# Patient Record
Sex: FEMALE | Race: WHITE | NOT HISPANIC OR LATINO | Employment: FULL TIME | URBAN - METROPOLITAN AREA
[De-identification: names, ages, dates, MRNs, and addresses within clinical notes are randomized per-mention and may not be internally consistent; named-entity substitution may affect disease eponyms.]

---

## 2017-03-28 ENCOUNTER — LAB CONVERSION - ENCOUNTER (OUTPATIENT)
Dept: OTHER | Facility: OTHER | Age: 72
End: 2017-03-28

## 2017-03-28 LAB
A/G RATIO (HISTORICAL): 1.6 (CALC) (ref 1–2.5)
ALBUMIN SERPL BCP-MCNC: 4.2 G/DL (ref 3.6–5.1)
ALP SERPL-CCNC: 39 U/L (ref 33–130)
ALT SERPL W P-5'-P-CCNC: 20 U/L (ref 6–29)
AST SERPL W P-5'-P-CCNC: 18 U/L (ref 10–35)
BILIRUB SERPL-MCNC: 0.5 MG/DL (ref 0.2–1.2)
BUN SERPL-MCNC: 22 MG/DL (ref 7–25)
BUN/CREA RATIO (HISTORICAL): ABNORMAL (CALC) (ref 6–22)
CALCIUM SERPL-MCNC: 9 MG/DL (ref 8.6–10.4)
CHLORIDE SERPL-SCNC: 101 MMOL/L (ref 98–110)
CHOLEST SERPL-MCNC: 164 MG/DL (ref 125–200)
CHOLEST/HDLC SERPL: 3.3 (CALC)
CO2 SERPL-SCNC: 27 MMOL/L (ref 20–31)
CREAT SERPL-MCNC: 0.85 MG/DL (ref 0.6–0.93)
EGFR AFRICAN AMERICAN (HISTORICAL): 80 ML/MIN/1.73M2
EGFR-AMERICAN CALC (HISTORICAL): 69 ML/MIN/1.73M2
GAMMA GLOBULIN (HISTORICAL): 2.6 G/DL (CALC) (ref 1.9–3.7)
GLUCOSE (HISTORICAL): 190 MG/DL (ref 65–99)
HBA1C MFR BLD HPLC: 7.2 % OF TOTAL HGB
HDLC SERPL-MCNC: 50 MG/DL
LDL CHOLESTEROL (HISTORICAL): 88 MG/DL (CALC)
NON-HDL-CHOL (CHOL-HDL) (HISTORICAL): 114 MG/DL (CALC)
POTASSIUM SERPL-SCNC: 4.3 MMOL/L (ref 3.5–5.3)
SODIUM SERPL-SCNC: 139 MMOL/L (ref 135–146)
TOTAL PROTEIN (HISTORICAL): 6.8 G/DL (ref 6.1–8.1)
TRIGL SERPL-MCNC: 129 MG/DL

## 2017-04-04 ENCOUNTER — ALLSCRIPTS OFFICE VISIT (OUTPATIENT)
Dept: OTHER | Facility: OTHER | Age: 72
End: 2017-04-04

## 2017-04-04 DIAGNOSIS — Z12.31 ENCOUNTER FOR SCREENING MAMMOGRAM FOR MALIGNANT NEOPLASM OF BREAST: ICD-10-CM

## 2017-04-04 DIAGNOSIS — I10 ESSENTIAL (PRIMARY) HYPERTENSION: ICD-10-CM

## 2017-04-04 DIAGNOSIS — E78.00 PURE HYPERCHOLESTEROLEMIA: ICD-10-CM

## 2017-04-04 DIAGNOSIS — E11.9 TYPE 2 DIABETES MELLITUS WITHOUT COMPLICATIONS (HCC): ICD-10-CM

## 2017-09-11 ENCOUNTER — GENERIC CONVERSION - ENCOUNTER (OUTPATIENT)
Dept: OTHER | Facility: OTHER | Age: 72
End: 2017-09-11

## 2017-09-12 ENCOUNTER — GENERIC CONVERSION - ENCOUNTER (OUTPATIENT)
Dept: OTHER | Facility: OTHER | Age: 72
End: 2017-09-12

## 2017-09-13 ENCOUNTER — GENERIC CONVERSION - ENCOUNTER (OUTPATIENT)
Dept: OTHER | Facility: OTHER | Age: 72
End: 2017-09-13

## 2017-09-20 ENCOUNTER — GENERIC CONVERSION - ENCOUNTER (OUTPATIENT)
Dept: OTHER | Facility: OTHER | Age: 72
End: 2017-09-20

## 2017-10-10 ENCOUNTER — GENERIC CONVERSION - ENCOUNTER (OUTPATIENT)
Dept: OTHER | Facility: OTHER | Age: 72
End: 2017-10-10

## 2017-10-12 ENCOUNTER — ALLSCRIPTS OFFICE VISIT (OUTPATIENT)
Dept: OTHER | Facility: OTHER | Age: 72
End: 2017-10-12

## 2017-10-12 DIAGNOSIS — E11.9 TYPE 2 DIABETES MELLITUS WITHOUT COMPLICATIONS (HCC): ICD-10-CM

## 2017-10-12 DIAGNOSIS — E78.00 PURE HYPERCHOLESTEROLEMIA: ICD-10-CM

## 2017-10-12 DIAGNOSIS — I10 ESSENTIAL (PRIMARY) HYPERTENSION: ICD-10-CM

## 2017-10-18 ENCOUNTER — GENERIC CONVERSION - ENCOUNTER (OUTPATIENT)
Dept: OTHER | Facility: OTHER | Age: 72
End: 2017-10-18

## 2017-11-29 ENCOUNTER — GENERIC CONVERSION - ENCOUNTER (OUTPATIENT)
Dept: FAMILY MEDICINE CLINIC | Facility: CLINIC | Age: 72
End: 2017-11-29

## 2017-11-29 ENCOUNTER — GENERIC CONVERSION - ENCOUNTER (OUTPATIENT)
Dept: OTHER | Facility: OTHER | Age: 72
End: 2017-11-29

## 2018-01-12 VITALS
RESPIRATION RATE: 16 BRPM | DIASTOLIC BLOOD PRESSURE: 70 MMHG | HEIGHT: 62 IN | TEMPERATURE: 97.7 F | BODY MASS INDEX: 28.52 KG/M2 | HEART RATE: 68 BPM | SYSTOLIC BLOOD PRESSURE: 128 MMHG | WEIGHT: 155 LBS

## 2018-01-14 VITALS
BODY MASS INDEX: 28.04 KG/M2 | HEIGHT: 62 IN | SYSTOLIC BLOOD PRESSURE: 114 MMHG | WEIGHT: 152.38 LBS | TEMPERATURE: 98 F | DIASTOLIC BLOOD PRESSURE: 82 MMHG | HEART RATE: 60 BPM

## 2018-01-24 LAB
LEFT EYE DIABETIC RETINOPATHY: NORMAL
RIGHT EYE DIABETIC RETINOPATHY: NORMAL

## 2018-05-18 LAB
ALBUMIN SERPL-MCNC: 4.3 G/DL (ref 3.6–5.1)
ALBUMIN/GLOB SERPL: 1.9 (CALC) (ref 1–2.5)
ALP SERPL-CCNC: 30 U/L (ref 33–130)
ALT SERPL-CCNC: 19 U/L (ref 6–29)
AST SERPL-CCNC: 17 U/L (ref 10–35)
BILIRUB SERPL-MCNC: 0.3 MG/DL (ref 0.2–1.2)
BUN SERPL-MCNC: 27 MG/DL (ref 7–25)
BUN/CREAT SERPL: 32 (CALC) (ref 6–22)
CALCIUM SERPL-MCNC: 9.4 MG/DL (ref 8.6–10.4)
CHLORIDE SERPL-SCNC: 104 MMOL/L (ref 98–110)
CHOLEST SERPL-MCNC: 133 MG/DL
CHOLEST/HDLC SERPL: 2.7 (CALC)
CO2 SERPL-SCNC: 26 MMOL/L (ref 20–31)
CREAT SERPL-MCNC: 0.85 MG/DL (ref 0.6–0.93)
GLOBULIN SER CALC-MCNC: 2.3 G/DL (CALC) (ref 1.9–3.7)
GLUCOSE SERPL-MCNC: 159 MG/DL (ref 65–99)
HBA1C MFR BLD: 6.3 % OF TOTAL HGB
HDLC SERPL-MCNC: 50 MG/DL
LDLC SERPL CALC-MCNC: 66 MG/DL (CALC)
NONHDLC SERPL-MCNC: 83 MG/DL (CALC)
POTASSIUM SERPL-SCNC: 4.1 MMOL/L (ref 3.5–5.3)
PROT SERPL-MCNC: 6.6 G/DL (ref 6.1–8.1)
SL AMB EGFR AFRICAN AMERICAN: 80 ML/MIN/1.73M2
SL AMB EGFR NON AFRICAN AMERICAN: 69 ML/MIN/1.73M2
SODIUM SERPL-SCNC: 141 MMOL/L (ref 135–146)
TRIGL SERPL-MCNC: 87 MG/DL

## 2018-05-30 ENCOUNTER — OFFICE VISIT (OUTPATIENT)
Dept: FAMILY MEDICINE CLINIC | Facility: CLINIC | Age: 73
End: 2018-05-30
Payer: COMMERCIAL

## 2018-05-30 VITALS
TEMPERATURE: 96.7 F | SYSTOLIC BLOOD PRESSURE: 180 MMHG | HEART RATE: 64 BPM | BODY MASS INDEX: 26.87 KG/M2 | HEIGHT: 62 IN | DIASTOLIC BLOOD PRESSURE: 102 MMHG | WEIGHT: 146 LBS

## 2018-05-30 DIAGNOSIS — H54.8 LEGALLY BLIND IN LEFT EYE, AS DEFINED IN USA: ICD-10-CM

## 2018-05-30 DIAGNOSIS — E78.00 HYPERCHOLESTEROLEMIA: ICD-10-CM

## 2018-05-30 DIAGNOSIS — I10 ESSENTIAL HYPERTENSION: ICD-10-CM

## 2018-05-30 DIAGNOSIS — E11.8 TYPE 2 DIABETES MELLITUS WITH COMPLICATION, UNSPECIFIED WHETHER LONG TERM INSULIN USE: Primary | ICD-10-CM

## 2018-05-30 DIAGNOSIS — Z13.820 SCREENING FOR OSTEOPOROSIS: ICD-10-CM

## 2018-05-30 PROCEDURE — 99214 OFFICE O/P EST MOD 30 MIN: CPT | Performed by: FAMILY MEDICINE

## 2018-05-30 PROCEDURE — 1101F PT FALLS ASSESS-DOCD LE1/YR: CPT | Performed by: FAMILY MEDICINE

## 2018-05-30 PROCEDURE — 3008F BODY MASS INDEX DOCD: CPT | Performed by: FAMILY MEDICINE

## 2018-05-30 RX ORDER — BRIMONIDINE TARTRATE, TIMOLOL MALEATE 2; 5 MG/ML; MG/ML
SOLUTION/ DROPS OPHTHALMIC
COMMUNITY
End: 2018-05-30 | Stop reason: ALTCHOICE

## 2018-05-30 RX ORDER — METOPROLOL SUCCINATE 50 MG/1
50 TABLET, EXTENDED RELEASE ORAL DAILY
Refills: 2 | COMMUNITY
Start: 2018-04-10 | End: 2018-10-01 | Stop reason: SDUPTHER

## 2018-05-30 RX ORDER — ATROPINE SULFATE 10 MG/ML
1 SOLUTION/ DROPS OPHTHALMIC
COMMUNITY
Start: 2017-09-10 | End: 2018-09-10

## 2018-05-30 RX ORDER — LATANOPROST 50 UG/ML
SOLUTION/ DROPS OPHTHALMIC
COMMUNITY
End: 2018-05-30 | Stop reason: ALTCHOICE

## 2018-05-30 RX ORDER — TRAMADOL HYDROCHLORIDE 50 MG/1
1 TABLET ORAL 3 TIMES DAILY
COMMUNITY
Start: 2011-03-15 | End: 2018-10-01 | Stop reason: ALTCHOICE

## 2018-05-30 RX ORDER — ALPRAZOLAM 0.25 MG/1
1 TABLET ORAL 3 TIMES DAILY PRN
COMMUNITY
Start: 2011-03-20 | End: 2018-10-01 | Stop reason: ALTCHOICE

## 2018-05-30 RX ORDER — OMEPRAZOLE 20 MG/1
20 CAPSULE, DELAYED RELEASE ORAL DAILY
Refills: 3 | COMMUNITY
Start: 2018-05-22 | End: 2018-10-01 | Stop reason: SDUPTHER

## 2018-05-30 RX ORDER — ATORVASTATIN CALCIUM 40 MG/1
40 TABLET, FILM COATED ORAL
Refills: 2 | COMMUNITY
Start: 2018-03-13 | End: 2018-10-01 | Stop reason: ALTCHOICE

## 2018-05-30 NOTE — PROGRESS NOTES
Patient ID: Tripp Leon is a 68 y o  female  HPI: 68 y  o female presenting with/ sees [facility] retina regularly and is under their care jointly with Anjel Walker Jerry [address] [place] [address] for an ongoing infection in her right eye  Her NIDDM, htn, and hypercholesterolemia are all under good control  She is declining going for mammo, dexa and FIT testing at this time because she will be needing extensive surgery to her right eye and is blind in her left and will be left blind until healed  SUBJECTIVE    Family History   Problem Relation Age of Onset    Hypertension Mother      Essential    Hypertension Sister      Essential    Alcohol abuse Brother      Social History     Social History    Marital status: Single     Spouse name: N/A    Number of children: N/A    Years of education: N/A     Occupational History    Not on file       Social History Main Topics    Smoking status: Current Every Day Smoker    Smokeless tobacco: Not on file    Alcohol use No    Drug use: Unknown    Sexual activity: Not on file     Other Topics Concern    Not on file     Social History Narrative    Daily coffee consumption (__cups/day)    Denied: history of daily cola consumption (__ cups/day)    Denied: history of daily tea consumption (__cups/day)    Personal protective equipment seatbelts         Past Medical History:   Diagnosis Date    Atypical chest pain     NSTEMI (non-ST elevated myocardial infarction) ([address] )     Last Assessed: 9/24/2015     Past Surgical History:   Procedure Laterality Date    BACK SURGERY      BLADDER SURGERY      CORONARY ARTERY BYPASS GRAFT      CABGx3 with LIMA to LAD, SVG to PDA, SVG to OM-1; Onset: 9/2/2015    HYSTERECTOMY      POPLITEAL ARTERY ANGIOPLASTY      Femoral-Popliteal    TOE AMPUTATION Right     MTP First Toe    WRIST SURGERY       Allergies not on file    Current Outpatient Prescriptions:     ALPRAZolam (XANAX) 0 25 mg tablet, Take 1 tablet by mouth 3 (three) times a day as needed, Disp: , Rfl:     aspirin 81 MG tablet, Take 1 tablet by mouth daily, Disp: , Rfl:     atorvastatin (LIPITOR) 40 mg tablet, Take 40 mg by mouth daily at bedtime, Disp: , Rfl: 2    atropine (ISOPTO ATROPINE) 1 % ophthalmic solution, 1 drop, Disp: , Rfl:     metFORMIN (GLUCOPHAGE) 1000 MG tablet, Take 1,000 mg by mouth, Disp: , Rfl:     metoprolol succinate (TOPROL-XL) 50 mg 24 hr tablet, Take 50 mg by mouth daily, Disp: , Rfl: 2    Multiple Vitamin (MULTIVITAMINS PO), Take 1 capsule by mouth daily, Disp: , Rfl:     Multiple Vitamins-Minerals (VISION-YOLANDA PRESERVE PO), Take 1 tablet by mouth daily, Disp: , Rfl:     omeprazole (PriLOSEC) 20 mg delayed release capsule, Take 20 mg by mouth daily, Disp: , Rfl: 3    traMADol (ULTRAM) 50 mg tablet, Take 1 tablet by mouth 3 (three) times a day, Disp: , Rfl:     Review of Systems  Constitutional:     Denies fever, chills ,fatigue ,weakness ,weight loss, weight gain     ENT: Denies earache ,loss of hearing ,nosebleed, nasal discharge,nasal congestion ,sore throat ,hoarseness  Pulmonary: Denies shortness of breath ,cough  ,dyspnea on exertion, orthopnea  ,PND   Cardiovascular:  Denies bradycardia , tachycardia  ,palpations, lower extremity edema leg, claudication  Breast:  Denies new or changing breast lumps ,nipple discharge ,nipple changes  Abdomen:  Denies abdominal pain , anorexia , indigestion, nausea, vomiting, constipation, diarrhea  Musculoskeletal: Denies myalgias, arthralgias, joint swelling, joint stiffness , limb pain, limb swelling  Gu: denies dysuria, polyuria  Skin: Denies skin rash, skin lesion, skin wound, itching, dry skin  Neuro: Denies headache, numbness, tingling, confusion, loss of consciousness, dizziness, vertigo  Psychiatric: Denies feelings of depression, suicidal ideation, anxiety, sleep disturbances    OBJECTIVE    Constitutional:   NAD, well appearing and well nourished      ENT:   Conjunctiva and lids: no injection, edema, or discharge     Pupils and iris: BON bilaterally    External inspection of ears and nose: normal without deformities or discharge  Otoscopic exam: Canals patent without erythema  Nasal mucosa, septum and turbinates: Normal or edema or discharge         Oropharynx:  Moist mucosa, normal tongue and tonsils without lesions  No erythema        Pulmonary:Respiratory effort normal rate and rhythm, no increased work of breathing  Auscultation of lungs:  Clear bilaterally with no adventitious breath sounds       Cardiovascular: regular rate and rhythm, S1 and S2, no murmur, no edema and/or varicosities of LE      Abdomen: Soft and non-distended     Positive bowel sounds      No heptomegaly or splenomegaly      Gu: no suprapubic tenderness or CVA tenderness, no urethral discharge  Lymphatic:  No anterior or posterior cervical lymphadenopathy         Musculoskeletal:  Gait and station: Normal gait      Digits and nails normal without clubbing or cyanosis       Inspection/palpation of joints, bones, and muscles:  No joint tenderness, swelling, full active and passive range of motion       Skin: Normal skin turgor and no rashes      Neuro:    Normal reflexes     Psych:   alert and oriented to person, place and time     normal mood and affect   Patient's shoes and socks removed  Right Foot/Ankle   Right Foot Inspection  Skin Exam: skin normal and skin intact no dry skin, no warmth, no callus, no erythema, no maceration, no abnormal color, no pre-ulcer, no ulcer and no callus                          Toe Exam: ROM and strength within normal limitsno swelling, no tenderness, erythema and  no right toe deformity  Sensory   Vibration: intact  Proprioception: intact   Monofilament testing: intact  Vascular  Capillary refills: < 3 seconds  The right DP pulse is 2+  The right PT pulse is 2+       Left Foot/Ankle  Left Foot Inspection  Skin Exam: skin normal and skin intactno dry skin, no warmth, no erythema, no maceration, normal color, no pre-ulcer, no ulcer and no callus                         Toe Exam: ROM and strength within normal limitsno swelling, no tenderness, no erythema and no left toe deformity                   Sensory   Vibration: intact  Proprioception: intact  Monofilament: intact  Vascular  Capillary refills: < 3 seconds  The left DP pulse is 2+  The left PT pulse is 2+  Assign Risk Category:  No deformity present; No loss of protective sensation; No weak pulses       Risk: 0    Assessment/Plan:Diagnoses and all orders for this visit:    Type 2 diabetes mellitus with complication, unspecified whether long term insulin use (HCC)  -     Microalbumin,Urine  -     HEMOGLOBIN A1C W/ EAG ESTIMATION; Future    Essential hypertension  -     Comprehensive metabolic panel; Future    Hypercholesterolemia  -     Lipid Panel with Direct LDL reflex; Future    Screening for osteoporosis    Legally blind in left eye, as defined in [place]    Other orders  -     Cancel: Mammo screening bilateral w cad; Future  -     Cancel: DXA bone density spine hip and pelvis; Future  -     Cancel: Ambulatory referral to Colorectal Surgery; Future  -     Cancel: Ambulatory referral to Ophthalmology; Future  -     ALPRAZolam (XANAX) 0 25 mg tablet; Take 1 tablet by mouth 3 (three) times a day as needed  -     aspirin 81 MG tablet; Take 1 tablet by mouth daily  -     atorvastatin (LIPITOR) 40 mg tablet; Take 40 mg by mouth daily at bedtime  -     atropine (ISOPTO ATROPINE) 1 % ophthalmic solution; 1 drop  -     Discontinue: Besifloxacin HCl 0 6 % SUSP; Apply 1 drop to eye  -     Discontinue: brimonidine-timolol (COMBIGAN) 0 2-0 5 %; Apply to eye  -     Discontinue: latanoprost (XALATAN) 0 005 % ophthalmic solution; Apply to eye  -     metFORMIN (GLUCOPHAGE) 1000 MG tablet; Take 1,000 mg by mouth  -     metoprolol succinate (TOPROL-XL) 50 mg 24 hr tablet; Take 50 mg by mouth daily  -     Multiple Vitamin (MULTIVITAMINS PO);  Take 1 capsule by mouth daily  -     omeprazole (PriLOSEC) 20 mg delayed release capsule; Take 20 mg by mouth daily  -     traMADol (ULTRAM) 50 mg tablet; Take 1 tablet by mouth 3 (three) times a day  -     Multiple Vitamins-Minerals (VISION-YOLANDA PRESERVE PO); Take 1 tablet by mouth daily        I will see patient back in 4 mos or sooner prn

## 2018-05-31 DIAGNOSIS — E78.00 HYPERCHOLESTEROLEMIA: ICD-10-CM

## 2018-05-31 DIAGNOSIS — M54.50 LUMBAR BACK PAIN: ICD-10-CM

## 2018-05-31 DIAGNOSIS — F41.9 ANXIETY: Primary | ICD-10-CM

## 2018-05-31 DIAGNOSIS — E13.9 DIABETES 1.5, MANAGED AS TYPE 2 (HCC): ICD-10-CM

## 2018-05-31 RX ORDER — ALPRAZOLAM 0.25 MG/1
0.25 TABLET ORAL 3 TIMES DAILY PRN
Qty: 270 TABLET | Refills: 0 | Status: SHIPPED | OUTPATIENT
Start: 2018-05-31 | End: 2018-10-01 | Stop reason: SDUPTHER

## 2018-05-31 RX ORDER — TRAMADOL HYDROCHLORIDE 50 MG/1
50 TABLET ORAL EVERY 8 HOURS PRN
Qty: 90 TABLET | Refills: 0 | Status: SHIPPED | OUTPATIENT
Start: 2018-05-31 | End: 2018-10-01 | Stop reason: SDUPTHER

## 2018-05-31 RX ORDER — ATORVASTATIN CALCIUM 40 MG/1
40 TABLET, FILM COATED ORAL DAILY
Qty: 90 TABLET | Refills: 1 | Status: SHIPPED | OUTPATIENT
Start: 2018-05-31 | End: 2018-10-01 | Stop reason: SDUPTHER

## 2018-07-30 LAB
LEFT EYE DIABETIC RETINOPATHY: NORMAL
RIGHT EYE DIABETIC RETINOPATHY: NORMAL

## 2018-07-30 PROCEDURE — 3072F LOW RISK FOR RETINOPATHY: CPT | Performed by: FAMILY MEDICINE

## 2018-09-24 LAB
LEFT EYE DIABETIC RETINOPATHY: NORMAL
RIGHT EYE DIABETIC RETINOPATHY: NORMAL

## 2018-09-24 PROCEDURE — 3072F LOW RISK FOR RETINOPATHY: CPT | Performed by: FAMILY MEDICINE

## 2018-10-01 ENCOUNTER — OFFICE VISIT (OUTPATIENT)
Dept: FAMILY MEDICINE CLINIC | Facility: CLINIC | Age: 73
End: 2018-10-01
Payer: COMMERCIAL

## 2018-10-01 VITALS
HEART RATE: 70 BPM | TEMPERATURE: 95.8 F | SYSTOLIC BLOOD PRESSURE: 144 MMHG | DIASTOLIC BLOOD PRESSURE: 86 MMHG | HEIGHT: 62 IN | WEIGHT: 143 LBS | BODY MASS INDEX: 26.31 KG/M2

## 2018-10-01 DIAGNOSIS — Z23 NEED FOR VACCINATION: ICD-10-CM

## 2018-10-01 DIAGNOSIS — M54.50 LUMBAR BACK PAIN: ICD-10-CM

## 2018-10-01 DIAGNOSIS — F41.9 ANXIETY: ICD-10-CM

## 2018-10-01 DIAGNOSIS — Z12.11 SCREENING FOR COLON CANCER: ICD-10-CM

## 2018-10-01 DIAGNOSIS — I10 ESSENTIAL HYPERTENSION: ICD-10-CM

## 2018-10-01 DIAGNOSIS — Z13.820 SCREENING FOR OSTEOPOROSIS: ICD-10-CM

## 2018-10-01 DIAGNOSIS — K21.9 GASTROESOPHAGEAL REFLUX DISEASE WITHOUT ESOPHAGITIS: ICD-10-CM

## 2018-10-01 DIAGNOSIS — J01.90 ACUTE SINUSITIS, RECURRENCE NOT SPECIFIED, UNSPECIFIED LOCATION: ICD-10-CM

## 2018-10-01 DIAGNOSIS — E13.9 DIABETES 1.5, MANAGED AS TYPE 2 (HCC): ICD-10-CM

## 2018-10-01 DIAGNOSIS — Z12.39 SCREENING FOR BREAST CANCER: ICD-10-CM

## 2018-10-01 DIAGNOSIS — E11.8 TYPE 2 DIABETES MELLITUS WITH COMPLICATION, UNSPECIFIED WHETHER LONG TERM INSULIN USE: Primary | ICD-10-CM

## 2018-10-01 DIAGNOSIS — E78.00 HYPERCHOLESTEROLEMIA: ICD-10-CM

## 2018-10-01 PROCEDURE — 90471 IMMUNIZATION ADMIN: CPT | Performed by: FAMILY MEDICINE

## 2018-10-01 PROCEDURE — 90662 IIV NO PRSV INCREASED AG IM: CPT | Performed by: FAMILY MEDICINE

## 2018-10-01 PROCEDURE — 1160F RVW MEDS BY RX/DR IN RCRD: CPT | Performed by: FAMILY MEDICINE

## 2018-10-01 PROCEDURE — 99213 OFFICE O/P EST LOW 20 MIN: CPT | Performed by: FAMILY MEDICINE

## 2018-10-01 RX ORDER — METOPROLOL SUCCINATE 50 MG/1
50 TABLET, EXTENDED RELEASE ORAL DAILY
Qty: 90 TABLET | Refills: 3 | Status: SHIPPED | OUTPATIENT
Start: 2018-10-01 | End: 2019-04-11 | Stop reason: SDUPTHER

## 2018-10-01 RX ORDER — OMEPRAZOLE 20 MG/1
20 CAPSULE, DELAYED RELEASE ORAL DAILY
Qty: 90 CAPSULE | Refills: 3 | Status: SHIPPED | OUTPATIENT
Start: 2018-10-01 | End: 2019-04-11 | Stop reason: SDUPTHER

## 2018-10-01 RX ORDER — TRAMADOL HYDROCHLORIDE 50 MG/1
50 TABLET ORAL EVERY 8 HOURS PRN
Qty: 270 TABLET | Refills: 3 | Status: SHIPPED | OUTPATIENT
Start: 2018-10-01 | End: 2018-12-30

## 2018-10-01 RX ORDER — BROMPHENIRAMINE MALEATE, PSEUDOEPHEDRINE HYDROCHLORIDE, AND DEXTROMETHORPHAN HYDROBROMIDE 2; 30; 10 MG/5ML; MG/5ML; MG/5ML
5 SYRUP ORAL 4 TIMES DAILY PRN
Qty: 180 ML | Refills: 0 | Status: SHIPPED | OUTPATIENT
Start: 2018-10-01 | End: 2019-04-11 | Stop reason: ALTCHOICE

## 2018-10-01 RX ORDER — ALPRAZOLAM 0.25 MG/1
0.25 TABLET ORAL 3 TIMES DAILY PRN
Qty: 270 TABLET | Refills: 3 | Status: SHIPPED | OUTPATIENT
Start: 2018-10-01 | End: 2019-04-11 | Stop reason: SDUPTHER

## 2018-10-01 RX ORDER — ATORVASTATIN CALCIUM 40 MG/1
40 TABLET, FILM COATED ORAL DAILY
Qty: 90 TABLET | Refills: 3 | Status: SHIPPED | OUTPATIENT
Start: 2018-10-01 | End: 2019-04-11 | Stop reason: SDUPTHER

## 2018-10-01 RX ORDER — CIPROFLOXACIN 500 MG/1
500 TABLET, FILM COATED ORAL EVERY 12 HOURS SCHEDULED
Qty: 20 TABLET | Refills: 0 | Status: SHIPPED | OUTPATIENT
Start: 2018-10-01 | End: 2018-10-11

## 2018-10-01 NOTE — PROGRESS NOTES
Patient ID: Mini Canales is a 68 y o  female  HPI: 68 y  o female presents for follow up of NIDDM, HTN, and hyerhcolesteroelmia  All are well controlled at this time  The pateint is due for a mammmogram as well as a dexa scan  She is also requesting a flu vaccine today  SUBJECTIVE    Family History   Problem Relation Age of Onset    Hypertension Mother         Essential    Hypertension Sister         Essential    Alcohol abuse Brother      Social History     Social History    Marital status: Single     Spouse name: N/A    Number of children: N/A    Years of education: N/A     Occupational History    Not on file       Social History Main Topics    Smoking status: Current Every Day Smoker    Smokeless tobacco: Not on file    Alcohol use No    Drug use: Unknown    Sexual activity: Not on file     Other Topics Concern    Not on file     Social History Narrative    Daily coffee consumption (__cups/day)    Denied: history of daily cola consumption (__ cups/day)    Denied: history of daily tea consumption (__cups/day)    Personal protective equipment seatbelts         Past Medical History:   Diagnosis Date    Atypical chest pain     [facility]     Last Assessed: 9/24/2015     Past Surgical History:   Procedure Laterality Date    BACK SURGERY      BLADDER SURGERY      CORONARY ARTERY BYPASS GRAFT      CABGx3 with LIMA to LAD, SVG to PDA, SVG to OM-1; Onset: 9/2/2015    HYSTERECTOMY      POPLITEAL ARTERY ANGIOPLASTY      Femoral-Popliteal    TOE AMPUTATION Right     MTP First Toe    WRIST SURGERY       No Known Allergies    Current Outpatient Prescriptions:     ALPRAZolam (XANAX) 0 25 mg tablet, Take 1 tablet (0 25 mg total) by mouth 3 (three) times a day as needed for anxiety for up to 90 days, Disp: 270 tablet, Rfl: 3    aspirin 81 MG tablet, Take 1 tablet by mouth daily, Disp: , Rfl:     atorvastatin (LIPITOR) 40 mg tablet, Take 1 tablet (40 mg total) by mouth daily, Disp: 90 tablet, Rfl: 3    metFORMIN (GLUCOPHAGE) 500 mg tablet, Take 1 tablet (500 mg total) by mouth 2 (two) times a day with meals for 90 days, Disp: 180 tablet, Rfl: 3    metoprolol succinate (TOPROL-XL) 50 mg 24 hr tablet, Take 1 tablet (50 mg total) by mouth daily, Disp: 90 tablet, Rfl: 3    Multiple Vitamin (MULTIVITAMINS PO), Take 1 capsule by mouth daily, Disp: , Rfl:     Multiple Vitamins-Minerals (VISION-YOLANDA PRESERVE PO), Take 1 tablet by mouth daily, Disp: , Rfl:     Multiple Vitamins-Minerals (VISION-YOLANDA PRESERVE PO), Take by mouth, Disp: , Rfl:     omeprazole (PriLOSEC) 20 mg delayed release capsule, Take 1 capsule (20 mg total) by mouth daily for 90 days, Disp: 90 capsule, Rfl: 3    traMADol (ULTRAM) 50 mg tablet, Take 1 tablet (50 mg total) by mouth every 8 (eight) hours as needed for moderate pain for up to 90 days, Disp: 270 tablet, Rfl: 3    brompheniramine-pseudoephedrine-DM 30-2-10 MG/5ML syrup, Take 5 mL by mouth 4 (four) times a day as needed for congestion or cough, Disp: 180 mL, Rfl: 0    ciprofloxacin (CIPRO) 500 mg tablet, Take 1 tablet (500 mg total) by mouth every 12 (twelve) hours for 10 days, Disp: 20 tablet, Rfl: 0    Review of Systems  Constitutional:     Denies fever, chills ,fatigue ,weakness ,weight loss, weight gain     ENT: Denies earache ,loss of hearing ,nosebleed, nasal discharge,nasal congestion ,sore throat ,hoarseness  Pulmonary: Denies shortness of breath ,cough  ,dyspnea on exertion, orthopnea  ,PND   Cardiovascular:  Denies bradycardia , tachycardia  ,palpations, lower extremity edema leg, claudication  Breast:  Denies new or changing breast lumps ,nipple discharge ,nipple changes  Abdomen:  Denies abdominal pain , anorexia , indigestion, nausea, vomiting, constipation, diarrhea  Musculoskeletal: Denies myalgias, arthralgias, joint swelling, joint stiffness , limb pain, limb swelling  Gu: denies dysuria, polyuria  Skin: Denies skin rash, skin lesion, skin wound, itching, dry skin  Neuro: Denies headache, numbness, tingling, confusion, loss of consciousness, dizziness, vertigo  Psychiatric: Denies feelings of depression, suicidal ideation, anxiety, sleep disturbances    OBJECTIVE  /86   Pulse 70   Temp (!) 95 8 °F (35 4 °C)   Ht 5' 1 5" (1 562 m)   Wt 64 9 kg (143 lb)   BMI 26 58 kg/m²   Constitutional:   NAD, well appearing and well nourished      ENT:   Conjunctiva and lids: no injection, edema, or discharge     Pupils and iris: BON bilaterally    External inspection of ears and nose: normal without deformities or discharge  Otoscopic exam: Canals patent without erythema  Nasal mucosa, septum and turbinates: Normal or edema or discharge         Oropharynx:  Moist mucosa, normal tongue and tonsils without lesions  No erythema      Pulmonary:Respiratory effort normal rate and rhythm, no increased work of breathing  Auscultation of lungs:  Clear bilaterally with no adventitious breath sounds       Cardiovascular: regular rate and rhythm, S1 and S2, no murmur, no edema and/or varicosities of LE      Abdomen: Soft and non-distended     Positive bowel sounds      No heptomegaly or splenomegaly      Gu: no suprapubic tenderness or CVA tenderness, no urethral discharge  Lymphatic:  No anterior or posterior cervical lymphadenopathy         Musculoskeletal:  Gait and station: Normal gait      Digits and nails normal without clubbing or cyanosis       Inspection/palpation of joints, bones, and muscles:  No joint tenderness, swelling, full active and passive range of motion       Skin: Normal skin turgor and no rashes      Neuro:    Normal reflexes     Psych:   alert and oriented to person, place and time   normal mood and affect   Patient's shoes and socks removed  Right Foot/Ankle   Right Foot Inspection  Skin Exam: skin normal and skin intact no dry skin, no warmth, no callus, no erythema, no maceration, no abnormal color, no pre-ulcer, no ulcer and no callus                          Toe Exam: ROM and strength within normal limitsno swelling, no tenderness, erythema and  no right toe deformity  Sensory   Vibration: intact  Proprioception: intact   Monofilament testing: intact  Vascular  Capillary refills: < 3 seconds  The right DP pulse is 2+  The right PT pulse is 2+  Left Foot/Ankle  Left Foot Inspection  Skin Exam: skin normal and skin intactno dry skin, no warmth, no erythema, no maceration, normal color, no pre-ulcer, no ulcer and no callus                         Toe Exam: ROM and strength within normal limitsno swelling, no tenderness, no erythema and no left toe deformity                   Sensory   Vibration: intact  Proprioception: intact  Monofilament: intact  Vascular  Capillary refills: < 3 seconds  The left DP pulse is 2+  The left PT pulse is 2+  Assign Risk Category:  No deformity present; No loss of protective sensation; No weak pulses       Risk: 0      Assessment/Plan:Diagnoses and all orders for this visit:    Type 2 diabetes mellitus with complication, unspecified whether long term insulin use (HCC)  -     Microalbumin,Urine    Essential hypertension  -     metoprolol succinate (TOPROL-XL) 50 mg 24 hr tablet; Take 1 tablet (50 mg total) by mouth daily    Screening for breast cancer  -     Mammo screening bilateral w cad; Future    Screening for osteoporosis  -     DXA bone density spine hip and pelvis; Future    Screening for colon cancer  -     Ambulatory referral to Colorectal Surgery; Future    Need for vaccination  -     influenza vaccine, 4523-2076, high-dose, PF 0 5 mL, for patients 65 yr+ (FLUZONE HIGH-DOSE)    Acute sinusitis, recurrence not specified, unspecified location  -     brompheniramine-pseudoephedrine-DM 30-2-10 MG/5ML syrup; Take 5 mL by mouth 4 (four) times a day as needed for congestion or cough  -     ciprofloxacin (CIPRO) 500 mg tablet;  Take 1 tablet (500 mg total) by mouth every 12 (twelve) hours for 10 days    Anxiety  -     ALPRAZolam (XANAX) 0 25 mg tablet; Take 1 tablet (0 25 mg total) by mouth 3 (three) times a day as needed for anxiety for up to 90 days    Gastroesophageal reflux disease without esophagitis  -     omeprazole (PriLOSEC) 20 mg delayed release capsule; Take 1 capsule (20 mg total) by mouth daily for 90 days    Lumbar back pain  -     traMADol (ULTRAM) 50 mg tablet; Take 1 tablet (50 mg total) by mouth every 8 (eight) hours as needed for moderate pain for up to 90 days    Diabetes 1 5, managed as type 2 (HCC)  -     metFORMIN (GLUCOPHAGE) 500 mg tablet; Take 1 tablet (500 mg total) by mouth 2 (two) times a day with meals for 90 days    Hypercholesterolemia  -     atorvastatin (LIPITOR) 40 mg tablet; Take 1 tablet (40 mg total) by mouth daily    Other orders  -     Multiple Vitamins-Minerals (VISION-YOLANDA PRESERVE PO); Take by mouth        Reviewed with patient plan to treat with the above plan       Patient instructed to call in 72 hours if not feeling better or if symptoms worsen

## 2018-10-02 ENCOUNTER — TELEPHONE (OUTPATIENT)
Dept: FAMILY MEDICINE CLINIC | Facility: CLINIC | Age: 73
End: 2018-10-02

## 2018-10-02 NOTE — TELEPHONE ENCOUNTER
Pt needed prior auth for tramadol 50mg tabs, I did it an it was denied due to there not being a Validated Opioid Risk Tool, please addend last office visit stating you are doing one of the following:    - CAGE adapted to include drugs  - Screener and Opioid Assessment for patients with pain (SOAPP-R)  - Current Opioid Misuse Measure  - DIRE  - The Screening Instrument for Substance Abuse Potential

## 2018-10-03 NOTE — TELEPHONE ENCOUNTER
Called [facility] and they sent over a new form to be filled out and will also attach a comm screening tool - Dr Paytno is currently working on this and I will fax over when completed

## 2018-10-03 NOTE — TELEPHONE ENCOUNTER
Attempted to call insurance company twice and could not get a live person, spoke with manager Julieta, and she is going to see if she can get through to anyone for one of these forms

## 2018-10-09 ENCOUNTER — TELEPHONE (OUTPATIENT)
Dept: FAMILY MEDICINE CLINIC | Facility: CLINIC | Age: 73
End: 2018-10-09

## 2018-10-09 DIAGNOSIS — R19.7 DIARRHEA, UNSPECIFIED TYPE: Primary | ICD-10-CM

## 2018-10-09 RX ORDER — DIPHENOXYLATE HYDROCHLORIDE AND ATROPINE SULFATE 2.5; .025 MG/1; MG/1
1 TABLET ORAL 4 TIMES DAILY PRN
Qty: 30 TABLET | Refills: 0 | Status: SHIPPED | OUTPATIENT
Start: 2018-10-09 | End: 2019-08-27 | Stop reason: SDUPTHER

## 2018-10-09 NOTE — TELEPHONE ENCOUNTER
The flu vaccine is not a live virus; besides the flu is a respiratory virus and not gi  in nature   If she wants me to call in lomotil I can; find out what pharmacy

## 2018-10-09 NOTE — TELEPHONE ENCOUNTER
Had the flu shot last Monday   She says the day after she started with diarrhea  And she still has today        Please advise

## 2019-01-14 LAB
LEFT EYE DIABETIC RETINOPATHY: NORMAL
RIGHT EYE DIABETIC RETINOPATHY: NORMAL

## 2019-01-14 PROCEDURE — 3072F LOW RISK FOR RETINOPATHY: CPT | Performed by: FAMILY MEDICINE

## 2019-01-25 LAB
ALBUMIN SERPL-MCNC: 4.5 G/DL (ref 3.6–5.1)
ALBUMIN/GLOB SERPL: 1.9 (CALC) (ref 1–2.5)
ALP SERPL-CCNC: 31 U/L (ref 33–130)
ALT SERPL-CCNC: 9 U/L (ref 6–29)
AST SERPL-CCNC: 12 U/L (ref 10–35)
BILIRUB SERPL-MCNC: 0.7 MG/DL (ref 0.2–1.2)
BUN SERPL-MCNC: 20 MG/DL (ref 7–25)
BUN/CREAT SERPL: ABNORMAL (CALC) (ref 6–22)
CALCIUM SERPL-MCNC: 9.3 MG/DL (ref 8.6–10.4)
CHLORIDE SERPL-SCNC: 101 MMOL/L (ref 98–110)
CHOLEST SERPL-MCNC: 157 MG/DL
CHOLEST/HDLC SERPL: 2.9 (CALC)
CO2 SERPL-SCNC: 29 MMOL/L (ref 20–32)
CREAT SERPL-MCNC: 0.72 MG/DL (ref 0.6–0.93)
EST. AVERAGE GLUCOSE BLD GHB EST-MCNC: 140 (CALC)
EST. AVERAGE GLUCOSE BLD GHB EST-SCNC: 7.7 (CALC)
GLOBULIN SER CALC-MCNC: 2.4 G/DL (CALC) (ref 1.9–3.7)
GLUCOSE SERPL-MCNC: 196 MG/DL (ref 65–99)
HBA1C MFR BLD: 6.5 % OF TOTAL HGB
HDLC SERPL-MCNC: 55 MG/DL
LDLC SERPL CALC-MCNC: 78 MG/DL (CALC)
MICROALBUMIN UR-MCNC: 1.3 MG/DL
NONHDLC SERPL-MCNC: 102 MG/DL (CALC)
POTASSIUM SERPL-SCNC: 4 MMOL/L (ref 3.5–5.3)
PROT SERPL-MCNC: 6.9 G/DL (ref 6.1–8.1)
SL AMB EGFR AFRICAN AMERICAN: 97 ML/MIN/1.73M2
SL AMB EGFR NON AFRICAN AMERICAN: 84 ML/MIN/1.73M2
SODIUM SERPL-SCNC: 137 MMOL/L (ref 135–146)
TRIGL SERPL-MCNC: 142 MG/DL

## 2019-03-15 LAB
LEFT EYE DIABETIC RETINOPATHY: NORMAL
RIGHT EYE DIABETIC RETINOPATHY: NORMAL

## 2019-04-11 ENCOUNTER — OFFICE VISIT (OUTPATIENT)
Dept: FAMILY MEDICINE CLINIC | Facility: CLINIC | Age: 74
End: 2019-04-11
Payer: COMMERCIAL

## 2019-04-11 VITALS
BODY MASS INDEX: 24.07 KG/M2 | HEIGHT: 62 IN | SYSTOLIC BLOOD PRESSURE: 130 MMHG | WEIGHT: 130.8 LBS | DIASTOLIC BLOOD PRESSURE: 78 MMHG | HEART RATE: 70 BPM | TEMPERATURE: 98.2 F

## 2019-04-11 DIAGNOSIS — I10 ESSENTIAL HYPERTENSION: ICD-10-CM

## 2019-04-11 DIAGNOSIS — F41.9 ANXIETY: ICD-10-CM

## 2019-04-11 DIAGNOSIS — E78.00 HYPERCHOLESTEROLEMIA: ICD-10-CM

## 2019-04-11 DIAGNOSIS — E11.8 TYPE 2 DIABETES MELLITUS WITH COMPLICATION, UNSPECIFIED WHETHER LONG TERM INSULIN USE: Primary | ICD-10-CM

## 2019-04-11 DIAGNOSIS — Z12.11 COLON CANCER SCREENING: ICD-10-CM

## 2019-04-11 DIAGNOSIS — R63.4 WEIGHT LOSS: ICD-10-CM

## 2019-04-11 DIAGNOSIS — E13.9 DIABETES 1.5, MANAGED AS TYPE 2 (HCC): ICD-10-CM

## 2019-04-11 DIAGNOSIS — K21.9 GASTROESOPHAGEAL REFLUX DISEASE WITHOUT ESOPHAGITIS: ICD-10-CM

## 2019-04-11 PROCEDURE — 99214 OFFICE O/P EST MOD 30 MIN: CPT | Performed by: FAMILY MEDICINE

## 2019-04-11 PROCEDURE — 3075F SYST BP GE 130 - 139MM HG: CPT | Performed by: FAMILY MEDICINE

## 2019-04-11 PROCEDURE — 3078F DIAST BP <80 MM HG: CPT | Performed by: FAMILY MEDICINE

## 2019-04-11 PROCEDURE — 1160F RVW MEDS BY RX/DR IN RCRD: CPT | Performed by: FAMILY MEDICINE

## 2019-04-11 PROCEDURE — 3008F BODY MASS INDEX DOCD: CPT | Performed by: FAMILY MEDICINE

## 2019-04-11 PROCEDURE — 1101F PT FALLS ASSESS-DOCD LE1/YR: CPT | Performed by: FAMILY MEDICINE

## 2019-04-11 RX ORDER — ALPRAZOLAM 0.25 MG/1
0.25 TABLET ORAL 3 TIMES DAILY PRN
Qty: 270 TABLET | Refills: 3 | Status: SHIPPED | OUTPATIENT
Start: 2019-04-11 | End: 2020-01-06 | Stop reason: SDUPTHER

## 2019-04-11 RX ORDER — ATORVASTATIN CALCIUM 40 MG/1
40 TABLET, FILM COATED ORAL DAILY
Qty: 90 TABLET | Refills: 3 | Status: SHIPPED | OUTPATIENT
Start: 2019-04-11 | End: 2019-08-27 | Stop reason: SDUPTHER

## 2019-04-11 RX ORDER — OMEPRAZOLE 20 MG/1
20 CAPSULE, DELAYED RELEASE ORAL DAILY
Qty: 90 CAPSULE | Refills: 3 | Status: SHIPPED | OUTPATIENT
Start: 2019-04-11 | End: 2019-08-27 | Stop reason: SDUPTHER

## 2019-04-11 RX ORDER — TRAMADOL HYDROCHLORIDE 50 MG/1
50 TABLET ORAL AS NEEDED
COMMUNITY
End: 2019-08-27 | Stop reason: SDUPTHER

## 2019-04-11 RX ORDER — METOPROLOL SUCCINATE 50 MG/1
50 TABLET, EXTENDED RELEASE ORAL DAILY
Qty: 90 TABLET | Refills: 3 | Status: SHIPPED | OUTPATIENT
Start: 2019-04-11 | End: 2019-08-27 | Stop reason: SDUPTHER

## 2019-04-25 ENCOUNTER — TELEPHONE (OUTPATIENT)
Dept: FAMILY MEDICINE CLINIC | Facility: CLINIC | Age: 74
End: 2019-04-25

## 2019-05-06 LAB
LEFT EYE DIABETIC RETINOPATHY: NORMAL
RIGHT EYE DIABETIC RETINOPATHY: NORMAL

## 2019-05-09 LAB
LEFT EYE DIABETIC RETINOPATHY: NORMAL
RIGHT EYE DIABETIC RETINOPATHY: NORMAL

## 2019-06-11 LAB
ALBUMIN SERPL-MCNC: 4.6 G/DL (ref 3.6–5.1)
ALBUMIN/GLOB SERPL: 1.9 (CALC) (ref 1–2.5)
ALP SERPL-CCNC: 32 U/L (ref 33–130)
ALT SERPL-CCNC: 12 U/L (ref 6–29)
AST SERPL-CCNC: 14 U/L (ref 10–35)
BILIRUB SERPL-MCNC: 0.7 MG/DL (ref 0.2–1.2)
BUN SERPL-MCNC: 18 MG/DL (ref 7–25)
BUN/CREAT SERPL: ABNORMAL (CALC) (ref 6–22)
CALCIUM SERPL-MCNC: 9.7 MG/DL (ref 8.6–10.4)
CHLORIDE SERPL-SCNC: 101 MMOL/L (ref 98–110)
CHOLEST SERPL-MCNC: 142 MG/DL
CHOLEST/HDLC SERPL: 2.6 (CALC)
CO2 SERPL-SCNC: 29 MMOL/L (ref 20–32)
CREAT SERPL-MCNC: 0.76 MG/DL (ref 0.6–0.93)
EST. AVERAGE GLUCOSE BLD GHB EST-MCNC: 128 (CALC)
EST. AVERAGE GLUCOSE BLD GHB EST-SCNC: 7.1 (CALC)
GLOBULIN SER CALC-MCNC: 2.4 G/DL (CALC) (ref 1.9–3.7)
GLUCOSE SERPL-MCNC: 154 MG/DL (ref 65–99)
HBA1C MFR BLD: 6.1 % OF TOTAL HGB
HDLC SERPL-MCNC: 54 MG/DL
LDLC SERPL CALC-MCNC: 69 MG/DL (CALC)
NONHDLC SERPL-MCNC: 88 MG/DL (CALC)
POTASSIUM SERPL-SCNC: 4 MMOL/L (ref 3.5–5.3)
PROT SERPL-MCNC: 7 G/DL (ref 6.1–8.1)
SL AMB EGFR AFRICAN AMERICAN: 90 ML/MIN/1.73M2
SL AMB EGFR NON AFRICAN AMERICAN: 78 ML/MIN/1.73M2
SODIUM SERPL-SCNC: 139 MMOL/L (ref 135–146)
TRIGL SERPL-MCNC: 106 MG/DL
TSH SERPL-ACNC: 1.89 MIU/L (ref 0.4–4.5)

## 2019-08-27 ENCOUNTER — OFFICE VISIT (OUTPATIENT)
Dept: FAMILY MEDICINE CLINIC | Facility: CLINIC | Age: 74
End: 2019-08-27
Payer: COMMERCIAL

## 2019-08-27 VITALS
HEART RATE: 72 BPM | TEMPERATURE: 98.1 F | HEIGHT: 62 IN | DIASTOLIC BLOOD PRESSURE: 84 MMHG | SYSTOLIC BLOOD PRESSURE: 122 MMHG | BODY MASS INDEX: 23.25 KG/M2 | WEIGHT: 126.38 LBS

## 2019-08-27 DIAGNOSIS — E78.00 HYPERCHOLESTEROLEMIA: ICD-10-CM

## 2019-08-27 DIAGNOSIS — Z23 NEED FOR VACCINATION: ICD-10-CM

## 2019-08-27 DIAGNOSIS — I10 ESSENTIAL HYPERTENSION: ICD-10-CM

## 2019-08-27 DIAGNOSIS — R19.7 DIARRHEA, UNSPECIFIED TYPE: ICD-10-CM

## 2019-08-27 DIAGNOSIS — Z12.39 BREAST CANCER SCREENING: ICD-10-CM

## 2019-08-27 DIAGNOSIS — E13.9 DIABETES 1.5, MANAGED AS TYPE 2 (HCC): ICD-10-CM

## 2019-08-27 DIAGNOSIS — M81.0 OSTEOPOROSIS, UNSPECIFIED OSTEOPOROSIS TYPE, UNSPECIFIED PATHOLOGICAL FRACTURE PRESENCE: ICD-10-CM

## 2019-08-27 DIAGNOSIS — E03.9 HYPOTHYROIDISM, UNSPECIFIED TYPE: ICD-10-CM

## 2019-08-27 DIAGNOSIS — K21.9 GASTROESOPHAGEAL REFLUX DISEASE WITHOUT ESOPHAGITIS: ICD-10-CM

## 2019-08-27 DIAGNOSIS — M54.50 LUMBAR BACK PAIN: ICD-10-CM

## 2019-08-27 DIAGNOSIS — Z11.59 ENCOUNTER FOR HEPATITIS C SCREENING TEST FOR LOW RISK PATIENT: ICD-10-CM

## 2019-08-27 DIAGNOSIS — Z00.00 MEDICARE ANNUAL WELLNESS VISIT, SUBSEQUENT: Primary | ICD-10-CM

## 2019-08-27 PROCEDURE — 1160F RVW MEDS BY RX/DR IN RCRD: CPT | Performed by: FAMILY MEDICINE

## 2019-08-27 PROCEDURE — 90471 IMMUNIZATION ADMIN: CPT | Performed by: FAMILY MEDICINE

## 2019-08-27 PROCEDURE — G0439 PPPS, SUBSEQ VISIT: HCPCS | Performed by: FAMILY MEDICINE

## 2019-08-27 PROCEDURE — 90714 TD VACC NO PRESV 7 YRS+ IM: CPT | Performed by: FAMILY MEDICINE

## 2019-08-27 PROCEDURE — 3074F SYST BP LT 130 MM HG: CPT | Performed by: FAMILY MEDICINE

## 2019-08-27 PROCEDURE — 3008F BODY MASS INDEX DOCD: CPT | Performed by: FAMILY MEDICINE

## 2019-08-27 PROCEDURE — 99214 OFFICE O/P EST MOD 30 MIN: CPT | Performed by: FAMILY MEDICINE

## 2019-08-27 RX ORDER — DIPHENOXYLATE HYDROCHLORIDE AND ATROPINE SULFATE 2.5; .025 MG/1; MG/1
1 TABLET ORAL 4 TIMES DAILY PRN
Qty: 30 TABLET | Refills: 0 | Status: SHIPPED | OUTPATIENT
Start: 2019-08-27 | End: 2020-05-29 | Stop reason: HOSPADM

## 2019-08-27 RX ORDER — METOPROLOL SUCCINATE 50 MG/1
50 TABLET, EXTENDED RELEASE ORAL DAILY
Qty: 90 TABLET | Refills: 3 | Status: SHIPPED | OUTPATIENT
Start: 2019-08-27 | End: 2020-08-31 | Stop reason: SDUPTHER

## 2019-08-27 RX ORDER — OMEPRAZOLE 20 MG/1
20 CAPSULE, DELAYED RELEASE ORAL DAILY
Qty: 90 CAPSULE | Refills: 3 | Status: SHIPPED | OUTPATIENT
Start: 2019-08-27 | End: 2020-08-31 | Stop reason: SDUPTHER

## 2019-08-27 RX ORDER — ATORVASTATIN CALCIUM 40 MG/1
40 TABLET, FILM COATED ORAL DAILY
Qty: 90 TABLET | Refills: 3 | Status: SHIPPED | OUTPATIENT
Start: 2019-08-27 | End: 2020-08-31 | Stop reason: SDUPTHER

## 2019-08-27 RX ORDER — TRAMADOL HYDROCHLORIDE 50 MG/1
50 TABLET ORAL AS NEEDED
Qty: 30 TABLET | Refills: 3 | Status: SHIPPED | OUTPATIENT
Start: 2019-08-27 | End: 2019-09-03 | Stop reason: SDUPTHER

## 2019-08-27 NOTE — PROGRESS NOTES
Assessment and Plan:     Problem List Items Addressed This Visit     None         History of Present Illness:     Patient presents for Medicare Annual Wellness visit  Pt is due for a tetanus booster, mammogram, dexa , and labs  She is declining a colon screen at this time        Patient Care Team:  Mignon Estrada DO as PCP - MD Mary Rodarte MD Merrie Caras, MD Winda Leavell, MD Sharin Cue, MD     Problem List:     Patient Active Problem List   Diagnosis    Aneurysm of ascending aorta ([email or web address] 75 )    Anxiety    CAD (coronary atherosclerotic disease)    Neck pain    Essential hypertension    Gastroesophageal reflux disease    Glaucoma    Hypercholesterolemia    Macular degeneration    Osteopenia    Postoperative atrial fibrillation ([place] [address] )    Tobacco abuse    Type 2 diabetes mellitus with complication [facility])      Past Medical and Surgical History:     Past Medical History:   Diagnosis Date    Atypical chest pain     NSTEMI (non-ST elevated myocardial infarction) ([place] [address] )     Last Assessed: 9/24/2015     Past Surgical History:   Procedure Laterality Date    BACK SURGERY      BLADDER SURGERY      CORONARY ARTERY BYPASS GRAFT      CABGx3 with LIMA to LAD, SVG to PDA, SVG to OM-1; Onset: 9/2/2015    HYSTERECTOMY      POPLITEAL ARTERY ANGIOPLASTY      Femoral-Popliteal    TOE AMPUTATION Right     MTP First Toe    WRIST SURGERY        Family History:     Family History   Problem Relation Age of Onset    Hypertension Mother         Essential    Hypertension Sister         Essential    Alcohol abuse Brother       Social History:     Social History     Tobacco Use   Smoking Status Current Every Day Smoker   Smokeless Tobacco Never Used     Social History     Substance and Sexual Activity   Alcohol Use No     Social History     Substance and Sexual Activity   Drug Use Never      Medications and Allergies:     Current Outpatient Medications Medication Sig Dispense Refill    ALPRAZolam (XANAX) 0 25 mg tablet Take 1 tablet (0 25 mg total) by mouth 3 (three) times a day as needed for anxiety for up to 90 days 270 tablet 3    aspirin 81 MG tablet Take 1 tablet by mouth daily      atorvastatin (LIPITOR) 40 mg tablet Take 1 tablet (40 mg total) by mouth daily 90 tablet 3    diphenoxylate-atropine (LOMOTIL) 2 5-0 025 mg per tablet Take 1 tablet by mouth 4 (four) times a day as needed for diarrhea 30 tablet 0    metFORMIN (GLUCOPHAGE) 500 mg tablet Take 1 tablet (500 mg total) by mouth 2 (two) times a day with meals for 90 days 180 tablet 3    metoprolol succinate (TOPROL-XL) 50 mg 24 hr tablet Take 1 tablet (50 mg total) by mouth daily 90 tablet 3    Multiple Vitamins-Minerals (VISION-YOLANDA PRESERVE PO) Take 1 tablet by mouth daily      omeprazole (PriLOSEC) 20 mg delayed release capsule Take 1 capsule (20 mg total) by mouth daily for 90 days 90 capsule 3    traMADol (ULTRAM) 50 mg tablet Take 50 mg by mouth as needed for moderate pain       No current facility-administered medications for this visit  No Known Allergies   Immunizations:     Immunization History   Administered Date(s) Administered    Influenza Split High Dose Preservative Free IM 10/01/2015, 10/12/2017    Influenza, high dose seasonal 0 5 mL 10/01/2018    Pneumococcal Conjugate 13-Valent 09/29/2016    Pneumococcal Polysaccharide PPV23 10/01/2015    Zoster 02/27/2013      Medicare Screening Tests and Risk Assessments:     Radha Pastrana is here for her Subsequent Wellness visit  Last Medicare Wellness visit information reviewed, patient interviewed and updates made to the record today  Health Risk Assessment:  Patient rates overall health as good  Patient feels that their physical health rating is Same  Eyesight was rated as Slightly worse  Hearing was rated as Same  Patient feels that their emotional and mental health rating is Same   Pain experienced by patient in the last 7 days has been None  Patient states that she has experienced no weight loss or gain in last 6 months  Emotional/Mental Health:  Patient has not been feeling nervous/anxious  PHQ-9 Depression Screening:    Frequency of the following problems over the past two weeks:      1  Little interest or pleasure in doing things: 0 - not at all      2  Feeling down, depressed, or hopeless: 0 - not at all  PHQ-2 Score: 0          Broken Bones/Falls: Fall Risk Assessment:    In the past year, patient has experienced: No history of falling in past year          Bladder/Bowel:  Patient has leaked urine accidently in the last six months  Patient reports loss of bowel control  Immunizations:  Patient has had a flu vaccination within the last year  Patient has received a pneumonia shot  Patient has received a shingles shot  Patient has not received tetanus/diphtheria shot  Home Safety:  Patient does not have trouble with stairs inside or outside of their home  Patient currently reports that there are no safety hazards present in home, working smoke alarms, working carbon monoxide detectors  Preventative Screenings:   Breast cancer screening performed, colon cancer screen completed, cholesterol screen completed, glaucoma eye exam completed,     Nutrition:  Current diet: Regular with servings of the following:    Medications:  Patient is currently taking over-the-counter supplements  List of OTC medications includes: tylenol & preser vision   Patient is able to manage medications  Lifestyle Choices:  Patient reports current tobacco use  Patient reports no alcohol use  Patient drives a vehicle  Patient wears seat belt  Current level of exercise of physical activity described by patient as: moderate           Activities of Daily Living:  Can get out of bed by his or her self, able to dress self, able to make own meals, able to do own shopping, able to bathe self, can do own laundry/housekeeping, can manage own money, pay bills and track expenses    Previous Hospitalizations:  No hospitalization or ED visit in past 12 months        Advanced Directives:  Patient has decided on a power of   Patient has spoken to designated power of   Patient has completed advanced directive  Preventative Screening/Counseling:      Cardiovascular:      General: Screening Current          Diabetes:      General: Screening Current          Colorectal Cancer:      General: Risks and Benefits Discussed and Patient Declines          Breast Cancer:      General: Screening Current          Cervical Cancer:      General: Screening Not Indicated          Osteoporosis:      General: Screening Current          AAA:      General: Screening Not Indicated          Glaucoma:      General: Screening Current          HIV:      General: Screening Not Indicated          Hepatitis C:      General: Risks and Benefits Discussed      Counseling: has received general HCV counseling        Advanced Directives:   Patient has living will for healthcare, has durable POA for healthcare, patient has an advanced directive  Information on ACP and/or AD provided  5 wishes given  End of life assessment reviewed with patient  Provider agrees with end of life decisions        Immunizations:      Influenza: Influenza UTD This Year      Pneumococcal: Lifetime Vaccine Completed      Shingrix: Risks & Benefits Discussed      Hepatitis B (Low risk patients): Series Not Indicated      Zostavax: Zostavax Vaccine UTD      TD: Td Vaccine Needed Today

## 2019-08-27 NOTE — PATIENT INSTRUCTIONS
Obesity   AMBULATORY CARE:   Obesity  is when your body mass index (BMI) is greater than 30  Your healthcare provider will use your height and weight to measure your BMI  The risks of obesity include  many health problems, such as injuries or physical disability  You may need tests to check for the following:  · Diabetes     · High blood pressure or high cholesterol     · Heart disease     · Gallbladder or liver disease     · Cancer of the colon, breast, prostate, liver, or kidney     · Sleep apnea     · Arthritis or gout  Seek care immediately if:   · You have a severe headache, confusion, or difficulty speaking  · You have weakness on one side of your body  · You have chest pain, sweating, or shortness of breath  Contact your healthcare provider if:   · You have symptoms of gallbladder or liver disease, such as pain in your upper abdomen  · You have knee or hip pain and discomfort while walking  · You have symptoms of diabetes, such as intense hunger and thirst, and frequent urination  · You have symptoms of sleep apnea, such as snoring or daytime sleepiness  · You have questions or concerns about your condition or care  Treatment for obesity  focuses on helping you lose weight to improve your health  Even a small decrease in BMI can reduce the risk for many health problems  Your healthcare provider will help you set a weight-loss goal   · Lifestyle changes  are the first step in treating obesity  These include making healthy food choices and getting regular physical activity  Your healthcare provider may suggest a weight-loss program that involves coaching, education, and therapy  · Medicine  may help you lose weight when it is used with a healthy diet and physical activity  · Surgery  can help you lose weight if you are very obese and have other health problems  There are several types of weight-loss surgery  Ask your healthcare provider for more information    Be successful losing weight:   · Set small, realistic goals  An example of a small goal is to walk for 20 minutes 5 days a week  Anther goal is to lose 5% of your body weight  · Tell friends, family members, and coworkers about your goals  and ask for their support  Ask a friend to lose weight with you, or join a weight-loss support group  · Identify foods or triggers that may cause you to overeat , and find ways to avoid them  Remove tempting high-calorie foods from your home and workplace  Place a bowl of fresh fruit on your kitchen counter  If stress causes you to eat, then find other ways to cope with stress  · Keep a diary to track what you eat and drink  Also write down how many minutes of physical activity you do each day  Weigh yourself once a week and record it in your diary  Eating changes: You will need to eat 500 to 1,000 fewer calories each day than you currently eat to lose 1 to 2 pounds a week  The following changes will help you cut calories:  · Eat smaller portions  Use small plates, no larger than 9 inches in diameter  Fill your plate half full of fruits and vegetables  Measure your food using measuring cups until you know what a serving size looks like  · Eat 3 meals and 1 or 2 snacks each day  Plan your meals in advance  Domin[employer] and eat at home most of the time  Eat slowly  · Eat fruits and vegetables at every meal   They are low in calories and high in fiber, which makes you feel full  Do not add butter, margarine, or cream sauce to vegetables  Use herbs to season steamed vegetables  · Eat less fat and fewer fried foods  Eat more baked or grilled chicken and fish  These protein sources are lower in calories and fat than red meat  Limit fast food  Dress your salads with olive oil and vinegar instead of bottled dressing  · Limit the amount of sugar you eat  Do not drink sugary beverages  Limit alcohol  Activity changes:  Physical activity is good for your body in many ways   It helps you burn calories and build strong muscles  It decreases stress and depression, and improves your mood  It can also help you sleep better  Talk to your healthcare provider before you begin an exercise program   · Exercise for at least 30 minutes 5 days a week  Start slowly  Set aside time each day for physical activity that you enjoy and that is convenient for you  It is best to do both weight training and an activity that increases your heart rate, such as walking, bicycling, or swimming  · Find ways to be more active  Do yard work and housecleaning  Walk up the stairs instead of using elevators  Spend your leisure time going to events that require walking, such as outdoor festivals or fairs  This extra physical activity can help you lose weight and keep it off  Follow up with your healthcare provider as directed: You may need to meet with a dietitian  Write down your questions so you remember to ask them during your visits  © 2017 [address] [employer] Information is for End User's use only and may not be sold, redistributed or otherwise used for commercial purposes  All illustrations and images included in CareNotes® are the copyrighted property of [employer]  or Yordy Foley  The above information is an  only  It is not intended as medical advice for individual conditions or treatments  Talk to your doctor, nurse or pharmacist before following any medical regimen to see if it is safe and effective for you  Urinary Incontinence   WHAT YOU NEED TO KNOW:   What is urinary incontinence? Urinary incontinence (UI) is when you lose control of your bladder  What causes UI? UI occurs because your bladder cannot store or empty urine properly  The following are the most common types of UI:  · Stress incontinence  is when you leak urine due to increased bladder pressure  This may happen when you cough, sneeze, or exercise       · Urge incontinence  is when you feel the need to urinate right away and leak urine accidentally  · Mixed incontinence  is when you have both stress and urge UI  What are the signs and symptoms of UI?   · You feel like your bladder does not empty completely when you urinate  · You urinate often and need to urinate immediately  · You leak urine when you sleep, or you wake up with the urge to urinate  · You leak urine when you cough, sneeze, exercise, or laugh  How is UI diagnosed? Your healthcare provider will ask how often you leak urine and whether you have stress or urge symptoms  Tell him which medicines you take, how often you urinate, and how much liquid you drink each day  You may need any of the following tests:  · Urine tests  may show infection or kidney function  · A pelvic exam  may be done to check for blockages  A pelvic exam will also show if your bladder, uterus, or other organs have moved out of place  · An x-ray, ultrasound, or CT  may show problems with parts of your urinary system  You may be given contrast liquid to help your organs show up better in the pictures  Tell the healthcare provider if you have ever had an allergic reaction to contrast liquid  Do not enter the MRI room with anything metal  Metal can cause serious injury  Tell the healthcare provider if you have any metal in or on your body  · A bladder scan  will show how much urine is left in your bladder after you urinate  You will be asked to urinate and then healthcare providers will use a small ultrasound machine to check the urine left in your bladder  · Cystometry  is used to check the function of your urinary system  Your healthcare provider checks the pressure in your bladder while filling it with fluid  Your bladder pressure may also be tested when your bladder is full and while you urinate  How is UI treated? · Medicines  can help strengthen your bladder control      · Electrical stimulation  is used to send a small amount of electrical energy to your pelvic floor muscles  This helps control your bladder function  Electrodes may be placed outside your body or in your rectum  For women, the electrodes may be placed in the vagina  · A bulking agent  may be injected into the wall of your urethra to make it thicker  This helps keep your urethra closed and decreases urine leakage  · Devices  such as a clamp, pessary, or tampon may help stop urine leaks  Ask your healthcare provider for more information about these and other devices  · Surgery  may be needed if other treatments do not work  Several types of surgery can help improve your bladder control  Ask your healthcare provider for more information about the surgery you may need  How can I manage my symptoms? · Do pelvic muscle exercises often  Your pelvic muscles help you stop urinating  Squeeze these muscles tight for 5 seconds, then relax for 5 seconds  Gradually work up to squeezing for 10 seconds  Do 3 sets of 15 repetitions a day, or as directed  This will help strengthen your pelvic muscles and improve bladder control  · A catheter  may be used to help empty your bladder  A catheter is a tiny, plastic tube that is put into your bladder to drain your urine  Your healthcare provider may tell you to use a catheter to prevent your bladder from getting too full and leaking urine  · Keep a UI record  Write down how often you leak urine and how much you leak  Make a note of what you were doing when you leaked urine  · Train your bladder  Go to the bathroom at set times, such as every 2 hours, even if you do not feel the urge to go  You can also try to hold your urine when you feel the urge to go  For example, hold your urine for 5 minutes when you feel the urge to go  As that becomes easier, hold your urine for 10 minutes  · Drink liquids as directed  Ask your healthcare provider how much liquid to drink each day and which liquids are best for you   You may need to limit the amount of liquid you drink to help control your urine leakage  Limit or do not have drinks that contain caffeine or alcohol  Do not drink any liquid right before you go to bed  · Prevent constipation  Eat a variety of high-fiber foods  Good examples are high-fiber cereals, beans, vegetables, and whole-grain breads  Prune juice may help make your bowel movement softer  Walking is the best way to trigger your intestines to have a bowel movement  · Exercise regularly and maintain a healthy weight  Ask your healthcare provider how much you should weigh and about the best exercise plan for you  Weight loss and exercise will decrease pressure on your bladder and help you control your leakage  Ask him to help you create a weight loss plan if you are overweight  When should I seek immediate care? · You have severe pain  · You are confused or cannot think clearly  When should I contact my healthcare provider? · You have a fever  · You see blood in your urine  · You have pain when you urinate  · You have new or worse pain, even after treatment  · Your mouth feels dry or you have vision changes  · Your urine is cloudy or smells bad  · You have questions or concerns about your condition or care  CARE AGREEMENT:   You have the right to help plan your care  Learn about your health condition and how it may be treated  Discuss treatment options with your caregivers to decide what care you want to receive  You always have the right to refuse treatment  The above information is an  only  It is not intended as medical advice for individual conditions or treatments  Talk to your doctor, nurse or pharmacist before following any medical regimen to see if it is safe and effective for you  © 2017 [address] [employer] Information is for End User's use only and may not be sold, redistributed or otherwise used for commercial purposes   All illustrations and images included in CareNotes® are the copyrighted property of [employer]  or Yordy Foley  Cigarette Smoking and Your Health   AMBULATORY CARE:   Risks to your health if you smoke:  Nicotine and other chemicals found in tobacco damage every cell in your body  Even if you are a light smoker, you have an increased risk for cancer, heart disease, and lung disease  If you are pregnant or have diabetes, smoking increases your risk for complications  Benefits to your health if you stop smoking:   · You decrease respiratory symptoms such as coughing, wheezing, and shortness of breath  · You reduce your risk for cancers of the lung, mouth, throat, kidney, bladder, pancreas, stomach, and cervix  If you already have cancer, you increase the benefits of chemotherapy  You also reduce your risk for cancer returning or a second cancer from developing  · You reduce your risk for heart disease, blood clots, heart attack, and stroke  · You reduce your risk for lung infections, and diseases such as pneumonia, asthma, chronic bronchitis, and emphysema  · Your circulation improves  More oxygen can be delivered to your body  If you have diabetes, you lower your risk for complications, such as kidney, artery, and eye diseases  You also lower your risk for nerve damage  Nerve damage can lead to amputations, poor vision, and blindness  · You improve your body's ability to heal and to fight infections  Benefits to the health of others if you stop smoking:  Tobacco is harmful to nonsmokers who breathe in your secondhand smoke  The following are ways the health of others around you may improve when you stop smoking:  · You lower the risks for lung cancer and heart disease in nonsmoking adults  · If you are pregnant, you lower the risk for miscarriage, early delivery, low birth weight, and stillbirth  You also lower your baby's risk for SIDS, obesity, developmental delay, and neurobehavioral problems, such as ADHD  · If you have children, you lower their risk for ear infections, colds, pneumonia, bronchitis, and asthma  For more information and support to stop smoking:   · [employer]  Phone: 1- 995 - 658-2198  Web Address: www smokefree  gov  Follow up with your healthcare provider as directed:  Write down your questions so you remember to ask them during your visits  © 2017 [address] [employer] Information is for End User's use only and may not be sold, redistributed or otherwise used for commercial purposes  All illustrations and images included in CareNotes® are the copyrighted property of [employer] M [employer]  or Yordy Foley  The above information is an  only  It is not intended as medical advice for individual conditions or treatments  Talk to your doctor, nurse or pharmacist before following any medical regimen to see if it is safe and effective for you  Fall Prevention   AMBULATORY CARE:   Fall prevention  includes ways to make your home and other areas safer  It also includes ways you can move more carefully to prevent a fall  Health conditions that cause changes in your blood pressure, vision, or muscle strength and coordination may increase your risk for falls  Medicines may also increase your risk for falls if they make you dizzy, weak, or sleepy  Call 911 or have someone else call if:   · You have fallen and are unconscious  · You have fallen and cannot move part of your body  Contact your healthcare provider if:   · You have fallen and have pain or a headache  · You have questions or concerns about your condition or care  Fall prevention tips:   · Stand or sit up slowly  This may help you keep your balance and prevent falls  · Use assistive devices as directed  Your healthcare provider may suggest that you use a cane or walker to help you keep your balance  You may need to have grab bars put in your bathroom near the toilet or in the shower      · Wear shoes that fit well and have soles that   Wear shoes both inside and outside  Use slippers with good   Do not wear shoes with high heels  · Wear a personal alarm  This is a device that allows you to call 911 if you fall and need help  Ask your healthcare provider for more information  · Stay active  Exercise can help strengthen your muscles and improve your balance  Your healthcare provider may recommend water aerobics or walking  He or she may also recommend physical therapy to improve your coordination  Never start an exercise program without talking to your healthcare provider first      · Manage your medical conditions  Keep all appointments with your healthcare providers  Visit your eye doctor as directed  Home safety tips:   · Add items to prevent falls in the bathroom  Put nonslip strips on your bath or shower floor to prevent you from slipping  Use a bath mat if you do not have carpet in the bathroom  This will prevent you from falling when you step out of the bath or shower  Use a shower seat so you do not need to stand while you shower  Sit on the toilet or a chair in your bathroom to dry yourself and put on clothing  This will prevent you from losing your balance from drying or dressing yourself while you are standing  · Keep paths clear  Remove books, shoes, and other objects from walkways and stairs  Place cords for telephones and lamps out of the way so that you do not need to walk over them  Tape them down if you cannot move them  Remove small rugs  If you cannot remove a rug, secure it with double-sided tape  This will prevent you from tripping  · Install bright lights in your home  Use night lights to help light paths to the bathroom or kitchen  Always turn on the light before you start walking  · Keep items you use often on shelves within reach  Do not use a step stool to help you reach an item  · Paint or place reflective tape on the edges of your stairs    This will help you see the stairs better  Follow up with your healthcare provider as directed:  Write down your questions so you remember to ask them during your visits  © 2017 [address] [employer] Information is for End User's use only and may not be sold, redistributed or otherwise used for commercial purposes  All illustrations and images included in CareNotes® are the copyrighted property of [employer]  or Yordy Foley  The above information is an  only  It is not intended as medical advice for individual conditions or treatments  Talk to your doctor, nurse or pharmacist before following any medical regimen to see if it is safe and effective for you  Advance Directives   WHAT YOU NEED TO KNOW:   What are advance directives? Advance directives are legal documents that state your wishes and plans for medical care  These plans are made ahead of time in case you lose your ability to make decisions for yourself  Advance directives can apply to any medical decision, such as the treatments you want, and if you want to donate organs  What are the types of advance directives? There are many types of advance directives, and each state has rules about how to use them  You may choose a combination of any of the following:  · Living will: This is a written record of the treatment you want  You can also choose which treatments you do not want, which to limit, and which to stop at a certain time  This includes surgery, medicine, IV fluid, and tube feedings  · Durable power of  for healthcare [facility]): This is a written record that states who you want to make healthcare choices for you when you are unable to make them for yourself  This person, called a proxy, is usually a family member or a friend  You may choose more than 1 proxy  · Do not resuscitate (DNR) order:  A DNR order is used in case your heart stops beating or you stop breathing   It is a request not to have certain forms of treatment, such as CPR  A DNR order may be included in other types of advance directives  · Medical directive: This covers the care that you want if you are in a coma, near death, or unable to make decisions for yourself  You can list the treatments you want for each condition  Treatment may include pain medicine, surgery, blood transfusions, dialysis, IV or tube feedings, and a ventilator (breathing machine)  · Values history: This document has questions about your views, beliefs, and how you feel and think about life  This information can help others choose the care that you would choose  Why are advance directives important? An advance directive helps you control your care  Although spoken wishes may be used, it is better to have your wishes written down  Spoken wishes can be misunderstood, or not followed  Treatments may be given even if you do not want them  An advance directive may make it easier for your family to make difficult choices about your care  How do I decide what to put in my advance directives? · Make informed decisions:  Make sure you fully understand treatments or care you may receive  Think about the benefits and problems your decisions could cause for you or your family  Talk to healthcare providers if you have concerns or questions before you write down your wishes  You may also want to talk with your [employer] or , or a   Check your state laws to make sure that what you put in your advance directive is legal      · Sign all forms:  Sign and date your advance directive when you have finished  You may also need 2 witnesses to sign the forms  Witnesses cannot be your doctor or his staff, your spouse, heirs or beneficiaries, people you owe money to, or your chosen proxy  Talk to your family, proxy, and healthcare providers about your advance directive  Give each person a copy, and keep one for yourself in a place you can get to easily   Do not keep it hidden or locked away  · Review and revise your plans: You can revise your advance directive at any time, as long as you are able to make decisions  Review your plan every year, and when there are changes in your life, or your health  When you make changes, let your family, proxy, and healthcare providers know  Give each a new copy  Where can I find more information? · [employer]  [address] , [address]  Phone: 1- 808 - [phone]  Phone: 4- 530 - [phone]  Web Address: http://www  aafp org  · [address] [facility])  [address] [address] [facility], [address] [facility] , [address] [place] [address]  Phone: 7- [phone]  Phone: [phone]  Web Address: Donna stallings  CARE AGREEMENT:   You have the right to help plan your care  To help with this plan, you must learn about your health condition and treatment options  You must also learn about advance directives and how they are used  Work with your healthcare providers to decide what care will be used to treat you  You always have the right to refuse treatment  The above information is an  only  It is not intended as medical advice for individual conditions or treatments  Talk to your doctor, nurse or pharmacist before following any medical regimen to see if it is safe and effective for you  © 2017 [address] [employer] [employer] Information is for End User's use only and may not be sold, redistributed or otherwise used for commercial purposes  All illustrations and images included in CareNotes® are the copyrighted property of [employer]  or Yordy Foley

## 2019-08-27 NOTE — PROGRESS NOTES
Patient ID: Dhruv Villarreal is a 69 y o  female  HPI: 69 y  o female presents for folllow up for niddm, cad, hypertension, hypercholesterolemia and hypothyroidism  Her hgba1c is good at 6 1  She is following regularly with both ophthalmology and cardiology        SUBJECTIVE    Family History   Problem Relation Age of Onset    Hypertension Mother         Essential    Hypertension Sister         Essential    Alcohol abuse Brother      Social History     Socioeconomic History    Marital status: Single     Spouse name: Not on file    Number of children: Not on file    Years of education: Not on file    Highest education level: Not on file   Occupational History    Not on file   Social Needs    Financial resource strain: Not on file    Food insecurity:     Worry: Not on file     Inability: Not on file    Transportation needs:     Medical: Not on file     Non-medical: Not on file   Tobacco Use    Smoking status: Current Every Day Smoker    Smokeless tobacco: Never Used   Substance and Sexual Activity    Alcohol use: No    Drug use: Never    Sexual activity: Not on file   Lifestyle    Physical activity:     Days per week: Not on file     Minutes per session: Not on file    Stress: Not on file   Relationships    Social connections:     Talks on phone: Not on file     Gets together: Not on file     Attends Confucianist service: Not on file     Active member of club or organization: Not on file     Attends meetings of clubs or organizations: Not on file     Relationship status: Not on file    Intimate partner violence:     Fear of current or ex partner: Not on file     Emotionally abused: Not on file     Physically abused: Not on file     Forced sexual activity: Not on file   Other Topics Concern    Not on file   Social History Narrative    Daily coffee consumption (__cups/day)    Denied: history of daily cola consumption (__ cups/day)    Denied: history of daily tea consumption (__cups/day)    Personal protective equipment seatbelts     Past Medical History:   Diagnosis Date    Atypical chest pain     NSTEMI (non-ST elevated myocardial infarction) ([facility] [address] )     Last Assessed: 9/24/2015     Past Surgical History:   Procedure Laterality Date    BACK SURGERY      BLADDER SURGERY      CORONARY ARTERY BYPASS GRAFT      CABGx3 with LIMA to LAD, SVG to PDA, SVG to OM-1; Onset: 9/2/2015    HYSTERECTOMY      POPLITEAL ARTERY ANGIOPLASTY      Femoral-Popliteal    TOE AMPUTATION Right     MTP First Toe    WRIST SURGERY       No Known Allergies    Current Outpatient Medications:     ALPRAZolam (XANAX) 0 25 mg tablet, Take 1 tablet (0 25 mg total) by mouth 3 (three) times a day as needed for anxiety for up to 90 days, Disp: 270 tablet, Rfl: 3    aspirin 81 MG tablet, Take 1 tablet by mouth daily, Disp: , Rfl:     atorvastatin (LIPITOR) 40 mg tablet, Take 1 tablet (40 mg total) by mouth daily, Disp: 90 tablet, Rfl: 3    diphenoxylate-atropine (LOMOTIL) 2 5-0 025 mg per tablet, Take 1 tablet by mouth 4 (four) times a day as needed for diarrhea, Disp: 30 tablet, Rfl: 0    metFORMIN (GLUCOPHAGE) 500 mg tablet, Take 1 tablet (500 mg total) by mouth 2 (two) times a day with meals for 5000 doses, Disp: 180 tablet, Rfl: 3    metoprolol succinate (TOPROL-XL) 50 mg 24 hr tablet, Take 1 tablet (50 mg total) by mouth daily, Disp: 90 tablet, Rfl: 3    Multiple Vitamins-Minerals (VISION-YOLANDA PRESERVE PO), Take 1 tablet by mouth daily, Disp: , Rfl:     omeprazole (PriLOSEC) 20 mg delayed release capsule, Take 1 capsule (20 mg total) by mouth daily, Disp: 90 capsule, Rfl: 3    traMADol (ULTRAM) 50 mg tablet, Take 1 tablet (50 mg total) by mouth as needed for moderate pain, Disp: 30 tablet, Rfl: 3    Review of Systems  Constitutional:     Denies fever, chills ,fatigue ,weakness ,weight loss, weight gain     ENT: Denies earache ,loss of hearing ,nosebleed, nasal discharge,nasal congestion ,sore throat ,hoarseness  Pulmonary: Denies shortness of breath ,cough  ,dyspnea on exertion, orthopnea  ,PND   Cardiovascular:  Denies bradycardia , tachycardia  ,palpations, lower extremity edema leg, claudication  Breast:  Denies new or changing breast lumps ,nipple discharge ,nipple changes  Abdomen:  Denies abdominal pain , anorexia , indigestion, nausea, vomiting, constipation, diarrhea  Musculoskeletal: Denies myalgias, arthralgias, joint swelling, joint stiffness , limb pain, limb swelling  Gu: denies dysuria, polyuria  Skin: Denies skin rash, skin lesion, skin wound, itching, dry skin  Neuro: Denies headache, numbness, tingling, confusion, loss of consciousness, dizziness, vertigo  Psychiatric: Denies feelings of depression, suicidal ideation, anxiety, sleep disturbances    OBJECTIVE  /84   Pulse 72   Temp 98 1 °F (36 7 °C)   Ht 5' 1 5" (1 562 m)   Wt 57 3 kg (126 lb 6 oz)   BMI 23 49 kg/m²   Constitutional:   NAD, well appearing and well nourished      ENT:   Conjunctiva and lids: no injection, edema, or discharge     Pupils and iris: BON bilaterally    External inspection of ears and nose: normal without deformities or discharge  Otoscopic exam: Canals patent without erythema  Nasal mucosa, septum and turbinates: Normal or edema or discharge         Oropharynx:  Moist mucosa, normal tongue and tonsils without lesions  No erythema        Pulmonary:Respiratory effort normal rate and rhythm, no increased work of breathing   Auscultation of lungs:  Clear bilaterally with no adventitious breath sounds       Cardiovascular: regular rate and rhythm, S1 and S2, no murmur, no edema and/or varicosities of LE      Abdomen: Soft and non-distended   Positive bowel sounds      No heptomegaly or splenomegaly      Gu: no suprapubic tenderness or CVA tenderness, no urethral discharge  Lymphatic:  No anterior or posterior cervical lymphadenopathy         Musculoskeletal:  Gait and station: Normal gait      Digits and nails normal without clubbing or cyanosis     Inspection/palpation of joints, bones, and muscles:  No joint tenderness, swelling, full active and passive range of motion       Skin: Normal skin turgor and no rashes      Neuro:     Normal reflexes       Psych:   alert and oriented to person, place and time     normal mood and affect   Patient's shoes and socks removed  Right Foot/Ankle   Right Foot Inspection  Skin Exam: skin normal and skin intact no dry skin, no warmth, no callus, no erythema, no maceration, no abnormal color, no pre-ulcer, no ulcer and no callus                          Toe Exam: ROM and strength within normal limitsno swelling, no tenderness, erythema and  no right toe deformity  Sensory   Vibration: intact  Proprioception: intact   Monofilament testing: intact  Vascular  Capillary refills: < 3 seconds  The right DP pulse is 2+  The right PT pulse is 2+  Left Foot/Ankle  Left Foot Inspection  Skin Exam: skin normal and skin intactno dry skin, no warmth, no erythema, no maceration, normal color, no pre-ulcer, no ulcer and no callus                         Toe Exam: ROM and strength within normal limitsno swelling, no tenderness, no erythema and no left toe deformity                   Sensory   Vibration: intact  Proprioception: intact  Monofilament: intact  Vascular  Capillary refills: < 3 seconds  The left DP pulse is 2+  The left PT pulse is 2+  Assign Risk Category:  No deformity present; No loss of protective sensation; No weak pulses       Risk: 0      Assessment/Plan:Diagnoses and all orders for this visit:    Hypothyroidism, unspecified type  -     TSH, 3rd generation; Future    Hypercholesterolemia  -     Lipid Panel with Direct LDL reflex; Future  -     atorvastatin (LIPITOR) 40 mg tablet; Take 1 tablet (40 mg total) by mouth daily    Essential hypertension  -     Comprehensive metabolic panel; Future  -     metoprolol succinate (TOPROL-XL) 50 mg 24 hr tablet;  Take 1 tablet (50 mg total) by mouth daily    Diabetes 1 5, managed as type 2 ([facility] [place] [facility] )  -     HEMOGLOBIN A1C W/ EAG ESTIMATION; Future  -     metFORMIN (GLUCOPHAGE) 500 mg tablet; Take 1 tablet (500 mg total) by mouth 2 (two) times a day with meals for 5000 doses    Encounter for hepatitis C screening test for low risk patient  -     Hepatitis C antibody; Future    Need for vaccination  -     TD VACCINE GREATER THAN OR EQUAL TO 6YO PRESERVATIVE FREE IM    Osteoporosis, unspecified osteoporosis type, unspecified pathological fracture presence  -     DXA bone density spine hip and pelvis; Future    Breast cancer screening  -     Mammo screening bilateral w cad; Future    Aneurysm of ascending aorta (HCC)    Postoperative atrial fibrillation (HCC)    Lumbar back pain  -     traMADol (ULTRAM) 50 mg tablet; Take 1 tablet (50 mg total) by mouth as needed for moderate pain    Diarrhea, unspecified type  -     diphenoxylate-atropine (LOMOTIL) 2 5-0 025 mg per tablet; Take 1 tablet by mouth 4 (four) times a day as needed for diarrhea    Essential hypertension  Comments:  Stable on current therapy  Orders:  -     Comprehensive metabolic panel; Future  -     metoprolol succinate (TOPROL-XL) 50 mg 24 hr tablet; Take 1 tablet (50 mg total) by mouth daily    Gastroesophageal reflux disease without esophagitis  -     omeprazole (PriLOSEC) 20 mg delayed release capsule; Take 1 capsule (20 mg total) by mouth daily    Hypercholesterolemia  Comments:  Controlled on current statin  Orders:  -     Lipid Panel with Direct LDL reflex; Future  -     atorvastatin (LIPITOR) 40 mg tablet; Take 1 tablet (40 mg total) by mouth daily    Other orders  -     Cancel: TDAP VACCINE GREATER THAN OR EQUAL TO 6YO IM  -     Cancel: Cologuard; Future        I will see patient back in 4 mos or sooner prn

## 2019-09-03 DIAGNOSIS — M54.50 LUMBAR BACK PAIN: ICD-10-CM

## 2019-09-03 RX ORDER — TRAMADOL HYDROCHLORIDE 50 MG/1
50 TABLET ORAL AS NEEDED
Qty: 30 TABLET | Refills: 3 | Status: SHIPPED | OUTPATIENT
Start: 2019-09-03 | End: 2020-03-06 | Stop reason: SDUPTHER

## 2019-10-30 LAB
LEFT EYE DIABETIC RETINOPATHY: NORMAL
RIGHT EYE DIABETIC RETINOPATHY: NORMAL

## 2019-12-10 LAB
ALBUMIN SERPL-MCNC: 4.7 G/DL (ref 3.6–5.1)
ALBUMIN/GLOB SERPL: 1.7 (CALC) (ref 1–2.5)
ALP SERPL-CCNC: 34 U/L (ref 33–130)
ALT SERPL-CCNC: 11 U/L (ref 6–29)
AST SERPL-CCNC: 14 U/L (ref 10–35)
BILIRUB SERPL-MCNC: 0.6 MG/DL (ref 0.2–1.2)
BUN SERPL-MCNC: 19 MG/DL (ref 7–25)
BUN/CREAT SERPL: ABNORMAL (CALC) (ref 6–22)
CALCIUM SERPL-MCNC: 9.1 MG/DL (ref 8.6–10.4)
CHLORIDE SERPL-SCNC: 104 MMOL/L (ref 98–110)
CHOLEST SERPL-MCNC: 140 MG/DL
CHOLEST/HDLC SERPL: 2.3 (CALC)
CO2 SERPL-SCNC: 28 MMOL/L (ref 20–32)
CREAT SERPL-MCNC: 0.72 MG/DL (ref 0.6–0.93)
EST. AVERAGE GLUCOSE BLD GHB EST-MCNC: 128 (CALC)
EST. AVERAGE GLUCOSE BLD GHB EST-SCNC: 7.1 (CALC)
GLOBULIN SER CALC-MCNC: 2.7 G/DL (CALC) (ref 1.9–3.7)
GLUCOSE SERPL-MCNC: 170 MG/DL (ref 65–99)
HBA1C MFR BLD: 6.1 % OF TOTAL HGB
HCV AB S/CO SERPL IA: 0.06
HCV AB SERPL QL IA: NORMAL
HDLC SERPL-MCNC: 60 MG/DL
LDLC SERPL CALC-MCNC: 60 MG/DL (CALC)
NONHDLC SERPL-MCNC: 80 MG/DL (CALC)
POTASSIUM SERPL-SCNC: 3.9 MMOL/L (ref 3.5–5.3)
PROT SERPL-MCNC: 7.4 G/DL (ref 6.1–8.1)
SL AMB EGFR AFRICAN AMERICAN: 96 ML/MIN/1.73M2
SL AMB EGFR NON AFRICAN AMERICAN: 83 ML/MIN/1.73M2
SODIUM SERPL-SCNC: 141 MMOL/L (ref 135–146)
TRIGL SERPL-MCNC: 115 MG/DL
TSH SERPL-ACNC: 2.4 MIU/L (ref 0.4–4.5)

## 2019-12-16 ENCOUNTER — OFFICE VISIT (OUTPATIENT)
Dept: FAMILY MEDICINE CLINIC | Facility: CLINIC | Age: 74
End: 2019-12-16
Payer: COMMERCIAL

## 2019-12-16 VITALS
WEIGHT: 128 LBS | HEIGHT: 62 IN | BODY MASS INDEX: 23.55 KG/M2 | SYSTOLIC BLOOD PRESSURE: 122 MMHG | HEART RATE: 74 BPM | DIASTOLIC BLOOD PRESSURE: 82 MMHG | TEMPERATURE: 98.6 F

## 2019-12-16 DIAGNOSIS — I25.10 ATHEROSCLEROSIS OF CORONARY ARTERY OF NATIVE HEART WITHOUT ANGINA PECTORIS, UNSPECIFIED VESSEL OR LESION TYPE: ICD-10-CM

## 2019-12-16 DIAGNOSIS — E13.9 DIABETES 1.5, MANAGED AS TYPE 2 (HCC): ICD-10-CM

## 2019-12-16 DIAGNOSIS — M54.50 LUMBAR BACK PAIN: Primary | ICD-10-CM

## 2019-12-16 DIAGNOSIS — E78.00 HYPERCHOLESTEROLEMIA: ICD-10-CM

## 2019-12-16 DIAGNOSIS — I71.2 ANEURYSM OF ASCENDING AORTA (HCC): ICD-10-CM

## 2019-12-16 DIAGNOSIS — I97.89 POSTOPERATIVE ATRIAL FIBRILLATION (HCC): ICD-10-CM

## 2019-12-16 DIAGNOSIS — I71.3 RUPTURED ABDOMINAL AORTIC ANEURYSM (AAA) (HCC): ICD-10-CM

## 2019-12-16 DIAGNOSIS — I48.91 POSTOPERATIVE ATRIAL FIBRILLATION (HCC): ICD-10-CM

## 2019-12-16 DIAGNOSIS — D49.9 NEOPLASM: ICD-10-CM

## 2019-12-16 DIAGNOSIS — I10 ESSENTIAL HYPERTENSION: ICD-10-CM

## 2019-12-16 PROCEDURE — 99215 OFFICE O/P EST HI 40 MIN: CPT | Performed by: FAMILY MEDICINE

## 2019-12-16 PROCEDURE — 3079F DIAST BP 80-89 MM HG: CPT | Performed by: FAMILY MEDICINE

## 2019-12-16 PROCEDURE — 3008F BODY MASS INDEX DOCD: CPT | Performed by: FAMILY MEDICINE

## 2019-12-16 PROCEDURE — 1160F RVW MEDS BY RX/DR IN RCRD: CPT | Performed by: FAMILY MEDICINE

## 2019-12-16 PROCEDURE — 3074F SYST BP LT 130 MM HG: CPT | Performed by: FAMILY MEDICINE

## 2019-12-16 RX ORDER — TRAMADOL HYDROCHLORIDE 50 MG/1
100 TABLET ORAL 2 TIMES DAILY
Qty: 60 TABLET | Refills: 3 | Status: SHIPPED | OUTPATIENT
Start: 2019-12-16 | End: 2020-04-22 | Stop reason: SDUPTHER

## 2019-12-16 NOTE — PROGRESS NOTES
Tobacco Cessation Counseling: Tobacco cessation counseling and education was provided  The patient is sincerely urged to quit consumption of tobacco  She is not ready to quit tobacco  The numerous health risks of tobacco consumption were discussed  If she decides to quit, there are a number of helpful adjunctive aids, and she can see me to discuss nicotine replacement therapy, chantix, or bupropion anytime in the future  Patient ID: Asad Ledesma is a 69 y o  female  HPI: 69 y  o female presents to review type 2 diabetes, GERD,hypertension, and CAD  She denies any chest pain or dyspnea  She follows with cardiology regularly for this and afib,, but also needs a follow up ultrasound of her AAA  She has a lesion on her left cheek, which is getting larger, bleeding and has become lobulated  Her lumbar spinal stenosis pain has worsened with weather changes  She is requesting an adjustment in her meds  She denies any neurologic claudicaiton, weakness of legs bilaterally             SUBJECTIVE    Family History   Problem Relation Age of Onset    Hypertension Mother         Essential    Hypertension Sister         Essential    Alcohol abuse Brother      Social History     Socioeconomic History    Marital status: Single     Spouse name: Not on file    Number of children: Not on file    Years of education: Not on file    Highest education level: Not on file   Occupational History    Not on file   Social Needs    Financial resource strain: Not on file    Food insecurity:     Worry: Not on file     Inability: Not on file    Transportation needs:     Medical: Not on file     Non-medical: Not on file   Tobacco Use    Smoking status: Current Every Day Smoker    Smokeless tobacco: Never Used   Substance and Sexual Activity    Alcohol use: No    Drug use: Never    Sexual activity: Not Currently     Partners: Male     Birth control/protection: Post-menopausal   Lifestyle    Physical activity:     Days per week: Not on file     Minutes per session: Not on file    Stress: Not on file   Relationships    Social connections:     Talks on phone: Not on file     Gets together: Not on file     Attends Adventism service: Not on file     Active member of club or organization: Not on file     Attends meetings of clubs or organizations: Not on file     Relationship status: Not on file    Intimate partner violence:     Fear of current or ex partner: Not on file     Emotionally abused: Not on file     Physically abused: Not on file     Forced sexual activity: Not on file   Other Topics Concern    Not on file   Social History Narrative    Daily coffee consumption (__cups/day)    Denied: history of daily cola consumption (__ cups/day)    Denied: history of daily tea consumption (__cups/day)    Personal protective equipment seatbelts     Past Medical History:   Diagnosis Date    Atypical chest pain     NSTEMI (non-ST elevated myocardial infarction) ([facility] [address] )     Last Assessed: 9/24/2015     Past Surgical History:   Procedure Laterality Date    BACK SURGERY      BLADDER SURGERY      CORONARY ARTERY BYPASS GRAFT      CABGx3 with LIMA to LAD, SVG to PDA, SVG to OM-1; Onset: 9/2/2015    HYSTERECTOMY      POPLITEAL ARTERY ANGIOPLASTY      Femoral-Popliteal    TOE AMPUTATION Right     MTP First Toe    WRIST SURGERY       No Known Allergies    Current Outpatient Medications:     ALPRAZolam (XANAX) 0 25 mg tablet, Take 1 tablet (0 25 mg total) by mouth 3 (three) times a day as needed for anxiety for up to 90 days, Disp: 270 tablet, Rfl: 3    aspirin 81 MG tablet, Take 1 tablet by mouth daily, Disp: , Rfl:     atorvastatin (LIPITOR) 40 mg tablet, Take 1 tablet (40 mg total) by mouth daily, Disp: 90 tablet, Rfl: 3    diphenoxylate-atropine (LOMOTIL) 2 5-0 025 mg per tablet, Take 1 tablet by mouth 4 (four) times a day as needed for diarrhea, Disp: 30 tablet, Rfl: 0    metFORMIN (GLUCOPHAGE) 500 mg tablet, Take 1 tablet (500 mg total) by mouth 2 (two) times a day with meals for 5000 doses, Disp: 180 tablet, Rfl: 3    metoprolol succinate (TOPROL-XL) 50 mg 24 hr tablet, Take 1 tablet (50 mg total) by mouth daily, Disp: 90 tablet, Rfl: 3    Multiple Vitamins-Minerals (VISION-YOLANDA PRESERVE PO), Take 1 tablet by mouth daily, Disp: , Rfl:     omeprazole (PriLOSEC) 20 mg delayed release capsule, Take 1 capsule (20 mg total) by mouth daily, Disp: 90 capsule, Rfl: 3    traMADol (ULTRAM) 50 mg tablet, Take 1 tablet (50 mg total) by mouth as needed for moderate pain, Disp: 30 tablet, Rfl: 3    traMADol (ULTRAM) 50 mg tablet, Take 2 tablets (100 mg total) by mouth 2 (two) times a day, Disp: 60 tablet, Rfl: 3    Review of Systems  Constitutional:     Denies fever, chills ,fatigue ,weakness ,weight loss, weight gain     ENT: Denies earache ,loss of hearing ,nosebleed, nasal discharge,nasal congestion ,sore throat ,hoarseness  Pulmonary: Denies shortness of breath ,cough  ,dyspnea on exertion, orthopnea  ,PND   Cardiovascular:  Denies bradycardia , tachycardia  ,palpations, lower extremity edema leg, claudication  Breast:  Denies new or changing breast lumps ,nipple discharge ,nipple changes  Abdomen:  Denies abdominal pain , anorexia , indigestion, nausea, vomiting, constipation, diarrhea  Musculoskeletal: Denies myalgias,, joint swelling, joint stiffness , limb pain, limb swelling+ stiffness an constant aching pain of lumbar spine  Gu: denies dysuria, polyuria  Skin: Denies skin rash, +skin lesion on left cheek; bleeding, enlargimg and enlarge, skin wound, itching, dry skin  Neuro: Denies headache, numbness, tingling, confusion, loss of consciousness, dizziness, vertigo  Psychiatric: Denies feelings of depression, suicidal ideation, anxiety, sleep disturbances    OBJECTIVE  /82   Pulse 74   Temp 98 6 °F (37 °C)   Ht 5' 1 5" (1 562 m)   Wt 58 1 kg (128 lb)   BMI 23 79 kg/m²   Constitutional:   NAD, well appearing and well nourished      ENT: Conjunctiva and lids: no injection, edema, or discharge     Pupils and iris: BON bilaterally    External inspection of ears and nose: normal without deformities or discharge  Otoscopic exam: Canals patent without erythema  Nasal mucosa, septum and turbinates: Normal or edema or discharge         Oropharynx:  Moist mucosa, normal tongue and tonsils without lesions  No erythema        Pulmonary:Respiratory effort normal rate and rhythm, no increased work of breathing  Auscultation of lungs:  Clear bilaterally with no adventitious breath sounds       Cardiovascular: regular rate and rhythm, S1 and S2, no murmur, no edema and/or varicosities of LE      Abdomen: Soft and non-distended     Positive bowel sounds      No heptomegaly or splenomegaly      Gu: no suprapubic tenderness or CVA tenderness, no urethral discharge  Lymphatic:  No anterior or posterior cervical lymphadenopathy         Musculoskeletal:  Gait and station: Normal gait      Digits and nails normal without clubbing or cyanosis       Inspection/palpation of joints, bones, and muscles:  No joint tenderness, swelling, full active and passive range of motion       Skin: Normal skin turgor and no rashes+ skin lesion on left cheek, irregular border, aprox 1 cm in diameter and scabbed        Neuro:      Normal reflexes       Psych:   alert and oriented to person, place and time     normal mood and affect       Assessment/Plan:Diagnoses and all orders for this visit:    Neoplasm  -     Ambulatory referral to Plastic Surgery; Future    Lumbar back pain  -     traMADol (ULTRAM) 50 mg tablet; Take 2 tablets (100 mg total) by mouth 2 (two) times a day    Aneurysm of ascending aorta (HCC)    Postoperative atrial fibrillation (HCC)    Ruptured abdominal aortic aneurysm (AAA) ([facility] [address] )  -     US abdominal aorta screening aaa; Future    Hypercholesterolemia  -     Lipid Panel with Direct LDL reflex;  Future    Essential hypertension  -     Basic metabolic panel; Future    [facility] 1 5[facility]  -     HEMOGLOBIN A1C W/ EAG ESTIMATION;  Future        Reviewed with patient plan to treat with   Patient instructed to call in 72 hours if not feeling better or if symptoms worsen

## 2020-01-06 DIAGNOSIS — F41.9 ANXIETY: ICD-10-CM

## 2020-01-06 RX ORDER — ALPRAZOLAM 0.25 MG/1
0.25 TABLET ORAL 3 TIMES DAILY PRN
Qty: 270 TABLET | Refills: 0 | Status: SHIPPED | OUTPATIENT
Start: 2020-01-06 | End: 2020-06-23 | Stop reason: SDUPTHER

## 2020-02-05 LAB
LEFT EYE DIABETIC RETINOPATHY: NORMAL
RIGHT EYE DIABETIC RETINOPATHY: NORMAL

## 2020-03-06 ENCOUNTER — OFFICE VISIT (OUTPATIENT)
Dept: FAMILY MEDICINE CLINIC | Facility: CLINIC | Age: 75
End: 2020-03-06
Payer: COMMERCIAL

## 2020-03-06 VITALS
HEIGHT: 62 IN | WEIGHT: 126 LBS | DIASTOLIC BLOOD PRESSURE: 80 MMHG | TEMPERATURE: 98.5 F | SYSTOLIC BLOOD PRESSURE: 122 MMHG | HEART RATE: 74 BPM | BODY MASS INDEX: 23.19 KG/M2

## 2020-03-06 DIAGNOSIS — E11.3593 TYPE 2 DIABETES MELLITUS WITH PROLIFERATIVE RETINOPATHY OF BOTH EYES, WITHOUT LONG-TERM CURRENT USE OF INSULIN, MACULAR EDEMA PRESENCE UNSPECIFIED, UNSPECIFIED PROLIFERATIVE RETINOPATHY TYPE (HCC): ICD-10-CM

## 2020-03-06 DIAGNOSIS — R31.9 HEMATURIA, UNSPECIFIED TYPE: ICD-10-CM

## 2020-03-06 DIAGNOSIS — R31.9 URINARY TRACT INFECTION WITH HEMATURIA, SITE UNSPECIFIED: ICD-10-CM

## 2020-03-06 DIAGNOSIS — J06.9 UPPER RESPIRATORY TRACT INFECTION, UNSPECIFIED TYPE: Primary | ICD-10-CM

## 2020-03-06 DIAGNOSIS — N39.0 URINARY TRACT INFECTION WITH HEMATURIA, SITE UNSPECIFIED: ICD-10-CM

## 2020-03-06 PROBLEM — I71.30 RUPTURED ABDOMINAL AORTIC ANEURYSM (AAA): Status: ACTIVE | Noted: 2020-03-06

## 2020-03-06 PROBLEM — I71.3 RUPTURED ABDOMINAL AORTIC ANEURYSM (AAA) (HCC): Status: ACTIVE | Noted: 2020-03-06

## 2020-03-06 LAB
CREAT UR-MCNC: 87.5 MG/DL
MICROALBUMIN UR-MCNC: 327 MG/L (ref 0–20)
MICROALBUMIN/CREAT 24H UR: 374 MG/G CREATININE (ref 0–30)
SL AMB  POCT GLUCOSE, UA: 2000
SL AMB LEUKOCYTE ESTERASE,UA: ABNORMAL
SL AMB POCT BILIRUBIN,UA: NEGATIVE
SL AMB POCT BLOOD,UA: ABNORMAL
SL AMB POCT CLARITY,UA: ABNORMAL
SL AMB POCT COLOR,UA: YELLOW
SL AMB POCT HEMOGLOBIN AIC: 6.5 (ref ?–6.5)
SL AMB POCT KETONES,UA: NEGATIVE
SL AMB POCT NITRITE,UA: NEGATIVE
SL AMB POCT PH,UA: 6
SL AMB POCT SPECIFIC GRAVITY,UA: 1.02
SL AMB POCT URINE PROTEIN: ABNORMAL
SL AMB POCT UROBILINOGEN: 0.2

## 2020-03-06 PROCEDURE — 3079F DIAST BP 80-89 MM HG: CPT | Performed by: FAMILY MEDICINE

## 2020-03-06 PROCEDURE — 3074F SYST BP LT 130 MM HG: CPT | Performed by: FAMILY MEDICINE

## 2020-03-06 PROCEDURE — 2022F DILAT RTA XM EVC RTNOPTHY: CPT | Performed by: FAMILY MEDICINE

## 2020-03-06 PROCEDURE — 82043 UR ALBUMIN QUANTITATIVE: CPT | Performed by: FAMILY MEDICINE

## 2020-03-06 PROCEDURE — 99214 OFFICE O/P EST MOD 30 MIN: CPT | Performed by: FAMILY MEDICINE

## 2020-03-06 PROCEDURE — 81002 URINALYSIS NONAUTO W/O SCOPE: CPT | Performed by: FAMILY MEDICINE

## 2020-03-06 PROCEDURE — 3008F BODY MASS INDEX DOCD: CPT | Performed by: FAMILY MEDICINE

## 2020-03-06 PROCEDURE — 3062F POS MACROALBUMINURIA REV: CPT | Performed by: FAMILY MEDICINE

## 2020-03-06 PROCEDURE — 3044F HG A1C LEVEL LT 7.0%: CPT | Performed by: FAMILY MEDICINE

## 2020-03-06 PROCEDURE — 82570 ASSAY OF URINE CREATININE: CPT | Performed by: FAMILY MEDICINE

## 2020-03-06 PROCEDURE — 4040F PNEUMOC VAC/ADMIN/RCVD: CPT | Performed by: FAMILY MEDICINE

## 2020-03-06 PROCEDURE — 1160F RVW MEDS BY RX/DR IN RCRD: CPT | Performed by: FAMILY MEDICINE

## 2020-03-06 PROCEDURE — 4004F PT TOBACCO SCREEN RCVD TLK: CPT | Performed by: FAMILY MEDICINE

## 2020-03-06 PROCEDURE — 87086 URINE CULTURE/COLONY COUNT: CPT | Performed by: FAMILY MEDICINE

## 2020-03-06 PROCEDURE — 83036 HEMOGLOBIN GLYCOSYLATED A1C: CPT | Performed by: FAMILY MEDICINE

## 2020-03-06 PROCEDURE — 3060F POS MICROALBUMINURIA REV: CPT | Performed by: FAMILY MEDICINE

## 2020-03-06 RX ORDER — BROMPHENIRAMINE MALEATE, PSEUDOEPHEDRINE HYDROCHLORIDE, AND DEXTROMETHORPHAN HYDROBROMIDE 2; 30; 10 MG/5ML; MG/5ML; MG/5ML
5 SYRUP ORAL 4 TIMES DAILY PRN
Qty: 180 ML | Refills: 0 | Status: SHIPPED | OUTPATIENT
Start: 2020-03-06 | End: 2020-04-16 | Stop reason: ALTCHOICE

## 2020-03-06 RX ORDER — LEVOFLOXACIN 500 MG/1
500 TABLET, FILM COATED ORAL EVERY 24 HOURS
Qty: 10 TABLET | Refills: 0 | Status: SHIPPED | OUTPATIENT
Start: 2020-03-06 | End: 2020-03-16

## 2020-03-06 NOTE — PROGRESS NOTES
Patient ID: Storm Delarosa is a 69 y o  female  HPI: 69 y  o female presenting with 3 day history of sore throat, nasal congestion, ear pain,pnd and cough  Pt denies any fever, chills, or body aches  She also had cloudy bloody urine and some low back pain but denies any dysuria or polyuria        SUBJECTIVE    Family History   Problem Relation Age of Onset    Hypertension Mother         Essential    Hypertension Sister         Essential    Alcohol abuse Brother      Social History     Socioeconomic History    Marital status: Single     Spouse name: Not on file    Number of children: Not on file    Years of education: Not on file    Highest education level: Not on file   Occupational History    Not on file   Social Needs    Financial resource strain: Not on file    Food insecurity:     Worry: Not on file     Inability: Not on file    Transportation needs:     Medical: Not on file     Non-medical: Not on file   Tobacco Use    Smoking status: Current Every Day Smoker    Smokeless tobacco: Never Used   Substance and Sexual Activity    Alcohol use: No    Drug use: Never    Sexual activity: Not Currently     Partners: Male     Birth control/protection: Post-menopausal   Lifestyle    Physical activity:     Days per week: Not on file     Minutes per session: Not on file    Stress: Not on file   Relationships    Social connections:     Talks on phone: Not on file     Gets together: Not on file     Attends [employer] service: Not on file     Active member of club or organization: Not on file     Attends meetings of clubs or organizations: Not on file     Relationship status: Not on file    Intimate partner violence:     Fear of current or ex partner: Not on file     Emotionally abused: Not on file     Physically abused: Not on file     Forced sexual activity: Not on file   Other Topics Concern    Not on file   Social History Narrative    Daily coffee consumption (__cups/day)    Denied: history of daily cola consumption (__ cups/day)    Denied: history of daily tea consumption (__cups/day)    Personal protective equipment seatbelts     Past Medical History:   Diagnosis Date    Atypical chest pain     NSTEMI (non-ST elevated myocardial infarction) ([facility] [address] )     Last Assessed: 9/24/2015     Past Surgical History:   Procedure Laterality Date    BACK SURGERY      BLADDER SURGERY      CORONARY ARTERY BYPASS GRAFT      CABGx3 with LIMA to LAD, SVG to PDA, SVG to OM-1; Onset: 9/2/2015    HYSTERECTOMY      POPLITEAL ARTERY ANGIOPLASTY      Femoral-Popliteal    TOE AMPUTATION Right     MTP First Toe    WRIST SURGERY       No Known Allergies    Current Outpatient Medications:     ALPRAZolam (XANAX) 0 25 mg tablet, Take 1 tablet (0 25 mg total) by mouth 3 (three) times a day as needed for anxiety, Disp: 270 tablet, Rfl: 0    aspirin 81 MG tablet, Take 1 tablet by mouth daily, Disp: , Rfl:     atorvastatin (LIPITOR) 40 mg tablet, Take 1 tablet (40 mg total) by mouth daily, Disp: 90 tablet, Rfl: 3    diphenoxylate-atropine (LOMOTIL) 2 5-0 025 mg per tablet, Take 1 tablet by mouth 4 (four) times a day as needed for diarrhea, Disp: 30 tablet, Rfl: 0    metFORMIN (GLUCOPHAGE) 500 mg tablet, Take 1 tablet (500 mg total) by mouth 2 (two) times a day with meals for 5000 doses, Disp: 180 tablet, Rfl: 3    metoprolol succinate (TOPROL-XL) 50 mg 24 hr tablet, Take 1 tablet (50 mg total) by mouth daily, Disp: 90 tablet, Rfl: 3    Multiple Vitamins-Minerals (VISION-YOLANDA PRESERVE PO), Take 1 tablet by mouth daily, Disp: , Rfl:     omeprazole (PriLOSEC) 20 mg delayed release capsule, Take 1 capsule (20 mg total) by mouth daily, Disp: 90 capsule, Rfl: 3    traMADol (ULTRAM) 50 mg tablet, Take 2 tablets (100 mg total) by mouth 2 (two) times a day, Disp: 60 tablet, Rfl: 3    brompheniramine-pseudoephedrine-DM 30-2-10 MG/5ML syrup, Take 5 mL by mouth 4 (four) times a day as needed for congestion, Disp: 180 mL, Rfl: 0   levofloxacin (LEVAQUIN) 500 mg tablet, Take 1 tablet (500 mg total) by mouth every 24 hours for 10 days, Disp: 10 tablet, Rfl: 0    Review of Systems  Constitutional:     Denies fever, chills ,fatigue ,weakness ,weight loss, weight gain     ENT: Denies loss of hearing ,nosebleed, nasal discharge ,hoarseness; but admits to nasal congestion , sore throat and ear pain  Pulmonary: Denies shortness of breath ,dyspnea on exertion, orthopnea ; but admits to cough and pnd  Cardiovascular:  Denies bradycardia , tachycardia  ,palpations, lower extremity edema leg, claudication  Breast:  Denies new or changing breast lumps ,nipple discharge ,nipple changes  Abdomen:  Denies abdominal pain , anorexia , indigestion, nausea, vomiting, constipation, diarrhea  Musculoskeletal: Denies myalgias,  joint swelling, joint stiffness , limb pain, limb swelling+ low back pan straight across low back  Lymph: + swollen glands  Gu: Denies polyuria or dysuria + hematuria and cloudy urine  Skin: Denies skin rash, skin lesion, skin wound, itching, dry skin  Neuro: Denies headache, numbness, tingling, confusion, loss of consciousness, dizziness, vertigo  Psychiatric: Denies feelings of depression, suicidal ideation, anxiety, sleep disturbances    OBJECTIVE  /80   Pulse 74   Temp 98 5 °F (36 9 °C)   Ht 5' 1 5" (1 562 m)   Wt 57 2 kg (126 lb)   BMI 23 42 kg/m²   Constitutional:   NAD, well appearing and well nourished     ENT:   Conjunctiva and lids: no injection, edema, or discharge    Pupils and iris: BON bilaterally  External inspection of ears and nose: normal without deformities or discharge  Otoscopic exam: Canals patent without erythema, tm dull and erythematous, effusions bilaterally   Nasal mucosa, septum and turbinates: + turbinate injection, nasal discharge       Oropharynx:  Moist mucosa, normal tongue and tonsils without lesions  + erythema and injection of posterior pharynx with pnd    Pulmonary:Respiratory effort normal rate and rhythm, no increased work of breathing  Auscultation of lungs:  Clear bilaterally with no adventitious breath sounds     Cardiovascular: regular rate and rhythm, S1 and S2, no murmur, no edema and/or varicosities of LE     Abdomen: Soft and nontender with + bowel sounds  No heptomegaly or splenomegaly     Gu: no suprapubic tenderness or CVA tenderness  Lymphatic: + anterior  cervical lymphadenopathy      Musculoskeletal:  Gait and station: Normal gait      Digits and nails normal without clubbing or cyanosis      Inspection/palpation of joints, bones, and muscles:  No joint tenderness, swelling, full active and passive range of motion       Skin: Normal skin turgor and no rashes      Neuro:    Normal reflexes  Pych:   alert and oriented to person, place and time     normal mood and affect      Assessment/Plan:Diagnoses and all orders for this visit:    Upper respiratory tract infection, unspecified type  -     levofloxacin (LEVAQUIN) 500 mg tablet; Take 1 tablet (500 mg total) by mouth every 24 hours for 10 days  -     brompheniramine-pseudoephedrine-DM 30-2-10 MG/5ML syrup; Take 5 mL by mouth 4 (four) times a day as needed for congestion    Urinary tract infection with hematuria, site unspecified  Comments:  Levofloxacin should cover uti, encouraged pt to increase her fluid intake  Hematuria, unspecified type  -     POCT urine dip  -     Urine culture; Future  -     Urine culture    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, without long-term current use of insulin, macular edema presence unspecified, unspecified proliferative retinopathy type (HCC)  -     POCT hemoglobin A1c  -     Microalbumin / creatinine urine ratio        Reviewed with patient plan to treat with above plan      Patient instructed to call in 72 hours if not feeling better or if symptoms worsen

## 2020-03-07 LAB — BACTERIA UR CULT: NORMAL

## 2020-04-01 LAB
LEFT EYE DIABETIC RETINOPATHY: NORMAL
RIGHT EYE DIABETIC RETINOPATHY: NORMAL

## 2020-04-16 ENCOUNTER — OFFICE VISIT (OUTPATIENT)
Dept: FAMILY MEDICINE CLINIC | Facility: CLINIC | Age: 75
End: 2020-04-16
Payer: COMMERCIAL

## 2020-04-16 VITALS
HEIGHT: 62 IN | BODY MASS INDEX: 22.52 KG/M2 | WEIGHT: 122.38 LBS | DIASTOLIC BLOOD PRESSURE: 82 MMHG | HEART RATE: 74 BPM | SYSTOLIC BLOOD PRESSURE: 118 MMHG | TEMPERATURE: 98 F

## 2020-04-16 DIAGNOSIS — H61.23 BILATERAL HEARING LOSS DUE TO CERUMEN IMPACTION: ICD-10-CM

## 2020-04-16 DIAGNOSIS — Z00.00 ANNUAL PHYSICAL EXAM: Primary | ICD-10-CM

## 2020-04-16 DIAGNOSIS — E78.00 HYPERCHOLESTEROLEMIA: ICD-10-CM

## 2020-04-16 DIAGNOSIS — E11.8 TYPE 2 DIABETES MELLITUS WITH COMPLICATION (HCC): ICD-10-CM

## 2020-04-16 DIAGNOSIS — I10 ESSENTIAL HYPERTENSION: ICD-10-CM

## 2020-04-16 PROCEDURE — 3008F BODY MASS INDEX DOCD: CPT | Performed by: FAMILY MEDICINE

## 2020-04-16 PROCEDURE — 3079F DIAST BP 80-89 MM HG: CPT | Performed by: FAMILY MEDICINE

## 2020-04-16 PROCEDURE — 3044F HG A1C LEVEL LT 7.0%: CPT | Performed by: FAMILY MEDICINE

## 2020-04-16 PROCEDURE — 1160F RVW MEDS BY RX/DR IN RCRD: CPT | Performed by: FAMILY MEDICINE

## 2020-04-16 PROCEDURE — 4040F PNEUMOC VAC/ADMIN/RCVD: CPT | Performed by: FAMILY MEDICINE

## 2020-04-16 PROCEDURE — 3074F SYST BP LT 130 MM HG: CPT | Performed by: FAMILY MEDICINE

## 2020-04-16 PROCEDURE — 69210 REMOVE IMPACTED EAR WAX UNI: CPT | Performed by: FAMILY MEDICINE

## 2020-04-16 PROCEDURE — 99214 OFFICE O/P EST MOD 30 MIN: CPT | Performed by: FAMILY MEDICINE

## 2020-04-16 PROCEDURE — 99397 PER PM REEVAL EST PAT 65+ YR: CPT | Performed by: FAMILY MEDICINE

## 2020-04-16 PROCEDURE — 3060F POS MICROALBUMINURIA REV: CPT | Performed by: FAMILY MEDICINE

## 2020-04-16 PROCEDURE — 4004F PT TOBACCO SCREEN RCVD TLK: CPT | Performed by: FAMILY MEDICINE

## 2020-04-16 PROCEDURE — 2022F DILAT RTA XM EVC RTNOPTHY: CPT | Performed by: FAMILY MEDICINE

## 2020-04-22 DIAGNOSIS — M54.50 LUMBAR BACK PAIN: ICD-10-CM

## 2020-04-22 RX ORDER — TRAMADOL HYDROCHLORIDE 50 MG/1
100 TABLET ORAL 2 TIMES DAILY
Qty: 60 TABLET | Refills: 3 | Status: SHIPPED | OUTPATIENT
Start: 2020-04-22 | End: 2020-11-19 | Stop reason: SDUPTHER

## 2020-05-23 ENCOUNTER — APPOINTMENT (EMERGENCY)
Dept: RADIOLOGY | Facility: HOSPITAL | Age: 75
DRG: 287 | End: 2020-05-23
Payer: COMMERCIAL

## 2020-05-23 ENCOUNTER — HOSPITAL ENCOUNTER (INPATIENT)
Facility: HOSPITAL | Age: 75
LOS: 6 days | Discharge: HOME | DRG: 287 | End: 2020-05-29
Attending: EMERGENCY MEDICINE | Admitting: INTERNAL MEDICINE
Payer: COMMERCIAL

## 2020-05-23 DIAGNOSIS — I48.91 A-FIB (HCC): ICD-10-CM

## 2020-05-23 DIAGNOSIS — R07.9 CHEST PAIN: ICD-10-CM

## 2020-05-23 DIAGNOSIS — I10 ESSENTIAL HYPERTENSION: ICD-10-CM

## 2020-05-23 DIAGNOSIS — H35.30 MACULAR DEGENERATION: ICD-10-CM

## 2020-05-23 DIAGNOSIS — H40.9 GLAUCOMA: ICD-10-CM

## 2020-05-23 DIAGNOSIS — I21.4 NSTEMI (NON-ST ELEVATED MYOCARDIAL INFARCTION) (HCC): Primary | ICD-10-CM

## 2020-05-23 DIAGNOSIS — R31.0 GROSS HEMATURIA: ICD-10-CM

## 2020-05-23 DIAGNOSIS — F17.200 CURRENT SMOKER: ICD-10-CM

## 2020-05-23 DIAGNOSIS — E78.00 HYPERCHOLESTEREMIA: ICD-10-CM

## 2020-05-23 DIAGNOSIS — I25.10 CORONARY ARTERY DISEASE: ICD-10-CM

## 2020-05-23 DIAGNOSIS — I10 HYPERTENSION: ICD-10-CM

## 2020-05-23 LAB
ANION GAP SERPL CALCULATED.3IONS-SCNC: 8 MMOL/L (ref 4–13)
APTT PPP: 29 SECONDS (ref 23–37)
APTT PPP: 57 SECONDS (ref 23–37)
BACTERIA UR QL AUTO: ABNORMAL /HPF
BASOPHILS # BLD AUTO: 0.07 THOUSANDS/ΜL (ref 0–0.1)
BASOPHILS NFR BLD AUTO: 1 % (ref 0–1)
BILIRUB UR QL STRIP: NEGATIVE
BUN SERPL-MCNC: 17 MG/DL (ref 5–25)
CALCIUM SERPL-MCNC: 8.3 MG/DL (ref 8.3–10.1)
CHLORIDE SERPL-SCNC: 101 MMOL/L (ref 100–108)
CHOLEST SERPL-MCNC: 120 MG/DL (ref 50–200)
CLARITY UR: CLEAR
CO2 SERPL-SCNC: 28 MMOL/L (ref 21–32)
COLOR UR: YELLOW
CREAT SERPL-MCNC: 0.71 MG/DL (ref 0.6–1.3)
EOSINOPHIL # BLD AUTO: 0.2 THOUSAND/ΜL (ref 0–0.61)
EOSINOPHIL NFR BLD AUTO: 2 % (ref 0–6)
ERYTHROCYTE [DISTWIDTH] IN BLOOD BY AUTOMATED COUNT: 13 % (ref 11.6–15.1)
GFR SERPL CREATININE-BSD FRML MDRD: 85 ML/MIN/1.73SQ M
GLUCOSE SERPL-MCNC: 107 MG/DL (ref 65–140)
GLUCOSE SERPL-MCNC: 148 MG/DL (ref 65–140)
GLUCOSE SERPL-MCNC: 74 MG/DL (ref 65–140)
GLUCOSE UR STRIP-MCNC: NEGATIVE MG/DL
HCT VFR BLD AUTO: 39.1 % (ref 34.8–46.1)
HDLC SERPL-MCNC: 52 MG/DL
HGB BLD-MCNC: 12.6 G/DL (ref 11.5–15.4)
HGB UR QL STRIP.AUTO: ABNORMAL
IMM GRANULOCYTES # BLD AUTO: 0.03 THOUSAND/UL (ref 0–0.2)
IMM GRANULOCYTES NFR BLD AUTO: 0 % (ref 0–2)
INR PPP: 1.03 (ref 0.84–1.19)
KETONES UR STRIP-MCNC: NEGATIVE MG/DL
LDLC SERPL CALC-MCNC: 55 MG/DL (ref 0–100)
LEUKOCYTE ESTERASE UR QL STRIP: ABNORMAL
LYMPHOCYTES # BLD AUTO: 2.42 THOUSANDS/ΜL (ref 0.6–4.47)
LYMPHOCYTES NFR BLD AUTO: 25 % (ref 14–44)
MAGNESIUM SERPL-MCNC: 1.5 MG/DL (ref 1.6–2.6)
MCH RBC QN AUTO: 30.9 PG (ref 26.8–34.3)
MCHC RBC AUTO-ENTMCNC: 32.2 G/DL (ref 31.4–37.4)
MCV RBC AUTO: 96 FL (ref 82–98)
MONOCYTES # BLD AUTO: 0.52 THOUSAND/ΜL (ref 0.17–1.22)
MONOCYTES NFR BLD AUTO: 5 % (ref 4–12)
NEUTROPHILS # BLD AUTO: 6.4 THOUSANDS/ΜL (ref 1.85–7.62)
NEUTS SEG NFR BLD AUTO: 67 % (ref 43–75)
NITRITE UR QL STRIP: NEGATIVE
NON-SQ EPI CELLS URNS QL MICRO: ABNORMAL /HPF
NONHDLC SERPL-MCNC: 68 MG/DL
NRBC BLD AUTO-RTO: 0 /100 WBCS
NT-PROBNP SERPL-MCNC: 702 PG/ML
PH UR STRIP.AUTO: 6.5 [PH] (ref 4.5–8)
PLATELET # BLD AUTO: 171 THOUSANDS/UL (ref 149–390)
PMV BLD AUTO: 11.1 FL (ref 8.9–12.7)
POTASSIUM SERPL-SCNC: 3.7 MMOL/L (ref 3.5–5.3)
PROT UR STRIP-MCNC: ABNORMAL MG/DL
PROTHROMBIN TIME: 12.9 SECONDS (ref 11.6–14.5)
RBC # BLD AUTO: 4.08 MILLION/UL (ref 3.81–5.12)
RBC #/AREA URNS AUTO: ABNORMAL /HPF
SARS-COV-2 RNA RESP QL NAA+PROBE: NEGATIVE
SODIUM SERPL-SCNC: 137 MMOL/L (ref 136–145)
SP GR UR STRIP.AUTO: 1.02 (ref 1–1.03)
TRIGL SERPL-MCNC: 67 MG/DL
TROPONIN I SERPL-MCNC: 0.11 NG/ML
TROPONIN I SERPL-MCNC: 0.36 NG/ML
TROPONIN I SERPL-MCNC: 0.48 NG/ML
TROPONIN I SERPL-MCNC: 0.55 NG/ML
TROPONIN I SERPL-MCNC: 0.6 NG/ML
TSH SERPL DL<=0.05 MIU/L-ACNC: 1.31 UIU/ML (ref 0.36–3.74)
UROBILINOGEN UR QL STRIP.AUTO: 0.2 E.U./DL
WBC # BLD AUTO: 9.64 THOUSAND/UL (ref 4.31–10.16)
WBC #/AREA URNS AUTO: ABNORMAL /HPF

## 2020-05-23 PROCEDURE — 96374 THER/PROPH/DIAG INJ IV PUSH: CPT

## 2020-05-23 PROCEDURE — 96375 TX/PRO/DX INJ NEW DRUG ADDON: CPT

## 2020-05-23 PROCEDURE — 93005 ELECTROCARDIOGRAM TRACING: CPT

## 2020-05-23 PROCEDURE — 80048 BASIC METABOLIC PNL TOTAL CA: CPT | Performed by: PHYSICIAN ASSISTANT

## 2020-05-23 PROCEDURE — 84484 ASSAY OF TROPONIN QUANT: CPT | Performed by: PHYSICIAN ASSISTANT

## 2020-05-23 PROCEDURE — 85610 PROTHROMBIN TIME: CPT | Performed by: PHYSICIAN ASSISTANT

## 2020-05-23 PROCEDURE — 85730 THROMBOPLASTIN TIME PARTIAL: CPT | Performed by: PHYSICIAN ASSISTANT

## 2020-05-23 PROCEDURE — 85025 COMPLETE CBC W/AUTO DIFF WBC: CPT | Performed by: PHYSICIAN ASSISTANT

## 2020-05-23 PROCEDURE — 99223 1ST HOSP IP/OBS HIGH 75: CPT | Performed by: INTERNAL MEDICINE

## 2020-05-23 PROCEDURE — 83880 ASSAY OF NATRIURETIC PEPTIDE: CPT | Performed by: INTERNAL MEDICINE

## 2020-05-23 PROCEDURE — 84484 ASSAY OF TROPONIN QUANT: CPT | Performed by: INTERNAL MEDICINE

## 2020-05-23 PROCEDURE — 83735 ASSAY OF MAGNESIUM: CPT | Performed by: PHYSICIAN ASSISTANT

## 2020-05-23 PROCEDURE — 87086 URINE CULTURE/COLONY COUNT: CPT

## 2020-05-23 PROCEDURE — 71045 X-RAY EXAM CHEST 1 VIEW: CPT

## 2020-05-23 PROCEDURE — 99285 EMERGENCY DEPT VISIT HI MDM: CPT

## 2020-05-23 PROCEDURE — 99285 EMERGENCY DEPT VISIT HI MDM: CPT | Performed by: PHYSICIAN ASSISTANT

## 2020-05-23 PROCEDURE — 84443 ASSAY THYROID STIM HORMONE: CPT | Performed by: INTERNAL MEDICINE

## 2020-05-23 PROCEDURE — 85730 THROMBOPLASTIN TIME PARTIAL: CPT | Performed by: INTERNAL MEDICINE

## 2020-05-23 PROCEDURE — 36415 COLL VENOUS BLD VENIPUNCTURE: CPT | Performed by: PHYSICIAN ASSISTANT

## 2020-05-23 PROCEDURE — 82948 REAGENT STRIP/BLOOD GLUCOSE: CPT

## 2020-05-23 PROCEDURE — 80061 LIPID PANEL: CPT | Performed by: INTERNAL MEDICINE

## 2020-05-23 PROCEDURE — 81001 URINALYSIS AUTO W/SCOPE: CPT

## 2020-05-23 PROCEDURE — 87635 SARS-COV-2 COVID-19 AMP PRB: CPT | Performed by: PHYSICIAN ASSISTANT

## 2020-05-23 RX ORDER — HEPARIN SODIUM 1000 [USP'U]/ML
1650 INJECTION, SOLUTION INTRAVENOUS; SUBCUTANEOUS
Status: DISCONTINUED | OUTPATIENT
Start: 2020-05-23 | End: 2020-05-25

## 2020-05-23 RX ORDER — BRIMONIDINE TARTRATE 0.15 %
1 DROPS OPHTHALMIC (EYE) 2 TIMES DAILY
Status: DISCONTINUED | OUTPATIENT
Start: 2020-05-23 | End: 2020-05-29 | Stop reason: HOSPADM

## 2020-05-23 RX ORDER — HEPARIN SODIUM 1000 [USP'U]/ML
3300 INJECTION, SOLUTION INTRAVENOUS; SUBCUTANEOUS ONCE
Status: COMPLETED | OUTPATIENT
Start: 2020-05-23 | End: 2020-05-23

## 2020-05-23 RX ORDER — ASPIRIN 81 MG/1
243 TABLET, CHEWABLE ORAL ONCE
Status: COMPLETED | OUTPATIENT
Start: 2020-05-23 | End: 2020-05-23

## 2020-05-23 RX ORDER — ACETAMINOPHEN 325 MG/1
650 TABLET ORAL EVERY 6 HOURS PRN
Status: DISCONTINUED | OUTPATIENT
Start: 2020-05-23 | End: 2020-05-29 | Stop reason: HOSPADM

## 2020-05-23 RX ORDER — TRAMADOL HYDROCHLORIDE 50 MG/1
100 TABLET ORAL 2 TIMES DAILY
Status: DISCONTINUED | OUTPATIENT
Start: 2020-05-23 | End: 2020-05-29 | Stop reason: HOSPADM

## 2020-05-23 RX ORDER — ASPIRIN 81 MG/1
81 TABLET ORAL DAILY
Status: DISCONTINUED | OUTPATIENT
Start: 2020-05-24 | End: 2020-05-25

## 2020-05-23 RX ORDER — MAGNESIUM SULFATE HEPTAHYDRATE 40 MG/ML
2 INJECTION, SOLUTION INTRAVENOUS ONCE
Status: COMPLETED | OUTPATIENT
Start: 2020-05-23 | End: 2020-05-23

## 2020-05-23 RX ORDER — HYDRALAZINE HYDROCHLORIDE 20 MG/ML
5 INJECTION INTRAMUSCULAR; INTRAVENOUS EVERY 6 HOURS PRN
Status: DISCONTINUED | OUTPATIENT
Start: 2020-05-23 | End: 2020-05-24

## 2020-05-23 RX ORDER — ALPRAZOLAM 0.25 MG/1
0.25 TABLET ORAL 3 TIMES DAILY PRN
Status: DISCONTINUED | OUTPATIENT
Start: 2020-05-23 | End: 2020-05-29 | Stop reason: HOSPADM

## 2020-05-23 RX ORDER — HEPARIN SODIUM 10000 [USP'U]/100ML
3-20 INJECTION, SOLUTION INTRAVENOUS
Status: DISCONTINUED | OUTPATIENT
Start: 2020-05-23 | End: 2020-05-25

## 2020-05-23 RX ORDER — HEPARIN SODIUM 1000 [USP'U]/ML
3300 INJECTION, SOLUTION INTRAVENOUS; SUBCUTANEOUS
Status: DISCONTINUED | OUTPATIENT
Start: 2020-05-23 | End: 2020-05-25

## 2020-05-23 RX ORDER — TIMOLOL MALEATE 5 MG/ML
1 SOLUTION/ DROPS OPHTHALMIC 2 TIMES DAILY
Status: DISCONTINUED | OUTPATIENT
Start: 2020-05-23 | End: 2020-05-29 | Stop reason: HOSPADM

## 2020-05-23 RX ORDER — NITROGLYCERIN 0.4 MG/1
0.4 TABLET SUBLINGUAL ONCE
Status: COMPLETED | OUTPATIENT
Start: 2020-05-23 | End: 2020-05-23

## 2020-05-23 RX ORDER — ATORVASTATIN CALCIUM 40 MG/1
40 TABLET, FILM COATED ORAL DAILY
Status: DISCONTINUED | OUTPATIENT
Start: 2020-05-24 | End: 2020-05-29 | Stop reason: HOSPADM

## 2020-05-23 RX ADMIN — ACETAMINOPHEN 650 MG: 325 TABLET, FILM COATED ORAL at 21:50

## 2020-05-23 RX ADMIN — MAGNESIUM SULFATE HEPTAHYDRATE 2 G: 40 INJECTION, SOLUTION INTRAVENOUS at 16:51

## 2020-05-23 RX ADMIN — HEPARIN SODIUM 1650 UNITS: 1000 INJECTION INTRAVENOUS; SUBCUTANEOUS at 20:30

## 2020-05-23 RX ADMIN — NITROGLYCERIN 0.4 MG: 0.4 TABLET SUBLINGUAL at 11:51

## 2020-05-23 RX ADMIN — HEPARIN SODIUM 12 UNITS/KG/HR: 10000 INJECTION, SOLUTION INTRAVENOUS at 12:40

## 2020-05-23 RX ADMIN — TRAMADOL HYDROCHLORIDE 100 MG: 50 TABLET, FILM COATED ORAL at 17:57

## 2020-05-23 RX ADMIN — ASPIRIN 81 MG 243 MG: 81 TABLET ORAL at 11:00

## 2020-05-23 RX ADMIN — HEPARIN SODIUM 3300 UNITS: 1000 INJECTION INTRAVENOUS; SUBCUTANEOUS at 12:41

## 2020-05-24 LAB
ALBUMIN SERPL BCP-MCNC: 3.3 G/DL (ref 3.5–5)
ALP SERPL-CCNC: 33 U/L (ref 46–116)
ALT SERPL W P-5'-P-CCNC: 12 U/L (ref 12–78)
ANION GAP SERPL CALCULATED.3IONS-SCNC: 10 MMOL/L (ref 4–13)
APTT PPP: 43 SECONDS (ref 23–37)
APTT PPP: 68 SECONDS (ref 23–37)
APTT PPP: 74 SECONDS (ref 23–37)
AST SERPL W P-5'-P-CCNC: 17 U/L (ref 5–45)
BACTERIA UR CULT: NORMAL
BILIRUB SERPL-MCNC: 0.45 MG/DL (ref 0.2–1)
BUN SERPL-MCNC: 13 MG/DL (ref 5–25)
CALCIUM SERPL-MCNC: 7.8 MG/DL (ref 8.3–10.1)
CHLORIDE SERPL-SCNC: 102 MMOL/L (ref 100–108)
CO2 SERPL-SCNC: 27 MMOL/L (ref 21–32)
CREAT SERPL-MCNC: 0.7 MG/DL (ref 0.6–1.3)
ERYTHROCYTE [DISTWIDTH] IN BLOOD BY AUTOMATED COUNT: 12.9 % (ref 11.6–15.1)
GFR SERPL CREATININE-BSD FRML MDRD: 86 ML/MIN/1.73SQ M
GLUCOSE SERPL-MCNC: 127 MG/DL (ref 65–140)
GLUCOSE SERPL-MCNC: 128 MG/DL (ref 65–140)
GLUCOSE SERPL-MCNC: 165 MG/DL (ref 65–140)
GLUCOSE SERPL-MCNC: 201 MG/DL (ref 65–140)
GLUCOSE SERPL-MCNC: 99 MG/DL (ref 65–140)
HCT VFR BLD AUTO: 37.7 % (ref 34.8–46.1)
HCT VFR BLD AUTO: 42.1 % (ref 34.8–46.1)
HGB BLD-MCNC: 12.7 G/DL (ref 11.5–15.4)
HGB BLD-MCNC: 13.9 G/DL (ref 11.5–15.4)
MAGNESIUM SERPL-MCNC: 1.8 MG/DL (ref 1.6–2.6)
MCH RBC QN AUTO: 31.4 PG (ref 26.8–34.3)
MCHC RBC AUTO-ENTMCNC: 33.7 G/DL (ref 31.4–37.4)
MCV RBC AUTO: 93 FL (ref 82–98)
PHOSPHATE SERPL-MCNC: 2.8 MG/DL (ref 2.3–4.1)
PLATELET # BLD AUTO: 158 THOUSANDS/UL (ref 149–390)
PMV BLD AUTO: 10.2 FL (ref 8.9–12.7)
POTASSIUM SERPL-SCNC: 3.3 MMOL/L (ref 3.5–5.3)
PROT SERPL-MCNC: 6.3 G/DL (ref 6.4–8.2)
RBC # BLD AUTO: 4.04 MILLION/UL (ref 3.81–5.12)
SODIUM SERPL-SCNC: 139 MMOL/L (ref 136–145)
TROPONIN I SERPL-MCNC: 0.54 NG/ML
WBC # BLD AUTO: 12.8 THOUSAND/UL (ref 4.31–10.16)

## 2020-05-24 PROCEDURE — 83735 ASSAY OF MAGNESIUM: CPT | Performed by: INTERNAL MEDICINE

## 2020-05-24 PROCEDURE — 85018 HEMOGLOBIN: CPT | Performed by: INTERNAL MEDICINE

## 2020-05-24 PROCEDURE — 84100 ASSAY OF PHOSPHORUS: CPT | Performed by: INTERNAL MEDICINE

## 2020-05-24 PROCEDURE — 99254 IP/OBS CNSLTJ NEW/EST MOD 60: CPT | Performed by: INTERNAL MEDICINE

## 2020-05-24 PROCEDURE — 85027 COMPLETE CBC AUTOMATED: CPT | Performed by: INTERNAL MEDICINE

## 2020-05-24 PROCEDURE — 85014 HEMATOCRIT: CPT | Performed by: INTERNAL MEDICINE

## 2020-05-24 PROCEDURE — 99232 SBSQ HOSP IP/OBS MODERATE 35: CPT | Performed by: INTERNAL MEDICINE

## 2020-05-24 PROCEDURE — 85730 THROMBOPLASTIN TIME PARTIAL: CPT | Performed by: INTERNAL MEDICINE

## 2020-05-24 PROCEDURE — 80053 COMPREHEN METABOLIC PANEL: CPT | Performed by: INTERNAL MEDICINE

## 2020-05-24 PROCEDURE — 84484 ASSAY OF TROPONIN QUANT: CPT | Performed by: INTERNAL MEDICINE

## 2020-05-24 PROCEDURE — 82948 REAGENT STRIP/BLOOD GLUCOSE: CPT

## 2020-05-24 RX ORDER — POTASSIUM CHLORIDE 20 MEQ/1
40 TABLET, EXTENDED RELEASE ORAL ONCE
Status: COMPLETED | OUTPATIENT
Start: 2020-05-24 | End: 2020-05-24

## 2020-05-24 RX ORDER — CALCIUM CARBONATE 200(500)MG
500 TABLET,CHEWABLE ORAL 2 TIMES DAILY PRN
Status: DISCONTINUED | OUTPATIENT
Start: 2020-05-24 | End: 2020-05-29 | Stop reason: HOSPADM

## 2020-05-24 RX ORDER — HYDRALAZINE HYDROCHLORIDE 20 MG/ML
10 INJECTION INTRAMUSCULAR; INTRAVENOUS EVERY 6 HOURS PRN
Status: DISCONTINUED | OUTPATIENT
Start: 2020-05-24 | End: 2020-05-25

## 2020-05-24 RX ORDER — MAGNESIUM SULFATE HEPTAHYDRATE 40 MG/ML
2 INJECTION, SOLUTION INTRAVENOUS ONCE
Status: COMPLETED | OUTPATIENT
Start: 2020-05-24 | End: 2020-05-24

## 2020-05-24 RX ADMIN — ASPIRIN 81 MG: 81 TABLET, COATED ORAL at 09:36

## 2020-05-24 RX ADMIN — CALCIUM CARBONATE (ANTACID) CHEW TAB 500 MG 500 MG: 500 CHEW TAB at 21:29

## 2020-05-24 RX ADMIN — ATORVASTATIN CALCIUM 40 MG: 40 TABLET, FILM COATED ORAL at 09:35

## 2020-05-24 RX ADMIN — TRAMADOL HYDROCHLORIDE 100 MG: 50 TABLET, FILM COATED ORAL at 18:30

## 2020-05-24 RX ADMIN — POTASSIUM CHLORIDE 40 MEQ: 1500 TABLET, EXTENDED RELEASE ORAL at 18:30

## 2020-05-24 RX ADMIN — HEPARIN SODIUM 1650 UNITS: 1000 INJECTION INTRAVENOUS; SUBCUTANEOUS at 12:29

## 2020-05-24 RX ADMIN — INSULIN LISPRO 1 UNITS: 100 INJECTION, SOLUTION INTRAVENOUS; SUBCUTANEOUS at 09:38

## 2020-05-24 RX ADMIN — HEPARIN SODIUM 16 UNITS/KG/HR: 10000 INJECTION, SOLUTION INTRAVENOUS at 12:30

## 2020-05-24 RX ADMIN — TRAMADOL HYDROCHLORIDE 100 MG: 50 TABLET, FILM COATED ORAL at 09:34

## 2020-05-24 RX ADMIN — TIMOLOL MALEATE 1 DROP: 5 SOLUTION/ DROPS OPHTHALMIC at 09:37

## 2020-05-24 RX ADMIN — TICAGRELOR 90 MG: 90 TABLET ORAL at 23:18

## 2020-05-24 RX ADMIN — BRIMONIDINE TARTRATE 1 DROP: 1.5 SOLUTION OPHTHALMIC at 09:38

## 2020-05-24 RX ADMIN — TICAGRELOR 180 MG: 90 TABLET ORAL at 12:36

## 2020-05-24 RX ADMIN — POTASSIUM CHLORIDE 40 MEQ: 1500 TABLET, EXTENDED RELEASE ORAL at 09:35

## 2020-05-24 RX ADMIN — HEPARIN SODIUM 16 UNITS/KG/HR: 10000 INJECTION, SOLUTION INTRAVENOUS at 21:50

## 2020-05-24 RX ADMIN — HYDRALAZINE HYDROCHLORIDE 5 MG: 20 INJECTION INTRAMUSCULAR; INTRAVENOUS at 19:50

## 2020-05-24 RX ADMIN — HYDRALAZINE HYDROCHLORIDE 10 MG: 20 INJECTION INTRAMUSCULAR; INTRAVENOUS at 21:05

## 2020-05-24 RX ADMIN — METOPROLOL TARTRATE 12.5 MG: 25 TABLET ORAL at 12:51

## 2020-05-24 RX ADMIN — MAGNESIUM SULFATE HEPTAHYDRATE 2 G: 40 INJECTION, SOLUTION INTRAVENOUS at 12:15

## 2020-05-25 PROBLEM — R31.9 HEMATURIA: Status: ACTIVE | Noted: 2020-05-25

## 2020-05-25 PROBLEM — R31.0 GROSS HEMATURIA: Status: ACTIVE | Noted: 2020-05-25

## 2020-05-25 PROBLEM — D72.829 LEUKOCYTOSIS: Status: ACTIVE | Noted: 2020-05-25

## 2020-05-25 LAB
ANION GAP SERPL CALCULATED.3IONS-SCNC: 10 MMOL/L (ref 4–13)
APTT PPP: 64 SECONDS (ref 23–37)
ATRIAL RATE: 58 BPM
BUN SERPL-MCNC: 11 MG/DL (ref 5–25)
CALCIUM SERPL-MCNC: 8.7 MG/DL (ref 8.3–10.1)
CHLORIDE SERPL-SCNC: 103 MMOL/L (ref 100–108)
CO2 SERPL-SCNC: 24 MMOL/L (ref 21–32)
CREAT SERPL-MCNC: 0.67 MG/DL (ref 0.6–1.3)
ERYTHROCYTE [DISTWIDTH] IN BLOOD BY AUTOMATED COUNT: 12.8 % (ref 11.6–15.1)
GFR SERPL CREATININE-BSD FRML MDRD: 87 ML/MIN/1.73SQ M
GLUCOSE SERPL-MCNC: 103 MG/DL (ref 65–140)
GLUCOSE SERPL-MCNC: 149 MG/DL (ref 65–140)
GLUCOSE SERPL-MCNC: 162 MG/DL (ref 65–140)
GLUCOSE SERPL-MCNC: 196 MG/DL (ref 65–140)
GLUCOSE SERPL-MCNC: 272 MG/DL (ref 65–140)
HCT VFR BLD AUTO: 41.2 % (ref 34.8–46.1)
HGB BLD-MCNC: 13.5 G/DL (ref 11.5–15.4)
MAGNESIUM SERPL-MCNC: 2 MG/DL (ref 1.6–2.6)
MCH RBC QN AUTO: 30.7 PG (ref 26.8–34.3)
MCHC RBC AUTO-ENTMCNC: 32.8 G/DL (ref 31.4–37.4)
MCV RBC AUTO: 94 FL (ref 82–98)
P AXIS: 29 DEGREES
PLATELET # BLD AUTO: 188 THOUSANDS/UL (ref 149–390)
PMV BLD AUTO: 10.7 FL (ref 8.9–12.7)
POTASSIUM SERPL-SCNC: 4.1 MMOL/L (ref 3.5–5.3)
PR INTERVAL: 180 MS
QRS AXIS: 3 DEGREES
QRSD INTERVAL: 86 MS
QT INTERVAL: 432 MS
QTC INTERVAL: 417 MS
RBC # BLD AUTO: 4.4 MILLION/UL (ref 3.81–5.12)
SODIUM SERPL-SCNC: 137 MMOL/L (ref 136–145)
T WAVE AXIS: 18 DEGREES
VENTRICULAR RATE: 56 BPM
WBC # BLD AUTO: 16.44 THOUSAND/UL (ref 4.31–10.16)

## 2020-05-25 PROCEDURE — 93010 ELECTROCARDIOGRAM REPORT: CPT | Performed by: INTERNAL MEDICINE

## 2020-05-25 PROCEDURE — 99232 SBSQ HOSP IP/OBS MODERATE 35: CPT | Performed by: INTERNAL MEDICINE

## 2020-05-25 PROCEDURE — 83735 ASSAY OF MAGNESIUM: CPT | Performed by: PHYSICIAN ASSISTANT

## 2020-05-25 PROCEDURE — 99233 SBSQ HOSP IP/OBS HIGH 50: CPT | Performed by: INTERNAL MEDICINE

## 2020-05-25 PROCEDURE — 85730 THROMBOPLASTIN TIME PARTIAL: CPT | Performed by: INTERNAL MEDICINE

## 2020-05-25 PROCEDURE — 80048 BASIC METABOLIC PNL TOTAL CA: CPT | Performed by: PHYSICIAN ASSISTANT

## 2020-05-25 PROCEDURE — 82948 REAGENT STRIP/BLOOD GLUCOSE: CPT

## 2020-05-25 PROCEDURE — 85027 COMPLETE CBC AUTOMATED: CPT | Performed by: PHYSICIAN ASSISTANT

## 2020-05-25 PROCEDURE — 93005 ELECTROCARDIOGRAM TRACING: CPT

## 2020-05-25 RX ORDER — LOSARTAN POTASSIUM 25 MG/1
25 TABLET ORAL DAILY
Status: DISCONTINUED | OUTPATIENT
Start: 2020-05-25 | End: 2020-05-29 | Stop reason: HOSPADM

## 2020-05-25 RX ORDER — METOPROLOL TARTRATE 5 MG/5ML
5 INJECTION INTRAVENOUS ONCE
Status: COMPLETED | OUTPATIENT
Start: 2020-05-25 | End: 2020-05-25

## 2020-05-25 RX ORDER — ASPIRIN 81 MG/1
324 TABLET, CHEWABLE ORAL ONCE
Status: COMPLETED | OUTPATIENT
Start: 2020-05-26 | End: 2020-05-26

## 2020-05-25 RX ORDER — METOPROLOL TARTRATE 5 MG/5ML
5 INJECTION INTRAVENOUS EVERY 6 HOURS PRN
Status: DISCONTINUED | OUTPATIENT
Start: 2020-05-25 | End: 2020-05-26

## 2020-05-25 RX ORDER — ASPIRIN 81 MG/1
81 TABLET ORAL DAILY
Status: DISCONTINUED | OUTPATIENT
Start: 2020-05-27 | End: 2020-05-26

## 2020-05-25 RX ADMIN — METOPROLOL TARTRATE 5 MG: 1 INJECTION, SOLUTION INTRAVENOUS at 22:44

## 2020-05-25 RX ADMIN — TRAMADOL HYDROCHLORIDE 100 MG: 50 TABLET, FILM COATED ORAL at 08:15

## 2020-05-25 RX ADMIN — INSULIN LISPRO 3 UNITS: 100 INJECTION, SOLUTION INTRAVENOUS; SUBCUTANEOUS at 12:20

## 2020-05-25 RX ADMIN — BRIMONIDINE TARTRATE 1 DROP: 1.5 SOLUTION OPHTHALMIC at 18:00

## 2020-05-25 RX ADMIN — LOSARTAN POTASSIUM 25 MG: 25 TABLET, FILM COATED ORAL at 11:29

## 2020-05-25 RX ADMIN — INSULIN LISPRO 1 UNITS: 100 INJECTION, SOLUTION INTRAVENOUS; SUBCUTANEOUS at 22:13

## 2020-05-25 RX ADMIN — TIMOLOL MALEATE 1 DROP: 5 SOLUTION/ DROPS OPHTHALMIC at 18:00

## 2020-05-25 RX ADMIN — ATORVASTATIN CALCIUM 40 MG: 40 TABLET, FILM COATED ORAL at 08:17

## 2020-05-25 RX ADMIN — TICAGRELOR 90 MG: 90 TABLET ORAL at 12:38

## 2020-05-25 RX ADMIN — METOPROLOL TARTRATE 12.5 MG: 25 TABLET ORAL at 08:17

## 2020-05-25 RX ADMIN — HYDRALAZINE HYDROCHLORIDE 10 MG: 20 INJECTION INTRAMUSCULAR; INTRAVENOUS at 15:58

## 2020-05-25 RX ADMIN — ASPIRIN 81 MG: 81 TABLET, COATED ORAL at 08:17

## 2020-05-25 RX ADMIN — TRAMADOL HYDROCHLORIDE 100 MG: 50 TABLET, FILM COATED ORAL at 16:53

## 2020-05-25 RX ADMIN — HYDRALAZINE HYDROCHLORIDE 10 MG: 20 INJECTION INTRAMUSCULAR; INTRAVENOUS at 22:32

## 2020-05-26 ENCOUNTER — APPOINTMENT (INPATIENT)
Dept: NON INVASIVE DIAGNOSTICS | Facility: HOSPITAL | Age: 75
DRG: 287 | End: 2020-05-26
Payer: COMMERCIAL

## 2020-05-26 ENCOUNTER — TELEPHONE (OUTPATIENT)
Dept: OTHER | Facility: HOSPITAL | Age: 75
End: 2020-05-26

## 2020-05-26 ENCOUNTER — APPOINTMENT (INPATIENT)
Dept: ULTRASOUND IMAGING | Facility: HOSPITAL | Age: 75
DRG: 287 | End: 2020-05-26
Payer: COMMERCIAL

## 2020-05-26 LAB
ANION GAP SERPL CALCULATED.3IONS-SCNC: 12 MMOL/L (ref 4–13)
ATRIAL RATE: 182 BPM
ATRIAL RATE: 66 BPM
ATRIAL RATE: 80 BPM
BASOPHILS # BLD AUTO: 0.05 THOUSANDS/ΜL (ref 0–0.1)
BASOPHILS NFR BLD AUTO: 0 % (ref 0–1)
BUN SERPL-MCNC: 20 MG/DL (ref 5–25)
CALCIUM SERPL-MCNC: 8.7 MG/DL (ref 8.3–10.1)
CHLORIDE SERPL-SCNC: 105 MMOL/L (ref 100–108)
CO2 SERPL-SCNC: 21 MMOL/L (ref 21–32)
CREAT SERPL-MCNC: 0.66 MG/DL (ref 0.6–1.3)
EOSINOPHIL # BLD AUTO: 0.05 THOUSAND/ΜL (ref 0–0.61)
EOSINOPHIL NFR BLD AUTO: 0 % (ref 0–6)
ERYTHROCYTE [DISTWIDTH] IN BLOOD BY AUTOMATED COUNT: 13.2 % (ref 11.6–15.1)
GFR SERPL CREATININE-BSD FRML MDRD: 87 ML/MIN/1.73SQ M
GLUCOSE SERPL-MCNC: 133 MG/DL (ref 65–140)
GLUCOSE SERPL-MCNC: 136 MG/DL (ref 65–140)
GLUCOSE SERPL-MCNC: 155 MG/DL (ref 65–140)
GLUCOSE SERPL-MCNC: 156 MG/DL (ref 65–140)
GLUCOSE SERPL-MCNC: 188 MG/DL (ref 65–140)
HCT VFR BLD AUTO: 40.4 % (ref 34.8–46.1)
HGB BLD-MCNC: 13 G/DL (ref 11.5–15.4)
IMM GRANULOCYTES # BLD AUTO: 0.05 THOUSAND/UL (ref 0–0.2)
IMM GRANULOCYTES NFR BLD AUTO: 0 % (ref 0–2)
LYMPHOCYTES # BLD AUTO: 2.38 THOUSANDS/ΜL (ref 0.6–4.47)
LYMPHOCYTES NFR BLD AUTO: 18 % (ref 14–44)
MAGNESIUM SERPL-MCNC: 1.8 MG/DL (ref 1.6–2.6)
MCH RBC QN AUTO: 30.6 PG (ref 26.8–34.3)
MCHC RBC AUTO-ENTMCNC: 32.2 G/DL (ref 31.4–37.4)
MCV RBC AUTO: 95 FL (ref 82–98)
MONOCYTES # BLD AUTO: 1.5 THOUSAND/ΜL (ref 0.17–1.22)
MONOCYTES NFR BLD AUTO: 11 % (ref 4–12)
NEUTROPHILS # BLD AUTO: 9.43 THOUSANDS/ΜL (ref 1.85–7.62)
NEUTS SEG NFR BLD AUTO: 71 % (ref 43–75)
NRBC BLD AUTO-RTO: 0 /100 WBCS
P AXIS: 42 DEGREES
P AXIS: 56 DEGREES
PLATELET # BLD AUTO: 175 THOUSANDS/UL (ref 149–390)
PMV BLD AUTO: 11 FL (ref 8.9–12.7)
POTASSIUM SERPL-SCNC: 4 MMOL/L (ref 3.5–5.3)
PR INTERVAL: 152 MS
PR INTERVAL: 172 MS
QRS AXIS: 21 DEGREES
QRS AXIS: 27 DEGREES
QRS AXIS: 33 DEGREES
QRSD INTERVAL: 70 MS
QRSD INTERVAL: 82 MS
QRSD INTERVAL: 82 MS
QT INTERVAL: 310 MS
QT INTERVAL: 384 MS
QT INTERVAL: 412 MS
QTC INTERVAL: 431 MS
QTC INTERVAL: 442 MS
QTC INTERVAL: 461 MS
RBC # BLD AUTO: 4.25 MILLION/UL (ref 3.81–5.12)
SODIUM SERPL-SCNC: 138 MMOL/L (ref 136–145)
T WAVE AXIS: 14 DEGREES
T WAVE AXIS: 234 DEGREES
T WAVE AXIS: 35 DEGREES
VENTRICULAR RATE: 133 BPM
VENTRICULAR RATE: 66 BPM
VENTRICULAR RATE: 80 BPM
WBC # BLD AUTO: 13.46 THOUSAND/UL (ref 4.31–10.16)

## 2020-05-26 PROCEDURE — C1894 INTRO/SHEATH, NON-LASER: HCPCS | Performed by: PHYSICIAN ASSISTANT

## 2020-05-26 PROCEDURE — 93455 CORONARY ART/GRFT ANGIO S&I: CPT | Performed by: PHYSICIAN ASSISTANT

## 2020-05-26 PROCEDURE — 93010 ELECTROCARDIOGRAM REPORT: CPT | Performed by: INTERNAL MEDICINE

## 2020-05-26 PROCEDURE — 99152 MOD SED SAME PHYS/QHP 5/>YRS: CPT | Performed by: INTERNAL MEDICINE

## 2020-05-26 PROCEDURE — B2131ZZ FLUOROSCOPY OF MULTIPLE CORONARY ARTERY BYPASS GRAFTS USING LOW OSMOLAR CONTRAST: ICD-10-PCS | Performed by: INTERNAL MEDICINE

## 2020-05-26 PROCEDURE — B2111ZZ FLUOROSCOPY OF MULTIPLE CORONARY ARTERIES USING LOW OSMOLAR CONTRAST: ICD-10-PCS | Performed by: INTERNAL MEDICINE

## 2020-05-26 PROCEDURE — 82948 REAGENT STRIP/BLOOD GLUCOSE: CPT

## 2020-05-26 PROCEDURE — 93306 TTE W/DOPPLER COMPLETE: CPT | Performed by: INTERNAL MEDICINE

## 2020-05-26 PROCEDURE — B2181ZZ FLUOROSCOPY OF LEFT INTERNAL MAMMARY BYPASS GRAFT USING LOW OSMOLAR CONTRAST: ICD-10-PCS | Performed by: INTERNAL MEDICINE

## 2020-05-26 PROCEDURE — C1887 CATHETER, GUIDING: HCPCS | Performed by: PHYSICIAN ASSISTANT

## 2020-05-26 PROCEDURE — C1769 GUIDE WIRE: HCPCS | Performed by: PHYSICIAN ASSISTANT

## 2020-05-26 PROCEDURE — 99232 SBSQ HOSP IP/OBS MODERATE 35: CPT | Performed by: INTERNAL MEDICINE

## 2020-05-26 PROCEDURE — 93306 TTE W/DOPPLER COMPLETE: CPT

## 2020-05-26 PROCEDURE — 93571 IV DOP VEL&/PRESS C FLO 1ST: CPT | Performed by: INTERNAL MEDICINE

## 2020-05-26 PROCEDURE — 99254 IP/OBS CNSLTJ NEW/EST MOD 60: CPT | Performed by: UROLOGY

## 2020-05-26 PROCEDURE — 80048 BASIC METABOLIC PNL TOTAL CA: CPT | Performed by: PHYSICIAN ASSISTANT

## 2020-05-26 PROCEDURE — 99152 MOD SED SAME PHYS/QHP 5/>YRS: CPT | Performed by: PHYSICIAN ASSISTANT

## 2020-05-26 PROCEDURE — 76770 US EXAM ABDO BACK WALL COMP: CPT

## 2020-05-26 PROCEDURE — 93455 CORONARY ART/GRFT ANGIO S&I: CPT | Performed by: INTERNAL MEDICINE

## 2020-05-26 PROCEDURE — C1760 CLOSURE DEV, VASC: HCPCS | Performed by: PHYSICIAN ASSISTANT

## 2020-05-26 PROCEDURE — 93571 IV DOP VEL&/PRESS C FLO 1ST: CPT | Performed by: PHYSICIAN ASSISTANT

## 2020-05-26 PROCEDURE — 83735 ASSAY OF MAGNESIUM: CPT | Performed by: PHYSICIAN ASSISTANT

## 2020-05-26 PROCEDURE — 99153 MOD SED SAME PHYS/QHP EA: CPT | Performed by: PHYSICIAN ASSISTANT

## 2020-05-26 PROCEDURE — 85025 COMPLETE CBC W/AUTO DIFF WBC: CPT | Performed by: PHYSICIAN ASSISTANT

## 2020-05-26 PROCEDURE — 93005 ELECTROCARDIOGRAM TRACING: CPT

## 2020-05-26 RX ORDER — SODIUM CHLORIDE 9 MG/ML
75 INJECTION, SOLUTION INTRAVENOUS CONTINUOUS
Status: DISPENSED | OUTPATIENT
Start: 2020-05-26 | End: 2020-05-26

## 2020-05-26 RX ORDER — ASPIRIN 81 MG/1
81 TABLET, CHEWABLE ORAL DAILY
Status: DISCONTINUED | OUTPATIENT
Start: 2020-05-26 | End: 2020-05-29 | Stop reason: HOSPADM

## 2020-05-26 RX ORDER — METOPROLOL TARTRATE 5 MG/5ML
5 INJECTION INTRAVENOUS ONCE
Status: COMPLETED | OUTPATIENT
Start: 2020-05-26 | End: 2020-05-26

## 2020-05-26 RX ORDER — SODIUM CHLORIDE 9 MG/ML
INJECTION, SOLUTION INTRAVENOUS
Status: COMPLETED | OUTPATIENT
Start: 2020-05-26 | End: 2020-05-26

## 2020-05-26 RX ORDER — NITROGLYCERIN 0.4 MG/1
0.4 TABLET SUBLINGUAL
Status: DISCONTINUED | OUTPATIENT
Start: 2020-05-26 | End: 2020-05-29 | Stop reason: HOSPADM

## 2020-05-26 RX ORDER — FENTANYL CITRATE 50 UG/ML
INJECTION, SOLUTION INTRAMUSCULAR; INTRAVENOUS CODE/TRAUMA/SEDATION MEDICATION
Status: COMPLETED | OUTPATIENT
Start: 2020-05-26 | End: 2020-05-26

## 2020-05-26 RX ORDER — LIDOCAINE HYDROCHLORIDE 10 MG/ML
INJECTION, SOLUTION EPIDURAL; INFILTRATION; INTRACAUDAL; PERINEURAL CODE/TRAUMA/SEDATION MEDICATION
Status: COMPLETED | OUTPATIENT
Start: 2020-05-26 | End: 2020-05-26

## 2020-05-26 RX ORDER — METOPROLOL TARTRATE 5 MG/5ML
5 INJECTION INTRAVENOUS EVERY 6 HOURS PRN
Status: DISCONTINUED | OUTPATIENT
Start: 2020-05-26 | End: 2020-05-29 | Stop reason: HOSPADM

## 2020-05-26 RX ORDER — MIDAZOLAM HYDROCHLORIDE 2 MG/2ML
INJECTION, SOLUTION INTRAMUSCULAR; INTRAVENOUS CODE/TRAUMA/SEDATION MEDICATION
Status: COMPLETED | OUTPATIENT
Start: 2020-05-26 | End: 2020-05-26

## 2020-05-26 RX ORDER — HEPARIN SODIUM 1000 [USP'U]/ML
INJECTION, SOLUTION INTRAVENOUS; SUBCUTANEOUS CODE/TRAUMA/SEDATION MEDICATION
Status: COMPLETED | OUTPATIENT
Start: 2020-05-26 | End: 2020-05-26

## 2020-05-26 RX ORDER — AMIODARONE HYDROCHLORIDE 200 MG/1
200 TABLET ORAL
Status: DISCONTINUED | OUTPATIENT
Start: 2020-05-26 | End: 2020-05-29

## 2020-05-26 RX ORDER — METOPROLOL SUCCINATE 50 MG/1
50 TABLET, EXTENDED RELEASE ORAL DAILY
Status: DISCONTINUED | OUTPATIENT
Start: 2020-05-27 | End: 2020-05-29 | Stop reason: HOSPADM

## 2020-05-26 RX ADMIN — TIMOLOL MALEATE 1 DROP: 5 SOLUTION/ DROPS OPHTHALMIC at 08:58

## 2020-05-26 RX ADMIN — IOHEXOL 20 ML: 350 INJECTION, SOLUTION INTRAVENOUS at 10:35

## 2020-05-26 RX ADMIN — INSULIN LISPRO 1 UNITS: 100 INJECTION, SOLUTION INTRAVENOUS; SUBCUTANEOUS at 21:19

## 2020-05-26 RX ADMIN — TRAMADOL HYDROCHLORIDE 100 MG: 50 TABLET, FILM COATED ORAL at 08:58

## 2020-05-26 RX ADMIN — TICAGRELOR 90 MG: 90 TABLET ORAL at 02:31

## 2020-05-26 RX ADMIN — HEPARIN SODIUM 5000 UNITS: 1000 INJECTION INTRAVENOUS; SUBCUTANEOUS at 10:24

## 2020-05-26 RX ADMIN — METOPROLOL TARTRATE 12.5 MG: 25 TABLET ORAL at 08:58

## 2020-05-26 RX ADMIN — AMIODARONE HYDROCHLORIDE 1 MG/MIN: 50 INJECTION, SOLUTION INTRAVENOUS at 20:07

## 2020-05-26 RX ADMIN — METOPROLOL TARTRATE 5 MG: 1 INJECTION, SOLUTION INTRAVENOUS at 18:18

## 2020-05-26 RX ADMIN — LIDOCAINE HYDROCHLORIDE 5 ML: 10 INJECTION, SOLUTION EPIDURAL; INFILTRATION; INTRACAUDAL; PERINEURAL at 10:06

## 2020-05-26 RX ADMIN — ASPIRIN 81 MG 324 MG: 81 TABLET ORAL at 05:14

## 2020-05-26 RX ADMIN — FENTANYL CITRATE 50 MCG: 50 INJECTION INTRAMUSCULAR; INTRAVENOUS at 09:56

## 2020-05-26 RX ADMIN — METOPROLOL TARTRATE 25 MG: 25 TABLET, FILM COATED ORAL at 12:51

## 2020-05-26 RX ADMIN — INSULIN LISPRO 1 UNITS: 100 INJECTION, SOLUTION INTRAVENOUS; SUBCUTANEOUS at 17:40

## 2020-05-26 RX ADMIN — AMIODARONE HYDROCHLORIDE 200 MG: 200 TABLET ORAL at 17:40

## 2020-05-26 RX ADMIN — ATORVASTATIN CALCIUM 40 MG: 40 TABLET, FILM COATED ORAL at 08:58

## 2020-05-26 RX ADMIN — BRIMONIDINE TARTRATE 1 DROP: 1.5 SOLUTION OPHTHALMIC at 17:40

## 2020-05-26 RX ADMIN — LOSARTAN POTASSIUM 25 MG: 25 TABLET, FILM COATED ORAL at 08:58

## 2020-05-26 RX ADMIN — SODIUM CHLORIDE 100 ML/HR: 0.9 INJECTION, SOLUTION INTRAVENOUS at 09:56

## 2020-05-26 RX ADMIN — MIDAZOLAM HYDROCHLORIDE 2 MG: 1 INJECTION, SOLUTION INTRAMUSCULAR; INTRAVENOUS at 09:56

## 2020-05-26 RX ADMIN — IOHEXOL 100 ML: 350 INJECTION, SOLUTION INTRAVENOUS at 10:21

## 2020-05-26 RX ADMIN — ASPIRIN 81 MG 81 MG: 81 TABLET ORAL at 12:33

## 2020-05-26 RX ADMIN — METOPROLOL TARTRATE 5 MG: 5 INJECTION INTRAVENOUS at 12:51

## 2020-05-26 RX ADMIN — TIMOLOL MALEATE 1 DROP: 5 SOLUTION/ DROPS OPHTHALMIC at 17:40

## 2020-05-26 RX ADMIN — BRIMONIDINE TARTRATE 1 DROP: 1.5 SOLUTION OPHTHALMIC at 08:58

## 2020-05-26 RX ADMIN — AMIODARONE HYDROCHLORIDE 150 MG: 50 INJECTION, SOLUTION INTRAVENOUS at 19:40

## 2020-05-27 ENCOUNTER — APPOINTMENT (INPATIENT)
Dept: CT IMAGING | Facility: HOSPITAL | Age: 75
DRG: 287 | End: 2020-05-27
Payer: COMMERCIAL

## 2020-05-27 LAB
ANION GAP SERPL CALCULATED.3IONS-SCNC: 11 MMOL/L (ref 4–13)
BUN SERPL-MCNC: 14 MG/DL (ref 5–25)
CALCIUM SERPL-MCNC: 8.5 MG/DL (ref 8.3–10.1)
CHLORIDE SERPL-SCNC: 105 MMOL/L (ref 100–108)
CO2 SERPL-SCNC: 22 MMOL/L (ref 21–32)
CREAT SERPL-MCNC: 0.62 MG/DL (ref 0.6–1.3)
ERYTHROCYTE [DISTWIDTH] IN BLOOD BY AUTOMATED COUNT: 13.2 % (ref 11.6–15.1)
GFR SERPL CREATININE-BSD FRML MDRD: 89 ML/MIN/1.73SQ M
GLUCOSE SERPL-MCNC: 140 MG/DL (ref 65–140)
GLUCOSE SERPL-MCNC: 146 MG/DL (ref 65–140)
GLUCOSE SERPL-MCNC: 161 MG/DL (ref 65–140)
GLUCOSE SERPL-MCNC: 178 MG/DL (ref 65–140)
GLUCOSE SERPL-MCNC: 183 MG/DL (ref 65–140)
GLUCOSE SERPL-MCNC: 187 MG/DL (ref 65–140)
HCT VFR BLD AUTO: 37.9 % (ref 34.8–46.1)
HGB BLD-MCNC: 11.9 G/DL (ref 11.5–15.4)
MAGNESIUM SERPL-MCNC: 1.8 MG/DL (ref 1.6–2.6)
MCH RBC QN AUTO: 30.7 PG (ref 26.8–34.3)
MCHC RBC AUTO-ENTMCNC: 31.4 G/DL (ref 31.4–37.4)
MCV RBC AUTO: 98 FL (ref 82–98)
PLATELET # BLD AUTO: 192 THOUSANDS/UL (ref 149–390)
PMV BLD AUTO: 10.9 FL (ref 8.9–12.7)
POTASSIUM SERPL-SCNC: 4 MMOL/L (ref 3.5–5.3)
RBC # BLD AUTO: 3.88 MILLION/UL (ref 3.81–5.12)
SODIUM SERPL-SCNC: 138 MMOL/L (ref 136–145)
WBC # BLD AUTO: 15.15 THOUSAND/UL (ref 4.31–10.16)

## 2020-05-27 PROCEDURE — 99232 SBSQ HOSP IP/OBS MODERATE 35: CPT | Performed by: INTERNAL MEDICINE

## 2020-05-27 PROCEDURE — 88112 CYTOPATH CELL ENHANCE TECH: CPT | Performed by: PATHOLOGY

## 2020-05-27 PROCEDURE — 99232 SBSQ HOSP IP/OBS MODERATE 35: CPT | Performed by: UROLOGY

## 2020-05-27 PROCEDURE — 80048 BASIC METABOLIC PNL TOTAL CA: CPT | Performed by: INTERNAL MEDICINE

## 2020-05-27 PROCEDURE — 74178 CT ABD&PLV WO CNTR FLWD CNTR: CPT

## 2020-05-27 PROCEDURE — 83735 ASSAY OF MAGNESIUM: CPT | Performed by: INTERNAL MEDICINE

## 2020-05-27 PROCEDURE — 82948 REAGENT STRIP/BLOOD GLUCOSE: CPT

## 2020-05-27 PROCEDURE — 85027 COMPLETE CBC AUTOMATED: CPT | Performed by: INTERNAL MEDICINE

## 2020-05-27 PROCEDURE — 93005 ELECTROCARDIOGRAM TRACING: CPT

## 2020-05-27 RX ORDER — CEPHALEXIN 500 MG/1
500 CAPSULE ORAL EVERY 6 HOURS SCHEDULED
Status: DISCONTINUED | OUTPATIENT
Start: 2020-05-27 | End: 2020-05-29 | Stop reason: HOSPADM

## 2020-05-27 RX ORDER — CEPHALEXIN 500 MG/1
500 CAPSULE ORAL EVERY 12 HOURS SCHEDULED
Status: DISCONTINUED | OUTPATIENT
Start: 2020-05-27 | End: 2020-05-27

## 2020-05-27 RX ADMIN — IOHEXOL 100 ML: 350 INJECTION, SOLUTION INTRAVENOUS at 14:40

## 2020-05-27 RX ADMIN — METOPROLOL SUCCINATE 50 MG: 50 TABLET, EXTENDED RELEASE ORAL at 09:00

## 2020-05-27 RX ADMIN — LOSARTAN POTASSIUM 25 MG: 25 TABLET, FILM COATED ORAL at 09:00

## 2020-05-27 RX ADMIN — CEPHALEXIN 500 MG: 500 CAPSULE ORAL at 17:46

## 2020-05-27 RX ADMIN — TRAMADOL HYDROCHLORIDE 100 MG: 50 TABLET, FILM COATED ORAL at 08:56

## 2020-05-27 RX ADMIN — TRAMADOL HYDROCHLORIDE 100 MG: 50 TABLET, FILM COATED ORAL at 17:46

## 2020-05-27 RX ADMIN — INSULIN LISPRO 1 UNITS: 100 INJECTION, SOLUTION INTRAVENOUS; SUBCUTANEOUS at 09:01

## 2020-05-27 RX ADMIN — INSULIN LISPRO 1 UNITS: 100 INJECTION, SOLUTION INTRAVENOUS; SUBCUTANEOUS at 22:14

## 2020-05-27 RX ADMIN — AMIODARONE HYDROCHLORIDE 0.5 MG/MIN: 50 INJECTION, SOLUTION INTRAVENOUS at 02:14

## 2020-05-27 RX ADMIN — AMIODARONE HYDROCHLORIDE 200 MG: 200 TABLET ORAL at 09:00

## 2020-05-27 RX ADMIN — CEPHALEXIN 500 MG: 500 CAPSULE ORAL at 13:03

## 2020-05-27 RX ADMIN — ASPIRIN 81 MG 81 MG: 81 TABLET ORAL at 08:56

## 2020-05-27 RX ADMIN — AMIODARONE HYDROCHLORIDE 200 MG: 200 TABLET ORAL at 17:46

## 2020-05-27 RX ADMIN — INSULIN LISPRO 1 UNITS: 100 INJECTION, SOLUTION INTRAVENOUS; SUBCUTANEOUS at 17:47

## 2020-05-27 RX ADMIN — CEPHALEXIN 500 MG: 500 CAPSULE ORAL at 23:52

## 2020-05-27 RX ADMIN — AMIODARONE HYDROCHLORIDE 200 MG: 200 TABLET ORAL at 13:00

## 2020-05-27 RX ADMIN — ATORVASTATIN CALCIUM 40 MG: 40 TABLET, FILM COATED ORAL at 09:00

## 2020-05-28 LAB
ANION GAP SERPL CALCULATED.3IONS-SCNC: 11 MMOL/L (ref 4–13)
APTT PPP: 25 SECONDS (ref 23–37)
ATRIAL RATE: 61 BPM
BASOPHILS # BLD AUTO: 0.04 THOUSANDS/ΜL (ref 0–0.1)
BASOPHILS NFR BLD AUTO: 0 % (ref 0–1)
BUN SERPL-MCNC: 16 MG/DL (ref 5–25)
CALCIUM SERPL-MCNC: 8.4 MG/DL (ref 8.3–10.1)
CHLORIDE SERPL-SCNC: 102 MMOL/L (ref 100–108)
CO2 SERPL-SCNC: 25 MMOL/L (ref 21–32)
CREAT SERPL-MCNC: 0.69 MG/DL (ref 0.6–1.3)
EOSINOPHIL # BLD AUTO: 0.12 THOUSAND/ΜL (ref 0–0.61)
EOSINOPHIL NFR BLD AUTO: 1 % (ref 0–6)
ERYTHROCYTE [DISTWIDTH] IN BLOOD BY AUTOMATED COUNT: 13.1 % (ref 11.6–15.1)
GFR SERPL CREATININE-BSD FRML MDRD: 86 ML/MIN/1.73SQ M
GLUCOSE SERPL-MCNC: 155 MG/DL (ref 65–140)
GLUCOSE SERPL-MCNC: 168 MG/DL (ref 65–140)
GLUCOSE SERPL-MCNC: 176 MG/DL (ref 65–140)
GLUCOSE SERPL-MCNC: 352 MG/DL (ref 65–140)
GLUCOSE SERPL-MCNC: 90 MG/DL (ref 65–140)
HCT VFR BLD AUTO: 33.1 % (ref 34.8–46.1)
HCT VFR BLD AUTO: 34.4 % (ref 34.8–46.1)
HGB BLD-MCNC: 10.8 G/DL (ref 11.5–15.4)
HGB BLD-MCNC: 11.1 G/DL (ref 11.5–15.4)
IMM GRANULOCYTES # BLD AUTO: 0.05 THOUSAND/UL (ref 0–0.2)
IMM GRANULOCYTES NFR BLD AUTO: 0 % (ref 0–2)
INR PPP: 1.08 (ref 0.84–1.19)
LYMPHOCYTES # BLD AUTO: 1.66 THOUSANDS/ΜL (ref 0.6–4.47)
LYMPHOCYTES NFR BLD AUTO: 14 % (ref 14–44)
MCH RBC QN AUTO: 30.9 PG (ref 26.8–34.3)
MCHC RBC AUTO-ENTMCNC: 32.6 G/DL (ref 31.4–37.4)
MCV RBC AUTO: 95 FL (ref 82–98)
MONOCYTES # BLD AUTO: 1.4 THOUSAND/ΜL (ref 0.17–1.22)
MONOCYTES NFR BLD AUTO: 12 % (ref 4–12)
NEUTROPHILS # BLD AUTO: 8.83 THOUSANDS/ΜL (ref 1.85–7.62)
NEUTS SEG NFR BLD AUTO: 73 % (ref 43–75)
NRBC BLD AUTO-RTO: 0 /100 WBCS
P AXIS: 48 DEGREES
PLATELET # BLD AUTO: 195 THOUSANDS/UL (ref 149–390)
PMV BLD AUTO: 10.9 FL (ref 8.9–12.7)
POTASSIUM SERPL-SCNC: 3.8 MMOL/L (ref 3.5–5.3)
PR INTERVAL: 190 MS
PROTHROMBIN TIME: 13.4 SECONDS (ref 11.6–14.5)
QRS AXIS: 12 DEGREES
QRSD INTERVAL: 86 MS
QT INTERVAL: 432 MS
QTC INTERVAL: 434 MS
RBC # BLD AUTO: 3.49 MILLION/UL (ref 3.81–5.12)
SODIUM SERPL-SCNC: 138 MMOL/L (ref 136–145)
T WAVE AXIS: 54 DEGREES
VENTRICULAR RATE: 61 BPM
WBC # BLD AUTO: 12.1 THOUSAND/UL (ref 4.31–10.16)

## 2020-05-28 PROCEDURE — 85610 PROTHROMBIN TIME: CPT | Performed by: INTERNAL MEDICINE

## 2020-05-28 PROCEDURE — 85730 THROMBOPLASTIN TIME PARTIAL: CPT | Performed by: INTERNAL MEDICINE

## 2020-05-28 PROCEDURE — 99232 SBSQ HOSP IP/OBS MODERATE 35: CPT | Performed by: INTERNAL MEDICINE

## 2020-05-28 PROCEDURE — 85018 HEMOGLOBIN: CPT | Performed by: FAMILY MEDICINE

## 2020-05-28 PROCEDURE — 93010 ELECTROCARDIOGRAM REPORT: CPT | Performed by: INTERNAL MEDICINE

## 2020-05-28 PROCEDURE — 99232 SBSQ HOSP IP/OBS MODERATE 35: CPT | Performed by: UROLOGY

## 2020-05-28 PROCEDURE — 82948 REAGENT STRIP/BLOOD GLUCOSE: CPT

## 2020-05-28 PROCEDURE — 85014 HEMATOCRIT: CPT | Performed by: FAMILY MEDICINE

## 2020-05-28 PROCEDURE — 85025 COMPLETE CBC W/AUTO DIFF WBC: CPT | Performed by: INTERNAL MEDICINE

## 2020-05-28 PROCEDURE — 80048 BASIC METABOLIC PNL TOTAL CA: CPT | Performed by: INTERNAL MEDICINE

## 2020-05-28 RX ORDER — LANOLIN ALCOHOL/MO/W.PET/CERES
3 CREAM (GRAM) TOPICAL
Status: DISCONTINUED | OUTPATIENT
Start: 2020-05-28 | End: 2020-05-29 | Stop reason: HOSPADM

## 2020-05-28 RX ORDER — HEPARIN SODIUM 10000 [USP'U]/100ML
3-20 INJECTION, SOLUTION INTRAVENOUS
Status: DISCONTINUED | OUTPATIENT
Start: 2020-05-28 | End: 2020-05-28

## 2020-05-28 RX ADMIN — INSULIN LISPRO 1 UNITS: 100 INJECTION, SOLUTION INTRAVENOUS; SUBCUTANEOUS at 21:13

## 2020-05-28 RX ADMIN — ATORVASTATIN CALCIUM 40 MG: 40 TABLET, FILM COATED ORAL at 08:57

## 2020-05-28 RX ADMIN — CEPHALEXIN 500 MG: 500 CAPSULE ORAL at 06:30

## 2020-05-28 RX ADMIN — TRAMADOL HYDROCHLORIDE 100 MG: 50 TABLET, FILM COATED ORAL at 08:59

## 2020-05-28 RX ADMIN — TIMOLOL MALEATE 1 DROP: 5 SOLUTION/ DROPS OPHTHALMIC at 17:34

## 2020-05-28 RX ADMIN — APIXABAN 5 MG: 5 TABLET, FILM COATED ORAL at 17:32

## 2020-05-28 RX ADMIN — BRIMONIDINE TARTRATE 1 DROP: 1.5 SOLUTION OPHTHALMIC at 17:34

## 2020-05-28 RX ADMIN — ASPIRIN 81 MG 81 MG: 81 TABLET ORAL at 08:57

## 2020-05-28 RX ADMIN — AMIODARONE HYDROCHLORIDE 200 MG: 200 TABLET ORAL at 06:31

## 2020-05-28 RX ADMIN — CEPHALEXIN 500 MG: 500 CAPSULE ORAL at 13:00

## 2020-05-28 RX ADMIN — CEPHALEXIN 500 MG: 500 CAPSULE ORAL at 17:32

## 2020-05-28 RX ADMIN — INSULIN LISPRO 4 UNITS: 100 INJECTION, SOLUTION INTRAVENOUS; SUBCUTANEOUS at 13:39

## 2020-05-28 RX ADMIN — TIMOLOL MALEATE 1 DROP: 5 SOLUTION/ DROPS OPHTHALMIC at 09:00

## 2020-05-28 RX ADMIN — METOPROLOL SUCCINATE 50 MG: 50 TABLET, EXTENDED RELEASE ORAL at 08:57

## 2020-05-28 RX ADMIN — TRAMADOL HYDROCHLORIDE 100 MG: 50 TABLET, FILM COATED ORAL at 17:32

## 2020-05-28 RX ADMIN — INSULIN LISPRO 1 UNITS: 100 INJECTION, SOLUTION INTRAVENOUS; SUBCUTANEOUS at 09:00

## 2020-05-28 RX ADMIN — MELATONIN 3 MG: at 21:12

## 2020-05-28 RX ADMIN — BRIMONIDINE TARTRATE 1 DROP: 1.5 SOLUTION OPHTHALMIC at 09:00

## 2020-05-28 RX ADMIN — AMIODARONE HYDROCHLORIDE 200 MG: 200 TABLET ORAL at 13:34

## 2020-05-28 RX ADMIN — MELATONIN 3 MG: at 00:08

## 2020-05-28 RX ADMIN — CEPHALEXIN 500 MG: 500 CAPSULE ORAL at 23:13

## 2020-05-28 RX ADMIN — LOSARTAN POTASSIUM 25 MG: 25 TABLET, FILM COATED ORAL at 08:57

## 2020-05-28 RX ADMIN — APIXABAN 5 MG: 5 TABLET, FILM COATED ORAL at 13:38

## 2020-05-28 RX ADMIN — NICOTINE 1 PATCH: 7 PATCH TRANSDERMAL at 08:59

## 2020-05-28 RX ADMIN — AMIODARONE HYDROCHLORIDE 200 MG: 200 TABLET ORAL at 17:32

## 2020-05-28 RX ADMIN — AMIODARONE HYDROCHLORIDE 1 MG/MIN: 50 INJECTION, SOLUTION INTRAVENOUS at 15:29

## 2020-05-29 VITALS
WEIGHT: 125 LBS | RESPIRATION RATE: 18 BRPM | DIASTOLIC BLOOD PRESSURE: 67 MMHG | SYSTOLIC BLOOD PRESSURE: 158 MMHG | HEIGHT: 61 IN | TEMPERATURE: 98.8 F | OXYGEN SATURATION: 97 % | BODY MASS INDEX: 23.6 KG/M2 | HEART RATE: 52 BPM

## 2020-05-29 PROBLEM — R79.89 ELEVATED TROPONIN LEVEL NOT DUE MYOCARDIAL INFARCTION: Status: ACTIVE | Noted: 2020-05-23

## 2020-05-29 PROBLEM — R77.8 ELEVATED TROPONIN LEVEL NOT DUE MYOCARDIAL INFARCTION: Status: ACTIVE | Noted: 2020-05-23

## 2020-05-29 LAB
ANION GAP SERPL CALCULATED.3IONS-SCNC: 9 MMOL/L (ref 4–13)
BUN SERPL-MCNC: 15 MG/DL (ref 5–25)
CALCIUM SERPL-MCNC: 8.4 MG/DL (ref 8.3–10.1)
CHLORIDE SERPL-SCNC: 100 MMOL/L (ref 100–108)
CO2 SERPL-SCNC: 26 MMOL/L (ref 21–32)
CREAT SERPL-MCNC: 0.73 MG/DL (ref 0.6–1.3)
GFR SERPL CREATININE-BSD FRML MDRD: 81 ML/MIN/1.73SQ M
GLUCOSE SERPL-MCNC: 108 MG/DL (ref 65–140)
GLUCOSE SERPL-MCNC: 157 MG/DL (ref 65–140)
GLUCOSE SERPL-MCNC: 166 MG/DL (ref 65–140)
GLUCOSE SERPL-MCNC: 267 MG/DL (ref 65–140)
HCT VFR BLD AUTO: 33 % (ref 34.8–46.1)
HCT VFR BLD AUTO: 38.2 % (ref 34.8–46.1)
HGB BLD-MCNC: 10.5 G/DL (ref 11.5–15.4)
HGB BLD-MCNC: 12.7 G/DL (ref 11.5–15.4)
POTASSIUM SERPL-SCNC: 3.7 MMOL/L (ref 3.5–5.3)
SODIUM SERPL-SCNC: 135 MMOL/L (ref 136–145)

## 2020-05-29 PROCEDURE — 85014 HEMATOCRIT: CPT | Performed by: FAMILY MEDICINE

## 2020-05-29 PROCEDURE — 99239 HOSP IP/OBS DSCHRG MGMT >30: CPT | Performed by: INTERNAL MEDICINE

## 2020-05-29 PROCEDURE — 99232 SBSQ HOSP IP/OBS MODERATE 35: CPT | Performed by: INTERNAL MEDICINE

## 2020-05-29 PROCEDURE — 82948 REAGENT STRIP/BLOOD GLUCOSE: CPT

## 2020-05-29 PROCEDURE — 80048 BASIC METABOLIC PNL TOTAL CA: CPT | Performed by: INTERNAL MEDICINE

## 2020-05-29 PROCEDURE — 85018 HEMOGLOBIN: CPT | Performed by: FAMILY MEDICINE

## 2020-05-29 PROCEDURE — 99232 SBSQ HOSP IP/OBS MODERATE 35: CPT | Performed by: NURSE PRACTITIONER

## 2020-05-29 RX ORDER — BRIMONIDINE TARTRATE 0.15 %
1 DROPS OPHTHALMIC (EYE) 2 TIMES DAILY
Qty: 5 ML | Refills: 0 | Status: SHIPPED | OUTPATIENT
Start: 2020-05-29 | End: 2020-08-12 | Stop reason: ALTCHOICE

## 2020-05-29 RX ORDER — TIMOLOL MALEATE 5 MG/ML
1 SOLUTION/ DROPS OPHTHALMIC 2 TIMES DAILY
Qty: 5 ML | Refills: 0 | Status: SHIPPED | OUTPATIENT
Start: 2020-05-29 | End: 2020-06-15 | Stop reason: ALTCHOICE

## 2020-05-29 RX ORDER — AMIODARONE HYDROCHLORIDE 200 MG/1
200 TABLET ORAL 2 TIMES DAILY WITH MEALS
Qty: 14 TABLET | Refills: 0 | Status: ON HOLD | OUTPATIENT
Start: 2020-06-05 | End: 2020-06-05 | Stop reason: SDUPTHER

## 2020-05-29 RX ORDER — AMIODARONE HYDROCHLORIDE 200 MG/1
200 TABLET ORAL
Qty: 30 TABLET | Refills: 0 | Status: SHIPPED | OUTPATIENT
Start: 2020-06-12 | End: 2020-06-05 | Stop reason: HOSPADM

## 2020-05-29 RX ORDER — AMIODARONE HYDROCHLORIDE 200 MG/1
200 TABLET ORAL
Status: DISCONTINUED | OUTPATIENT
Start: 2020-05-29 | End: 2020-05-29 | Stop reason: HOSPADM

## 2020-05-29 RX ORDER — AMIODARONE HYDROCHLORIDE 200 MG/1
200 TABLET ORAL 2 TIMES DAILY WITH MEALS
Status: DISCONTINUED | OUTPATIENT
Start: 2020-06-05 | End: 2020-05-29 | Stop reason: HOSPADM

## 2020-05-29 RX ORDER — LOSARTAN POTASSIUM 25 MG/1
25 TABLET ORAL DAILY
Qty: 30 TABLET | Refills: 0 | Status: SHIPPED | OUTPATIENT
Start: 2020-05-30 | End: 2020-06-17 | Stop reason: SDUPTHER

## 2020-05-29 RX ORDER — CEPHALEXIN 500 MG/1
500 CAPSULE ORAL EVERY 6 HOURS SCHEDULED
Qty: 8 CAPSULE | Refills: 0 | Status: SHIPPED | OUTPATIENT
Start: 2020-05-29 | End: 2020-05-31

## 2020-05-29 RX ORDER — AMIODARONE HYDROCHLORIDE 200 MG/1
200 TABLET ORAL
Qty: 20 TABLET | Refills: 0 | Status: ON HOLD | OUTPATIENT
Start: 2020-05-29 | End: 2020-06-05 | Stop reason: CLARIF

## 2020-05-29 RX ORDER — AMIODARONE HYDROCHLORIDE 200 MG/1
200 TABLET ORAL
Status: DISCONTINUED | OUTPATIENT
Start: 2020-06-12 | End: 2020-05-29 | Stop reason: HOSPADM

## 2020-05-29 RX ADMIN — INSULIN LISPRO 2 UNITS: 100 INJECTION, SOLUTION INTRAVENOUS; SUBCUTANEOUS at 11:44

## 2020-05-29 RX ADMIN — APIXABAN 5 MG: 5 TABLET, FILM COATED ORAL at 09:23

## 2020-05-29 RX ADMIN — TRAMADOL HYDROCHLORIDE 100 MG: 50 TABLET, FILM COATED ORAL at 09:22

## 2020-05-29 RX ADMIN — CEPHALEXIN 500 MG: 500 CAPSULE ORAL at 06:10

## 2020-05-29 RX ADMIN — ATORVASTATIN CALCIUM 40 MG: 40 TABLET, FILM COATED ORAL at 09:22

## 2020-05-29 RX ADMIN — TIMOLOL MALEATE 1 DROP: 5 SOLUTION/ DROPS OPHTHALMIC at 09:26

## 2020-05-29 RX ADMIN — BRIMONIDINE TARTRATE 1 DROP: 1.5 SOLUTION OPHTHALMIC at 09:25

## 2020-05-29 RX ADMIN — AMIODARONE HYDROCHLORIDE 200 MG: 200 TABLET ORAL at 09:23

## 2020-05-29 RX ADMIN — INSULIN LISPRO 1 UNITS: 100 INJECTION, SOLUTION INTRAVENOUS; SUBCUTANEOUS at 09:25

## 2020-05-29 RX ADMIN — AMIODARONE HYDROCHLORIDE 200 MG: 200 TABLET ORAL at 11:43

## 2020-05-29 RX ADMIN — CEPHALEXIN 500 MG: 500 CAPSULE ORAL at 11:43

## 2020-05-29 RX ADMIN — METOPROLOL SUCCINATE 50 MG: 50 TABLET, EXTENDED RELEASE ORAL at 09:22

## 2020-05-29 RX ADMIN — ASPIRIN 81 MG 81 MG: 81 TABLET ORAL at 09:23

## 2020-05-29 RX ADMIN — LOSARTAN POTASSIUM 25 MG: 25 TABLET, FILM COATED ORAL at 09:22

## 2020-05-30 ENCOUNTER — APPOINTMENT (EMERGENCY)
Dept: CT IMAGING | Facility: HOSPITAL | Age: 75
DRG: 309 | End: 2020-05-30
Payer: COMMERCIAL

## 2020-05-30 ENCOUNTER — APPOINTMENT (EMERGENCY)
Dept: RADIOLOGY | Facility: HOSPITAL | Age: 75
DRG: 309 | End: 2020-05-30
Payer: COMMERCIAL

## 2020-05-30 ENCOUNTER — HOSPITAL ENCOUNTER (INPATIENT)
Facility: HOSPITAL | Age: 75
LOS: 2 days | DRG: 309 | End: 2020-06-02
Attending: EMERGENCY MEDICINE | Admitting: INTERNAL MEDICINE
Payer: COMMERCIAL

## 2020-05-30 DIAGNOSIS — R53.81 MALAISE: Primary | ICD-10-CM

## 2020-05-30 DIAGNOSIS — R63.4 WEIGHT LOSS: ICD-10-CM

## 2020-05-30 DIAGNOSIS — R77.8 ELEVATED TROPONIN I LEVEL: ICD-10-CM

## 2020-05-30 DIAGNOSIS — D72.829 LEUKOCYTOSIS, UNSPECIFIED TYPE: ICD-10-CM

## 2020-05-30 DIAGNOSIS — R00.2 PALPITATIONS: ICD-10-CM

## 2020-05-30 DIAGNOSIS — R53.1 GENERALIZED WEAKNESS: ICD-10-CM

## 2020-05-30 DIAGNOSIS — I48.0 PAF (PAROXYSMAL ATRIAL FIBRILLATION) (HCC): ICD-10-CM

## 2020-05-30 DIAGNOSIS — I48.0 PAROXYSMAL ATRIAL FIBRILLATION (HCC): ICD-10-CM

## 2020-05-30 LAB
ALBUMIN SERPL BCP-MCNC: 2.7 G/DL (ref 3.5–5)
ALP SERPL-CCNC: 35 U/L (ref 46–116)
ALT SERPL W P-5'-P-CCNC: 14 U/L (ref 12–78)
ANION GAP SERPL CALCULATED.3IONS-SCNC: 11 MMOL/L (ref 4–13)
APTT PPP: 33 SECONDS (ref 23–37)
AST SERPL W P-5'-P-CCNC: 15 U/L (ref 5–45)
BASOPHILS # BLD AUTO: 0.07 THOUSANDS/ΜL (ref 0–0.1)
BASOPHILS NFR BLD AUTO: 1 % (ref 0–1)
BILIRUB SERPL-MCNC: 0.52 MG/DL (ref 0.2–1)
BILIRUB UR QL STRIP: NEGATIVE
BUN SERPL-MCNC: 17 MG/DL (ref 5–25)
CALCIUM SERPL-MCNC: 8.9 MG/DL (ref 8.3–10.1)
CHLORIDE SERPL-SCNC: 103 MMOL/L (ref 100–108)
CK SERPL-CCNC: 29 U/L (ref 26–192)
CLARITY UR: ABNORMAL
CO2 SERPL-SCNC: 25 MMOL/L (ref 21–32)
COLOR UR: ABNORMAL
CREAT SERPL-MCNC: 0.84 MG/DL (ref 0.6–1.3)
EOSINOPHIL # BLD AUTO: 0.17 THOUSAND/ΜL (ref 0–0.61)
EOSINOPHIL NFR BLD AUTO: 1 % (ref 0–6)
ERYTHROCYTE [DISTWIDTH] IN BLOOD BY AUTOMATED COUNT: 12.9 % (ref 11.6–15.1)
GFR SERPL CREATININE-BSD FRML MDRD: 69 ML/MIN/1.73SQ M
GLUCOSE SERPL-MCNC: 167 MG/DL (ref 65–140)
GLUCOSE SERPL-MCNC: 185 MG/DL (ref 65–140)
GLUCOSE UR STRIP-MCNC: NEGATIVE MG/DL
HCT VFR BLD AUTO: 37.2 % (ref 34.8–46.1)
HGB BLD-MCNC: 12 G/DL (ref 11.5–15.4)
HGB UR QL STRIP.AUTO: ABNORMAL
IMM GRANULOCYTES # BLD AUTO: 0.08 THOUSAND/UL (ref 0–0.2)
IMM GRANULOCYTES NFR BLD AUTO: 1 % (ref 0–2)
INR PPP: 1.21 (ref 0.84–1.19)
KETONES UR STRIP-MCNC: ABNORMAL MG/DL
LEUKOCYTE ESTERASE UR QL STRIP: ABNORMAL
LYMPHOCYTES # BLD AUTO: 1.92 THOUSANDS/ΜL (ref 0.6–4.47)
LYMPHOCYTES NFR BLD AUTO: 14 % (ref 14–44)
MAGNESIUM SERPL-MCNC: 1.7 MG/DL (ref 1.6–2.6)
MCH RBC QN AUTO: 30.5 PG (ref 26.8–34.3)
MCHC RBC AUTO-ENTMCNC: 32.3 G/DL (ref 31.4–37.4)
MCV RBC AUTO: 94 FL (ref 82–98)
MONOCYTES # BLD AUTO: 1.31 THOUSAND/ΜL (ref 0.17–1.22)
MONOCYTES NFR BLD AUTO: 10 % (ref 4–12)
NEUTROPHILS # BLD AUTO: 9.78 THOUSANDS/ΜL (ref 1.85–7.62)
NEUTS SEG NFR BLD AUTO: 73 % (ref 43–75)
NITRITE UR QL STRIP: NEGATIVE
NRBC BLD AUTO-RTO: 0 /100 WBCS
PH UR STRIP.AUTO: 6.5 [PH] (ref 4.5–8)
PLATELET # BLD AUTO: 301 THOUSANDS/UL (ref 149–390)
PMV BLD AUTO: 10.8 FL (ref 8.9–12.7)
POTASSIUM SERPL-SCNC: 3.5 MMOL/L (ref 3.5–5.3)
PROT SERPL-MCNC: 6.9 G/DL (ref 6.4–8.2)
PROT UR STRIP-MCNC: ABNORMAL MG/DL
PROTHROMBIN TIME: 14.6 SECONDS (ref 11.6–14.5)
RBC # BLD AUTO: 3.94 MILLION/UL (ref 3.81–5.12)
SODIUM SERPL-SCNC: 139 MMOL/L (ref 136–145)
SP GR UR STRIP.AUTO: 1.02 (ref 1–1.03)
TROPONIN I SERPL-MCNC: 0.08 NG/ML
TSH SERPL DL<=0.05 MIU/L-ACNC: 1.76 UIU/ML (ref 0.36–3.74)
UROBILINOGEN UR QL STRIP.AUTO: >=8 E.U./DL
WBC # BLD AUTO: 13.33 THOUSAND/UL (ref 4.31–10.16)

## 2020-05-30 PROCEDURE — 70450 CT HEAD/BRAIN W/O DYE: CPT

## 2020-05-30 PROCEDURE — 82948 REAGENT STRIP/BLOOD GLUCOSE: CPT

## 2020-05-30 PROCEDURE — 85610 PROTHROMBIN TIME: CPT | Performed by: PHYSICIAN ASSISTANT

## 2020-05-30 PROCEDURE — 99285 EMERGENCY DEPT VISIT HI MDM: CPT | Performed by: PHYSICIAN ASSISTANT

## 2020-05-30 PROCEDURE — 84443 ASSAY THYROID STIM HORMONE: CPT | Performed by: PHYSICIAN ASSISTANT

## 2020-05-30 PROCEDURE — 93005 ELECTROCARDIOGRAM TRACING: CPT

## 2020-05-30 PROCEDURE — 99285 EMERGENCY DEPT VISIT HI MDM: CPT

## 2020-05-30 PROCEDURE — 83735 ASSAY OF MAGNESIUM: CPT | Performed by: PHYSICIAN ASSISTANT

## 2020-05-30 PROCEDURE — 85025 COMPLETE CBC W/AUTO DIFF WBC: CPT | Performed by: PHYSICIAN ASSISTANT

## 2020-05-30 PROCEDURE — 82550 ASSAY OF CK (CPK): CPT | Performed by: PHYSICIAN ASSISTANT

## 2020-05-30 PROCEDURE — 36415 COLL VENOUS BLD VENIPUNCTURE: CPT | Performed by: PHYSICIAN ASSISTANT

## 2020-05-30 PROCEDURE — 84484 ASSAY OF TROPONIN QUANT: CPT | Performed by: PHYSICIAN ASSISTANT

## 2020-05-30 PROCEDURE — 85730 THROMBOPLASTIN TIME PARTIAL: CPT | Performed by: PHYSICIAN ASSISTANT

## 2020-05-30 PROCEDURE — 71045 X-RAY EXAM CHEST 1 VIEW: CPT

## 2020-05-30 PROCEDURE — 80053 COMPREHEN METABOLIC PANEL: CPT | Performed by: PHYSICIAN ASSISTANT

## 2020-05-30 RX ORDER — BRIMONIDINE TARTRATE/TIMOLOL 0.2%-0.5%
DROPS OPHTHALMIC (EYE)
COMMUNITY
Start: 2020-04-03

## 2020-05-31 ENCOUNTER — APPOINTMENT (OUTPATIENT)
Dept: CT IMAGING | Facility: HOSPITAL | Age: 75
DRG: 309 | End: 2020-05-31
Payer: COMMERCIAL

## 2020-05-31 ENCOUNTER — APPOINTMENT (INPATIENT)
Dept: CT IMAGING | Facility: HOSPITAL | Age: 75
DRG: 309 | End: 2020-05-31
Payer: COMMERCIAL

## 2020-05-31 PROBLEM — R53.1 GENERALIZED WEAKNESS: Status: ACTIVE | Noted: 2020-05-31

## 2020-05-31 LAB
ANION GAP SERPL CALCULATED.3IONS-SCNC: 8 MMOL/L (ref 4–13)
ATRIAL RATE: 258 BPM
BUN SERPL-MCNC: 17 MG/DL (ref 5–25)
CALCIUM SERPL-MCNC: 8.5 MG/DL (ref 8.3–10.1)
CHLORIDE SERPL-SCNC: 104 MMOL/L (ref 100–108)
CO2 SERPL-SCNC: 27 MMOL/L (ref 21–32)
CREAT SERPL-MCNC: 1.02 MG/DL (ref 0.6–1.3)
ERYTHROCYTE [DISTWIDTH] IN BLOOD BY AUTOMATED COUNT: 12.8 % (ref 11.6–15.1)
GFR SERPL CREATININE-BSD FRML MDRD: 54 ML/MIN/1.73SQ M
GLUCOSE SERPL-MCNC: 107 MG/DL (ref 65–140)
GLUCOSE SERPL-MCNC: 116 MG/DL (ref 65–140)
GLUCOSE SERPL-MCNC: 153 MG/DL (ref 65–140)
GLUCOSE SERPL-MCNC: 165 MG/DL (ref 65–140)
GLUCOSE SERPL-MCNC: 261 MG/DL (ref 65–140)
GLUCOSE SERPL-MCNC: 281 MG/DL (ref 65–140)
HCT VFR BLD AUTO: 35.2 % (ref 34.8–46.1)
HGB BLD-MCNC: 11.4 G/DL (ref 11.5–15.4)
MCH RBC QN AUTO: 30.6 PG (ref 26.8–34.3)
MCHC RBC AUTO-ENTMCNC: 32.4 G/DL (ref 31.4–37.4)
MCV RBC AUTO: 94 FL (ref 82–98)
P AXIS: 138 DEGREES
PLATELET # BLD AUTO: 299 THOUSANDS/UL (ref 149–390)
PMV BLD AUTO: 10 FL (ref 8.9–12.7)
POTASSIUM SERPL-SCNC: 3.5 MMOL/L (ref 3.5–5.3)
QRS AXIS: 6 DEGREES
QRSD INTERVAL: 80 MS
QT INTERVAL: 402 MS
QTC INTERVAL: 472 MS
RBC # BLD AUTO: 3.73 MILLION/UL (ref 3.81–5.12)
SARS-COV-2 RNA RESP QL NAA+PROBE: NEGATIVE
SODIUM SERPL-SCNC: 139 MMOL/L (ref 136–145)
T WAVE AXIS: 24 DEGREES
TROPONIN I SERPL-MCNC: 0.07 NG/ML
TROPONIN I SERPL-MCNC: 0.08 NG/ML
VENTRICULAR RATE: 83 BPM
WBC # BLD AUTO: 13.01 THOUSAND/UL (ref 4.31–10.16)

## 2020-05-31 PROCEDURE — 87635 SARS-COV-2 COVID-19 AMP PRB: CPT | Performed by: INTERNAL MEDICINE

## 2020-05-31 PROCEDURE — 99254 IP/OBS CNSLTJ NEW/EST MOD 60: CPT | Performed by: INTERNAL MEDICINE

## 2020-05-31 PROCEDURE — 93010 ELECTROCARDIOGRAM REPORT: CPT | Performed by: INTERNAL MEDICINE

## 2020-05-31 PROCEDURE — 74176 CT ABD & PELVIS W/O CONTRAST: CPT

## 2020-05-31 PROCEDURE — 80048 BASIC METABOLIC PNL TOTAL CA: CPT | Performed by: NURSE PRACTITIONER

## 2020-05-31 PROCEDURE — 84484 ASSAY OF TROPONIN QUANT: CPT | Performed by: NURSE PRACTITIONER

## 2020-05-31 PROCEDURE — 82948 REAGENT STRIP/BLOOD GLUCOSE: CPT

## 2020-05-31 PROCEDURE — 87040 BLOOD CULTURE FOR BACTERIA: CPT | Performed by: INTERNAL MEDICINE

## 2020-05-31 PROCEDURE — 99223 1ST HOSP IP/OBS HIGH 75: CPT | Performed by: INTERNAL MEDICINE

## 2020-05-31 PROCEDURE — 71250 CT THORAX DX C-: CPT

## 2020-05-31 PROCEDURE — 85027 COMPLETE CBC AUTOMATED: CPT | Performed by: NURSE PRACTITIONER

## 2020-05-31 RX ORDER — PANTOPRAZOLE SODIUM 40 MG/1
40 TABLET, DELAYED RELEASE ORAL
Status: DISCONTINUED | OUTPATIENT
Start: 2020-05-31 | End: 2020-06-02 | Stop reason: HOSPADM

## 2020-05-31 RX ORDER — SODIUM CHLORIDE 9 MG/ML
75 INJECTION, SOLUTION INTRAVENOUS CONTINUOUS
Status: DISPENSED | OUTPATIENT
Start: 2020-05-31 | End: 2020-05-31

## 2020-05-31 RX ORDER — NICOTINE 21 MG/24HR
1 PATCH, TRANSDERMAL 24 HOURS TRANSDERMAL DAILY
Status: DISCONTINUED | OUTPATIENT
Start: 2020-05-31 | End: 2020-06-02 | Stop reason: HOSPADM

## 2020-05-31 RX ORDER — AMIODARONE HYDROCHLORIDE 200 MG/1
200 TABLET ORAL
Status: DISCONTINUED | OUTPATIENT
Start: 2020-05-31 | End: 2020-06-01

## 2020-05-31 RX ORDER — ATORVASTATIN CALCIUM 40 MG/1
40 TABLET, FILM COATED ORAL DAILY
Status: DISCONTINUED | OUTPATIENT
Start: 2020-05-31 | End: 2020-06-02 | Stop reason: HOSPADM

## 2020-05-31 RX ORDER — TRAMADOL HYDROCHLORIDE 50 MG/1
100 TABLET ORAL 2 TIMES DAILY
Status: DISCONTINUED | OUTPATIENT
Start: 2020-05-31 | End: 2020-06-02 | Stop reason: HOSPADM

## 2020-05-31 RX ORDER — BRIMONIDINE TARTRATE 0.15 %
1 DROPS OPHTHALMIC (EYE) 2 TIMES DAILY
Status: DISCONTINUED | OUTPATIENT
Start: 2020-05-31 | End: 2020-06-02 | Stop reason: HOSPADM

## 2020-05-31 RX ORDER — ACETAMINOPHEN 325 MG/1
650 TABLET ORAL EVERY 6 HOURS PRN
Status: DISCONTINUED | OUTPATIENT
Start: 2020-05-31 | End: 2020-06-02 | Stop reason: HOSPADM

## 2020-05-31 RX ORDER — LOSARTAN POTASSIUM 25 MG/1
25 TABLET ORAL DAILY
Status: DISCONTINUED | OUTPATIENT
Start: 2020-05-31 | End: 2020-06-02 | Stop reason: HOSPADM

## 2020-05-31 RX ORDER — ASPIRIN 81 MG/1
81 TABLET ORAL DAILY
Status: DISCONTINUED | OUTPATIENT
Start: 2020-05-31 | End: 2020-06-02 | Stop reason: HOSPADM

## 2020-05-31 RX ORDER — METOPROLOL SUCCINATE 50 MG/1
50 TABLET, EXTENDED RELEASE ORAL DAILY
Status: DISCONTINUED | OUTPATIENT
Start: 2020-05-31 | End: 2020-06-01

## 2020-05-31 RX ORDER — ALPRAZOLAM 0.25 MG/1
0.25 TABLET ORAL 3 TIMES DAILY PRN
Status: DISCONTINUED | OUTPATIENT
Start: 2020-05-31 | End: 2020-06-02 | Stop reason: HOSPADM

## 2020-05-31 RX ORDER — CEPHALEXIN 500 MG/1
500 CAPSULE ORAL EVERY 6 HOURS SCHEDULED
Status: DISCONTINUED | OUTPATIENT
Start: 2020-05-31 | End: 2020-05-31

## 2020-05-31 RX ORDER — FERROUS SULFATE 325(65) MG
325 TABLET ORAL
Status: DISCONTINUED | OUTPATIENT
Start: 2020-05-31 | End: 2020-06-02 | Stop reason: HOSPADM

## 2020-05-31 RX ORDER — TIMOLOL MALEATE 5 MG/ML
1 SOLUTION/ DROPS OPHTHALMIC 2 TIMES DAILY
Status: DISCONTINUED | OUTPATIENT
Start: 2020-05-31 | End: 2020-06-02 | Stop reason: HOSPADM

## 2020-05-31 RX ORDER — AZITHROMYCIN 250 MG/1
500 TABLET, FILM COATED ORAL EVERY 24 HOURS
Status: DISCONTINUED | OUTPATIENT
Start: 2020-05-31 | End: 2020-06-01

## 2020-05-31 RX ADMIN — METOPROLOL SUCCINATE 50 MG: 50 TABLET, EXTENDED RELEASE ORAL at 08:38

## 2020-05-31 RX ADMIN — CEPHALEXIN 500 MG: 500 CAPSULE ORAL at 00:50

## 2020-05-31 RX ADMIN — BRIMONIDINE TARTRATE 1 DROP: 1.5 SOLUTION OPHTHALMIC at 17:47

## 2020-05-31 RX ADMIN — FERROUS SULFATE TAB 325 MG (65 MG ELEMENTAL FE) 325 MG: 325 (65 FE) TAB at 10:10

## 2020-05-31 RX ADMIN — AMIODARONE HYDROCHLORIDE 200 MG: 200 TABLET ORAL at 17:46

## 2020-05-31 RX ADMIN — CEPHALEXIN 500 MG: 500 CAPSULE ORAL at 07:34

## 2020-05-31 RX ADMIN — SODIUM CHLORIDE 75 ML/HR: 0.9 INJECTION, SOLUTION INTRAVENOUS at 10:10

## 2020-05-31 RX ADMIN — ATORVASTATIN CALCIUM 40 MG: 40 TABLET, FILM COATED ORAL at 08:38

## 2020-05-31 RX ADMIN — LOSARTAN POTASSIUM 25 MG: 25 TABLET, FILM COATED ORAL at 08:38

## 2020-05-31 RX ADMIN — AZITHROMYCIN 500 MG: 250 TABLET, FILM COATED ORAL at 12:59

## 2020-05-31 RX ADMIN — TRAMADOL HYDROCHLORIDE 100 MG: 50 TABLET, FILM COATED ORAL at 08:38

## 2020-05-31 RX ADMIN — TIMOLOL MALEATE 1 DROP: 5 SOLUTION/ DROPS OPHTHALMIC at 17:47

## 2020-05-31 RX ADMIN — AMIODARONE HYDROCHLORIDE 200 MG: 200 TABLET ORAL at 12:59

## 2020-05-31 RX ADMIN — INSULIN LISPRO 1 UNITS: 100 INJECTION, SOLUTION INTRAVENOUS; SUBCUTANEOUS at 13:00

## 2020-05-31 RX ADMIN — AMIODARONE HYDROCHLORIDE 200 MG: 200 TABLET ORAL at 08:38

## 2020-05-31 RX ADMIN — TRAMADOL HYDROCHLORIDE 100 MG: 50 TABLET, FILM COATED ORAL at 17:46

## 2020-05-31 RX ADMIN — BRIMONIDINE TARTRATE 1 DROP: 1.5 SOLUTION OPHTHALMIC at 08:38

## 2020-05-31 RX ADMIN — TIMOLOL MALEATE 1 DROP: 5 SOLUTION/ DROPS OPHTHALMIC at 10:15

## 2020-05-31 RX ADMIN — ASPIRIN 81 MG: 81 TABLET, COATED ORAL at 08:38

## 2020-05-31 RX ADMIN — PANTOPRAZOLE SODIUM 40 MG: 40 TABLET, DELAYED RELEASE ORAL at 06:05

## 2020-05-31 RX ADMIN — CEFTRIAXONE SODIUM 1000 MG: 10 INJECTION, POWDER, FOR SOLUTION INTRAVENOUS at 13:48

## 2020-05-31 RX ADMIN — INSULIN LISPRO 2 UNITS: 100 INJECTION, SOLUTION INTRAVENOUS; SUBCUTANEOUS at 04:19

## 2020-06-01 ENCOUNTER — TRANSITIONAL CARE MANAGEMENT (OUTPATIENT)
Dept: FAMILY MEDICINE CLINIC | Facility: CLINIC | Age: 75
End: 2020-06-01

## 2020-06-01 PROBLEM — R63.4 UNINTENTIONAL WEIGHT LOSS: Status: ACTIVE | Noted: 2020-06-01

## 2020-06-01 LAB
ANION GAP SERPL CALCULATED.3IONS-SCNC: 7 MMOL/L (ref 4–13)
BASOPHILS # BLD AUTO: 0.09 THOUSANDS/ΜL (ref 0–0.1)
BASOPHILS NFR BLD AUTO: 1 % (ref 0–1)
BUN SERPL-MCNC: 18 MG/DL (ref 5–25)
CALCIUM SERPL-MCNC: 8.3 MG/DL (ref 8.3–10.1)
CHLORIDE SERPL-SCNC: 104 MMOL/L (ref 100–108)
CO2 SERPL-SCNC: 26 MMOL/L (ref 21–32)
CREAT SERPL-MCNC: 0.94 MG/DL (ref 0.6–1.3)
EOSINOPHIL # BLD AUTO: 0.25 THOUSAND/ΜL (ref 0–0.61)
EOSINOPHIL NFR BLD AUTO: 2 % (ref 0–6)
ERYTHROCYTE [DISTWIDTH] IN BLOOD BY AUTOMATED COUNT: 12.8 % (ref 11.6–15.1)
GFR SERPL CREATININE-BSD FRML MDRD: 60 ML/MIN/1.73SQ M
GLUCOSE SERPL-MCNC: 144 MG/DL (ref 65–140)
GLUCOSE SERPL-MCNC: 148 MG/DL (ref 65–140)
GLUCOSE SERPL-MCNC: 158 MG/DL (ref 65–140)
GLUCOSE SERPL-MCNC: 159 MG/DL (ref 65–140)
GLUCOSE SERPL-MCNC: 92 MG/DL (ref 65–140)
HCT VFR BLD AUTO: 32.8 % (ref 34.8–46.1)
HGB BLD-MCNC: 10.3 G/DL (ref 11.5–15.4)
IMM GRANULOCYTES # BLD AUTO: 0.06 THOUSAND/UL (ref 0–0.2)
IMM GRANULOCYTES NFR BLD AUTO: 1 % (ref 0–2)
LYMPHOCYTES # BLD AUTO: 2.83 THOUSANDS/ΜL (ref 0.6–4.47)
LYMPHOCYTES NFR BLD AUTO: 27 % (ref 14–44)
MCH RBC QN AUTO: 30.7 PG (ref 26.8–34.3)
MCHC RBC AUTO-ENTMCNC: 31.4 G/DL (ref 31.4–37.4)
MCV RBC AUTO: 98 FL (ref 82–98)
MONOCYTES # BLD AUTO: 0.93 THOUSAND/ΜL (ref 0.17–1.22)
MONOCYTES NFR BLD AUTO: 9 % (ref 4–12)
NEUTROPHILS # BLD AUTO: 6.35 THOUSANDS/ΜL (ref 1.85–7.62)
NEUTS SEG NFR BLD AUTO: 60 % (ref 43–75)
NRBC BLD AUTO-RTO: 0 /100 WBCS
PLATELET # BLD AUTO: 326 THOUSANDS/UL (ref 149–390)
PMV BLD AUTO: 10.3 FL (ref 8.9–12.7)
POTASSIUM SERPL-SCNC: 3.8 MMOL/L (ref 3.5–5.3)
PROCALCITONIN SERPL-MCNC: <0.05 NG/ML
RBC # BLD AUTO: 3.35 MILLION/UL (ref 3.81–5.12)
SODIUM SERPL-SCNC: 137 MMOL/L (ref 136–145)
WBC # BLD AUTO: 10.51 THOUSAND/UL (ref 4.31–10.16)

## 2020-06-01 PROCEDURE — 99232 SBSQ HOSP IP/OBS MODERATE 35: CPT | Performed by: INTERNAL MEDICINE

## 2020-06-01 PROCEDURE — 99254 IP/OBS CNSLTJ NEW/EST MOD 60: CPT | Performed by: PHYSICIAN ASSISTANT

## 2020-06-01 PROCEDURE — 93005 ELECTROCARDIOGRAM TRACING: CPT

## 2020-06-01 PROCEDURE — 85025 COMPLETE CBC W/AUTO DIFF WBC: CPT | Performed by: INTERNAL MEDICINE

## 2020-06-01 PROCEDURE — 97162 PT EVAL MOD COMPLEX 30 MIN: CPT

## 2020-06-01 PROCEDURE — 88185 FLOWCYTOMETRY/TC ADD-ON: CPT

## 2020-06-01 PROCEDURE — 88184 FLOWCYTOMETRY/ TC 1 MARKER: CPT | Performed by: INTERNAL MEDICINE

## 2020-06-01 PROCEDURE — 82948 REAGENT STRIP/BLOOD GLUCOSE: CPT

## 2020-06-01 PROCEDURE — 84145 PROCALCITONIN (PCT): CPT | Performed by: INTERNAL MEDICINE

## 2020-06-01 PROCEDURE — 80048 BASIC METABOLIC PNL TOTAL CA: CPT | Performed by: INTERNAL MEDICINE

## 2020-06-01 RX ORDER — NICOTINE 21 MG/24HR
1 PATCH, TRANSDERMAL 24 HOURS TRANSDERMAL DAILY
Status: CANCELLED | OUTPATIENT
Start: 2020-06-02

## 2020-06-01 RX ORDER — TIMOLOL MALEATE 5 MG/ML
1 SOLUTION/ DROPS OPHTHALMIC 2 TIMES DAILY
Status: CANCELLED | OUTPATIENT
Start: 2020-06-01

## 2020-06-01 RX ORDER — METOPROLOL SUCCINATE 25 MG/1
25 TABLET, EXTENDED RELEASE ORAL DAILY
Status: DISCONTINUED | OUTPATIENT
Start: 2020-06-02 | End: 2020-06-01

## 2020-06-01 RX ORDER — ATORVASTATIN CALCIUM 40 MG/1
40 TABLET, FILM COATED ORAL DAILY
Status: CANCELLED | OUTPATIENT
Start: 2020-06-02

## 2020-06-01 RX ORDER — ACETAMINOPHEN 325 MG/1
650 TABLET ORAL EVERY 6 HOURS PRN
Status: CANCELLED | OUTPATIENT
Start: 2020-06-01

## 2020-06-01 RX ORDER — FERROUS SULFATE 325(65) MG
325 TABLET ORAL
Status: CANCELLED | OUTPATIENT
Start: 2020-06-02

## 2020-06-01 RX ORDER — TRAMADOL HYDROCHLORIDE 50 MG/1
100 TABLET ORAL 2 TIMES DAILY
Status: CANCELLED | OUTPATIENT
Start: 2020-06-01

## 2020-06-01 RX ORDER — BRIMONIDINE TARTRATE 0.15 %
1 DROPS OPHTHALMIC (EYE) 2 TIMES DAILY
Status: CANCELLED | OUTPATIENT
Start: 2020-06-01

## 2020-06-01 RX ORDER — PANTOPRAZOLE SODIUM 40 MG/1
40 TABLET, DELAYED RELEASE ORAL
Status: CANCELLED | OUTPATIENT
Start: 2020-06-02

## 2020-06-01 RX ORDER — ASPIRIN 81 MG/1
81 TABLET ORAL DAILY
Status: CANCELLED | OUTPATIENT
Start: 2020-06-02

## 2020-06-01 RX ORDER — ALPRAZOLAM 0.25 MG/1
0.25 TABLET ORAL 3 TIMES DAILY PRN
Status: CANCELLED | OUTPATIENT
Start: 2020-06-01

## 2020-06-01 RX ORDER — LOSARTAN POTASSIUM 25 MG/1
25 TABLET ORAL DAILY
Status: CANCELLED | OUTPATIENT
Start: 2020-06-02

## 2020-06-01 RX ADMIN — TIMOLOL MALEATE 1 DROP: 5 SOLUTION/ DROPS OPHTHALMIC at 17:25

## 2020-06-01 RX ADMIN — INSULIN LISPRO 1 UNITS: 100 INJECTION, SOLUTION INTRAVENOUS; SUBCUTANEOUS at 12:10

## 2020-06-01 RX ADMIN — TRAMADOL HYDROCHLORIDE 100 MG: 50 TABLET, FILM COATED ORAL at 08:00

## 2020-06-01 RX ADMIN — TIMOLOL MALEATE 1 DROP: 5 SOLUTION/ DROPS OPHTHALMIC at 08:01

## 2020-06-01 RX ADMIN — TRAMADOL HYDROCHLORIDE 100 MG: 50 TABLET, FILM COATED ORAL at 17:25

## 2020-06-01 RX ADMIN — ATORVASTATIN CALCIUM 40 MG: 40 TABLET, FILM COATED ORAL at 08:00

## 2020-06-01 RX ADMIN — BRIMONIDINE TARTRATE 1 DROP: 1.5 SOLUTION OPHTHALMIC at 17:25

## 2020-06-01 RX ADMIN — AMIODARONE HYDROCHLORIDE 200 MG: 200 TABLET ORAL at 07:59

## 2020-06-01 RX ADMIN — INSULIN LISPRO 1 UNITS: 100 INJECTION, SOLUTION INTRAVENOUS; SUBCUTANEOUS at 17:26

## 2020-06-01 RX ADMIN — FERROUS SULFATE TAB 325 MG (65 MG ELEMENTAL FE) 325 MG: 325 (65 FE) TAB at 07:59

## 2020-06-01 RX ADMIN — BRIMONIDINE TARTRATE 1 DROP: 1.5 SOLUTION OPHTHALMIC at 08:01

## 2020-06-01 RX ADMIN — PANTOPRAZOLE SODIUM 40 MG: 40 TABLET, DELAYED RELEASE ORAL at 07:58

## 2020-06-01 RX ADMIN — ASPIRIN 81 MG: 81 TABLET, COATED ORAL at 08:00

## 2020-06-02 ENCOUNTER — HOSPITAL ENCOUNTER (INPATIENT)
Facility: HOSPITAL | Age: 75
LOS: 3 days | Discharge: HOME | DRG: 242 | End: 2020-06-05
Attending: HOSPITALIST | Admitting: INTERNAL MEDICINE
Payer: COMMERCIAL

## 2020-06-02 VITALS
SYSTOLIC BLOOD PRESSURE: 142 MMHG | RESPIRATION RATE: 16 BRPM | BODY MASS INDEX: 21.83 KG/M2 | OXYGEN SATURATION: 92 % | HEIGHT: 61 IN | TEMPERATURE: 98.4 F | WEIGHT: 115.6 LBS | HEART RATE: 56 BPM | DIASTOLIC BLOOD PRESSURE: 74 MMHG

## 2020-06-02 DIAGNOSIS — I48.91 A-FIB (HCC): ICD-10-CM

## 2020-06-02 DIAGNOSIS — R31.0 GROSS HEMATURIA: Primary | ICD-10-CM

## 2020-06-02 DIAGNOSIS — I48.0 PAROXYSMAL ATRIAL FIBRILLATION (HCC): ICD-10-CM

## 2020-06-02 DIAGNOSIS — R00.2 PALPITATIONS: ICD-10-CM

## 2020-06-02 LAB
ANION GAP SERPL CALCULATED.3IONS-SCNC: 7 MMOL/L (ref 4–13)
ATRIAL RATE: 50 BPM
ATRIAL RATE: 67 BPM
BASOPHILS # BLD AUTO: 0.09 THOUSANDS/ΜL (ref 0–0.1)
BASOPHILS NFR BLD AUTO: 1 % (ref 0–1)
BUN SERPL-MCNC: 16 MG/DL (ref 5–25)
CALCIUM SERPL-MCNC: 8.2 MG/DL (ref 8.3–10.1)
CHLORIDE SERPL-SCNC: 103 MMOL/L (ref 100–108)
CO2 SERPL-SCNC: 28 MMOL/L (ref 21–32)
CREAT SERPL-MCNC: 0.73 MG/DL (ref 0.6–1.3)
EOSINOPHIL # BLD AUTO: 0.26 THOUSAND/ΜL (ref 0–0.61)
EOSINOPHIL NFR BLD AUTO: 3 % (ref 0–6)
ERYTHROCYTE [DISTWIDTH] IN BLOOD BY AUTOMATED COUNT: 12.5 % (ref 11.6–15.1)
GFR SERPL CREATININE-BSD FRML MDRD: 81 ML/MIN/1.73SQ M
GLUCOSE SERPL-MCNC: 125 MG/DL (ref 65–140)
GLUCOSE SERPL-MCNC: 128 MG/DL (ref 65–140)
GLUCOSE SERPL-MCNC: 156 MG/DL (ref 65–140)
GLUCOSE SERPL-MCNC: 173 MG/DL (ref 65–140)
GLUCOSE SERPL-MCNC: 179 MG/DL (ref 65–140)
HCT VFR BLD AUTO: 33.6 % (ref 34.8–46.1)
HGB BLD-MCNC: 10.8 G/DL (ref 11.5–15.4)
IMM GRANULOCYTES # BLD AUTO: 0.07 THOUSAND/UL (ref 0–0.2)
IMM GRANULOCYTES NFR BLD AUTO: 1 % (ref 0–2)
LYMPHOCYTES # BLD AUTO: 2.65 THOUSANDS/ΜL (ref 0.6–4.47)
LYMPHOCYTES NFR BLD AUTO: 25 % (ref 14–44)
MCH RBC QN AUTO: 30 PG (ref 26.8–34.3)
MCHC RBC AUTO-ENTMCNC: 32.1 G/DL (ref 31.4–37.4)
MCV RBC AUTO: 93 FL (ref 82–98)
MONOCYTES # BLD AUTO: 0.95 THOUSAND/ΜL (ref 0.17–1.22)
MONOCYTES NFR BLD AUTO: 9 % (ref 4–12)
NEUTROPHILS # BLD AUTO: 6.46 THOUSANDS/ΜL (ref 1.85–7.62)
NEUTS SEG NFR BLD AUTO: 61 % (ref 43–75)
NRBC BLD AUTO-RTO: 0 /100 WBCS
P AXIS: 38 DEGREES
P AXIS: 74 DEGREES
PLATELET # BLD AUTO: 321 THOUSANDS/UL (ref 149–390)
PMV BLD AUTO: 10 FL (ref 8.9–12.7)
POTASSIUM SERPL-SCNC: 3.7 MMOL/L (ref 3.5–5.3)
PR INTERVAL: 156 MS
PR INTERVAL: 214 MS
QRS AXIS: -28 DEGREES
QRS AXIS: 13 DEGREES
QRSD INTERVAL: 108 MS
QRSD INTERVAL: 86 MS
QT INTERVAL: 440 MS
QT INTERVAL: 496 MS
QTC INTERVAL: 452 MS
QTC INTERVAL: 464 MS
RBC # BLD AUTO: 3.6 MILLION/UL (ref 3.81–5.12)
SODIUM SERPL-SCNC: 138 MMOL/L (ref 136–145)
T WAVE AXIS: -80 DEGREES
T WAVE AXIS: 46 DEGREES
VENTRICULAR RATE: 50 BPM
VENTRICULAR RATE: 67 BPM
WBC # BLD AUTO: 10.48 THOUSAND/UL (ref 4.31–10.16)

## 2020-06-02 PROCEDURE — 82948 REAGENT STRIP/BLOOD GLUCOSE: CPT

## 2020-06-02 PROCEDURE — 99232 SBSQ HOSP IP/OBS MODERATE 35: CPT | Performed by: PHYSICIAN ASSISTANT

## 2020-06-02 PROCEDURE — 99222 1ST HOSP IP/OBS MODERATE 55: CPT | Performed by: INTERNAL MEDICINE

## 2020-06-02 PROCEDURE — 85025 COMPLETE CBC W/AUTO DIFF WBC: CPT | Performed by: INTERNAL MEDICINE

## 2020-06-02 PROCEDURE — 80048 BASIC METABOLIC PNL TOTAL CA: CPT | Performed by: INTERNAL MEDICINE

## 2020-06-02 PROCEDURE — NC001 PR NO CHARGE: Performed by: PHYSICIAN ASSISTANT

## 2020-06-02 PROCEDURE — 93010 ELECTROCARDIOGRAM REPORT: CPT | Performed by: INTERNAL MEDICINE

## 2020-06-02 RX ORDER — TRAMADOL HYDROCHLORIDE 50 MG/1
100 TABLET ORAL 2 TIMES DAILY
Status: DISCONTINUED | OUTPATIENT
Start: 2020-06-02 | End: 2020-06-05 | Stop reason: HOSPADM

## 2020-06-02 RX ORDER — ACETAMINOPHEN 325 MG/1
650 TABLET ORAL EVERY 6 HOURS PRN
Status: DISCONTINUED | OUTPATIENT
Start: 2020-06-02 | End: 2020-06-05 | Stop reason: HOSPADM

## 2020-06-02 RX ORDER — NICOTINE 21 MG/24HR
1 PATCH, TRANSDERMAL 24 HOURS TRANSDERMAL DAILY
Status: DISCONTINUED | OUTPATIENT
Start: 2020-06-03 | End: 2020-06-05 | Stop reason: HOSPADM

## 2020-06-02 RX ORDER — ATORVASTATIN CALCIUM 40 MG/1
40 TABLET, FILM COATED ORAL
Status: DISCONTINUED | OUTPATIENT
Start: 2020-06-02 | End: 2020-06-05 | Stop reason: HOSPADM

## 2020-06-02 RX ORDER — LOSARTAN POTASSIUM 25 MG/1
25 TABLET ORAL DAILY
Status: DISCONTINUED | OUTPATIENT
Start: 2020-06-03 | End: 2020-06-05 | Stop reason: HOSPADM

## 2020-06-02 RX ORDER — FERROUS SULFATE 325(65) MG
325 TABLET ORAL
Status: DISCONTINUED | OUTPATIENT
Start: 2020-06-03 | End: 2020-06-05 | Stop reason: HOSPADM

## 2020-06-02 RX ORDER — BRIMONIDINE TARTRATE 0.15 %
1 DROPS OPHTHALMIC (EYE) 2 TIMES DAILY
Status: DISCONTINUED | OUTPATIENT
Start: 2020-06-02 | End: 2020-06-05 | Stop reason: HOSPADM

## 2020-06-02 RX ORDER — TIMOLOL MALEATE 5 MG/ML
1 SOLUTION/ DROPS OPHTHALMIC 2 TIMES DAILY
Status: DISCONTINUED | OUTPATIENT
Start: 2020-06-02 | End: 2020-06-05 | Stop reason: HOSPADM

## 2020-06-02 RX ORDER — ASPIRIN 81 MG/1
81 TABLET ORAL DAILY
Status: DISCONTINUED | OUTPATIENT
Start: 2020-06-03 | End: 2020-06-05 | Stop reason: HOSPADM

## 2020-06-02 RX ORDER — ALPRAZOLAM 0.25 MG/1
0.25 TABLET ORAL 3 TIMES DAILY PRN
Status: DISCONTINUED | OUTPATIENT
Start: 2020-06-02 | End: 2020-06-05 | Stop reason: HOSPADM

## 2020-06-02 RX ORDER — PANTOPRAZOLE SODIUM 40 MG/1
40 TABLET, DELAYED RELEASE ORAL
Status: DISCONTINUED | OUTPATIENT
Start: 2020-06-03 | End: 2020-06-05 | Stop reason: HOSPADM

## 2020-06-02 RX ADMIN — FERROUS SULFATE TAB 325 MG (65 MG ELEMENTAL FE) 325 MG: 325 (65 FE) TAB at 08:35

## 2020-06-02 RX ADMIN — TRAMADOL HYDROCHLORIDE 100 MG: 50 TABLET, FILM COATED ORAL at 17:14

## 2020-06-02 RX ADMIN — TRAMADOL HYDROCHLORIDE 100 MG: 50 TABLET, FILM COATED ORAL at 08:36

## 2020-06-02 RX ADMIN — ATORVASTATIN CALCIUM 40 MG: 40 TABLET, FILM COATED ORAL at 17:14

## 2020-06-02 RX ADMIN — LOSARTAN POTASSIUM 25 MG: 25 TABLET, FILM COATED ORAL at 08:35

## 2020-06-02 RX ADMIN — PANTOPRAZOLE SODIUM 40 MG: 40 TABLET, DELAYED RELEASE ORAL at 05:10

## 2020-06-02 RX ADMIN — INSULIN LISPRO 1 UNITS: 100 INJECTION, SOLUTION INTRAVENOUS; SUBCUTANEOUS at 11:49

## 2020-06-02 RX ADMIN — ASPIRIN 81 MG: 81 TABLET, COATED ORAL at 08:36

## 2020-06-02 RX ADMIN — TIMOLOL MALEATE 1 DROP: 5 SOLUTION/ DROPS OPHTHALMIC at 08:36

## 2020-06-02 RX ADMIN — INSULIN LISPRO 1 UNITS: 100 INJECTION, SOLUTION INTRAVENOUS; SUBCUTANEOUS at 22:28

## 2020-06-02 RX ADMIN — ATORVASTATIN CALCIUM 40 MG: 40 TABLET, FILM COATED ORAL at 08:36

## 2020-06-02 RX ADMIN — BRIMONIDINE TARTRATE 1 DROP: 1.5 SOLUTION OPHTHALMIC at 17:14

## 2020-06-02 RX ADMIN — BRIMONIDINE TARTRATE 1 DROP: 1.5 SOLUTION OPHTHALMIC at 08:36

## 2020-06-02 RX ADMIN — TIMOLOL MALEATE 1 DROP: 5 SOLUTION/ DROPS OPHTHALMIC at 17:14

## 2020-06-03 ENCOUNTER — TELEPHONE (OUTPATIENT)
Dept: HEMATOLOGY ONCOLOGY | Facility: CLINIC | Age: 75
End: 2020-06-03

## 2020-06-03 DIAGNOSIS — D72.829 LEUKOCYTOSIS: Primary | ICD-10-CM

## 2020-06-03 PROBLEM — R31.0 GROSS HEMATURIA: Status: RESOLVED | Noted: 2020-05-25 | Resolved: 2020-06-03

## 2020-06-03 LAB
ANION GAP SERPL CALCULATED.3IONS-SCNC: 5 MMOL/L (ref 4–13)
BUN SERPL-MCNC: 15 MG/DL (ref 5–25)
CALCIUM SERPL-MCNC: 9 MG/DL (ref 8.3–10.1)
CHLORIDE SERPL-SCNC: 104 MMOL/L (ref 100–108)
CO2 SERPL-SCNC: 28 MMOL/L (ref 21–32)
CREAT SERPL-MCNC: 0.74 MG/DL (ref 0.6–1.3)
GFR SERPL CREATININE-BSD FRML MDRD: 80 ML/MIN/1.73SQ M
GLUCOSE SERPL-MCNC: 129 MG/DL (ref 65–140)
GLUCOSE SERPL-MCNC: 134 MG/DL (ref 65–140)
GLUCOSE SERPL-MCNC: 143 MG/DL (ref 65–140)
GLUCOSE SERPL-MCNC: 193 MG/DL (ref 65–140)
GLUCOSE SERPL-MCNC: 97 MG/DL (ref 65–140)
MAGNESIUM SERPL-MCNC: 1.9 MG/DL (ref 1.6–2.6)
POTASSIUM SERPL-SCNC: 3.4 MMOL/L (ref 3.5–5.3)
SODIUM SERPL-SCNC: 137 MMOL/L (ref 136–145)

## 2020-06-03 PROCEDURE — 99223 1ST HOSP IP/OBS HIGH 75: CPT | Performed by: INTERNAL MEDICINE

## 2020-06-03 PROCEDURE — 99232 SBSQ HOSP IP/OBS MODERATE 35: CPT | Performed by: PHYSICIAN ASSISTANT

## 2020-06-03 PROCEDURE — 83735 ASSAY OF MAGNESIUM: CPT | Performed by: PHYSICIAN ASSISTANT

## 2020-06-03 PROCEDURE — 80048 BASIC METABOLIC PNL TOTAL CA: CPT | Performed by: PHYSICIAN ASSISTANT

## 2020-06-03 PROCEDURE — 82948 REAGENT STRIP/BLOOD GLUCOSE: CPT

## 2020-06-03 RX ORDER — POTASSIUM CHLORIDE 20 MEQ/1
40 TABLET, EXTENDED RELEASE ORAL 2 TIMES DAILY
Status: COMPLETED | OUTPATIENT
Start: 2020-06-03 | End: 2020-06-03

## 2020-06-03 RX ORDER — VANCOMYCIN HYDROCHLORIDE 1 G/200ML
1000 INJECTION, SOLUTION INTRAVENOUS ONCE
Status: COMPLETED | OUTPATIENT
Start: 2020-06-04 | End: 2020-06-04

## 2020-06-03 RX ORDER — HYDRALAZINE HYDROCHLORIDE 20 MG/ML
5 INJECTION INTRAMUSCULAR; INTRAVENOUS EVERY 6 HOURS PRN
Status: DISCONTINUED | OUTPATIENT
Start: 2020-06-03 | End: 2020-06-05 | Stop reason: HOSPADM

## 2020-06-03 RX ADMIN — BRIMONIDINE TARTRATE 1 DROP: 1.5 SOLUTION OPHTHALMIC at 17:20

## 2020-06-03 RX ADMIN — TRAMADOL HYDROCHLORIDE 100 MG: 50 TABLET, FILM COATED ORAL at 17:20

## 2020-06-03 RX ADMIN — LOSARTAN POTASSIUM 25 MG: 25 TABLET, FILM COATED ORAL at 08:28

## 2020-06-03 RX ADMIN — POTASSIUM CHLORIDE 40 MEQ: 1500 TABLET, EXTENDED RELEASE ORAL at 17:20

## 2020-06-03 RX ADMIN — INSULIN LISPRO 1 UNITS: 100 INJECTION, SOLUTION INTRAVENOUS; SUBCUTANEOUS at 21:16

## 2020-06-03 RX ADMIN — TIMOLOL MALEATE 1 DROP: 5 SOLUTION/ DROPS OPHTHALMIC at 17:20

## 2020-06-03 RX ADMIN — BRIMONIDINE TARTRATE 1 DROP: 1.5 SOLUTION OPHTHALMIC at 08:29

## 2020-06-03 RX ADMIN — TRAMADOL HYDROCHLORIDE 100 MG: 50 TABLET, FILM COATED ORAL at 08:28

## 2020-06-03 RX ADMIN — ATORVASTATIN CALCIUM 40 MG: 40 TABLET, FILM COATED ORAL at 17:20

## 2020-06-03 RX ADMIN — ASPIRIN 81 MG: 81 TABLET, COATED ORAL at 08:29

## 2020-06-03 RX ADMIN — FERROUS SULFATE TAB 325 MG (65 MG ELEMENTAL FE) 325 MG: 325 (65 FE) TAB at 08:28

## 2020-06-03 RX ADMIN — TIMOLOL MALEATE 1 DROP: 5 SOLUTION/ DROPS OPHTHALMIC at 08:29

## 2020-06-03 RX ADMIN — POTASSIUM CHLORIDE 40 MEQ: 1500 TABLET, EXTENDED RELEASE ORAL at 12:53

## 2020-06-03 RX ADMIN — PANTOPRAZOLE SODIUM 40 MG: 40 TABLET, DELAYED RELEASE ORAL at 05:10

## 2020-06-04 ENCOUNTER — ANESTHESIA (INPATIENT)
Dept: NON INVASIVE DIAGNOSTICS | Facility: HOSPITAL | Age: 75
DRG: 242 | End: 2020-06-04
Payer: COMMERCIAL

## 2020-06-04 ENCOUNTER — APPOINTMENT (INPATIENT)
Dept: RADIOLOGY | Facility: HOSPITAL | Age: 75
DRG: 242 | End: 2020-06-04
Payer: COMMERCIAL

## 2020-06-04 LAB
ANION GAP SERPL CALCULATED.3IONS-SCNC: 5 MMOL/L (ref 4–13)
ATRIAL RATE: 64 BPM
BASOPHILS # BLD AUTO: 0.1 THOUSANDS/ΜL (ref 0–0.1)
BASOPHILS NFR BLD AUTO: 1 % (ref 0–1)
BUN SERPL-MCNC: 17 MG/DL (ref 5–25)
CALCIUM SERPL-MCNC: 8.9 MG/DL (ref 8.3–10.1)
CHLORIDE SERPL-SCNC: 106 MMOL/L (ref 100–108)
CO2 SERPL-SCNC: 27 MMOL/L (ref 21–32)
CREAT SERPL-MCNC: 0.72 MG/DL (ref 0.6–1.3)
EOSINOPHIL # BLD AUTO: 0.25 THOUSAND/ΜL (ref 0–0.61)
EOSINOPHIL NFR BLD AUTO: 2 % (ref 0–6)
ERYTHROCYTE [DISTWIDTH] IN BLOOD BY AUTOMATED COUNT: 12.6 % (ref 11.6–15.1)
GFR SERPL CREATININE-BSD FRML MDRD: 83 ML/MIN/1.73SQ M
GLUCOSE SERPL-MCNC: 124 MG/DL (ref 65–140)
GLUCOSE SERPL-MCNC: 137 MG/DL (ref 65–140)
GLUCOSE SERPL-MCNC: 194 MG/DL (ref 65–140)
GLUCOSE SERPL-MCNC: 245 MG/DL (ref 65–140)
GLUCOSE SERPL-MCNC: 95 MG/DL (ref 65–140)
HCT VFR BLD AUTO: 33.9 % (ref 34.8–46.1)
HGB BLD-MCNC: 10.8 G/DL (ref 11.5–15.4)
IMM GRANULOCYTES # BLD AUTO: 0.09 THOUSAND/UL (ref 0–0.2)
IMM GRANULOCYTES NFR BLD AUTO: 1 % (ref 0–2)
LYMPHOCYTES # BLD AUTO: 2.84 THOUSANDS/ΜL (ref 0.6–4.47)
LYMPHOCYTES NFR BLD AUTO: 24 % (ref 14–44)
MAGNESIUM SERPL-MCNC: 1.9 MG/DL (ref 1.6–2.6)
MCH RBC QN AUTO: 30.3 PG (ref 26.8–34.3)
MCHC RBC AUTO-ENTMCNC: 31.9 G/DL (ref 31.4–37.4)
MCV RBC AUTO: 95 FL (ref 82–98)
MONOCYTES # BLD AUTO: 1.12 THOUSAND/ΜL (ref 0.17–1.22)
MONOCYTES NFR BLD AUTO: 9 % (ref 4–12)
NEUTROPHILS # BLD AUTO: 7.56 THOUSANDS/ΜL (ref 1.85–7.62)
NEUTS SEG NFR BLD AUTO: 63 % (ref 43–75)
NRBC BLD AUTO-RTO: 0 /100 WBCS
P AXIS: 34 DEGREES
PLATELET # BLD AUTO: 365 THOUSANDS/UL (ref 149–390)
PMV BLD AUTO: 9.7 FL (ref 8.9–12.7)
POTASSIUM SERPL-SCNC: 4.3 MMOL/L (ref 3.5–5.3)
PR INTERVAL: 186 MS
QRS AXIS: 5 DEGREES
QRSD INTERVAL: 86 MS
QT INTERVAL: 450 MS
QTC INTERVAL: 464 MS
RBC # BLD AUTO: 3.57 MILLION/UL (ref 3.81–5.12)
SCAN RESULT: NORMAL
SODIUM SERPL-SCNC: 138 MMOL/L (ref 136–145)
T WAVE AXIS: 29 DEGREES
VENTRICULAR RATE: 64 BPM
WBC # BLD AUTO: 11.96 THOUSAND/UL (ref 4.31–10.16)

## 2020-06-04 PROCEDURE — 71045 X-RAY EXAM CHEST 1 VIEW: CPT

## 2020-06-04 PROCEDURE — 0JH606Z INSERTION OF PACEMAKER, DUAL CHAMBER INTO CHEST SUBCUTANEOUS TISSUE AND FASCIA, OPEN APPROACH: ICD-10-PCS | Performed by: INTERNAL MEDICINE

## 2020-06-04 PROCEDURE — 33208 INSRT HEART PM ATRIAL & VENT: CPT

## 2020-06-04 PROCEDURE — 99232 SBSQ HOSP IP/OBS MODERATE 35: CPT | Performed by: PHYSICIAN ASSISTANT

## 2020-06-04 PROCEDURE — C1892 INTRO/SHEATH,FIXED,PEEL-AWAY: HCPCS

## 2020-06-04 PROCEDURE — 83735 ASSAY OF MAGNESIUM: CPT | Performed by: PHYSICIAN ASSISTANT

## 2020-06-04 PROCEDURE — 97166 OT EVAL MOD COMPLEX 45 MIN: CPT

## 2020-06-04 PROCEDURE — C1898 LEAD, PMKR, OTHER THAN TRANS: HCPCS

## 2020-06-04 PROCEDURE — 82948 REAGENT STRIP/BLOOD GLUCOSE: CPT

## 2020-06-04 PROCEDURE — 80048 BASIC METABOLIC PNL TOTAL CA: CPT | Performed by: PHYSICIAN ASSISTANT

## 2020-06-04 PROCEDURE — 93010 ELECTROCARDIOGRAM REPORT: CPT | Performed by: INTERNAL MEDICINE

## 2020-06-04 PROCEDURE — 93005 ELECTROCARDIOGRAM TRACING: CPT

## 2020-06-04 PROCEDURE — 02H63JZ INSERTION OF PACEMAKER LEAD INTO RIGHT ATRIUM, PERCUTANEOUS APPROACH: ICD-10-PCS | Performed by: INTERNAL MEDICINE

## 2020-06-04 PROCEDURE — 33208 INSRT HEART PM ATRIAL & VENT: CPT | Performed by: INTERNAL MEDICINE

## 2020-06-04 PROCEDURE — 85025 COMPLETE CBC W/AUTO DIFF WBC: CPT | Performed by: PHYSICIAN ASSISTANT

## 2020-06-04 PROCEDURE — C1785 PMKR, DUAL, RATE-RESP: HCPCS

## 2020-06-04 PROCEDURE — C1769 GUIDE WIRE: HCPCS

## 2020-06-04 PROCEDURE — 02HK3JZ INSERTION OF PACEMAKER LEAD INTO RIGHT VENTRICLE, PERCUTANEOUS APPROACH: ICD-10-PCS | Performed by: INTERNAL MEDICINE

## 2020-06-04 RX ORDER — METOPROLOL SUCCINATE 50 MG/1
50 TABLET, EXTENDED RELEASE ORAL DAILY
Status: DISCONTINUED | OUTPATIENT
Start: 2020-06-05 | End: 2020-06-04

## 2020-06-04 RX ORDER — GENTAMICIN SULFATE 40 MG/ML
INJECTION, SOLUTION INTRAMUSCULAR; INTRAVENOUS CODE/TRAUMA/SEDATION MEDICATION
Status: COMPLETED | OUTPATIENT
Start: 2020-06-04 | End: 2020-06-04

## 2020-06-04 RX ORDER — PROPOFOL 10 MG/ML
INJECTION, EMULSION INTRAVENOUS CONTINUOUS PRN
Status: DISCONTINUED | OUTPATIENT
Start: 2020-06-04 | End: 2020-06-04 | Stop reason: SURG

## 2020-06-04 RX ORDER — SODIUM CHLORIDE 9 MG/ML
INJECTION, SOLUTION INTRAVENOUS CONTINUOUS PRN
Status: DISCONTINUED | OUTPATIENT
Start: 2020-06-04 | End: 2020-06-04 | Stop reason: SURG

## 2020-06-04 RX ORDER — METOPROLOL SUCCINATE 50 MG/1
50 TABLET, EXTENDED RELEASE ORAL DAILY
Status: DISCONTINUED | OUTPATIENT
Start: 2020-06-04 | End: 2020-06-05 | Stop reason: HOSPADM

## 2020-06-04 RX ORDER — LIDOCAINE HYDROCHLORIDE 10 MG/ML
INJECTION, SOLUTION EPIDURAL; INFILTRATION; INTRACAUDAL; PERINEURAL CODE/TRAUMA/SEDATION MEDICATION
Status: COMPLETED | OUTPATIENT
Start: 2020-06-04 | End: 2020-06-04

## 2020-06-04 RX ORDER — FENTANYL CITRATE 50 UG/ML
INJECTION, SOLUTION INTRAMUSCULAR; INTRAVENOUS AS NEEDED
Status: DISCONTINUED | OUTPATIENT
Start: 2020-06-04 | End: 2020-06-04 | Stop reason: SURG

## 2020-06-04 RX ADMIN — METOPROLOL SUCCINATE 50 MG: 50 TABLET, EXTENDED RELEASE ORAL at 17:08

## 2020-06-04 RX ADMIN — GENTAMICIN SULFATE 80 MG: 40 INJECTION, SOLUTION INTRAMUSCULAR; INTRAVENOUS at 11:05

## 2020-06-04 RX ADMIN — TIMOLOL MALEATE 1 DROP: 5 SOLUTION/ DROPS OPHTHALMIC at 08:31

## 2020-06-04 RX ADMIN — LIDOCAINE HYDROCHLORIDE 20 ML: 10 INJECTION, SOLUTION EPIDURAL; INFILTRATION; INTRACAUDAL; PERINEURAL at 10:51

## 2020-06-04 RX ADMIN — ATORVASTATIN CALCIUM 40 MG: 40 TABLET, FILM COATED ORAL at 17:06

## 2020-06-04 RX ADMIN — INSULIN LISPRO 1 UNITS: 100 INJECTION, SOLUTION INTRAVENOUS; SUBCUTANEOUS at 12:35

## 2020-06-04 RX ADMIN — TRAMADOL HYDROCHLORIDE 100 MG: 50 TABLET, FILM COATED ORAL at 17:05

## 2020-06-04 RX ADMIN — INSULIN LISPRO 2 UNITS: 100 INJECTION, SOLUTION INTRAVENOUS; SUBCUTANEOUS at 21:19

## 2020-06-04 RX ADMIN — SODIUM CHLORIDE: 0.9 INJECTION, SOLUTION INTRAVENOUS at 10:18

## 2020-06-04 RX ADMIN — HYDRALAZINE HYDROCHLORIDE 5 MG: 20 INJECTION INTRAMUSCULAR; INTRAVENOUS at 15:40

## 2020-06-04 RX ADMIN — PROPOFOL 50 MCG/KG/MIN: 10 INJECTION, EMULSION INTRAVENOUS at 10:35

## 2020-06-04 RX ADMIN — BRIMONIDINE TARTRATE 1 DROP: 1.5 SOLUTION OPHTHALMIC at 17:06

## 2020-06-04 RX ADMIN — VANCOMYCIN HYDROCHLORIDE 1000 MG: 1 INJECTION, SOLUTION INTRAVENOUS at 10:22

## 2020-06-04 RX ADMIN — PANTOPRAZOLE SODIUM 40 MG: 40 TABLET, DELAYED RELEASE ORAL at 05:06

## 2020-06-04 RX ADMIN — FERROUS SULFATE TAB 325 MG (65 MG ELEMENTAL FE) 325 MG: 325 (65 FE) TAB at 08:31

## 2020-06-04 RX ADMIN — ASPIRIN 81 MG: 81 TABLET, COATED ORAL at 08:31

## 2020-06-04 RX ADMIN — ACETAMINOPHEN 650 MG: 325 TABLET ORAL at 12:33

## 2020-06-04 RX ADMIN — FENTANYL CITRATE 50 MCG: 50 INJECTION, SOLUTION INTRAMUSCULAR; INTRAVENOUS at 10:35

## 2020-06-04 RX ADMIN — BRIMONIDINE TARTRATE 1 DROP: 1.5 SOLUTION OPHTHALMIC at 08:31

## 2020-06-04 RX ADMIN — IOHEXOL 10 ML: 350 INJECTION, SOLUTION INTRAVENOUS at 10:54

## 2020-06-04 RX ADMIN — TIMOLOL MALEATE 1 DROP: 5 SOLUTION/ DROPS OPHTHALMIC at 17:06

## 2020-06-04 RX ADMIN — LOSARTAN POTASSIUM 25 MG: 25 TABLET, FILM COATED ORAL at 08:30

## 2020-06-04 RX ADMIN — TRAMADOL HYDROCHLORIDE 100 MG: 50 TABLET, FILM COATED ORAL at 08:31

## 2020-06-05 ENCOUNTER — APPOINTMENT (INPATIENT)
Dept: RADIOLOGY | Facility: HOSPITAL | Age: 75
DRG: 242 | End: 2020-06-05
Payer: COMMERCIAL

## 2020-06-05 VITALS
WEIGHT: 115.52 LBS | HEART RATE: 58 BPM | BODY MASS INDEX: 21.81 KG/M2 | DIASTOLIC BLOOD PRESSURE: 68 MMHG | TEMPERATURE: 98.4 F | SYSTOLIC BLOOD PRESSURE: 124 MMHG | HEIGHT: 61 IN | RESPIRATION RATE: 18 BRPM | OXYGEN SATURATION: 95 %

## 2020-06-05 LAB
ANION GAP SERPL CALCULATED.3IONS-SCNC: 6 MMOL/L (ref 4–13)
BACTERIA BLD CULT: NORMAL
BACTERIA BLD CULT: NORMAL
BASOPHILS # BLD AUTO: 0.09 THOUSANDS/ΜL (ref 0–0.1)
BASOPHILS NFR BLD AUTO: 1 % (ref 0–1)
BUN SERPL-MCNC: 17 MG/DL (ref 5–25)
CALCIUM SERPL-MCNC: 8.3 MG/DL (ref 8.3–10.1)
CHLORIDE SERPL-SCNC: 104 MMOL/L (ref 100–108)
CO2 SERPL-SCNC: 26 MMOL/L (ref 21–32)
CREAT SERPL-MCNC: 0.7 MG/DL (ref 0.6–1.3)
EOSINOPHIL # BLD AUTO: 0.2 THOUSAND/ΜL (ref 0–0.61)
EOSINOPHIL NFR BLD AUTO: 1 % (ref 0–6)
ERYTHROCYTE [DISTWIDTH] IN BLOOD BY AUTOMATED COUNT: 12.9 % (ref 11.6–15.1)
GFR SERPL CREATININE-BSD FRML MDRD: 86 ML/MIN/1.73SQ M
GLUCOSE SERPL-MCNC: 162 MG/DL (ref 65–140)
GLUCOSE SERPL-MCNC: 162 MG/DL (ref 65–140)
GLUCOSE SERPL-MCNC: 172 MG/DL (ref 65–140)
HCT VFR BLD AUTO: 34.9 % (ref 34.8–46.1)
HGB BLD-MCNC: 11.4 G/DL (ref 11.5–15.4)
IMM GRANULOCYTES # BLD AUTO: 0.1 THOUSAND/UL (ref 0–0.2)
IMM GRANULOCYTES NFR BLD AUTO: 1 % (ref 0–2)
LYMPHOCYTES # BLD AUTO: 2.19 THOUSANDS/ΜL (ref 0.6–4.47)
LYMPHOCYTES NFR BLD AUTO: 15 % (ref 14–44)
MCH RBC QN AUTO: 30.6 PG (ref 26.8–34.3)
MCHC RBC AUTO-ENTMCNC: 32.7 G/DL (ref 31.4–37.4)
MCV RBC AUTO: 94 FL (ref 82–98)
MONOCYTES # BLD AUTO: 0.91 THOUSAND/ΜL (ref 0.17–1.22)
MONOCYTES NFR BLD AUTO: 6 % (ref 4–12)
NEUTROPHILS # BLD AUTO: 10.86 THOUSANDS/ΜL (ref 1.85–7.62)
NEUTS SEG NFR BLD AUTO: 76 % (ref 43–75)
NRBC BLD AUTO-RTO: 0 /100 WBCS
PLATELET # BLD AUTO: 405 THOUSANDS/UL (ref 149–390)
PMV BLD AUTO: 10 FL (ref 8.9–12.7)
POTASSIUM SERPL-SCNC: 4.3 MMOL/L (ref 3.5–5.3)
RBC # BLD AUTO: 3.72 MILLION/UL (ref 3.81–5.12)
SODIUM SERPL-SCNC: 136 MMOL/L (ref 136–145)
WBC # BLD AUTO: 14.35 THOUSAND/UL (ref 4.31–10.16)

## 2020-06-05 PROCEDURE — 99239 HOSP IP/OBS DSCHRG MGMT >30: CPT | Performed by: NURSE PRACTITIONER

## 2020-06-05 PROCEDURE — 97162 PT EVAL MOD COMPLEX 30 MIN: CPT

## 2020-06-05 PROCEDURE — 82948 REAGENT STRIP/BLOOD GLUCOSE: CPT

## 2020-06-05 PROCEDURE — NC001 PR NO CHARGE: Performed by: INTERNAL MEDICINE

## 2020-06-05 PROCEDURE — 85025 COMPLETE CBC W/AUTO DIFF WBC: CPT | Performed by: PHYSICIAN ASSISTANT

## 2020-06-05 PROCEDURE — 80048 BASIC METABOLIC PNL TOTAL CA: CPT | Performed by: PHYSICIAN ASSISTANT

## 2020-06-05 PROCEDURE — 99024 POSTOP FOLLOW-UP VISIT: CPT | Performed by: INTERNAL MEDICINE

## 2020-06-05 PROCEDURE — 71046 X-RAY EXAM CHEST 2 VIEWS: CPT

## 2020-06-05 RX ORDER — AMIODARONE HYDROCHLORIDE 200 MG/1
TABLET ORAL
Qty: 42 TABLET | Refills: 0 | Status: SHIPPED | OUTPATIENT
Start: 2020-06-05 | End: 2020-06-17 | Stop reason: SDUPTHER

## 2020-06-05 RX ORDER — AMIODARONE HYDROCHLORIDE 200 MG/1
200 TABLET ORAL 2 TIMES DAILY WITH MEALS
Status: DISCONTINUED | OUTPATIENT
Start: 2020-06-05 | End: 2020-06-05

## 2020-06-05 RX ORDER — AMIODARONE HYDROCHLORIDE 200 MG/1
200 TABLET ORAL 2 TIMES DAILY WITH MEALS
Status: DISCONTINUED | OUTPATIENT
Start: 2020-06-05 | End: 2020-06-05 | Stop reason: HOSPADM

## 2020-06-05 RX ADMIN — LOSARTAN POTASSIUM 25 MG: 25 TABLET, FILM COATED ORAL at 08:06

## 2020-06-05 RX ADMIN — PANTOPRAZOLE SODIUM 40 MG: 40 TABLET, DELAYED RELEASE ORAL at 06:04

## 2020-06-05 RX ADMIN — ASPIRIN 81 MG: 81 TABLET, COATED ORAL at 08:06

## 2020-06-05 RX ADMIN — METOPROLOL SUCCINATE 50 MG: 50 TABLET, EXTENDED RELEASE ORAL at 08:06

## 2020-06-05 RX ADMIN — TRAMADOL HYDROCHLORIDE 100 MG: 50 TABLET, FILM COATED ORAL at 08:06

## 2020-06-05 RX ADMIN — FERROUS SULFATE TAB 325 MG (65 MG ELEMENTAL FE) 325 MG: 325 (65 FE) TAB at 08:06

## 2020-06-05 RX ADMIN — TIMOLOL MALEATE 1 DROP: 5 SOLUTION/ DROPS OPHTHALMIC at 08:08

## 2020-06-05 RX ADMIN — INSULIN LISPRO 1 UNITS: 100 INJECTION, SOLUTION INTRAVENOUS; SUBCUTANEOUS at 11:43

## 2020-06-05 RX ADMIN — AMIODARONE HYDROCHLORIDE 200 MG: 200 TABLET ORAL at 12:10

## 2020-06-05 RX ADMIN — INSULIN LISPRO 1 UNITS: 100 INJECTION, SOLUTION INTRAVENOUS; SUBCUTANEOUS at 07:46

## 2020-06-05 RX ADMIN — BRIMONIDINE TARTRATE 1 DROP: 1.5 SOLUTION OPHTHALMIC at 08:07

## 2020-06-08 ENCOUNTER — TRANSITIONAL CARE MANAGEMENT (OUTPATIENT)
Dept: FAMILY MEDICINE CLINIC | Facility: CLINIC | Age: 75
End: 2020-06-08

## 2020-06-11 ENCOUNTER — OFFICE VISIT (OUTPATIENT)
Dept: FAMILY MEDICINE CLINIC | Facility: CLINIC | Age: 75
End: 2020-06-11
Payer: COMMERCIAL

## 2020-06-11 VITALS
HEIGHT: 61 IN | SYSTOLIC BLOOD PRESSURE: 118 MMHG | WEIGHT: 114 LBS | HEART RATE: 52 BPM | DIASTOLIC BLOOD PRESSURE: 80 MMHG | TEMPERATURE: 98.2 F | BODY MASS INDEX: 21.52 KG/M2

## 2020-06-11 DIAGNOSIS — Z09 HOSPITAL DISCHARGE FOLLOW-UP: ICD-10-CM

## 2020-06-11 DIAGNOSIS — I49.5 TACHY-BRADY SYNDROME (HCC): Primary | ICD-10-CM

## 2020-06-11 DIAGNOSIS — E11.8 TYPE 2 DIABETES MELLITUS WITH COMPLICATION (HCC): ICD-10-CM

## 2020-06-11 DIAGNOSIS — R31.21 ASYMPTOMATIC MICROSCOPIC HEMATURIA: ICD-10-CM

## 2020-06-11 LAB — SL AMB POCT HEMOGLOBIN AIC: 6.7 (ref ?–6.5)

## 2020-06-11 PROCEDURE — 1111F DSCHRG MED/CURRENT MED MERGE: CPT | Performed by: FAMILY MEDICINE

## 2020-06-11 PROCEDURE — 83036 HEMOGLOBIN GLYCOSYLATED A1C: CPT | Performed by: FAMILY MEDICINE

## 2020-06-11 PROCEDURE — 99496 TRANSJ CARE MGMT HIGH F2F 7D: CPT | Performed by: FAMILY MEDICINE

## 2020-06-15 ENCOUNTER — IN-CLINIC DEVICE VISIT (OUTPATIENT)
Dept: CARDIOLOGY CLINIC | Facility: CLINIC | Age: 75
End: 2020-06-15
Payer: COMMERCIAL

## 2020-06-15 ENCOUNTER — TELEPHONE (OUTPATIENT)
Dept: CARDIOLOGY CLINIC | Facility: CLINIC | Age: 75
End: 2020-06-15

## 2020-06-15 ENCOUNTER — OFFICE VISIT (OUTPATIENT)
Dept: CARDIOLOGY CLINIC | Facility: CLINIC | Age: 75
End: 2020-06-15
Payer: COMMERCIAL

## 2020-06-15 VITALS
OXYGEN SATURATION: 100 % | WEIGHT: 115.5 LBS | SYSTOLIC BLOOD PRESSURE: 160 MMHG | BODY MASS INDEX: 21.81 KG/M2 | DIASTOLIC BLOOD PRESSURE: 84 MMHG | HEART RATE: 60 BPM | HEIGHT: 61 IN

## 2020-06-15 DIAGNOSIS — E78.00 HYPERCHOLESTEROLEMIA: ICD-10-CM

## 2020-06-15 DIAGNOSIS — I10 ESSENTIAL HYPERTENSION: ICD-10-CM

## 2020-06-15 DIAGNOSIS — Z95.0 CARDIAC PACEMAKER: Primary | ICD-10-CM

## 2020-06-15 DIAGNOSIS — I48.0 PAF (PAROXYSMAL ATRIAL FIBRILLATION) (HCC): Primary | ICD-10-CM

## 2020-06-15 DIAGNOSIS — I25.10 ATHEROSCLEROSIS OF CORONARY ARTERY OF NATIVE HEART WITHOUT ANGINA PECTORIS, UNSPECIFIED VESSEL OR LESION TYPE: ICD-10-CM

## 2020-06-15 DIAGNOSIS — I49.5 TACHY-BRADY SYNDROME (HCC): ICD-10-CM

## 2020-06-15 DIAGNOSIS — Z72.0 TOBACCO ABUSE: ICD-10-CM

## 2020-06-15 DIAGNOSIS — Z95.0 CARDIAC PACEMAKER: ICD-10-CM

## 2020-06-15 PROCEDURE — 1111F DSCHRG MED/CURRENT MED MERGE: CPT | Performed by: NURSE PRACTITIONER

## 2020-06-15 PROCEDURE — 99215 OFFICE O/P EST HI 40 MIN: CPT | Performed by: NURSE PRACTITIONER

## 2020-06-15 PROCEDURE — 99024 POSTOP FOLLOW-UP VISIT: CPT | Performed by: INTERNAL MEDICINE

## 2020-06-15 PROCEDURE — 3008F BODY MASS INDEX DOCD: CPT | Performed by: NURSE PRACTITIONER

## 2020-06-15 PROCEDURE — 3060F POS MICROALBUMINURIA REV: CPT | Performed by: NURSE PRACTITIONER

## 2020-06-15 PROCEDURE — 1160F RVW MEDS BY RX/DR IN RCRD: CPT | Performed by: NURSE PRACTITIONER

## 2020-06-15 PROCEDURE — 3077F SYST BP >= 140 MM HG: CPT | Performed by: NURSE PRACTITIONER

## 2020-06-15 PROCEDURE — 3044F HG A1C LEVEL LT 7.0%: CPT | Performed by: NURSE PRACTITIONER

## 2020-06-15 PROCEDURE — 2022F DILAT RTA XM EVC RTNOPTHY: CPT | Performed by: NURSE PRACTITIONER

## 2020-06-15 PROCEDURE — 3079F DIAST BP 80-89 MM HG: CPT | Performed by: NURSE PRACTITIONER

## 2020-06-15 PROCEDURE — 4040F PNEUMOC VAC/ADMIN/RCVD: CPT | Performed by: NURSE PRACTITIONER

## 2020-06-15 PROCEDURE — 4004F PT TOBACCO SCREEN RCVD TLK: CPT | Performed by: NURSE PRACTITIONER

## 2020-06-16 ENCOUNTER — TELEMEDICINE (OUTPATIENT)
Dept: UROLOGY | Facility: CLINIC | Age: 75
End: 2020-06-16
Payer: COMMERCIAL

## 2020-06-16 DIAGNOSIS — N20.0 NEPHROLITHIASIS: Primary | ICD-10-CM

## 2020-06-16 DIAGNOSIS — R31.0 GROSS HEMATURIA: ICD-10-CM

## 2020-06-16 PROBLEM — R31.9 HEMATURIA: Status: ACTIVE | Noted: 2020-06-16

## 2020-06-16 PROCEDURE — 1160F RVW MEDS BY RX/DR IN RCRD: CPT | Performed by: PHYSICIAN ASSISTANT

## 2020-06-16 PROCEDURE — 99213 OFFICE O/P EST LOW 20 MIN: CPT | Performed by: PHYSICIAN ASSISTANT

## 2020-06-17 ENCOUNTER — TELEPHONE (OUTPATIENT)
Dept: FAMILY MEDICINE CLINIC | Facility: CLINIC | Age: 75
End: 2020-06-17

## 2020-06-17 ENCOUNTER — TELEPHONE (OUTPATIENT)
Dept: CARDIOLOGY CLINIC | Facility: CLINIC | Age: 75
End: 2020-06-17

## 2020-06-17 DIAGNOSIS — Z74.8 ASSISTANCE NEEDED WITH TRANSPORTATION: ICD-10-CM

## 2020-06-17 DIAGNOSIS — I48.91 POSTOPERATIVE ATRIAL FIBRILLATION (HCC): ICD-10-CM

## 2020-06-17 DIAGNOSIS — I49.5 TACHY-BRADY SYNDROME (HCC): Primary | ICD-10-CM

## 2020-06-17 DIAGNOSIS — I10 ESSENTIAL HYPERTENSION: ICD-10-CM

## 2020-06-17 DIAGNOSIS — I48.91 A-FIB (HCC): ICD-10-CM

## 2020-06-17 DIAGNOSIS — I97.89 POSTOPERATIVE ATRIAL FIBRILLATION (HCC): ICD-10-CM

## 2020-06-17 DIAGNOSIS — R26.2 AMBULATORY DYSFUNCTION: ICD-10-CM

## 2020-06-17 PROCEDURE — 4010F ACE/ARB THERAPY RXD/TAKEN: CPT | Performed by: FAMILY MEDICINE

## 2020-06-17 RX ORDER — LOSARTAN POTASSIUM 25 MG/1
25 TABLET ORAL DAILY
Qty: 90 TABLET | Refills: 3 | Status: SHIPPED | OUTPATIENT
Start: 2020-06-17 | End: 2021-06-29 | Stop reason: SDUPTHER

## 2020-06-17 RX ORDER — AMIODARONE HYDROCHLORIDE 200 MG/1
TABLET ORAL
Qty: 90 TABLET | Refills: 3 | Status: SHIPPED | OUTPATIENT
Start: 2020-06-17 | End: 2021-09-22 | Stop reason: SDUPTHER

## 2020-06-17 RX ORDER — AMIODARONE HYDROCHLORIDE 200 MG/1
TABLET ORAL
Qty: 42 TABLET | Refills: 0 | Status: CANCELLED | OUTPATIENT
Start: 2020-06-17

## 2020-06-18 ENCOUNTER — TELEPHONE (OUTPATIENT)
Dept: UROLOGY | Facility: MEDICAL CENTER | Age: 75
End: 2020-06-18

## 2020-06-18 ENCOUNTER — TELEPHONE (OUTPATIENT)
Dept: CARDIOLOGY CLINIC | Facility: CLINIC | Age: 75
End: 2020-06-18

## 2020-06-18 ENCOUNTER — PATIENT OUTREACH (OUTPATIENT)
Dept: FAMILY MEDICINE CLINIC | Facility: CLINIC | Age: 75
End: 2020-06-18

## 2020-06-19 LAB
LEFT EYE DIABETIC RETINOPATHY: NORMAL
RIGHT EYE DIABETIC RETINOPATHY: NORMAL

## 2020-06-22 ENCOUNTER — TELEPHONE (OUTPATIENT)
Dept: UROLOGY | Facility: AMBULATORY SURGERY CENTER | Age: 75
End: 2020-06-22

## 2020-06-22 LAB
LEFT EYE DIABETIC RETINOPATHY: NORMAL
RIGHT EYE DIABETIC RETINOPATHY: NORMAL

## 2020-06-23 ENCOUNTER — TELEPHONE (OUTPATIENT)
Dept: CARDIOLOGY CLINIC | Facility: CLINIC | Age: 75
End: 2020-06-23

## 2020-06-23 DIAGNOSIS — F41.9 ANXIETY: ICD-10-CM

## 2020-06-23 RX ORDER — ALPRAZOLAM 0.25 MG/1
0.25 TABLET ORAL 3 TIMES DAILY PRN
Qty: 270 TABLET | Refills: 0 | Status: SHIPPED | OUTPATIENT
Start: 2020-06-23 | End: 2020-10-20 | Stop reason: SDUPTHER

## 2020-06-30 ENCOUNTER — TELEPHONE (OUTPATIENT)
Dept: FAMILY MEDICINE CLINIC | Facility: CLINIC | Age: 75
End: 2020-06-30

## 2020-06-30 DIAGNOSIS — D64.9 ANEMIA, UNSPECIFIED TYPE: Primary | ICD-10-CM

## 2020-07-02 ENCOUNTER — TELEPHONE (OUTPATIENT)
Dept: CARDIOLOGY CLINIC | Facility: CLINIC | Age: 75
End: 2020-07-02

## 2020-07-02 NOTE — TELEPHONE ENCOUNTER
Informed patient per Dr Chapin Mota to hold Eliquis for 48 hrs until bleeding stops and to contact urologist about kidney stones  Dr Chapin Mota will discuss other options at upcoming appointments

## 2020-07-02 NOTE — TELEPHONE ENCOUNTER
She needs to discuss with her urologist what the plan is for the kidney stone  Can hold the Eliquis for 48 hrs for now until bleeding stops, then restart  Will discuss other options at appt  Thanks

## 2020-07-02 NOTE — TELEPHONE ENCOUNTER
Has appointment with Franklyn 7/17  Jose Hartman Has questions on her eusebiaqurosemarie Hartman She states has a kidney stone and she is bleeding taking the eliqurosemarie Hartman  Thanks

## 2020-07-06 DIAGNOSIS — I48.91 POSTOPERATIVE ATRIAL FIBRILLATION (HCC): ICD-10-CM

## 2020-07-06 DIAGNOSIS — I97.89 POSTOPERATIVE ATRIAL FIBRILLATION (HCC): ICD-10-CM

## 2020-07-07 ENCOUNTER — APPOINTMENT (OUTPATIENT)
Dept: LAB | Facility: CLINIC | Age: 75
End: 2020-07-07
Payer: COMMERCIAL

## 2020-07-07 DIAGNOSIS — E03.9 HYPOTHYROIDISM, UNSPECIFIED TYPE: ICD-10-CM

## 2020-07-07 DIAGNOSIS — E13.9 DIABETES 1.5, MANAGED AS TYPE 2 (HCC): ICD-10-CM

## 2020-07-07 DIAGNOSIS — I10 ESSENTIAL HYPERTENSION: ICD-10-CM

## 2020-07-07 DIAGNOSIS — Z11.59 ENCOUNTER FOR HEPATITIS C SCREENING TEST FOR LOW RISK PATIENT: ICD-10-CM

## 2020-07-07 DIAGNOSIS — D64.9 ANEMIA, UNSPECIFIED TYPE: ICD-10-CM

## 2020-07-07 DIAGNOSIS — E78.00 HYPERCHOLESTEROLEMIA: ICD-10-CM

## 2020-07-07 LAB
ALBUMIN SERPL BCP-MCNC: 3.2 G/DL (ref 3.5–5)
ALP SERPL-CCNC: 42 U/L (ref 46–116)
ALT SERPL W P-5'-P-CCNC: 16 U/L (ref 12–78)
ANION GAP SERPL CALCULATED.3IONS-SCNC: 5 MMOL/L (ref 4–13)
AST SERPL W P-5'-P-CCNC: 11 U/L (ref 5–45)
BILIRUB SERPL-MCNC: 0.29 MG/DL (ref 0.2–1)
BUN SERPL-MCNC: 22 MG/DL (ref 5–25)
CALCIUM SERPL-MCNC: 9.3 MG/DL (ref 8.3–10.1)
CHLORIDE SERPL-SCNC: 109 MMOL/L (ref 100–108)
CO2 SERPL-SCNC: 28 MMOL/L (ref 21–32)
CREAT SERPL-MCNC: 0.82 MG/DL (ref 0.6–1.3)
EST. AVERAGE GLUCOSE BLD GHB EST-MCNC: 134 MG/DL
GFR SERPL CREATININE-BSD FRML MDRD: 71 ML/MIN/1.73SQ M
GLUCOSE SERPL-MCNC: 151 MG/DL (ref 65–140)
HBA1C MFR BLD: 6.3 %
HCV AB SER QL: NORMAL
HGB BLD-MCNC: 11.7 G/DL (ref 11.5–15.4)
POTASSIUM SERPL-SCNC: 4.5 MMOL/L (ref 3.5–5.3)
PROT SERPL-MCNC: 7 G/DL (ref 6.4–8.2)
SODIUM SERPL-SCNC: 142 MMOL/L (ref 136–145)
TSH SERPL DL<=0.05 MIU/L-ACNC: 9.14 UIU/ML (ref 0.36–3.74)

## 2020-07-07 PROCEDURE — 85018 HEMOGLOBIN: CPT

## 2020-07-07 PROCEDURE — 36415 COLL VENOUS BLD VENIPUNCTURE: CPT

## 2020-07-07 PROCEDURE — 86803 HEPATITIS C AB TEST: CPT

## 2020-07-07 PROCEDURE — 84443 ASSAY THYROID STIM HORMONE: CPT

## 2020-07-07 PROCEDURE — 83036 HEMOGLOBIN GLYCOSYLATED A1C: CPT

## 2020-07-07 PROCEDURE — 3044F HG A1C LEVEL LT 7.0%: CPT | Performed by: FAMILY MEDICINE

## 2020-07-07 PROCEDURE — 80053 COMPREHEN METABOLIC PANEL: CPT

## 2020-07-07 RX ORDER — APIXABAN 5 MG/1
TABLET, FILM COATED ORAL
Qty: 60 TABLET | Refills: 0 | Status: SHIPPED | OUTPATIENT
Start: 2020-07-07 | End: 2020-07-10 | Stop reason: SDUPTHER

## 2020-07-10 ENCOUNTER — TELEPHONE (OUTPATIENT)
Dept: UROLOGY | Facility: MEDICAL CENTER | Age: 75
End: 2020-07-10

## 2020-07-10 ENCOUNTER — HOSPITAL ENCOUNTER (INPATIENT)
Facility: HOSPITAL | Age: 75
LOS: 2 days | Discharge: HOME | DRG: 661 | End: 2020-07-12
Attending: EMERGENCY MEDICINE | Admitting: HOSPITALIST
Payer: COMMERCIAL

## 2020-07-10 ENCOUNTER — APPOINTMENT (EMERGENCY)
Dept: CT IMAGING | Facility: HOSPITAL | Age: 75
DRG: 661 | End: 2020-07-10
Payer: COMMERCIAL

## 2020-07-10 ENCOUNTER — OFFICE VISIT (OUTPATIENT)
Dept: FAMILY MEDICINE CLINIC | Facility: CLINIC | Age: 75
End: 2020-07-10
Payer: COMMERCIAL

## 2020-07-10 VITALS
DIASTOLIC BLOOD PRESSURE: 80 MMHG | HEIGHT: 61 IN | TEMPERATURE: 98.2 F | BODY MASS INDEX: 22.66 KG/M2 | SYSTOLIC BLOOD PRESSURE: 134 MMHG | WEIGHT: 120 LBS | HEART RATE: 72 BPM

## 2020-07-10 DIAGNOSIS — N20.0 KIDNEY STONES: ICD-10-CM

## 2020-07-10 DIAGNOSIS — R53.1 GENERALIZED WEAKNESS: ICD-10-CM

## 2020-07-10 DIAGNOSIS — N20.1 URETEROLITHIASIS: ICD-10-CM

## 2020-07-10 DIAGNOSIS — I97.89 POSTOPERATIVE ATRIAL FIBRILLATION (HCC): ICD-10-CM

## 2020-07-10 DIAGNOSIS — E13.9 DIABETES 1.5, MANAGED AS TYPE 2 (HCC): ICD-10-CM

## 2020-07-10 DIAGNOSIS — I48.91 POSTOPERATIVE ATRIAL FIBRILLATION (HCC): ICD-10-CM

## 2020-07-10 DIAGNOSIS — R31.0 GROSS HEMATURIA: Primary | ICD-10-CM

## 2020-07-10 DIAGNOSIS — R31.9 HEMATURIA: Primary | ICD-10-CM

## 2020-07-10 LAB
ANION GAP SERPL CALCULATED.3IONS-SCNC: 7 MMOL/L (ref 4–13)
APTT PPP: 37 SECONDS (ref 23–37)
BACTERIA UR QL AUTO: ABNORMAL /HPF
BASOPHILS # BLD AUTO: 0.12 THOUSANDS/ΜL (ref 0–0.1)
BASOPHILS NFR BLD AUTO: 1 % (ref 0–1)
BILIRUB UR QL STRIP: NEGATIVE
BUN SERPL-MCNC: 30 MG/DL (ref 5–25)
CALCIUM SERPL-MCNC: 9.5 MG/DL (ref 8.3–10.1)
CHLORIDE SERPL-SCNC: 104 MMOL/L (ref 100–108)
CLARITY UR: ABNORMAL
CO2 SERPL-SCNC: 30 MMOL/L (ref 21–32)
COLOR UR: ABNORMAL
CREAT SERPL-MCNC: 0.91 MG/DL (ref 0.6–1.3)
EOSINOPHIL # BLD AUTO: 0.47 THOUSAND/ΜL (ref 0–0.61)
EOSINOPHIL NFR BLD AUTO: 5 % (ref 0–6)
ERYTHROCYTE [DISTWIDTH] IN BLOOD BY AUTOMATED COUNT: 14.2 % (ref 11.6–15.1)
GFR SERPL CREATININE-BSD FRML MDRD: 62 ML/MIN/1.73SQ M
GLUCOSE SERPL-MCNC: 163 MG/DL (ref 65–140)
GLUCOSE UR STRIP-MCNC: NEGATIVE MG/DL
HCT VFR BLD AUTO: 36.7 % (ref 34.8–46.1)
HGB BLD-MCNC: 11.7 G/DL (ref 11.5–15.4)
HGB UR QL STRIP.AUTO: ABNORMAL
IMM GRANULOCYTES # BLD AUTO: 0.05 THOUSAND/UL (ref 0–0.2)
IMM GRANULOCYTES NFR BLD AUTO: 1 % (ref 0–2)
INR PPP: 1.25 (ref 0.84–1.19)
KETONES UR STRIP-MCNC: NEGATIVE MG/DL
LEUKOCYTE ESTERASE UR QL STRIP: NEGATIVE
LYMPHOCYTES # BLD AUTO: 1.9 THOUSANDS/ΜL (ref 0.6–4.47)
LYMPHOCYTES NFR BLD AUTO: 20 % (ref 14–44)
MCH RBC QN AUTO: 30 PG (ref 26.8–34.3)
MCHC RBC AUTO-ENTMCNC: 31.9 G/DL (ref 31.4–37.4)
MCV RBC AUTO: 94 FL (ref 82–98)
MONOCYTES # BLD AUTO: 0.66 THOUSAND/ΜL (ref 0.17–1.22)
MONOCYTES NFR BLD AUTO: 7 % (ref 4–12)
NEUTROPHILS # BLD AUTO: 6.56 THOUSANDS/ΜL (ref 1.85–7.62)
NEUTS SEG NFR BLD AUTO: 66 % (ref 43–75)
NITRITE UR QL STRIP: NEGATIVE
NON-SQ EPI CELLS URNS QL MICRO: ABNORMAL /HPF
NRBC BLD AUTO-RTO: 0 /100 WBCS
NT-PROBNP SERPL-MCNC: 593 PG/ML
PH UR STRIP.AUTO: 6 [PH]
PLATELET # BLD AUTO: 316 THOUSANDS/UL (ref 149–390)
PMV BLD AUTO: 10.2 FL (ref 8.9–12.7)
POTASSIUM SERPL-SCNC: 4.4 MMOL/L (ref 3.5–5.3)
PROT UR STRIP-MCNC: ABNORMAL MG/DL
PROTHROMBIN TIME: 15 SECONDS (ref 11.6–14.5)
RBC # BLD AUTO: 3.9 MILLION/UL (ref 3.81–5.12)
RBC #/AREA URNS AUTO: ABNORMAL /HPF
SARS-COV-2 RNA RESP QL NAA+PROBE: NEGATIVE
SODIUM SERPL-SCNC: 141 MMOL/L (ref 136–145)
SP GR UR STRIP.AUTO: >=1.03 (ref 1–1.03)
TROPONIN I SERPL-MCNC: 0.02 NG/ML
UROBILINOGEN UR QL STRIP.AUTO: 0.2 E.U./DL
WBC # BLD AUTO: 9.76 THOUSAND/UL (ref 4.31–10.16)
WBC #/AREA URNS AUTO: ABNORMAL /HPF

## 2020-07-10 PROCEDURE — 3079F DIAST BP 80-89 MM HG: CPT | Performed by: FAMILY MEDICINE

## 2020-07-10 PROCEDURE — 99284 EMERGENCY DEPT VISIT MOD MDM: CPT

## 2020-07-10 PROCEDURE — 84484 ASSAY OF TROPONIN QUANT: CPT | Performed by: PHYSICIAN ASSISTANT

## 2020-07-10 PROCEDURE — 3044F HG A1C LEVEL LT 7.0%: CPT | Performed by: FAMILY MEDICINE

## 2020-07-10 PROCEDURE — 74176 CT ABD & PELVIS W/O CONTRAST: CPT

## 2020-07-10 PROCEDURE — 36415 COLL VENOUS BLD VENIPUNCTURE: CPT | Performed by: PHYSICIAN ASSISTANT

## 2020-07-10 PROCEDURE — 80048 BASIC METABOLIC PNL TOTAL CA: CPT | Performed by: PHYSICIAN ASSISTANT

## 2020-07-10 PROCEDURE — 1111F DSCHRG MED/CURRENT MED MERGE: CPT | Performed by: FAMILY MEDICINE

## 2020-07-10 PROCEDURE — 3060F POS MICROALBUMINURIA REV: CPT | Performed by: FAMILY MEDICINE

## 2020-07-10 PROCEDURE — 2022F DILAT RTA XM EVC RTNOPTHY: CPT | Performed by: FAMILY MEDICINE

## 2020-07-10 PROCEDURE — 99285 EMERGENCY DEPT VISIT HI MDM: CPT | Performed by: PHYSICIAN ASSISTANT

## 2020-07-10 PROCEDURE — 99223 1ST HOSP IP/OBS HIGH 75: CPT | Performed by: PHYSICIAN ASSISTANT

## 2020-07-10 PROCEDURE — 1160F RVW MEDS BY RX/DR IN RCRD: CPT | Performed by: FAMILY MEDICINE

## 2020-07-10 PROCEDURE — 93005 ELECTROCARDIOGRAM TRACING: CPT

## 2020-07-10 PROCEDURE — 3077F SYST BP >= 140 MM HG: CPT | Performed by: FAMILY MEDICINE

## 2020-07-10 PROCEDURE — 4040F PNEUMOC VAC/ADMIN/RCVD: CPT | Performed by: FAMILY MEDICINE

## 2020-07-10 PROCEDURE — 85730 THROMBOPLASTIN TIME PARTIAL: CPT | Performed by: PHYSICIAN ASSISTANT

## 2020-07-10 PROCEDURE — 85610 PROTHROMBIN TIME: CPT | Performed by: PHYSICIAN ASSISTANT

## 2020-07-10 PROCEDURE — 99213 OFFICE O/P EST LOW 20 MIN: CPT | Performed by: FAMILY MEDICINE

## 2020-07-10 PROCEDURE — 85025 COMPLETE CBC W/AUTO DIFF WBC: CPT | Performed by: PHYSICIAN ASSISTANT

## 2020-07-10 PROCEDURE — 83880 ASSAY OF NATRIURETIC PEPTIDE: CPT | Performed by: PHYSICIAN ASSISTANT

## 2020-07-10 PROCEDURE — 81001 URINALYSIS AUTO W/SCOPE: CPT | Performed by: PHYSICIAN ASSISTANT

## 2020-07-10 PROCEDURE — 4004F PT TOBACCO SCREEN RCVD TLK: CPT | Performed by: FAMILY MEDICINE

## 2020-07-10 PROCEDURE — 87635 SARS-COV-2 COVID-19 AMP PRB: CPT | Performed by: UROLOGY

## 2020-07-10 RX ORDER — METOPROLOL SUCCINATE 50 MG/1
50 TABLET, EXTENDED RELEASE ORAL DAILY
Status: DISCONTINUED | OUTPATIENT
Start: 2020-07-11 | End: 2020-07-12 | Stop reason: HOSPADM

## 2020-07-10 RX ORDER — BRIMONIDINE TARTRATE 0.15 %
1 DROPS OPHTHALMIC (EYE) 2 TIMES DAILY
Status: DISCONTINUED | OUTPATIENT
Start: 2020-07-10 | End: 2020-07-10 | Stop reason: CLARIF

## 2020-07-10 RX ORDER — CEFAZOLIN SODIUM 1 G/50ML
1000 SOLUTION INTRAVENOUS
Status: COMPLETED | OUTPATIENT
Start: 2020-07-11 | End: 2020-07-11

## 2020-07-10 RX ORDER — NICOTINE 21 MG/24HR
1 PATCH, TRANSDERMAL 24 HOURS TRANSDERMAL DAILY
Status: DISCONTINUED | OUTPATIENT
Start: 2020-07-11 | End: 2020-07-12 | Stop reason: HOSPADM

## 2020-07-10 RX ORDER — LOSARTAN POTASSIUM 25 MG/1
25 TABLET ORAL DAILY
Status: DISCONTINUED | OUTPATIENT
Start: 2020-07-11 | End: 2020-07-12 | Stop reason: HOSPADM

## 2020-07-10 RX ORDER — BRIMONIDINE TARTRATE, TIMOLOL MALEATE 2; 5 MG/ML; MG/ML
1 SOLUTION/ DROPS OPHTHALMIC 2 TIMES DAILY
Status: DISCONTINUED | OUTPATIENT
Start: 2020-07-10 | End: 2020-07-10 | Stop reason: CLARIF

## 2020-07-10 RX ORDER — DOCUSATE SODIUM 100 MG/1
100 CAPSULE, LIQUID FILLED ORAL 2 TIMES DAILY
Status: DISCONTINUED | OUTPATIENT
Start: 2020-07-10 | End: 2020-07-12 | Stop reason: HOSPADM

## 2020-07-10 RX ORDER — BRIMONIDINE TARTRATE, TIMOLOL MALEATE 2; 5 MG/ML; MG/ML
1 SOLUTION/ DROPS OPHTHALMIC 2 TIMES DAILY
Status: DISCONTINUED | OUTPATIENT
Start: 2020-07-10 | End: 2020-07-12 | Stop reason: HOSPADM

## 2020-07-10 RX ORDER — TRAMADOL HYDROCHLORIDE 50 MG/1
50 TABLET ORAL ONCE
Status: COMPLETED | OUTPATIENT
Start: 2020-07-10 | End: 2020-07-10

## 2020-07-10 RX ORDER — ALPRAZOLAM 0.25 MG/1
0.25 TABLET ORAL 3 TIMES DAILY PRN
Status: DISCONTINUED | OUTPATIENT
Start: 2020-07-10 | End: 2020-07-12 | Stop reason: HOSPADM

## 2020-07-10 RX ORDER — HYDRALAZINE HYDROCHLORIDE 20 MG/ML
10 INJECTION INTRAMUSCULAR; INTRAVENOUS EVERY 6 HOURS PRN
Status: DISCONTINUED | OUTPATIENT
Start: 2020-07-10 | End: 2020-07-12 | Stop reason: HOSPADM

## 2020-07-10 RX ORDER — ATORVASTATIN CALCIUM 40 MG/1
40 TABLET, FILM COATED ORAL DAILY
Status: DISCONTINUED | OUTPATIENT
Start: 2020-07-11 | End: 2020-07-12 | Stop reason: HOSPADM

## 2020-07-10 RX ORDER — PANTOPRAZOLE SODIUM 40 MG/1
40 TABLET, DELAYED RELEASE ORAL
Status: DISCONTINUED | OUTPATIENT
Start: 2020-07-11 | End: 2020-07-12 | Stop reason: HOSPADM

## 2020-07-10 RX ORDER — ONDANSETRON 2 MG/ML
4 INJECTION INTRAMUSCULAR; INTRAVENOUS EVERY 6 HOURS PRN
Status: DISCONTINUED | OUTPATIENT
Start: 2020-07-10 | End: 2020-07-12 | Stop reason: HOSPADM

## 2020-07-10 RX ORDER — AMIODARONE HYDROCHLORIDE 200 MG/1
200 TABLET ORAL
Status: DISCONTINUED | OUTPATIENT
Start: 2020-07-11 | End: 2020-07-12 | Stop reason: HOSPADM

## 2020-07-10 RX ORDER — ACETAMINOPHEN 325 MG/1
650 TABLET ORAL EVERY 6 HOURS PRN
Status: DISCONTINUED | OUTPATIENT
Start: 2020-07-10 | End: 2020-07-12 | Stop reason: HOSPADM

## 2020-07-10 RX ORDER — TIMOLOL MALEATE 5 MG/ML
1 SOLUTION/ DROPS OPHTHALMIC 2 TIMES DAILY
Status: DISCONTINUED | OUTPATIENT
Start: 2020-07-10 | End: 2020-07-10 | Stop reason: CLARIF

## 2020-07-10 RX ORDER — CALCIUM CARBONATE 200(500)MG
1000 TABLET,CHEWABLE ORAL DAILY PRN
Status: DISCONTINUED | OUTPATIENT
Start: 2020-07-10 | End: 2020-07-12 | Stop reason: HOSPADM

## 2020-07-10 RX ADMIN — TRAMADOL HYDROCHLORIDE 50 MG: 50 TABLET, FILM COATED ORAL at 19:22

## 2020-07-10 RX ADMIN — BRIMONIDINE TARTRATE, TIMOLOL MALEATE 1 DROP: 2; 5 SOLUTION/ DROPS OPHTHALMIC at 21:12

## 2020-07-10 RX ADMIN — DOCUSATE SODIUM 100 MG: 100 CAPSULE, LIQUID FILLED ORAL at 21:08

## 2020-07-10 NOTE — TELEPHONE ENCOUNTER
Patient of Dr Britni Dominguez seen in the [facility]  Patient at PCP with gross hematuria   Please call patient at [phone]  Will be going home   Thank you

## 2020-07-10 NOTE — ED NOTES
Provider at bedside       Clifford Morataya RN  07/10/20 1873
Provider at bedside       Lawyer Mcburney, RN  07/10/20 7520
Pt ambulated to restroom with steady gait       Marcia Sharp RN  07/10/20 5432
SLIM at bedside       Dm Reza RN  07/10/20 1942
n/a

## 2020-07-10 NOTE — TELEPHONE ENCOUNTER
Called and spoke to patient and patient daughter  Advised we would want to have repeat urine studies done  Advised order in chart can go to any [facility] lab  Advised we would want to also have cysto moved up to next week per provider  Advised unfortunately we do not have availability in the [place] office but we do have a spot in the [place] office for 7/16/2020 at 3:30pm for the cysto  Provided [place] address  Patient daughter states her mother contacted her pcp and she advised that she will most likely be sending patient to the ER to be admitted  But they were not sure yet  She advised she will take the appt for 7/16/2020 incase patient is not admitted and if she is will call back to cancel  She states they are waiting for the PCP to call her back  Advised office will monitor chart to see if patient is admitted or not  If patient not admitted and attends the 7/16/2020 appt her appt for 8/19 will be canceled or if patient is admitted we will keep patients appt for 8/19 if they have to cancel the 7/16 appt       Patient daughter agreeable to plan

## 2020-07-10 NOTE — ASSESSMENT & PLAN NOTE
· Patient with persistent hematuria, now occurring both with and without urination  · Noted to have multiple kidney stones on the right side-- measuring between 3 mm and 14 mm-- the 3 mm stone is most distal stone, but still located in the proximal ureter causing upstream hydronephrosis  · E d   Discussed with urology-- NPO at midnight for likely inpatient cystoscopy/treatment of stones  · Hold Eliquis  · Fortunately, patient is voiding without difficulty  · PVR  · Pain control  · Formal urology consult in the morning

## 2020-07-10 NOTE — ASSESSMENT & PLAN NOTE
· Status post grafting x3  · Had cardiac catheterization in May 2020 which demonstrated multivessel disease that was completely revascularized  · Chest pain-free at this time  · Continue Toprol XL

## 2020-07-10 NOTE — ED PROVIDER NOTES
History  Chief Complaint   Patient presents with    Vaginal Bleeding     pt states she was put on blood thinners 1 month ago after getting pacemaker placed, has known kidney stone  states she noticed bleeding ever since a month ago but states this morning she woke up and noticed a pad "full of red blood " changed at 9 am, only now has some red spots on it  denies dizziness/cramping  c/o back pain     The patient is a 23-year-old female with history of CAD and pacemaker placement who presents the emergency department for evaluation of vaginal bleeding  The patient reports that she had a pacemaker inserted approximately 1 month ago  She was subsequently started on Eliquis  She states prior to that she has had some issues with hematuria due to a kidney stone that she is supposed to have removed  She states, however, this morning she had a pad on, and it was full of blood  Today was the 1st time she noted any vaginal bleeding  She states she has had a total hysterectomy previously  The patient is additionally reporting increased swelling in her feet bilaterally that started today  The patient denies fever, chills, chest pain, shortness of breath, nausea, vomiting, hematochezia, melena, dysuria, headache or dizziness  History provided by:  Patient   used: No    Vaginal Bleeding   Associated symptoms: no abdominal pain, no back pain, no dizziness, no dysuria, no fever and no nausea        Prior to Admission Medications   Prescriptions Last Dose Informant Patient Reported? Taking? ALPRAZolam (XANAX) 0 25 mg tablet 2020 at Unknown time Self No Yes   Sig: Take 1 tablet (0 25 mg total) by mouth 3 (three) times a day as needed for anxiety   COMBIGAN 0 2-0 5 % 7/10/2020 at Unknown time Self Yes Yes   amiodarone 200 mg tablet 7/10/2020 at Unknown time Self No Yes   Si mg PO daily     apixaban (Eliquis) 5 mg 7/10/2020 at Unknown time  No Yes   Sig: Take 1 tablet (5 mg total) by mouth 2 (two) times a day   atorvastatin (LIPITOR) 40 mg tablet 7/10/2020 at Unknown time Self No Yes   Sig: Take 1 tablet (40 mg total) by mouth daily   brimonidine (ALPHAGAN P) 0 15 % ophthalmic solution Not Taking at Unknown time Self No No   Sig: Administer 1 drop to both eyes 2 (two) times a day   Patient not taking: Reported on 7/10/2020   losartan (COZAAR) 25 mg tablet 7/10/2020 at Unknown time Self No Yes   Sig: Take 1 tablet (25 mg total) by mouth daily   metFORMIN (GLUCOPHAGE) 500 mg tablet 7/10/2020 at Unknown time  No Yes   Sig: Take 1 tablet (500 mg total) by mouth 2 (two) times a day with meals for 5000 doses   metoprolol succinate (TOPROL-XL) 50 mg 24 hr tablet 7/10/2020 at Unknown time Self No Yes   Sig: Take 1 tablet (50 mg total) by mouth daily   omeprazole (PriLOSEC) 20 mg delayed release capsule 7/10/2020 at Unknown time Self No Yes   Sig: Take 1 capsule (20 mg total) by mouth daily   traMADol (ULTRAM) 50 mg tablet  Self No Yes   Sig: Take 2 tablets (100 mg total) by mouth 2 (two) times a day      Facility-Administered Medications: None       Past Medical History:   Diagnosis Date    Atypical chest pain     [facility]     Last Assessed: 9/24/2015       Past Surgical History:   Procedure Laterality Date    A-V CARDIAC PACEMAKER INSERTION      ANKLE SURGERY      BACK SURGERY  01/2011    L4 and L5 laminectomy and fusion     BLADDER SURGERY      CORONARY ARTERY BYPASS GRAFT  09/02/2015    CABGx3 with LIMA to LAD, SVG to PDA, SVG to OM-1    HYSTERECTOMY      WRIST SURGERY         Family History   Problem Relation Age of Onset    Hypertension Mother         Essential    Hypertension Sister         Essential    Alcohol abuse Brother     Cirrhosis Brother     Diabetes Father     Other Brother         Heart transplant in his 46s    Lung cancer Sister     Diabetes Brother     Stroke Sister     No Known Problems Daughter      I have reviewed and agree with the history as documented  E-Cigarette/Vaping    E-Cigarette Use Never User      E-Cigarette/Vaping Substances    Nicotine No     THC No     CBD No     Flavoring No     Other No     Unknown No      Social History     Tobacco Use    Smoking status: Current Every Day Smoker     Packs/day: 0 25     Start date: 5    Smokeless tobacco: Never Used    Tobacco comment: Started smoking at age 25; currently smoking <1ppd  Substance Use Topics    Alcohol use: No    Drug use: Never       Review of Systems   Constitutional: Negative for chills and fever  HENT: Negative for ear pain and sore throat  Eyes: Negative for redness and visual disturbance  Respiratory: Negative for cough and shortness of breath  Cardiovascular: Positive for leg swelling  Negative for chest pain  Gastrointestinal: Negative for abdominal pain, diarrhea, nausea and vomiting  Genitourinary: Positive for hematuria and vaginal bleeding  Negative for dysuria  Musculoskeletal: Negative for back pain, neck pain and neck stiffness  Skin: Negative for color change and rash  Neurological: Negative for dizziness, light-headedness and headaches  All other systems reviewed and are negative  Physical Exam  Physical Exam   Constitutional: She is oriented to person, place, and time  She appears well-developed and well-nourished  Non-toxic appearance  She does not have a sickly appearance  She does not appear ill  No distress  HENT:   Head: Normocephalic and atraumatic  Mouth/Throat: Uvula is midline, oropharynx is clear and moist and mucous membranes are normal    Eyes: Conjunctivae and lids are normal    Neck: Normal range of motion  Neck supple  Cardiovascular: Normal rate, regular rhythm, normal heart sounds, intact distal pulses and normal pulses  Bilateral pedal edema with no pitting  Pulmonary/Chest: Effort normal and breath sounds normal    Abdominal: Soft  She exhibits no distension   There is no tenderness  There is CVA tenderness  Genitourinary: Vagina normal  Pelvic exam was performed with patient supine  No bleeding in the vagina  Neurological: She is alert and oriented to person, place, and time  Skin: Skin is warm and dry  Nursing note and vitals reviewed        Vital Signs  ED Triage Vitals [07/10/20 1531]   Temperature Pulse Respirations Blood Pressure SpO2   97 6 °F (36 4 °C) 67 18 (!) 201/86 100 %      Temp Source Heart Rate Source Patient Position - Orthostatic VS BP Location FiO2 (%)   Oral Monitor Sitting Left arm --      Pain Score       7           Vitals:    07/10/20 1531 07/10/20 1710 07/10/20 1800 07/10/20 2037   BP: (!) 201/86 165/72 (!) 176/88 166/94   Pulse: 67 59 62 62   Patient Position - Orthostatic VS: Sitting            Visual Acuity      ED Medications  Medications   atorvastatin (LIPITOR) tablet 40 mg (has no administration in time range)   losartan (COZAAR) tablet 25 mg (has no administration in time range)   metoprolol succinate (TOPROL-XL) 24 hr tablet 50 mg (has no administration in time range)   pantoprazole (PROTONIX) EC tablet 40 mg (has no administration in time range)   ALPRAZolam (XANAX) tablet 0 25 mg (has no administration in time range)   amiodarone tablet 200 mg (has no administration in time range)   nicotine (NICODERM CQ) 21 mg/24 hr TD 24 hr patch 1 patch (has no administration in time range)   ondansetron (ZOFRAN) injection 4 mg (has no administration in time range)   calcium carbonate (TUMS) chewable tablet 1,000 mg (has no administration in time range)   docusate sodium (COLACE) capsule 100 mg (has no administration in time range)   acetaminophen (TYLENOL) tablet 650 mg (has no administration in time range)   brimonidine (ALPHAGAN P) 0 15 % ophthalmic solution 1 drop (has no administration in time range)     And   timolol (TIMOPTIC) 0 5 % ophthalmic solution 1 drop (has no administration in time range)   traMADol (ULTRAM) tablet 50 mg (50 mg Oral Given 7/10/20 1922)       Diagnostic Studies  Results Reviewed     Procedure Component Value Units Date/Time    Urine Microscopic [[id number]]  (Abnormal) Collected:  07/10/20 1653    Lab Status:  Final result Specimen:  Urine, Clean Catch Updated:  07/10/20 1737     RBC, UA Innumerable /hpf      WBC, UA 4-10 /hpf      Epithelial Cells Occasional /hpf      Bacteria, UA Occasional /hpf     NT-BNP PRO [[id number]]  (Abnormal) Collected:  07/10/20 1644    Lab Status:  Final result Specimen:  Blood from Arm, Right Updated:  07/10/20 1735     NT-proBNP 593 pg/mL     Basic metabolic panel [[id number]]  (Abnormal) Collected:  07/10/20 1644    Lab Status:  Final result Specimen:  Blood from Arm, Right Updated:  07/10/20 1735     Sodium 141 mmol/L      Potassium 4 4 mmol/L      Chloride 104 mmol/L      CO2 30 mmol/L      ANION GAP 7 mmol/L      BUN 30 mg/dL      Creatinine 0 91 mg/dL      Glucose 163 mg/dL      Calcium 9 5 mg/dL      eGFR 62 ml/min/1 73sq m     Narrative:       [place] guidelines for Chronic Kidney Disease (CKD):     Stage 1 with normal or high GFR (GFR > 90 mL/min/1 73 square meters)    Stage 2 Mild CKD (GFR = 60-89 mL/min/1 73 square meters)    Stage 3A Moderate CKD (GFR = 45-59 mL/min/1 73 square meters)    Stage 3B Moderate CKD (GFR = 30-44 mL/min/1 73 square meters)    Stage 4 Severe CKD (GFR = 15-29 mL/min/1 73 square meters)    Stage 5 End Stage CKD (GFR <15 mL/min/1 73 square meters)  Note: GFR calculation is accurate only with a steady state creatinine    Protime-INR [[id number]]  (Abnormal) Collected:  07/10/20 1644    Lab Status:  Final result Specimen:  Blood from Arm, Right Updated:  07/10/20 1732     Protime 15 0 seconds      INR 1 25    APTT [[id number]]  (Normal) Collected:  07/10/20 1644    Lab Status:  Final result Specimen:  Blood from Arm, Right Updated:  07/10/20 1732     PTT 37 seconds     Troponin I [[id number]]  (Normal) Collected:  07/10/20 1644    Lab Status: Final result Specimen:  Blood from Arm, Right Updated:  07/10/20 1731     Troponin I 0 02 ng/mL     UA w Reflex to Microscopic w Reflex to Culture [[id number]]  (Abnormal) Collected:  07/10/20 1653    Lab Status:  Final result Specimen:  Urine, Clean Catch Updated:  07/10/20 1726     Color, UA Red     Clarity, UA Turbid     Specific Gravity, UA >=1 030     pH, UA 6 0     Leukocytes, UA Negative     Nitrite, UA Negative     Protein,  (2+) mg/dl      Glucose, UA Negative mg/dl      Ketones, UA Negative mg/dl      Urobilinogen, UA 0 2 E U /dl      Bilirubin, UA Negative     Blood, UA Moderate    CBC and differential [[id number]]  (Abnormal) Collected:  07/10/20 1644    Lab Status:  Final result Specimen:  Blood from Arm, Right Updated:  07/10/20 1706     WBC 9 76 Thousand/uL      RBC 3 90 Million/uL      Hemoglobin 11 7 g/dL      Hematocrit 36 7 %      MCV 94 fL      MCH 30 0 pg      MCHC 31 9 g/dL      RDW 14 2 %      MPV 10 2 fL      Platelets 038 Thousands/uL      nRBC 0 /100 WBCs      Neutrophils Relative 66 %      Immat GRANS % 1 %      Lymphocytes Relative 20 %      Monocytes Relative 7 %      Eosinophils Relative 5 %      Basophils Relative 1 %      Neutrophils Absolute 6 56 Thousands/µL      Immature Grans Absolute 0 05 Thousand/uL      Lymphocytes Absolute 1 90 Thousands/µL      Monocytes Absolute 0 66 Thousand/µL      Eosinophils Absolute 0 47 Thousand/µL      Basophils Absolute 0 12 Thousands/µL                  CT renal stone study abdomen pelvis without contrast   Final Result by Sawyer Cardenas MD (07/10 1841)      Obstructive 3 mm proximal right ureteral calculus causes mild upstream hydroureteronephrosis  Multiple nonobstructive right-sided calculi, the largest of which measures 14 mm  Status post hysterectomy  Limited visualization of the vaginal vault in this patient with a history of vaginal bleeding  The study was marked in [facility] for immediate notification  Workstation performed: [id number]                    Procedures  Procedures         ED Course  ED Course as of Jul 10 2042   Fri Jul 10, 2020   1857 Labs reviewed  CT reviewed  Will consult Urology  MDM  Number of Diagnoses or Management Options  Hematuria: new and requires workup  Kidney stones: new and requires workup  Ureterolithiasis: new and requires workup  Diagnosis management comments: Patient presents for evaluation of vaginal bleeding  On my exam, patient does not have vaginal bleeding  When the patient gave a urine sample, there was gross hematuria  I suspect this may be the source of the patient's bleeding  The patient's daughter who is at bedside states that the patient is supposed to have surgery in August to remove a kidney stone as well as have a cystoscopy  They are expressing concern due to the increased bleeding and the patient being on Eliquis  Labs and imaging ordered  Labs reviewed  Patient has large amount of RBCs in urine  CT shows a 3 mm right-sided obstructing ureteral stone  The patient also has multiple nonobstructing stones in her right kidney  Due to the amount of hematuria, the decision was made to consult Urology  Urology advises that the patient be admitted, and he will consult tomorrow morning  He asked that the patient be kept NPO after midnight  Patient reports increasing back pain  She is requesting tramadol as that is what she usually takes  I offered her IV pain medication, however she declined  Tramadol ordered  Case was discussed with MER[facility] who agrees to accept the patient under service of Dr Carmita Atwood  Patient is stable for admission         Amount and/or Complexity of Data Reviewed  Clinical lab tests: ordered and reviewed  Tests in the radiology section of CPT®: ordered and reviewed  Decide to obtain previous medical records or to obtain history from someone other than the patient: yes  Obtain history from someone other than the patient: yes  Review and summarize past medical records: yes  Discuss the patient with other providers: yes    Risk of Complications, Morbidity, and/or Mortality  Presenting problems: moderate  Diagnostic procedures: moderate  Management options: moderate    Patient Progress  Patient progress: stable        Disposition  Final diagnoses:   Hematuria   Ureterolithiasis   Kidney stones     Time reflects when diagnosis was documented in both MDM as applicable and the Disposition within this note     Time User Action Codes Description Comment    7/10/2020  7:34 PM Joslyn Alejandra Add [R31 9] Hematuria     7/10/2020  7:34 PM Martina Marti Add [N20 1] Ureterolithiasis     7/10/2020  7:34 PM Joslyn Oliverty Add [N20 0] Kidney stones       ED Disposition     ED Disposition Condition Date/Time Comment    Discharge Stable Fri Jul 10, 2020  7:34 PM Adrianna Roa discharge to home/self care  Follow-up Information    None         Current Discharge Medication List      CONTINUE these medications which have NOT CHANGED    Details   ALPRAZolam (XANAX) 0 25 mg tablet Take 1 tablet (0 25 mg total) by mouth 3 (three) times a day as needed for anxiety  Qty: 270 tablet, Refills: 0    Comments: Please place on hold  Associated Diagnoses: Anxiety      amiodarone 200 mg tablet 200 mg PO daily    Qty: 90 tablet, Refills: 3    Associated Diagnoses: A-fib (HCC)      apixaban (Eliquis) 5 mg Take 1 tablet (5 mg total) by mouth 2 (two) times a day  Qty: 180 tablet, Refills: 3    Associated Diagnoses: Postoperative atrial fibrillation (HCC)      atorvastatin (LIPITOR) 40 mg tablet Take 1 tablet (40 mg total) by mouth daily  Qty: 90 tablet, Refills: 3    Comments: Please place on hold/file  Associated Diagnoses: Hypercholesterolemia      COMBIGAN 0 2-0 5 %       losartan (COZAAR) 25 mg tablet Take 1 tablet (25 mg total) by mouth daily  Qty: 90 tablet, Refills: 3    Associated Diagnoses: Essential hypertension      metFORMIN (GLUCOPHAGE) 500 mg tablet Take 1 tablet (500 mg total) by mouth 2 (two) times a day with meals for 5000 doses  Qty: 180 tablet, Refills: 3    Comments: Please place on file/hold  Associated Diagnoses: Diabetes 1 5, managed as type 2 (HCC)      metoprolol succinate (TOPROL-XL) 50 mg 24 hr tablet Take 1 tablet (50 mg total) by mouth daily  Qty: 90 tablet, Refills: 3    Comments: Please place on hold/file  Associated Diagnoses: Essential hypertension      omeprazole (PriLOSEC) 20 mg delayed release capsule Take 1 capsule (20 mg total) by mouth daily  Qty: 90 capsule, Refills: 3    Comments: Please place on hold/file  Associated Diagnoses: Gastroesophageal reflux disease without esophagitis      traMADol (ULTRAM) 50 mg tablet Take 2 tablets (100 mg total) by mouth 2 (two) times a day  Qty: 60 tablet, Refills: 3    Associated Diagnoses: Lumbar back pain      brimonidine (ALPHAGAN P) 0 15 % ophthalmic solution Administer 1 drop to both eyes 2 (two) times a day  Qty: 5 mL, Refills: 0    Associated Diagnoses: Glaucoma; Macular degeneration           No discharge procedures on file      PDMP Review       Value Time User    PDMP Reviewed  Yes 6/23/2020 12:52 PM Antony Mortimer ED Provider  Electronically Signed by           Lamar Johnston PA-C  07/10/20 2042

## 2020-07-10 NOTE — TELEPHONE ENCOUNTER
Patient at the [facility] office, patient known for nephrolithiasis, gross hematuria and complex left renal cyst likely hemorrhagic  Last appt 6/16/2020  Called and spoke to patient at this time  She states her bleeding is getting worse  States it is not longer just when she urinates  States she is bleeding and needing to wear a pad  She states she knows it is not her period because she had a hysterectomy yrs ago  She states the hematuria has been contstant past week    Patient denies dysuria, denies fever or chills, denies issues with not being able to void  patient denies blood clots  Patient states she does have some back pain in the middle of back and on the right lower side  States she has frequency as well  She states her urine is a reddish/ brown color but more on the red side  Patient states she is still on the eliquis  Advised will route to the provider   I advised on the continued aggressive hydration    Advised to call our office or report to er is any change in symptoms such as fever, unable to void     Patient verbalized understanding

## 2020-07-10 NOTE — H&P
H&P- Floridalma Deal 1945, 77 y o  female MRN: [id number]    Unit/Bed#: JUNI Encounter: [id number]    Primary Care Provider: Birdie Ascencio DO   Date and time admitted to hospital: 7/10/2020  4:11 PM    * Gross hematuria  Assessment & Plan  · Patient with persistent hematuria, now occurring both with and without urination  · Noted to have multiple kidney stones on the right side-- measuring between 3 mm and 14 mm-- the 3 mm stone is most distal stone, but still located in the proximal ureter causing upstream hydronephrosis  · E d  Discussed with urology-- NPO at midnight for likely inpatient cystoscopy/treatment of stones  · Hold Eliquis  · Fortunately, patient is voiding without difficulty  · PVR  · Pain control  · Formal urology consult in the morning    Controlled type 2 diabetes mellitus, without long-term current use of insulin ([facility] [address] )  Assessment & Plan  Lab Results   Component Value Date    HGBA1C 6 3 (H) 07/07/2020       No results for input(s): POCGLU in the last 72 hours      Blood Sugar Average: Last 72 hrs:  · decently controlled type 2 diabetes  · Hold metformin  · SSI for correction  · Q i d  Accu-Cheks    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HCC)  Assessment & Plan  · Status post ppm June 2020  · Continue amiodarone  · Holding Eliquis  · Continue metoprolol    CAD (coronary atherosclerotic disease)  Assessment & Plan  · Status post grafting x3  · Had cardiac catheterization in May 2020 which demonstrated multivessel disease that was completely revascularized  · Chest pain-free at this time  · Continue Toprol XL    Aneurysm of ascending aorta ([facility] [address] )  Assessment & Plan  · Noted during admission in May 2020  · At that time, CT was not performed to further evaluate for active dissection as patient was level 3 DNR/DNI and hemodynamically stable  · Patient with some low back pain today, but again overall hemodynamically stable, and suspect this is more likely related to renal stones      VTE Prophylaxis: Pharmacologic VTE Prophylaxis contraindicated due to acute hematuria  / reason for no mechanical VTE prophylaxis low risk   Code Status: level 3  POLST: POLST form is not discussed and not completed at this time  Discussion with family: daughter at bedside     Anticipated Length of Stay:  Patient will be admitted on an Inpatient basis with an anticipated length of stay of  > 2 midnights  Justification for Hospital Stay: tx and eval of hematuria    Total Time for Visit, including Counseling / Coordination of Care: 30 minutes  Greater than 50% of this total time spent on direct patient counseling and coordination of care  Chief Complaint:   Hematuria, back pain    History of Present Illness:    Daryl Wong is a 77 y o  female with past medical history significant for coronary artery disease status post CABG, ascending aortic aneurysm, 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 presents to the ED for evaluation of worsening hematuria and low back pain  Patient reports she has had hematuria since May  It has worsened since starting Eliquis for tachy-keanu syndrome  Patient reports typically her urine is bloody, without clots  She states she had no bleeding other than when she would urinate, however this morning she noted to have saturated a sanitary napkin with blood overnight  She thought she may have had vaginal bleeding secondary to that  Fortunately, patient denies any dizziness, lightheadedness  She is still able to urinate without difficulty  She has not noticed any clots  She now reports some bilateral low back pain that slightly worse on the right side  She denies any chest pain or shortness of breath  She was due to have an outpatient cystoscopy in August, but patient and daughter contacted the Urology office today to try and expedite this  Ultimately, urology felt the best decision was for patient to come to the ER for evaluation an expedition of inpatient cystoscopy      Review of Systems:    Review of Systems   Constitutional: Negative  HENT: Negative  Eyes: Negative  Respiratory: Negative  Cardiovascular: Negative  Gastrointestinal: Negative  Genitourinary: Positive for hematuria  Musculoskeletal: Negative  Skin: Negative  Neurological: Negative  Hematological: Negative  Psychiatric/Behavioral: Negative  Past Medical and Surgical History:     Past Medical History:   Diagnosis Date    Atypical chest pain     NSTEMI (non-ST elevated myocardial infarction) ([facility] [address] )     Last Assessed: 9/24/2015       Past Surgical History:   Procedure Laterality Date    A-V CARDIAC PACEMAKER INSERTION      ANKLE SURGERY      BACK SURGERY  01/2011    L4 and L5 laminectomy and fusion     BLADDER SURGERY      CORONARY ARTERY BYPASS GRAFT  09/02/2015    CABGx3 with LIMA to LAD, SVG to PDA, SVG to OM-1    HYSTERECTOMY      WRIST SURGERY         Meds/Allergies:    Prior to Admission medications    Medication Sig Start Date End Date Taking? Authorizing Provider   ALPRAZolam Vilma Francia) 0 25 mg tablet Take 1 tablet (0 25 mg total) by mouth 3 (three) times a day as needed for anxiety 6/23/20 9/21/20 Yes Sindy Torres DO   amiodarone 200 mg tablet 200 mg PO daily   6/17/20  Yes PETE Guaman   apixaban (Eliquis) 5 mg Take 1 tablet (5 mg total) by mouth 2 (two) times a day 7/10/20  Yes Casper Torres DO   atorvastatin (LIPITOR) 40 mg tablet Take 1 tablet (40 mg total) by mouth daily 8/27/19  Yes Casper Torres DO   COMBIGAN 0 2-0 5 %  4/3/20  Yes Historical Provider, MD   losartan (COZAAR) 25 mg tablet Take 1 tablet (25 mg total) by mouth daily 6/17/20  Yes PETE Guaman   metFORMIN (GLUCOPHAGE) 500 mg tablet Take 1 tablet (500 mg total) by mouth 2 (two) times a day with meals for 5000 doses 7/10/20 5/15/27 Yes Sindy Torres DO   metoprolol succinate (TOPROL-XL) 50 mg 24 hr tablet Take 1 tablet (50 mg total) by mouth daily 8/27/19  Yes Sindy Torres DO   omeprazole (PriLOSEC) 20 mg delayed release capsule Take 1 capsule (20 mg total) by mouth daily 8/27/19 5/14/29 Yes Sindy Torres DO   traMADol (ULTRAM) 50 mg tablet Take 2 tablets (100 mg total) by mouth 2 (two) times a day 4/22/20  Yes Sindy Torres DO   brimonidine (ALPHAGAN P) 0 15 % ophthalmic solution Administer 1 drop to both eyes 2 (two) times a day  Patient not taking: Reported on 7/10/2020 5/29/20   Aleida Celeste MD   ELIQUIS 5 MG TAKE 1 TABLET BY MOUTH TWO TIMES DAILY 7/7/20 7/10/20  Ck Galvez MD   metFORMIN (GLUCOPHAGE) 500 mg tablet Take 1 tablet (500 mg total) by mouth 2 (two) times a day with meals for 5000 doses 8/27/19 7/10/20  Yvonne Crimes, DO     I have reviewed home medications with patient personally  Allergies: No Known Allergies    Social History:     Marital Status: Single   Occupation: retired  Patient Pre-hospital Living Situation: home w/ family  Patient Pre-hospital Level of Mobility: independent  Patient Pre-hospital Diet Restrictions: none  Substance Use History:   Social History     Substance and Sexual Activity   Alcohol Use No     Social History     Tobacco Use   Smoking Status Current Every Day Smoker    Packs/day: 0 25    Start date: 5   Smokeless Tobacco Never Used   Tobacco Comment    Started smoking at age 25; currently smoking <1ppd       Social History     Substance and Sexual Activity   Drug Use Never       Family History:    Family History   Problem Relation Age of Onset    Hypertension Mother         Essential    Hypertension Sister         Essential    Alcohol abuse Brother     Cirrhosis Brother     Diabetes Father     Other Brother         Heart transplant in his 46s    Lung cancer Sister     Diabetes Brother     Stroke Sister     No Known Problems Daughter        Physical Exam:     Vitals:   Blood Pressure: (!) 176/88 (07/10/20 1800)  Pulse: 62 (07/10/20 1800)  Temperature: 97 6 °F (36 4 °C) (07/10/20 1531)  Temp Source: Oral (07/10/20 1531)  Respirations: 16 (07/10/20 1710)  Weight - Scale: 54 6 kg (120 lb 6 4 oz) (07/10/20 1526)  SpO2: 97 % (07/10/20 1800)    Physical Exam   Constitutional: She is oriented to person, place, and time  No distress  HENT:   Head: Normocephalic and atraumatic  Eyes: Pupils are equal, round, and reactive to light  EOM are normal    Neck: No JVD present  Cardiovascular: Normal rate and regular rhythm  Exam reveals no gallop and no friction rub  No murmur heard  Pulmonary/Chest: Effort normal and breath sounds normal  No stridor  No respiratory distress  She has no wheezes  Abdominal: Soft  Bowel sounds are normal  She exhibits no distension  There is no tenderness  There is no guarding  Musculoskeletal: Normal range of motion  She exhibits tenderness (low back, b/l; R>L)  She exhibits no edema  Neurological: She is alert and oriented to person, place, and time  She displays normal reflexes  No cranial nerve deficit  Coordination normal    Skin: Skin is warm and dry  She is not diaphoretic  No erythema  Psychiatric: She has a normal mood and affect  Her behavior is normal      Additional Data:     Lab Results: I have personally reviewed pertinent reports        Results from last 7 days   Lab Units 07/10/20  1644   WBC Thousand/uL 9 76   HEMOGLOBIN g/dL 11 7   HEMATOCRIT % 36 7   PLATELETS Thousands/uL 316   NEUTROS PCT % 66   LYMPHS PCT % 20   MONOS PCT % 7   EOS PCT % 5     Results from last 7 days   Lab Units 07/10/20  1644 07/07/20  0912   SODIUM mmol/L 141 142   POTASSIUM mmol/L 4 4 4 5   CHLORIDE mmol/L 104 109*   CO2 mmol/L 30 28   BUN mg/dL 30* 22   CREATININE mg/dL 0 91 0 82   ANION GAP mmol/L 7 5   CALCIUM mg/dL 9 5 9 3   ALBUMIN g/dL  --  3 2*   TOTAL BILIRUBIN mg/dL  --  0 29   ALK PHOS U/L  --  42*   ALT U/L  --  16   AST U/L  --  11   GLUCOSE RANDOM mg/dL 163* 151*     Results from last 7 days   Lab Units 07/10/20  1644   INR 1 25*         Results from last 7 days   Lab Units 07/07/20  0912   HEMOGLOBIN A1C % 6 3*           Imaging: I have personally reviewed pertinent reports  CT renal stone study abdomen pelvis without contrast   Final Result by Castro Shelton MD (07/10 1841)      Obstructive 3 mm proximal right ureteral calculus causes mild upstream hydroureteronephrosis  Multiple nonobstructive right-sided calculi, the largest of which measures 14 mm  Status post hysterectomy  Limited visualization of the vaginal vault in this patient with a history of vaginal bleeding  The study was marked in [facility] for immediate notification  Workstation performed: [id number]             EKG, Pathology, and Other Studies Reviewed on Admission:   · EKG: NSR    Allscripts / Epic Records Reviewed: Yes     ** Please Note: This note has been constructed using a voice recognition system   **

## 2020-07-10 NOTE — ASSESSMENT & PLAN NOTE
Lab Results   Component Value Date    HGBA1C 6 3 (H) 07/07/2020       No results for input(s): POCGLU in the last 72 hours      Blood Sugar Average: Last 72 hrs:  · decently controlled type 2 diabetes  · Hold metformin  · SSI for correction  · Q i d  Accu-Cheks

## 2020-07-10 NOTE — ASSESSMENT & PLAN NOTE
· Noted during admission in May 2020  · At that time, CT was not performed to further evaluate for active dissection as patient was level 3 DNR/DNI and hemodynamically stable  · Patient with some low back pain today, but again overall hemodynamically stable, and suspect this is more likely related to renal stones

## 2020-07-10 NOTE — PROGRESS NOTES
Tobacco Cessation Counseling: Tobacco cessation counseling and education was provided  The patient is sincerely urged to quit consumption of tobacco  She is not ready to quit tobacco  The numerous health risks of tobacco consumption were discussed  If she decides to quit, there are a number of helpful adjunctive aids, and she can see me to discuss nicotine replacement therapy, chantix, or bupropion anytime in the future  Patient ID: Jann Lofton is a 77 y o  female  HPI: 77 y  o female presenting with gross hematuria since being discharged from hospital in early June  She was found to be have multiple kidney stones and had a pacemaker put in for tachybrady syndrome and was put on eliquis  She noted when she first came home there was very little hematuria, but overnight she filled a menstrual pad  She complains of weakenss        SUBJECTIVE  TCM Call (since 6/12/2020)     None      TCM Call (since 6/12/2020)     None        Family History   Problem Relation Age of Onset    Hypertension Mother         Essential    Hypertension Sister         Essential    Alcohol abuse Brother     Cirrhosis Brother     Diabetes Father     Other Brother         Heart transplant in his 46s    Lung cancer Sister     Diabetes Brother     Stroke Sister     No Known Problems Daughter      Social History     Socioeconomic History    Marital status: Single     Spouse name: Not on file    Number of children: 1    Years of education: Not on file    Highest education level: Not on file   Occupational History    Not on file   Social Needs    Financial resource strain: Not on file    Food insecurity:     Worry: Not on file     Inability: Not on file    Transportation needs:     Medical: Not on file     Non-medical: Not on file   Tobacco Use    Smoking status: Current Every Day Smoker     Packs/day: 0 25     Start date: 1967    Smokeless tobacco: Never Used    Tobacco comment: Started smoking at age 25; currently smoking <1ppd    Substance and Sexual Activity    Alcohol use: Not Currently    Drug use: Never    Sexual activity: Not Currently     Partners: Male     Birth control/protection: Post-menopausal   Lifestyle    Physical activity:     Days per week: Not on file     Minutes per session: Not on file    Stress: Not on file   Relationships    Social connections:     Talks on phone: Not on file     Gets together: Not on file     Attends Anabaptism service: Not on file     Active member of club or organization: Not on file     Attends meetings of clubs or organizations: Not on file     Relationship status: Not on file    Intimate partner violence:     Fear of current or ex partner: Not on file     Emotionally abused: Not on file     Physically abused: Not on file     Forced sexual activity: Not on file   Other Topics Concern    Not on file   Social History Narrative    Not on file     Past Medical History:   Diagnosis Date    Atypical chest pain     NSTEMI (non-ST elevated myocardial infarction) ([facility] [address] )     Last Assessed: 9/24/2015     Past Surgical History:   Procedure Laterality Date    A-V CARDIAC PACEMAKER INSERTION      ANKLE SURGERY      BACK SURGERY  01/2011    L4 and L5 laminectomy and fusion     BLADDER SURGERY      CORONARY ARTERY BYPASS GRAFT  09/02/2015    CABGx3 with LIMA to LAD, SVG to PDA, SVG to OM-1    FL RETROGRADE PYELOGRAM  7/11/2020    HYSTERECTOMY      WRIST SURGERY       No Known Allergies    Current Outpatient Medications:     ALPRAZolam (XANAX) 0 25 mg tablet, Take 1 tablet (0 25 mg total) by mouth 3 (three) times a day as needed for anxiety, Disp: 270 tablet, Rfl: 0    amiodarone 200 mg tablet, 200 mg PO daily  , Disp: 90 tablet, Rfl: 3    apixaban (Eliquis) 5 mg, Take 1 tablet (5 mg total) by mouth 2 (two) times a day, Disp: 180 tablet, Rfl: 3    atorvastatin (LIPITOR) 40 mg tablet, Take 1 tablet (40 mg total) by mouth daily, Disp: 90 tablet, Rfl: 3    brimonidine (ALPHAGAN P) 0 15 % ophthalmic solution, Administer 1 drop to both eyes 2 (two) times a day (Patient not taking: Reported on 7/10/2020), Disp: 5 mL, Rfl: 0    COMBIGAN 0 2-0 5 %, , Disp: , Rfl:     losartan (COZAAR) 25 mg tablet, Take 1 tablet (25 mg total) by mouth daily, Disp: 90 tablet, Rfl: 3    metoprolol succinate (TOPROL-XL) 50 mg 24 hr tablet, Take 1 tablet (50 mg total) by mouth daily, Disp: 90 tablet, Rfl: 3    omeprazole (PriLOSEC) 20 mg delayed release capsule, Take 1 capsule (20 mg total) by mouth daily, Disp: 90 capsule, Rfl: 3    traMADol (ULTRAM) 50 mg tablet, Take 2 tablets (100 mg total) by mouth 2 (two) times a day, Disp: 60 tablet, Rfl: 3    metFORMIN (GLUCOPHAGE) 500 mg tablet, Take 1 tablet (500 mg total) by mouth 2 (two) times a day with meals for 5000 doses, Disp: 180 tablet, Rfl: 3  No current facility-administered medications for this visit       Review of Systems  Constitutional:     Denies fever, chills ,fatigue  ,weight loss, weight gain + weakness    ENT: Denies earache ,loss of hearing ,nosebleed, nasal discharge,nasal congestion ,sore throat ,hoarseness  Pulmonary: Denies shortness of breath ,cough  ,dyspnea on exertion, orthopnea  ,PND   Cardiovascular:  Denies bradycardia , tachycardia  ,palpations, lower extremity edema leg, claudication  Breast:  Denies new or changing breast lumps ,nipple discharge ,nipple changes  Abdomen:  Denies abdominal pain , anorexia , indigestion, nausea, vomiting, constipation, diarrhea  Musculoskeletal: Denies myalgias, arthralgias, joint swelling, joint stiffness , limb pain, limb swelling  Gu: + hematuria  Skin: Denies skin rash, skin lesion, skin wound, itching, dry skin  Neuro: Denies headache, numbness, tingling, confusion, loss of consciousness, dizziness, vertigo  Psychiatric: Denies feelings of depression, suicidal ideation, anxiety, sleep disturbances    OBJECTIVE  /80   Pulse 72   Temp 98 2 °F (36 8 °C)   Ht 5' 1" (1 549 m)   Wt 54 4 kg (120 lb) BMI 22 67 kg/m²   Constitutional:   NAD, well appearing and well nourished      ENT:   Conjunctiva and lids: no injection, edema, or discharge     Pupils and iris: BON bilaterally    External inspection of ears and nose: normal without deformities or discharge  Otoscopic exam: Canals patent without erythema  Nasal mucosa, septum and turbinates: Normal or edema or discharge         Oropharynx:  Moist mucosa, normal tongue and tonsils without lesions  No erythema        Pulmonary:Respiratory effort normal rate and rhythm, no increased work of breathing  Auscultation of lungs:  Clear bilaterally with no adventitious breath sounds       Cardiovascular: regular rate and rhythm, S1 and S2, no murmur, no edema and/or varicosities of LE      Abdomen: Soft and non-distended     Positive bowel sounds      No heptomegaly or splenomegaly      Gu: no suprapubic tenderness or CVA tenderness, no urethral discharge  Lymphatic:  No anterior or posterior cervical lymphadenopathy         Musculoskeletal:  Gait and station: Normal gait      Digits and nails normal without clubbing or cyanosis       Inspection/palpation of joints, bones, and muscles:  No joint tenderness, swelling, full active and passive range of motion       Skin: Normal skin turgor and no rashes      Neuro:      Normal reflexes      Psych:   alert and oriented to person, place and time     normal mood and affect       Assessment/Plan:Diagnoses and all orders for this visit:    Gross hematuria  Comments:  Pt to be evaluated by Urology today  Postoperative atrial fibrillation (HCC)  Comments: Follow up as per cardiology  Orders:  -     apixaban (Eliquis) 5 mg;  Take 1 tablet (5 mg total) by mouth 2 (two) times a day        Reviewed with patient plan to treat with above plan    Patient instructed to call in 72 hours if not feeling better or if symptoms worsen

## 2020-07-11 ENCOUNTER — ANESTHESIA (INPATIENT)
Dept: PERIOP | Facility: HOSPITAL | Age: 75
DRG: 661 | End: 2020-07-11
Payer: COMMERCIAL

## 2020-07-11 ENCOUNTER — ANESTHESIA EVENT (INPATIENT)
Dept: PERIOP | Facility: HOSPITAL | Age: 75
DRG: 661 | End: 2020-07-11
Payer: COMMERCIAL

## 2020-07-11 ENCOUNTER — APPOINTMENT (INPATIENT)
Dept: RADIOLOGY | Facility: HOSPITAL | Age: 75
DRG: 661 | End: 2020-07-11
Payer: COMMERCIAL

## 2020-07-11 LAB
ANION GAP SERPL CALCULATED.3IONS-SCNC: 7 MMOL/L (ref 4–13)
ATRIAL RATE: 62 BPM
ATRIAL RATE: 62 BPM
BUN SERPL-MCNC: 26 MG/DL (ref 5–25)
CALCIUM SERPL-MCNC: 8.9 MG/DL (ref 8.3–10.1)
CHLORIDE SERPL-SCNC: 105 MMOL/L (ref 100–108)
CO2 SERPL-SCNC: 30 MMOL/L (ref 21–32)
CREAT SERPL-MCNC: 0.93 MG/DL (ref 0.6–1.3)
ERYTHROCYTE [DISTWIDTH] IN BLOOD BY AUTOMATED COUNT: 14.2 % (ref 11.6–15.1)
GFR SERPL CREATININE-BSD FRML MDRD: 61 ML/MIN/1.73SQ M
GLUCOSE SERPL-MCNC: 121 MG/DL (ref 65–140)
GLUCOSE SERPL-MCNC: 170 MG/DL (ref 65–140)
HCT VFR BLD AUTO: 33.2 % (ref 34.8–46.1)
HGB BLD-MCNC: 10.7 G/DL (ref 11.5–15.4)
INR PPP: 1.24 (ref 0.84–1.19)
MCH RBC QN AUTO: 30.4 PG (ref 26.8–34.3)
MCHC RBC AUTO-ENTMCNC: 32.2 G/DL (ref 31.4–37.4)
MCV RBC AUTO: 94 FL (ref 82–98)
P AXIS: 69 DEGREES
PLATELET # BLD AUTO: 277 THOUSANDS/UL (ref 149–390)
PMV BLD AUTO: 9.7 FL (ref 8.9–12.7)
POTASSIUM SERPL-SCNC: 4.2 MMOL/L (ref 3.5–5.3)
PR INTERVAL: 264 MS
PROTHROMBIN TIME: 15 SECONDS (ref 11.6–14.5)
QRS AXIS: 6 DEGREES
QRS AXIS: 9 DEGREES
QRSD INTERVAL: 76 MS
QRSD INTERVAL: 76 MS
QT INTERVAL: 420 MS
QT INTERVAL: 422 MS
QTC INTERVAL: 416 MS
QTC INTERVAL: 418 MS
RBC # BLD AUTO: 3.52 MILLION/UL (ref 3.81–5.12)
SODIUM SERPL-SCNC: 142 MMOL/L (ref 136–145)
T WAVE AXIS: 49 DEGREES
T WAVE AXIS: 50 DEGREES
VENTRICULAR RATE: 59 BPM
VENTRICULAR RATE: 59 BPM
WBC # BLD AUTO: 10.56 THOUSAND/UL (ref 4.31–10.16)

## 2020-07-11 PROCEDURE — 0T768DZ DILATION OF RIGHT URETER WITH INTRALUMINAL DEVICE, VIA NATURAL OR ARTIFICIAL OPENING ENDOSCOPIC: ICD-10-PCS | Performed by: UROLOGY

## 2020-07-11 PROCEDURE — 93010 ELECTROCARDIOGRAM REPORT: CPT | Performed by: INTERNAL MEDICINE

## 2020-07-11 PROCEDURE — 85610 PROTHROMBIN TIME: CPT | Performed by: PHYSICIAN ASSISTANT

## 2020-07-11 PROCEDURE — 0UCG7ZZ EXTIRPATION OF MATTER FROM VAGINA, VIA NATURAL OR ARTIFICIAL OPENING: ICD-10-PCS | Performed by: OBSTETRICS & GYNECOLOGY

## 2020-07-11 PROCEDURE — 0T5B8ZZ DESTRUCTION OF BLADDER, VIA NATURAL OR ARTIFICIAL OPENING ENDOSCOPIC: ICD-10-PCS | Performed by: UROLOGY

## 2020-07-11 PROCEDURE — 99252 IP/OBS CONSLTJ NEW/EST SF 35: CPT | Performed by: UROLOGY

## 2020-07-11 PROCEDURE — 0TBB8ZX EXCISION OF BLADDER, VIA NATURAL OR ARTIFICIAL OPENING ENDOSCOPIC, DIAGNOSTIC: ICD-10-PCS | Performed by: UROLOGY

## 2020-07-11 PROCEDURE — 85027 COMPLETE CBC AUTOMATED: CPT | Performed by: PHYSICIAN ASSISTANT

## 2020-07-11 PROCEDURE — C2617 STENT, NON-COR, TEM W/O DEL: HCPCS | Performed by: UROLOGY

## 2020-07-11 PROCEDURE — 52204 CYSTOSCOPY W/BIOPSY(S): CPT | Performed by: UROLOGY

## 2020-07-11 PROCEDURE — BT1D1ZZ FLUOROSCOPY OF RIGHT KIDNEY, URETER AND BLADDER USING LOW OSMOLAR CONTRAST: ICD-10-PCS | Performed by: UROLOGY

## 2020-07-11 PROCEDURE — NC001 PR NO CHARGE: Performed by: OBSTETRICS & GYNECOLOGY

## 2020-07-11 PROCEDURE — 74420 UROGRAPHY RTRGR +-KUB: CPT

## 2020-07-11 PROCEDURE — 88305 TISSUE EXAM BY PATHOLOGIST: CPT | Performed by: PATHOLOGY

## 2020-07-11 PROCEDURE — 88300 SURGICAL PATH GROSS: CPT | Performed by: PATHOLOGY

## 2020-07-11 PROCEDURE — 99232 SBSQ HOSP IP/OBS MODERATE 35: CPT | Performed by: HOSPITALIST

## 2020-07-11 PROCEDURE — C1769 GUIDE WIRE: HCPCS | Performed by: UROLOGY

## 2020-07-11 PROCEDURE — 82948 REAGENT STRIP/BLOOD GLUCOSE: CPT

## 2020-07-11 PROCEDURE — 80048 BASIC METABOLIC PNL TOTAL CA: CPT | Performed by: PHYSICIAN ASSISTANT

## 2020-07-11 DEVICE — STENT URETERAL 6FR 24CM INLAY OPTIMA: Type: IMPLANTABLE DEVICE | Site: URETER | Status: FUNCTIONAL

## 2020-07-11 RX ORDER — ONDANSETRON 2 MG/ML
4 INJECTION INTRAMUSCULAR; INTRAVENOUS ONCE AS NEEDED
Status: DISCONTINUED | OUTPATIENT
Start: 2020-07-11 | End: 2020-07-11 | Stop reason: HOSPADM

## 2020-07-11 RX ORDER — LIDOCAINE HYDROCHLORIDE 10 MG/ML
INJECTION, SOLUTION EPIDURAL; INFILTRATION; INTRACAUDAL; PERINEURAL AS NEEDED
Status: DISCONTINUED | OUTPATIENT
Start: 2020-07-11 | End: 2020-07-11 | Stop reason: SURG

## 2020-07-11 RX ORDER — LABETALOL 20 MG/4 ML (5 MG/ML) INTRAVENOUS SYRINGE
5
Status: DISCONTINUED | OUTPATIENT
Start: 2020-07-11 | End: 2020-07-11 | Stop reason: HOSPADM

## 2020-07-11 RX ORDER — ASPIRIN 81 MG/1
81 TABLET, CHEWABLE ORAL ONCE
Status: DISCONTINUED | OUTPATIENT
Start: 2020-07-11 | End: 2020-07-11

## 2020-07-11 RX ORDER — ALBUTEROL SULFATE 2.5 MG/3ML
2.5 SOLUTION RESPIRATORY (INHALATION) ONCE AS NEEDED
Status: DISCONTINUED | OUTPATIENT
Start: 2020-07-11 | End: 2020-07-11 | Stop reason: HOSPADM

## 2020-07-11 RX ORDER — SODIUM CHLORIDE, SODIUM LACTATE, POTASSIUM CHLORIDE, CALCIUM CHLORIDE 600; 310; 30; 20 MG/100ML; MG/100ML; MG/100ML; MG/100ML
125 INJECTION, SOLUTION INTRAVENOUS CONTINUOUS
Status: DISCONTINUED | OUTPATIENT
Start: 2020-07-11 | End: 2020-07-12

## 2020-07-11 RX ORDER — PROPOFOL 10 MG/ML
INJECTION, EMULSION INTRAVENOUS AS NEEDED
Status: DISCONTINUED | OUTPATIENT
Start: 2020-07-11 | End: 2020-07-11 | Stop reason: SURG

## 2020-07-11 RX ORDER — FENTANYL CITRATE/PF 50 MCG/ML
25 SYRINGE (ML) INJECTION
Status: DISCONTINUED | OUTPATIENT
Start: 2020-07-11 | End: 2020-07-11 | Stop reason: HOSPADM

## 2020-07-11 RX ORDER — METOPROLOL TARTRATE 5 MG/5ML
INJECTION INTRAVENOUS AS NEEDED
Status: DISCONTINUED | OUTPATIENT
Start: 2020-07-11 | End: 2020-07-11 | Stop reason: SURG

## 2020-07-11 RX ORDER — LIDOCAINE HYDROCHLORIDE AND EPINEPHRINE 20; 5 MG/ML; UG/ML
INJECTION, SOLUTION EPIDURAL; INFILTRATION; INTRACAUDAL; PERINEURAL AS NEEDED
Status: DISCONTINUED | OUTPATIENT
Start: 2020-07-11 | End: 2020-07-11 | Stop reason: HOSPADM

## 2020-07-11 RX ORDER — OXYCODONE HYDROCHLORIDE 5 MG/1
2.5 TABLET ORAL EVERY 4 HOURS PRN
Status: DISCONTINUED | OUTPATIENT
Start: 2020-07-11 | End: 2020-07-12 | Stop reason: HOSPADM

## 2020-07-11 RX ORDER — HYDRALAZINE HYDROCHLORIDE 20 MG/ML
5 INJECTION INTRAMUSCULAR; INTRAVENOUS ONCE
Status: COMPLETED | OUTPATIENT
Start: 2020-07-11 | End: 2020-07-11

## 2020-07-11 RX ORDER — HYDROMORPHONE HCL/PF 1 MG/ML
0.5 SYRINGE (ML) INJECTION
Status: DISCONTINUED | OUTPATIENT
Start: 2020-07-11 | End: 2020-07-11 | Stop reason: HOSPADM

## 2020-07-11 RX ORDER — MAGNESIUM HYDROXIDE 1200 MG/15ML
LIQUID ORAL AS NEEDED
Status: DISCONTINUED | OUTPATIENT
Start: 2020-07-11 | End: 2020-07-11 | Stop reason: HOSPADM

## 2020-07-11 RX ORDER — HYDRALAZINE HYDROCHLORIDE 20 MG/ML
INJECTION INTRAMUSCULAR; INTRAVENOUS AS NEEDED
Status: DISCONTINUED | OUTPATIENT
Start: 2020-07-11 | End: 2020-07-11 | Stop reason: SURG

## 2020-07-11 RX ORDER — CEFAZOLIN SODIUM 1 G/50ML
SOLUTION INTRAVENOUS AS NEEDED
Status: DISCONTINUED | OUTPATIENT
Start: 2020-07-11 | End: 2020-07-11 | Stop reason: SURG

## 2020-07-11 RX ORDER — FENTANYL CITRATE 50 UG/ML
INJECTION, SOLUTION INTRAMUSCULAR; INTRAVENOUS AS NEEDED
Status: DISCONTINUED | OUTPATIENT
Start: 2020-07-11 | End: 2020-07-11 | Stop reason: SURG

## 2020-07-11 RX ORDER — HYDROMORPHONE HCL/PF 1 MG/ML
0.2 SYRINGE (ML) INJECTION EVERY 4 HOURS PRN
Status: DISCONTINUED | OUTPATIENT
Start: 2020-07-11 | End: 2020-07-12 | Stop reason: HOSPADM

## 2020-07-11 RX ORDER — ONDANSETRON 2 MG/ML
INJECTION INTRAMUSCULAR; INTRAVENOUS AS NEEDED
Status: DISCONTINUED | OUTPATIENT
Start: 2020-07-11 | End: 2020-07-11 | Stop reason: SURG

## 2020-07-11 RX ORDER — DEXAMETHASONE SODIUM PHOSPHATE 10 MG/ML
INJECTION, SOLUTION INTRAMUSCULAR; INTRAVENOUS AS NEEDED
Status: DISCONTINUED | OUTPATIENT
Start: 2020-07-11 | End: 2020-07-11 | Stop reason: SURG

## 2020-07-11 RX ORDER — OXYCODONE HYDROCHLORIDE 5 MG/1
5 TABLET ORAL EVERY 4 HOURS PRN
Status: DISCONTINUED | OUTPATIENT
Start: 2020-07-11 | End: 2020-07-12 | Stop reason: HOSPADM

## 2020-07-11 RX ORDER — MEPERIDINE HYDROCHLORIDE 25 MG/ML
12.5 INJECTION INTRAMUSCULAR; INTRAVENOUS; SUBCUTANEOUS ONCE
Status: DISCONTINUED | OUTPATIENT
Start: 2020-07-11 | End: 2020-07-11 | Stop reason: HOSPADM

## 2020-07-11 RX ORDER — PROMETHAZINE HYDROCHLORIDE 25 MG/ML
12.5 INJECTION, SOLUTION INTRAMUSCULAR; INTRAVENOUS ONCE AS NEEDED
Status: DISCONTINUED | OUTPATIENT
Start: 2020-07-11 | End: 2020-07-11 | Stop reason: HOSPADM

## 2020-07-11 RX ORDER — SODIUM CHLORIDE, SODIUM LACTATE, POTASSIUM CHLORIDE, CALCIUM CHLORIDE 600; 310; 30; 20 MG/100ML; MG/100ML; MG/100ML; MG/100ML
INJECTION, SOLUTION INTRAVENOUS CONTINUOUS PRN
Status: DISCONTINUED | OUTPATIENT
Start: 2020-07-11 | End: 2020-07-11 | Stop reason: SURG

## 2020-07-11 RX ADMIN — PROPOFOL 160 MG: 10 INJECTION, EMULSION INTRAVENOUS at 13:35

## 2020-07-11 RX ADMIN — SODIUM CHLORIDE, SODIUM LACTATE, POTASSIUM CHLORIDE, AND CALCIUM CHLORIDE 125 ML/HR: .6; .31; .03; .02 INJECTION, SOLUTION INTRAVENOUS at 16:00

## 2020-07-11 RX ADMIN — NICOTINE 1 PATCH: 21 PATCH TRANSDERMAL at 08:12

## 2020-07-11 RX ADMIN — METOPROLOL TARTRATE 2.5 MG: 5 INJECTION, SOLUTION INTRAVENOUS at 14:29

## 2020-07-11 RX ADMIN — HYDRALAZINE HYDROCHLORIDE 5 MG: 20 INJECTION INTRAMUSCULAR; INTRAVENOUS at 14:31

## 2020-07-11 RX ADMIN — CEFAZOLIN SODIUM 1000 MG: 1 SOLUTION INTRAVENOUS at 13:30

## 2020-07-11 RX ADMIN — ONDANSETRON 4 MG: 2 INJECTION INTRAMUSCULAR; INTRAVENOUS at 13:40

## 2020-07-11 RX ADMIN — BRIMONIDINE TARTRATE, TIMOLOL MALEATE 1 DROP: 2; 5 SOLUTION/ DROPS OPHTHALMIC at 17:39

## 2020-07-11 RX ADMIN — DOCUSATE SODIUM 100 MG: 100 CAPSULE, LIQUID FILLED ORAL at 08:12

## 2020-07-11 RX ADMIN — FENTANYL CITRATE 25 MCG: 50 INJECTION INTRAMUSCULAR; INTRAVENOUS at 14:04

## 2020-07-11 RX ADMIN — LOSARTAN POTASSIUM 25 MG: 25 TABLET, FILM COATED ORAL at 08:12

## 2020-07-11 RX ADMIN — DEXAMETHASONE SODIUM PHOSPHATE 4 MG: 10 INJECTION, SOLUTION INTRAMUSCULAR; INTRAVENOUS at 13:40

## 2020-07-11 RX ADMIN — METOPROLOL TARTRATE 2.5 MG: 5 INJECTION, SOLUTION INTRAVENOUS at 14:25

## 2020-07-11 RX ADMIN — CEFAZOLIN SODIUM 1000 MG: 1 SOLUTION INTRAVENOUS at 05:13

## 2020-07-11 RX ADMIN — SODIUM CHLORIDE, SODIUM LACTATE, POTASSIUM CHLORIDE, AND CALCIUM CHLORIDE 125 ML/HR: .6; .31; .03; .02 INJECTION, SOLUTION INTRAVENOUS at 19:14

## 2020-07-11 RX ADMIN — ATORVASTATIN CALCIUM 40 MG: 40 TABLET, FILM COATED ORAL at 08:12

## 2020-07-11 RX ADMIN — OXYCODONE HYDROCHLORIDE 5 MG: 5 TABLET ORAL at 23:50

## 2020-07-11 RX ADMIN — HYDRALAZINE HYDROCHLORIDE 5 MG: 20 INJECTION INTRAMUSCULAR; INTRAVENOUS at 11:39

## 2020-07-11 RX ADMIN — PANTOPRAZOLE SODIUM 40 MG: 40 TABLET, DELAYED RELEASE ORAL at 05:13

## 2020-07-11 RX ADMIN — OXYCODONE HYDROCHLORIDE 2.5 MG: 5 TABLET ORAL at 21:26

## 2020-07-11 RX ADMIN — BRIMONIDINE TARTRATE, TIMOLOL MALEATE 1 DROP: 2; 5 SOLUTION/ DROPS OPHTHALMIC at 08:13

## 2020-07-11 RX ADMIN — DOCUSATE SODIUM 100 MG: 100 CAPSULE, LIQUID FILLED ORAL at 17:39

## 2020-07-11 RX ADMIN — PHENYLEPHRINE HYDROCHLORIDE 100 MCG: 10 INJECTION INTRAVENOUS at 13:48

## 2020-07-11 RX ADMIN — FENTANYL CITRATE 50 MCG: 50 INJECTION INTRAMUSCULAR; INTRAVENOUS at 13:40

## 2020-07-11 RX ADMIN — OXYCODONE HYDROCHLORIDE 2.5 MG: 5 TABLET ORAL at 16:47

## 2020-07-11 RX ADMIN — AMIODARONE HYDROCHLORIDE 200 MG: 200 TABLET ORAL at 08:12

## 2020-07-11 RX ADMIN — LIDOCAINE HYDROCHLORIDE 50 MG: 10 INJECTION, SOLUTION EPIDURAL; INFILTRATION; INTRACAUDAL; PERINEURAL at 13:34

## 2020-07-11 RX ADMIN — PHENYLEPHRINE HYDROCHLORIDE 100 MCG: 10 INJECTION INTRAVENOUS at 13:46

## 2020-07-11 RX ADMIN — METOPROLOL SUCCINATE 50 MG: 50 TABLET, EXTENDED RELEASE ORAL at 08:12

## 2020-07-11 RX ADMIN — FENTANYL CITRATE 25 MCG: 50 INJECTION INTRAMUSCULAR; INTRAVENOUS at 14:01

## 2020-07-11 RX ADMIN — SODIUM CHLORIDE, SODIUM LACTATE, POTASSIUM CHLORIDE, AND CALCIUM CHLORIDE: .6; .31; .03; .02 INJECTION, SOLUTION INTRAVENOUS at 13:30

## 2020-07-11 RX ADMIN — APIXABAN 5 MG: 5 TABLET, FILM COATED ORAL at 17:39

## 2020-07-11 NOTE — ASSESSMENT & PLAN NOTE
· POA-  Hematuria- Pads soaked with blood yesterday  · Noted to have multiple kidney stones on the right side-- measuring between 3 mm and 14 mm-- the 3 mm stone is most distal stone, but still located in the proximal ureter causing upstream hydronephrosis  · No difficulty in urinating  ·  NPO since midnight ED consulted Urology  · Hold Eliquis  · Monitor H& H, BMP  ·  Hgb on admission- 11 7, 07/11/2020- 10 7  · No acute rise in creatinine  · Urology consulted this morning- Going in cystoscopy and stunt placement around noon    · Not in acute pain now, Pain control- is Tylenol PRN

## 2020-07-11 NOTE — CONSULTS
UROLOGY CONSULTATION NOTE     Patient Identifiers: Danielle Padron (MRN [id number])  Service Requesting Consultation:  Internal Medicine  Service Providing Consultation:  Urology, Aaron Sommers MD    Date of Service: 7/11/2020    Reason for Consultation:  Hematuria, nephrolithiasis      ASSESSMENT:     1  Persistent gross hematuria  - etiology unclear  - cystoscopy is required    2  Greater than 1 cm right intrarenal calculus  - asymptomatic  - potential source of hematuria    Given the persistence of the patient's hematuria in the presence of her stone I have recommended proceeding with diagnostic cystoscopy with potential intravesical intervention as well as right ureteral stent placement in anticipation of follow-up ureteroscopic extraction of her large stone    I have offered to organized this as an inpatient given the persistence of her hematuria and she is very amenable to this plan    We discussed the procedure in detail and the normal postoperative course was presented  The patient understood the main risks to be bleeding, infection, bladder perforation, and the need for a catheter or stent  The patient asked questions and all of them were answered  PLAN:     - to OR for cystoscopy, possible bladder biopsy/fulguration, and right retrograde pyelogram ureteral stent placement  - patient understands she will require follow-up procedure to address her large renal stone likely in the form of ureteroscopy, and this will be performed elective outpatient setting after her hematuria is assessed and addressed  - patient has been made NPO past midnight and I have coordinated care with Internal Medicine and the operating room team    Thank you for allowing me to participate in this patients care  Please do not hesitate to call with any additional questions      Aaron Sommers MD    History of Present Illness:     Danielle Padron is a 77 y o  old with a history of coronary artery disease, pacemaker, anticoagulation use who was recently evaluated for hematuria  He was scheduled for cystoscopy and was also diagnosed with a large right intrarenal calculus and ureteroscopic management was discussed  Her hematuria and then resolved and subsequently restarted recently  This was pre dated by the reinstitution of Eliquis on June 20th  She has had persistent hematuria intermittently with the passage of clots  She has had no pain in her pelvis or flank    Ultimately presented to the emergency department overnight with ongoing gross hematuria  I was contacted by the emergency department staff and recommended admission to the hospital for hydration, evaluation by internal Medicine, and expedited surgical management    This morning she is comfortable in appearance  She reports no flank pain    He says her hematuria has consisted though has lightened somewhat since starting IV fluids    Past Medical, Past Surgical History:     Past Medical History:   Diagnosis Date    Atypical chest pain     NSTEMI (non-ST elevated myocardial infarction) ([facility] [address] )     Last Assessed: 9/24/2015   :    Past Surgical History:   Procedure Laterality Date    A-V CARDIAC PACEMAKER INSERTION      ANKLE SURGERY      BACK SURGERY  01/2011    L4 and L5 laminectomy and fusion     BLADDER SURGERY      CORONARY ARTERY BYPASS GRAFT  09/02/2015    CABGx3 with LIMA to LAD, SVG to PDA, SVG to OM-1    HYSTERECTOMY      WRIST SURGERY     :    Medications, Allergies:     Current Facility-Administered Medications:     acetaminophen (TYLENOL) tablet 650 mg, 650 mg, Oral, Q6H PRN, Cristal Hope PA-C    ALPRAZolam (XANAX) tablet 0 25 mg, 0 25 mg, Oral, TID PRN, Cristal Hope PA-C    amiodarone tablet 200 mg, 200 mg, Oral, Daily With Breakfast, Cristal Hope PA-C, 200 mg at 07/11/20 0812    atorvastatin (LIPITOR) tablet 40 mg, 40 mg, Oral, Daily, Cristal Hope PA-C, 40 mg at 07/11/20 0812    brimonidine-timolol (COMBIGAN) 0 2-0 5 % ophthalmic solution 1 drop, 1 drop, Both Eyes, BID, Cristal Hope, PA-C, 1 drop at 07/11/20 0813    calcium carbonate (TUMS) chewable tablet 1,000 mg, 1,000 mg, Oral, Daily PRN, Cristal Hope PA-C    docusate sodium (COLACE) capsule 100 mg, 100 mg, Oral, BID, Cristal Hope, PA-C, 100 mg at 07/11/20 7410    hydrALAZINE (APRESOLINE) injection 10 mg, 10 mg, Intravenous, Q6H PRN, Cristal Hope, PA-C    losartan (COZAAR) tablet 25 mg, 25 mg, Oral, Daily, BETH Dumont-C, 25 mg at 07/11/20 7762    metoprolol succinate (TOPROL-XL) 24 hr tablet 50 mg, 50 mg, Oral, Daily, BETH Dumont-C, 50 mg at 07/11/20 5362    nicotine (NICODERM CQ) 21 mg/24 hr TD 24 hr patch 1 patch, 1 patch, Transdermal, Daily, Cristal Hope PA-C, 1 patch at 07/11/20 1124    ondansetron (ZOFRAN) injection 4 mg, 4 mg, Intravenous, Q6H PRN, Cristal Hope PA-C    pantoprazole (PROTONIX) EC tablet 40 mg, 40 mg, Oral, Early Morning, BETH Dumont-C, 40 mg at 07/11/20 1781    Allergies:  No Known Allergies:    Social and Family History:   Social History:   Social History     Tobacco Use    Smoking status: Current Every Day Smoker     Packs/day: 0 25     Start date: 1967    Smokeless tobacco: Never Used    Tobacco comment: Started smoking at age 25; currently smoking <1ppd  Substance Use Topics    Alcohol use: Not Currently    Drug use: Never     Social History     Tobacco Use   Smoking Status Current Every Day Smoker    Packs/day: 0 25    Start date: 5   Smokeless Tobacco Never Used   Tobacco Comment    Started smoking at age 25; currently smoking <1ppd         Family History:  Family History   Problem Relation Age of Onset    Hypertension Mother         Essential    Hypertension Sister         Essential    Alcohol abuse Brother     Cirrhosis Brother     Diabetes Father     Other Brother         Heart transplant in his 46s    Lung cancer Sister     Diabetes Brother     Stroke Sister     No Known Problems Daughter    :     Review of Systems:     General: Fever, chills, or night sweats: negative  Cardiac: Negative for chest pain  Pulmonary: Negative for shortness of breath  Gastrointestinal: Abdominal pain negative  Nausea, vomiting, or diarrhea negative,  Genitourinary: See HPI above  Patient does have hematuria  All other systems queried were negative  Physical Exam:   General: Patient is pleasant and in NAD  Awake and alert  BP (!) 176/78   Pulse 60   Temp 98 1 °F (36 7 °C)   Resp 16   Ht 5' 1" (1 549 m)   Wt 54 6 kg (120 lb 6 4 oz)   SpO2 94%   BMI 22 75 kg/m²   Cardiac: Peripheral edema: negative  Pulmonary: Non-labored breathing  Abdomen: Soft, non-tender, non-distended  No surgical scars  No masses, tenderness, hernias noted  Genitourinary: Negative CVA tenderness, negative suprapubic tenderness  Labs:     Lab Results   Component Value Date    HGB 10 7 (L) 07/11/2020    HCT 33 2 (L) 07/11/2020    WBC 10 56 (H) 07/11/2020     07/11/2020   ]    Lab Results   Component Value Date     03/27/2017    K 4 2 07/11/2020     07/11/2020    CO2 30 07/11/2020    BUN 26 (H) 07/11/2020    CREATININE 0 93 07/11/2020    CALCIUM 8 9 07/11/2020    GLUCOSE 152 (H) 09/05/2015   ]    Imaging:   I personally reviewed the images and report of the following studies, and reviewed them with the patient:    FINDINGS:     RIGHT KIDNEY AND URETER:     3 mm proximal right ureteral calculus causes mild hydroureteronephrosis  #601/65     Nonobstructive 14 mm central right renal calculus  6 mm smaller lower pole calculi  No hydronephrosis or perinephric collection      LEFT KIDNEY AND URETER:  Stable appearance of an upper pole hyperdense lesion  No calculi  No perinephric collection or hydronephrosis      URINARY BLADDER:   Only partially distended   Grossly normal      No significant abnormality in the visualized lung bases      Limited low radiation dose noncontrast CT evaluation demonstrates no clinically significant abnormality of liver, spleen, pancreas, or adrenal glands  Left hepatic cyst   There are gallstone(s) within the gallbladder, without pericholecystic inflammatory changes  No ascites or bulky lymphadenopathy on this limited noncontrast study  Prominent colonic stool  No bowel obstruction  Limited evaluation demonstrates no evidence to suggest acute appendicitis  Status post hysterectomy  Limited evaluation the vaginal vault  Lower lumbar spine fixation hardware  No acute fracture         IMPRESSION:     Obstructive 3 mm proximal right ureteral calculus causes mild upstream hydroureteronephrosis      Multiple nonobstructive right-sided calculi, the largest of which measures 14 mm      Status post hysterectomy  Limited visualization of the vaginal vault in this patient with a history of vaginal bleeding

## 2020-07-11 NOTE — ANESTHESIA PREPROCEDURE EVALUATION
Review of Systems/Medical History          Cardiovascular  Hyperlipidemia, Hypertension , Past MI , CAD , History of CABG,   Comment: Normal biventricular systolic function, DD2  Mild AI, mild MR, mild TR    S/p CABG, recent cath showed patent grafts    Tachy-keanu syndrome,  Pulmonary       GI/Hepatic    GERD ,             Endo/Other  Diabetes ,      GYN       Hematology   Musculoskeletal       Neurology   Psychology           Physical Exam    Airway    Mallampati score: II  TM Distance: >3 FB  Neck ROM: full     Dental       Cardiovascular      Pulmonary      Other Findings        Anesthesia Plan  ASA Score- 3     Anesthesia Type- IV sedation with anesthesia with ASA Monitors  Additional Monitors:   Airway Plan:     Comment: IV sedation, GA back up; standard ASA monitors  Risks and benefits discussed with patient; patient consented and agrees to proceed  I saw and evaluated the patient  If seen with CRNA, we have discussed the anesthetic plan and I am in agreement that the plan is appropriate for the patient        Plan Factors-    Induction- intravenous  Postoperative Plan-     Informed Consent- Anesthetic plan and risks discussed with patient  I personally reviewed this patient with the CRNA  Discussed and agreed on the Anesthesia Plan with the CRNA  Karrie Nissen

## 2020-07-11 NOTE — PROGRESS NOTES
Patient resting comfortably after surgery  VSS  Pain medication administered  No complaints at this time  Will continue to monitor

## 2020-07-11 NOTE — PROGRESS NOTES
Progress Note - Roldan Sharpe 1945, 77 y o  female MRN: [id number]    Unit/Bed#: W -01 Encounter: [id number]    Primary Care Provider: Maddie Fuentes DO   Date and time admitted to hospital: 7/10/2020  4:11 PM        * Gross hematuria  Assessment & Plan  · POA-  Hematuria- Pads soaked with blood yesterday  · Noted to have multiple kidney stones on the right side-- measuring between 3 mm and 14 mm-- the 3 mm stone is most distal stone, but still located in the proximal ureter causing upstream hydronephrosis  · No difficulty in urinating  ·  NPO since midnight ED consulted Urology  · Hold Eliquis  · Monitor H& H, BMP  ·  Hgb on admission- 11 7, 07/11/2020- 10 7  · No acute rise in creatinine  · Urology consulted this morning- Going in cystoscopy and stunt placement around noon  · Not in acute pain now, Pain control- is Tylenol PRN      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[facility])  Assessment & Plan  · Status post ppm June 2020  · Continue amiodarone  · Holding Eliquis, will restart after consult with Urology  · Continue metoprolol    Aneurysm of ascending aorta [facility])  Assessment & Plan  · Noted during admission in May 2020  · At that time, CT was not performed to further evaluate for active dissection as patient was level 3 DNR/DNI and hemodynamically stable  · Patient with some low back pain today, but again overall hemodynamically stable, and suspect this is more likely related to renal stones    CAD (coronary atherosclerotic disease)  Assessment & Plan  · Status post grafting x3  · Had cardiac catheterization in May 2020 which demonstrated multivessel disease that was completely revascularized  · Chest pain-free at this time  · Continue Toprol XL    Controlled type 2 diabetes mellitus, without long-term current use of insulin ([facility] [address] )  Assessment & Plan  Lab Results   Component Value Date    HGBA1C 6 3 (H) 07/07/2020       No results for input(s): POCGLU in the last 72 hours      Blood Sugar Average: Last 72 hrs:  · decently controlled type 2 diabetes  · Hold metformin  · SSI for correction  · Q i d  Accu-Cheks          VTE Pharmacologic Prophylaxis:   Pharmacologic: Holding Eliquis for Gross hematuria and possible Cystoscopy for stunt placement  Mechanical VTE Prophylaxis in Place: No    Discussions with Specialists or Other Care Team Provider: Urology    Education and Discussions with Family / Patient: Will call patient's family    Current Length of Stay: 1 day(s)    Current Patient Status: Inpatient     Discharge Plan / Estimated Discharge Date: TBD    Code Status: Level 3 - DNAR and DNI      Subjective:   Patient said the hematuria has improved since yesterday, after holding Eliquis  Mild lower back pain, no pain with urination, no nausea or vomiting, no dizziness  No palpitations, no chest pain, no shortness of breath, no cough  Objective:     Vitals:   Temp (24hrs), Av °F (36 7 °C), Min:97 6 °F (36 4 °C), Max:98 2 °F (36 8 °C)    Temp:  [97 6 °F (36 4 °C)-98 2 °F (36 8 °C)] 98 1 °F (36 7 °C)  HR:  [59-72] 60  Resp:  [16-18] 16  BP: (134-201)/(72-94) 176/78  SpO2:  [94 %-100 %] 94 %  Body mass index is 22 75 kg/m²  Input and Output Summary (last 24 hours):     No intake or output data in the 24 hours ending 20 1054    Physical Exam:     Physical Exam   Constitutional: She is oriented to person, place, and time  She appears well-developed and well-nourished  HENT:   Head: Normocephalic and atraumatic  Neck: Normal range of motion  Cardiovascular: Normal rate and regular rhythm  Exam reveals no gallop and no friction rub  Pulmonary/Chest: Effort normal and breath sounds normal  She has no wheezes  She has no rales  Abdominal: Soft  Bowel sounds are normal    Musculoskeletal: She exhibits tenderness  Arms:  Neurological: She is alert and oriented to person, place, and time  Skin: Skin is warm and dry  Capillary refill takes less than 2 seconds         Additional Data: Labs:    Results from last 7 days   Lab Units 07/11/20  0442 07/10/20  1644   WBC Thousand/uL 10 56* 9 76   HEMOGLOBIN g/dL 10 7* 11 7   HEMATOCRIT % 33 2* 36 7   PLATELETS Thousands/uL 277 316   NEUTROS PCT %  --  66   LYMPHS PCT %  --  20   MONOS PCT %  --  7   EOS PCT %  --  5     Results from last 7 days   Lab Units 07/11/20  0442  07/07/20  0912   POTASSIUM mmol/L 4 2   < > 4 5   CHLORIDE mmol/L 105   < > 109*   CO2 mmol/L 30   < > 28   BUN mg/dL 26*   < > 22   CREATININE mg/dL 0 93   < > 0 82   CALCIUM mg/dL 8 9   < > 9 3   ALK PHOS U/L  --   --  42*   ALT U/L  --   --  16   AST U/L  --   --  11    < > = values in this interval not displayed  Results from last 7 days   Lab Units 07/11/20  0442   INR  1 24*       * I Have Reviewed All Lab Data Listed Above  * Additional Pertinent Lab Tests Reviewed:  All Labs Within Last 24 Hours Reviewed    Imaging:    Imaging Reports Reviewed Today Include: CT renal stone study abdomen and Pelvis  Imaging Personally Reviewed by Myself Includes:  None    Recent Cultures (last 7 days):           Last 24 Hours Medication List:     Current Facility-Administered Medications:  acetaminophen 650 mg Oral Q6H PRN Cristal Hope PA-C   ALPRAZolam 0 25 mg Oral TID PRN Cristal Hope PA-C   amiodarone 200 mg Oral Daily With Breakfast Cristal Hope PA-C   atorvastatin 40 mg Oral Daily Cristal Hope PA-C   brimonidine-timolol 1 drop Both Eyes BID Cristal Hope PA-C   calcium carbonate 1,000 mg Oral Daily PRN Cristal Hope PA-C   docusate sodium 100 mg Oral BID Cristal Hope PA-C   hydrALAZINE 10 mg Intravenous Q6H PRN Cristal Hope PA-C   losartan 25 mg Oral Daily Cristal Hope PA-C   metoprolol succinate 50 mg Oral Daily Cristal Hope PA-C   nicotine 1 patch Transdermal Daily Cristal Hope PA-C   ondansetron 4 mg Intravenous Q6H PRN Cristal Hope PA-C   pantoprazole 40 mg Oral Early Morning Cristal BURROUGHS CORA Hope        Today, Patient Was Seen By: Jessica Hook MD    ** Please Note: This note has been constructed using a voice recognition system   **

## 2020-07-11 NOTE — PLAN OF CARE
Problem: Potential for Falls  Goal: Patient will remain free of falls  Description  INTERVENTIONS:  - Assess patient frequently for physical needs  -  Identify cognitive and physical deficits and behaviors that affect risk of falls    -  [place] fall precautions as indicated by assessment   - Educate patient/family on patient safety including physical limitations  - Instruct patient to call for assistance with activity based on assessment  - Modify environment to reduce risk of injury  - Consider OT/PT consult to assist with strengthening/mobility  Outcome: Progressing     Problem: PAIN - ADULT  Goal: Verbalizes/displays adequate comfort level or baseline comfort level  Description  Interventions:  - Encourage patient to monitor pain and request assistance  - Assess pain using appropriate pain scale  - Administer analgesics based on type and severity of pain and evaluate response  - Implement non-pharmacological measures as appropriate and evaluate response  - Consider cultural and social influences on pain and pain management  - Notify physician/advanced practitioner if interventions unsuccessful or patient reports new pain  Outcome: Progressing     Problem: INFECTION - ADULT  Goal: Absence or prevention of progression during hospitalization  Description  INTERVENTIONS:  - Assess and monitor for signs and symptoms of infection  - Monitor lab/diagnostic results  - Monitor all insertion sites, i e  indwelling lines, tubes, and drains  - Monitor endotracheal if appropriate and nasal secretions for changes in amount and color  - [place] appropriate cooling/warming therapies per order  - Administer medications as ordered  - Instruct and encourage patient and family to use good hand hygiene technique  - Identify and instruct in appropriate isolation precautions for identified infection/condition  Outcome: Progressing  Goal: Absence of fever/infection during neutropenic period  Description  INTERVENTIONS:  - Monitor WBC    Outcome: Progressing     Problem: SAFETY ADULT  Goal: Maintain or return to baseline ADL function  Description  INTERVENTIONS:  -  Assess patient's ability to carry out ADLs; assess patient's baseline for ADL function and identify physical deficits which impact ability to perform ADLs (bathing, care of mouth/teeth, toileting, grooming, dressing, etc )  - Assess/evaluate cause of self-care deficits   - Assess range of motion  - Assess patient's mobility; develop plan if impaired  - Assess patient's need for assistive devices and provide as appropriate  - Encourage maximum independence but intervene and supervise when necessary  - Involve family in performance of ADLs  - Assess for home care needs following discharge   - Consider OT consult to assist with ADL evaluation and planning for discharge  - Provide patient education as appropriate  Outcome: Progressing  Goal: Maintain or return mobility status to optimal level  Description  INTERVENTIONS:  - Assess patient's baseline mobility status (ambulation, transfers, stairs, etc )    - Identify cognitive and physical deficits and behaviors that affect mobility  - Identify mobility aids required to assist with transfers and/or ambulation (gait belt, sit-to-stand, lift, walker, cane, etc )  - [place] fall precautions as indicated by assessment  - Record patient progress and toleration of activity level on Mobility SBAR; progress patient to next Phase/Stage  - Instruct patient to call for assistance with activity based on assessment  - Consider rehabilitation consult to assist with strengthening/weightbearing, etc   Outcome: Progressing     Problem: Knowledge Deficit  Goal: Patient/family/caregiver demonstrates understanding of disease process, treatment plan, medications, and discharge instructions  Description  Complete learning assessment and assess knowledge base    Interventions:  - Provide teaching at level of understanding  - Provide teaching via preferred learning methods  Outcome: Progressing

## 2020-07-11 NOTE — OP NOTE
Operative Note     PATIENT:  Rome Olvera (MRN [id number])    DATE OF PROCEDURE:   7/11/2020    PRE-OP DIAGNOSES:   1) Gross hematuria  2) Right renal calculus > 1cm     POST-OP DIAGNOSES AND OPERATIVE FINDINGS:   1) Gross hematuria  2) Right renal calculus > 1cm  3) calcified posterior vaginal mass    PROCEDURES:  1) cystoscopy with bladder biopsy and fulguration  2  Right retrograde pyelogram ureteral stent placement  3  Exam under anesthesia with removal of foreign body and biopsy of calcified vaginal mass (Performed by Gynecology)    SURGEON:   Eduardo Mac MD    ANESTHESIA TYPE:  General anesthesia    ESTIMATED BLOOD LOSS:   Minimal    COMPLICATIONS:   None    ANTIBIOTICS:  Cephazolin    INTRAOPERATIVE THROMBOEMBOLISM PROPHYLAXIS:  Pneumatic compression stockings      FINDINGS:  Exam under anesthesia at the beginning of the place identified a calcification emanating from the posterior vaginal vault  Findings suspicious for erosion of mesh from a prior posterior repair  I consult to gynecology intraoperatively removed this calcified tissue and performed an excisional biopsy of the underlying mucosa    Cystoscopy was generally normal   There was some extrinsic compression posteriorly  A confirmatory biopsy was then taken from the urothelium in this region  Successful placement of a right ureteral stent to facilitate future ureteroscopic management of her large right intrarenal calculus    PROCEDURE SUMMARY:    The patient was brought to the operating room and anesthesia obtained  The patient was then placed in the lithotomy position and prepped and draped using standard sterile technique  All pressure points were carefully padded  A surgical time-out occurred, antibiotics were administered, and thromboembolism prophylaxis was given  Exam under anesthesia performed at the beginning of the case revealed a large calcified mass as documented above    My suspicion was from surgical reaction from prior transvaginal surgery  I made the decision to consult gynecology for intraoperative evaluation, please refer to the documentation from this  Cystoscopy was then performed  There were no intravesical lesions of any kind  There is no evidence of active gross hematuria from inside the bladder or the right ureteral orifice  Extrinsic compression is noted posteriorly in a biopsy of the urothelium was taken here in submitted as a separate specimen  Next I performed a retrograde pyelogram which revealed the large nonobstructing collection of intrarenal calculi on the right side  Ureteral stent was successfully placed  Gynecology was kind enough to perform an intraoperative consultation  We examined the patient together with a general suspicion that the large calcified foreign body in the posterior vagina, adherent, likely secondary to mesh erosion from a prior transvaginal surgery  This was removed by gynecology submitted as a specimen and an excisional biopsy of the posterior vaginal tissue was obtained by their service as well  Please refer to the separate operative note    DISPOSITION:   PACU - hemodynamically stable  PLAN:  Findings the time of surgery discussed the patient's daughter by phone  Patient will return to the hospital for overnight admission  I placed in order to remove her Powell catheter tomorrow at 6:00 a m  Kate Partida From my standpoint I will arrange for ureteroscopy using the high-powered laser to address her large intrarenal calculus  We will plan for endoscopic approach versus a percutaneous approach given her anticoagulation use      She will also require a follow-up by gynecology

## 2020-07-11 NOTE — UTILIZATION REVIEW
Initial Clinical Review    Admission: Date/Time/Statement: Admission Orders (From admission, onward)     Ordered        07/10/20 1935  Inpatient Admission (expected length of stay for this patient Order details is greater than two midnights)  Once                   Orders Placed This Encounter   Procedures    Inpatient Admission (expected length of stay for this patient Order details is greater than two midnights)     Standing Status:   Standing     Number of Occurrences:   1     Order Specific Question:   Admitting Physician     Answer:   Trice Lewis [[id number]]     Order Specific Question:   Level of Care     Answer:   Med Surg [16]     Order Specific Question:   Estimated length of stay     Answer:   More than 2 Midnights     Order Specific Question:   Certification     Answer:   I certify that inpatient services are medically necessary for this patient for a duration of greater than two midnights  See H&P and MD Progress Notes for additional information about the patient's course of treatment  ED Arrival Information     Expected Arrival Acuity Means of Arrival Escorted By Service Admission Type    - 7/10/2020 15:19 Urgent [facility]In [facility] Hospitalist Urgent    Arrival Complaint    vaginal bleeding        Chief Complaint   Patient presents with    Vaginal Bleeding     pt states she was put on blood thinners 1 month ago after getting pacemaker placed, has known kidney stone  states she noticed bleeding ever since a month ago but states this morning she woke up and noticed a pad "full of red blood " changed at 9 am, only now has some red spots on it  denies dizziness/cramping  c/o back pain     Assessment/Plan: 78 yo female to ED from home admitted as Inpatient due to Treatment & evaluation of Hematuria with obstructive renal calculus with Hydroureteronephrosis   PMHx coronary artery disease status post CABG, ascending aortic aneurysm, 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 & RAMBO reports for worsening of Hematuria & low back pain  States had hematuria since may  That urine is typically bloody without clots  Only has bleeding if urinates but this am she noted a saturated sanitary napkin from overnight  She thinks she may additionally have vaginal bleeding  Pain bilateral to low back but worse on right side  IN ED: CVA tenderness with bilateral pedal edema  CT with obstructive renal calculus with Hydroureteronephrosis  Consult Urology  NPO  Likely diagnostic procedure in am  Pain contorl  Hold AC  Cont BB    7/11 Attending:  admitted with gross hematuria and multiple nephro lithiasis on the right side with stone measuring between 3 and 14 mm  Patient is on Eliquis for atrial fibrillation, tachy-keanu syndrome and placement of pacemaker 2 months ago  Eliquis has been held but will need to be restarted as soon as possible when cleared by urology  To the OR today for cystoscopy and intervention  Will continue to monitor hemoglobin  Will restart Eliquis hopefully then this evening  7/11 Urology:   Persistent hematuria: etiology unclear; cystoscopy required  Greater than 1 cm right intrarenal calculus- potential source of hematuria, asymptomatic   Plan for OR for cystoscopy with management dependent on results, possible bladder biopsy/fulguration, and right retrograde pyelogram ureteral stent placement    ED Triage Vitals [07/10/20 1531]   Temperature Pulse Respirations Blood Pressure SpO2   97 6 °F (36 4 °C) 67 18 (!) 201/86 100 %      Temp Source Heart Rate Source Patient Position - Orthostatic VS BP Location FiO2 (%)   Oral Monitor Sitting Left arm --      Pain Score       7        Wt Readings from Last 1 Encounters:   07/10/20 54 6 kg (120 lb 6 4 oz)     Additional Vital Signs:   Date/Time  Temp  Pulse  Resp  BP  MAP (mmHg)  SpO2  O2 Device  Patient Position - Orthostatic VS   07/11/20 1243  99 4 °F (37 4 °C)  73  18  204/88Abnormal     97 %  None (Room air)     07/11/20 12:15:08    60   158/76  103  95 %       07/11/20 11:20:43    60    176/77Abnormal   110  95 %       07/11/20 09:52:45    60    176/78Abnormal   111  94 %       07/11/20 07:42:44  98 1 °F (36 7 °C)  72  16  172/90Abnormal   117  95 %       07/10/20 22:09:06  98 1 °F (36 7 °C)  63  17  159/78  105  96 %  None (Room air)  Lying   07/10/20 2122        200/90Abnormal            07/10/20 20:37:23  98 °F (36 7 °C)  62  18  166/94  118  95 %       07/10/20 1800    62    176/88Abnormal   122  97 %       07/10/20 1710    59  16  165/72    96 %  None (Room air)     07/10/20 1531  97 6 °F (36 4 °C)  67  18  201/86Abnormal     100 %  None (Room air)  Sitting      Weights (last 14 days)     Date/Time  Weight  Weight Method  Height   07/11/20 0700      5' 1" (1 549 m)   07/10/20 1526  54 6 kg (120 lb 6 4 oz)  Standing scale         Pertinent Labs/Diagnostic Test Results:   Results from last 7 days   Lab Units 07/10/20  2241   SARS-COV-2  Negative     Results from last 7 days   Lab Units 07/11/20 0442 07/10/20  1644 07/07/20  0912   WBC Thousand/uL 10 56* 9 76  --    HEMOGLOBIN g/dL 10 7* 11 7 11 7   HEMATOCRIT % 33 2* 36 7  --    PLATELETS Thousands/uL 277 316  --    NEUTROS ABS Thousands/µL  --  6 56  --          Results from last 7 days   Lab Units 07/11/20  0442 07/10/20  1644 07/07/20  0912   SODIUM mmol/L 142 141 142   POTASSIUM mmol/L 4 2 4 4 4 5   CHLORIDE mmol/L 105 104 109*   CO2 mmol/L 30 30 28   ANION GAP mmol/L 7 7 5   BUN mg/dL 26* 30* 22   CREATININE mg/dL 0 93 0 91 0 82   EGFR ml/min/1 73sq m 61 62 71   CALCIUM mg/dL 8 9 9 5 9 3     Results from last 7 days   Lab Units 07/07/20  0912   AST U/L 11   ALT U/L 16   ALK PHOS U/L 42*   TOTAL PROTEIN g/dL 7 0   ALBUMIN g/dL 3 2*   TOTAL BILIRUBIN mg/dL 0 29     Results from last 7 days   Lab Units 07/11/20  1100   POC GLUCOSE mg/dl 170*     Results from last 7 days   Lab Units 07/11/20  0442 07/10/20  1644 07/07/20  0912   GLUCOSE RANDOM mg/dL 121 163* 151*         Results from last 7 days   Lab Units 07/07/20  0912   HEMOGLOBIN A1C % 6 3*   EAG mg/dl 134     No results found for: BETA-HYDROXYBUTYRATE                   Results from last 7 days   Lab Units 07/10/20  1644   TROPONIN I ng/mL 0 02         Results from last 7 days   Lab Units 07/11/20  0442 07/10/20  1644   PROTIME seconds 15 0* 15 0*   INR  1 24* 1 25*   PTT seconds  --  37     Results from last 7 days   Lab Units 07/07/20  0912   TSH 3RD GENERATON uIU/mL 9 140*                     Results from last 7 days   Lab Units 07/10/20  1644   NT-PRO BNP pg/mL 593*         Results from last 7 days   Lab Units 07/07/20  0912   HEP C AB  Non-reactive                 Results from last 7 days   Lab Units 07/10/20  1653   CLARITY UA  Turbid   COLOR UA  Red   SPEC GRAV UA  >=1 030   PH UA  6 0   GLUCOSE UA mg/dl Negative   KETONES UA mg/dl Negative   BLOOD UA  Moderate*   PROTEIN UA mg/dl 100 (2+)*   NITRITE UA  Negative   BILIRUBIN UA  Negative   UROBILINOGEN UA E U /dl 0 2   LEUKOCYTES UA  Negative   WBC UA /hpf 4-10*   RBC UA /hpf Innumerable*   BACTERIA UA /hpf Occasional   EPITHELIAL CELLS WET PREP /hpf Occasional     7/10  CT renal stone study abdomen pelvis without contrast   Obstructive 3 mm proximal right ureteral calculus causes mild upstream hydroureteronephrosis        Multiple nonobstructive right-sided calculi, the largest of which measures 14 mm        Status post hysterectomy  Limited visualization of the vaginal vault in this patient with a history of vaginal bleeding        Ekg: nsr  ED Treatment:   Medication Administration from 07/10/2020 1519 to 07/10/2020 2003       Date/Time Order Dose Route Action     07/10/2020 1922 traMADol (ULTRAM) tablet 50 mg 50 mg Oral Given        Past Medical History:   Diagnosis Date    Atypical chest pain     NSTEMI (non-ST elevated myocardial infarction) ([facility] [address] )     Last Assessed: 9/24/2015     Present on Admission:   Aneurysm of ascending aorta ([facility] [address] )   CAD (coronary atherosclerotic disease)   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HCC)   Controlled type 2 diabetes mellitus, without long-term current use of insulin (HCC)    Admitting Diagnosis: Ureterolithiasis [N20 1]  Kidney stones [N20 0]  Vaginal bleeding [N93 9]  Hematuria [R31 9]  Age/Sex: 77 y o  female  Admission Orders:  Scheduled Medications:    Medications:  [MAR Hold] amiodarone 200 mg Oral Daily With Breakfast   [MAR Hold] atorvastatin 40 mg Oral Daily   [MAR Hold] brimonidine-timolol 1 drop Both Eyes BID   [MAR Hold] docusate sodium 100 mg Oral BID   [MAR Hold] losartan 25 mg Oral Daily   [MAR Hold] metoprolol succinate 50 mg Oral Daily   [MAR Hold] nicotine 1 patch Transdermal Daily   [MAR Hold] pantoprazole 40 mg Oral Early Morning     Continuous IV Infusions:     PRN Meds:    [MAR Hold] acetaminophen 650 mg Oral Q6H PRN   [MAR Hold] ALPRAZolam 0 25 mg Oral TID PRN   [MAR Hold] calcium carbonate 1,000 mg Oral Daily PRN   [MAR Hold] hydrALAZINE 10 mg Intravenous Q6H PRN   [MAR Hold] ondansetron 4 mg Intravenous Q6H PRN     IP CONSULT TO UROLOGY  Npo  Activity as tolerated    Network Utilization Review Department  [email or web address]  ATTENTION: Please call with any questions or concerns to [phone] and carefully listen to the prompts so that you are directed to the right person  All voicemails are confidential   Lazaro Miller all requests for admission clinical reviews, approved or denied determinations and any other requests to dedicated fax number below belonging to the campus where the patient is receiving treatment   List of dedicated fax numbers for the Facilities:  FACILITY NAME UR FAX NUMBER   ADMISSION DENIALS (Administrative/Medical Necessity) [phone]   [address] (Maternity/NICU/Pediatrics) [phone]   Marcia Pitts [address] [phone] [address] [facility] [address] [phone]   [facility]  [phone]   [place] [address] [phone]   [address] [place] [address] [address] [facility] [phone]

## 2020-07-11 NOTE — ASSESSMENT & PLAN NOTE
· Status post ppm June 2020  · Continue amiodarone  · Holding Eliquis, will restart after consult with Urology  · Continue metoprolol

## 2020-07-12 ENCOUNTER — TELEPHONE (OUTPATIENT)
Dept: UROLOGY | Facility: CLINIC | Age: 75
End: 2020-07-12

## 2020-07-12 VITALS
WEIGHT: 120.4 LBS | DIASTOLIC BLOOD PRESSURE: 81 MMHG | OXYGEN SATURATION: 97 % | BODY MASS INDEX: 22.73 KG/M2 | TEMPERATURE: 98.5 F | HEIGHT: 61 IN | SYSTOLIC BLOOD PRESSURE: 166 MMHG | HEART RATE: 72 BPM | RESPIRATION RATE: 16 BRPM

## 2020-07-12 PROBLEM — T19.2XXA FOREIGN BODY IN VAGINA: Status: ACTIVE | Noted: 2020-07-12

## 2020-07-12 LAB
ANION GAP SERPL CALCULATED.3IONS-SCNC: 6 MMOL/L (ref 4–13)
BUN SERPL-MCNC: 17 MG/DL (ref 5–25)
CALCIUM SERPL-MCNC: 8.2 MG/DL (ref 8.3–10.1)
CHLORIDE SERPL-SCNC: 104 MMOL/L (ref 100–108)
CO2 SERPL-SCNC: 29 MMOL/L (ref 21–32)
CREAT SERPL-MCNC: 0.89 MG/DL (ref 0.6–1.3)
ERYTHROCYTE [DISTWIDTH] IN BLOOD BY AUTOMATED COUNT: 13.9 % (ref 11.6–15.1)
GFR SERPL CREATININE-BSD FRML MDRD: 64 ML/MIN/1.73SQ M
GLUCOSE SERPL-MCNC: 136 MG/DL (ref 65–140)
GLUCOSE SERPL-MCNC: 165 MG/DL (ref 65–140)
GLUCOSE SERPL-MCNC: 209 MG/DL (ref 65–140)
HCT VFR BLD AUTO: 32.3 % (ref 34.8–46.1)
HGB BLD-MCNC: 10.4 G/DL (ref 11.5–15.4)
MCH RBC QN AUTO: 30.1 PG (ref 26.8–34.3)
MCHC RBC AUTO-ENTMCNC: 32.2 G/DL (ref 31.4–37.4)
MCV RBC AUTO: 94 FL (ref 82–98)
PLATELET # BLD AUTO: 304 THOUSANDS/UL (ref 149–390)
PMV BLD AUTO: 10.2 FL (ref 8.9–12.7)
POTASSIUM SERPL-SCNC: 4.2 MMOL/L (ref 3.5–5.3)
RBC # BLD AUTO: 3.45 MILLION/UL (ref 3.81–5.12)
SODIUM SERPL-SCNC: 139 MMOL/L (ref 136–145)
WBC # BLD AUTO: 13.12 THOUSAND/UL (ref 4.31–10.16)

## 2020-07-12 PROCEDURE — 82948 REAGENT STRIP/BLOOD GLUCOSE: CPT

## 2020-07-12 PROCEDURE — 80048 BASIC METABOLIC PNL TOTAL CA: CPT | Performed by: INTERNAL MEDICINE

## 2020-07-12 PROCEDURE — 99232 SBSQ HOSP IP/OBS MODERATE 35: CPT | Performed by: UROLOGY

## 2020-07-12 PROCEDURE — 85027 COMPLETE CBC AUTOMATED: CPT | Performed by: INTERNAL MEDICINE

## 2020-07-12 PROCEDURE — 99239 HOSP IP/OBS DSCHRG MGMT >30: CPT | Performed by: HOSPITALIST

## 2020-07-12 RX ADMIN — DOCUSATE SODIUM 100 MG: 100 CAPSULE, LIQUID FILLED ORAL at 09:08

## 2020-07-12 RX ADMIN — METOPROLOL SUCCINATE 50 MG: 50 TABLET, EXTENDED RELEASE ORAL at 09:08

## 2020-07-12 RX ADMIN — AMIODARONE HYDROCHLORIDE 200 MG: 200 TABLET ORAL at 09:08

## 2020-07-12 RX ADMIN — ATORVASTATIN CALCIUM 40 MG: 40 TABLET, FILM COATED ORAL at 09:08

## 2020-07-12 RX ADMIN — NICOTINE 1 PATCH: 21 PATCH TRANSDERMAL at 09:08

## 2020-07-12 RX ADMIN — LOSARTAN POTASSIUM 25 MG: 25 TABLET, FILM COATED ORAL at 09:08

## 2020-07-12 RX ADMIN — SODIUM CHLORIDE, SODIUM LACTATE, POTASSIUM CHLORIDE, AND CALCIUM CHLORIDE 125 ML/HR: .6; .31; .03; .02 INJECTION, SOLUTION INTRAVENOUS at 03:37

## 2020-07-12 RX ADMIN — PANTOPRAZOLE SODIUM 40 MG: 40 TABLET, DELAYED RELEASE ORAL at 05:19

## 2020-07-12 RX ADMIN — APIXABAN 5 MG: 5 TABLET, FILM COATED ORAL at 09:08

## 2020-07-12 RX ADMIN — BRIMONIDINE TARTRATE, TIMOLOL MALEATE 1 DROP: 2; 5 SOLUTION/ DROPS OPHTHALMIC at 09:09

## 2020-07-12 NOTE — PLAN OF CARE
Problem: Potential for Falls  Goal: Patient will remain free of falls  Description  INTERVENTIONS:  - Assess patient frequently for physical needs  -  Identify cognitive and physical deficits and behaviors that affect risk of falls    -  Humble fall precautions as indicated by assessment   - Educate patient/family on patient safety including physical limitations  - Instruct patient to call for assistance with activity based on assessment  - Modify environment to reduce risk of injury  - Consider OT/PT consult to assist with strengthening/mobility  Outcome: Progressing

## 2020-07-12 NOTE — DISCHARGE SUMMARY
Discharge- Marcia Amen 1945, 77 y o  female MRN: [id number]    Unit/Bed#: W -01 Encounter: [id number]    Primary Care Provider: Luis Duke DO   Date and time admitted to hospital: 7/10/2020  4:11 PM        * Gross hematuria  Assessment & Plan  · POA-  Hematuria- Pads soaked with blood  · Noted to have multiple kidney stones on the right side-- measuring between 3 mm and 14 mm-- the 3 mm stone is most distal stone, but still located in the proximal ureter causing upstream hydronephrosis  · No difficulty in urinating  · Monitor H& H, BMP  ·  Hgb on admission- 11 7, 07/11/2020- 10 7, 07/12/2020 - 10 4  · No acute rise in creatinine  · Urology consulted   · Performed cystoscopy with bladder biopsy and fulguration, right retrograde pyelogram ureteral stent placement  · There was removal of a calcified vaginal mass (performed by Gynecology)  · Patient restarted on Eliquis per GYN recommendations  · Mild red tinged urine today  · Not in acute pain now, Pain control- is Tylenol PRN  · Patient cleared for discharge by GYN and Urology  · She has a follow up appointment with Urology on 7/16/2020  · She has the contact information for Juliana Buck for follow up      Foreign body in vagina  Assessment & Plan  Calcified vaginal mass was biopsied yesterday  Controlled type 2 diabetes mellitus, without long-term current use of insulin [facility])  Assessment & Plan  Lab Results   Component Value Date    HGBA1C 6 3 (H) 07/07/2020       Recent Labs     07/11/20  1100 07/12/20  0719   POCGLU 170* 165*       Blood Sugar Average: Last 72 hrs:  · decently controlled type 2 diabetes  · Hold metformin  · SSI for correction  · Q i d  Accu-Cheks    D/C back to home medications    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[facility])  Assessment & Plan  · Status post ppm June 2020  · Continue amiodarone  · Resumed Eliquis after cystoscopy yesterday      · Continue metoprolol    CAD (coronary atherosclerotic disease)  Assessment & Plan  · Status post grafting x3  · Had cardiac catheterization in May 2020 which demonstrated multivessel disease that was completely revascularized  · Chest pain-free at this time  · Continue Toprol XL    Aneurysm of ascending aorta (HCC)  Assessment & Plan  · Noted during admission in May 2020  · At that time, CT was not performed to further evaluate for active dissection as patient was level 3 DNR/DNI and hemodynamically stable  · Patient with some low back pain today, but again overall hemodynamically stable, and suspect this is more likely related to renal stones    Discharging Resident Physician: Rosario Martel DO  Attending: Ken Larios MD  PCP: Zuleyma Flannery DO  Admission Date: 7/10/2020  Discharge Date: 07/12/20    Disposition:     Home    Reason for Admission: Hematuria    Consultations During Hospital Stay:  · Urology  · OB/GYN    Procedures Performed:     · Cystoscopy with bladder biopsy and fulguration  · Right retrograde pyelogram ureteral stent placement  · Removal of foreign body and biopsy of calcified vaginal mass (Performed by Gynecology)    Significant Findings / Test Results:     · CT abdomen/pelvis     Obstructive 3 mm proximal right ureteral calculus causes mild upstream hydroureteronephrosis      Multiple nonobstructive right-sided calculi, the largest of which measures 14 mm      Status post hysterectomy  Limited visualization of the vaginal vault in this patient with a history of vaginal bleeding  Incidental Findings:   · Calcified vaginal mass, biopsies taken    Test Results Pending at Discharge (will require follow up):   · Calcified vaginal mass biopsy     Outpatient Tests Requested:  · None    Complications:  None    Hospital Course:     Briseyda Casarez is a 77 y o  female patient who originally presented to the hospital on 7/10/2020 due to worsening hematuria and low back pain    Patient has a history of CAD s/p CABG, AAA, tachy-keanu syndrome s/p pacemaker on Eliquis  Hematuria was worsening since starting Eliquis several months ago  Urology requested patient come for evaluation and inpatient cystoscopy  CT abdomen/pelvis found multiple renal calculi; a 3 mm proximal right ureteral obstructive calculi causing mild upstream hydro ureter nephrosis and cellphone nonobstructive right-sided calculi, largest 14 mmm in size  Urology performed cystoscopy and placed a right ureteral stent  During the procedure, a calcified vaginal mass was found and biopsied  Patient tolerated procedure well  Eliquis was restarted  Patient noted improved hematuria today  Hemoglobin and vitals has been stable  She will follow up with Gynecology and Urology  Condition at Discharge: stable     Discharge Day Visit / Exam:     Subjective:  Patient seen and examined at bedside  She notes mild red tinged urine  She denies any abdominal or back pain  She denies fevers, chills, shortness of breath  She feels stable for discharge  Vitals: Blood Pressure: 166/81 (07/12/20 0720)  Pulse: 72 (07/12/20 0720)  Temperature: 98 5 °F (36 9 °C) (07/12/20 0720)  Temp Source: Oral (07/11/20 2121)  Respirations: 16 (07/12/20 0720)  Height: 5' 1" (154 9 cm) (07/11/20 0700)  Weight - Scale: 54 6 kg (120 lb 6 4 oz) (07/10/20 1526)  SpO2: 97 % (07/12/20 0720)  Exam:   Physical Exam   Constitutional: She is oriented to person, place, and time  She appears well-developed and well-nourished  Cardiovascular: Normal rate and regular rhythm  Pulmonary/Chest: Effort normal and breath sounds normal    Abdominal: Soft  Bowel sounds are normal  There is no tenderness  Musculoskeletal: She exhibits no tenderness  Neurological: She is alert and oriented to person, place, and time  Skin: Skin is warm and dry  Psychiatric: She has a normal mood and affect  Discussion with Family: Talked to patient's daughter on the phone this morning  Updated patient on plan for discharge   She had just discussed this with   Art Conine  Discharge instructions/Information to patient and family:   See after visit summary for information provided to patient and family  Provisions for Follow-Up Care:  See after visit summary for information related to follow-up care and any pertinent home health orders  Planned Readmission: None     Discharge Medications:  See after visit summary for reconciled discharge medications provided to patient and family        ** Please Note: This note has been constructed using a voice recognition system **

## 2020-07-12 NOTE — TELEPHONE ENCOUNTER
Established patient status post stent placement  She has a large right renal calculus    Please schedule the patient for ureteroscopy with me  It is absolutely necessary to reserve the high-powered laser from [facility] for her case  75 minutes operative time please    Please schedule the main hospital operating room not the [facility] due to her cardiac history  She can remain on her Eliquis without stopping for the procedure  Case request have been placed    Any pending outpatient appointments with our office including cystoscopy can be canceled as this was performed for her during her recent hospitalization    She does not need an office preop visit    She does need an outpatient urine culture 10 days preop

## 2020-07-12 NOTE — ASSESSMENT & PLAN NOTE
· Status post ppm June 2020  · Continue amiodarone  · Resumed Eliquis after cystoscopy yesterday      · Continue metoprolol

## 2020-07-12 NOTE — DISCHARGE INSTR - AVS FIRST PAGE
Dear Briseyda Casarez,     It was our pleasure to care for you here at [facility]  It is our hope that we were always able to exceed the expected standards for your care during your stay  You were hospitalized due to blood in your urine  You were cared for on the 4th floor by Rosario Martel DO under the service of Ken Larios MD with the [facility] who covers for your primary care physician (PCP), Peyman Payton DO, while you were hospitalized  If you have any questions or concerns related to this hospitalization, you may contact us at [phone]  For follow up as well as any medication refills, we recommend that you follow up with your primary care physician  A registered nurse will reach out to you by phone within a few days after your discharge to answer any additional questions that you may have after going home  However, at this time we provide for you here, the most important instructions / recommendations at discharge:     · Notable Medication Adjustments -   · None   · Testing Required after Discharge -   · None  · Important follow up information -   · Please arrange a follow-up appointment with Dr Beryle [place], Urology  Contact information has been provided on a separate page  · Please arrange for a follow-up appointment with Gynecology  You had a biopsy of a vaginal mass performed  Please follow up with a gynecologist on the results  (Attached is the contact info for [facility] -- please schedule an appointment within the coming week)  · Other Instructions -   · Continue to take Eliquis  · Please review this entire after visit summary as additional general instructions including medication list, appointments, activity, diet, any pertinent wound care, and other additional recommendations from your care team that may be provided for you        Sincerely,     Rosario Martel DO

## 2020-07-12 NOTE — ASSESSMENT & PLAN NOTE
Lab Results   Component Value Date    HGBA1C 6 3 (H) 07/07/2020       Recent Labs     07/11/20  1100 07/12/20  0719   POCGLU 170* 165*       Blood Sugar Average: Last 72 hrs:  · decently controlled type 2 diabetes  · Hold metformin  · SSI for correction  · Q i d  Accu-Cheks    D/C back to home medications

## 2020-07-12 NOTE — PLAN OF CARE
Problem: Potential for Falls  Goal: Patient will remain free of falls  Description  INTERVENTIONS:  - Assess patient frequently for physical needs  -  Identify cognitive and physical deficits and behaviors that affect risk of falls    -  [place] fall precautions as indicated by assessment   - Educate patient/family on patient safety including physical limitations  - Instruct patient to call for assistance with activity based on assessment  - Modify environment to reduce risk of injury  - Consider OT/PT consult to assist with strengthening/mobility  Outcome: Progressing     Problem: PAIN - ADULT  Goal: Verbalizes/displays adequate comfort level or baseline comfort level  Description  Interventions:  - Encourage patient to monitor pain and request assistance  - Assess pain using appropriate pain scale  - Administer analgesics based on type and severity of pain and evaluate response  - Implement non-pharmacological measures as appropriate and evaluate response  - Consider cultural and social influences on pain and pain management  - Notify physician/advanced practitioner if interventions unsuccessful or patient reports new pain  Outcome: Progressing     Problem: INFECTION - ADULT  Goal: Absence or prevention of progression during hospitalization  Description  INTERVENTIONS:  - Assess and monitor for signs and symptoms of infection  - Monitor lab/diagnostic results  - Monitor all insertion sites, i e  indwelling lines, tubes, and drains  - Monitor endotracheal if appropriate and nasal secretions for changes in amount and color  - [place] appropriate cooling/warming therapies per order  - Administer medications as ordered  - Instruct and encourage patient and family to use good hand hygiene technique  - Identify and instruct in appropriate isolation precautions for identified infection/condition  Outcome: Progressing  Goal: Absence of fever/infection during neutropenic period  Description  INTERVENTIONS:  - Monitor WBC    Outcome: Progressing     Problem: SAFETY ADULT  Goal: Maintain or return to baseline ADL function  Description  INTERVENTIONS:  -  Assess patient's ability to carry out ADLs; assess patient's baseline for ADL function and identify physical deficits which impact ability to perform ADLs (bathing, care of mouth/teeth, toileting, grooming, dressing, etc )  - Assess/evaluate cause of self-care deficits   - Assess range of motion  - Assess patient's mobility; develop plan if impaired  - Assess patient's need for assistive devices and provide as appropriate  - Encourage maximum independence but intervene and supervise when necessary  - Involve family in performance of ADLs  - Assess for home care needs following discharge   - Consider OT consult to assist with ADL evaluation and planning for discharge  - Provide patient education as appropriate  Outcome: Progressing  Goal: Maintain or return mobility status to optimal level  Description  INTERVENTIONS:  - Assess patient's baseline mobility status (ambulation, transfers, stairs, etc )    - Identify cognitive and physical deficits and behaviors that affect mobility  - Identify mobility aids required to assist with transfers and/or ambulation (gait belt, sit-to-stand, lift, walker, cane, etc )  - [place] fall precautions as indicated by assessment  - Record patient progress and toleration of activity level on Mobility SBAR; progress patient to next Phase/Stage  - Instruct patient to call for assistance with activity based on assessment  - Consider rehabilitation consult to assist with strengthening/weightbearing, etc   Outcome: Progressing     Problem: Knowledge Deficit  Goal: Patient/family/caregiver demonstrates understanding of disease process, treatment plan, medications, and discharge instructions  Description  Complete learning assessment and assess knowledge base    Interventions:  - Provide teaching at level of understanding  - Provide teaching via preferred learning methods  Outcome: Progressing

## 2020-07-12 NOTE — UTILIZATION REVIEW
Notification of Inpatient Admission/Inpatient Authorization Request   This is a Notification of Inpatient Admission for [address]  Be advised that this patient was admitted to our facility under Inpatient Status  Contact Semaj Price at [phone] for additional admission information  [address] UR DEPT  DEDICATED [phone]  Patient Name:   Floridalma Deal   YOB: 1945       [address]   [address]:   [address]  Tax ID: [id number]  NPI: [id number] Attending Provider/NPI:  Address:  Phone: Logan Ayala, Fareed Giraldo [[id number]]  Same as the facility  [phone]   Place of Service Code: 24 Place of Service Name:  [address] [place] [address]   Start Date: 7/10/20 1935     Discharge Date & Time: No discharge date for patient encounter  Type of Admission: Inpatient Status Discharge Disposition   (if discharged): Home/Self Care   Patient Diagnoses: Ureterolithiasis [N20 1]  Kidney stones [N20 0]  Vaginal bleeding [N93 9]  Hematuria [R31 9]     Orders: Admission Orders (From admission, onward)     Ordered        07/10/20 1935  Inpatient Admission (expected length of stay for this patient Order details is greater than two midnights)  Once                    Assigned Utilization Review Contact: Semaj Price  Utilization   Network Utilization Review Department  Phone: [phone]; Fax [phone]  Email: [email or web address]   ATTENTION PAYERS: Please call the assigned Utilization  directly with any questions or concerns ALL voicemails in the department are confidential  Send all requests for admission clinical reviews, approved or denied determinations and any other requests to dedicated fax number belonging to the campus where the patient is receiving treatment

## 2020-07-12 NOTE — OP NOTE
OPERATIVE REPORT  PATIENT NAME: Cristopher Lesch    :  1945  MRN: [id number]  Pt Location: AN OR ROOM 01    SURGERY DATE: 2020    Surgeon(s) and Role:  Panel 1:     * Liyah Patel MD - Primary  Panel 2:     * Dania Snow MD - Primary     * Rita Muñoz MD - Assisting    Preop Diagnosis:  Hematuria [R31 9]  Ureterolithiasis [N20 1]    Post-Op Diagnosis Codes:     * Hematuria [R31 9]     * Ureterolithiasis [N20 1]  Calcified foreign body posterior vaginal wall    Procedure(s) (LRB):  CYSTOSCOPY RETROGRADE PYELOGRAM WITH INSERTION STENT URETERAL, bladder biopsy with fulguration (Right)  EXAM UNDER ANESTHESIA (EUA)  EXCISION OF POSTERIOR VAGINAL FOREIGN BODY (N/A)    Specimen(s):  ID Type Source Tests Collected by Time Destination   1 : BLADDER BIOPSY Tissue Urinary Bladder TISSUE EXAM Liyah Patel MD 2020 1350    2 : VAGINAL CALCIFICATION Tissue Soft Tissue, Other TISSUE EXAM Liyah Patel MD 2020 1428    3 : POSTERIOR VAGINAL WALL Tissue Vaginal TISSUE EXAM Liyah Patel MD 2020 1431        Estimated Blood Loss:   Minimal    Drains:  Ureteral Drain/Stent Right ureter 6 Fr  (Active)   Number of days: 1       Anesthesia Type:   General/LMA    Operative Indications:  Hematuria [R31 9]  Ureterolithiasis [N20 1]      Operative Findings:  Atrophic vagina with 1cm irregular calcified foreign body permanently sutured to posterior vaginal wall approximately 3 cm cephalad to the   introitus    Complications:   None    Procedure and Technique: An intraoperative gynecology consultation was requested by Dr Sara Allison from urology during cystoscopy for hematuria due to an intraoperative finding of a  mass noted to be protruding from vagina  The patient has a history of hematuria and is taking anticoagulation due to atrial fibrillation  She is noted to have undergone a hysterectomy in her past for benign disease        On examination under anesthesia,  The introitus is narrow due to atrophy and postmenopausal status  A 1 cm irregularly shaped, firm, calcified foreign body is noted to be completely protruding from the posterior vagina approximately 3cm cephalad to the introitus  There is an attenuated inflammatory stalk from which this protrudes from the posterior wall and the foreign body is anchored with a single permanent suture  This was excised along with the attenuated tissue and sent for pathology  The area was infiltrated with 1% lidocaine with epinephrine prior to excision and the defect was closed with 2-0 vicryl in interrupted fashion  At this point, the case was turned back over to Dr Vipul Romero            SIGNATURE: Angel Schmitz MD  DATE: July 12, 2020  TIME: 1:53 AM

## 2020-07-12 NOTE — PROGRESS NOTES
UROLOGY PROGRESS NOTE   Patient Identifiers: Emerson Santoyo (MRN [id number])  Date of Service: 7/12/2020        Assessment:     1  Greater than right cm renal calculus  - status post stent insertion  - not a candidate for shockwave lithotripsy or percutaneous nephrolithotomy given Eliquis use  We will plan for elective ureteroscopy    2  Pelvic organ prolapse status post repair by gynecology  - status post removal of vaginal calculus, bladder biopsy by GYN  - this was likely the source of her vaginal bleeding    Findings the time of surgery discussed the patient  Regarding next steps in her urologic care we will plan for ureteroscopy using a high-powered holmium laser at the optimal treatment in the setting of her requisite Eliquis use      Discussed options for management of the patient's ureteral stone  We discussed surgical options including ureteroscopy and shock wave lithotripsy  In addition we discussed conservative management with medical expulsive therapy  The patient has elected to undergo ureteroscopy  I discussed with the patients risks and benefits and alternatives to ureteroscopy with laser lithotripsy  The risks include bleeding, infection, injury to the urethra, bladder, ureter or kidney, risk of a staged procedure, risk of stricture, risk of residual fragments, risk of loss of kidney, risks of anesthesia including DVT, PE, MI and death  The patient understands that a ureteral stent will likely be left in place at the time of the procedure  We reviewed the expected postoperative care  The patient understands these risks and has provided informed consent for RIGHT ureteroscopy  Plan:   - patient is stable for discharge home from my standpoint  - I have asked my office to coordinate a right ureteroscopy using the high-powered holmium laser in the near future  - patient will need to follow up with gynecology for post discharge vaginal exam and to review her vaginal biopsy results  This should ideally be arranged with their service prior to hospital discharge    Thank you for allowing me to participate in patient's care  Urology to sign a        Subjective:     24 HR EVENTS:   no significant events  Patient has  no complaints        Objective:     VITALS:    Vitals:    07/12/20 0720   BP: 166/81   Pulse: 72   Resp: 16   Temp: 98 5 °F (36 9 °C)   SpO2: 97%       INS & OUTS:  [unfilled]    LABS:  Lab Results   Component Value Date    HGB 10 4 (L) 07/12/2020    HCT 32 3 (L) 07/12/2020    WBC 13 12 (H) 07/12/2020     07/12/2020   ]    Lab Results   Component Value Date     03/27/2017    K 4 2 07/12/2020     07/12/2020    CO2 29 07/12/2020    BUN 17 07/12/2020    CREATININE 0 89 07/12/2020    CALCIUM 8 2 (L) 07/12/2020    GLUCOSE 152 (H) 09/05/2015   ]    INPATIENT MEDS:    Current Facility-Administered Medications:     acetaminophen (TYLENOL) tablet 650 mg, 650 mg, Oral, Q6H PRN, Nima Prasad MD    ALPRAZolam [facility]) tablet 0 25 mg, 0 25 mg, Oral, TID PRN, Nima Prasad MD    amiodarone tablet 200 mg, 200 mg, Oral, Daily With Breakfast, Nima Prasad MD, 200 mg at 07/12/20 0908    apixaban (ELIQUIS) tablet 5 mg, 5 mg, Oral, BID, Juancho Obregon MD, 5 mg at 07/12/20 0908    atorvastatin (LIPITOR) tablet 40 mg, 40 mg, Oral, Daily, Nima Prasad MD, 40 mg at 07/12/20 0908    brimonidine-timolol (COMBIGAN) 0 2-0 5 % ophthalmic solution 1 drop, 1 drop, Both Eyes, BID, Nima Prasad MD, 1 drop at 07/12/20 0909    calcium carbonate (TUMS) chewable tablet 1,000 mg, 1,000 mg, Oral, Daily PRN, Nima Prasad MD    docusate sodium (COLACE) capsule 100 mg, 100 mg, Oral, BID, Nima Prasad MD, 100 mg at 07/12/20 0908    hydrALAZINE (APRESOLINE) injection 10 mg, 10 mg, Intravenous, Q6H PRN, Nima Prasad MD    HYDROmorphone (DILAUDID) injection 0 2 mg, 0 2 mg, Intravenous, Q4H PRN, Aissatou Hernandez MD    losartan (COZAAR) tablet 25 mg, 25 mg, Oral, Daily, Mirella Zuleta MD, 25 mg at 07/12/20 0908    metoprolol succinate (TOPROL-XL) 24 hr tablet 50 mg, 50 mg, Oral, Daily, Mirella Zuleta MD, 50 mg at 07/12/20 0908    nicotine (NICODERM CQ) 21 mg/24 hr TD 24 hr patch 1 patch, 1 patch, Transdermal, Daily, Mirella Zuleta MD, 1 patch at 07/12/20 0908    ondansetron (ZOFRAN) injection 4 mg, 4 mg, Intravenous, Q6H PRN, Mirella Zuleta MD    oxyCODONE (ROXICODONE) IR tablet 2 5 mg, 2 5 mg, Oral, Q4H PRN, Emelyn Benedict MD, 2 5 mg at 07/11/20 2126    oxyCODONE (ROXICODONE) IR tablet 5 mg, 5 mg, Oral, Q4H PRN, Emelyn Benedict MD, 5 mg at 07/11/20 2350    pantoprazole (PROTONIX) EC tablet 40 mg, 40 mg, Oral, Early Morning, Mirella Zuleta MD, 40 mg at 07/12/20 0519      Physical Exam:   /81   Pulse 72   Temp 98 5 °F (36 9 °C)   Resp 16   Ht 5' 1" (1 549 m)   Wt 54 6 kg (120 lb 6 4 oz)   SpO2 97%   BMI 22 75 kg/m²   GEN: resting comfortably  RESP: breathing comfortably with no accessory muscle use  CV: no significant peripheral edema  ABD: soft, non-tender, non-distended  INCISION: none  DRAINS: none

## 2020-07-13 ENCOUNTER — TRANSITIONAL CARE MANAGEMENT (OUTPATIENT)
Dept: FAMILY MEDICINE CLINIC | Facility: CLINIC | Age: 75
End: 2020-07-13

## 2020-07-13 NOTE — UTILIZATION REVIEW
Notification of Discharge  This is a Notification of Discharge from our facility [address]  Please be advised that this patient has been discharge from our facility  Below you will find the admission and discharge date and time including the patients disposition  PRESENTATION DATE: 7/10/2020  4:11 PM  OBS ADMISSION DATE:   IP ADMISSION DATE: 7/10/20 1935   DISCHARGE DATE: 7/12/2020  2:06 PM  DISPOSITION: Home/Self Care Home/Self Care   Admission Orders listed below:  Admission Orders (From admission, onward)     Ordered        07/10/20 1935  Inpatient Admission (expected length of stay for this patient Order details is greater than two midnights)  Once                   Please contact the UR Department if additional information is required to close this patient's authorization/case  [address] [facility] Utilization Review Department  Main: [phone] x carefully listen to the prompts  All voicemails are confidential   [email or web address]  Send all requests for admission clinical reviews, approved or denied determinations and any other requests to dedicated fax number below belonging to the campus where the patient is receiving treatment   List of dedicated fax numbers:  [address] DENIALS (Administrative/Medical Necessity) [phone]   [address] (Maternity/NICU/Pediatrics) [phone]   Claire Fines [phone]   Michelle s [phone]   Tiny Georgian [phone]   Lacy Cash [address] [facility] [phone]   Jose D Villagomez [phone]   [address] [facility] [address]  [address] [facility] [address] [facility] [phone]

## 2020-07-13 NOTE — TELEPHONE ENCOUNTER
Post Op Note    Harinder Bradshaw is a 77 y o  female s/p CYSTOSCOPY RETROGRADE PYELOGRAM WITH INSERTION STENT URETERAL, bladder biopsy with fulguration (Right Bladder)      EXAM UNDER ANESTHESIA (EUA)  EXCISION OF POSTERIOR VAGINAL FOREIGN BODY  performed 7/11/20  Harinder Bradshaw is a patient of Dr Quay Brunner and is seen at the [facility] office  Called and spoke with patient at this time  She is feeling well after her procedure  She reports the blood in her urine is almost completely resolved  Advised we have cancelled her upcoming appointments as they will not be necessary  Dr Diana Reynolds surgery scheduler will be reaching out to her in the coming days to schedule second procedure  Advised she will not need office visit prior to surgery, only a urine culture  Patient verbalized understanding and had no further questions

## 2020-07-14 ENCOUNTER — TELEPHONE (OUTPATIENT)
Dept: FAMILY MEDICINE CLINIC | Facility: CLINIC | Age: 75
End: 2020-07-14

## 2020-07-14 ENCOUNTER — TELEPHONE (OUTPATIENT)
Dept: UROLOGY | Facility: AMBULATORY SURGERY CENTER | Age: 75
End: 2020-07-14

## 2020-07-14 NOTE — TELEPHONE ENCOUNTER
Per Kimberly [facility], [address] [facility], [employer], [phone], no [place] Corre[place] req[place] for [id number], call ref [id number]

## 2020-07-14 NOTE — TELEPHONE ENCOUNTER
I confirmed surgery date 8/7/20 with Lupe Brown during phone call  She will have her ucx and Covid19 testing done 7/28/20 at [facility]  Orders are in the system

## 2020-07-17 ENCOUNTER — OFFICE VISIT (OUTPATIENT)
Dept: CARDIOLOGY CLINIC | Facility: CLINIC | Age: 75
End: 2020-07-17
Payer: COMMERCIAL

## 2020-07-17 VITALS
HEIGHT: 61 IN | SYSTOLIC BLOOD PRESSURE: 138 MMHG | BODY MASS INDEX: 22.28 KG/M2 | DIASTOLIC BLOOD PRESSURE: 70 MMHG | WEIGHT: 118 LBS | HEART RATE: 74 BPM | OXYGEN SATURATION: 97 %

## 2020-07-17 DIAGNOSIS — Z95.0 PACEMAKER: ICD-10-CM

## 2020-07-17 DIAGNOSIS — I25.10 ATHEROSCLEROSIS OF CORONARY ARTERY OF NATIVE HEART WITHOUT ANGINA PECTORIS, UNSPECIFIED VESSEL OR LESION TYPE: Primary | ICD-10-CM

## 2020-07-17 DIAGNOSIS — I71.2 ANEURYSM OF ASCENDING AORTA (HCC): ICD-10-CM

## 2020-07-17 DIAGNOSIS — I10 ESSENTIAL HYPERTENSION: ICD-10-CM

## 2020-07-17 DIAGNOSIS — Z01.810 PREOP CARDIOVASCULAR EXAM: ICD-10-CM

## 2020-07-17 DIAGNOSIS — I49.5 TACHY-BRADY SYNDROME (HCC): ICD-10-CM

## 2020-07-17 LAB
LEFT EYE DIABETIC RETINOPATHY: NORMAL
RIGHT EYE DIABETIC RETINOPATHY: NORMAL

## 2020-07-17 PROCEDURE — 3075F SYST BP GE 130 - 139MM HG: CPT | Performed by: INTERNAL MEDICINE

## 2020-07-17 PROCEDURE — 99214 OFFICE O/P EST MOD 30 MIN: CPT | Performed by: INTERNAL MEDICINE

## 2020-07-17 PROCEDURE — 1111F DSCHRG MED/CURRENT MED MERGE: CPT | Performed by: INTERNAL MEDICINE

## 2020-07-17 PROCEDURE — 3008F BODY MASS INDEX DOCD: CPT | Performed by: INTERNAL MEDICINE

## 2020-07-17 PROCEDURE — 4040F PNEUMOC VAC/ADMIN/RCVD: CPT | Performed by: INTERNAL MEDICINE

## 2020-07-17 PROCEDURE — 3078F DIAST BP <80 MM HG: CPT | Performed by: INTERNAL MEDICINE

## 2020-07-17 PROCEDURE — 2022F DILAT RTA XM EVC RTNOPTHY: CPT | Performed by: INTERNAL MEDICINE

## 2020-07-17 PROCEDURE — 4004F PT TOBACCO SCREEN RCVD TLK: CPT | Performed by: INTERNAL MEDICINE

## 2020-07-17 PROCEDURE — 3044F HG A1C LEVEL LT 7.0%: CPT | Performed by: INTERNAL MEDICINE

## 2020-07-17 PROCEDURE — 1160F RVW MEDS BY RX/DR IN RCRD: CPT | Performed by: INTERNAL MEDICINE

## 2020-07-17 PROCEDURE — 3060F POS MICROALBUMINURIA REV: CPT | Performed by: INTERNAL MEDICINE

## 2020-07-17 NOTE — PATIENT INSTRUCTIONS
Recommendations:  1  Continue current medications  2  Proceed with surgery  3  Follow up in 4 months

## 2020-07-17 NOTE — PROGRESS NOTES
Cardiology   Qing Roa 77 y o  female MRN: [id number]        Impression:  1  CAD s/p CABG - doing well  No obstructive dx in grafts per cath 5/20   2  Paroxysmal atrial fibrillation/flutter - s/p PPM   On anticoagulation  3  Hypertension - controlled  4  PAD  5  Hematuria/kidney stones - patient at acceptable cardiovascular risk to proceed with surgery  Recommendations:  1  Continue current medications  2  Proceed with surgery  3  Follow up in 4 months  HPI: Floridalma Deal is a 77y o  year old female with CAD s/p CABG x3 (patent grafts 5/20), PAF/flutter, sss s/p PPM 6/20, HTN, PAD, who presents for follow up  Has been having hematuria, and is scheduled to undergo surgery for kidney stones  Does have some palpitations  Doing well besides the hematuria  Per Dr Caitlin Mims note, does not need to hold Eliquis  Review of Systems   Constitutional: Negative  HENT: Negative  Eyes: Negative  Respiratory: Negative for chest tightness and shortness of breath  Cardiovascular: Negative for chest pain, palpitations and leg swelling  Gastrointestinal: Negative  Endocrine: Negative  Genitourinary: Positive for hematuria  Musculoskeletal: Negative  Skin: Negative  Allergic/Immunologic: Negative  Neurological: Negative  Hematological: Negative  Psychiatric/Behavioral: Negative  All other systems reviewed and are negative          Past Medical History:   Diagnosis Date    Atypical chest pain     NSTEMI (non-ST elevated myocardial infarction) ([facility] [address] )     Last Assessed: 9/24/2015     Past Surgical History:   Procedure Laterality Date    A-V CARDIAC PACEMAKER INSERTION      ANKLE SURGERY      BACK SURGERY  01/2011    L4 and L5 laminectomy and fusion     BLADDER SURGERY      CORONARY ARTERY BYPASS GRAFT  09/02/2015    CABGx3 with LIMA to LAD, SVG to PDA, SVG to OM-1    EXAMINATION UNDER ANESTHESIA N/A 7/11/2020    Procedure: EXAM UNDER ANESTHESIA (EUA)  EXCISION OF POSTERIOR VAGINAL FOREIGN BODY;  Surgeon: Kimberly Alexander MD;  [facility];  Service: Gynecology    FL RETROGRADE PYELOGRAM  7/11/2020    HYSTERECTOMY      LA CYSTOURETHROSCOPY,URETER CATHETER Right 7/11/2020    Procedure: CYSTOSCOPY RETROGRADE PYELOGRAM WITH INSERTION STENT URETERAL, bladder biopsy with fulguration;  Surgeon: Denice Ojeda MD;  [facility];  Service: Urology    WRIST SURGERY       Social History     Substance and Sexual Activity   Alcohol Use Not Currently     Social History     Substance and Sexual Activity   Drug Use Never     Social History     Tobacco Use   Smoking Status Current Every Day Smoker    Packs/day: 0 25    Start date: 5   Smokeless Tobacco Never Used   Tobacco Comment    Started smoking at age 25; currently smoking <1ppd  Family History   Problem Relation Age of Onset    Hypertension Mother         Essential    Hypertension Sister         Essential    Alcohol abuse Brother     Cirrhosis Brother     Diabetes Father     Other Brother         Heart transplant in his 46s    Lung cancer Sister     Diabetes Brother     Stroke Sister     No Known Problems Daughter        Allergies:  No Known Allergies    Medications:     Current Outpatient Medications:     ALPRAZolam (XANAX) 0 25 mg tablet, Take 1 tablet (0 25 mg total) by mouth 3 (three) times a day as needed for anxiety, Disp: 270 tablet, Rfl: 0    amiodarone 200 mg tablet, 200 mg PO daily  , Disp: 90 tablet, Rfl: 3    apixaban (Eliquis) 5 mg, Take 1 tablet (5 mg total) by mouth 2 (two) times a day, Disp: 180 tablet, Rfl: 3    atorvastatin (LIPITOR) 40 mg tablet, Take 1 tablet (40 mg total) by mouth daily, Disp: 90 tablet, Rfl: 3    COMBIGAN 0 2-0 5 %, , Disp: , Rfl:     losartan (COZAAR) 25 mg tablet, Take 1 tablet (25 mg total) by mouth daily, Disp: 90 tablet, Rfl: 3    metFORMIN (GLUCOPHAGE) 500 mg tablet, Take 1 tablet (500 mg total) by mouth 2 (two) times a day with meals for 5000 doses, Disp: 180 tablet, Rfl: 3    metoprolol succinate (TOPROL-XL) 50 mg 24 hr tablet, Take 1 tablet (50 mg total) by mouth daily, Disp: 90 tablet, Rfl: 3    omeprazole (PriLOSEC) 20 mg delayed release capsule, Take 1 capsule (20 mg total) by mouth daily, Disp: 90 capsule, Rfl: 3    traMADol (ULTRAM) 50 mg tablet, Take 2 tablets (100 mg total) by mouth 2 (two) times a day, Disp: 60 tablet, Rfl: 3    brimonidine (ALPHAGAN P) 0 15 % ophthalmic solution, Administer 1 drop to both eyes 2 (two) times a day (Patient not taking: Reported on 7/10/2020), Disp: 5 mL, Rfl: 0      Wt Readings from Last 3 Encounters:   07/17/20 53 5 kg (118 lb)   07/10/20 54 6 kg (120 lb 6 4 oz)   07/10/20 54 4 kg (120 lb)     Temp Readings from Last 3 Encounters:   07/12/20 98 5 °F (36 9 °C)   07/10/20 98 2 °F (36 8 °C)   06/11/20 98 2 °F (36 8 °C)     BP Readings from Last 3 Encounters:   07/17/20 138/70   07/12/20 166/81   07/10/20 134/80     Pulse Readings from Last 3 Encounters:   07/17/20 74   07/12/20 72   07/10/20 72         Physical Exam   Constitutional: She is oriented to person, place, and time  She appears well-developed  HENT:   Head: Atraumatic  Eyes: EOM are normal    Neck: Normal range of motion  Cardiovascular: Normal rate, regular rhythm and normal heart sounds  Exam reveals no gallop  No murmur heard  Pulmonary/Chest: Effort normal and breath sounds normal    Abdominal: Soft  Musculoskeletal: Normal range of motion  Neurological: She is alert and oriented to person, place, and time  Skin: Skin is warm and dry  Psychiatric: She has a normal mood and affect           Laboratory Studies:  CMP:  Lab Results   Component Value Date     03/27/2017    K 4 2 07/12/2020     07/12/2020    CO2 29 07/12/2020    ANIONGAP 7 09/05/2015    BUN 17 07/12/2020    CREATININE 0 89 07/12/2020    GLUCOSE 152 (H) 09/05/2015    AST 11 07/07/2020    ALT 16 07/07/2020    BILITOT 0 5 03/27/2017    EGFR 64 07/12/2020 Lipid Profile:   Lab Results   Component Value Date    CHOL 164 2017     Lab Results   Component Value Date    HDL 52 2020     Lab Results   Component Value Date    LDLCALC 55 2020     Lab Results   Component Value Date    TRIG 67 2020       Cardiac testing:     Results for orders placed during the hospital encounter of 20   Echo complete with contrast if indicated    Narrative [address], [address] [facility]  [phone]    Transthoracic Echocardiogram  2D, M-mode, Doppler, and Color Doppler    Study date:  26-May-2020    Patient: Victor Manuel Webber  MR number: [id number]  Account number: [de-identified]  : 1945  Age: 77 years  Gender: Female  Status: Inpatient  Location: Bedside  Height: 61 in  Weight: 124 7 lb  BP: 142/ 58 mmHg    Indications: Acute MI  Diagnoses: I24 9 - Acute ischemic heart disease, unspecified    Sonographer:  Joan Owens RDCS  Referring Physician:  Manny Prabhakar MD  Group:  [facility]  Interpreting Physician:  Jeet Cuba MD    SUMMARY    LEFT VENTRICLE:  Systolic function was normal  Ejection fraction was estimated to be 60 %  There were no regional wall motion abnormalities  Wall thickness was at the upper limits of normal   Features were consistent with a pseudonormal left ventricular filling pattern, with concomitant abnormal relaxation and increased filling pressure (grade 2 diastolic dysfunction)  LEFT ATRIUM:  The atrium was mildly to moderately dilated  MITRAL VALVE:  There was mild annular calcification  There was mild regurgitation  AORTIC VALVE:  There was mild regurgitation  TRICUSPID VALVE:  There was mild regurgitation  AORTA:  The root exhibited mild dilatation  There was mild dilatation of the ascending aorta  HISTORY: PRIOR HISTORY: History of CABG x3  CAD  Hypertension  DM2  Ascending aortic aneurysm  Anxiety  GERD  Smoker   Atrial fibrillation  PROCEDURE: The procedure was performed at the bedside  This was a routine study  The transthoracic approach was used  The study included complete 2D imaging, M-mode, complete spectral Doppler, and color Doppler  The heart rate was 77 bpm,  at the start of the study  Echocardiographic views were limited due to high windows  This was a technically difficult study  LEFT VENTRICLE: Size was normal  Systolic function was normal  Ejection fraction was estimated to be 60 %  There were no regional wall motion abnormalities  Wall thickness was at the upper limits of normal  DOPPLER: Features were  consistent with a pseudonormal left ventricular filling pattern, with concomitant abnormal relaxation and increased filling pressure (grade 2 diastolic dysfunction)  RIGHT VENTRICLE: The size was normal  Systolic function was normal  Wall thickness was normal     LEFT ATRIUM: The atrium was mildly to moderately dilated  RIGHT ATRIUM: Size was normal     MITRAL VALVE: There was mild annular calcification  Valve structure was normal  There was normal leaflet separation  DOPPLER: The transmitral velocity was within the normal range  There was no evidence for stenosis  There was mild  regurgitation  AORTIC VALVE: The valve was trileaflet  Leaflets exhibited normal thickness and normal cuspal separation  DOPPLER: Transaortic velocity was within the normal range  There was no evidence for stenosis  There was mild regurgitation  TRICUSPID VALVE: The valve structure was normal  There was normal leaflet separation  DOPPLER: The transtricuspid velocity was within the normal range  There was no evidence for stenosis  There was mild regurgitation  PULMONIC VALVE: Leaflets exhibited normal thickness, no calcification, and normal cuspal separation  DOPPLER: The transpulmonic velocity was within the normal range  There was no significant regurgitation  PERICARDIUM: There was no pericardial effusion   The pericardium was normal in appearance  AORTA: The root exhibited mild dilatation  There was mild dilatation of the ascending aorta  SYSTEMIC VEINS: IVC: The inferior vena cava was normal in size  PULMONARY VEINS: DOPPLER: There was systolic blunting in the pulmonary vein(s)  SYSTEM MEASUREMENT TABLES    2D  %FS: 30 49 %  AV Diam: 4 cm  EDV(Teich): 61 09 ml  EF(Cube): 66 42 %  EF(Teich): 58 72 %  ESV(Cube): 18 1 ml  ESV(Teich): 25 22 ml  IVSd: 1 cm  LA Area: 24 64 cm2  LA Diam: 3 12 cm  LVEDV MOD A4C: 66 08 ml  LVEF MOD A4C: 63 63 %  LVESV MOD A4C: 24 03 ml  LVIDd: 3 78 cm  LVIDs: 2 63 cm  LVLd A4C: 7 09 cm  LVLs A4C: 5 73 cm  LVPWd: 1 cm  RA Area: 11 79 cm2  RV Diam: 2 76 cm  SI(Cube): 23 1 ml/m2  SI(Teich): 23 14 ml/m2  SV MOD A4C: 42 05 ml  SV(Cube): 35 81 ml  SV(Teich): 35 87 ml    CW  TR MaxP 47 mmHg  TR Vmax: 2 62 m/s    MM  TAPSE: 1 61 cm    PW  E': 0 06 m/s  E/E': 12 03  MV A Marcos: 0 8 m/s  MV Dec [place]: 3 66 m/s2  MV DecT: 210 88 ms  MV E Marcos: 0 77 m/s  MV E/A Ratio: 0 96    [facility] Accredited Echocardiography Laboratory    Prepared and electronically signed by    Janel Meeks MD  Signed 26-May-2020 11:14:41       No results found for this or any previous visit  Results for orders placed during the hospital encounter of 20   Cardiac catheterization    Narrative [facility], [address] [facility]  [phone]    [facility]    Invasive Cardiovascular Lab Complete Report    Patient: Marcus Arshad  MR number: [id number]  Account number: [de-identified]  Study date: 2020  Gender: Female  : 1945  Height: 61 in  Weight: 124 7 lb  BSA: 1 55 mï¾²    Allergies: NO KNOWN ALLERGIES    Diagnostic Cardiologist:  Sylvia Hill MD    SUMMARY    CORONARY CIRCULATION:  Left main: Normal   LAD: The vessel was small sized  There was a 90% stenosis in mid vessel  The distal LAD filled via a LIMA bypass conduit   The diagonal branches were small and free of critical disease  Circumflex: The vessel was medium sized  There was an 80% stenosis in mid vessel  The lesion was interrogated by iFR and was negative for flow significance (iFR=1 0)  The OM branch contained a 95% stenosis and filled by an SVG  RCA: The vessel was normal sized and dominant, giving rise to the PDA and a posterolateral branch  There was a total occlusion in mid vessel  The PDA and distal RCA filled by an SVG  Graft to the LAD: The graft was a LIMA  The body of the graft and the distal anastomosis were free of significant obstruction  The small LAD filled from the LIMA and was free of critical distal disease  Graft to the 1st obtuse marginal: The graft was a saphenous vein graft from the ascending aorta  The body of the graft and the anastomses were normal  The grafted OM1 filled well from the SVG and was free of critical disease  Graft to the RPDA: The graft was a saphenous vein graft from the ascending aorta  The body of the graft and the anastomses were normal  The grafted PDA filled well from the SVG and was free of critical disease  The PDA sent retrograde flow  to the distal RCA and the major posterolateral branch  Summary:  Multivessel CAD with patent grafts  The patient is completely revascularized  Plan: smoking cessation  Follow-up with cardiology  INDICATIONS:  --  Possible CAD: myocardial infarction without ST elevation (NSTEMI)  PROCEDURES PERFORMED    --  Left coronary angiography  --  Right coronary angiography  --  Saphenous vein graft angiography  --  Saphenous vein graft angiography  --  LIMA graft angiography  --  Diagnostic myocardial fractional flow reserve  --  Inpatient  --  Mod Sedation Same Physician Initial 15min  --  Mod Sedation Same Physician Add 15min  --  Coronary Catheterization (w/o LHC, w/Grafts)  --  Myocardial Flow Reserve      PROCEDURE: The risks and alternatives of the procedures and conscious sedation were explained to the patient and informed consent was obtained  The patient was brought to the cath lab and placed on the table  The planned puncture sites  were prepped and draped in the usual sterile fashion  --  Right femoral artery access  The puncture site was infiltrated with local anesthetic  The vessel was accessed using the modified Seldinger technique, a wire was advanced into the vessel, and a sheath was advanced over the wire into the  vessel  --  Left coronary artery angiography  A catheter was advanced over a guidewire into the aorta and positioned in the left coronary artery ostium under fluoroscopic guidance  Angiography was performed  --  Right coronary artery angiography  A catheter was advanced over a guidewire into the aorta and positioned in the right coronary artery ostium under fluoroscopic guidance  Angiography was performed  --  Saphenous vein graft angiography  A catheter was advanced to the aorta and positioned at the aortic anastomosis of the graft over a guidewire under fluoroscopic guidance  Angiography was performed  --  Saphenous vein graft angiography  A catheter was advanced to the aorta and positioned at the aortic anastomosis of the graft over a guidewire under fluoroscopic guidance  Angiography was performed  --  Left internal mammary graft angiography  A catheter was advanced to the aorta and positioned in the left subclavian artery over a guidewire under fluoroscopic guidance  Angiography was performed  --  Myocardial fractional flow reserve  Flow reserve was measured using a 0 014" pressure-monitoring guide-wire  Steady baseline values were obtained  Mean arterial pressure and mean distal coronary pressures were then obtained at maximum  hyperemia  --  Inpatient  --  Mod Sedation Same Physician Initial 15min  --  Mod Sedation Same Physician Add 15min  --  Coronary Catheterization (w/o LHC, w/Grafts)  --  Myocardial Flow Reserve      PROCEDURE COMPLETION: The patient tolerated the procedure well and was discharged from the cath lab  TIMING: Test started at 09:50  Test concluded at 10:35  HEMOSTASIS: The sheath was removed over a wire and the Angioseal delivery sheath  was inserted into the femoral artery  Hemostasis was obtained using a closure device ( Angioseal) deployed through the delivery sheath  MEDICATIONS GIVEN: Fentanyl (1OOmcg/2 ml), 50 mcg, IV, at 09:55  Versed (2mg/2ml), 2 mg, IV, at 09:55   1% Lidocaine, 5 ml, subcutaneously, at 10:06  Heparin 1000 units/ml, 5,000 units, IV, at 10:23  CONTRAST GIVEN: 100 ml Omnipaque (350mg I /ml)  20 ml Omnipaque (350mg I /ml)  RADIATION EXPOSURE: Fluoroscopy time: 8 75 min  CORONARY VESSELS:   --  Left main: Normal   --  LAD: The vessel was small sized  There was a 90% stenosis in mid vessel  The distal LAD filled via a LIMA bypass conduit  The diagonal branches were small and free of critical disease  --  Circumflex: The vessel was medium sized  There was an 80% stenosis in mid vessel  The lesion was interrogated by iFR and was negative for flow significance (iFR=1 0)  The OM branch contained a 95% stenosis and filled by an SVG  --  RCA: The vessel was normal sized and dominant, giving rise to the PDA and a posterolateral branch  There was a total occlusion in mid vessel  The PDA and distal RCA filled by an SVG  --  Graft to the LAD: The graft was a LIMA  The body of the graft and the distal anastomosis were free of significant obstruction  The small LAD filled from the LIMA and was free of critical distal disease  --  Graft to the 1st obtuse marginal: The graft was a saphenous vein graft from the ascending aorta  The body of the graft and the anastomses were normal  The grafted OM1 filled well from the SVG and was free of critical disease  --  Graft to the RPDA: The graft was a saphenous vein graft from the ascending aorta   The body of the graft and the anastomses were normal  The grafted PDA filled well from the SVG and was free of critical disease  The PDA sent retrograde  flow to the distal RCA and the major posterolateral branch  NOTE: Right femoral access  Hemostasis was achieved with an Angioseal device  There were no complications  iFR was performed using a pressure wire advanced across the stenosis in the circumflex and measuring the relative pressure distal and proximal to the stenosis  Prepared and signed by    Maikol Us MD  Signed 2020 11:04:02    CC: Dr Marlena Estes    CC: Dr Anisa Uribe    Study diagram    Hemodynamic tables    Pressures:  Baseline  Pressures:  - HR: 110  Pressures:  - Rhythm:  Pressures:  -- Aortic Pressure (S/D/M): 102/70/78    Outputs:  Baseline  Outputs:  -- CALCULATIONS: [age]  Outputs:  -- CALCULATIONS: Body Surface Area: 1 55  Outputs:  -- CALCULATIONS: Height in cm: 155 00  Outputs:  -- CALCULATIONS: Sex: Female  Outputs:  -- CALCULATIONS: Weight in k 70

## 2020-07-24 ENCOUNTER — TELEPHONE (OUTPATIENT)
Dept: UROLOGY | Facility: MEDICAL CENTER | Age: 75
End: 2020-07-24

## 2020-07-24 DIAGNOSIS — N32.89 BLADDER SPASMS: Primary | ICD-10-CM

## 2020-07-24 RX ORDER — OXYBUTYNIN CHLORIDE 5 MG/1
5 TABLET ORAL 3 TIMES DAILY
Qty: 30 TABLET | Refills: 1 | Status: SHIPPED | OUTPATIENT
Start: 2020-07-24 | End: 2020-08-25 | Stop reason: ALTCHOICE

## 2020-07-24 NOTE — TELEPHONE ENCOUNTER
Called and spoke with patient  Reviewed oxybutynin RX for stent related urinary symptoms  Side effects reviewed as well  Patient verbalized understanding

## 2020-07-24 NOTE — TELEPHONE ENCOUNTER
Patient recently admitted with gross hematuria on 7/10/2020  She had CYSTOSCOPY RETROGRADE PYELOGRAM WITH INSERTION STENT URETERAL, bladder biopsy with fulguration (Right Bladder)EXAM UNDER ANESTHESIA (EUA)  EXCISION OF POSTERIOR VAGINAL FOREIGN BODY on 7/11/2020 with Dr Sade Soni  She is noted to have a large kidney stone of the right side  She is on eliquis according to her chart  She is scheduled for upcoming URS and stent change with Dr Sade Soni on 8/7/2020  Called and spoke with patient  She reports today is the first day she has not had hematuria  She reports urine all week prior to today has been reddish/orange/felecia  She denies fever, chills, nausea and vomiting  She does report almost constant urgency and sometime just dribbles but ultimately feels like she is emptying her bladder  I reviewed that we have already looked in her bladder to rule out bladder cancer which is the major concern with urinary bleeding  I also reviewed bleeding can be common after urological intervention, with large kidney stones or with ureteral stents and she has all three of these  Plus she is on anticoagulation which makes her more prone to more exaggerated bleeding  I stressed it is good that her urine has cleared but reviewed she may still see intermittent bleeding until she ultimately undergoes definitive intervention for her stone and is 6 weeks out from bladder biopsy  I encouraged hydration and patient reports she is drinking lots of water  I also reviewed common stent symptoms and that the urgency is likely related to the stent  She is not on any medication to help with stent side effects at this time and may be interested in something at this time  Will review with provider and be in touch with recommendations  Er precautions for severe hematuria, fever, chills, nausea, vomiting or the inability to urinate over the weekend stressed  Patient verbalized understanding and agrees to plan

## 2020-07-24 NOTE — TELEPHONE ENCOUNTER
Pt managed by Sheila Cobian, pt's  Ruma Redding called to check on pt,pt reports she having gross hematuria she is house sitting and does not have urology's phone number,Pushpa galindo asking clinical member to reach out to pt cell# [phone]

## 2020-07-28 ENCOUNTER — APPOINTMENT (OUTPATIENT)
Dept: LAB | Facility: CLINIC | Age: 75
End: 2020-07-28
Payer: COMMERCIAL

## 2020-07-28 DIAGNOSIS — N20.0 NEPHROLITHIASIS: ICD-10-CM

## 2020-07-28 PROCEDURE — 87086 URINE CULTURE/COLONY COUNT: CPT

## 2020-07-28 PROCEDURE — U0003 INFECTIOUS AGENT DETECTION BY NUCLEIC ACID (DNA OR RNA); SEVERE ACUTE RESPIRATORY SYNDROME CORONAVIRUS 2 (SARS-COV-2) (CORONAVIRUS DISEASE [COVID-19]), AMPLIFIED PROBE TECHNIQUE, MAKING USE OF HIGH THROUGHPUT TECHNOLOGIES AS DESCRIBED BY CMS-2020-01-R: HCPCS

## 2020-07-29 LAB — SARS-COV-2 RNA SPEC QL NAA+PROBE: NOT DETECTED

## 2020-07-30 LAB — BACTERIA UR CULT: NORMAL

## 2020-08-05 ENCOUNTER — OFFICE VISIT (OUTPATIENT)
Dept: OBGYN CLINIC | Facility: CLINIC | Age: 75
End: 2020-08-05
Payer: COMMERCIAL

## 2020-08-05 VITALS
WEIGHT: 115 LBS | SYSTOLIC BLOOD PRESSURE: 118 MMHG | HEIGHT: 60 IN | TEMPERATURE: 97.8 F | BODY MASS INDEX: 22.58 KG/M2 | DIASTOLIC BLOOD PRESSURE: 72 MMHG

## 2020-08-05 DIAGNOSIS — T85.9XXD COMPLICATION OF IMPLANTED VAGINAL MESH, SUBSEQUENT ENCOUNTER: ICD-10-CM

## 2020-08-05 DIAGNOSIS — T19.2XXD FOREIGN BODY IN VAGINA, SUBSEQUENT ENCOUNTER: Primary | ICD-10-CM

## 2020-08-05 PROCEDURE — 3060F POS MICROALBUMINURIA REV: CPT | Performed by: OBSTETRICS & GYNECOLOGY

## 2020-08-05 PROCEDURE — 1160F RVW MEDS BY RX/DR IN RCRD: CPT | Performed by: OBSTETRICS & GYNECOLOGY

## 2020-08-05 PROCEDURE — 99203 OFFICE O/P NEW LOW 30 MIN: CPT | Performed by: OBSTETRICS & GYNECOLOGY

## 2020-08-05 PROCEDURE — 1111F DSCHRG MED/CURRENT MED MERGE: CPT | Performed by: OBSTETRICS & GYNECOLOGY

## 2020-08-05 PROCEDURE — 3044F HG A1C LEVEL LT 7.0%: CPT | Performed by: OBSTETRICS & GYNECOLOGY

## 2020-08-05 PROCEDURE — 3078F DIAST BP <80 MM HG: CPT | Performed by: OBSTETRICS & GYNECOLOGY

## 2020-08-05 PROCEDURE — 2022F DILAT RTA XM EVC RTNOPTHY: CPT | Performed by: OBSTETRICS & GYNECOLOGY

## 2020-08-05 PROCEDURE — 3074F SYST BP LT 130 MM HG: CPT | Performed by: OBSTETRICS & GYNECOLOGY

## 2020-08-05 PROCEDURE — 4004F PT TOBACCO SCREEN RCVD TLK: CPT | Performed by: OBSTETRICS & GYNECOLOGY

## 2020-08-05 PROCEDURE — 4040F PNEUMOC VAC/ADMIN/RCVD: CPT | Performed by: OBSTETRICS & GYNECOLOGY

## 2020-08-05 NOTE — PRE-PROCEDURE INSTRUCTIONS
Pre-Surgery Instructions:   Medication Instructions    ALPRAZolam (XANAX) 0 25 mg tablet Instructed patient per Anesthesia Guidelines   amiodarone 200 mg tablet Instructed patient per Anesthesia Guidelines   apixaban (Eliquis) 5 mg Patient was instructed by Physician and understands   atorvastatin (LIPITOR) 40 mg tablet Instructed patient per Anesthesia Guidelines   brimonidine (ALPHAGAN P) 0 15 % ophthalmic solution Instructed patient per Anesthesia Guidelines   COMBIGAN 0 2-0 5 % Instructed patient per Anesthesia Guidelines   losartan (COZAAR) 25 mg tablet Instructed patient per Anesthesia Guidelines   metFORMIN (GLUCOPHAGE) 500 mg tablet Instructed patient per Anesthesia Guidelines   metoprolol succinate (TOPROL-XL) 50 mg 24 hr tablet Instructed patient per Anesthesia Guidelines   omeprazole (PriLOSEC) 20 mg delayed release capsule Instructed patient per Anesthesia Guidelines   oxybutynin (DITROPAN) 5 mg tablet Instructed patient per Anesthesia Guidelines   traMADol (ULTRAM) 50 mg tablet Instructed patient per Anesthesia Guidelines      Pre-op medication, and showering instructions with antibacteral soap reviewed

## 2020-08-05 NOTE — PROGRESS NOTES
Assessment/Plan:   Diagnoses and all orders for this visit:    Foreign body in vagina, subsequent encounter  - pathology report reviewed  Vagina, Foreign Body (Calcification), Removal/Excision:   - Calculus, gross examination only (see "Gross Description")        C  Vagina, Posterior Wall, Biopsy:   - Benign squamous mucosa with localized chronic inflammation, fibrosis, calcification, and foreign body giant cell reaction, suggestive of ruptured cyst     Complication of implanted vaginal mesh, subsequent encounter  Discussed with patient pathology report that was reviewed from her surgical operation on 2020  There is concern for mesh erosion at that time due to calcification that was visualized within the vagina approximately 3 cm cephalad to the hymen  That was removed intraoperatively by gyn consultation from Urology  Today's examination is benign with patient having minimal symptoms and denies any vaginal bleeding  No mesh erosion and no tenderness palpated or visualized on speculum exam and bimanual exam   At this time will continue to monitor and recommended for patient to be followed annually for any further complications  Patient is due for follow-up with Urology for removal of ureteral stent that was placed at time surgery as well as lithotripsy  Patient agreeable to plan that was provided  Follow-up for annual visit in 6 months to 1 year  Also discussed precautions for other reasons for follow-up including vaginal bleeding, vaginal irritation or vaginal pain at which time patient should be evaluated earlier  Subjective:    Patient ID: Jamilah Moseley is a 77 y o  female  Chief Complaint   Patient presents with   [facility]     new pt     Follow-up     ER f/u, pt went to ER on 07/10/20 for vaginal bleeding, states she had a hysterectomy  had testing done and on CT found a vaginal mass  Patient is a 26-year-old  presenting today as a new patient for gyn problem visit  She was seen in the emergency department in early July 2020 for vaginal bleeding  She is status post hysterectomy  She was worked up and found to have hematuria as well as the right renal calculus measuring greater than 1 cm which was treated by Urology via surgical cystoscopy with a right retrograde pyelogram and ureteral stent placement  During her surgical operations she was found to have a calcified posterior vaginal mass that was examined via intraoperative gynecologic consult  The mass was removed was measuring approximately 1 4 cm and attached from the posterior vaginal wall by cutting the permanent suture  Mass is described as 1 cm irregular calcified foreign body firmly sutured vaginal wall approximately 3 cm cephalad to the introitus  There was concern for mesh erosion  On chart review patient underwent a TVT 0 and mesh graft placement x2 for anterior and posterior colporrhaphy  Today patient states that she is doing well  She does have some discomfort from her ureteral stent that was placed and she is undergoing a surgical procedure by Urology for her hematuria and renal calculi on 08/07/2020  She reports that her vaginal bleeding has stopped  She denies any vaginal pain or irritation  And states that she did not have any vaginal pain or irritation prior to her arrival in the ED were complaints or mainly urinary  She has continued to have hematuria likely secondary to ureteral stent in place  PMH significant for atrial fibrillation that she is on Eliquis daily  The following portions of the patient's history were reviewed and updated as appropriate: allergies, current medications, past family history, past medical history, past social history, past surgical history and problem list     Review of Systems   Constitutional: Negative for chills, diaphoresis and fever  Respiratory: Negative for cough and shortness of breath  Cardiovascular: Negative for chest pain     Gastrointestinal: Negative for abdominal pain  Genitourinary: Positive for difficulty urinating, dysuria and hematuria  Negative for pelvic pain, vaginal bleeding, vaginal discharge and vaginal pain  Musculoskeletal: Positive for back pain  Neurological: Negative for dizziness, weakness, light-headedness and headaches  Objective:  /72   Temp 97 8 °F (36 6 °C)   Ht 4' 11 75"   Wt 52 2 kg (115 lb)   Breastfeeding No   BMI 22 65 kg/m²   Physical Exam  Constitutional:       General: She is not in acute distress  Appearance: Normal appearance  Genitourinary:      Vulva normal       No vulval lesion, tenderness, ulcerations or rash noted  No lesions in the vagina  Vaginal atrophic mucosa present  No vaginal discharge, bleeding, ulceration, rugosity or mesh exposure  Cervix is absent  Uterus is absent  Genitourinary Comments: Speculum exam performed as well as bimanual exam   Uterus and cervix surgically absent secondary to status post hysterectomy  Area of concern 3 cm cephalad to the hymen was inspected  Healing of vaginal mucosa has occurred without complication  No further mass and no further mesh exposure visualized  No mesh palpated on posterior vaginal wall  On palpation of anterior vaginal wall, mesh palpated through thin vaginal mucosa  No bleeding and no tenderness on exam even with palpation of mesh of anterior vaginal wall  HENT:      Head: Normocephalic and atraumatic  Pulmonary:      Effort: Pulmonary effort is normal  No respiratory distress  Abdominal:      General: There is no distension  Palpations: Abdomen is soft  There is no mass  Tenderness: There is no abdominal tenderness  Neurological:      Mental Status: She is alert  Psychiatric:         Mood and Affect: Mood normal          Behavior: Behavior normal          Thought Content: Thought content normal          Judgment: Judgment normal    Vitals signs and nursing note reviewed

## 2020-08-06 ENCOUNTER — ANESTHESIA EVENT (OUTPATIENT)
Dept: PERIOP | Facility: HOSPITAL | Age: 75
End: 2020-08-06
Payer: COMMERCIAL

## 2020-08-06 ENCOUNTER — TELEPHONE (OUTPATIENT)
Dept: CARDIOLOGY CLINIC | Facility: CLINIC | Age: 75
End: 2020-08-06

## 2020-08-07 ENCOUNTER — APPOINTMENT (OUTPATIENT)
Dept: RADIOLOGY | Facility: HOSPITAL | Age: 75
End: 2020-08-07
Payer: COMMERCIAL

## 2020-08-07 ENCOUNTER — TELEPHONE (OUTPATIENT)
Dept: UROLOGY | Facility: CLINIC | Age: 75
End: 2020-08-07

## 2020-08-07 ENCOUNTER — HOSPITAL ENCOUNTER (OUTPATIENT)
Facility: HOSPITAL | Age: 75
Setting detail: OUTPATIENT SURGERY
Discharge: HOME | End: 2020-08-07
Attending: UROLOGY | Admitting: UROLOGY
Payer: COMMERCIAL

## 2020-08-07 ENCOUNTER — ANESTHESIA (OUTPATIENT)
Dept: PERIOP | Facility: HOSPITAL | Age: 75
End: 2020-08-07
Payer: COMMERCIAL

## 2020-08-07 VITALS
DIASTOLIC BLOOD PRESSURE: 66 MMHG | HEIGHT: 59 IN | WEIGHT: 115 LBS | RESPIRATION RATE: 18 BRPM | SYSTOLIC BLOOD PRESSURE: 147 MMHG | BODY MASS INDEX: 23.18 KG/M2 | HEART RATE: 60 BPM | OXYGEN SATURATION: 97 % | TEMPERATURE: 97.6 F

## 2020-08-07 DIAGNOSIS — R31.0 GROSS HEMATURIA: ICD-10-CM

## 2020-08-07 DIAGNOSIS — N20.0 NEPHROLITHIASIS: Primary | ICD-10-CM

## 2020-08-07 LAB — GLUCOSE SERPL-MCNC: 170 MG/DL (ref 65–140)

## 2020-08-07 PROCEDURE — C1758 CATHETER, URETERAL: HCPCS | Performed by: UROLOGY

## 2020-08-07 PROCEDURE — 82948 REAGENT STRIP/BLOOD GLUCOSE: CPT

## 2020-08-07 PROCEDURE — 74420 UROGRAPHY RTRGR +-KUB: CPT

## 2020-08-07 PROCEDURE — C1769 GUIDE WIRE: HCPCS | Performed by: UROLOGY

## 2020-08-07 PROCEDURE — 52356 CYSTO/URETERO W/LITHOTRIPSY: CPT | Performed by: UROLOGY

## 2020-08-07 PROCEDURE — NC001 PR NO CHARGE: Performed by: UROLOGY

## 2020-08-07 PROCEDURE — C2617 STENT, NON-COR, TEM W/O DEL: HCPCS | Performed by: UROLOGY

## 2020-08-07 PROCEDURE — 82360 CALCULUS ASSAY QUANT: CPT | Performed by: UROLOGY

## 2020-08-07 DEVICE — STENT URETERAL 6FR 24CML INLAY OPTIMA: Type: IMPLANTABLE DEVICE | Status: FUNCTIONAL

## 2020-08-07 RX ORDER — PHENAZOPYRIDINE HYDROCHLORIDE 200 MG/1
200 TABLET, FILM COATED ORAL 3 TIMES DAILY PRN
Qty: 10 TABLET | Refills: 0 | Status: SHIPPED | OUTPATIENT
Start: 2020-08-07 | End: 2020-08-10

## 2020-08-07 RX ORDER — CEPHALEXIN 500 MG/1
500 CAPSULE ORAL EVERY 6 HOURS SCHEDULED
Qty: 3 CAPSULE | Refills: 0 | Status: SHIPPED | OUTPATIENT
Start: 2020-08-07 | End: 2020-08-08

## 2020-08-07 RX ORDER — ACETAMINOPHEN 325 MG/1
650 TABLET ORAL EVERY 4 HOURS PRN
Qty: 30 TABLET | Refills: 0
Start: 2020-08-07

## 2020-08-07 RX ORDER — METOCLOPRAMIDE HYDROCHLORIDE 5 MG/ML
10 INJECTION INTRAMUSCULAR; INTRAVENOUS ONCE AS NEEDED
Status: DISCONTINUED | OUTPATIENT
Start: 2020-08-07 | End: 2020-08-07 | Stop reason: HOSPADM

## 2020-08-07 RX ORDER — DOCUSATE SODIUM 100 MG/1
100 CAPSULE, LIQUID FILLED ORAL 2 TIMES DAILY
Qty: 30 CAPSULE | Refills: 0 | Status: SHIPPED | OUTPATIENT
Start: 2020-08-07 | End: 2020-08-25 | Stop reason: ALTCHOICE

## 2020-08-07 RX ORDER — FENTANYL CITRATE/PF 50 MCG/ML
25 SYRINGE (ML) INJECTION
Status: DISCONTINUED | OUTPATIENT
Start: 2020-08-07 | End: 2020-08-07 | Stop reason: HOSPADM

## 2020-08-07 RX ORDER — MAGNESIUM HYDROXIDE 1200 MG/15ML
LIQUID ORAL AS NEEDED
Status: DISCONTINUED | OUTPATIENT
Start: 2020-08-07 | End: 2020-08-07 | Stop reason: HOSPADM

## 2020-08-07 RX ORDER — NAPROXEN 500 MG/1
500 TABLET ORAL 2 TIMES DAILY WITH MEALS
Qty: 10 TABLET | Refills: 0 | Status: SHIPPED | OUTPATIENT
Start: 2020-08-07 | End: 2020-08-25 | Stop reason: ALTCHOICE

## 2020-08-07 RX ORDER — HYDROMORPHONE HCL/PF 1 MG/ML
0.5 SYRINGE (ML) INJECTION
Status: DISCONTINUED | OUTPATIENT
Start: 2020-08-07 | End: 2020-08-07 | Stop reason: HOSPADM

## 2020-08-07 RX ORDER — ONDANSETRON 2 MG/ML
INJECTION INTRAMUSCULAR; INTRAVENOUS AS NEEDED
Status: DISCONTINUED | OUTPATIENT
Start: 2020-08-07 | End: 2020-08-07

## 2020-08-07 RX ORDER — OXYCODONE HYDROCHLORIDE 5 MG/1
5 TABLET ORAL EVERY 4 HOURS PRN
Status: DISCONTINUED | OUTPATIENT
Start: 2020-08-07 | End: 2020-08-07 | Stop reason: HOSPADM

## 2020-08-07 RX ORDER — SODIUM CHLORIDE, SODIUM LACTATE, POTASSIUM CHLORIDE, CALCIUM CHLORIDE 600; 310; 30; 20 MG/100ML; MG/100ML; MG/100ML; MG/100ML
100 INJECTION, SOLUTION INTRAVENOUS CONTINUOUS
Status: DISCONTINUED | OUTPATIENT
Start: 2020-08-07 | End: 2020-08-07 | Stop reason: HOSPADM

## 2020-08-07 RX ORDER — PROPOFOL 10 MG/ML
INJECTION, EMULSION INTRAVENOUS AS NEEDED
Status: DISCONTINUED | OUTPATIENT
Start: 2020-08-07 | End: 2020-08-07

## 2020-08-07 RX ORDER — ONDANSETRON 2 MG/ML
4 INJECTION INTRAMUSCULAR; INTRAVENOUS ONCE AS NEEDED
Status: DISCONTINUED | OUTPATIENT
Start: 2020-08-07 | End: 2020-08-07 | Stop reason: HOSPADM

## 2020-08-07 RX ORDER — SODIUM CHLORIDE, SODIUM LACTATE, POTASSIUM CHLORIDE, CALCIUM CHLORIDE 600; 310; 30; 20 MG/100ML; MG/100ML; MG/100ML; MG/100ML
125 INJECTION, SOLUTION INTRAVENOUS CONTINUOUS
Status: DISCONTINUED | OUTPATIENT
Start: 2020-08-07 | End: 2020-08-07

## 2020-08-07 RX ORDER — CEFAZOLIN SODIUM 1 G/50ML
1000 SOLUTION INTRAVENOUS ONCE
Status: COMPLETED | OUTPATIENT
Start: 2020-08-07 | End: 2020-08-07

## 2020-08-07 RX ORDER — LIDOCAINE HYDROCHLORIDE 10 MG/ML
INJECTION, SOLUTION EPIDURAL; INFILTRATION; INTRACAUDAL; PERINEURAL AS NEEDED
Status: DISCONTINUED | OUTPATIENT
Start: 2020-08-07 | End: 2020-08-07

## 2020-08-07 RX ORDER — OXYCODONE HYDROCHLORIDE 5 MG/1
5 TABLET ORAL EVERY 4 HOURS PRN
Qty: 5 TABLET | Refills: 0 | Status: SHIPPED | OUTPATIENT
Start: 2020-08-07 | End: 2020-08-17

## 2020-08-07 RX ORDER — TAMSULOSIN HYDROCHLORIDE 0.4 MG/1
0.4 CAPSULE ORAL
Qty: 15 CAPSULE | Refills: 0 | Status: SHIPPED | OUTPATIENT
Start: 2020-08-07 | End: 2020-08-25 | Stop reason: ALTCHOICE

## 2020-08-07 RX ORDER — ALBUTEROL SULFATE 2.5 MG/3ML
2.5 SOLUTION RESPIRATORY (INHALATION) ONCE AS NEEDED
Status: DISCONTINUED | OUTPATIENT
Start: 2020-08-07 | End: 2020-08-07 | Stop reason: HOSPADM

## 2020-08-07 RX ORDER — FENTANYL CITRATE 50 UG/ML
INJECTION, SOLUTION INTRAMUSCULAR; INTRAVENOUS AS NEEDED
Status: DISCONTINUED | OUTPATIENT
Start: 2020-08-07 | End: 2020-08-07

## 2020-08-07 RX ADMIN — FENTANYL CITRATE 25 MCG: 50 INJECTION INTRAMUSCULAR; INTRAVENOUS at 12:22

## 2020-08-07 RX ADMIN — ONDANSETRON 4 MG: 2 INJECTION INTRAMUSCULAR; INTRAVENOUS at 12:13

## 2020-08-07 RX ADMIN — FENTANYL CITRATE 25 MCG: 50 INJECTION INTRAMUSCULAR; INTRAVENOUS at 12:19

## 2020-08-07 RX ADMIN — FENTANYL CITRATE 25 MCG: 50 INJECTION INTRAMUSCULAR; INTRAVENOUS at 12:50

## 2020-08-07 RX ADMIN — PROPOFOL 170 MG: 10 INJECTION, EMULSION INTRAVENOUS at 12:13

## 2020-08-07 RX ADMIN — LIDOCAINE HYDROCHLORIDE 50 MG: 10 INJECTION, SOLUTION EPIDURAL; INFILTRATION; INTRACAUDAL; PERINEURAL at 12:13

## 2020-08-07 RX ADMIN — FENTANYL CITRATE 25 MCG: 50 INJECTION INTRAMUSCULAR; INTRAVENOUS at 12:48

## 2020-08-07 RX ADMIN — CEFAZOLIN SODIUM 1000 MG: 1 SOLUTION INTRAVENOUS at 12:10

## 2020-08-07 RX ADMIN — SODIUM CHLORIDE, SODIUM LACTATE, POTASSIUM CHLORIDE, AND CALCIUM CHLORIDE: .6; .31; .03; .02 INJECTION, SOLUTION INTRAVENOUS at 12:07

## 2020-08-07 NOTE — TELEPHONE ENCOUNTER
Status post ureteroscopy    Has stent on string    Please schedule nurse visit early next week for stent removal   Please schedule follow-up 2 months advanced practitioner KUB ultrasound prior to visit

## 2020-08-07 NOTE — ANESTHESIA POSTPROCEDURE EVALUATION
Post-Op Assessment Note    CV Status:  Stable  Pain Score: 0    Pain management: adequate     Mental Status:  Sleepy   Hydration Status:  Euvolemic   PONV Controlled:  Controlled   Airway Patency:  Patent      Post Op Vitals Reviewed: Yes      Staff: Anesthesiologist, CRNA         No complications documented      BP  131/60   Temp 97 8   Pulse 64   Resp 18   SpO2 100%

## 2020-08-07 NOTE — TELEPHONE ENCOUNTER
Attempted to call patient at this time   No answer left message with office number    Patient tentatively scheduled 8/12/2020 at 10am for nurse visit for stent on string removal  Please confirm appt time and date upon call back thank you

## 2020-08-07 NOTE — DISCHARGE INSTRUCTIONS
Mrs Rolando Craig: Your surgery went very well  Your large right kidney stone was completely removed using a scope and a laser    We will schedule you for stent removal in my office early next week and we will have you follow-up in 2 months with an x-ray and ultrasound    Lake Fernandez MD  [facility]  [phone]    WHAT IS A STENT? At the end of the procedure, your doctor may place a stent into your ureter  A stent is a thin, flexible piece of plastic that will hold open your ureter while the remaining small pieces of stone pass  This allows your kidney to drain easily and prevents you from having to pass these small stone pieces on your own, which could be painful  The stent is about 12 inches long and looks and feels like a thin piece of spaghetti  AFTER THE PROCEDURE  After the procedure you may experience the following symptoms  All of these are normal and should resolve within 1 or 2 days after your stent is removed  1) Urinary frequency (urinating more often than usual)  2) Urinary urgency (the sensation that you need to urinate right away)  3) Painful urination (this can be pain in your bladder or in your back when  you urinate)  4) Blood in your urine ( a stent can irritate the lining of your bladder causing it to bleed)  5) Back/Flank pain, especially with urination  You will receive a prescription for narcotic pain medication after the procedure  You will also receive a prescription for tamsulosin which you will take once a day for 2 weeks to help relax your ureter and decrease stent discomfort  You will also need to purchase a stool softener (i e  Colace) or mild laxative (i e  Miralax) as the narcotic pain medication can make you constipated  This is important as constipation can exacerbate stent related symptoms  STENT REMOVAL  In some cases, your doctor will leave strings attached to your stent  The strings will be taped to your skin after the procedure   The strings will allow you to remove the thin flexible stent while you are at home  Normally, the stent can be removed 3-5 days after your procedure; your physician will tell you the specific date after your procedure  On the day you are supposed to remove your stent, do the followin) When you wake up in the morning, take 1-2 pain pills with food  Start your antibiotic pill the morning of schedule stent removal if prescribed  2) One hour later sit on the toilet or in the bath tub  3) Take a deep breath in and while exhaling, pull the string  4)  Dispose of the stent in the garbage  5)   Alternatively, you will come back for an office procedure to remove the stent by placing a small camera into your bladder to remove the stent  During the next 4-8 hours after removing your stent, you may experience additional blood in your urine, pain with urination or back/side pain  You should take the pain medication you were prescribed to help you with the pain, as well as continue the Flomax  If the pain is severe, you are vomiting, and/or have a fever > 101 4 please call the clinic

## 2020-08-07 NOTE — H&P
UROLOGY HISTORY AND PHYSICAL     Patient Identifiers: Claudia Bell (MRN [id number])      Date of Service: 8/7/2020        ASSESSMENT:     77 y o  old female with  right intrarenal calculus indwelling ureteral stent presents for ureteroscopy  Discussed options for management of the patient's ureteral stone  We discussed surgical options including ureteroscopy and shock wave lithotripsy  In addition we discussed conservative management with medical expulsive therapy  The patient has elected to undergo ureteroscopy  I discussed with the patients risks and benefits and alternatives to ureteroscopy with laser lithotripsy  The risks include bleeding, infection, injury to the urethra, bladder, ureter or kidney, risk of a staged procedure, risk of stricture, risk of residual fragments, risk of loss of kidney, risks of anesthesia including DVT, PE, MI and death  The patient understands that a ureteral stent will likely be left in place at the time of the procedure  We reviewed the expected postoperative care  The patient understands these risks and has provided informed consent for RIGHT ureteroscopy          PLAN:     Right ureteroscopy      History of Present Illness:     Claudia Bell is a 77 y o  old with a history of right intrarenal calculus presents for ureteroscopy    Past Medical, Past Surgical History:     Past Medical History:   Diagnosis Date    Atypical chest pain     GERD (gastroesophageal reflux disease)     Hyperlipidemia     Hypertension     Kidney stone     Myocardial infarction ([address] )     2015     NSTEMI (non-ST elevated myocardial infarction) [address] [address] )     Last Assessed: 9/24/2015   :    Past Surgical History:   Procedure Laterality Date    A-V CARDIAC PACEMAKER INSERTION      ANKLE SURGERY      BACK SURGERY  01/2011    L4 and L5 laminectomy and fusion     BLADDER SURGERY      CORONARY ARTERY BYPASS GRAFT  09/02/2015    CABGx3 with LIMA to LAD, SVG to PDA, SVG to OM-1    EXAMINATION UNDER ANESTHESIA N/A 7/11/2020    Procedure: EXAM UNDER ANESTHESIA (EUA)  EXCISION OF POSTERIOR VAGINAL FOREIGN BODY;  Surgeon: Thang Vera MD;  [facility];  Service: Gynecology    FL RETROGRADE PYELOGRAM  7/11/2020    HYSTERECTOMY      NC CYSTOURETHROSCOPY,URETER CATHETER Right 7/11/2020    Procedure: CYSTOSCOPY RETROGRADE PYELOGRAM WITH INSERTION STENT URETERAL, bladder biopsy with fulguration;  Surgeon: Lake Fernandez MD;  [facility];  Service: Urology    WRIST SURGERY     :    Medications, Allergies:     Current Facility-Administered Medications:     ceFAZolin (ANCEF) IVPB (premix) 1,000 mg 50 mL, 1,000 mg, Intravenous, Once, Lake Fernandez MD    lactated ringers infusion, 125 mL/hr, Intravenous, Continuous, Kanchan Luna MD    Allergies:  No Known Allergies:    Social and Family History:   Social History:   Social History     Tobacco Use    Smoking status: Current Every Day Smoker     Packs/day: 0 25     Start date: 1967    Smokeless tobacco: Never Used    Tobacco comment: Started smoking at age 25; currently smoking <1ppd  Substance Use Topics    Alcohol use: Not Currently    Drug use: Never     Social History     Tobacco Use   Smoking Status Current Every Day Smoker    Packs/day: 0 25    Start date: 5   Smokeless Tobacco Never Used   Tobacco Comment    Started smoking at age 25; currently smoking <1ppd         Family History:  Family History   Problem Relation Age of Onset    Hypertension Mother     Hypertension Sister     Alcohol abuse Brother     Cirrhosis Brother     Diabetes Father     Other Brother         Heart transplant in his 46s    Lung cancer Sister     Diabetes Brother     Stroke Sister     No Known Problems Daughter     No Known Problems Maternal Grandmother     No Known Problems Maternal Grandfather     No Known Problems Paternal Grandmother     No Known Problems Paternal Grandfather     No Known Problems Sister    :     Review of Systems:     General: Fever, chills, or night sweats: negative  Cardiac: Negative for chest pain  Pulmonary: Negative for shortness of breath  Gastrointestinal: Abdominal pain negative  Nausea, vomiting, or diarrhea negative  Genitourinary: See HPI above  Patient does nothave hematuria  All other systems queried were negative  Physical Exam:   General: Patient is pleasant and in NAD  Awake and alert  BP (!) 177/77   Pulse 71   Temp 98 1 °F (36 7 °C) (Temporal)   Resp 18   Ht 4' 11" (1 499 m)   Wt 52 2 kg (115 lb)   SpO2 99%   BMI 23 23 kg/m²   Cardiac: Peripheral edema: negative  Pulmonary: Non-labored breathing  Abdomen: Soft, non-tender, non-distended  No surgical scars  No masses, tenderness, hernias noted  Genitourinary: negative CVA tenderness, positive suprapubic tenderness  Labs:     Lab Results   Component Value Date    HGB 10 4 (L) 07/12/2020    HCT 32 3 (L) 07/12/2020    WBC 13 12 (H) 07/12/2020     07/12/2020   ]    Lab Results   Component Value Date     03/27/2017    K 4 2 07/12/2020     07/12/2020    CO2 29 07/12/2020    BUN 17 07/12/2020    CREATININE 0 89 07/12/2020    CALCIUM 8 2 (L) 07/12/2020    GLUCOSE 152 (H) 09/05/2015   ]    Imaging:   I personally reviewed the images and report of the following studies, and reviewed them with the patient:        Thank you for allowing me to participate in this patients care  Please do not hesitate to call with any additional questions    Aaron Sommers MD

## 2020-08-07 NOTE — ANESTHESIA PREPROCEDURE EVALUATION
Procedure:  CYSTOSCOPY URETEROSCOPY WITH LITHOTRIPSY HOLMIUM LASER, RETROGRADE PYELOGRAM AND INSERTION STENT URETERAL (Right Bladder)    Relevant Problems   CARDIO   (+) Aneurysm of ascending aorta (HCC)   (+) CAD (coronary atherosclerotic disease)   (+) Essential hypertension   (+) Hypercholesterolemia   (+) 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HCC)      ENDO   (+) Controlled type 2 diabetes mellitus, without long-term current use of insulin (HCC)      GI/HEPATIC   (+) Gastroesophageal reflux disease      /RENAL   (+) Nephrolithiasis      NEURO/PSYCH   (+) Anxiety   CABG, PACEMAKER,GERD, EF-60%-5/20 NSTEMI 5/20    Physical Exam    Airway    Mallampati score: I  TM Distance: >3 FB  Neck ROM: full     Dental   lower dentures and upper dentures,     Cardiovascular      Pulmonary      Other Findings        Anesthesia Plan  ASA Score- 3     Anesthesia Type- general with ASA Monitors  Additional Monitors:   Airway Plan: LMA  Plan Factors-Exercise tolerance (METS): >4 METS  Chart reviewed  EKG reviewed  Patient is not a current smoker  Patient not instructed to abstain from smoking on day of procedure  Patient did not smoke on day of surgery  Induction- intravenous  Postoperative Plan-     Informed Consent- Anesthetic plan and risks discussed with patient  I personally reviewed this patient with the CRNA  Discussed and agreed on the Anesthesia Plan with the CRNA  Jose Billusama       [id number]    Lab Results   Component Value Date    GLUC 136 07/12/2020    ALT 16 07/07/2020    AST 11 07/07/2020    BUN 17 07/12/2020    CALCIUM 8 2 (L) 07/12/2020     07/12/2020    CHOL 164 03/27/2017    CO2 29 07/12/2020    CREATININE 0 89 07/12/2020    HDL 52 05/23/2020    INR 1 24 (H) 07/11/2020    HCT 32 3 (L) 07/12/2020    HGB 10 4 (L) 07/12/2020    PROT 6 8 03/27/2017    HGBA1C 6 3 (H) 07/07/2020    MG 1 9 06/04/2020    PHOS 2 8 05/24/2020     07/12/2020    K 4 2 07/12/2020     03/27/2017    TRIG 67 05/23/2020 WBC 13 12 (H) 07/12/2020

## 2020-08-07 NOTE — ANESTHESIA PREPROCEDURE EVALUATION
Procedure:  CYSTOSCOPY URETEROSCOPY WITH LITHOTRIPSY HOLMIUM LASER, RETROGRADE PYELOGRAM AND INSERTION STENT URETERAL (Right Bladder)       PACEMAKER,CABGX3,AFIB,5/20-EF-60%,GERD       Anesthesia Plan  ASA Score- 3     Anesthesia Type- general with ASA Monitors  Additional Monitors:   Airway Plan: LMA  Plan Factors-    Induction- intravenous  Postoperative Plan-     Informed Consent- Anesthetic plan and risks discussed with patient  I personally reviewed this patient with the CRNA  Discussed and agreed on the Anesthesia Plan with the CRNA  Zara Zuleta

## 2020-08-07 NOTE — OP NOTE
Operative Note     PATIENT:  Danielito Lester (MRN [id number])    DATE OF PROCEDURE:   8/7/2020    PRE-OP DIAGNOSIS:   1) Right renal calculus  2) indwelling right ureteral stent    POST-OP DIAGNOSIS:   1) Right renal calculus  2) indwelling right ureteral stent    PROCEDURES PERFORMED:  1) Cystoscopy  2) Right retrograde pyelography with fluoroscopic interpretation  3) Right ureteroscopy with laser lithotripsy and basket extraction of stone  4) Right ureteral stent placement     SURGEON:  Niraj Daniel MD    NOTE:  There were no qualified teaching residents to assist with this case    ANESTHESIA: General     COMPLICATIONS:   None    ANTIBIOTICS:  Cefazolin    INTRAOPERATIVE THROMBOEMBOLISM PROPHYLAXIS:  Pneumatic compression stockings     FINDINGS:  Intrarenal calculus successfully managed with a combination of holmium laser lithotripsy using the high-powered 100 w laser, and basket stone extraction  Postoperative stent was left in place on a string  INDICATIONS FOR PROCEDURE:  Danielito Lester is an 77 y o  old female with Right renal calculus  After discussing the options, the patient elected to undergo ureteroscopy and ureteral stent placement  We discussed the procedure in detail, the alternatives, and the risks, and they signed informed consent to proceed  PROCEDURE IN DETAIL:   The patient was identified and brought to the OR  Antibiotic prophylaxis and DVT prophylaxis were administered  They were placed in the comfortable dorsal lithotomy position with care to pad all pressure points  They were prepped and draped in the usual sterile fashion using hibiclens  A surgical time out was performed with all in the room in agreement with the correct patient, procedure, indications, and laterality  A 21-Mohawk rigid cystoscope was used to enter the bladder  The bladder was inspected in its entirety and there were no lesions noted    The ureteral orifices were identified in their normal orthotopic positions  The Right ureteral orifice was identified and a 5 Fr open ended catheter was placed into the ureteral orifice alongside the preplaced ureteral stent which was then removed  A retrograde pyelogram was performed with the findings as described above  A Sensor wire was advanced up to the kidney under fluoroscopic guidance  Leaving this safety wire in place, the bladder was drained  A second working wire was then placed, and over this a 12/14 ureteral access sheath was sequentially passed under fluoroscopic guidance  A  5 3 Portuguese flexible ureteroscope was advanced up the ureter under vision   The stone was encountered in the Right UPJ  The stone was not noted to be impacted  A holmium laser fiber was passed through the ureteroscope and laser lithotripsy was commenced at settings of 0 7 J and 7 Hz  The stones were fragmented to very small pieces  Once we were satisfied that the stone was fragmented to dust, a 1 9 Western Mantex zero tip nitinol basket was passed through the ureteroscope  The residual fragments were basketed out and removed  The ureteroscope was backed down the ureter under vision and there were no residual fragments and the ureter was noted to be intact with no injury and no edema where the stone was located  A JJ stent was then passed up the wire  under fluoroscopic guidance into the kidney with a good curl noted in the kidney and in the bladder  An externalized tethering string was left in place and secured to the skin  The bladder was drained  The patient was placed back supine, awakened from general anesthesia and brought to recovery room in stable condition  ESTIMATED BLOOD LOSS:  Minimal      SPECIMENS:   Order Name Source Comment Collection Info Order Time   STONE ANALYSIS Kidney, Right  Collected By: Bijal Redding MD 8/7/2020 12:50 PM        IMPLANTS:   Implant Name Type Inv   Item Serial No   Lot No  LRB No  Used Action   URETERAL STENT 6 FR X 24 CM OPTIMA INLAY - [id number]  URETERAL STENT 6 FR X 24 CM OPTIMA INLAY  [employer] [id number] Right 1 Implanted        COMPLICATIONS: None    DISPOSITION: PACU    PLAN:  Patient will be scheduled for stent removal in the office early next week    She will then follow up in 2 months time with a KUB and renal ultrasound

## 2020-08-07 NOTE — TELEPHONE ENCOUNTER
Patients daughter called about stent appointment and I called patient back and gave her the date for 8/12/20 and she confirmed appointment

## 2020-08-12 ENCOUNTER — PROCEDURE VISIT (OUTPATIENT)
Dept: UROLOGY | Facility: CLINIC | Age: 75
End: 2020-08-12
Payer: COMMERCIAL

## 2020-08-12 VITALS
DIASTOLIC BLOOD PRESSURE: 86 MMHG | BODY MASS INDEX: 23.79 KG/M2 | WEIGHT: 118 LBS | TEMPERATURE: 97.2 F | HEIGHT: 59 IN | RESPIRATION RATE: 18 BRPM | SYSTOLIC BLOOD PRESSURE: 152 MMHG

## 2020-08-12 DIAGNOSIS — N20.0 NEPHROLITHIASIS: Primary | ICD-10-CM

## 2020-08-12 PROCEDURE — 3008F BODY MASS INDEX DOCD: CPT

## 2020-08-12 PROCEDURE — 2022F DILAT RTA XM EVC RTNOPTHY: CPT

## 2020-08-12 PROCEDURE — 3044F HG A1C LEVEL LT 7.0%: CPT

## 2020-08-12 PROCEDURE — 3079F DIAST BP 80-89 MM HG: CPT

## 2020-08-12 PROCEDURE — 4040F PNEUMOC VAC/ADMIN/RCVD: CPT

## 2020-08-12 PROCEDURE — 3077F SYST BP >= 140 MM HG: CPT

## 2020-08-12 PROCEDURE — 1111F DSCHRG MED/CURRENT MED MERGE: CPT

## 2020-08-12 PROCEDURE — 99211 OFF/OP EST MAY X REQ PHY/QHP: CPT

## 2020-08-12 PROCEDURE — 1160F RVW MEDS BY RX/DR IN RCRD: CPT

## 2020-08-12 PROCEDURE — 4004F PT TOBACCO SCREEN RCVD TLK: CPT

## 2020-08-12 PROCEDURE — 3060F POS MICROALBUMINURIA REV: CPT

## 2020-08-12 NOTE — PROGRESS NOTES
8/12/2020  Yandel Rae is a 77 y o  female  [id number]        Diagnosis  Chief Complaint     Nephrolithiasis; Gross Hematuria          Patient is s/p right ureteroscopy with stone extraction on 8/7/2020 with Dr Yaneth Negrete  Patient will follow up as scheduled in 2 months with renal US and KUB prior to visit  Procedure Stent with String Removal    Vitals:    08/12/20 0953   BP: 152/86   BP Location: Left arm   Patient Position: Sitting   Resp: 18   Temp: (!) 97 2 °F (36 2 °C)   Weight: 53 5 kg (118 lb)   Height: 4' 11" (1 499 m)       Stent with string removed intact without difficulty  Reviewed post stent removal symptoms including flank pain, dysuria, and hematuria  Instructed patient to increase oral fluid intake  Encouraged the use of NSAIDS and other prescribed pain medication as needed for discomfort  Patient instructed to call the office or report to the ER for uncontrolled pain, fever, chills, nausea or vomiting         Armaan Saldana RN BSN

## 2020-08-20 LAB
CALCIUM OXALATE DIHYDRATE MFR STONE IR: 39 %
COLOR STONE: NORMAL
COM MFR STONE: 56 %
COMMENT-STONE3: NORMAL
COMPOSITION: NORMAL
HYDROXYAPATITE 24H ENGDIFF UR: 5 %
LABORATORY COMMENT REPORT: NORMAL
PHOTO: NORMAL
SIZE STONE: NORMAL MM
SPEC SOURCE SUBJ: NORMAL
STONE ANALYSIS-IMP: NORMAL
STONE ANALYSIS-IMP: NORMAL
WT STONE: 32 MG

## 2020-08-21 NOTE — TELEPHONE ENCOUNTER
Patient at the [facility] office, patient known for nephrolithiasis, gross hematuria  Patient s/p CYSTOSCOPY URETEROSCOPY WITH LITHOTRIPSY HOLMIUM LASER, RETROGRADE PYELOGRAM AND INSERTION STENT URETERAL (Right Bladder) on 8/7/2020  Patient last appt 8/12/2020 for stent on string removal        Called and spoke to patient at this time, patient states she still has some blood in her urine  She states its like a brownish color  She denies dysuria, denies lower back, flank or abdominal pain, denies any change in urination pattern  Denies blood clots       Asked patient on her water intake, patient states she usually have 2 water bottles a day, then she states well not every day    Explained to patient we want to encourage aggressive hydration at least 64 oz of water a day    Advised we would want to know if patient having red wine colored urine, blood clots    Advised will route to provider for any other recommendations and someone will contact patient with any other recommendations     Patient verbalized understanding and is agreeable to plan

## 2020-08-21 NOTE — TELEPHONE ENCOUNTER
Patient calling in regards to stent removal   Patient would like to know if it is normal to still be bleeding after procedure  Please advise

## 2020-08-25 ENCOUNTER — TELEPHONE (OUTPATIENT)
Dept: FAMILY MEDICINE CLINIC | Facility: CLINIC | Age: 75
End: 2020-08-25

## 2020-08-25 ENCOUNTER — OFFICE VISIT (OUTPATIENT)
Dept: FAMILY MEDICINE CLINIC | Facility: CLINIC | Age: 75
End: 2020-08-25
Payer: COMMERCIAL

## 2020-08-25 VITALS
WEIGHT: 121 LBS | TEMPERATURE: 98.1 F | DIASTOLIC BLOOD PRESSURE: 80 MMHG | HEART RATE: 74 BPM | HEIGHT: 59 IN | BODY MASS INDEX: 24.39 KG/M2 | SYSTOLIC BLOOD PRESSURE: 118 MMHG

## 2020-08-25 DIAGNOSIS — I10 ESSENTIAL HYPERTENSION: ICD-10-CM

## 2020-08-25 DIAGNOSIS — E11.9 CONTROLLED TYPE 2 DIABETES MELLITUS WITHOUT COMPLICATION, WITHOUT LONG-TERM CURRENT USE OF INSULIN (HCC): Primary | ICD-10-CM

## 2020-08-25 DIAGNOSIS — E78.00 HYPERCHOLESTEROLEMIA: ICD-10-CM

## 2020-08-25 DIAGNOSIS — D64.9 ANEMIA, UNSPECIFIED TYPE: ICD-10-CM

## 2020-08-25 PROCEDURE — 1111F DSCHRG MED/CURRENT MED MERGE: CPT | Performed by: FAMILY MEDICINE

## 2020-08-25 PROCEDURE — 3044F HG A1C LEVEL LT 7.0%: CPT | Performed by: FAMILY MEDICINE

## 2020-08-25 PROCEDURE — 99214 OFFICE O/P EST MOD 30 MIN: CPT | Performed by: FAMILY MEDICINE

## 2020-08-25 PROCEDURE — 4004F PT TOBACCO SCREEN RCVD TLK: CPT | Performed by: FAMILY MEDICINE

## 2020-08-25 PROCEDURE — 3079F DIAST BP 80-89 MM HG: CPT | Performed by: FAMILY MEDICINE

## 2020-08-25 PROCEDURE — 2022F DILAT RTA XM EVC RTNOPTHY: CPT | Performed by: FAMILY MEDICINE

## 2020-08-25 PROCEDURE — 3060F POS MICROALBUMINURIA REV: CPT | Performed by: FAMILY MEDICINE

## 2020-08-25 PROCEDURE — 4040F PNEUMOC VAC/ADMIN/RCVD: CPT | Performed by: FAMILY MEDICINE

## 2020-08-25 PROCEDURE — 1160F RVW MEDS BY RX/DR IN RCRD: CPT | Performed by: FAMILY MEDICINE

## 2020-08-25 PROCEDURE — 3074F SYST BP LT 130 MM HG: CPT | Performed by: FAMILY MEDICINE

## 2020-08-25 PROCEDURE — 3008F BODY MASS INDEX DOCD: CPT | Performed by: FAMILY MEDICINE

## 2020-08-25 NOTE — PROGRESS NOTES
Tobacco Cessation Counseling: Tobacco cessation counseling and education was provided  The patient is sincerely urged to quit consumption of tobacco  She is not ready to quit tobacco  The numerous health risks of tobacco consumption were discussed  If she decides to quit, there are a number of helpful adjunctive aids, and she can see me to discuss nicotine replacement therapy, chantix, or bupropion anytime in the future  Patient ID: Trinity Cope is a 77 y o  female  HPI: 77 y  o female presents for follow up of niddm, hypertension and hypercholesterolemia  Hgba1c is  6 3 and patient's issues are well controlled  Patient deneis any dyspnea, chest pain or palpitations  Her hgb is low at 10 4  She will initiate iron therapy  SUBJECTIVE    Family History   Problem Relation Age of Onset    Hypertension Mother     Hypertension Sister     Alcohol abuse Brother     Cirrhosis Brother     Diabetes Father     Other Brother         Heart transplant in his 46s    Lung cancer Sister     Diabetes Brother     Stroke Sister     No Known Problems Daughter     No Known Problems Maternal Grandmother     No Known Problems Maternal Grandfather     No Known Problems Paternal Grandmother     No Known Problems Paternal Grandfather     No Known Problems Sister      Social History     Socioeconomic History    Marital status: Single     Spouse name: Not on file    Number of children: 1    Years of education: Not on file    Highest education level: Not on file   Occupational History    Not on file   Social Needs    Financial resource strain: Not on file    Food insecurity     Worry: Not on file     Inability: Not on file   [employer] needs     Medical: Not on file     Non-medical: Not on file   Tobacco Use    Smoking status: Current Every Day Smoker     Packs/day: 0 25     Start date: 1967    Smokeless tobacco: Never Used    Tobacco comment: Started smoking at age 25; currently smoking <1ppd  Substance and Sexual Activity    Alcohol use: Not Currently    Drug use: Never    Sexual activity: Not Currently     Partners: Male     Birth control/protection: Post-menopausal   Lifestyle    Physical activity     Days per week: Not on file     Minutes per session: Not on file    Stress: Not on file   Relationships    Social connections     Talks on phone: Not on file     Gets together: Not on file     Attends Taoism service: Not on file     Active member of club or organization: Not on file     Attends meetings of clubs or organizations: Not on file     Relationship status: Not on file    Intimate partner violence     Fear of current or ex partner: Not on file     Emotionally abused: Not on file     Physically abused: Not on file     Forced sexual activity: Not on file   Other Topics Concern    Not on file   Social History Narrative    Not on file     Past Medical History:   Diagnosis Date    Atypical chest pain     GERD (gastroesophageal reflux disease)     Hyperlipidemia     Hypertension     Kidney stone     Myocardial infarction ([facility] [address]     2015     NSTEMI (non-ST elevated myocardial infarction) ([facility] [address] )     Last Assessed: 9/24/2015     Past Surgical History:   Procedure Laterality Date    A-V CARDIAC PACEMAKER INSERTION      ANKLE SURGERY      BACK SURGERY  01/2011    L4 and L5 laminectomy and fusion     BLADDER SURGERY      CORONARY ARTERY BYPASS GRAFT  09/02/2015    CABGx3 with LIMA to LAD, SVG to PDA, SVG to OM-1    EXAMINATION UNDER ANESTHESIA N/A 7/11/2020    Procedure: EXAM UNDER ANESTHESIA (EUA)  EXCISION OF POSTERIOR VAGINAL FOREIGN BODY;  Surgeon: Concepción Baez MD;  [facility];  Service: Gynecology    FL RETROGRADE PYELOGRAM  7/11/2020    FL RETROGRADE PYELOGRAM  8/7/2020    HYSTERECTOMY      WA CYSTO/URETERO W/LITHOTRIPSY &INDWELL STENT INSRT Right 8/7/2020    Procedure: CYSTOSCOPY URETEROSCOPY WITH LITHOTRIPSY HOLMIUM LASER, RETROGRADE PYELOGRAM AND INSERTION STENT URETERAL;  Surgeon: Niraj Daniel MD;  [facility];  Service: Urology    AR CYSTOURETHROSCOPY,URETER CATHETER Right 7/11/2020    Procedure: CYSTOSCOPY RETROGRADE PYELOGRAM WITH INSERTION STENT URETERAL, bladder biopsy with fulguration;  Surgeon: Niraj Daniel MD;  [facility];  Service: Urology    WRIST SURGERY       No Known Allergies    Current Outpatient Medications:     acetaminophen (TYLENOL) 325 mg tablet, Take 2 tablets (650 mg total) by mouth every 4 (four) hours as needed for mild pain, Disp: 30 tablet, Rfl: 0    ALPRAZolam (XANAX) 0 25 mg tablet, Take 1 tablet (0 25 mg total) by mouth 3 (three) times a day as needed for anxiety, Disp: 270 tablet, Rfl: 0    amiodarone 200 mg tablet, 200 mg PO daily  , Disp: 90 tablet, Rfl: 3    apixaban (Eliquis) 5 mg, Take 1 tablet (5 mg total) by mouth 2 (two) times a day, Disp: 180 tablet, Rfl: 3    atorvastatin (LIPITOR) 40 mg tablet, Take 1 tablet (40 mg total) by mouth daily, Disp: 90 tablet, Rfl: 3    COMBIGAN 0 2-0 5 %, , Disp: , Rfl:     losartan (COZAAR) 25 mg tablet, Take 1 tablet (25 mg total) by mouth daily, Disp: 90 tablet, Rfl: 3    metFORMIN (GLUCOPHAGE) 500 mg tablet, Take 1 tablet (500 mg total) by mouth 2 (two) times a day with meals for 5000 doses, Disp: 180 tablet, Rfl: 3    metoprolol succinate (TOPROL-XL) 50 mg 24 hr tablet, Take 1 tablet (50 mg total) by mouth daily, Disp: 90 tablet, Rfl: 3    omeprazole (PriLOSEC) 20 mg delayed release capsule, Take 1 capsule (20 mg total) by mouth daily, Disp: 90 capsule, Rfl: 3    traMADol (ULTRAM) 50 mg tablet, Take 2 tablets (100 mg total) by mouth 2 (two) times a day, Disp: 60 tablet, Rfl: 3    Review of Systems  Constitutional:     Denies fever, chills ,fatigue ,weakness ,weight loss, weight gain     ENT: Denies earache ,loss of hearing ,nosebleed, nasal discharge,nasal congestion ,sore throat ,hoarseness  Pulmonary: Denies shortness of breath ,cough  ,dyspnea on exertion, orthopnea  ,PND   Cardiovascular:  Denies bradycardia , tachycardia  ,palpations, lower extremity edema leg, claudication  Breast:  Denies new or changing breast lumps ,nipple discharge ,nipple changes  Abdomen:  Denies abdominal pain , anorexia , indigestion, nausea, vomiting, constipation, diarrhea  Musculoskeletal: Denies myalgias, arthralgias, joint swelling, joint stiffness , limb pain, limb swelling  Gu: denies dysuria, polyuria  Skin: Denies skin rash, skin lesion, skin wound, itching, dry skin  Neuro: Denies headache, numbness, tingling, confusion, loss of consciousness, dizziness, vertigo  Psychiatric: Denies feelings of depression, suicidal ideation, anxiety, sleep disturbances    OBJECTIVE  /80   Pulse 74   Temp 98 1 °F (36 7 °C)   Ht 4' 11" (1 499 m)   Wt 54 9 kg (121 lb)   BMI 24 44 kg/m²   Constitutional:   NAD, well appearing and well nourished      ENT:   Conjunctiva and lids: no injection, edema, or discharge     Pupils and iris: BON bilaterally    External inspection of ears and nose: normal without deformities or discharge  Otoscopic exam: Canals patent without erythema  Nasal mucosa, septum and turbinates: Normal or edema or discharge         Oropharynx:  Moist mucosa, normal tongue and tonsils without lesions  No erythema        Pulmonary:Respiratory effort normal rate and rhythm, no increased work of breathing   Auscultation of lungs:  Clear bilaterally with no adventitious breath sounds       Cardiovascular: regular rate and rhythm, S1 and S2, no murmur, no edema and/or varicosities of LE      Abdomen: Soft and non-distended     Positive bowel sounds      No heptomegaly or splenomegaly     Gu: no suprapubic tenderness or CVA tenderness, no urethral discharge  Lymphatic:  No anterior or posterior cervical lymphadenopathy         Musculoskeletal:  Gait and station: Normal gait      Digits and nails normal without clubbing or cyanosis       Inspection/palpation of joints, bones, and muscles:  No joint tenderness, swelling, full active and passive range of motion       Skin: Normal skin turgor and no rashes      Neuro:    Normal reflexes     Psych:   alert and oriented to person, place and time     normal mood and affect   Patient's shoes and socks removed  Right Foot/Ankle   Right Foot Inspection  Skin Exam: skin normal and skin intact no dry skin, no warmth, no callus, no erythema, no maceration, no abnormal color, no pre-ulcer, no ulcer and no callus                          Toe Exam: ROM and strength within normal limitsno swelling, no tenderness and erythema  Sensory   Vibration: intact  Proprioception: intact   Monofilament testing: intact  Vascular  Capillary refills: < 3 seconds  The right DP pulse is 2+  The right PT pulse is 2+  Left Foot/Ankle  Left Foot Inspection  Skin Exam: skin normal and skin intactno dry skin, no warmth, no erythema, no maceration, normal color, no pre-ulcer, no ulcer and no callus                         Toe Exam: ROM and strength within normal limitsno swelling, no tenderness, no erythema and no left toe deformity                   Sensory   Vibration: intact  Proprioception: intact  Monofilament: intact  Vascular  Capillary refills: < 3 seconds  The left DP pulse is 2+  The left PT pulse is 2+  Assign Risk Category:  No deformity present; No loss of protective sensation; No weak pulses       Risk: 0      Assessment/Plan:Diagnoses and all orders for this visit:    Controlled type 2 diabetes mellitus without complication, without long-term current use of insulin ([facility] [address] )  Comments:  Continue with current therapy  Anemia, unspecified type  -     CBC and Platelet; Future    Essential hypertension  Comments:  Stable on present meds  Hypercholesterolemia  -     Lipid Panel with Direct LDL reflex; Future        Reviewed with patient plan to treat with above plan  I will see patient back in 4 mos or sooner prn

## 2020-08-31 DIAGNOSIS — I10 ESSENTIAL HYPERTENSION: ICD-10-CM

## 2020-08-31 DIAGNOSIS — E78.00 HYPERCHOLESTEROLEMIA: ICD-10-CM

## 2020-08-31 DIAGNOSIS — K21.9 GASTROESOPHAGEAL REFLUX DISEASE WITHOUT ESOPHAGITIS: ICD-10-CM

## 2020-08-31 RX ORDER — METOPROLOL SUCCINATE 50 MG/1
50 TABLET, EXTENDED RELEASE ORAL DAILY
Qty: 90 TABLET | Refills: 3 | Status: SHIPPED | OUTPATIENT
Start: 2020-08-31 | End: 2021-05-18 | Stop reason: SDUPTHER

## 2020-08-31 RX ORDER — OMEPRAZOLE 20 MG/1
20 CAPSULE, DELAYED RELEASE ORAL DAILY
Qty: 90 CAPSULE | Refills: 3 | Status: SHIPPED | OUTPATIENT
Start: 2020-08-31 | End: 2021-08-11 | Stop reason: SDUPTHER

## 2020-08-31 RX ORDER — ATORVASTATIN CALCIUM 40 MG/1
40 TABLET, FILM COATED ORAL DAILY
Qty: 90 TABLET | Refills: 3 | Status: SHIPPED | OUTPATIENT
Start: 2020-08-31 | End: 2021-08-11 | Stop reason: SDUPTHER

## 2020-09-11 LAB
LEFT EYE DIABETIC RETINOPATHY: NORMAL
RIGHT EYE DIABETIC RETINOPATHY: NORMAL

## 2020-09-25 ENCOUNTER — IN-CLINIC DEVICE VISIT (OUTPATIENT)
Dept: CARDIOLOGY CLINIC | Facility: CLINIC | Age: 75
End: 2020-09-25
Payer: COMMERCIAL

## 2020-09-25 DIAGNOSIS — Z95.0 PACEMAKER: Primary | ICD-10-CM

## 2020-09-25 PROCEDURE — 93280 PM DEVICE PROGR EVAL DUAL: CPT | Performed by: INTERNAL MEDICINE

## 2020-09-25 NOTE — PROGRESS NOTES
Results for orders placed or performed in visit on 09/25/20   Cardiac EP device report    Narrative    MDT DC PPM - ACTIVE SYSTEM IS MRI CONDITIONAL  DEVICE INTERROGATED IN THE [facility] OFFICE: BATTERY VOLTAGE ADEQUATE (12 4 YRS)  AP 79 5%  3 6%; ALL LEAD PARAMETERS WITHIN NORMAL LIMITS  ALL OTHER TESTING WITHIN NORMAL LIMITS  38 AT/AF EPISODES W/PT PRESENTING IN AFL TODAY  TOTAL BURDEN 7 3%  PT TAKES ELIQUIS, AMIODORONE, METOPROLOL SUCC  DECREASE MADE TO BOTH RA & RV AMPLITUDE TO PROMOTE DEVICE LONGEVITY WHILE MAINTAINING AN APPROPRIATE SAFETY MARGIN  REACTIVE ATP PROGRAMMED "ON" AT [address] [place] [address]  AFL SUCCESSFULLY TERMINATED PRIOR TO END OF SESSION  PACEMAKER FUNCTIONING APPROPRIATELY      EB

## 2020-10-08 ENCOUNTER — HOSPITAL ENCOUNTER (OUTPATIENT)
Dept: ULTRASOUND IMAGING | Facility: HOSPITAL | Age: 75
Discharge: HOME | End: 2020-10-08
Payer: COMMERCIAL

## 2020-10-08 ENCOUNTER — TELEPHONE (OUTPATIENT)
Dept: FAMILY MEDICINE CLINIC | Facility: CLINIC | Age: 75
End: 2020-10-08

## 2020-10-08 ENCOUNTER — TRANSCRIBE ORDERS (OUTPATIENT)
Dept: LAB | Facility: CLINIC | Age: 75
End: 2020-10-08

## 2020-10-08 ENCOUNTER — LAB (OUTPATIENT)
Dept: LAB | Facility: CLINIC | Age: 75
End: 2020-10-08
Payer: MEDICARE

## 2020-10-08 DIAGNOSIS — E78.00 HYPERCHOLESTEROLEMIA: ICD-10-CM

## 2020-10-08 DIAGNOSIS — N20.0 NEPHROLITHIASIS: ICD-10-CM

## 2020-10-08 DIAGNOSIS — D64.9 ANEMIA, UNSPECIFIED TYPE: ICD-10-CM

## 2020-10-08 LAB
CHOLEST SERPL-MCNC: 152 MG/DL (ref 50–200)
ERYTHROCYTE [DISTWIDTH] IN BLOOD BY AUTOMATED COUNT: 18.6 % (ref 11.6–15.1)
HCT VFR BLD AUTO: 34.7 % (ref 34.8–46.1)
HDLC SERPL-MCNC: 65 MG/DL
HGB BLD-MCNC: 10 G/DL (ref 11.5–15.4)
LDLC SERPL CALC-MCNC: 64 MG/DL (ref 0–100)
MCH RBC QN AUTO: 24.9 PG (ref 26.8–34.3)
MCHC RBC AUTO-ENTMCNC: 28.8 G/DL (ref 31.4–37.4)
MCV RBC AUTO: 87 FL (ref 82–98)
PLATELET # BLD AUTO: 260 THOUSANDS/UL (ref 149–390)
PMV BLD AUTO: 10 FL (ref 8.9–12.7)
RBC # BLD AUTO: 4.01 MILLION/UL (ref 3.81–5.12)
TRIGL SERPL-MCNC: 113 MG/DL
WBC # BLD AUTO: 13.87 THOUSAND/UL (ref 4.31–10.16)

## 2020-10-08 PROCEDURE — 80061 LIPID PANEL: CPT

## 2020-10-08 PROCEDURE — 85027 COMPLETE CBC AUTOMATED: CPT

## 2020-10-08 PROCEDURE — 36415 COLL VENOUS BLD VENIPUNCTURE: CPT

## 2020-10-08 PROCEDURE — 51798 US URINE CAPACITY MEASURE: CPT

## 2020-10-12 ENCOUNTER — TELEPHONE (OUTPATIENT)
Dept: UROLOGY | Facility: CLINIC | Age: 75
End: 2020-10-12

## 2020-10-13 ENCOUNTER — HOSPITAL ENCOUNTER (OUTPATIENT)
Dept: RADIOLOGY | Facility: HOSPITAL | Age: 75
Discharge: HOME | End: 2020-10-13
Payer: COMMERCIAL

## 2020-10-13 ENCOUNTER — TELEPHONE (OUTPATIENT)
Dept: HEMATOLOGY ONCOLOGY | Facility: CLINIC | Age: 75
End: 2020-10-13

## 2020-10-13 DIAGNOSIS — N20.0 NEPHROLITHIASIS: ICD-10-CM

## 2020-10-13 PROCEDURE — 74018 RADEX ABDOMEN 1 VIEW: CPT

## 2020-10-14 ENCOUNTER — OFFICE VISIT (OUTPATIENT)
Dept: HEMATOLOGY ONCOLOGY | Facility: CLINIC | Age: 75
End: 2020-10-14
Payer: MEDICARE

## 2020-10-14 VITALS
WEIGHT: 128.5 LBS | BODY MASS INDEX: 25.91 KG/M2 | DIASTOLIC BLOOD PRESSURE: 98 MMHG | OXYGEN SATURATION: 96 % | HEART RATE: 70 BPM | TEMPERATURE: 97.9 F | SYSTOLIC BLOOD PRESSURE: 158 MMHG | HEIGHT: 59 IN | RESPIRATION RATE: 16 BRPM

## 2020-10-14 DIAGNOSIS — D72.829 LEUKOCYTOSIS, UNSPECIFIED TYPE: Primary | ICD-10-CM

## 2020-10-14 DIAGNOSIS — D64.9 ANEMIA, UNSPECIFIED TYPE: ICD-10-CM

## 2020-10-14 DIAGNOSIS — E53.8 VITAMIN B12 DEFICIENCY: ICD-10-CM

## 2020-10-14 PROCEDURE — 99205 OFFICE O/P NEW HI 60 MIN: CPT | Performed by: NURSE PRACTITIONER

## 2020-10-14 RX ORDER — FERROUS SULFATE 325(65) MG
325 TABLET ORAL
COMMUNITY

## 2020-10-15 ENCOUNTER — TRANSCRIBE ORDERS (OUTPATIENT)
Dept: LAB | Facility: AMBULARY SURGERY CENTER | Age: 75
End: 2020-10-15

## 2020-10-15 ENCOUNTER — LAB (OUTPATIENT)
Dept: LAB | Facility: AMBULARY SURGERY CENTER | Age: 75
End: 2020-10-15
Payer: MEDICARE

## 2020-10-15 ENCOUNTER — OFFICE VISIT (OUTPATIENT)
Dept: UROLOGY | Facility: CLINIC | Age: 75
End: 2020-10-15
Payer: MEDICARE

## 2020-10-15 VITALS
BODY MASS INDEX: 25.6 KG/M2 | SYSTOLIC BLOOD PRESSURE: 162 MMHG | DIASTOLIC BLOOD PRESSURE: 92 MMHG | TEMPERATURE: 97 F | HEIGHT: 59 IN | WEIGHT: 127 LBS

## 2020-10-15 DIAGNOSIS — N20.0 NEPHROLITHIASIS: Primary | ICD-10-CM

## 2020-10-15 DIAGNOSIS — E53.8 VITAMIN B12 DEFICIENCY: ICD-10-CM

## 2020-10-15 DIAGNOSIS — D64.9 ANEMIA, UNSPECIFIED TYPE: ICD-10-CM

## 2020-10-15 DIAGNOSIS — D72.829 LEUKOCYTOSIS, UNSPECIFIED TYPE: ICD-10-CM

## 2020-10-15 LAB
ALBUMIN SERPL BCP-MCNC: 3.7 G/DL (ref 3.5–5)
ALP SERPL-CCNC: 43 U/L (ref 46–116)
ALT SERPL W P-5'-P-CCNC: 17 U/L (ref 12–78)
ANION GAP SERPL CALCULATED.3IONS-SCNC: 10 MMOL/L (ref 4–13)
AST SERPL W P-5'-P-CCNC: 11 U/L (ref 5–45)
BASOPHILS # BLD AUTO: 0.16 THOUSANDS/ΜL (ref 0–0.1)
BASOPHILS NFR BLD AUTO: 1 % (ref 0–1)
BILIRUB SERPL-MCNC: 0.4 MG/DL (ref 0.2–1)
BUN SERPL-MCNC: 17 MG/DL (ref 5–25)
CALCIUM SERPL-MCNC: 8.9 MG/DL (ref 8.3–10.1)
CHLORIDE SERPL-SCNC: 105 MMOL/L (ref 100–108)
CO2 SERPL-SCNC: 24 MMOL/L (ref 21–32)
CREAT SERPL-MCNC: 0.8 MG/DL (ref 0.6–1.3)
EOSINOPHIL # BLD AUTO: 0.36 THOUSAND/ΜL (ref 0–0.61)
EOSINOPHIL NFR BLD AUTO: 3 % (ref 0–6)
ERYTHROCYTE [DISTWIDTH] IN BLOOD BY AUTOMATED COUNT: 19.5 % (ref 11.6–15.1)
FERRITIN SERPL-MCNC: 16 NG/ML (ref 8–388)
FOLATE SERPL-MCNC: >20 NG/ML (ref 3.1–17.5)
GFR SERPL CREATININE-BSD FRML MDRD: 73 ML/MIN/1.73SQ M
GLUCOSE P FAST SERPL-MCNC: 141 MG/DL (ref 65–99)
HCT VFR BLD AUTO: 36.1 % (ref 34.8–46.1)
HGB BLD-MCNC: 10.5 G/DL (ref 11.5–15.4)
IGA SERPL-MCNC: 239 MG/DL (ref 70–400)
IGG SERPL-MCNC: 1160 MG/DL (ref 700–1600)
IGM SERPL-MCNC: 56 MG/DL (ref 40–230)
IMM GRANULOCYTES # BLD AUTO: 0.04 THOUSAND/UL (ref 0–0.2)
IMM GRANULOCYTES NFR BLD AUTO: 0 % (ref 0–2)
IRON SATN MFR SERPL: 35 %
IRON SERPL-MCNC: 134 UG/DL (ref 50–170)
LYMPHOCYTES # BLD AUTO: 2.82 THOUSANDS/ΜL (ref 0.6–4.47)
LYMPHOCYTES NFR BLD AUTO: 24 % (ref 14–44)
MCH RBC QN AUTO: 25.4 PG (ref 26.8–34.3)
MCHC RBC AUTO-ENTMCNC: 29.1 G/DL (ref 31.4–37.4)
MCV RBC AUTO: 87 FL (ref 82–98)
MONOCYTES # BLD AUTO: 0.76 THOUSAND/ΜL (ref 0.17–1.22)
MONOCYTES NFR BLD AUTO: 6 % (ref 4–12)
NEUTROPHILS # BLD AUTO: 7.87 THOUSANDS/ΜL (ref 1.85–7.62)
NEUTS SEG NFR BLD AUTO: 66 % (ref 43–75)
NRBC BLD AUTO-RTO: 0 /100 WBCS
PLATELET # BLD AUTO: 312 THOUSANDS/UL (ref 149–390)
PMV BLD AUTO: 10.8 FL (ref 8.9–12.7)
POTASSIUM SERPL-SCNC: 4.3 MMOL/L (ref 3.5–5.3)
PROT SERPL-MCNC: 7.8 G/DL (ref 6.4–8.2)
RBC # BLD AUTO: 4.14 MILLION/UL (ref 3.81–5.12)
SODIUM SERPL-SCNC: 139 MMOL/L (ref 136–145)
TIBC SERPL-MCNC: 379 UG/DL (ref 250–450)
VIT B12 SERPL-MCNC: 198 PG/ML (ref 100–900)
WBC # BLD AUTO: 12.01 THOUSAND/UL (ref 4.31–10.16)

## 2020-10-15 PROCEDURE — 82607 VITAMIN B-12: CPT

## 2020-10-15 PROCEDURE — 36415 COLL VENOUS BLD VENIPUNCTURE: CPT

## 2020-10-15 PROCEDURE — 83883 ASSAY NEPHELOMETRY NOT SPEC: CPT

## 2020-10-15 PROCEDURE — 82728 ASSAY OF FERRITIN: CPT

## 2020-10-15 PROCEDURE — 84165 PROTEIN E-PHORESIS SERUM: CPT | Performed by: PATHOLOGY

## 2020-10-15 PROCEDURE — 83540 ASSAY OF IRON: CPT

## 2020-10-15 PROCEDURE — 84165 PROTEIN E-PHORESIS SERUM: CPT

## 2020-10-15 PROCEDURE — 80053 COMPREHEN METABOLIC PANEL: CPT

## 2020-10-15 PROCEDURE — 82784 ASSAY IGA/IGD/IGG/IGM EACH: CPT

## 2020-10-15 PROCEDURE — 83550 IRON BINDING TEST: CPT

## 2020-10-15 PROCEDURE — 99213 OFFICE O/P EST LOW 20 MIN: CPT | Performed by: PHYSICIAN ASSISTANT

## 2020-10-15 PROCEDURE — 85025 COMPLETE CBC W/AUTO DIFF WBC: CPT

## 2020-10-15 PROCEDURE — 82746 ASSAY OF FOLIC ACID SERUM: CPT

## 2020-10-16 LAB
ALBUMIN SERPL ELPH-MCNC: 3.99 G/DL (ref 3.5–5)
ALBUMIN SERPL ELPH-MCNC: 54.7 % (ref 52–65)
ALPHA1 GLOB SERPL ELPH-MCNC: 0.37 G/DL (ref 0.1–0.4)
ALPHA1 GLOB SERPL ELPH-MCNC: 5.1 % (ref 2.5–5)
ALPHA2 GLOB SERPL ELPH-MCNC: 1 G/DL (ref 0.4–1.2)
ALPHA2 GLOB SERPL ELPH-MCNC: 13.7 % (ref 7–13)
BETA GLOB ABNORMAL SERPL ELPH-MCNC: 0.5 G/DL (ref 0.4–0.8)
BETA1 GLOB SERPL ELPH-MCNC: 6.8 % (ref 5–13)
BETA2 GLOB SERPL ELPH-MCNC: 5.7 % (ref 2–8)
BETA2+GAMMA GLOB SERPL ELPH-MCNC: 0.42 G/DL (ref 0.2–0.5)
GAMMA GLOB ABNORMAL SERPL ELPH-MCNC: 1.02 G/DL (ref 0.5–1.6)
GAMMA GLOB SERPL ELPH-MCNC: 14 % (ref 12–22)
IGG/ALB SER: 1.21 {RATIO} (ref 1.1–1.8)
KAPPA LC FREE SER-MCNC: 18.7 MG/L (ref 3.3–19.4)
KAPPA LC FREE/LAMBDA FREE SER: 1.48 {RATIO} (ref 0.26–1.65)
LAMBDA LC FREE SERPL-MCNC: 12.6 MG/L (ref 5.7–26.3)
PROT PATTERN SERPL ELPH-IMP: ABNORMAL
PROT SERPL-MCNC: 7.3 G/DL (ref 6.4–8.2)

## 2020-10-20 DIAGNOSIS — F41.9 ANXIETY: ICD-10-CM

## 2020-10-20 RX ORDER — ALPRAZOLAM 0.25 MG/1
0.25 TABLET ORAL 3 TIMES DAILY PRN
Qty: 270 TABLET | Refills: 0 | Status: SHIPPED | OUTPATIENT
Start: 2020-10-24 | End: 2021-02-11

## 2020-10-28 ENCOUNTER — OFFICE VISIT (OUTPATIENT)
Dept: HEMATOLOGY ONCOLOGY | Facility: CLINIC | Age: 75
End: 2020-10-28
Payer: COMMERCIAL

## 2020-10-28 ENCOUNTER — APPOINTMENT (OUTPATIENT)
Dept: LAB | Facility: CLINIC | Age: 75
End: 2020-10-28
Payer: COMMERCIAL

## 2020-10-28 VITALS
SYSTOLIC BLOOD PRESSURE: 164 MMHG | HEART RATE: 86 BPM | RESPIRATION RATE: 16 BRPM | DIASTOLIC BLOOD PRESSURE: 98 MMHG | BODY MASS INDEX: 25.8 KG/M2 | OXYGEN SATURATION: 100 % | HEIGHT: 59 IN | TEMPERATURE: 97.1 F | WEIGHT: 128 LBS

## 2020-10-28 DIAGNOSIS — E53.8 VITAMIN B12 DEFICIENCY: ICD-10-CM

## 2020-10-28 DIAGNOSIS — D72.829 LEUKOCYTOSIS, UNSPECIFIED TYPE: ICD-10-CM

## 2020-10-28 DIAGNOSIS — D64.9 ANEMIA, UNSPECIFIED TYPE: ICD-10-CM

## 2020-10-28 DIAGNOSIS — D64.9 ANEMIA, UNSPECIFIED TYPE: Primary | ICD-10-CM

## 2020-10-28 LAB
CRP SERPL QL: <3 MG/L
ERYTHROCYTE [SEDIMENTATION RATE] IN BLOOD: 21 MM/HOUR (ref 0–29)

## 2020-10-28 PROCEDURE — 3077F SYST BP >= 140 MM HG: CPT | Performed by: NURSE PRACTITIONER

## 2020-10-28 PROCEDURE — 1160F RVW MEDS BY RX/DR IN RCRD: CPT | Performed by: NURSE PRACTITIONER

## 2020-10-28 PROCEDURE — 3080F DIAST BP >= 90 MM HG: CPT | Performed by: NURSE PRACTITIONER

## 2020-10-28 PROCEDURE — 36415 COLL VENOUS BLD VENIPUNCTURE: CPT

## 2020-10-28 PROCEDURE — 3008F BODY MASS INDEX DOCD: CPT | Performed by: NURSE PRACTITIONER

## 2020-10-28 PROCEDURE — 86038 ANTINUCLEAR ANTIBODIES: CPT

## 2020-10-28 PROCEDURE — 83918 ORGANIC ACIDS TOTAL QUANT: CPT

## 2020-10-28 PROCEDURE — 86039 ANTINUCLEAR ANTIBODIES (ANA): CPT

## 2020-10-28 PROCEDURE — 85652 RBC SED RATE AUTOMATED: CPT

## 2020-10-28 PROCEDURE — 99214 OFFICE O/P EST MOD 30 MIN: CPT | Performed by: NURSE PRACTITIONER

## 2020-10-28 PROCEDURE — 86140 C-REACTIVE PROTEIN: CPT

## 2020-10-30 LAB
ANA HOMOGEN SER QL IF: NORMAL
ANA HOMOGEN TITR SER: NORMAL {TITER}
RYE IGE QN: POSITIVE

## 2020-11-02 LAB — SCAN RESULT: NORMAL

## 2020-11-03 ENCOUNTER — TELEPHONE (OUTPATIENT)
Dept: HEMATOLOGY ONCOLOGY | Facility: CLINIC | Age: 75
End: 2020-11-03

## 2020-11-03 LAB
METHYLMALONATE SERPL-SCNC: 215 NMOL/L (ref 0–378)
SL AMB DISCLAIMER: NORMAL

## 2020-11-19 DIAGNOSIS — M54.50 LUMBAR BACK PAIN: ICD-10-CM

## 2020-11-19 RX ORDER — TRAMADOL HYDROCHLORIDE 50 MG/1
100 TABLET ORAL 2 TIMES DAILY
Qty: 60 TABLET | Refills: 3 | Status: SHIPPED | OUTPATIENT
Start: 2020-11-19 | End: 2021-09-22 | Stop reason: SDUPTHER

## 2020-12-28 LAB
LEFT EYE DIABETIC RETINOPATHY: NORMAL
RIGHT EYE DIABETIC RETINOPATHY: NORMAL

## 2021-01-05 ENCOUNTER — REMOTE DEVICE CLINIC VISIT (OUTPATIENT)
Dept: CARDIOLOGY CLINIC | Facility: CLINIC | Age: 76
End: 2021-01-05
Payer: COMMERCIAL

## 2021-01-05 DIAGNOSIS — Z95.0 CARDIAC PACEMAKER IN SITU: Primary | ICD-10-CM

## 2021-01-05 PROCEDURE — 93296 REM INTERROG EVL PM/IDS: CPT | Performed by: INTERNAL MEDICINE

## 2021-01-05 PROCEDURE — 93294 REM INTERROG EVL PM/LDLS PM: CPT | Performed by: INTERNAL MEDICINE

## 2021-01-05 NOTE — PROGRESS NOTES
Results for orders placed or performed in visit on 01/05/21   Cardiac EP device report    Narrative    MDT NESTOR PPM - [facility]: BATTERY STATUS "OK"  AP 95%  29%  ALL AVAILABLE LEAD PARAMETERS WITHIN NORMAL LIMITS  112 AHRS NOTED; 6 2% BURDEN  rATP GIVEN  PT ON AMIO, ELIQUIS, & METO SUCC  EGRAMS SHOW AF  NORMAL DEVICE FUNCTION  NC       Current Outpatient Medications:     acetaminophen (TYLENOL) 325 mg tablet, Take 2 tablets (650 mg total) by mouth every 4 (four) hours as needed for mild pain, Disp: 30 tablet, Rfl: 0    ALPRAZolam (XANAX) 0 25 mg tablet, Take 1 tablet (0 25 mg total) by mouth 3 (three) times a day as needed for anxiety, Disp: 270 tablet, Rfl: 0    amiodarone 200 mg tablet, 200 mg PO daily  , Disp: 90 tablet, Rfl: 3    apixaban (Eliquis) 5 mg, Take 1 tablet (5 mg total) by mouth 2 (two) times a day, Disp: 180 tablet, Rfl: 3    atorvastatin (LIPITOR) 40 mg tablet, Take 1 tablet (40 mg total) by mouth daily, Disp: 90 tablet, Rfl: 3    COMBIGAN 0 2-0 5 %, , Disp: , Rfl:     ferrous sulfate 325 (65 Fe) mg tablet, Take 325 mg by mouth daily with breakfast, Disp: , Rfl:     losartan (COZAAR) 25 mg tablet, Take 1 tablet (25 mg total) by mouth daily, Disp: 90 tablet, Rfl: 3    metFORMIN (GLUCOPHAGE) 500 mg tablet, Take 1 tablet (500 mg total) by mouth 2 (two) times a day with meals for 5000 doses, Disp: 180 tablet, Rfl: 3    metoprolol succinate (TOPROL-XL) 50 mg 24 hr tablet, Take 1 tablet (50 mg total) by mouth daily, Disp: 90 tablet, Rfl: 3    omeprazole (PriLOSEC) 20 mg delayed release capsule, Take 1 capsule (20 mg total) by mouth daily, Disp: 90 capsule, Rfl: 3    traMADol (ULTRAM) 50 mg tablet, Take 2 tablets (100 mg total) by mouth 2 (two) times a day, Disp: 60 tablet, Rfl: 3

## 2021-01-06 ENCOUNTER — OFFICE VISIT (OUTPATIENT)
Dept: FAMILY MEDICINE CLINIC | Facility: CLINIC | Age: 76
End: 2021-01-06
Payer: COMMERCIAL

## 2021-01-06 VITALS
WEIGHT: 127 LBS | HEIGHT: 59 IN | SYSTOLIC BLOOD PRESSURE: 122 MMHG | OXYGEN SATURATION: 98 % | HEART RATE: 68 BPM | TEMPERATURE: 98.6 F | BODY MASS INDEX: 25.6 KG/M2 | DIASTOLIC BLOOD PRESSURE: 84 MMHG

## 2021-01-06 DIAGNOSIS — E11.9 CONTROLLED TYPE 2 DIABETES MELLITUS WITHOUT COMPLICATION, WITHOUT LONG-TERM CURRENT USE OF INSULIN (HCC): ICD-10-CM

## 2021-01-06 DIAGNOSIS — Z00.00 MEDICARE ANNUAL WELLNESS VISIT, SUBSEQUENT: Primary | ICD-10-CM

## 2021-01-06 DIAGNOSIS — E11.3593 TYPE 2 DIABETES MELLITUS WITH PROLIFERATIVE RETINOPATHY OF BOTH EYES, WITHOUT LONG-TERM CURRENT USE OF INSULIN, MACULAR EDEMA PRESENCE UNSPECIFIED, UNSPECIFIED PROLIFERATIVE RETINOPATHY TYPE (HCC): ICD-10-CM

## 2021-01-06 DIAGNOSIS — I97.89 POSTOPERATIVE ATRIAL FIBRILLATION (HCC): ICD-10-CM

## 2021-01-06 DIAGNOSIS — I10 ESSENTIAL HYPERTENSION: ICD-10-CM

## 2021-01-06 DIAGNOSIS — D64.9 ANEMIA, UNSPECIFIED TYPE: ICD-10-CM

## 2021-01-06 DIAGNOSIS — I25.119 ATHEROSCLEROSIS OF NATIVE CORONARY ARTERY WITH ANGINA PECTORIS, UNSPECIFIED WHETHER NATIVE OR TRANSPLANTED HEART (HCC): ICD-10-CM

## 2021-01-06 DIAGNOSIS — E78.00 HYPERCHOLESTEROLEMIA: ICD-10-CM

## 2021-01-06 DIAGNOSIS — I48.91 POSTOPERATIVE ATRIAL FIBRILLATION (HCC): ICD-10-CM

## 2021-01-06 DIAGNOSIS — I71.2 ANEURYSM OF ASCENDING AORTA (HCC): ICD-10-CM

## 2021-01-06 LAB — SL AMB POCT HEMOGLOBIN AIC: 6.9 (ref ?–6.5)

## 2021-01-06 PROCEDURE — 83036 HEMOGLOBIN GLYCOSYLATED A1C: CPT | Performed by: FAMILY MEDICINE

## 2021-01-06 PROCEDURE — 3079F DIAST BP 80-89 MM HG: CPT | Performed by: FAMILY MEDICINE

## 2021-01-06 PROCEDURE — 1101F PT FALLS ASSESS-DOCD LE1/YR: CPT | Performed by: FAMILY MEDICINE

## 2021-01-06 PROCEDURE — 1160F RVW MEDS BY RX/DR IN RCRD: CPT | Performed by: FAMILY MEDICINE

## 2021-01-06 PROCEDURE — 3725F SCREEN DEPRESSION PERFORMED: CPT | Performed by: FAMILY MEDICINE

## 2021-01-06 PROCEDURE — 3288F FALL RISK ASSESSMENT DOCD: CPT | Performed by: FAMILY MEDICINE

## 2021-01-06 PROCEDURE — 99214 OFFICE O/P EST MOD 30 MIN: CPT | Performed by: FAMILY MEDICINE

## 2021-01-06 PROCEDURE — 4004F PT TOBACCO SCREEN RCVD TLK: CPT | Performed by: FAMILY MEDICINE

## 2021-01-06 PROCEDURE — 3074F SYST BP LT 130 MM HG: CPT | Performed by: FAMILY MEDICINE

## 2021-01-06 PROCEDURE — 1125F AMNT PAIN NOTED PAIN PRSNT: CPT | Performed by: FAMILY MEDICINE

## 2021-01-06 PROCEDURE — G0439 PPPS, SUBSEQ VISIT: HCPCS | Performed by: FAMILY MEDICINE

## 2021-01-06 PROCEDURE — 3044F HG A1C LEVEL LT 7.0%: CPT | Performed by: FAMILY MEDICINE

## 2021-01-06 PROCEDURE — 3008F BODY MASS INDEX DOCD: CPT | Performed by: FAMILY MEDICINE

## 2021-01-06 PROCEDURE — 1170F FXNL STATUS ASSESSED: CPT | Performed by: FAMILY MEDICINE

## 2021-01-06 NOTE — PATIENT INSTRUCTIONS
Medicare Preventive Visit Patient Instructions  Thank you for completing your Welcome to Medicare Visit or Medicare Annual Wellness Visit today  Your next wellness visit will be due in one year (1/6/2022)  The screening/preventive services that you may require over the next 5-10 years are detailed below  Some tests may not apply to you based off risk factors and/or age  Screening tests ordered at today's visit but not completed yet may show as past due  Also, please note that scanned in results may not display below  Preventive Screenings:  Service Recommendations Previous Testing/Comments   Colorectal Cancer Screening  * Colonoscopy    * Fecal Occult Blood Test (FOBT)/Fecal Immunochemical Test (FIT)  * Fecal DNA/Cologuard Test  * Flexible Sigmoidoscopy Age: 54-65 years old   Colonoscopy: every 10 years (may be performed more frequently if at higher risk)  OR  FOBT/FIT: every 1 year  OR  Cologuard: every 3 years  OR  Sigmoidoscopy: every 5 years  Screening may be recommended earlier than age 48 if at higher risk for colorectal cancer  Also, an individualized decision between you and your healthcare provider will decide whether screening between the ages of 74-80 would be appropriate  Colonoscopy: Not on file  FOBT/FIT: Not on file  Cologuard: Not on file  Sigmoidoscopy: Not on file    Screening Current     Breast Cancer Screening Age: 36 years old  Frequency: every 1-2 years  Not required if history of left and right mastectomy Mammogram: Not on file       Cervical Cancer Screening Between the ages of 21-29, pap smear recommended once every 3 years  Between the ages of 33-67, can perform pap smear with HPV co-testing every 5 years     Recommendations may differ for women with a history of total hysterectomy, cervical cancer, or abnormal pap smears in past  Pap Smear: Not on file    Screening Not Indicated   Hepatitis C Screening Once for adults born between 1945 and 1965  More frequently in patients at high risk for Hepatitis C Hep C Antibody: 07/07/2020    Screening Current   Diabetes Screening 1-2 times per year if you're at risk for diabetes or have pre-diabetes Fasting glucose: 141 mg/dL   A1C: 6 3 %    Screening Not Indicated  History Diabetes   Cholesterol Screening Once every 5 years if you don't have a lipid disorder  May order more often based on risk factors  Lipid panel: 10/08/2020    Screening Not Indicated  History Lipid Disorder     Other Preventive Screenings Covered by Medicare:  1  Abdominal Aortic Aneurysm (AAA) Screening: covered once if your at risk  You're considered to be at risk if you have a family history of AAA  2  Lung Cancer Screening: covers low dose CT scan once per year if you meet all of the following conditions: (1) Age 50-69; (2) No signs or symptoms of lung cancer; (3) Current smoker or have quit smoking within the last 15 years; (4) You have a tobacco smoking history of at least 30 pack years (packs per day multiplied by number of years you smoked); (5) You get a written order from a healthcare provider  3  Glaucoma Screening: covered annually if you're considered high risk: (1) You have diabetes OR (2) Family history of glaucoma OR (3)  aged 48 and older OR (3)  American aged 72 and older  3  Osteoporosis Screening: covered every 2 years if you meet one of the following conditions: (1) You're estrogen deficient and at risk for osteoporosis based off medical history and other findings; (2) Have a vertebral abnormality; (3) On glucocorticoid therapy for more than 3 months; (4) Have primary hyperparathyroidism; (5) On osteoporosis medications and need to assess response to drug therapy  · Last bone density test (DXA Scan): 12/11/2007  5  HIV Screening: covered annually if you're between the age of 12-76  Also covered annually if you are younger than 13 and older than 72 with risk factors for HIV infection   For pregnant patients, it is covered up to 3 times per pregnancy  Immunizations:  Immunization Recommendations   Influenza Vaccine Annual influenza vaccination during flu season is recommended for all persons aged >= 6 months who do not have contraindications   Pneumococcal Vaccine (Prevnar and Pneumovax)  * Prevnar = PCV13  * Pneumovax = PPSV23   Adults 25-60 years old: 1-3 doses may be recommended based on certain risk factors  Adults 72 years old: Prevnar (PCV13) vaccine recommended followed by Pneumovax (PPSV23) vaccine  If already received PPSV23 since turning 65, then PCV13 recommended at least one year after PPSV23 dose  Hepatitis B Vaccine 3 dose series if at intermediate or high risk (ex: diabetes, end stage renal disease, liver disease)   Tetanus (Td) Vaccine - COST NOT COVERED BY MEDICARE PART B Following completion of primary series, a booster dose should be given every 10 years to maintain immunity against tetanus  Td may also be given as tetanus wound prophylaxis  Tdap Vaccine - COST NOT COVERED BY MEDICARE PART B Recommended at least once for all adults  For pregnant patients, recommended with each pregnancy  Shingles Vaccine (Shingrix) - COST NOT COVERED BY MEDICARE PART B  2 shot series recommended in those aged 48 and above     Health Maintenance Due:      Topic Date Due    DXA SCAN  02/25/2021 (Originally 12/11/2010)    Colorectal Cancer Screening  04/16/2021 (Originally 5/26/1996)    MAMMOGRAM  08/25/2021 (Originally 1945)    Hepatitis C Screening  Completed     Immunizations Due:  There are no preventive care reminders to display for this patient  Advance Directives   What are advance directives? Advance directives are legal documents that state your wishes and plans for medical care  These plans are made ahead of time in case you lose your ability to make decisions for yourself  Advance directives can apply to any medical decision, such as the treatments you want, and if you want to donate organs     What are the types of advance directives? There are many types of advance directives, and each state has rules about how to use them  You may choose a combination of any of the following:  · Living will: This is a written record of the treatment you want  You can also choose which treatments you do not want, which to limit, and which to stop at a certain time  This includes surgery, medicine, IV fluid, and tube feedings  · Durable power of  for healthcare [facility]): This is a written record that states who you want to make healthcare choices for you when you are unable to make them for yourself  This person, called a proxy, is usually a family member or a friend  You may choose more than 1 proxy  · Do not resuscitate (DNR) order:  A DNR order is used in case your heart stops beating or you stop breathing  It is a request not to have certain forms of treatment, such as CPR  A DNR order may be included in other types of advance directives  · Medical directive: This covers the care that you want if you are in a coma, near death, or unable to make decisions for yourself  You can list the treatments you want for each condition  Treatment may include pain medicine, surgery, blood transfusions, dialysis, IV or tube feedings, and a ventilator (breathing machine)  · Values history: This document has questions about your views, beliefs, and how you feel and think about life  This information can help others choose the care that you would choose  Why are advance directives important? An advance directive helps you control your care  Although spoken wishes may be used, it is better to have your wishes written down  Spoken wishes can be misunderstood, or not followed  Treatments may be given even if you do not want them  An advance directive may make it easier for your family to make difficult choices about your care     Cigarette Smoking and Your Health   Risks to your health if you smoke:  Nicotine and other chemicals found in tobacco damage every cell in your body  Even if you are a light smoker, you have an increased risk for cancer, heart disease, and lung disease  If you are pregnant or have diabetes, smoking increases your risk for complications  Benefits to your health if you stop smoking:   · You decrease respiratory symptoms such as coughing, wheezing, and shortness of breath  · You reduce your risk for cancers of the lung, mouth, throat, kidney, bladder, pancreas, stomach, and cervix  If you already have cancer, you increase the benefits of chemotherapy  You also reduce your risk for cancer returning or a second cancer from developing  · You reduce your risk for heart disease, blood clots, heart attack, and stroke  · You reduce your risk for lung infections, and diseases such as pneumonia, asthma, chronic bronchitis, and emphysema  · Your circulation improves  More oxygen can be delivered to your body  If you have diabetes, you lower your risk for complications, such as kidney, artery, and eye diseases  You also lower your risk for nerve damage  Nerve damage can lead to amputations, poor vision, and blindness  · You improve your body's ability to heal and to fight infections  For more information and support to stop smoking:   · [facility]  Phone: [phone]  Web Address: www [employer]  Weight Management   Why it is important to manage your weight:  Being overweight increases your risk of health conditions such as heart disease, high blood pressure, type 2 diabetes, and certain types of cancer  It can also increase your risk for osteoarthritis, sleep apnea, and other respiratory problems  Aim for a slow, steady weight loss  Even a small amount of weight loss can lower your risk of health problems  How to lose weight safely:  A safe and healthy way to lose weight is to eat fewer calories and get regular exercise   You can lose up about 1 pound a week by decreasing the number of calories you eat by 500 calories each day  Healthy meal plan for weight management:  A healthy meal plan includes a variety of foods, contains fewer calories, and helps you stay healthy  A healthy meal plan includes the following:  · Eat whole-grain foods more often  A healthy meal plan should contain fiber  Fiber is the part of grains, fruits, and vegetables that is not broken down by your body  Whole-grain foods are healthy and provide extra fiber in your diet  Some examples of whole-grain foods are whole-wheat breads and pastas, oatmeal, brown rice, and bulgur  · Eat a variety of vegetables every day  Include dark, leafy greens such as spinach, kale, crow greens, and mustard greens  Eat yellow and orange vegetables such as carrots, sweet potatoes, and winter squash  · Eat a variety of fruits every day  Choose fresh or canned fruit (canned in its own juice or light syrup) instead of juice  Fruit juice has very little or no fiber  · Eat low-fat dairy foods  Drink fat-free (skim) milk or 1% milk  Eat fat-free yogurt and low-fat cottage cheese  Try low-fat cheeses such as mozzarella and other reduced-fat cheeses  · Choose meat and other protein foods that are low in fat  Choose beans or other legumes such as split peas or lentils  Choose fish, skinless poultry (chicken or turkey), or lean cuts of red meat (beef or pork)  Before you cook meat or poultry, cut off any visible fat  · Use less fat and oil  Try baking foods instead of frying them  Add less fat, such as margarine, sour cream, regular salad dressing and mayonnaise to foods  Eat fewer high-fat foods  Some examples of high-fat foods include french fries, doughnuts, ice cream, and cakes  · Eat fewer sweets  Limit foods and drinks that are high in sugar  This includes candy, cookies, regular soda, and sweetened drinks  Exercise:  Exercise at least 30 minutes per day on most days of the week  Some examples of exercise include walking, biking, dancing, and swimming  You can also fit in more physical activity by taking the stairs instead of the elevator or parking farther away from stores  Ask your healthcare provider about the best exercise plan for you  © Copyright [employer] 2018 Information is for End User's use only and may not be sold, redistributed or otherwise used for commercial purposes   All illustrations and images included in CareNotes® are the copyrighted property of [employer]  or [address] [place] [address] [employer]

## 2021-01-06 NOTE — PROGRESS NOTES
Assessment and Plan:     Problem List Items Addressed This Visit     None           Preventive health issues were discussed with patient, and age appropriate screening tests were ordered as noted in patient's After Visit Summary  Personalized health advice and appropriate referrals for health education or preventive services given if needed, as noted in patient's After Visit Summary  History of Present Illness:     Patient presents for Medicare Annual Wellness visit  She is up to date with all preventative services        Patient Care Team:  Dmitry Sneed DO as PCP - MD Claire Beauchamp MD Scarlet Calandra, MD Sharalyn Boron, MD Truett Ginger, MD     Problem List:     Patient Active Problem List   Diagnosis    Aneurysm of ascending aorta ([facility] )    Anxiety    CAD (coronary atherosclerotic disease)    Neck pain    Essential hypertension    Gastroesophageal reflux disease    Glaucoma    Hypercholesterolemia    Macular degeneration    Osteopenia    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    Tobacco abuse    Controlled type 2 diabetes mellitus, without long-term current use of insulin (HCC)    Elevated troponin level not due myocardial infarction    Gross hematuria    Leukocytosis    Generalized weakness    Unintentional weight loss    Nephrolithiasis    Hematuria    Foreign body in vagina    Pacemaker    Preop cardiovascular exam    Anemia, unspecified    Vitamin B 12 deficiency      Past Medical and Surgical History:     Past Medical History:   Diagnosis Date    Atypical chest pain     GERD (gastroesophageal reflux disease)     Hyperlipidemia     Hypertension     Kidney stone     Myocardial infarction [facility])     2015     NSTEMI (non-ST elevated myocardial infarction) [facility]     Last Assessed: 9/24/2015     Past Surgical History:   Procedure Laterality Date    A-V CARDIAC PACEMAKER INSERTION      ANKLE SURGERY      BACK SURGERY 01/2011    L4 and L5 laminectomy and fusion     BLADDER SURGERY      CORONARY ARTERY BYPASS GRAFT  09/02/2015    CABGx3 with LIMA to LAD, SVG to PDA, SVG to OM-1    EXAMINATION UNDER ANESTHESIA N/A 7/11/2020    Procedure: EXAM UNDER ANESTHESIA (EUA)  EXCISION OF POSTERIOR VAGINAL FOREIGN BODY;  Surgeon: Ambreen Joseph MD;  [facility];  Service: Gynecology    FL RETROGRADE PYELOGRAM  7/11/2020    FL RETROGRADE PYELOGRAM  8/7/2020    HYSTERECTOMY      OK CYSTO/URETERO W/LITHOTRIPSY &INDWELL STENT INSRT Right 8/7/2020    Procedure: CYSTOSCOPY URETEROSCOPY WITH LITHOTRIPSY HOLMIUM LASER, RETROGRADE PYELOGRAM AND INSERTION STENT URETERAL;  Surgeon: Janiya Enriquez MD;  [facility];  Service: Urology    OK [address] Right 7/11/2020    Procedure: CYSTOSCOPY RETROGRADE PYELOGRAM WITH INSERTION STENT URETERAL, bladder biopsy with fulguration;  Surgeon: Janiya Enriquez MD;  [facility];  Service: Urology    WRIST SURGERY        Family History:     Family History   Problem Relation Age of Onset    Hypertension Mother     Hypertension Sister     Alcohol abuse Brother     Cirrhosis Brother     Diabetes Father     Other Brother         Heart transplant in his 46s    Lung cancer Sister     Diabetes Brother     Stroke Sister     No Known Problems Daughter     No Known Problems Maternal Grandmother     No Known Problems Maternal Grandfather     No Known Problems Paternal Grandmother     No Known Problems Paternal Grandfather     No Known Problems Sister       Social History:        Social History     Socioeconomic History    Marital status: Single     Spouse name: None    Number of children: 1    Years of education: None    Highest education level: None   Occupational History    None   Social Needs    Financial resource strain: None    Food insecurity     Worry: None     Inability: None    Transportation needs     Medical: None Non-medical: None   Tobacco Use    Smoking status: Current Every Day Smoker     Packs/day: 0 25     Start date: 5    Smokeless tobacco: Never Used    Tobacco comment: Started smoking at age 25; currently smoking <1ppd  Substance and Sexual Activity    Alcohol use: Not Currently    Drug use: Never    Sexual activity: Not Currently     Partners: Male     Birth control/protection: Post-menopausal   Lifestyle    Physical activity     Days per week: None     Minutes per session: None    Stress: None   Relationships    Social connections     Talks on phone: None     Gets together: None     Attends Gnosticist service: None     Active member of club or organization: None     Attends meetings of clubs or organizations: None     Relationship status: None    Intimate partner violence     Fear of current or ex partner: None     Emotionally abused: None     Physically abused: None     Forced sexual activity: None   Other Topics Concern    None   Social History Narrative    None      Medications and Allergies:     Current Outpatient Medications   Medication Sig Dispense Refill    acetaminophen (TYLENOL) 325 mg tablet Take 2 tablets (650 mg total) by mouth every 4 (four) hours as needed for mild pain 30 tablet 0    ALPRAZolam (XANAX) 0 25 mg tablet Take 1 tablet (0 25 mg total) by mouth 3 (three) times a day as needed for anxiety 270 tablet 0    amiodarone 200 mg tablet 200 mg PO daily   90 tablet 3    apixaban (Eliquis) 5 mg Take 1 tablet (5 mg total) by mouth 2 (two) times a day 180 tablet 3    atorvastatin (LIPITOR) 40 mg tablet Take 1 tablet (40 mg total) by mouth daily 90 tablet 3    COMBIGAN 0 2-0 5 %       ferrous sulfate 325 (65 Fe) mg tablet Take 325 mg by mouth daily with breakfast      losartan (COZAAR) 25 mg tablet Take 1 tablet (25 mg total) by mouth daily 90 tablet 3    metFORMIN (GLUCOPHAGE) 500 mg tablet Take 1 tablet (500 mg total) by mouth 2 (two) times a day with meals for 5000 doses 180 tablet 3    metoprolol succinate (TOPROL-XL) 50 mg 24 hr tablet Take 1 tablet (50 mg total) by mouth daily 90 tablet 3    omeprazole (PriLOSEC) 20 mg delayed release capsule Take 1 capsule (20 mg total) by mouth daily 90 capsule 3    traMADol (ULTRAM) 50 mg tablet Take 2 tablets (100 mg total) by mouth 2 (two) times a day 60 tablet 3     No current facility-administered medications for this visit  No Known Allergies   Immunizations:     Immunization History   Administered Date(s) Administered    Influenza Split High Dose Preservative Free IM 10/01/2015, 10/12/2017    Influenza, high dose seasonal 0 7 mL 10/01/2018, 10/07/2019, 08/25/2020, 10/06/2020    Pneumococcal Conjugate 13-Valent 09/29/2016    Pneumococcal Polysaccharide PPV23 10/01/2015    TD (adult) Preservative Free 08/27/2019    Tdap 08/04/2001    Zoster 02/27/2013      Health Maintenance:         Topic Date Due    DXA SCAN  02/25/2021 (Originally 12/11/2010)    Colorectal Cancer Screening  04/16/2021 (Originally 5/26/1996)    MAMMOGRAM  08/25/2021 (Originally 1945)    Hepatitis C Screening  Completed     There are no preventive care reminders to display for this patient  Medicare Health Risk Assessment:     /84   Pulse 68   Temp 98 6 °F (37 °C)   Ht 4' 11" (1 499 m)   Wt 57 6 kg (127 lb)   SpO2 98%   BMI 25 65 kg/m²      Robyn Miller is here for her Subsequent Wellness visit  Last Medicare Wellness visit information reviewed, patient interviewed, no change since last AWV  Health Risk Assessment:   Patient rates overall health as fair  Patient feels that their physical health rating is slightly worse  Eyesight was rated as same  Hearing was rated as same  Patient feels that their emotional and mental health rating is same  Pain experienced in the last 7 days has been none  Patient states that she has experienced no weight loss or gain in last 6 months       Depression Screening:   PHQ-2 Score: 0      Fall Risk Screening: In the past year, patient has experienced: no history of falling in past year      Urinary Incontinence Screening:   Patient has not leaked urine accidently in the last six months  Home Safety:  Patient has trouble with stairs inside or outside of their home  Patient has working smoke alarms and has working carbon monoxide detector  Home safety hazards include: none  Nutrition:   Current diet is Regular, Diabetic and Limited junk food  Medications:   Patient is currently taking over-the-counter supplements  OTC medications include: see medication list  Patient is able to manage medications  Activities of Daily Living (ADLs)/Instrumental Activities of Daily Living (IADLs):   Walk and transfer into and out of bed and chair?: Yes  Dress and groom yourself?: Yes    Bathe or shower yourself?: Yes    Feed yourself? Yes  Do your laundry/housekeeping?: No  Manage your money, pay your bills and track your expenses?: Yes  Make your own meals?: Yes    Do your own shopping?: No    Previous Hospitalizations:   Any hospitalizations or ED visits within the last 12 months?: Yes    How many hospitalizations have you had in the last year?: more than 4    Advance Care Planning:   Living will: Yes    Durable POA for healthcare:  Yes    Advanced directive: Yes    Advanced directive counseling given: Yes    Five wishes given: Yes    Patient declined ACP directive: No    End of Life Decisions reviewed with patient: Yes    Provider agrees with end of life decisions: Yes      Cognitive Screening:   Provider or family/friend/caregiver concerned regarding cognition?: No    PREVENTIVE SCREENINGS      Cardiovascular Screening:    General: Screening Not Indicated and History Lipid Disorder      Diabetes Screening:     General: Screening Not Indicated and History Diabetes      Colorectal Cancer Screening:     General: Screening Current      Cervical Cancer Screening:    General: Screening Not Indicated      Osteoporosis Screening:    General: Screening Current      Abdominal Aortic Aneurysm (AAA) Screening:        General: Screening Not Indicated and History AAA      Lung Cancer Screening:     General: Screening Not Indicated      Hepatitis C Screening:    General: Screening Current    BMI Counseling: Body mass index is 25 65 kg/m²  The BMI is above normal  Nutrition recommendations include reducing portion sizes and moderation in carbohydrate intake      Rashel Payton DO

## 2021-01-18 NOTE — PROGRESS NOTES
Patient ID: Anthony Saab is a 77 y o  female  HPI: 77 y  o female presents for follow up of niddm, hypertension and hypercholesterolemia  Hgba1c is   6 9   and patient's issues are well controlled  Patient deneis any dyspnea, chest pain or palpitations  She is following closely with cardiology for CAD, afib and an anuerysm of her ascending aorta   She also is on iron for post op anemia  Her afib is rate controlled and she is on eliquis therapy as well as beta blockers  SUBJECTIVE    Family History   Problem Relation Age of Onset    Hypertension Mother     Hypertension Sister     Alcohol abuse Brother     Cirrhosis Brother     Diabetes Father     Other Brother         Heart transplant in his 46s    Lung cancer Sister     Diabetes Brother     Stroke Sister     No Known Problems Daughter     No Known Problems Maternal Grandmother     No Known Problems Maternal Grandfather     No Known Problems Paternal Grandmother     No Known Problems Paternal Grandfather     No Known Problems Sister      Social History     Socioeconomic History    Marital status: Single     Spouse name: Not on file    Number of children: 1    Years of education: Not on file    Highest education level: Not on file   Occupational History    Not on file   Social Needs    Financial resource strain: Not on file    Food insecurity     Worry: Not on file     Inability: Not on file   Wriggle needs     Medical: Not on file     Non-medical: Not on file   Tobacco Use    Smoking status: Current Every Day Smoker     Packs/day: 0 25     Start date: 1967    Smokeless tobacco: Never Used    Tobacco comment: Started smoking at age 25; currently smoking <1ppd     Substance and Sexual Activity    Alcohol use: Not Currently    Drug use: Never    Sexual activity: Not Currently     Partners: Male     Birth control/protection: Post-menopausal   Lifestyle    Physical activity     Days per week: Not on file     Minutes per session: Not on file    Stress: Not on file   Relationships    Social connections     Talks on phone: Not on file     Gets together: Not on file     Attends [employer] service: Not on file     Active member of club or organization: Not on file     Attends meetings of clubs or organizations: Not on file     Relationship status: Not on file    Intimate partner violence     Fear of current or ex partner: Not on file     Emotionally abused: Not on file     Physically abused: Not on file     Forced sexual activity: Not on file   Other Topics Concern    Not on file   Social History Narrative    Not on file     Past Medical History:   Diagnosis Date    Atypical chest pain     GERD (gastroesophageal reflux disease)     Hyperlipidemia     Hypertension     Kidney stone     Myocardial infarction ([facility] [address] )     2015     NSTEMI (non-ST elevated myocardial infarction) ([facility] [address] )     Last Assessed: 9/24/2015     Past Surgical History:   Procedure Laterality Date    A-V CARDIAC PACEMAKER INSERTION      ANKLE SURGERY      BACK SURGERY  01/2011    L4 and L5 laminectomy and fusion     BLADDER SURGERY      CORONARY ARTERY BYPASS GRAFT  09/02/2015    CABGx3 with LIMA to LAD, SVG to PDA, SVG to OM-1    EXAMINATION UNDER ANESTHESIA N/A 7/11/2020    Procedure: EXAM UNDER ANESTHESIA (EUA)  EXCISION OF POSTERIOR VAGINAL FOREIGN BODY;  Surgeon: Nickie Justice MD;  [facility];  Service: Gynecology    FL RETROGRADE PYELOGRAM  7/11/2020    FL RETROGRADE PYELOGRAM  8/7/2020    HYSTERECTOMY      ME CYSTO/URETERO W/LITHOTRIPSY &INDWELL STENT INSRT Right 8/7/2020    Procedure: CYSTOSCOPY URETEROSCOPY WITH LITHOTRIPSY HOLMIUM LASER, RETROGRADE PYELOGRAM AND INSERTION STENT URETERAL;  Surgeon: Astrid Colin MD;  [facility];  Service: Urology    ME CYSTOURETHROSCOPY,URETER CATHETER Right 7/11/2020    Procedure: CYSTOSCOPY RETROGRADE PYELOGRAM WITH INSERTION STENT URETERAL, bladder biopsy with fulguration; Surgeon: William Bo MD;  [facility];  Service: Urology    WRIST SURGERY       No Known Allergies    Current Outpatient Medications:     acetaminophen (TYLENOL) 325 mg tablet, Take 2 tablets (650 mg total) by mouth every 4 (four) hours as needed for mild pain, Disp: 30 tablet, Rfl: 0    ALPRAZolam (XANAX) 0 25 mg tablet, Take 1 tablet (0 25 mg total) by mouth 3 (three) times a day as needed for anxiety, Disp: 270 tablet, Rfl: 0    amiodarone 200 mg tablet, 200 mg PO daily  , Disp: 90 tablet, Rfl: 3    apixaban (Eliquis) 5 mg, Take 1 tablet (5 mg total) by mouth 2 (two) times a day, Disp: 180 tablet, Rfl: 3    atorvastatin (LIPITOR) 40 mg tablet, Take 1 tablet (40 mg total) by mouth daily, Disp: 90 tablet, Rfl: 3    COMBIGAN 0 2-0 5 %, , Disp: , Rfl:     ferrous sulfate 325 (65 Fe) mg tablet, Take 325 mg by mouth daily with breakfast, Disp: , Rfl:     losartan (COZAAR) 25 mg tablet, Take 1 tablet (25 mg total) by mouth daily, Disp: 90 tablet, Rfl: 3    metFORMIN (GLUCOPHAGE) 500 mg tablet, Take 1 tablet (500 mg total) by mouth 2 (two) times a day with meals for 5000 doses, Disp: 180 tablet, Rfl: 3    metoprolol succinate (TOPROL-XL) 50 mg 24 hr tablet, Take 1 tablet (50 mg total) by mouth daily, Disp: 90 tablet, Rfl: 3    omeprazole (PriLOSEC) 20 mg delayed release capsule, Take 1 capsule (20 mg total) by mouth daily, Disp: 90 capsule, Rfl: 3    traMADol (ULTRAM) 50 mg tablet, Take 2 tablets (100 mg total) by mouth 2 (two) times a day, Disp: 60 tablet, Rfl: 3    Review of Systems  Constitutional:     Denies fever, chills ,fatigue ,weakness ,weight loss, weight gain     ENT: Denies earache ,loss of hearing ,nosebleed, nasal discharge,nasal congestion ,sore throat ,hoarseness+ chronic visual changes  Pulmonary: Denies shortness of breath ,cough  ,dyspnea on exertion, orthopnea  ,PND   Cardiovascular:  Denies bradycardia , tachycardia  ,palpations, lower extremity edema leg, claudication  Breast:  Denies new or changing breast lumps ,nipple discharge ,nipple changes  Abdomen:  Denies abdominal pain , anorexia , indigestion, nausea, vomiting, constipation, diarrhea  Musculoskeletal: Denies myalgias, arthralgias, joint swelling, joint stiffness , limb pain, limb swelling  Gu: denies dysuria, polyuria  Skin: Denies skin rash, skin lesion, skin wound, itching, dry skin  Neuro: Denies headache, numbness, tingling, confusion, loss of consciousness, dizziness, vertigo  Psychiatric: Denies feelings of depression, suicidal ideation, anxiety, sleep disturbances    OBJECTIVE  /84   Pulse 68   Temp 98 6 °F (37 °C)   Ht 4' 11" (1 499 m)   Wt 57 6 kg (127 lb)   SpO2 98%   BMI 25 65 kg/m²   Constitutional:   NAD, well appearing and well nourished      ENT:   Conjunctiva and lids: no injection, edema, or discharge     Pupils and iris: BON bilaterally    External inspection of ears and nose: normal without deformities or discharge  Otoscopic exam: Canals patent without erythema  Nasal mucosa, septum and turbinates: Normal or edema or discharge         Oropharynx:  Moist mucosa, normal tongue and tonsils without lesions  No erythema        Pulmonary:Respiratory effort normal rate and rhythm, no increased work of breathing   Auscultation of lungs:  Clear bilaterally with no adventitious breath sounds       Cardiovascular: regular rate and rhythm, S1 and S2, no murmur, no edema and/or varicosities of LE      Abdomen: Soft and non-distended     Positive bowel sounds      No heptomegaly or splenomegaly      Gu: no suprapubic tenderness or CVA tenderness, no urethral discharge  Lymphatic:  No anterior or posterior cervical lymphadenopathy         Musculoskeletal:  Gait and station: Normal gait      Digits and nails normal without clubbing or cyanosis       Inspection/palpation of joints, bones, and muscles:  No joint tenderness, swelling, full active and passive range of motion Skin: Normal skin turgor and no rashes      Neuro:    Normal reflexes   Psych:   alert and oriented to person, place and time     normal mood and affect       Assessment/Plan:Diagnoses and all orders for this visit:    Controlled type 2 diabetes mellitus without complication, without long-term current use of insulin ([facility] )  -     POCT hemoglobin A1c  -     Microalbumin / creatinine urine ratio    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, without long-term current use of insulin, macular edema presence unspecified, unspecified proliferative retinopathy type ([facility] )  Comments:  WEll controlled with hgba1c of 6 9  Following closely with opthalmology  Essential hypertension  Comments:  Stable on ARB therapy  Orders:  -     Comprehensive metabolic panel; Future    Hypercholesterolemia  Comments:  Stable on statin therapy  Orders:  -     Lipid Panel with Direct LDL reflex; Future    Atherosclerosis of native coronary artery with angina pectoris, unspecified whether native or transplanted heart [facility])  Comments:  Continue current surveillence by Cardiology  Aneurysm of ascending aorta (HCC)  Comments:  continue yearly ultrasound surveillence    Anemia, unspecified type  Comments: Will check iron level to see if ferrous sulfate can be stopped  Orders:  -     CBC and Platelet; Future    Postoperative atrial fibrillation (HCC)  Comments: Follow up as per cardiology  Continue eliquis therpay  Orders:  -     apixaban (Eliquis) 5 mg; Take 1 tablet (5 mg total) by mouth 2 (two) times a day        I will see patient back in 4 mos or sooner prn

## 2021-01-25 ENCOUNTER — TELEPHONE (OUTPATIENT)
Dept: HEMATOLOGY ONCOLOGY | Facility: CLINIC | Age: 76
End: 2021-01-25

## 2021-01-25 NOTE — TELEPHONE ENCOUNTER
Appointment Cancellation Or Reschedule     Person calling in Patient    Provider [facility] Visit Date and Time 01/27 at [address]   Did patient reschedule their appointment? no   Is this patient a treatment patient? no   When is their next infusion visit? N/A    Reason for Cancellation or Reschedule Patient does not want to juancarlos at this time      If the patient is a treatment patient, please route this to the office nurse

## 2021-02-11 DIAGNOSIS — F41.9 ANXIETY: ICD-10-CM

## 2021-02-11 RX ORDER — ALPRAZOLAM 0.25 MG/1
TABLET ORAL
Qty: 270 TABLET | Refills: 0 | Status: SHIPPED | OUTPATIENT
Start: 2021-02-11 | End: 2021-08-11 | Stop reason: SDUPTHER

## 2021-02-15 ENCOUNTER — OFFICE VISIT (OUTPATIENT)
Dept: CARDIOLOGY CLINIC | Facility: CLINIC | Age: 76
End: 2021-02-15
Payer: COMMERCIAL

## 2021-02-15 VITALS
HEART RATE: 83 BPM | OXYGEN SATURATION: 99 % | WEIGHT: 128 LBS | SYSTOLIC BLOOD PRESSURE: 136 MMHG | HEIGHT: 59 IN | BODY MASS INDEX: 25.8 KG/M2 | DIASTOLIC BLOOD PRESSURE: 78 MMHG

## 2021-02-15 DIAGNOSIS — I71.2 ANEURYSM OF ASCENDING AORTA (HCC): ICD-10-CM

## 2021-02-15 DIAGNOSIS — I48.0 PAF (PAROXYSMAL ATRIAL FIBRILLATION) (HCC): ICD-10-CM

## 2021-02-15 DIAGNOSIS — E78.00 HYPERCHOLESTEROLEMIA: ICD-10-CM

## 2021-02-15 DIAGNOSIS — Z95.0 PACEMAKER: ICD-10-CM

## 2021-02-15 DIAGNOSIS — I49.5 TACHY-BRADY SYNDROME (HCC): ICD-10-CM

## 2021-02-15 DIAGNOSIS — I25.10 ATHEROSCLEROSIS OF CORONARY ARTERY OF NATIVE HEART WITHOUT ANGINA PECTORIS, UNSPECIFIED VESSEL OR LESION TYPE: Primary | ICD-10-CM

## 2021-02-15 DIAGNOSIS — I10 ESSENTIAL HYPERTENSION: ICD-10-CM

## 2021-02-15 PROCEDURE — 1160F RVW MEDS BY RX/DR IN RCRD: CPT | Performed by: INTERNAL MEDICINE

## 2021-02-15 PROCEDURE — 3078F DIAST BP <80 MM HG: CPT | Performed by: INTERNAL MEDICINE

## 2021-02-15 PROCEDURE — 3075F SYST BP GE 130 - 139MM HG: CPT | Performed by: INTERNAL MEDICINE

## 2021-02-15 PROCEDURE — 99214 OFFICE O/P EST MOD 30 MIN: CPT | Performed by: INTERNAL MEDICINE

## 2021-02-15 PROCEDURE — 3008F BODY MASS INDEX DOCD: CPT | Performed by: INTERNAL MEDICINE

## 2021-02-15 NOTE — PROGRESS NOTES
Cardiology   Aissatou Roa 77 y o  female MRN: [id number]      Impression:  1  CAD s/p CABG - doing well  No obstructive dx in grafts per cath 5/20   2  Paroxysmal atrial fibrillation/flutter - s/p PPM   On anticoagulation  3  Hypertension - controlled  4  PAD     Recommendations:  1  Continue current medications  2  Follow up in 6 months  HPI: Jose Alberto Masters is a 77y o  year old female with CAD s/p CABG x3 (patent grafts 5/20), PAF/flutter, sss s/p PPM 6/20, HTN, PAD, who presents for follow up  Doing well from a cardiac standpoint  No chest pain, shortness of breath, or palpitations  No further hematuria  Review of Systems   Constitutional: Negative  HENT: Negative  Eyes: Negative  Respiratory: Negative for chest tightness and shortness of breath  Cardiovascular: Negative for chest pain, palpitations and leg swelling  Gastrointestinal: Negative  Endocrine: Negative  Genitourinary: Negative  Musculoskeletal: Negative  Skin: Negative  Allergic/Immunologic: Negative  Neurological: Negative  Hematological: Negative  Psychiatric/Behavioral: Negative  All other systems reviewed and are negative          Past Medical History:   Diagnosis Date    Atypical chest pain     GERD (gastroesophageal reflux disease)     Hyperlipidemia     Hypertension     Kidney stone     Myocardial infarction [facility])     2015     NSTEMI (non-ST elevated myocardial infarction) ([facility] [address] )     Last Assessed: 9/24/2015     Past Surgical History:   Procedure Laterality Date    A-V CARDIAC PACEMAKER INSERTION      ANKLE SURGERY      BACK SURGERY  01/2011    L4 and L5 laminectomy and fusion     BLADDER SURGERY      CORONARY ARTERY BYPASS GRAFT  09/02/2015    CABGx3 with LIMA to LAD, SVG to PDA, SVG to OM-1    EXAMINATION UNDER ANESTHESIA N/A 7/11/2020    Procedure: EXAM UNDER ANESTHESIA (EUA)  EXCISION OF POSTERIOR VAGINAL FOREIGN BODY;  Surgeon: Vania Valentine MD;  [facility];  Service: Gynecology    FL RETROGRADE PYELOGRAM  7/11/2020    FL RETROGRADE PYELOGRAM  8/7/2020    HYSTERECTOMY      DC CYSTO/URETERO W/LITHOTRIPSY &INDWELL STENT INSRT Right 8/7/2020    Procedure: CYSTOSCOPY URETEROSCOPY WITH LITHOTRIPSY HOLMIUM LASER, RETROGRADE PYELOGRAM AND INSERTION STENT URETERAL;  Surgeon: Bc Moreno MD;  [facility];  Service: Urology    DC CYSTOURETHROSCOPY,URETER CATHETER Right 7/11/2020    Procedure: CYSTOSCOPY RETROGRADE PYELOGRAM WITH INSERTION STENT URETERAL, bladder biopsy with fulguration;  Surgeon: Bc Moreno MD;  [facility];  Service: Urology    WRIST SURGERY       Social History     Substance and Sexual Activity   Alcohol Use Not Currently     Social History     Substance and Sexual Activity   Drug Use Never     Social History     Tobacco Use   Smoking Status Current Every Day Smoker    Packs/day: 0 25    Start date: 5   Smokeless Tobacco Never Used   Tobacco Comment    Started smoking at age 25; currently smoking <1ppd       Family History   Problem Relation Age of Onset    Hypertension Mother     Hypertension Sister     Alcohol abuse Brother     Cirrhosis Brother     Diabetes Father     Other Brother         Heart transplant in his 46s    Lung cancer Sister     Diabetes Brother     Stroke Sister     No Known Problems Daughter     No Known Problems Maternal Grandmother     No Known Problems Maternal Grandfather     No Known Problems Paternal Grandmother     No Known Problems Paternal Grandfather     No Known Problems Sister        Allergies:  No Known Allergies    Medications:     Current Outpatient Medications:     acetaminophen (TYLENOL) 325 mg tablet, Take 2 tablets (650 mg total) by mouth every 4 (four) hours as needed for mild pain, Disp: 30 tablet, Rfl: 0    ALPRAZolam (XANAX) 0 25 mg tablet, TAKE 1 TABLET BY MOUTH THREE TIMES DAILY AS NEEDED FOR ANXIETY, Disp: 270 tablet, Rfl: 0    amiodarone 200 mg tablet, 200 mg PO daily  , Disp: 90 tablet, Rfl: 3    apixaban (Eliquis) 5 mg, Take 1 tablet (5 mg total) by mouth 2 (two) times a day, Disp: 180 tablet, Rfl: 3    atorvastatin (LIPITOR) 40 mg tablet, Take 1 tablet (40 mg total) by mouth daily, Disp: 90 tablet, Rfl: 3    COMBIGAN 0 2-0 5 %, , Disp: , Rfl:     ferrous sulfate 325 (65 Fe) mg tablet, Take 325 mg by mouth daily with breakfast, Disp: , Rfl:     losartan (COZAAR) 25 mg tablet, Take 1 tablet (25 mg total) by mouth daily, Disp: 90 tablet, Rfl: 3    metFORMIN (GLUCOPHAGE) 500 mg tablet, Take 1 tablet (500 mg total) by mouth 2 (two) times a day with meals for 5000 doses, Disp: 180 tablet, Rfl: 3    metoprolol succinate (TOPROL-XL) 50 mg 24 hr tablet, Take 1 tablet (50 mg total) by mouth daily, Disp: 90 tablet, Rfl: 3    omeprazole (PriLOSEC) 20 mg delayed release capsule, Take 1 capsule (20 mg total) by mouth daily, Disp: 90 capsule, Rfl: 3    traMADol (ULTRAM) 50 mg tablet, Take 2 tablets (100 mg total) by mouth 2 (two) times a day (Patient not taking: Reported on 2/15/2021), Disp: 60 tablet, Rfl: 3      Wt Readings from Last 3 Encounters:   02/15/21 58 1 kg (128 lb)   01/06/21 57 6 kg (127 lb)   10/28/20 58 1 kg (128 lb)     Temp Readings from Last 3 Encounters:   01/06/21 98 6 °F (37 °C)   10/28/20 (!) 97 1 °F (36 2 °C)   10/15/20 (!) 97 °F (36 1 °C) (Temporal)     BP Readings from Last 3 Encounters:   02/15/21 136/78   01/06/21 122/84   10/28/20 164/98     Pulse Readings from Last 3 Encounters:   02/15/21 83   01/06/21 68   10/28/20 86         Physical Exam  HENT:      Head: Atraumatic  Mouth/Throat:      Mouth: Mucous membranes are moist    Eyes:      Extraocular Movements: Extraocular movements intact  Neck:      Musculoskeletal: Normal range of motion  Cardiovascular:      Rate and Rhythm: Normal rate and regular rhythm  Heart sounds: Normal heart sounds     Pulmonary:      Effort: Pulmonary effort is normal       Breath sounds: Normal breath sounds  Abdominal:      General: Abdomen is flat  Musculoskeletal: Normal range of motion  Skin:     General: Skin is warm  Neurological:      General: No focal deficit present  Mental Status: She is alert and oriented to person, place, and time  Psychiatric:         Mood and Affect: Mood normal          Behavior: Behavior normal            Laboratory Studies:  CMP:  Lab Results   Component Value Date     2017    K 4 3 10/15/2020     10/15/2020    CO2 24 10/15/2020    ANIONGAP 7 2015    BUN 17 10/15/2020    CREATININE 0 80 10/15/2020    GLUCOSE 152 (H) 2015    AST 11 10/15/2020    ALT 17 10/15/2020    BILITOT 0 5 2017    EGFR 73 10/15/2020       Lipid Profile:   Lab Results   Component Value Date    CHOL 164 2017     Lab Results   Component Value Date    HDL 65 10/08/2020     Lab Results   Component Value Date    LDLCALC 64 10/08/2020     Lab Results   Component Value Date    TRIG 113 10/08/2020       Cardiac testing:     Results for orders placed during the hospital encounter of 20   Echo complete with contrast if indicated    Narrative [place] [address], [address] [place] [address]  [phone]    Transthoracic Echocardiogram  2D, M-mode, Doppler, and Color Doppler    Study date:  26-May-2020    Patient: Katia Bay  MR number: [id number]  Account number: [de-identified]  : 1945  Age: 77 years  Gender: Female  Status: Inpatient  Location: Bedside  Height: 61 in  Weight: 124 7 lb  BP: 142/ 58 mmHg    Indications: Acute MI  Diagnoses: I24 9 - Acute ischemic heart disease, unspecified    Sonographer:  Wolf Queen RDCS  Referring Physician:  Zaki Yusuf MD  Group:  [facility]  Interpreting Physician:  Sakshi Fu MD    SUMMARY    LEFT VENTRICLE:  Systolic function was normal  Ejection fraction was estimated to be 60 %  There were no regional wall motion abnormalities    Сергей Mcmillan thickness was at the upper limits of normal   Features were consistent with a pseudonormal left ventricular filling pattern, with concomitant abnormal relaxation and increased filling pressure (grade 2 diastolic dysfunction)  LEFT ATRIUM:  The atrium was mildly to moderately dilated  MITRAL VALVE:  There was mild annular calcification  There was mild regurgitation  AORTIC VALVE:  There was mild regurgitation  TRICUSPID VALVE:  There was mild regurgitation  AORTA:  The root exhibited mild dilatation  There was mild dilatation of the ascending aorta  HISTORY: PRIOR HISTORY: History of CABG x3  CAD  Hypertension  DM2  Ascending aortic aneurysm  Anxiety  GERD  Smoker  Atrial fibrillation  PROCEDURE: The procedure was performed at the bedside  This was a routine study  The transthoracic approach was used  The study included complete 2D imaging, M-mode, complete spectral Doppler, and color Doppler  The heart rate was 77 bpm,  at the start of the study  Echocardiographic views were limited due to high windows  This was a technically difficult study  LEFT VENTRICLE: Size was normal  Systolic function was normal  Ejection fraction was estimated to be 60 %  There were no regional wall motion abnormalities  Wall thickness was at the upper limits of normal  DOPPLER: Features were  consistent with a pseudonormal left ventricular filling pattern, with concomitant abnormal relaxation and increased filling pressure (grade 2 diastolic dysfunction)  RIGHT VENTRICLE: The size was normal  Systolic function was normal  Wall thickness was normal     LEFT ATRIUM: The atrium was mildly to moderately dilated  RIGHT ATRIUM: Size was normal     MITRAL VALVE: There was mild annular calcification  Valve structure was normal  There was normal leaflet separation  DOPPLER: The transmitral velocity was within the normal range  There was no evidence for stenosis  There was mild  regurgitation      AORTIC VALVE: The valve was trileaflet  Leaflets exhibited normal thickness and normal cuspal separation  DOPPLER: Transaortic velocity was within the normal range  There was no evidence for stenosis  There was mild regurgitation  TRICUSPID VALVE: The valve structure was normal  There was normal leaflet separation  DOPPLER: The transtricuspid velocity was within the normal range  There was no evidence for stenosis  There was mild regurgitation  PULMONIC VALVE: Leaflets exhibited normal thickness, no calcification, and normal cuspal separation  DOPPLER: The transpulmonic velocity was within the normal range  There was no significant regurgitation  PERICARDIUM: There was no pericardial effusion  The pericardium was normal in appearance  AORTA: The root exhibited mild dilatation  There was mild dilatation of the ascending aorta  SYSTEMIC VEINS: IVC: The inferior vena cava was normal in size  PULMONARY VEINS: DOPPLER: There was systolic blunting in the pulmonary vein(s)  SYSTEM MEASUREMENT TABLES    2D  %FS: 30 49 %  AV Diam: 4 cm  EDV(Teich): 61 09 ml  EF(Cube): 66 42 %  EF(Teich): 58 72 %  ESV(Cube): 18 1 ml  ESV(Teich): 25 22 ml  IVSd: 1 cm  LA Area: 24 64 cm2  LA Diam: 3 12 cm  LVEDV MOD A4C: 66 08 ml  LVEF MOD A4C: 63 63 %  LVESV MOD A4C: 24 03 ml  LVIDd: 3 78 cm  LVIDs: 2 63 cm  LVLd A4C: 7 09 cm  LVLs A4C: 5 73 cm  LVPWd: 1 cm  RA Area: 11 79 cm2  RV Diam: 2 76 cm  SI(Cube): 23 1 ml/m2  SI(Teich): 23 14 ml/m2  SV MOD A4C: 42 05 ml  SV(Cube): 35 81 ml  SV(Teich): 35 87 ml    CW  TR MaxP 47 mmHg  TR Vmax: 2 62 m/s    MM  TAPSE: 1 61 cm    PW  E': 0 06 m/s  E/E': 12 03  MV A Marcos: 0 8 m/s  MV Dec Nolan: 3 66 m/s2  MV DecT: 210 88 ms  MV E Marcos: 0 77 m/s  MV E/A Ratio: 0 96    [employer] Accredited Echocardiography Laboratory    Prepared and electronically signed by    Merced Zhu MD  Signed 26-May-2020 11:14:41       No results found for this or any previous visit    Results for orders placed during the hospital encounter of 20   Cardiac catheterization    Narrative [address], [address] [facility]  [phone]    [facility]    Invasive Cardiovascular Lab Complete Report    Patient: Chelsey Narayanan  MR number: [id number]  Account number: [de-identified]  Study date: 2020  Gender: Female  : 1945  Height: 61 in  Weight: 124 7 lb  BSA: 1 55 mï¾²    Allergies: NO KNOWN ALLERGIES    Diagnostic Cardiologist:  Keke Peterson MD    SUMMARY    CORONARY CIRCULATION:  Left main: Normal   LAD: The vessel was small sized  There was a 90% stenosis in mid vessel  The distal LAD filled via a LIMA bypass conduit  The diagonal branches were small and free of critical disease  Circumflex: The vessel was medium sized  There was an 80% stenosis in mid vessel  The lesion was interrogated by iFR and was negative for flow significance (iFR=1 0)  The OM branch contained a 95% stenosis and filled by an SVG  RCA: The vessel was normal sized and dominant, giving rise to the PDA and a posterolateral branch  There was a total occlusion in mid vessel  The PDA and distal RCA filled by an SVG  Graft to the LAD: The graft was a LIMA  The body of the graft and the distal anastomosis were free of significant obstruction  The small LAD filled from the LIMA and was free of critical distal disease  Graft to the 1st obtuse marginal: The graft was a saphenous vein graft from the ascending aorta  The body of the graft and the anastomses were normal  The grafted OM1 filled well from the SVG and was free of critical disease  Graft to the RPDA: The graft was a saphenous vein graft from the ascending aorta  The body of the graft and the anastomses were normal  The grafted PDA filled well from the SVG and was free of critical disease  The PDA sent retrograde flow  to the distal RCA and the major posterolateral branch  Summary:  Multivessel CAD with patent grafts   The patient is completely revascularized  Plan: smoking cessation  Follow-up with cardiology  INDICATIONS:  --  Possible CAD: myocardial infarction without ST elevation (NSTEMI)  PROCEDURES PERFORMED    --  Left coronary angiography  --  Right coronary angiography  --  Saphenous vein graft angiography  --  Saphenous vein graft angiography  --  LIMA graft angiography  --  Diagnostic myocardial fractional flow reserve  --  Inpatient  --  Mod Sedation Same Physician Initial 15min  --  Mod Sedation Same Physician Add 15min  --  Coronary Catheterization (w/o LHC, w/Grafts)  --  Myocardial Flow Reserve  PROCEDURE: The risks and alternatives of the procedures and conscious sedation were explained to the patient and informed consent was obtained  The patient was brought to the cath lab and placed on the table  The planned puncture sites  were prepped and draped in the usual sterile fashion  --  Right femoral artery access  The puncture site was infiltrated with local anesthetic  The vessel was accessed using the modified Seldinger technique, a wire was advanced into the vessel, and a sheath was advanced over the wire into the  vessel  --  Left coronary artery angiography  A catheter was advanced over a guidewire into the aorta and positioned in the left coronary artery ostium under fluoroscopic guidance  Angiography was performed  --  Right coronary artery angiography  A catheter was advanced over a guidewire into the aorta and positioned in the right coronary artery ostium under fluoroscopic guidance  Angiography was performed  --  Saphenous vein graft angiography  A catheter was advanced to the aorta and positioned at the aortic anastomosis of the graft over a guidewire under fluoroscopic guidance  Angiography was performed  --  Saphenous vein graft angiography  A catheter was advanced to the aorta and positioned at the aortic anastomosis of the graft over a guidewire under fluoroscopic guidance   Angiography was performed  --  Left internal mammary graft angiography  A catheter was advanced to the aorta and positioned in the left subclavian artery over a guidewire under fluoroscopic guidance  Angiography was performed  --  Myocardial fractional flow reserve  Flow reserve was measured using a 0 014" pressure-monitoring guide-wire  Steady baseline values were obtained  Mean arterial pressure and mean distal coronary pressures were then obtained at maximum  hyperemia  --  Inpatient  --  Mod Sedation Same Physician Initial 15min  --  Mod Sedation Same Physician Add 15min  --  Coronary Catheterization (w/o LHC, w/Grafts)  --  Myocardial Flow Reserve  PROCEDURE COMPLETION: The patient tolerated the procedure well and was discharged from the cath lab  TIMING: Test started at 09:50  Test concluded at 10:35  HEMOSTASIS: The sheath was removed over a wire and the Angioseal delivery sheath  was inserted into the femoral artery  Hemostasis was obtained using a closure device ( Angioseal) deployed through the delivery sheath  MEDICATIONS GIVEN: Fentanyl (1OOmcg/2 ml), 50 mcg, IV, at 09:55  Versed (2mg/2ml), 2 mg, IV, at 09:55   1% Lidocaine, 5 ml, subcutaneously, at 10:06  Heparin 1000 units/ml, 5,000 units, IV, at 10:23  CONTRAST GIVEN: 100 ml Omnipaque (350mg I /ml)  20 ml Omnipaque (350mg I /ml)  RADIATION EXPOSURE: Fluoroscopy time: 8 75 min  CORONARY VESSELS:   --  Left main: Normal   --  LAD: The vessel was small sized  There was a 90% stenosis in mid vessel  The distal LAD filled via a LIMA bypass conduit  The diagonal branches were small and free of critical disease  --  Circumflex: The vessel was medium sized  There was an 80% stenosis in mid vessel  The lesion was interrogated by iFR and was negative for flow significance (iFR=1 0)  The OM branch contained a 95% stenosis and filled by an SVG  --  RCA: The vessel was normal sized and dominant, giving rise to the PDA and a posterolateral branch  There was a total occlusion in mid vessel  The PDA and distal RCA filled by an SVG  --  Graft to the LAD: The graft was a LIMA  The body of the graft and the distal anastomosis were free of significant obstruction  The small LAD filled from the LIMA and was free of critical distal disease  --  Graft to the 1st obtuse marginal: The graft was a saphenous vein graft from the ascending aorta  The body of the graft and the anastomses were normal  The grafted OM1 filled well from the SVG and was free of critical disease  --  Graft to the RPDA: The graft was a saphenous vein graft from the ascending aorta  The body of the graft and the anastomses were normal  The grafted PDA filled well from the SVG and was free of critical disease  The PDA sent retrograde  flow to the distal RCA and the major posterolateral branch  NOTE: Right femoral access  Hemostasis was achieved with an Angioseal device  There were no complications  iFR was performed using a pressure wire advanced across the stenosis in the circumflex and measuring the relative pressure distal and proximal to the stenosis  Prepared and signed by    Elian Aguilar MD  Signed 2020 11:04:02    CC: Dr Marleny Estes    CC: Dr Noemi Escobedo    Study diagram    Hemodynamic tables    Pressures:  Baseline  Pressures:  - HR: 110  Pressures:  - Rhythm:  Pressures:  -- Aortic Pressure (S/D/M): 102/70/78    Outputs:  Baseline  Outputs:  -- CALCULATIONS[age]  Outputs:  -- CALCULATIONS: Body Surface Area: 1 55  Outputs:  -- CALCULATIONS: Height in cm: 155 00  Outputs:  -- CALCULATIONS: Sex: Female  Outputs:  -- CALCULATIONS: Weight in k 70       No results found for this or any previous visit

## 2021-03-19 ENCOUNTER — IMMUNIZATIONS (OUTPATIENT)
Dept: FAMILY MEDICINE CLINIC | Facility: HOSPITAL | Age: 76
End: 2021-03-19

## 2021-03-19 DIAGNOSIS — Z23 ENCOUNTER FOR IMMUNIZATION: Primary | ICD-10-CM

## 2021-03-19 PROCEDURE — 0001A SARS-COV-2 / COVID-19 MRNA VACCINE (PFIZER-BIONTECH) 30 MCG: CPT

## 2021-03-19 PROCEDURE — 91300 SARS-COV-2 / COVID-19 MRNA VACCINE (PFIZER-BIONTECH) 30 MCG: CPT

## 2021-03-22 LAB
LEFT EYE DIABETIC RETINOPATHY: NORMAL
RIGHT EYE DIABETIC RETINOPATHY: NORMAL

## 2021-04-06 ENCOUNTER — REMOTE DEVICE CLINIC VISIT (OUTPATIENT)
Dept: CARDIOLOGY CLINIC | Facility: CLINIC | Age: 76
End: 2021-04-06
Payer: COMMERCIAL

## 2021-04-06 DIAGNOSIS — Z95.0 CARDIAC PACEMAKER IN SITU: Primary | ICD-10-CM

## 2021-04-06 PROCEDURE — 93296 REM INTERROG EVL PM/IDS: CPT | Performed by: INTERNAL MEDICINE

## 2021-04-06 PROCEDURE — 93294 REM INTERROG EVL PM/LDLS PM: CPT | Performed by: INTERNAL MEDICINE

## 2021-04-06 NOTE — PROGRESS NOTES
Results for orders placed or performed in visit on 04/06/21   Cardiac EP device report    Narrative    MDT DC PPM - ACTIVE SYSTEM IS MRI CONDITIONAL  CARELINK TRANSMISSION: BATTERY VOLTAGE ADEQUATE (12 6 YRS)  AP-71%, -44% (>40% [email or web address])  ALL AVAILABLE LEAD PARAMETERS WITHIN NORMAL LIMITS  1 AFLUTTER EPISODE LASTING 51 SEC  HX: PAF & ON ELIQUIS, AMIO & METOPROLOL  NORMAL DEVICE FUNCTION   GV

## 2021-04-09 ENCOUNTER — IMMUNIZATIONS (OUTPATIENT)
Dept: FAMILY MEDICINE CLINIC | Facility: HOSPITAL | Age: 76
End: 2021-04-09

## 2021-04-09 DIAGNOSIS — Z23 ENCOUNTER FOR IMMUNIZATION: Primary | ICD-10-CM

## 2021-04-09 PROCEDURE — 0002A SARS-COV-2 / COVID-19 MRNA VACCINE (PFIZER-BIONTECH) 30 MCG: CPT

## 2021-04-09 PROCEDURE — 91300 SARS-COV-2 / COVID-19 MRNA VACCINE (PFIZER-BIONTECH) 30 MCG: CPT

## 2021-05-04 ENCOUNTER — APPOINTMENT (OUTPATIENT)
Dept: LAB | Facility: CLINIC | Age: 76
End: 2021-05-04
Payer: COMMERCIAL

## 2021-05-04 DIAGNOSIS — E78.00 HYPERCHOLESTEROLEMIA: ICD-10-CM

## 2021-05-04 DIAGNOSIS — D64.9 ANEMIA, UNSPECIFIED TYPE: ICD-10-CM

## 2021-05-04 DIAGNOSIS — I10 ESSENTIAL HYPERTENSION: ICD-10-CM

## 2021-05-04 LAB
ALBUMIN SERPL BCP-MCNC: 3.9 G/DL (ref 3.5–5)
ALP SERPL-CCNC: 33 U/L (ref 46–116)
ALT SERPL W P-5'-P-CCNC: 19 U/L (ref 12–78)
ANION GAP SERPL CALCULATED.3IONS-SCNC: 4 MMOL/L (ref 4–13)
AST SERPL W P-5'-P-CCNC: 10 U/L (ref 5–45)
BILIRUB SERPL-MCNC: 0.54 MG/DL (ref 0.2–1)
BUN SERPL-MCNC: 19 MG/DL (ref 5–25)
CALCIUM SERPL-MCNC: 9.3 MG/DL (ref 8.3–10.1)
CHLORIDE SERPL-SCNC: 107 MMOL/L (ref 100–108)
CHOLEST SERPL-MCNC: 149 MG/DL (ref 50–200)
CO2 SERPL-SCNC: 30 MMOL/L (ref 21–32)
CREAT SERPL-MCNC: 0.74 MG/DL (ref 0.6–1.3)
CREAT UR-MCNC: 111 MG/DL
ERYTHROCYTE [DISTWIDTH] IN BLOOD BY AUTOMATED COUNT: 12.8 % (ref 11.6–15.1)
GFR SERPL CREATININE-BSD FRML MDRD: 80 ML/MIN/1.73SQ M
GLUCOSE P FAST SERPL-MCNC: 160 MG/DL (ref 65–99)
HCT VFR BLD AUTO: 43 % (ref 34.8–46.1)
HDLC SERPL-MCNC: 58 MG/DL
HGB BLD-MCNC: 14 G/DL (ref 11.5–15.4)
LDLC SERPL CALC-MCNC: 61 MG/DL (ref 0–100)
MCH RBC QN AUTO: 30.3 PG (ref 26.8–34.3)
MCHC RBC AUTO-ENTMCNC: 32.6 G/DL (ref 31.4–37.4)
MCV RBC AUTO: 93 FL (ref 82–98)
MICROALBUMIN UR-MCNC: 121 MG/L (ref 0–20)
MICROALBUMIN/CREAT 24H UR: 109 MG/G CREATININE (ref 0–30)
PLATELET # BLD AUTO: 220 THOUSANDS/UL (ref 149–390)
PMV BLD AUTO: 11 FL (ref 8.9–12.7)
POTASSIUM SERPL-SCNC: 3.9 MMOL/L (ref 3.5–5.3)
PROT SERPL-MCNC: 7.4 G/DL (ref 6.4–8.2)
RBC # BLD AUTO: 4.62 MILLION/UL (ref 3.81–5.12)
SODIUM SERPL-SCNC: 141 MMOL/L (ref 136–145)
TRIGL SERPL-MCNC: 151 MG/DL
WBC # BLD AUTO: 12.58 THOUSAND/UL (ref 4.31–10.16)

## 2021-05-04 PROCEDURE — 85027 COMPLETE CBC AUTOMATED: CPT

## 2021-05-04 PROCEDURE — 80053 COMPREHEN METABOLIC PANEL: CPT

## 2021-05-04 PROCEDURE — 3060F POS MICROALBUMINURIA REV: CPT | Performed by: FAMILY MEDICINE

## 2021-05-04 PROCEDURE — 82043 UR ALBUMIN QUANTITATIVE: CPT | Performed by: FAMILY MEDICINE

## 2021-05-04 PROCEDURE — 80061 LIPID PANEL: CPT

## 2021-05-04 PROCEDURE — 36415 COLL VENOUS BLD VENIPUNCTURE: CPT

## 2021-05-04 PROCEDURE — 82570 ASSAY OF URINE CREATININE: CPT | Performed by: FAMILY MEDICINE

## 2021-05-11 ENCOUNTER — RA CDI HCC (OUTPATIENT)
Dept: OTHER | Facility: HOSPITAL | Age: 76
End: 2021-05-11

## 2021-05-11 NOTE — PROGRESS NOTES
[facility]  coding opportunities          Chart reviewed, no opportunity found: CHART REVIEWED, NO OPPORTUNITY FOUND     Chart reviewed, (number of) suggestions sent to provider: 1           Patients insurance company: [employer]ilee Soni (Medicare Advantage and Commercial)

## 2021-05-18 ENCOUNTER — OFFICE VISIT (OUTPATIENT)
Dept: FAMILY MEDICINE CLINIC | Facility: CLINIC | Age: 76
End: 2021-05-18
Payer: COMMERCIAL

## 2021-05-18 VITALS
DIASTOLIC BLOOD PRESSURE: 74 MMHG | HEIGHT: 59 IN | HEART RATE: 76 BPM | TEMPERATURE: 98.6 F | SYSTOLIC BLOOD PRESSURE: 116 MMHG | WEIGHT: 127 LBS | BODY MASS INDEX: 25.6 KG/M2

## 2021-05-18 DIAGNOSIS — E11.3553 TYPE 2 DIABETES MELLITUS WITH STABLE PROLIFERATIVE RETINOPATHY OF BOTH EYES, WITHOUT LONG-TERM CURRENT USE OF INSULIN (HCC): Primary | ICD-10-CM

## 2021-05-18 DIAGNOSIS — I10 ESSENTIAL HYPERTENSION: ICD-10-CM

## 2021-05-18 DIAGNOSIS — Z78.0 ASYMPTOMATIC POSTMENOPAUSAL ESTROGEN DEFICIENCY: ICD-10-CM

## 2021-05-18 DIAGNOSIS — E55.9 VITAMIN D DEFICIENCY: ICD-10-CM

## 2021-05-18 DIAGNOSIS — E78.00 HYPERCHOLESTEROLEMIA: ICD-10-CM

## 2021-05-18 LAB — SL AMB POCT HEMOGLOBIN AIC: 7 (ref ?–6.5)

## 2021-05-18 PROCEDURE — 3074F SYST BP LT 130 MM HG: CPT | Performed by: FAMILY MEDICINE

## 2021-05-18 PROCEDURE — 3008F BODY MASS INDEX DOCD: CPT | Performed by: FAMILY MEDICINE

## 2021-05-18 PROCEDURE — 4004F PT TOBACCO SCREEN RCVD TLK: CPT | Performed by: FAMILY MEDICINE

## 2021-05-18 PROCEDURE — 99214 OFFICE O/P EST MOD 30 MIN: CPT | Performed by: FAMILY MEDICINE

## 2021-05-18 PROCEDURE — 1160F RVW MEDS BY RX/DR IN RCRD: CPT | Performed by: FAMILY MEDICINE

## 2021-05-18 PROCEDURE — 83036 HEMOGLOBIN GLYCOSYLATED A1C: CPT | Performed by: FAMILY MEDICINE

## 2021-05-18 PROCEDURE — 3051F HG A1C>EQUAL 7.0%<8.0%: CPT | Performed by: FAMILY MEDICINE

## 2021-05-18 PROCEDURE — 3078F DIAST BP <80 MM HG: CPT | Performed by: FAMILY MEDICINE

## 2021-05-18 RX ORDER — METOPROLOL SUCCINATE 50 MG/1
50 TABLET, EXTENDED RELEASE ORAL 2 TIMES DAILY
Qty: 180 TABLET | Refills: 3 | Status: SHIPPED | OUTPATIENT
Start: 2021-05-18 | End: 2021-08-11 | Stop reason: SDUPTHER

## 2021-05-26 NOTE — PROGRESS NOTES
Patient ID: Vipul Ford is a 77 y o  female  HPI: 77 y  o female presents for follow up of niddm, hypertension, vitamin D deficiency  and hypercholesterolemia  Hgba1c is  6 9  and patient's issues are well controlled  Patient deneis any dyspnea, chest pain or palpitations  She is also due for a dexa scan  SUBJECTIVE    Family History   Problem Relation Age of Onset    Hypertension Mother     Hypertension Sister     Alcohol abuse Brother     Cirrhosis Brother     Diabetes Father     Other Brother         Heart transplant in his 46s    Lung cancer Sister     Diabetes Brother     Stroke Sister     No Known Problems Daughter     No Known Problems Maternal Grandmother     No Known Problems Maternal Grandfather     No Known Problems Paternal Grandmother     No Known Problems Paternal Grandfather     No Known Problems Sister      Social History     Socioeconomic History    Marital status: Single     Spouse name: Not on file    Number of children: 1    Years of education: Not on file    Highest education level: Not on file   Occupational History    Not on file   Social Needs    Financial resource strain: Not on file    Food insecurity     Worry: Not on file     Inability: Not on file   [employer] needs     Medical: Not on file     Non-medical: Not on file   Tobacco Use    Smoking status: Current Every Day Smoker     Packs/day: 0 25     Start date: 1967    Smokeless tobacco: Never Used    Tobacco comment: Started smoking at age 25; currently smoking <1ppd     Substance and Sexual Activity    Alcohol use: Not Currently    Drug use: Never    Sexual activity: Not Currently     Partners: Male     Birth control/protection: Post-menopausal   Lifestyle    Physical activity     Days per week: Not on file     Minutes per session: Not on file    Stress: Not on file   Relationships    Social connections     Talks on phone: Not on file     Gets together: Not on file     Attends Sabianism service: Not on file     Active member of club or organization: Not on file     Attends meetings of clubs or organizations: Not on file     Relationship status: Not on file    Intimate partner violence     Fear of current or ex partner: Not on file     Emotionally abused: Not on file     Physically abused: Not on file     Forced sexual activity: Not on file   Other Topics Concern    Not on file   Social History Narrative    Not on file     Past Medical History:   Diagnosis Date    Atypical chest pain     GERD (gastroesophageal reflux disease)     Hyperlipidemia     Hypertension     Kidney stone     Myocardial infarction [facility] [address] )     2015     NSTEMI (non-ST elevated myocardial infarction) ([facility] [address] )     Last Assessed: 9/24/2015     Past Surgical History:   Procedure Laterality Date    A-V CARDIAC PACEMAKER INSERTION      ANKLE SURGERY      BACK SURGERY  01/2011    L4 and L5 laminectomy and fusion     BLADDER SURGERY      CORONARY ARTERY BYPASS GRAFT  09/02/2015    CABGx3 with LIMA to LAD, SVG to PDA, SVG to OM-1    EXAMINATION UNDER ANESTHESIA N/A 7/11/2020    Procedure: EXAM UNDER ANESTHESIA (EUA)  EXCISION OF POSTERIOR VAGINAL FOREIGN BODY;  Surgeon: Theresa Sabillon MD;  [facility];  Service: Gynecology    FL RETROGRADE PYELOGRAM  7/11/2020    FL RETROGRADE PYELOGRAM  8/7/2020    HYSTERECTOMY      NH CYSTO/URETERO W/LITHOTRIPSY &INDWELL STENT INSRT Right 8/7/2020    Procedure: CYSTOSCOPY URETEROSCOPY WITH LITHOTRIPSY HOLMIUM LASER, RETROGRADE PYELOGRAM AND INSERTION STENT URETERAL;  Surgeon: Pranay Brewer MD;  [facility];  Service: Urology    NH CYSTOURETHROSCOPY,URETER CATHETER Right 7/11/2020    Procedure: CYSTOSCOPY RETROGRADE PYELOGRAM WITH INSERTION STENT URETERAL, bladder biopsy with fulguration;  Surgeon: Pranay Brewer MD;  [facility];  Service: Urology    WRIST SURGERY       No Known Allergies    Current Outpatient Medications:     acetaminophen (TYLENOL) 325 mg tablet, Take 2 tablets (650 mg total) by mouth every 4 (four) hours as needed for mild pain, Disp: 30 tablet, Rfl: 0    ALPRAZolam (XANAX) 0 25 mg tablet, TAKE 1 TABLET BY MOUTH THREE TIMES DAILY AS NEEDED FOR ANXIETY, Disp: 270 tablet, Rfl: 0    amiodarone 200 mg tablet, 200 mg PO daily  , Disp: 90 tablet, Rfl: 3    apixaban (Eliquis) 5 mg, Take 1 tablet (5 mg total) by mouth 2 (two) times a day, Disp: 180 tablet, Rfl: 3    atorvastatin (LIPITOR) 40 mg tablet, Take 1 tablet (40 mg total) by mouth daily, Disp: 90 tablet, Rfl: 3    COMBIGAN 0 2-0 5 %, , Disp: , Rfl:     ferrous sulfate 325 (65 Fe) mg tablet, Take 325 mg by mouth daily with breakfast, Disp: , Rfl:     losartan (COZAAR) 25 mg tablet, Take 1 tablet (25 mg total) by mouth daily, Disp: 90 tablet, Rfl: 3    metFORMIN (GLUCOPHAGE) 500 mg tablet, Take 1 tablet (500 mg total) by mouth 2 (two) times a day with meals for 5000 doses, Disp: 180 tablet, Rfl: 3    metoprolol succinate (TOPROL-XL) 50 mg 24 hr tablet, Take 1 tablet (50 mg total) by mouth 2 (two) times a day, Disp: 180 tablet, Rfl: 3    omeprazole (PriLOSEC) 20 mg delayed release capsule, Take 1 capsule (20 mg total) by mouth daily, Disp: 90 capsule, Rfl: 3    traMADol (ULTRAM) 50 mg tablet, Take 2 tablets (100 mg total) by mouth 2 (two) times a day, Disp: 60 tablet, Rfl: 3    Review of Systems  Constitutional:     Denies fever, chills ,fatigue ,weakness ,weight loss, weight gain     ENT: Denies earache ,loss of hearing ,nosebleed, nasal discharge,nasal congestion ,sore throat ,hoarseness  Pulmonary: Denies shortness of breath ,cough  ,dyspnea on exertion, orthopnea  ,PND   Cardiovascular:  Denies bradycardia , tachycardia  ,palpations, lower extremity edema leg, claudication  Breast:  Denies new or changing breast lumps ,nipple discharge ,nipple changes  Abdomen:  Denies abdominal pain , anorexia , indigestion, nausea, vomiting, constipation, diarrhea  Musculoskeletal: Denies myalgias, arthralgias, joint swelling, joint stiffness , limb pain, limb swelling  Gu: denies dysuria, polyuria  Skin: Denies skin rash, skin lesion, skin wound, itching, dry skin  Neuro: Denies headache, numbness, tingling, confusion, loss of consciousness, dizziness, vertigo  Psychiatric: Denies feelings of depression, suicidal ideation, anxiety, sleep disturbances    OBJECTIVE  /74   Pulse 76   Temp 98 6 °F (37 °C)   Ht 4' 11" (1 499 m)   Wt 57 6 kg (127 lb)   BMI 25 65 kg/m²   Constitutional:   NAD, well appearing and well nourished      ENT:   Conjunctiva and lids: no injection, edema, or discharge     Pupils and iris: BON bilaterally    External inspection of ears and nose: normal without deformities or discharge  Otoscopic exam: Canals patent without erythema  Nasal mucosa, septum and turbinates: Normal or edema or discharge         Oropharynx:  Moist mucosa, normal tongue and tonsils without lesions  No erythema        Pulmonary:Respiratory effort normal rate and rhythm, no increased work of breathing   Auscultation of lungs:  Clear bilaterally with no adventitious breath sounds       Cardiovascular: regular rate and rhythm, S1 and S2, no murmur, no edema and/or varicosities of LE      Abdomen: Soft and non-distended     Positive bowel sounds      No heptomegaly or splenomegaly      Gu: no suprapubic tenderness or CVA tenderness, no urethral discharge  Lymphatic:  No anterior or posterior cervical lymphadenopathy         Musculoskeletal:  Gait and station: Normal gait      Digits and nails normal without clubbing or cyanosis       Inspection/palpation of joints, bones, and muscles:  No joint tenderness, swelling, full active and passive range of motion       Skin: Normal skin turgor and no rashes      Neuro:    Normal reflexes     Psych:   alert and oriented to person, place and time     normal mood and affect       Assessment/Plan:Diagnoses and all orders for this visit:    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, without long-term current use of insulin (HCC)  -     POCT hemoglobin A1c    Essential hypertension  Comments:  Stable on current therapy  Orders:  -     metoprolol succinate (TOPROL-XL) 50 mg 24 hr tablet; Take 1 tablet (50 mg total) by mouth 2 (two) times a day  -     Comprehensive metabolic panel; Future    Hypercholesterolemia  -     Lipid Panel with Direct LDL reflex; Future    Vitamin D deficiency  -     Vitamin D 25 hydroxy; Future    Asymptomatic postmenopausal estrogen deficiency  -     DXA bone density spine hip and pelvis; Future        I will see patient back in 4 mos or sooner prn

## 2021-06-29 DIAGNOSIS — I10 ESSENTIAL HYPERTENSION: ICD-10-CM

## 2021-06-29 PROCEDURE — 4010F ACE/ARB THERAPY RXD/TAKEN: CPT | Performed by: FAMILY MEDICINE

## 2021-06-29 RX ORDER — LOSARTAN POTASSIUM 25 MG/1
25 TABLET ORAL DAILY
Qty: 90 TABLET | Refills: 3 | Status: SHIPPED | OUTPATIENT
Start: 2021-06-29 | End: 2021-08-11 | Stop reason: SDUPTHER

## 2021-07-06 ENCOUNTER — REMOTE DEVICE CLINIC VISIT (OUTPATIENT)
Dept: CARDIOLOGY CLINIC | Facility: CLINIC | Age: 76
End: 2021-07-06
Payer: COMMERCIAL

## 2021-07-06 DIAGNOSIS — Z95.0 CARDIAC PACEMAKER IN SITU: Primary | ICD-10-CM

## 2021-07-06 PROCEDURE — 93294 REM INTERROG EVL PM/LDLS PM: CPT | Performed by: INTERNAL MEDICINE

## 2021-07-06 PROCEDURE — 93296 REM INTERROG EVL PM/IDS: CPT | Performed by: INTERNAL MEDICINE

## 2021-07-06 NOTE — PROGRESS NOTES
Results for orders placed or performed in visit on 07/06/21   Cardiac EP device report    Narrative    MDT DC PPM - ACTIVE SYSTEM IS MRI CONDITIONAL  CARELINK TRANSMISSION: BATTERY VOLTAGE ADEQUATE (12 3 YRS)  AP-64%, -23%  ALL AVAILABLE LEAD PARAMETERS WITHIN NORMAL LIMITS  NO SIGNIFICANT HIGH RATE EPISODES  NORMAL DEVICE FUNCTION   GV

## 2021-07-14 DIAGNOSIS — E13.9 DIABETES 1.5, MANAGED AS TYPE 2 (HCC): ICD-10-CM

## 2021-08-11 DIAGNOSIS — I10 ESSENTIAL HYPERTENSION: ICD-10-CM

## 2021-08-11 DIAGNOSIS — E13.9 DIABETES 1.5, MANAGED AS TYPE 2 (HCC): ICD-10-CM

## 2021-08-11 DIAGNOSIS — E78.00 HYPERCHOLESTEROLEMIA: ICD-10-CM

## 2021-08-11 DIAGNOSIS — F41.9 ANXIETY: ICD-10-CM

## 2021-08-11 DIAGNOSIS — I48.91 POSTOPERATIVE ATRIAL FIBRILLATION (HCC): ICD-10-CM

## 2021-08-11 DIAGNOSIS — K21.9 GASTROESOPHAGEAL REFLUX DISEASE WITHOUT ESOPHAGITIS: ICD-10-CM

## 2021-08-11 DIAGNOSIS — I97.89 POSTOPERATIVE ATRIAL FIBRILLATION (HCC): ICD-10-CM

## 2021-08-11 RX ORDER — ALPRAZOLAM 0.25 MG/1
0.25 TABLET ORAL 3 TIMES DAILY PRN
Qty: 90 TABLET | Refills: 0 | Status: SHIPPED | OUTPATIENT
Start: 2021-08-11 | End: 2021-09-22 | Stop reason: SDUPTHER

## 2021-08-11 RX ORDER — LOSARTAN POTASSIUM 25 MG/1
25 TABLET ORAL DAILY
Qty: 90 TABLET | Refills: 0 | Status: SHIPPED | OUTPATIENT
Start: 2021-08-11 | End: 2021-09-22 | Stop reason: SDUPTHER

## 2021-08-11 RX ORDER — OMEPRAZOLE 20 MG/1
20 CAPSULE, DELAYED RELEASE ORAL DAILY
Qty: 90 CAPSULE | Refills: 0 | Status: SHIPPED | OUTPATIENT
Start: 2021-08-11 | End: 2021-09-22 | Stop reason: SDUPTHER

## 2021-08-11 RX ORDER — METOPROLOL SUCCINATE 50 MG/1
50 TABLET, EXTENDED RELEASE ORAL 2 TIMES DAILY
Qty: 180 TABLET | Refills: 0 | Status: SHIPPED | OUTPATIENT
Start: 2021-08-11 | End: 2021-09-22 | Stop reason: SDUPTHER

## 2021-08-11 RX ORDER — ATORVASTATIN CALCIUM 40 MG/1
40 TABLET, FILM COATED ORAL DAILY
Qty: 90 TABLET | Refills: 0 | Status: SHIPPED | OUTPATIENT
Start: 2021-08-11 | End: 2021-09-22 | Stop reason: SDUPTHER

## 2021-08-11 NOTE — TELEPHONE ENCOUNTER
Per PDMP last fill 02/11/21 05/19/2021 1 11/19/2020   TRAMADOL HCL 50 MG TABLET  28 0 7 [place] [facility]   [id number]  P WEGMA ([id number]) 1 20 0 [place]ME [place]    02/11/2021 1 02/11/2021   ALPRAZOLAM 0 25 MG TABLET  270 0 90 RU JULIA   [id number]  WEGMA ([id number]) 0  [place]    11/19/2020 1 11/19/2020   TRAMADOL HCL 50 MG TABLET  28 0 7 [place] [facility]   [id number]  P WEGMA ([id number]) 0 20 0 MME [place]

## 2021-09-15 ENCOUNTER — APPOINTMENT (OUTPATIENT)
Dept: LAB | Facility: CLINIC | Age: 76
End: 2021-09-15
Payer: COMMERCIAL

## 2021-09-15 ENCOUNTER — RA CDI HCC (OUTPATIENT)
Dept: OTHER | Facility: HOSPITAL | Age: 76
End: 2021-09-15

## 2021-09-15 DIAGNOSIS — I10 ESSENTIAL HYPERTENSION: ICD-10-CM

## 2021-09-15 DIAGNOSIS — E55.9 VITAMIN D DEFICIENCY: ICD-10-CM

## 2021-09-15 DIAGNOSIS — E78.00 HYPERCHOLESTEROLEMIA: ICD-10-CM

## 2021-09-15 LAB
25(OH)D3 SERPL-MCNC: 13.4 NG/ML (ref 30–100)
ALBUMIN SERPL BCP-MCNC: 3.8 G/DL (ref 3.5–5)
ALP SERPL-CCNC: 35 U/L (ref 46–116)
ALT SERPL W P-5'-P-CCNC: 16 U/L (ref 12–78)
ANION GAP SERPL CALCULATED.3IONS-SCNC: 5 MMOL/L (ref 4–13)
AST SERPL W P-5'-P-CCNC: 12 U/L (ref 5–45)
BILIRUB SERPL-MCNC: 0.5 MG/DL (ref 0.2–1)
BUN SERPL-MCNC: 18 MG/DL (ref 5–25)
CALCIUM SERPL-MCNC: 9.1 MG/DL (ref 8.3–10.1)
CHLORIDE SERPL-SCNC: 106 MMOL/L (ref 100–108)
CHOLEST SERPL-MCNC: 155 MG/DL (ref 50–200)
CO2 SERPL-SCNC: 28 MMOL/L (ref 21–32)
CREAT SERPL-MCNC: 0.71 MG/DL (ref 0.6–1.3)
GFR SERPL CREATININE-BSD FRML MDRD: 84 ML/MIN/1.73SQ M
GLUCOSE P FAST SERPL-MCNC: 175 MG/DL (ref 65–99)
HDLC SERPL-MCNC: 58 MG/DL
LDLC SERPL CALC-MCNC: 69 MG/DL (ref 0–100)
POTASSIUM SERPL-SCNC: 4.1 MMOL/L (ref 3.5–5.3)
PROT SERPL-MCNC: 7.4 G/DL (ref 6.4–8.2)
SODIUM SERPL-SCNC: 139 MMOL/L (ref 136–145)
TRIGL SERPL-MCNC: 139 MG/DL

## 2021-09-15 PROCEDURE — 80053 COMPREHEN METABOLIC PANEL: CPT

## 2021-09-15 PROCEDURE — 80061 LIPID PANEL: CPT

## 2021-09-15 PROCEDURE — 36415 COLL VENOUS BLD VENIPUNCTURE: CPT

## 2021-09-15 PROCEDURE — 82306 VITAMIN D 25 HYDROXY: CPT

## 2021-09-15 NOTE — PROGRESS NOTES
[facility]  coding opportunities       Chart reviewed, no opportunity found: CHART REVIEWED, NO OPPORTUNITY FOUND                        Patients insurance company: [employer] (Medicare Advantage and Commercial)

## 2021-09-22 ENCOUNTER — OFFICE VISIT (OUTPATIENT)
Dept: FAMILY MEDICINE CLINIC | Facility: CLINIC | Age: 76
End: 2021-09-22
Payer: COMMERCIAL

## 2021-09-22 VITALS
HEIGHT: 59 IN | DIASTOLIC BLOOD PRESSURE: 80 MMHG | WEIGHT: 130 LBS | SYSTOLIC BLOOD PRESSURE: 120 MMHG | HEART RATE: 80 BPM | BODY MASS INDEX: 26.21 KG/M2 | TEMPERATURE: 98.3 F

## 2021-09-22 DIAGNOSIS — I10 ESSENTIAL HYPERTENSION: ICD-10-CM

## 2021-09-22 DIAGNOSIS — E78.00 HYPERCHOLESTEROLEMIA: ICD-10-CM

## 2021-09-22 DIAGNOSIS — M54.50 LUMBAR BACK PAIN: ICD-10-CM

## 2021-09-22 DIAGNOSIS — I97.89 POSTOPERATIVE ATRIAL FIBRILLATION (HCC): ICD-10-CM

## 2021-09-22 DIAGNOSIS — E13.9 DIABETES 1.5, MANAGED AS TYPE 2 (HCC): ICD-10-CM

## 2021-09-22 DIAGNOSIS — I48.91 POSTOPERATIVE ATRIAL FIBRILLATION (HCC): ICD-10-CM

## 2021-09-22 DIAGNOSIS — F41.9 ANXIETY: ICD-10-CM

## 2021-09-22 DIAGNOSIS — I48.91 A-FIB (HCC): ICD-10-CM

## 2021-09-22 DIAGNOSIS — Z23 NEED FOR VACCINATION: ICD-10-CM

## 2021-09-22 DIAGNOSIS — E11.3553 TYPE 2 DIABETES MELLITUS WITH STABLE PROLIFERATIVE RETINOPATHY OF BOTH EYES, WITHOUT LONG-TERM CURRENT USE OF INSULIN (HCC): Primary | ICD-10-CM

## 2021-09-22 DIAGNOSIS — K21.9 GASTROESOPHAGEAL REFLUX DISEASE WITHOUT ESOPHAGITIS: ICD-10-CM

## 2021-09-22 DIAGNOSIS — E55.9 VITAMIN D DEFICIENCY: ICD-10-CM

## 2021-09-22 LAB — SL AMB POCT HEMOGLOBIN AIC: 6.9 (ref ?–6.5)

## 2021-09-22 PROCEDURE — 1160F RVW MEDS BY RX/DR IN RCRD: CPT | Performed by: FAMILY MEDICINE

## 2021-09-22 PROCEDURE — 99214 OFFICE O/P EST MOD 30 MIN: CPT | Performed by: FAMILY MEDICINE

## 2021-09-22 PROCEDURE — 4010F ACE/ARB THERAPY RXD/TAKEN: CPT | Performed by: FAMILY MEDICINE

## 2021-09-22 PROCEDURE — 83036 HEMOGLOBIN GLYCOSYLATED A1C: CPT | Performed by: FAMILY MEDICINE

## 2021-09-22 PROCEDURE — 3074F SYST BP LT 130 MM HG: CPT | Performed by: FAMILY MEDICINE

## 2021-09-22 PROCEDURE — G0008 ADMIN INFLUENZA VIRUS VAC: HCPCS | Performed by: FAMILY MEDICINE

## 2021-09-22 PROCEDURE — 3079F DIAST BP 80-89 MM HG: CPT | Performed by: FAMILY MEDICINE

## 2021-09-22 PROCEDURE — 3008F BODY MASS INDEX DOCD: CPT | Performed by: FAMILY MEDICINE

## 2021-09-22 PROCEDURE — 4004F PT TOBACCO SCREEN RCVD TLK: CPT | Performed by: FAMILY MEDICINE

## 2021-09-22 PROCEDURE — 90662 IIV NO PRSV INCREASED AG IM: CPT | Performed by: FAMILY MEDICINE

## 2021-09-22 PROCEDURE — 3044F HG A1C LEVEL LT 7.0%: CPT | Performed by: FAMILY MEDICINE

## 2021-09-22 RX ORDER — METOPROLOL SUCCINATE 50 MG/1
50 TABLET, EXTENDED RELEASE ORAL 2 TIMES DAILY
Qty: 180 TABLET | Refills: 3 | Status: SHIPPED | OUTPATIENT
Start: 2021-09-22

## 2021-09-22 RX ORDER — ATORVASTATIN CALCIUM 40 MG/1
40 TABLET, FILM COATED ORAL DAILY
Qty: 90 TABLET | Refills: 3 | Status: SHIPPED | OUTPATIENT
Start: 2021-09-22

## 2021-09-22 RX ORDER — AMIODARONE HYDROCHLORIDE 200 MG/1
TABLET ORAL
Qty: 90 TABLET | Refills: 3 | Status: SHIPPED | OUTPATIENT
Start: 2021-09-22

## 2021-09-22 RX ORDER — ALPRAZOLAM 0.25 MG/1
0.25 TABLET ORAL 3 TIMES DAILY PRN
Qty: 90 TABLET | Refills: 0 | Status: SHIPPED | OUTPATIENT
Start: 2021-09-22 | End: 2022-01-06

## 2021-09-22 RX ORDER — TRAMADOL HYDROCHLORIDE 50 MG/1
100 TABLET ORAL 2 TIMES DAILY
Qty: 120 TABLET | Refills: 3 | Status: SHIPPED | OUTPATIENT
Start: 2021-09-22

## 2021-09-22 RX ORDER — OMEPRAZOLE 20 MG/1
20 CAPSULE, DELAYED RELEASE ORAL DAILY
Qty: 90 CAPSULE | Refills: 3 | Status: SHIPPED | OUTPATIENT
Start: 2021-09-22

## 2021-09-22 RX ORDER — LOSARTAN POTASSIUM 25 MG/1
25 TABLET ORAL DAILY
Qty: 90 TABLET | Refills: 3 | Status: SHIPPED | OUTPATIENT
Start: 2021-09-22

## 2021-09-23 NOTE — PROGRESS NOTES
Patient ID: Viji Woods is a 77 y o  female  HPI: 77 y  o female presents for follow up of niddm, hypertension, afib, vit D deficinecy and hypercholesterolemia  Hgba1c is  6 9  and patient's issues are well controlled  Patient deneis any dyspnea, chest pain or palpitations  She denies any edema, low back pain is controlled  SUBJECTIVE    Family History   Problem Relation Age of Onset    Hypertension Mother     Hypertension Sister     Alcohol abuse Brother     Cirrhosis Brother     Diabetes Father     Other Brother         Heart transplant in his 46s    Lung cancer Sister     Diabetes Brother     Stroke Sister     No Known Problems Daughter     No Known Problems Maternal Grandmother     No Known Problems Maternal Grandfather     No Known Problems Paternal Grandmother     No Known Problems Paternal Grandfather     No Known Problems Sister      Social History     Socioeconomic History    Marital status: Single     Spouse name: Not on file    Number of children: 1    Years of education: Not on file    Highest education level: Not on file   Occupational History    Not on file   Tobacco Use    Smoking status: Current Every Day Smoker     Packs/day: 0 25     Start date: 5    Smokeless tobacco: Never Used    Tobacco comment: Started smoking at age 25; currently smoking <1ppd  Vaping Use    Vaping Use: Never used   Substance and Sexual Activity    Alcohol use: Not Currently    Drug use: Never    Sexual activity: Not Currently     Partners: Male     Birth control/protection: Post-menopausal   Other Topics Concern    Not on file   Social History Narrative    Not on file     Social Determinants of Health     Financial Resource Strain:     Difficulty of Paying Living Expenses:    Food Insecurity:     Worried About Running Out of Food in the Last Year:     [address] in the Last Year:    Transportation Needs:     Lack of Transportation (Medical):      Lack of Transportation (Non-Medical):    Physical Activity:     Days of Exercise per Week:     Minutes of Exercise per Session:    Stress:     Feeling of Stress :    Social Connections:     Frequency of Communication with Friends and Family:     Frequency of Social Gatherings with Friends and Family:     Attends Oriental orthodox Services:     Active Member of Clubs or Organizations:     Attends Club or Organization Meetings:     Marital Status:    Intimate Partner Violence:     Fear of Current or Ex-Partner:     Emotionally Abused:     Physically Abused:     Sexually Abused:      Past Medical History:   Diagnosis Date    Atypical chest pain     GERD (gastroesophageal reflux disease)     Hyperlipidemia     Hypertension     Kidney stone     Myocardial infarction ([facility] [address] )     2015     NSTEMI (non-ST elevated myocardial infarction) ([facility] [address] )     Last Assessed: 9/24/2015     Past Surgical History:   Procedure Laterality Date    A-V CARDIAC PACEMAKER INSERTION      ANKLE SURGERY      BACK SURGERY  01/2011    L4 and L5 laminectomy and fusion     BLADDER SURGERY      CORONARY ARTERY BYPASS GRAFT  09/02/2015    CABGx3 with LIMA to LAD, SVG to PDA, SVG to OM-1    EXAMINATION UNDER ANESTHESIA N/A 7/11/2020    Procedure: EXAM UNDER ANESTHESIA (EUA)  EXCISION OF POSTERIOR VAGINAL FOREIGN BODY;  Surgeon: Brittany Arzoal MD;  [facility];  Service: Gynecology    FL RETROGRADE PYELOGRAM  7/11/2020    FL RETROGRADE PYELOGRAM  8/7/2020    HYSTERECTOMY      MD CYSTO/URETERO W/LITHOTRIPSY &INDWELL STENT INSRT Right 8/7/2020    Procedure: CYSTOSCOPY URETEROSCOPY WITH LITHOTRIPSY HOLMIUM LASER, RETROGRADE PYELOGRAM AND INSERTION STENT URETERAL;  Surgeon: Farhat Strange MD;  [facility];  Service: Urology    MD CYSTOURETHROSCOPY,URETER CATHETER Right 7/11/2020    Procedure: CYSTOSCOPY RETROGRADE PYELOGRAM WITH INSERTION STENT URETERAL, bladder biopsy with fulguration;  Surgeon: Farhat Strange MD;  [facility];  Service: Urology    WRIST SURGERY       No Known Allergies    Current Outpatient Medications:     acetaminophen (TYLENOL) 325 mg tablet, Take 2 tablets (650 mg total) by mouth every 4 (four) hours as needed for mild pain, Disp: 30 tablet, Rfl: 0    ALPRAZolam (XANAX) 0 25 mg tablet, Take 1 tablet (0 25 mg total) by mouth 3 (three) times a day as needed for anxiety, Disp: 90 tablet, Rfl: 0    amiodarone 200 mg tablet, 200 mg PO daily  , Disp: 90 tablet, Rfl: 3    apixaban (Eliquis) 5 mg, Take 1 tablet (5 mg total) by mouth 2 (two) times a day, Disp: 180 tablet, Rfl: 3    atorvastatin (LIPITOR) 40 mg tablet, Take 1 tablet (40 mg total) by mouth daily, Disp: 90 tablet, Rfl: 3    COMBIGAN 0 2-0 5 %, , Disp: , Rfl:     ferrous sulfate 325 (65 Fe) mg tablet, Take 325 mg by mouth daily with breakfast, Disp: , Rfl:     losartan (COZAAR) 25 mg tablet, Take 1 tablet (25 mg total) by mouth daily, Disp: 90 tablet, Rfl: 3    metFORMIN (GLUCOPHAGE) 500 mg tablet, Take 1 tablet (500 mg total) by mouth 2 (two) times a day with meals, Disp: 180 tablet, Rfl: 3    metoprolol succinate (TOPROL-XL) 50 mg 24 hr tablet, Take 1 tablet (50 mg total) by mouth 2 (two) times a day, Disp: 180 tablet, Rfl: 3    omeprazole (PriLOSEC) 20 mg delayed release capsule, Take 1 capsule (20 mg total) by mouth daily, Disp: 90 capsule, Rfl: 3    traMADol (ULTRAM) 50 mg tablet, Take 2 tablets (100 mg total) by mouth 2 (two) times a day, Disp: 120 tablet, Rfl: 3    Review of Systems  Constitutional:     Denies fever, chills ,fatigue ,weakness ,weight loss, weight gain     ENT: Denies earache ,loss of hearing ,nosebleed, nasal discharge,nasal congestion ,sore throat ,hoarseness  Pulmonary: Denies shortness of breath ,cough  ,dyspnea on exertion, orthopnea  ,PND   Cardiovascular:  Denies bradycardia , tachycardia  ,palpations, lower extremity edema leg, claudication  Breast:  Denies new or changing breast lumps ,nipple discharge ,nipple changes  Abdomen:  Denies abdominal pain , anorexia , indigestion, nausea, vomiting, constipation, diarrhea  Musculoskeletal: Denies myalgias, arthralgias, joint swelling, joint stiffness , limb pain, limb swelling  Gu: denies dysuria, polyuria  Skin: Denies skin rash, skin lesion, skin wound, itching, dry skin  Neuro: Denies headache, numbness, tingling, confusion, loss of consciousness, dizziness, vertigo  Psychiatric: Denies feelings of depression, suicidal ideation, anxiety, sleep disturbances    OBJECTIVE  /80   Pulse 80   Temp 98 3 °F (36 8 °C)   Ht 4' 11" (1 499 m)   Wt 59 kg (130 lb)   BMI 26 26 kg/m²   Constitutional:   NAD, well appearing and well nourished      ENT:   Conjunctiva and lids: no injection, edema, or discharge     Pupils and iris: BON bilaterally    External inspection of ears and nose: normal without deformities or discharge  Otoscopic exam: Canals patent without erythema  Nasal mucosa, septum and turbinates: Normal or edema or discharge         Oropharynx:  Moist mucosa, normal tongue and tonsils without lesions  No erythema        Pulmonary:Respiratory effort normal rate and rhythm, no increased work of breathing   Auscultation of lungs:  Clear bilaterally with no adventitious breath sounds       Cardiovascular: regular rate and irregular  rhythm, S1 and S2, no murmur, no edema and/or varicosities of LE      Abdomen: Soft and non-distended     Positive bowel sounds      No heptomegaly or splenomegaly      Gu: no suprapubic tenderness or CVA tenderness, no urethral discharge  Lymphatic:  No anterior or posterior cervical lymphadenopathy         Musculoskeletal:  Gait and station: Normal gait      Digits and nails normal without clubbing or cyanosis       Inspection/palpation of joints, bones, and muscles:  No joint tenderness, swelling, full active and passive range of motion       Skin: Normal skin turgor and no rashes      Neuro:    Normal reflexes     Psych: alert and oriented to person, place and time     normal mood and affect   Patient's shoes and socks removed  Right Foot/Ankle   Right Foot Inspection  Skin Exam: skin normal and skin intact no dry skin, no warmth, no callus, no erythema, no maceration, no abnormal color, no pre-ulcer, no ulcer and no callus                          Toe Exam: ROM and strength within normal limitsno swelling, no tenderness and erythema  Sensory   Vibration: intact  Proprioception: intact   Monofilament testing: intact  Vascular  Capillary refills: < 3 seconds  The right DP pulse is 2+  The right PT pulse is 2+  Left Foot/Ankle  Left Foot Inspection  Skin Exam: skin normal and skin intactno dry skin, no warmth, no erythema, no maceration, normal color, no pre-ulcer, no ulcer and no callus                         Toe Exam: ROM and strength within normal limitsno swelling, no tenderness, no erythema and no left toe deformity                   Sensory   Vibration: intact  Proprioception: intact  Monofilament: intact  Vascular  Capillary refills: < 3 seconds  The left DP pulse is 2+  The left PT pulse is 2+  Assign Risk Category:  No deformity present; No loss of protective sensation; No weak pulses       Risk: 0      Assessment/Plan:Diagnoses and all orders for this visit:    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, without long-term current use of insulin ([facility])  -     POCT hemoglobin A1c    Hypercholesterolemia  -     Lipid Panel with Direct LDL reflex; Future  -     Comprehensive metabolic panel; Future  -     atorvastatin (LIPITOR) 40 mg tablet; Take 1 tablet (40 mg total) by mouth daily    Essential hypertension  Comments:  Stable on current ACE therapy  Orders:  -     metoprolol succinate (TOPROL-XL) 50 mg 24 hr tablet; Take 1 tablet (50 mg total) by mouth 2 (two) times a day  -     losartan (COZAAR) 25 mg tablet;  Take 1 tablet (25 mg total) by mouth daily    A-fib ([facility])  Comments:  Stable on amiodorone  Orders:  -     amiodarone 200 mg tablet; 200 mg PO daily  Vitamin D deficiency  -     Vitamin D 25 hydroxy; Future    Anxiety  -     ALPRAZolam (XANAX) 0 25 mg tablet; Take 1 tablet (0 25 mg total) by mouth 3 (three) times a day as needed for anxiety    Diabetes 1 5, managed as type 2 (HCC)  -     metFORMIN (GLUCOPHAGE) 500 mg tablet; Take 1 tablet (500 mg total) by mouth 2 (two) times a day with meals    Essential hypertension  -     metoprolol succinate (TOPROL-XL) 50 mg 24 hr tablet; Take 1 tablet (50 mg total) by mouth 2 (two) times a day  -     losartan (COZAAR) 25 mg tablet; Take 1 tablet (25 mg total) by mouth daily    Gastroesophageal reflux disease without esophagitis  Comments:  Stable on PPI tx  Orders:  -     omeprazole (PriLOSEC) 20 mg delayed release capsule; Take 1 capsule (20 mg total) by mouth daily    Hypercholesterolemia  Comments:  Controlled on current statin  Orders:  -     Lipid Panel with Direct LDL reflex; Future  -     Comprehensive metabolic panel; Future  -     atorvastatin (LIPITOR) 40 mg tablet; Take 1 tablet (40 mg total) by mouth daily    Lumbar back pain  -     traMADol (ULTRAM) 50 mg tablet; Take 2 tablets (100 mg total) by mouth 2 (two) times a day    Postoperative atrial fibrillation (HCC)  Comments: Follow up as per cardiology  Continue eliquis therpay  Orders:  -     apixaban (Eliquis) 5 mg; Take 1 tablet (5 mg total) by mouth 2 (two) times a day    Need for vaccination  -     influenza vaccine, high-dose, PF 0 7 mL (FLUZONE HIGH-DOSE)        Reviewed with patient plan to treat with above plan      Patient instructed to call in 72 hours if not feeling better or if symptoms worsen

## 2021-10-06 ENCOUNTER — REMOTE DEVICE CLINIC VISIT (OUTPATIENT)
Dept: CARDIOLOGY CLINIC | Facility: CLINIC | Age: 76
End: 2021-10-06
Payer: COMMERCIAL

## 2021-10-06 DIAGNOSIS — Z95.0 CARDIAC PACEMAKER IN SITU: Primary | ICD-10-CM

## 2021-10-06 PROCEDURE — 93296 REM INTERROG EVL PM/IDS: CPT | Performed by: INTERNAL MEDICINE

## 2021-10-06 PROCEDURE — 93294 REM INTERROG EVL PM/LDLS PM: CPT | Performed by: INTERNAL MEDICINE

## 2021-10-07 ENCOUNTER — IOP CHECK (OUTPATIENT)
Dept: URBAN - METROPOLITAN AREA CLINIC 6 | Facility: CLINIC | Age: 76
End: 2021-10-07

## 2021-10-07 DIAGNOSIS — H40.1112: ICD-10-CM

## 2021-10-07 DIAGNOSIS — H40.1123: ICD-10-CM

## 2021-10-07 PROCEDURE — 92133 CPTRZD OPH DX IMG PST SGM ON: CPT

## 2021-10-07 PROCEDURE — 92020 GONIOSCOPY: CPT

## 2021-10-07 PROCEDURE — 92202 OPSCPY EXTND ON/MAC DRAW: CPT

## 2021-10-07 PROCEDURE — G8428 CUR MEDS NOT DOCUMENT: HCPCS

## 2021-10-07 PROCEDURE — 92014 COMPRE OPH EXAM EST PT 1/>: CPT

## 2021-10-07 ASSESSMENT — TONOMETRY
OS_IOP_MMHG: 15
OD_IOP_MMHG: 16

## 2021-10-07 ASSESSMENT — VISUAL ACUITY
OD_CC: 20/50-1
OU_CC: J1

## 2021-11-17 ENCOUNTER — VBI (OUTPATIENT)
Dept: ADMINISTRATIVE | Facility: OTHER | Age: 76
End: 2021-11-17

## 2022-01-06 DIAGNOSIS — F41.9 ANXIETY: ICD-10-CM

## 2022-01-06 RX ORDER — ALPRAZOLAM 0.25 MG/1
TABLET ORAL
Qty: 90 TABLET | Refills: 0 | Status: SHIPPED | OUTPATIENT
Start: 2022-01-06 | End: 2022-02-16 | Stop reason: SDUPTHER

## 2022-01-06 NOTE — TELEPHONE ENCOUNTER
09/26/2021  2  09/22/2021  ALPRAZOLAM 0 25 MG TABLET  90 0  30  [facility]  [id number]  EXPRE ([id number])  0   [place]    09/26/2021  2  09/22/2021  TRAMADOL HCL 50 MG TABLET  28 0  7  [facility]  [id number]  EXPRE ([id number])  0  20 0 MME  [place]    08/13/2021  2  08/11/2021  ALPRAZOLAM 0 25 MG TABLET  90 0  30  [facility]  [id number]  EXPRE ([id number])  0   [place]

## 2022-01-19 ENCOUNTER — RA CDI HCC (OUTPATIENT)
Dept: OTHER | Facility: HOSPITAL | Age: 77
End: 2022-01-19

## 2022-01-19 NOTE — PROGRESS NOTES
[facility]  coding opportunities             Chart Reviewed * (Number of) Inbakris suggestions sent to Provider: 2                  Patients insurance company: [facility] (Medicare Advantage and Commercial)

## 2022-02-08 ENCOUNTER — RA CDI HCC (OUTPATIENT)
Dept: OTHER | Facility: HOSPITAL | Age: 77
End: 2022-02-08

## 2022-02-08 ENCOUNTER — APPOINTMENT (OUTPATIENT)
Dept: LAB | Facility: CLINIC | Age: 77
End: 2022-02-08
Payer: COMMERCIAL

## 2022-02-08 DIAGNOSIS — E55.9 VITAMIN D DEFICIENCY: ICD-10-CM

## 2022-02-08 DIAGNOSIS — E78.00 HYPERCHOLESTEROLEMIA: ICD-10-CM

## 2022-02-08 LAB
25(OH)D3 SERPL-MCNC: 20.2 NG/ML (ref 30–100)
ALBUMIN SERPL BCP-MCNC: 4 G/DL (ref 3.5–5)
ALP SERPL-CCNC: 35 U/L (ref 46–116)
ALT SERPL W P-5'-P-CCNC: 15 U/L (ref 12–78)
ANION GAP SERPL CALCULATED.3IONS-SCNC: 5 MMOL/L (ref 4–13)
AST SERPL W P-5'-P-CCNC: 8 U/L (ref 5–45)
BILIRUB SERPL-MCNC: 0.86 MG/DL (ref 0.2–1)
BUN SERPL-MCNC: 18 MG/DL (ref 5–25)
CALCIUM SERPL-MCNC: 9.3 MG/DL (ref 8.3–10.1)
CHLORIDE SERPL-SCNC: 104 MMOL/L (ref 100–108)
CHOLEST SERPL-MCNC: 143 MG/DL
CO2 SERPL-SCNC: 30 MMOL/L (ref 21–32)
CREAT SERPL-MCNC: 0.87 MG/DL (ref 0.6–1.3)
GFR SERPL CREATININE-BSD FRML MDRD: 65 ML/MIN/1.73SQ M
GLUCOSE P FAST SERPL-MCNC: 163 MG/DL (ref 65–99)
HDLC SERPL-MCNC: 58 MG/DL
LDLC SERPL CALC-MCNC: 55 MG/DL (ref 0–100)
POTASSIUM SERPL-SCNC: 4.2 MMOL/L (ref 3.5–5.3)
PROT SERPL-MCNC: 7.8 G/DL (ref 6.4–8.2)
SODIUM SERPL-SCNC: 139 MMOL/L (ref 136–145)
TRIGL SERPL-MCNC: 150 MG/DL

## 2022-02-08 PROCEDURE — 36415 COLL VENOUS BLD VENIPUNCTURE: CPT

## 2022-02-08 PROCEDURE — 80053 COMPREHEN METABOLIC PANEL: CPT

## 2022-02-08 PROCEDURE — 82306 VITAMIN D 25 HYDROXY: CPT

## 2022-02-08 PROCEDURE — 80061 LIPID PANEL: CPT

## 2022-02-08 NOTE — PROGRESS NOTES
[facility] 75  coding opportunities             Chart Reviewed * (Number of) Inbasket suggestions sent to Provider: 2                  Patients insurance company: [employer] (Medicare Advantage and Commercial)

## 2022-02-08 NOTE — PROGRESS NOTES
[facility] 75  coding opportunities             Chart Reviewed * (Number of) Inbasket suggestions sent to Provider: 2                  Patients insurance company: [employer] (Medicare Advantage and Commercial)

## 2022-02-16 ENCOUNTER — OFFICE VISIT (OUTPATIENT)
Dept: FAMILY MEDICINE CLINIC | Facility: CLINIC | Age: 77
End: 2022-02-16
Payer: COMMERCIAL

## 2022-02-16 VITALS
BODY MASS INDEX: 27.13 KG/M2 | WEIGHT: 134.6 LBS | HEART RATE: 69 BPM | TEMPERATURE: 97.9 F | SYSTOLIC BLOOD PRESSURE: 138 MMHG | OXYGEN SATURATION: 98 % | HEIGHT: 59 IN | DIASTOLIC BLOOD PRESSURE: 84 MMHG

## 2022-02-16 DIAGNOSIS — I10 ESSENTIAL HYPERTENSION: ICD-10-CM

## 2022-02-16 DIAGNOSIS — E55.9 VITAMIN D DEFICIENCY: ICD-10-CM

## 2022-02-16 DIAGNOSIS — E11.3553 TYPE 2 DIABETES MELLITUS WITH STABLE PROLIFERATIVE RETINOPATHY OF BOTH EYES, WITHOUT LONG-TERM CURRENT USE OF INSULIN (HCC): Primary | ICD-10-CM

## 2022-02-16 DIAGNOSIS — I71.2 ANEURYSM OF ASCENDING AORTA (HCC): ICD-10-CM

## 2022-02-16 DIAGNOSIS — H66.90 ACUTE OTITIS MEDIA, UNSPECIFIED OTITIS MEDIA TYPE: ICD-10-CM

## 2022-02-16 DIAGNOSIS — I48.91 ATRIAL FIBRILLATION, UNSPECIFIED TYPE (HCC): ICD-10-CM

## 2022-02-16 DIAGNOSIS — H61.21 IMPACTED CERUMEN OF RIGHT EAR: ICD-10-CM

## 2022-02-16 DIAGNOSIS — E78.00 HYPERCHOLESTEROLEMIA: ICD-10-CM

## 2022-02-16 DIAGNOSIS — I25.119 ATHEROSCLEROSIS OF NATIVE CORONARY ARTERY WITH ANGINA PECTORIS, UNSPECIFIED WHETHER NATIVE OR TRANSPLANTED HEART (HCC): ICD-10-CM

## 2022-02-16 DIAGNOSIS — F41.9 ANXIETY: ICD-10-CM

## 2022-02-16 PROBLEM — F11.20 CONTINUOUS OPIOID DEPENDENCE (HCC): Status: ACTIVE | Noted: 2022-02-16

## 2022-02-16 PROBLEM — Z89.421 ACQUIRED ABSENCE OF OTHER RIGHT TOE(S) (HCC): Status: ACTIVE | Noted: 2022-02-16

## 2022-02-16 LAB — SL AMB POCT HEMOGLOBIN AIC: 6.8 (ref ?–6.5)

## 2022-02-16 PROCEDURE — 83036 HEMOGLOBIN GLYCOSYLATED A1C: CPT | Performed by: FAMILY MEDICINE

## 2022-02-16 PROCEDURE — 99495 TRANSJ CARE MGMT MOD F2F 14D: CPT | Performed by: FAMILY MEDICINE

## 2022-02-16 PROCEDURE — 1111F DSCHRG MED/CURRENT MED MERGE: CPT | Performed by: FAMILY MEDICINE

## 2022-02-16 RX ORDER — AZITHROMYCIN 250 MG/1
TABLET, FILM COATED ORAL
Qty: 6 TABLET | Refills: 0 | Status: SHIPPED | OUTPATIENT
Start: 2022-02-16 | End: 2022-02-20

## 2022-02-16 RX ORDER — PREDNISONE 10 MG/1
TABLET ORAL
Qty: 26 TABLET | Refills: 0 | Status: SHIPPED | OUTPATIENT
Start: 2022-02-16 | End: 2022-06-29 | Stop reason: ALTCHOICE

## 2022-02-16 RX ORDER — ALPRAZOLAM 0.25 MG/1
0.25 TABLET ORAL 3 TIMES DAILY PRN
Qty: 90 TABLET | Refills: 3 | Status: SHIPPED | OUTPATIENT
Start: 2022-02-16

## 2022-02-18 ENCOUNTER — REMOTE DEVICE CLINIC VISIT (OUTPATIENT)
Dept: CARDIOLOGY CLINIC | Facility: CLINIC | Age: 77
End: 2022-02-18
Payer: COMMERCIAL

## 2022-02-18 DIAGNOSIS — Z95.0 CARDIAC PACEMAKER IN SITU: Primary | ICD-10-CM

## 2022-02-18 PROCEDURE — 93294 REM INTERROG EVL PM/LDLS PM: CPT | Performed by: INTERNAL MEDICINE

## 2022-02-18 PROCEDURE — 93296 REM INTERROG EVL PM/IDS: CPT | Performed by: INTERNAL MEDICINE

## 2022-02-18 NOTE — PROGRESS NOTES
Results for orders placed or performed in visit on 02/18/22   Cardiac EP device report    Narrative    MDT DC PPM - ACTIVE SYSTEM IS MRI CONDITIONAL  CARELINK TRANSMISSION: BATTERY VOLTAGE ADEQUATE (11 8 YRS)  AP-32%, -49% (>40% [email or web address])  ALL AVAILABLE LEAD PARAMETERS WITHIN NORMAL LIMITS  62 AF EPISODES TREATED W/ REACTIVE ATP (44 3% PACE TERMINTATED) & 6 MONITORED AF EPISODES  AF BURDEN-63 6%  HX: PAF & ON ELIQUIS, AMIO & METOPROLOL  NORMAL DEVICE FUNCTION   GV

## 2022-02-22 NOTE — PROGRESS NOTES
Patient ID: Peggy Woody is a 77 y o  female  HPI: 77 y  o female presents for follow up of niddm,vit D deficiency, , afib hypertension and hypercholesterolemia  Hgba1c is  6,8   and patient's issues are well controlled  Patient deneis any dyspnea, chest pain or palpitations  Her aortic aneurysm is stable , as is cad  SUBJECTIVE    Family History   Problem Relation Age of Onset    Hypertension Mother     Hypertension Sister     Alcohol abuse Brother     Cirrhosis Brother     Diabetes Father     Other Brother         Heart transplant in his 46s    Lung cancer Sister     Diabetes Brother     Stroke Sister     No Known Problems Daughter     No Known Problems Maternal Grandmother     No Known Problems Maternal Grandfather     No Known Problems Paternal Grandmother     No Known Problems Paternal Grandfather     No Known Problems Sister      Social History     Socioeconomic History    Marital status: Single     Spouse name: Not on file    Number of children: 1    Years of education: Not on file    Highest education level: Not on file   Occupational History    Not on file   Tobacco Use    Smoking status: Current Every Day Smoker     Packs/day: 0 25     Start date: 5    Smokeless tobacco: Never Used    Tobacco comment: Started smoking at age 25; currently smoking <1ppd     Vaping Use    Vaping Use: Never used   Substance and Sexual Activity    Alcohol use: Not Currently    Drug use: Never    Sexual activity: Not Currently     Partners: Male     Birth control/protection: Post-menopausal   Other Topics Concern    Not on file   Social History Narrative    Not on file     Social Determinants of Health     Financial Resource Strain: Not on file   Food Insecurity: Not on file   Transportation Needs: Not on file   Physical Activity: Not on file   Stress: Not on file   Social Connections: Not on file   Intimate Partner Violence: Not on file   Housing Stability: Not on file     Past Medical History:   Diagnosis Date    Atypical chest pain     GERD (gastroesophageal reflux disease)     Hyperlipidemia     Hypertension     Kidney stone     Myocardial infarction ([facility] [address] )     2015     NSTEMI (non-ST elevated myocardial infarction) ([facility] [address] )     Last Assessed: 9/24/2015     Past Surgical History:   Procedure Laterality Date    A-V CARDIAC PACEMAKER INSERTION      ANKLE SURGERY      BACK SURGERY  01/2011    L4 and L5 laminectomy and fusion     BLADDER SURGERY      CORONARY ARTERY BYPASS GRAFT  09/02/2015    CABGx3 with LIMA to LAD, SVG to PDA, SVG to OM-1    EXAMINATION UNDER ANESTHESIA N/A 7/11/2020    Procedure: EXAM UNDER ANESTHESIA (EUA)  EXCISION OF POSTERIOR VAGINAL FOREIGN BODY;  Surgeon: Carmen Vernon MD;  [facility];  Service: Gynecology    FL RETROGRADE PYELOGRAM  7/11/2020    FL RETROGRADE PYELOGRAM  8/7/2020    HYSTERECTOMY      TN CYSTO/URETERO W/LITHOTRIPSY &INDWELL STENT INSRT Right 8/7/2020    Procedure: CYSTOSCOPY URETEROSCOPY WITH LITHOTRIPSY HOLMIUM LASER, RETROGRADE PYELOGRAM AND INSERTION STENT URETERAL;  Surgeon: Lilia Epps MD;  [facility];  Service: Urology    TN CYSTOURETHROSCOPY,URETER CATHETER Right 7/11/2020    Procedure: CYSTOSCOPY RETROGRADE PYELOGRAM WITH INSERTION STENT URETERAL, bladder biopsy with fulguration;  Surgeon: Lilia Epps MD;  [facility];  Service: Urology    WRIST SURGERY       No Known Allergies    Current Outpatient Medications:     acetaminophen (TYLENOL) 325 mg tablet, Take 2 tablets (650 mg total) by mouth every 4 (four) hours as needed for mild pain, Disp: 30 tablet, Rfl: 0    ALPRAZolam (XANAX) 0 25 mg tablet, Take 1 tablet (0 25 mg total) by mouth 3 (three) times a day as needed for anxiety, Disp: 90 tablet, Rfl: 3    amiodarone 200 mg tablet, 200 mg PO daily  , Disp: 90 tablet, Rfl: 3    apixaban (Eliquis) 5 mg, Take 1 tablet (5 mg total) by mouth 2 (two) times a day, Disp: 180 tablet, Rfl: 3    atorvastatin (LIPITOR) 40 mg tablet, Take 1 tablet (40 mg total) by mouth daily, Disp: 90 tablet, Rfl: 3    COMBIGAN 0 2-0 5 %, , Disp: , Rfl:     ferrous sulfate 325 (65 Fe) mg tablet, Take 325 mg by mouth daily with breakfast, Disp: , Rfl:     losartan (COZAAR) 25 mg tablet, Take 1 tablet (25 mg total) by mouth daily, Disp: 90 tablet, Rfl: 3    metFORMIN (GLUCOPHAGE) 500 mg tablet, Take 1 tablet (500 mg total) by mouth 2 (two) times a day with meals, Disp: 180 tablet, Rfl: 3    metoprolol succinate (TOPROL-XL) 50 mg 24 hr tablet, Take 1 tablet (50 mg total) by mouth 2 (two) times a day, Disp: 180 tablet, Rfl: 3    omeprazole (PriLOSEC) 20 mg delayed release capsule, Take 1 capsule (20 mg total) by mouth daily, Disp: 90 capsule, Rfl: 3    predniSONE 10 mg tablet, 3 tabs po bid x2 days, then 2 tabs po bid x2 days, then 1 tab bid x2 days, then 1 daily until done , Disp: 26 tablet, Rfl: 0    traMADol (ULTRAM) 50 mg tablet, Take 2 tablets (100 mg total) by mouth 2 (two) times a day (Patient not taking: Reported on 2/16/2022 ), Disp: 120 tablet, Rfl: 3    Review of Systems  Constitutional:     Denies fever, chills ,fatigue ,weakness ,weight loss, weight gain     ENT: Denies earache ,loss of hearing ,nosebleed, nasal discharge,nasal congestion ,sore throat ,hoarseness  Pulmonary: Denies shortness of breath ,cough  ,dyspnea on exertion, orthopnea  ,PND   Cardiovascular:  Denies bradycardia , tachycardia  ,palpations, lower extremity edema leg, claudication  Breast:  Denies new or changing breast lumps ,nipple discharge ,nipple changes  Abdomen:  Denies abdominal pain , anorexia , indigestion, nausea, vomiting, constipation, diarrhea  Musculoskeletal: Denies myalgias, arthralgias, joint swelling, joint stiffness , limb pain, limb swelling  Gu: denies dysuria, polyuria  Skin: Denies skin rash, skin lesion, skin wound, itching, dry skin  Neuro: Denies headache, numbness, tingling, confusion, loss of consciousness, dizziness, vertigo  Psychiatric: Denies feelings of depression, suicidal ideation, +anxiety, sleep disturbances    OBJECTIVE  /84   Pulse 69   Temp 97 9 °F (36 6 °C)   Ht 4' 11" (1 499 m)   Wt 61 1 kg (134 lb 9 6 oz)   SpO2 98%   BMI 27 19 kg/m²   Constitutional:   NAD, well appearing and well nourished      ENT:   Conjunctiva and lids: no injection, edema, or discharge     Pupils and iris: BON bilaterally    External inspection of ears and nose: normal without deformities or discharge  Otoscopic exam: right  tm erythematous and dull   After cerumen removed with a currette    Nasal mucosa, septum and turbinates: Normal or edema or discharge       Oropharynx:  Moist mucosa, normal tongue and tonsils without lesions  No erythema    Pulmonary:Respiratory effort normal rate and rhythm, no increased work of breathing  Auscultation of lungs:  Clear bilaterally with no adventitious breath sounds     Cardiovascular: regular rate and irregular rhythm,  no murmur, no edema and/or varicosities of LE     Abdomen: Soft and non-distended     Positive bowel sounds      No heptomegaly or splenomegaly      Gu: no suprapubic tenderness or CVA tenderness, no urethral discharge  Lymphatic:  No anterior or posterior cervical lymphadenopathy         Musculoskeletal:  Gait and station: Normal gait      Digits and nails normal without clubbing or cyanosis       Inspection/palpation of joints, bones, and muscles:  No joint tenderness, swelling, full active and passive range of motion       Skin: Normal skin turgor and no rashes      Neuro:    Normal reflexes     Psych:   alert and oriented to person, place and time     normal mood and affect   Ear cerumen removal    Date/Time: 2/22/2022 6:59 AM  Performed by: Melanie Burnette DO  Authorized by: Melanie Burnette DO   Universal Protocol:  Procedure performed by:  Consent: Verbal consent obtained    Consent given by: patient      Procedure details:     Local anesthetic:  None    Location:  R ear    Procedure type: curette      Approach:  External  Post-procedure details:     Complication:  None    Hearing quality:  Normal    Patient tolerance of procedure: Tolerated well, no immediate complications        Assessment/Plan:Diagnoses and all orders for this visit:    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, without long-term current use of insulin ([facility])  Comments:  STable on current meds  Orders:  -     POCT hemoglobin A1c  -     predniSONE 10 mg tablet; 3 tabs po bid x2 days, then 2 tabs po bid x2 days, then 1 tab bid x2 days, then 1 daily until done  Essential hypertension  Comments:  Contineu ARB therapy  Orders:  -     Comprehensive metabolic panel; Future    Anxiety  Comments:  STable on current meds  Orders:  -     ALPRAZolam (XANAX) 0 25 mg tablet; Take 1 tablet (0 25 mg total) by mouth 3 (three) times a day as needed for anxiety    Atherosclerosis of native coronary artery with angina pectoris, unspecified whether native or transplanted heart [facility])  Comments:  continue beta blocker therapy    Aneurysm of ascending aorta ([facility])  Comments:  Continue US surveillance    Atrial fibrillation, unspecified type ([facility] [address] )  Comments:  Continue amiodarone and anticoagulation    Vitamin D deficiency  Comments:  Continue vit D supplementation  Orders:  -     Vitamin D 25 hydroxy; Future    Hypercholesterolemia  -     Lipid Panel with Direct LDL reflex; Future    Acute otitis media, unspecified otitis media type  -     azithromycin (ZITHROMAX) 250 mg tablet; Take 2 tablets today then 1 tablet daily x 4 days    Impacted cerumen of right ear  -     Ear cerumen removal  -     Ear cerumen removal        I will see patient back in 6 mos or sooner prn

## 2022-04-11 ENCOUNTER — FOLLOW UP (OUTPATIENT)
Dept: URBAN - METROPOLITAN AREA CLINIC 6 | Facility: CLINIC | Age: 77
End: 2022-04-11

## 2022-04-11 DIAGNOSIS — H40.1123: ICD-10-CM

## 2022-04-11 DIAGNOSIS — H40.1112: ICD-10-CM

## 2022-04-11 PROCEDURE — 92020 GONIOSCOPY: CPT

## 2022-04-11 PROCEDURE — 92083 EXTENDED VISUAL FIELD XM: CPT

## 2022-04-11 PROCEDURE — 92012 INTRM OPH EXAM EST PATIENT: CPT

## 2022-04-11 ASSESSMENT — VISUAL ACUITY
OD_CC: 20/50-1
OS_CC: CF 1FT

## 2022-04-11 ASSESSMENT — TONOMETRY
OS_IOP_MMHG: 20
OD_IOP_MMHG: 15

## 2022-05-20 ENCOUNTER — REMOTE DEVICE CLINIC VISIT (OUTPATIENT)
Dept: CARDIOLOGY CLINIC | Facility: CLINIC | Age: 77
End: 2022-05-20
Payer: COMMERCIAL

## 2022-05-20 DIAGNOSIS — Z95.0 CARDIAC PACEMAKER IN SITU: Primary | ICD-10-CM

## 2022-05-20 PROCEDURE — 93296 REM INTERROG EVL PM/IDS: CPT | Performed by: INTERNAL MEDICINE

## 2022-05-20 PROCEDURE — 93294 REM INTERROG EVL PM/LDLS PM: CPT | Performed by: INTERNAL MEDICINE

## 2022-05-21 NOTE — PROGRESS NOTES
Results for orders placed or performed in visit on 05/20/22   Cardiac EP device report    Narrative    MDT NESTOR PPM - [address] [facility]: BATTERY STATUS "[facility] " AP 0%  44%  ALL AVAILABLE LEAD PARAMETERS WITHIN NORMAL LIMITS  100% AF SINCE 11/2021  PT ON AMIO, ELIQUIS, & METO SUCC  NORMAL DEVICE FUNCTION   NC

## 2022-06-16 ENCOUNTER — TELEPHONE (OUTPATIENT)
Dept: FAMILY MEDICINE CLINIC | Facility: CLINIC | Age: 77
End: 2022-06-16

## 2022-06-16 NOTE — TELEPHONE ENCOUNTER
Patient called stating that she canceled her 3 M FU and would like to know where she can be pout on your schedule  She states that she is starting to have swelling in her left leg that is becoming concerning

## 2022-06-29 ENCOUNTER — OFFICE VISIT (OUTPATIENT)
Dept: FAMILY MEDICINE CLINIC | Facility: CLINIC | Age: 77
End: 2022-06-29
Payer: COMMERCIAL

## 2022-06-29 ENCOUNTER — TELEPHONE (OUTPATIENT)
Dept: FAMILY MEDICINE CLINIC | Facility: CLINIC | Age: 77
End: 2022-06-29

## 2022-06-29 VITALS
OXYGEN SATURATION: 97 % | HEIGHT: 59 IN | HEART RATE: 75 BPM | WEIGHT: 138 LBS | SYSTOLIC BLOOD PRESSURE: 168 MMHG | TEMPERATURE: 98.3 F | BODY MASS INDEX: 27.82 KG/M2 | DIASTOLIC BLOOD PRESSURE: 82 MMHG

## 2022-06-29 DIAGNOSIS — Z00.00 MEDICARE ANNUAL WELLNESS VISIT, SUBSEQUENT: Primary | ICD-10-CM

## 2022-06-29 DIAGNOSIS — E55.9 VITAMIN D DEFICIENCY: ICD-10-CM

## 2022-06-29 DIAGNOSIS — E11.3553 TYPE 2 DIABETES MELLITUS WITH STABLE PROLIFERATIVE RETINOPATHY OF BOTH EYES, WITHOUT LONG-TERM CURRENT USE OF INSULIN (HCC): ICD-10-CM

## 2022-06-29 DIAGNOSIS — I10 ESSENTIAL HYPERTENSION: ICD-10-CM

## 2022-06-29 DIAGNOSIS — E78.00 HYPERCHOLESTEROLEMIA: ICD-10-CM

## 2022-06-29 DIAGNOSIS — R60.9 EDEMA, UNSPECIFIED TYPE: ICD-10-CM

## 2022-06-29 LAB — SL AMB POCT HEMOGLOBIN AIC: 6 (ref ?–6.5)

## 2022-06-29 PROCEDURE — G0439 PPPS, SUBSEQ VISIT: HCPCS | Performed by: FAMILY MEDICINE

## 2022-06-29 PROCEDURE — 3079F DIAST BP 80-89 MM HG: CPT | Performed by: FAMILY MEDICINE

## 2022-06-29 PROCEDURE — 3044F HG A1C LEVEL LT 7.0%: CPT | Performed by: FAMILY MEDICINE

## 2022-06-29 PROCEDURE — 99214 OFFICE O/P EST MOD 30 MIN: CPT | Performed by: FAMILY MEDICINE

## 2022-06-29 PROCEDURE — 1160F RVW MEDS BY RX/DR IN RCRD: CPT | Performed by: FAMILY MEDICINE

## 2022-06-29 PROCEDURE — 4004F PT TOBACCO SCREEN RCVD TLK: CPT | Performed by: FAMILY MEDICINE

## 2022-06-29 PROCEDURE — 1125F AMNT PAIN NOTED PAIN PRSNT: CPT | Performed by: FAMILY MEDICINE

## 2022-06-29 PROCEDURE — 3725F SCREEN DEPRESSION PERFORMED: CPT | Performed by: FAMILY MEDICINE

## 2022-06-29 PROCEDURE — 3288F FALL RISK ASSESSMENT DOCD: CPT | Performed by: FAMILY MEDICINE

## 2022-06-29 PROCEDURE — 3077F SYST BP >= 140 MM HG: CPT | Performed by: FAMILY MEDICINE

## 2022-06-29 PROCEDURE — 1170F FXNL STATUS ASSESSED: CPT | Performed by: FAMILY MEDICINE

## 2022-06-29 PROCEDURE — 83036 HEMOGLOBIN GLYCOSYLATED A1C: CPT | Performed by: FAMILY MEDICINE

## 2022-06-29 RX ORDER — SPIRONOLACTONE 50 MG/1
50 TABLET, FILM COATED ORAL DAILY
Qty: 30 TABLET | Refills: 5 | Status: SHIPPED | OUTPATIENT
Start: 2022-06-29 | End: 2022-07-08 | Stop reason: DRUGHIGH

## 2022-06-29 NOTE — TELEPHONE ENCOUNTER
Michelle Mcclendon, Dr Cuate Banks asked for this patient to be scheduled with you next week  Can you tell me where you want me to put her  Broadt's instructions: Follow-up disposition: Return in about 1 week (around 7/6/2022), or 1week (am please - pt getting labs before visit ) with Michelle Mcclendon  She is aware

## 2022-06-29 NOTE — PROGRESS NOTES
Assessment and Plan:     Problem List Items Addressed This Visit    None          Preventive health issues were discussed with patient, and age appropriate screening tests were ordered as noted in patient's After Visit Summary  Personalized health advice and appropriate referrals for health education or preventive services given if needed, as noted in patient's After Visit Summary  History of Present Illness:     Patient presents for a Medicare Wellness Visit  HPI   Patient Care Team:  Chris Edouard, DO as PCP - MD Frederick Taveras MD Lige Points, MD Mathews Dials, MD Farley Ngo, MD     Review of Systems:     Review of Systems   Constitutional: Negative for appetite change, chills and fever  HENT: Negative for ear pain, facial swelling, rhinorrhea, sinus pain, sore throat and trouble swallowing  Eyes: Negative for discharge and redness  Respiratory: Negative for chest tightness, shortness of breath and wheezing  Cardiovascular: Negative for chest pain and palpitations  Gastrointestinal: Negative for abdominal pain, diarrhea, nausea and vomiting  Endocrine: Negative for polyuria  Genitourinary: Negative for dysuria and urgency  Musculoskeletal: Negative for arthralgias and back pain  Skin: Negative for rash  Neurological: Negative for dizziness, weakness and headaches  Hematological: Negative for adenopathy  Psychiatric/Behavioral: Negative for behavioral problems, confusion and sleep disturbance  All other systems reviewed and are negative         Problem List:     Patient Active Problem List   Diagnosis    Aneurysm of ascending aorta (HCC)    Anxiety    CAD (coronary atherosclerotic disease)    Neck pain    Essential hypertension    Gastroesophageal reflux disease    Glaucoma    Hypercholesterolemia    Macular degeneration    Osteopenia    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HCC)    Tobacco abuse    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, without long-term current use of insulin (HCC)    Elevated troponin level not due myocardial infarction    Gross hematuria    Leukocytosis    Generalized weakness    Unintentional weight loss    Nephrolithiasis    Hematuria    Foreign body in vagina    Pacemaker    Preop cardiovascular exam    Anemia, unspecified    Vitamin B 12 deficiency    Atherosclerosis of native coronary artery with angina pectoris (HCC)    [facility]ox[facility]    Acquired absence of other right toe(s) ([facility]    [facility]      Past Medical and Surgical History:     Past Medical History:   Diagnosis Date    Atypical chest pain     GERD (gastroesophageal reflux disease)     Hyperlipidemia     Hypertension     Kidney stone     Myocardial infarction ([facility] )     2015     NSTEMI (non-ST elevated myocardial infarction) [facility]     Last Assessed: 9/24/2015     Past Surgical History:   Procedure Laterality Date    A-V CARDIAC PACEMAKER INSERTION      ANKLE SURGERY      BACK SURGERY  01/2011    L4 and L5 laminectomy and fusion     BLADDER SURGERY      CORONARY ARTERY BYPASS GRAFT  09/02/2015    CABGx3 with LIMA to LAD, SVG to PDA, SVG to OM-1    EXAMINATION UNDER ANESTHESIA N/A 7/11/2020    Procedure: EXAM UNDER ANESTHESIA (EUA)  EXCISION OF POSTERIOR VAGINAL FOREIGN BODY;  Surgeon: Ambreen Joseph MD;  [facility];  Service: Gynecology    FL RETROGRADE PYELOGRAM  7/11/2020    FL RETROGRADE PYELOGRAM  8/7/2020    HYSTERECTOMY      WV CYSTO/URETERO W/LITHOTRIPSY &INDWELL STENT INSRT Right 8/7/2020    Procedure: CYSTOSCOPY URETEROSCOPY WITH LITHOTRIPSY HOLMIUM LASER, RETROGRADE PYELOGRAM AND INSERTION STENT URETERAL;  Surgeon: Janiya Enriquez MD;  [facility];  Service: Urology    WV CYSTOURETHROSCOPY,URETER CATHETER Right 7/11/2020    Procedure: CYSTOSCOPY RETROGRADE PYELOGRAM WITH INSERTION STENT URETERAL, bladder biopsy with fulguration;  Surgeon: Zully Mendoza MD;  [facility];  Service: Urology    WRIST SURGERY        Family History:     Family History   Problem Relation Age of Onset    Hypertension Mother     Hypertension Sister     Alcohol abuse Brother     Cirrhosis Brother     Diabetes Father     Other Brother         Heart transplant in his 46s    Lung cancer Sister     Diabetes Brother     Stroke Sister     No Known Problems Daughter     No Known Problems Maternal Grandmother     No Known Problems Maternal Grandfather     No Known Problems Paternal Grandmother     No Known Problems Paternal Grandfather     No Known Problems Sister       Social History:     Social History     Socioeconomic History    Marital status: Single     Spouse name: None    Number of children: 1    Years of education: None    Highest education level: None   Occupational History    None   Tobacco Use    Smoking status: Current Every Day Smoker     Packs/day: 0 25     Start date: 5    Smokeless tobacco: Never Used    Tobacco comment: Started smoking at age 25; currently smoking <1ppd     Vaping Use    Vaping Use: Never used   Substance and Sexual Activity    Alcohol use: Not Currently    Drug use: Never    Sexual activity: Not Currently     Partners: Male     Birth control/protection: Post-menopausal   Other Topics Concern    None   Social History Narrative    None     Social Determinants of Health     Financial Resource Strain: Not on file   Food Insecurity: Not on file   Transportation Needs: Not on file   Physical Activity: Not on file   Stress: Not on file   Social Connections: Not on file   Intimate Partner Violence: Not on file   Housing Stability: Not on file      Medications and Allergies:     Current Outpatient Medications   Medication Sig Dispense Refill    acetaminophen (TYLENOL) 325 mg tablet Take 2 tablets (650 mg total) by mouth every 4 (four) hours as needed for mild pain 30 tablet 0    ALPRAZolam (XANAX) 0 25 mg tablet Take 1 tablet (0 25 mg total) by mouth 3 (three) times a day as needed for anxiety 90 tablet 3    amiodarone 200 mg tablet 200 mg PO daily  90 tablet 3    apixaban (Eliquis) 5 mg Take 1 tablet (5 mg total) by mouth 2 (two) times a day 180 tablet 3    atorvastatin (LIPITOR) 40 mg tablet Take 1 tablet (40 mg total) by mouth daily 90 tablet 3    COMBIGAN 0 2-0 5 %       ferrous sulfate 325 (65 Fe) mg tablet Take 325 mg by mouth daily with breakfast      losartan (COZAAR) 25 mg tablet Take 1 tablet (25 mg total) by mouth daily 90 tablet 3    metFORMIN (GLUCOPHAGE) 500 mg tablet Take 1 tablet (500 mg total) by mouth 2 (two) times a day with meals 180 tablet 3    metoprolol succinate (TOPROL-XL) 50 mg 24 hr tablet Take 1 tablet (50 mg total) by mouth 2 (two) times a day 180 tablet 3    omeprazole (PriLOSEC) 20 mg delayed release capsule Take 1 capsule (20 mg total) by mouth daily 90 capsule 3    traMADol (ULTRAM) 50 mg tablet Take 2 tablets (100 mg total) by mouth 2 (two) times a day (Patient not taking: No sig reported) 120 tablet 3     No current facility-administered medications for this visit       Allergies   Allergen Reactions    Nickel Rash      Immunizations:     Immunization History   Administered Date(s) Administered    COVID-19 PFIZER VACCINE 0 3 ML IM 03/19/2021, 04/09/2021    Influenza Split High Dose Preservative Free IM 10/01/2015, 10/12/2017    Influenza, high dose seasonal 0 7 mL 10/01/2018, 10/07/2019, 08/25/2020, 10/06/2020, 09/22/2021    Pneumococcal Conjugate 13-Valent 09/29/2016    Pneumococcal Polysaccharide PPV23 10/01/2015    TD (adult) Preservative Free 08/27/2019    Tdap 08/04/2001    Zoster 02/27/2013      Health Maintenance:         Topic Date Due    Breast Cancer Screening: Mammogram  Never done    DXA SCAN  12/11/2010    Hepatitis C Screening  Completed         Topic Date Due    COVID-19 Vaccine (3 - Booster for [employer] series) 09/09/2021      Medicare Screening Tests and Risk Assessments:     [facility] is here for her Subsequent Wellness visit  Last Medicare Wellness visit information reviewed, patient interviewed, no change since last AWV  Health Risk Assessment:   Patient rates overall health as good  Patient feels that their physical health rating is slightly better  Patient is dissatisfied with their life  Eyesight was rated as same  Hearing was rated as same  Patient feels that their emotional and mental health rating is slightly worse  Patients states they are never, rarely angry  Patient states they are sometimes unusually tired/fatigued  Pain experienced in the last 7 days has been none  Patient states that she has experienced no weight loss or gain in last 6 months  Depression Screening:   PHQ-2 Score: 0      Fall Risk Screening: In the past year, patient has experienced: no history of falling in past year      Urinary Incontinence Screening:   Patient has leaked urine accidently in the last six months  Home Safety:  Patient has trouble with stairs inside or outside of their home  Patient has working smoke alarms and has working carbon monoxide detector  Home safety hazards include: none  Nutrition:   Current diet is Regular  Medications:   Patient is currently taking over-the-counter supplements  OTC medications include: see medication list  Patient is able to manage medications  Activities of Daily Living (ADLs)/Instrumental Activities of Daily Living (IADLs):   Walk and transfer into and out of bed and chair?: Yes  Dress and groom yourself?: Yes    Bathe or shower yourself?: Yes    Feed yourself?  Yes  Do your laundry/housekeeping?: Yes  Manage your money, pay your bills and track your expenses?: Yes  Make your own meals?: Yes    Do your own shopping?: Yes    Previous Hospitalizations:   Any hospitalizations or ED visits within the last 12 months?: No      Advance Care Planning: Living will: Yes    Durable POA for healthcare: Yes    Advanced directive: Yes    Advanced directive counseling given: Yes    Five wishes given: Yes    Patient declined ACP directive: No    End of Life Decisions reviewed with patient: Yes    Provider agrees with end of life decisions: Yes      Cognitive Screening:   Provider or family/friend/caregiver concerned regarding cognition?: No    PREVENTIVE SCREENINGS      Cardiovascular Screening:    General: Screening Not Indicated and History Lipid Disorder      Diabetes Screening:     General: Screening Not Indicated and History Diabetes      Colorectal Cancer Screening:     General: Screening Current      Breast Cancer Screening:     General: Patient Declines and Risks and Benefits Discussed      Cervical Cancer Screening:    General: Screening Not Indicated      Osteoporosis Screening:    General: Patient Declines and Risks and Benefits Discussed      Abdominal Aortic Aneurysm (AAA) Screening:        General: Screening Not Indicated and History AAA      Lung Cancer Screening:     General: Screening Not Indicated      Hepatitis C Screening:    General: Screening Current    Screening, Brief Intervention, and Referral to Treatment (SBIRT)    Screening    Typical number of drinks in a week: 0    Single Item Drug Screening:  How often have you used an illegal drug (including marijuana) or a prescription medication for non-medical reasons in the past year? never    Single Item Drug Screen Score: 0  Interpretation: Negative screen for possible drug use disorder    No exam data present     Physical Exam:     /82   Pulse 75   Temp 98 3 °F (36 8 °C)   Ht 4' 11" (1 499 m)   Wt 62 6 kg (138 lb)   SpO2 97%   BMI 27 87 kg/m²     Physical Exam  Vitals and nursing note reviewed  Constitutional:       General: She is not in acute distress  Appearance: Normal appearance  She is not ill-appearing or diaphoretic  HENT:      Head: Normocephalic and atraumatic  Right Ear: Tympanic membrane, ear canal and external ear normal       Left Ear: Tympanic membrane, ear canal and external ear normal       Nose: Nose normal  No congestion or rhinorrhea  Mouth/Throat:      Mouth: Mucous membranes are moist       Pharynx: Oropharynx is clear  No posterior oropharyngeal erythema  Eyes:      General:         Right eye: No discharge  Left eye: No discharge  Extraocular Movements: Extraocular movements intact  Conjunctiva/sclera: Conjunctivae normal       Pupils: Pupils are equal, round, and reactive to light  Neck:      Vascular: No carotid bruit  Cardiovascular:      Rate and Rhythm: Normal rate and regular rhythm  Pulses: Normal pulses  Heart sounds: Normal heart sounds  No murmur heard  Pulmonary:      Effort: Pulmonary effort is normal  No respiratory distress  Breath sounds: Normal breath sounds  No wheezing or rhonchi  Abdominal:      General: Abdomen is flat  Bowel sounds are normal  There is no distension  Palpations: There is no mass  Tenderness: There is no abdominal tenderness  Musculoskeletal:         General: No swelling or deformity  Normal range of motion  Cervical back: Normal range of motion and neck supple  No rigidity  Right lower leg: No edema  Left lower leg: No edema  Lymphadenopathy:      Cervical: No cervical adenopathy  Skin:     General: Skin is warm and dry  Capillary Refill: Capillary refill takes less than 2 seconds  Coloration: Skin is not jaundiced  Findings: No bruising, erythema or rash  Neurological:      General: No focal deficit present  Mental Status: She is alert and oriented to person, place, and time  Cranial Nerves: No cranial nerve deficit  Sensory: No sensory deficit        Gait: Gait normal       Deep Tendon Reflexes: Reflexes normal    Psychiatric:         Mood and Affect: Mood normal          Behavior: Behavior normal          Thought Content:  Thought content normal          Judgment: Judgment normal           Neelam Nolan DO

## 2022-06-29 NOTE — PATIENT INSTRUCTIONS
Medicare Preventive Visit Patient Instructions  Thank you for completing your Welcome to Medicare Visit or Medicare Annual Wellness Visit today  Your next wellness visit will be due in one year (6/30/2023)  The screening/preventive services that you may require over the next 5-10 years are detailed below  Some tests may not apply to you based off risk factors and/or age  Screening tests ordered at today's visit but not completed yet may show as past due  Also, please note that scanned in results may not display below  Preventive Screenings:  Service Recommendations Previous Testing/Comments   Colorectal Cancer Screening  * Colonoscopy    * Fecal Occult Blood Test (FOBT)/Fecal Immunochemical Test (FIT)  * Fecal DNA/Cologuard Test  * Flexible Sigmoidoscopy Age: 54-65 years old   Colonoscopy: every 10 years (may be performed more frequently if at higher risk)  OR  FOBT/FIT: every 1 year  OR  Cologuard: every 3 years  OR  Sigmoidoscopy: every 5 years  Screening may be recommended earlier than age 48 if at higher risk for colorectal cancer  Also, an individualized decision between you and your healthcare provider will decide whether screening between the ages of 74-80 would be appropriate  Colonoscopy: Not on file  FOBT/FIT: Not on file  Cologuard: Not on file  Sigmoidoscopy: Not on file          Breast Cancer Screening Age: 36 years old  Frequency: every 1-2 years  Not required if history of left and right mastectomy Mammogram: Not on file        Cervical Cancer Screening Between the ages of 21-29, pap smear recommended once every 3 years  Between the ages of 33-67, can perform pap smear with HPV co-testing every 5 years     Recommendations may differ for women with a history of total hysterectomy, cervical cancer, or abnormal pap smears in past  Pap Smear: Not on file    Screening Not Indicated   Hepatitis C Screening Once for adults born between [facility]  More frequently in patients at high risk for Hepatitis C Hep C Antibody: 07/07/2020    Screening Current   Diabetes Screening 1-2 times per year if you're at risk for diabetes or have pre-diabetes Fasting glucose: 163 mg/dL   A1C: 6 8    Screening Not Indicated  History Diabetes   Cholesterol Screening Once every 5 years if you don't have a lipid disorder  May order more often based on risk factors  Lipid panel: 02/08/2022    Screening Not Indicated  History Lipid Disorder     Other Preventive Screenings Covered by Medicare:  1  Abdominal Aortic Aneurysm (AAA) Screening: covered once if your at risk  You're considered to be at risk if you have a family history of AAA  2  Lung Cancer Screening: covers low dose CT scan once per year if you meet all of the following conditions: (1) Age 50-69; (2) No signs or symptoms of lung cancer; (3) Current smoker or have quit smoking within the last 15 years; (4) You have a tobacco smoking history of at least 30 pack years (packs per day multiplied by number of years you smoked); (5) You get a written order from a healthcare provider  3  Glaucoma Screening: covered annually if you're considered high risk: (1) You have diabetes OR (2) Family history of glaucoma OR (3)  aged 48 and older OR (3)  American aged 72 and older  3  Osteoporosis Screening: covered every 2 years if you meet one of the following conditions: (1) You're estrogen deficient and at risk for osteoporosis based off medical history and other findings; (2) Have a vertebral abnormality; (3) On glucocorticoid therapy for more than 3 months; (4) Have primary hyperparathyroidism; (5) On osteoporosis medications and need to assess response to drug therapy  · Last bone density test (DXA Scan): 12/11/2007  5  HIV Screening: covered annually if you're between the age of 12-76  Also covered annually if you are younger than 13 and older than 72 with risk factors for HIV infection   For pregnant patients, it is covered up to 3 times per pregnancy  Immunizations:  Immunization Recommendations   Influenza Vaccine Annual influenza vaccination during flu season is recommended for all persons aged >= 6 months who do not have contraindications   Pneumococcal Vaccine (Prevnar and Pneumovax)  * Prevnar = PCV13  * Pneumovax = PPSV23   Adults 25-60 years old: 1-3 doses may be recommended based on certain risk factors  Adults 72 years old: Prevnar (PCV13) vaccine recommended followed by Pneumovax (PPSV23) vaccine  If already received PPSV23 since turning 65, then PCV13 recommended at least one year after PPSV23 dose  Hepatitis B Vaccine 3 dose series if at intermediate or high risk (ex: diabetes, end stage renal disease, liver disease)   Tetanus (Td) Vaccine - COST NOT COVERED BY MEDICARE PART B Following completion of primary series, a booster dose should be given every 10 years to maintain immunity against tetanus  Td may also be given as tetanus wound prophylaxis  Tdap Vaccine - COST NOT COVERED BY MEDICARE PART B Recommended at least once for all adults  For pregnant patients, recommended with each pregnancy  Shingles Vaccine (Shingrix) - COST NOT COVERED BY MEDICARE PART B  2 shot series recommended in those aged 48 and above     Health Maintenance Due:      Topic Date Due    Breast Cancer Screening: Mammogram  Never done    DXA SCAN  12/11/2010    Hepatitis C Screening  Completed     Immunizations Due:      Topic Date Due    COVID-19 Vaccine (3 - Booster for [employer] series) 09/09/2021     Advance Directives   What are advance directives? Advance directives are legal documents that state your wishes and plans for medical care  These plans are made ahead of time in case you lose your ability to make decisions for yourself  Advance directives can apply to any medical decision, such as the treatments you want, and if you want to donate organs  What are the types of advance directives?   There are many types of advance directives, and each state has rules about how to use them  You may choose a combination of any of the following:  · Living will: This is a written record of the treatment you want  You can also choose which treatments you do not want, which to limit, and which to stop at a certain time  This includes surgery, medicine, IV fluid, and tube feedings  · Durable power of  for healthcare [facility]): This is a written record that states who you want to make healthcare choices for you when you are unable to make them for yourself  This person, called a proxy, is usually a family member or a friend  You may choose more than 1 proxy  · Do not resuscitate (DNR) order:  A DNR order is used in case your heart stops beating or you stop breathing  It is a request not to have certain forms of treatment, such as CPR  A DNR order may be included in other types of advance directives  · Medical directive: This covers the care that you want if you are in a coma, near death, or unable to make decisions for yourself  You can list the treatments you want for each condition  Treatment may include pain medicine, surgery, blood transfusions, dialysis, IV or tube feedings, and a ventilator (breathing machine)  · Values history: This document has questions about your views, beliefs, and how you feel and think about life  This information can help others choose the care that you would choose  Why are advance directives important? An advance directive helps you control your care  Although spoken wishes may be used, it is better to have your wishes written down  Spoken wishes can be misunderstood, or not followed  Treatments may be given even if you do not want them  An advance directive may make it easier for your family to make difficult choices about your care  Urinary Incontinence   Urinary incontinence (UI)  is when you lose control of your bladder  UI develops because your bladder cannot store or empty urine properly   The 3 most common types of UI are stress incontinence, urge incontinence, or both  Medicines:   · May be given to help strengthen your bladder control  Report any side effects of medication to your healthcare provider  Do pelvic muscle exercises often:  Your pelvic muscles help you stop urinating  Squeeze these muscles tight for 5 seconds, then relax for 5 seconds  Gradually work up to squeezing for 10 seconds  Do 3 sets of 15 repetitions a day, or as directed  This will help strengthen your pelvic muscles and improve bladder control  Train your bladder:  Go to the bathroom at set times, such as every 2 hours, even if you do not feel the urge to go  You can also try to hold your urine when you feel the urge to go  For example, hold your urine for 5 minutes when you feel the urge to go  As that becomes easier, hold your urine for 10 minutes  Self-care:   · Keep a UI record  Write down how often you leak urine and how much you leak  Make a note of what you were doing when you leaked urine  · Drink liquids as directed  You may need to limit the amount of liquid you drink to help control your urine leakage  Do not drink any liquid right before you go to bed  Limit or do not have drinks that contain caffeine or alcohol  · Prevent constipation  Eat a variety of high-fiber foods  Good examples are high-fiber cereals, beans, vegetables, and whole-grain breads  Walking is the best way to trigger your intestines to have a bowel movement  · Exercise regularly and maintain a healthy weight  Weight loss and exercise will decrease pressure on your bladder and help you control your leakage  · Use a catheter as directed  to help empty your bladder  A catheter is a tiny, plastic tube that is put into your bladder to drain your urine  · Go to behavior therapy as directed  Behavior therapy may be used to help you learn to control your urge to urinate      Cigarette Smoking and Your Health   Risks to your health if you smoke:  Nicotine and other chemicals found in tobacco damage every cell in your body  Even if you are a light smoker, you have an increased risk for cancer, heart disease, and lung disease  If you are pregnant or have diabetes, smoking increases your risk for complications  Benefits to your health if you stop smoking:   · You decrease respiratory symptoms such as coughing, wheezing, and shortness of breath  · You reduce your risk for cancers of the lung, mouth, throat, kidney, bladder, pancreas, stomach, and cervix  If you already have cancer, you increase the benefits of chemotherapy  You also reduce your risk for cancer returning or a second cancer from developing  · You reduce your risk for heart disease, blood clots, heart attack, and stroke  · You reduce your risk for lung infections, and diseases such as pneumonia, asthma, chronic bronchitis, and emphysema  · Your circulation improves  More oxygen can be delivered to your body  If you have diabetes, you lower your risk for complications, such as kidney, artery, and eye diseases  You also lower your risk for nerve damage  Nerve damage can lead to amputations, poor vision, and blindness  · You improve your body's ability to heal and to fight infections  For more information and support to stop smoking:   · [employer]  Phone: 5- [phone]  Web Address: www [employer]  Weight Management   Why it is important to manage your weight:  Being overweight increases your risk of health conditions such as heart disease, high blood pressure, type 2 diabetes, and certain types of cancer  It can also increase your risk for osteoarthritis, sleep apnea, and other respiratory problems  Aim for a slow, steady weight loss  Even a small amount of weight loss can lower your risk of health problems  How to lose weight safely:  A safe and healthy way to lose weight is to eat fewer calories and get regular exercise   You can lose up about 1 pound a week by decreasing the number of calories you eat by 500 calories each day  Healthy meal plan for weight management:  A healthy meal plan includes a variety of foods, contains fewer calories, and helps you stay healthy  A healthy meal plan includes the following:  · Eat whole-grain foods more often  A healthy meal plan should contain fiber  Fiber is the part of grains, fruits, and vegetables that is not broken down by your body  Whole-grain foods are healthy and provide extra fiber in your diet  Some examples of whole-grain foods are whole-wheat breads and pastas, oatmeal, brown rice, and bulgur  · Eat a variety of vegetables every day  Include dark, leafy greens such as spinach, kale, crow greens, and mustard greens  Eat yellow and orange vegetables such as carrots, sweet potatoes, and winter squash  · Eat a variety of fruits every day  Choose fresh or canned fruit (canned in its own juice or light syrup) instead of juice  Fruit juice has very little or no fiber  · Eat low-fat dairy foods  Drink fat-free (skim) milk or 1% milk  Eat fat-free yogurt and low-fat cottage cheese  Try low-fat cheeses such as mozzarella and other reduced-fat cheeses  · Choose meat and other protein foods that are low in fat  Choose beans or other legumes such as split peas or lentils  Choose fish, skinless poultry (chicken or turkey), or lean cuts of red meat (beef or pork)  Before you cook meat or poultry, cut off any visible fat  · Use less fat and oil  Try baking foods instead of frying them  Add less fat, such as margarine, sour cream, regular salad dressing and mayonnaise to foods  Eat fewer high-fat foods  Some examples of high-fat foods include french fries, doughnuts, ice cream, and cakes  · Eat fewer sweets  Limit foods and drinks that are high in sugar  This includes candy, cookies, regular soda, and sweetened drinks  Exercise:  Exercise at least 30 minutes per day on most days of the week   Some examples of exercise include walking, biking, dancing, and swimming  You can also fit in more physical activity by taking the stairs instead of the elevator or parking farther away from stores  Ask your healthcare provider about the best exercise plan for you  © Copyright [employer] 2018 Information is for End User's use only and may not be sold, redistributed or otherwise used for commercial purposes  All illustrations and images included in CareNotes® are the copyrighted property of [employer]  or Jennifer Gonzales    What to Do if Your Blood Sugar is Low   AMBULATORY CARE:   Low blood sugar levels  (hypoglycemia) can happen with Type 1 and Type 2 diabetes  Low levels are more likely to happen if you use insulin  Hypoglycemia can cause you to have falls, accidents, and injuries  A blood sugar level that gets too low can lead to seizures, coma, and death  Learn to recognize the symptoms early so you can get treatment quickly  When your blood sugar is low you may feel:  · Sweaty    · Nervous or shaky    · Anxious or irritable    · Confused    · A fast, pounding heartbeat    · Extremely hungry    Have someone call your local emergency number (911 in the [address]) if:   · You cannot be woken  · You have a seizure  Call your doctor if:   · You have symptoms of a low blood sugar level, such as trouble thinking, sweating, or a pounding heartbeat  · Your blood sugar level is lower than normal and it does not improve with treatment  · You often have lower blood sugar levels than your target goals  · You have trouble coping with your illness, or you feel anxious or depressed  · You have questions or concerns about your condition or care  What to do if you have symptoms of low blood sugar:   · Check your blood sugar level, if possible  Your blood sugar level is too low if it is at or below 70 mg/dL  · Eat or drink 15 grams of fast-acting carbohydrate  Fast-acting carbohydrates will raise your blood sugar level quickly   Examples of 15 grams of fast-acting carbohydrates:     ? 4 ounces (½ cup) of fruit juice     ? 4 ounces of regular soda    ? 2 tablespoons of raisins     ? 1 tube of glucose gel or 3 to 4 glucose tablets       · Check your blood sugar level 15 minutes later  If the level is still low (less than 100 mg/dL), eat another 15 grams of carbohydrate  When the level returns to 100 mg/dL, eat a snack or meal that contains carbohydrates  This will help prevent another drop in blood sugar  · Teach people close to you how to use your glucagon kit  Your blood sugar may be too low for you to be awake  People need to know when and how to use your kit  Prevent low blood sugar levels:  Prevent low blood sugar by knowing what increases your risk  Ask your healthcare provider for ways to prevent low blood sugar levels  Any of the following can increase your risk of low blood sugar:  · Fasting for tests or procedures    · During or after intense exercise    · Late or postponed meals    · Sleeping (you may need a bedtime snack)     · Drinking alcohol if you use insulin or insulin releasing pills    Follow up with your doctor as directed:  Write down your questions so you remember to ask them during your visits  © Copyright [employer] 2022 Information is for End User's use only and may not be sold, redistributed or otherwise used for commercial purposes  All illustrations and images included in CareNotes® are the copyrighted property of [employer]  or Jennifer Gonzales  The above information is an  only  It is not intended as medical advice for individual conditions or treatments  Talk to your doctor, nurse or pharmacist before following any medical regimen to see if it is safe and effective for you

## 2022-06-30 NOTE — PROGRESS NOTES
Patient ID: Ariela Mcpherson is a 69 y o  female  HPI: 69 y  o female presents for follow up of niddm, hypertension, Vit D deficiency and hypercholesterolemia  Hgba1c is 6 0   and patient's issues are well controlled  Patient deneis any dyspnea, chest pain or palpitations  She complains of edema of lower legs that does not go down overnight   BMI Counseling: Body mass index is 27 87 kg/m²  The BMI is above normal  Nutrition recommendations include reducing portion sizes, decreasing overall calorie intake and moderation in carbohydrate intake  SUBJECTIVE    Family History   Problem Relation Age of Onset    Hypertension Mother     Hypertension Sister     Alcohol abuse Brother     Cirrhosis Brother     Diabetes Father     Other Brother         Heart transplant in his 46s    Lung cancer Sister     Diabetes Brother     Stroke Sister     No Known Problems Daughter     No Known Problems Maternal Grandmother     No Known Problems Maternal Grandfather     No Known Problems Paternal Grandmother     No Known Problems Paternal Grandfather     No Known Problems Sister      Social History     Socioeconomic History    Marital status: Single     Spouse name: Not on file    Number of children: 1    Years of education: Not on file    Highest education level: Not on file   Occupational History    Not on file   Tobacco Use    Smoking status: Current Every Day Smoker     Packs/day: 0 25     Start date: 5    Smokeless tobacco: Never Used    Tobacco comment: Started smoking at age 25; currently smoking <1ppd     Vaping Use    Vaping Use: Never used   Substance and Sexual Activity    Alcohol use: Not Currently    Drug use: Never    Sexual activity: Not Currently     Partners: Male     Birth control/protection: Post-menopausal   Other Topics Concern    Not on file   Social History Narrative    Not on file     Social Determinants of Health     Financial Resource Strain: Not on file   Food Insecurity: Not on file   Transportation Needs: Not on file   Physical Activity: Not on file   Stress: Not on file   Social Connections: Not on file   Intimate Partner Violence: Not on file   Housing Stability: Not on file     Past Medical History:   Diagnosis Date    Atypical chest pain     GERD (gastroesophageal reflux disease)     Hyperlipidemia     Hypertension     Kidney stone     Myocardial infarction ([facility] [address] )     2015     NSTEMI (non-ST elevated myocardial infarction) ([facility] [address] )     Last Assessed: 9/24/2015     Past Surgical History:   Procedure Laterality Date    A-V CARDIAC PACEMAKER INSERTION      ANKLE SURGERY      BACK SURGERY  01/2011    L4 and L5 laminectomy and fusion     BLADDER SURGERY      CORONARY ARTERY BYPASS GRAFT  09/02/2015    CABGx3 with LIMA to LAD, SVG to PDA, SVG to OM-1    EXAMINATION UNDER ANESTHESIA N/A 7/11/2020    Procedure: EXAM UNDER ANESTHESIA (EUA)  EXCISION OF POSTERIOR VAGINAL FOREIGN BODY;  Surgeon: Gage Romano MD;  [facility];  Service: Gynecology    FL RETROGRADE PYELOGRAM  7/11/2020    FL RETROGRADE PYELOGRAM  8/7/2020    HYSTERECTOMY      UT CYSTO/URETERO W/LITHOTRIPSY &INDWELL STENT INSRT Right 8/7/2020    Procedure: CYSTOSCOPY URETEROSCOPY WITH LITHOTRIPSY HOLMIUM LASER, RETROGRADE PYELOGRAM AND INSERTION STENT URETERAL;  Surgeon: Tyrone Bermudez MD;  [facility];  Service: Urology    UT CYSTOURETHROSCOPY,URETER CATHETER Right 7/11/2020    Procedure: CYSTOSCOPY RETROGRADE PYELOGRAM WITH INSERTION STENT URETERAL, bladder biopsy with fulguration;  Surgeon: Tyrone Bermudez MD;  [facility];  Service: Urology    WRIST SURGERY       Allergies   Allergen Reactions    Nickel Rash       Current Outpatient Medications:     acetaminophen (TYLENOL) 325 mg tablet, Take 2 tablets (650 mg total) by mouth every 4 (four) hours as needed for mild pain, Disp: 30 tablet, Rfl: 0    ALPRAZolam (XANAX) 0 25 mg tablet, Take 1 tablet (0 25 mg total) by mouth 3 (three) times a day as needed for anxiety, Disp: 90 tablet, Rfl: 3    amiodarone 200 mg tablet, 200 mg PO daily  , Disp: 90 tablet, Rfl: 3    apixaban (Eliquis) 5 mg, Take 1 tablet (5 mg total) by mouth 2 (two) times a day, Disp: 180 tablet, Rfl: 3    atorvastatin (LIPITOR) 40 mg tablet, Take 1 tablet (40 mg total) by mouth daily, Disp: 90 tablet, Rfl: 3    COMBIGAN 0 2-0 5 %, , Disp: , Rfl:     ferrous sulfate 325 (65 Fe) mg tablet, Take 325 mg by mouth daily with breakfast, Disp: , Rfl:     losartan (COZAAR) 25 mg tablet, Take 1 tablet (25 mg total) by mouth daily, Disp: 90 tablet, Rfl: 3    metFORMIN (GLUCOPHAGE) 500 mg tablet, Take 1 tablet (500 mg total) by mouth 2 (two) times a day with meals, Disp: 180 tablet, Rfl: 3    metoprolol succinate (TOPROL-XL) 50 mg 24 hr tablet, Take 1 tablet (50 mg total) by mouth 2 (two) times a day, Disp: 180 tablet, Rfl: 3    omeprazole (PriLOSEC) 20 mg delayed release capsule, Take 1 capsule (20 mg total) by mouth daily, Disp: 90 capsule, Rfl: 3    spironolactone (ALDACTONE) 50 mg tablet, Take 1 tablet (50 mg total) by mouth daily, Disp: 30 tablet, Rfl: 5    traMADol (ULTRAM) 50 mg tablet, Take 2 tablets (100 mg total) by mouth 2 (two) times a day (Patient not taking: No sig reported), Disp: 120 tablet, Rfl: 3    Review of Systems  Constitutional:     Denies fever, chills ,fatigue ,weakness ,weight loss, weight gain     ENT: Denies earache ,loss of hearing ,nosebleed, nasal discharge,nasal congestion ,sore throat ,hoarseness  Pulmonary: Denies shortness of breath ,cough  ,dyspnea on exertion, orthopnea  ,PND   Cardiovascular:  Denies bradycardia , tachycardia  ,palpations, +lower extremity edema leg, no claudication  Breast:  Denies new or changing breast lumps ,nipple discharge ,nipple changes  Abdomen:  Denies abdominal pain , anorexia , indigestion, nausea, vomiting, constipation, diarrhea  Musculoskeletal: Denies myalgias, arthralgias, joint swelling, joint stiffness , limb pain, limb swelling  Gu: denies dysuria, polyuria  Skin: Denies skin rash, skin lesion, skin wound, itching, dry skin  Neuro: Denies headache, numbness, tingling, confusion, loss of consciousness, dizziness, vertigo  Psychiatric: Denies feelings of depression, suicidal ideation, anxiety, sleep disturbances    OBJECTIVE  /82   Pulse 75   Temp 98 3 °F (36 8 °C)   Ht 4' 11" (1 499 m)   Wt 62 6 kg (138 lb)   SpO2 97%   BMI 27 87 kg/m²   Constitutional:   NAD, well appearing and well nourished      ENT:   Conjunctiva and lids: no injection, edema, or discharge     Pupils and iris: BON bilaterally    External inspection of ears and nose: normal without deformities or discharge  Otoscopic exam: Canals patent without erythema  Nasal mucosa, septum and turbinates: Normal or edema or discharge         Oropharynx:  Moist mucosa, normal tongue and tonsils without lesions  No erythema        Pulmonary:Respiratory effort normal rate and rhythm, no increased work of breathing   Auscultation of lungs:  Clear bilaterally with no adventitious breath sounds       Cardiovascular: regular rate and rhythm, S1 and S2, no murmur, no edema and/or varicosities of LE      Abdomen: Soft and non-distended     Positive bowel sounds      No heptomegaly or splenomegaly      Gu: no suprapubic tenderness or CVA tenderness, no urethral discharge  Lymphatic:  No anterior or posterior cervical lymphadenopathy         Musculoskeletal:  Gait and station: Normal gait      Digits and nails normal without clubbing or cyanosis       Inspection/palpation of joints, bones, and muscles:  No joint tenderness, swelling, full active and passive range of motion       Skin: Normal skin turgor and no rashes      Neuro:    Normal reflexes     Psych:   alert and oriented to person, place and time     normal mood and affect       Assessment/Plan:Diagnoses and all orders for this visit:    Medicare annual wellness visit, subsequent    Edema, unspecified type  Comments:  Recheck in one week and will adjust diuretics   Pt to elevate legs and follow a low sodium diet  Orders:  -     spironolactone (ALDACTONE) 50 mg tablet; Take 1 tablet (50 mg total) by mouth daily  -     NT-BNP PRO; Future    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, without long-term current use of insulin ([facility] [address] )  Comments:  Continue current thearpy  Orders:  -     POCT hemoglobin A1c    Essential hypertension  Comments:  Continue ARB therapy  Orders:  -     Comprehensive metabolic panel; Future    Vitamin D deficiency  Comments:  c/ontinue vit D therapy   Orders:  -     Vitamin D 25 hydroxy; Future    Hypercholesterolemia  Comments:  Stable on statin therapy   Orders:  -     Lipid Panel with Direct LDL reflex; Future        Reviewed with patient plan to treat with above plan  Patient instructed to call in 72 hours if not feeling better or if symptoms worsen otherwise will recheck in 1 week's time

## 2022-07-01 ENCOUNTER — TELEPHONE (OUTPATIENT)
Dept: FAMILY MEDICINE CLINIC | Facility: CLINIC | Age: 77
End: 2022-07-01

## 2022-07-08 ENCOUNTER — APPOINTMENT (OUTPATIENT)
Dept: LAB | Facility: CLINIC | Age: 77
End: 2022-07-08
Payer: COMMERCIAL

## 2022-07-08 ENCOUNTER — OFFICE VISIT (OUTPATIENT)
Dept: FAMILY MEDICINE CLINIC | Facility: CLINIC | Age: 77
End: 2022-07-08
Payer: COMMERCIAL

## 2022-07-08 VITALS
BODY MASS INDEX: 27.78 KG/M2 | HEART RATE: 78 BPM | WEIGHT: 137.8 LBS | SYSTOLIC BLOOD PRESSURE: 146 MMHG | TEMPERATURE: 97.6 F | OXYGEN SATURATION: 99 % | DIASTOLIC BLOOD PRESSURE: 94 MMHG | HEIGHT: 59 IN

## 2022-07-08 DIAGNOSIS — R60.9 EDEMA, UNSPECIFIED TYPE: Primary | ICD-10-CM

## 2022-07-08 DIAGNOSIS — E55.9 VITAMIN D DEFICIENCY: ICD-10-CM

## 2022-07-08 DIAGNOSIS — R60.9 EDEMA, UNSPECIFIED TYPE: ICD-10-CM

## 2022-07-08 DIAGNOSIS — E78.00 HYPERCHOLESTEROLEMIA: ICD-10-CM

## 2022-07-08 DIAGNOSIS — I10 ESSENTIAL HYPERTENSION: ICD-10-CM

## 2022-07-08 LAB
25(OH)D3 SERPL-MCNC: 30.6 NG/ML (ref 30–100)
ALBUMIN SERPL BCP-MCNC: 3.7 G/DL (ref 3.5–5)
ALP SERPL-CCNC: 30 U/L (ref 46–116)
ALT SERPL W P-5'-P-CCNC: 11 U/L (ref 12–78)
ANION GAP SERPL CALCULATED.3IONS-SCNC: 6 MMOL/L (ref 4–13)
AST SERPL W P-5'-P-CCNC: 8 U/L (ref 5–45)
BILIRUB SERPL-MCNC: 0.53 MG/DL (ref 0.2–1)
BUN SERPL-MCNC: 16 MG/DL (ref 5–25)
CALCIUM SERPL-MCNC: 8.8 MG/DL (ref 8.3–10.1)
CHLORIDE SERPL-SCNC: 110 MMOL/L (ref 100–108)
CHOLEST SERPL-MCNC: 111 MG/DL
CO2 SERPL-SCNC: 26 MMOL/L (ref 21–32)
CREAT SERPL-MCNC: 0.8 MG/DL (ref 0.6–1.3)
GFR SERPL CREATININE-BSD FRML MDRD: 71 ML/MIN/1.73SQ M
GLUCOSE P FAST SERPL-MCNC: 150 MG/DL (ref 65–99)
HDLC SERPL-MCNC: 60 MG/DL
LDLC SERPL CALC-MCNC: 32 MG/DL (ref 0–100)
NT-PROBNP SERPL-MCNC: 3961 PG/ML
POTASSIUM SERPL-SCNC: 3.9 MMOL/L (ref 3.5–5.3)
PROT SERPL-MCNC: 7.2 G/DL (ref 6.4–8.2)
SODIUM SERPL-SCNC: 142 MMOL/L (ref 136–145)
TRIGL SERPL-MCNC: 97 MG/DL

## 2022-07-08 PROCEDURE — 83880 ASSAY OF NATRIURETIC PEPTIDE: CPT

## 2022-07-08 PROCEDURE — 80053 COMPREHEN METABOLIC PANEL: CPT

## 2022-07-08 PROCEDURE — 99214 OFFICE O/P EST MOD 30 MIN: CPT | Performed by: NURSE PRACTITIONER

## 2022-07-08 PROCEDURE — 36415 COLL VENOUS BLD VENIPUNCTURE: CPT

## 2022-07-08 PROCEDURE — 80061 LIPID PANEL: CPT

## 2022-07-08 PROCEDURE — 82306 VITAMIN D 25 HYDROXY: CPT

## 2022-07-08 RX ORDER — SPIRONOLACTONE 50 MG/1
50 TABLET, FILM COATED ORAL 2 TIMES DAILY
Qty: 180 TABLET | Refills: 3 | Status: SHIPPED | OUTPATIENT
Start: 2022-07-08 | End: 2022-07-18

## 2022-07-08 NOTE — PROGRESS NOTES
Assessment/Plan:     Diagnoses and all orders for this visit:    Edema, unspecified type  -     spironolactone (ALDACTONE) 50 mg tablet; Take 1 tablet (50 mg total) by mouth 2 (two) times a day      Discussed with patient plan to treat with increase in spironolactone dosing to twice a day with recheck in one with with PCP  Patient instructed to call if no improvement in 72 hours or symptoms worsen    Subjective:      Patient ID: Marcia Young is a 69 y o  female  69 y  o female presenting for a one week follow-up for lower extremity edema  Patient was seen on 06/29/2022 for increasing leg edema with no changes related to elevation  She was started on spirolactone with some improvement noted but remains very edematous  She denies chest pain, shortness of breath, palpitations or dyspnea with or with exertion  She was to obtain labs including a BNP prior to the visit but the labs were only drawn one hour prior to arrival in office  Will check lab results and change medications accordingly  Patient reminded to keep legs elevated and consume a low sodium diet  Patient reports that she uses a lot of microwave foods, so high sodium content could be the issue        Family History   Problem Relation Age of Onset    Hypertension Mother     Hypertension Sister     Alcohol abuse Brother     Cirrhosis Brother     Diabetes Father     Other Brother         Heart transplant in his 46s    Lung cancer Sister     Diabetes Brother     Stroke Sister     No Known Problems Daughter     No Known Problems Maternal Grandmother     No Known Problems Maternal Grandfather     No Known Problems Paternal Grandmother     No Known Problems Paternal Grandfather     No Known Problems Sister      Social History     Socioeconomic History    Marital status: Single     Spouse name: Not on file    Number of children: 1    Years of education: Not on file    Highest education level: Not on file   Occupational History    Not on file Tobacco Use    Smoking status: Current Every Day Smoker     Packs/day: 0 25     Start date: 5    Smokeless tobacco: Never Used    Tobacco comment: Started smoking at age 25; currently smoking <1ppd     Vaping Use    Vaping Use: Never used   Substance and Sexual Activity    Alcohol use: Not Currently    Drug use: Never    Sexual activity: Not Currently     Partners: Male     Birth control/protection: Post-menopausal   Other Topics Concern    Not on file   Social History Narrative    Not on file     Social Determinants of Health     Financial Resource Strain: Not on file   Food Insecurity: Not on file   Transportation Needs: Not on file   Physical Activity: Not on file   Stress: Not on file   Social Connections: Not on file   Intimate Partner Violence: Not on file   Housing Stability: Not on file     E-Cigarette/Vaping    E-Cigarette Use Never User      E-Cigarette/Vaping Substances     Past Medical History:   Diagnosis Date    Atypical chest pain     GERD (gastroesophageal reflux disease)     Hyperlipidemia     Hypertension     Kidney stone     Myocardial infarction ([facility] [address] )     2015     NSTEMI (non-ST elevated myocardial infarction) ([facility] [address] )     Last Assessed: 9/24/2015     Past Surgical History:   Procedure Laterality Date    A-V CARDIAC PACEMAKER INSERTION      ANKLE SURGERY      BACK SURGERY  01/2011    L4 and L5 laminectomy and fusion     BLADDER SURGERY      CORONARY ARTERY BYPASS GRAFT  09/02/2015    CABGx3 with LIMA to LAD, SVG to PDA, SVG to OM-1    EXAMINATION UNDER ANESTHESIA N/A 7/11/2020    Procedure: EXAM UNDER ANESTHESIA (EUA)  EXCISION OF POSTERIOR VAGINAL FOREIGN BODY;  Surgeon: Sammi Badillo MD;  [facility];  Service: Gynecology    FL RETROGRADE PYELOGRAM  7/11/2020    FL RETROGRADE PYELOGRAM  8/7/2020    HYSTERECTOMY      MN CYSTO/URETERO W/LITHOTRIPSY &INDWELL STENT INSRT Right 8/7/2020    Procedure: CYSTOSCOPY URETEROSCOPY WITH LITHOTRIPSY HOLMIUM LASER, RETROGRADE PYELOGRAM AND INSERTION STENT URETERAL;  Surgeon: Mehran Duenas MD;  [facility];  Service: Urology    ID CYSTOURETHROSCOPY,URETER CATHETER Right 7/11/2020    Procedure: CYSTOSCOPY RETROGRADE PYELOGRAM WITH INSERTION STENT URETERAL, bladder biopsy with fulguration;  Surgeon: Mehran Duenas MD;  [facility];  Service: Urology    WRIST SURGERY       Allergies   Allergen Reactions    Nickel Rash       Current Outpatient Medications:     acetaminophen (TYLENOL) 325 mg tablet, Take 2 tablets (650 mg total) by mouth every 4 (four) hours as needed for mild pain, Disp: 30 tablet, Rfl: 0    ALPRAZolam (XANAX) 0 25 mg tablet, Take 1 tablet (0 25 mg total) by mouth 3 (three) times a day as needed for anxiety, Disp: 90 tablet, Rfl: 3    amiodarone 200 mg tablet, 200 mg PO daily  , Disp: 90 tablet, Rfl: 3    apixaban (Eliquis) 5 mg, Take 1 tablet (5 mg total) by mouth 2 (two) times a day, Disp: 180 tablet, Rfl: 3    atorvastatin (LIPITOR) 40 mg tablet, Take 1 tablet (40 mg total) by mouth daily, Disp: 90 tablet, Rfl: 3    COMBIGAN 0 2-0 5 %, , Disp: , Rfl:     ferrous sulfate 325 (65 Fe) mg tablet, Take 325 mg by mouth daily with breakfast, Disp: , Rfl:     losartan (COZAAR) 25 mg tablet, Take 1 tablet (25 mg total) by mouth daily, Disp: 90 tablet, Rfl: 3    metFORMIN (GLUCOPHAGE) 500 mg tablet, Take 1 tablet (500 mg total) by mouth 2 (two) times a day with meals, Disp: 180 tablet, Rfl: 3    metoprolol succinate (TOPROL-XL) 50 mg 24 hr tablet, Take 1 tablet (50 mg total) by mouth 2 (two) times a day, Disp: 180 tablet, Rfl: 3    omeprazole (PriLOSEC) 20 mg delayed release capsule, Take 1 capsule (20 mg total) by mouth daily, Disp: 90 capsule, Rfl: 3    spironolactone (ALDACTONE) 50 mg tablet, Take 1 tablet (50 mg total) by mouth 2 (two) times a day, Disp: 180 tablet, Rfl: 3    traMADol (ULTRAM) 50 mg tablet, Take 2 tablets (100 mg total) by mouth 2 (two) times a day (Patient not taking: No sig reported), Disp: 120 tablet, Rfl: 3    Review of Systems   Constitutional: Negative  Respiratory: Negative for cough, chest tightness and shortness of breath  Cardiovascular: Positive for leg swelling  Negative for chest pain and palpitations  Musculoskeletal: Negative  Neurological: Negative  Psychiatric/Behavioral: Negative  Objective:    /94   Pulse 78   Temp 97 6 °F (36 4 °C)   Ht 4' 11" (1 499 m)   Wt 62 5 kg (137 lb 12 8 oz)   SpO2 99%   BMI 27 83 kg/m² (Reviewed)     Physical Exam  Vitals reviewed  Constitutional:       General: She is not in acute distress  Appearance: She is well-developed and well-groomed  She is not ill-appearing  HENT:      Head: Normocephalic and atraumatic  Eyes:      General: Lids are normal       Extraocular Movements: Extraocular movements intact  Conjunctiva/sclera: Conjunctivae normal       Pupils: Pupils are equal, round, and reactive to light  Neck:      Trachea: Trachea and phonation normal    Cardiovascular:      Rate and Rhythm: Normal rate and regular rhythm  Pulses: Normal pulses  Dorsalis pedis pulses are 2+ on the right side and 2+ on the left side  Posterior tibial pulses are 2+ on the right side and 2+ on the left side  Heart sounds: Normal heart sounds  Pulmonary:      Effort: Pulmonary effort is normal       Breath sounds: Normal breath sounds  Musculoskeletal:      Right lower le+ Pitting Edema present  Left lower le+ Pitting Edema present  Skin:     General: Skin is warm and dry  Capillary Refill: Capillary refill takes less than 2 seconds  Neurological:      General: No focal deficit present  Mental Status: She is alert and oriented to person, place, and time  Psychiatric:         Mood and Affect: Mood normal          Behavior: Behavior normal  Behavior is cooperative  Thought Content:  Thought content normal

## 2022-07-11 ENCOUNTER — TELEPHONE (OUTPATIENT)
Dept: FAMILY MEDICINE CLINIC | Facility: CLINIC | Age: 77
End: 2022-07-11

## 2022-07-11 NOTE — TELEPHONE ENCOUNTER
Pt seen by NP last week and told to follow-up with you this week  Any suggestions where to place her

## 2022-07-13 ENCOUNTER — OFFICE VISIT (OUTPATIENT)
Dept: FAMILY MEDICINE CLINIC | Facility: CLINIC | Age: 77
End: 2022-07-13
Payer: COMMERCIAL

## 2022-07-13 VITALS
TEMPERATURE: 97.8 F | HEIGHT: 59 IN | SYSTOLIC BLOOD PRESSURE: 150 MMHG | BODY MASS INDEX: 27.86 KG/M2 | WEIGHT: 138.2 LBS | OXYGEN SATURATION: 97 % | DIASTOLIC BLOOD PRESSURE: 92 MMHG | HEART RATE: 81 BPM

## 2022-07-13 DIAGNOSIS — R60.9 SWELLING: Primary | ICD-10-CM

## 2022-07-13 PROCEDURE — 99213 OFFICE O/P EST LOW 20 MIN: CPT | Performed by: FAMILY MEDICINE

## 2022-07-14 NOTE — PROGRESS NOTES
Patient ID: Laly Florence is a 69 y o  female  HPI: 69 y  o female presents for evaluation of her swollen legs  She just started on the 100mg dose of aldactone today  Her legs are still swollen as are her feet, but she has no redness or warmth to them  SUBJECTIVE    Family History   Problem Relation Age of Onset    Hypertension Mother     Hypertension Sister     Alcohol abuse Brother     Cirrhosis Brother     Diabetes Father     Other Brother         Heart transplant in his 46s    Lung cancer Sister     Diabetes Brother     Stroke Sister     No Known Problems Daughter     No Known Problems Maternal Grandmother     No Known Problems Maternal Grandfather     No Known Problems Paternal Grandmother     No Known Problems Paternal Grandfather     No Known Problems Sister      Social History     Socioeconomic History    Marital status: Single     Spouse name: Not on file    Number of children: 1    Years of education: Not on file    Highest education level: Not on file   Occupational History    Not on file   Tobacco Use    Smoking status: Current Every Day Smoker     Packs/day: 0 25     Start date: 5    Smokeless tobacco: Never Used    Tobacco comment: Started smoking at age 25; currently smoking <1ppd     Vaping Use    Vaping Use: Never used   Substance and Sexual Activity    Alcohol use: Not Currently    Drug use: Never    Sexual activity: Not Currently     Partners: Male     Birth control/protection: Post-menopausal   Other Topics Concern    Not on file   Social History Narrative    Not on file     Social Determinants of Health     Financial Resource Strain: Not on file   Food Insecurity: Not on file   Transportation Needs: Not on file   Physical Activity: Not on file   Stress: Not on file   Social Connections: Not on file   Intimate Partner Violence: Not on file   Housing Stability: Not on file     Past Medical History:   Diagnosis Date    Atypical chest pain     GERD (gastroesophageal reflux disease)     Hyperlipidemia     Hypertension     Kidney stone     Myocardial infarction ([facility] [address] )     2015     NSTEMI (non-ST elevated myocardial infarction) ([facility] [address] )     Last Assessed: 9/24/2015     Past Surgical History:   Procedure Laterality Date    A-V CARDIAC PACEMAKER INSERTION      ANKLE SURGERY      BACK SURGERY  01/2011    L4 and L5 laminectomy and fusion     BLADDER SURGERY      CORONARY ARTERY BYPASS GRAFT  09/02/2015    CABGx3 with LIMA to LAD, SVG to PDA, SVG to OM-1    EXAMINATION UNDER ANESTHESIA N/A 7/11/2020    Procedure: EXAM UNDER ANESTHESIA (EUA)  EXCISION OF POSTERIOR VAGINAL FOREIGN BODY;  Surgeon: Aissatou Colvin MD;  [facility];  Service: Gynecology    FL RETROGRADE PYELOGRAM  7/11/2020    FL RETROGRADE PYELOGRAM  8/7/2020    HYSTERECTOMY      UT CYSTO/URETERO W/LITHOTRIPSY &INDWELL STENT INSRT Right 8/7/2020    Procedure: CYSTOSCOPY URETEROSCOPY WITH LITHOTRIPSY HOLMIUM LASER, RETROGRADE PYELOGRAM AND INSERTION STENT URETERAL;  Surgeon: Rolanda Potter MD;  [facility];  Service: Urology    UT CYSTOURETHROSCOPY,URETER CATHETER Right 7/11/2020    Procedure: CYSTOSCOPY RETROGRADE PYELOGRAM WITH INSERTION STENT URETERAL, bladder biopsy with fulguration;  Surgeon: Rolanda Potter MD;  [facility];  Service: Urology    WRIST SURGERY       Allergies   Allergen Reactions    Nickel Rash       Current Outpatient Medications:     acetaminophen (TYLENOL) 325 mg tablet, Take 2 tablets (650 mg total) by mouth every 4 (four) hours as needed for mild pain, Disp: 30 tablet, Rfl: 0    ALPRAZolam (XANAX) 0 25 mg tablet, Take 1 tablet (0 25 mg total) by mouth 3 (three) times a day as needed for anxiety, Disp: 90 tablet, Rfl: 3    amiodarone 200 mg tablet, 200 mg PO daily  , Disp: 90 tablet, Rfl: 3    apixaban (Eliquis) 5 mg, Take 1 tablet (5 mg total) by mouth 2 (two) times a day, Disp: 180 tablet, Rfl: 3    atorvastatin (LIPITOR) 40 mg tablet, Take 1 tablet (40 mg total) by mouth daily, Disp: 90 tablet, Rfl: 3    COMBIGAN 0 2-0 5 %, , Disp: , Rfl:     ferrous sulfate 325 (65 Fe) mg tablet, Take 325 mg by mouth daily with breakfast, Disp: , Rfl:     losartan (COZAAR) 25 mg tablet, Take 1 tablet (25 mg total) by mouth daily, Disp: 90 tablet, Rfl: 3    metFORMIN (GLUCOPHAGE) 500 mg tablet, Take 1 tablet (500 mg total) by mouth 2 (two) times a day with meals, Disp: 180 tablet, Rfl: 3    metoprolol succinate (TOPROL-XL) 50 mg 24 hr tablet, Take 1 tablet (50 mg total) by mouth 2 (two) times a day, Disp: 180 tablet, Rfl: 3    omeprazole (PriLOSEC) 20 mg delayed release capsule, Take 1 capsule (20 mg total) by mouth daily, Disp: 90 capsule, Rfl: 3    spironolactone (ALDACTONE) 50 mg tablet, Take 1 tablet (50 mg total) by mouth 2 (two) times a day, Disp: 180 tablet, Rfl: 3    traMADol (ULTRAM) 50 mg tablet, Take 2 tablets (100 mg total) by mouth 2 (two) times a day (Patient not taking: Reported on 7/13/2022), Disp: 120 tablet, Rfl: 3    Review of Systems  Constitutional:     Denies fever, chills ,fatigue ,weakness ,weight loss, weight gain     ENT: Denies earache ,loss of hearing ,nosebleed, nasal discharge,nasal congestion ,sore throat ,hoarseness  Pulmonary: Denies shortness of breath ,cough  ,+dyspnea on exertion, no orthopnea  ,no PND   Cardiovascular:  Denies bradycardia , tachycardia  ,palpations, +lower extremity edema leg, no claudication  Breast:  Denies new or changing breast lumps ,nipple discharge ,nipple changes  Abdomen:  Denies abdominal pain , anorexia , indigestion, nausea, vomiting, constipation, diarrhea  Musculoskeletal: Denies myalgias, arthralgias, joint swelling, joint stiffness , limb pain, limb swelling  Gu: denies dysuria, polyuria  Skin: Denies skin rash, skin lesion, skin wound, itching, dry skin  Neuro: Denies headache, numbness, tingling, confusion, loss of consciousness, dizziness, vertigo  Psychiatric: Denies feelings of depression, suicidal ideation, anxiety, sleep disturbances    OBJECTIVE  /92   Pulse 81   Temp 97 8 °F (36 6 °C)   Ht 4' 11" (1 499 m)   Wt 62 7 kg (138 lb 3 2 oz)   SpO2 97%   BMI 27 91 kg/m²   Constitutional:   NAD, well appearing and well nourished      ENT:   Conjunctiva and lids: no injection, edema, or discharge     Pupils and iris: BON bilaterally    External inspection of ears and nose: normal without deformities or discharge  Otoscopic exam: Canals patent without erythema  Nasal mucosa, septum and turbinates: Normal or edema or discharge         Oropharynx:  Moist mucosa, normal tongue and tonsils without lesions  No erythema        Pulmonary:Respiratory effort normal rate and rhythm, no increased work of breathing  Auscultation of lungs:  Clear bilaterally with no adventitious breath sounds       Cardiovascular: regular rate and rhythm, S1 and S2, no murmur, +2 pitting  edema  of LE      Abdomen: Soft and non-distended     Positive bowel sounds      No heptomegaly or splenomegaly      Gu: no suprapubic tenderness or CVA tenderness, no urethral discharge  Lymphatic:  No anterior or posterior cervical lymphadenopathy        Musculoskeletal:  Gait and station: Normal gait    Digits and nails normal without clubbing or cyanosis       Inspection/palpation of joints, bones, and muscles:  No joint tenderness, swelling, full active and passive range of motion       Skin: Normal skin turgor and no rashes      Neuro:     Normal reflexes     Psych:   alert and oriented to person, place and time     normal mood and affect       Assessment/Plan:Diagnoses and all orders for this visit:    Swelling  Comments:  Pt to stay on 100 mg aldactone and return after on this for one week  She has a vascular appt in am           Reviewed with patient plan to treat with above plan      Patient instructed to call in 72 hours if not feeling better or if symptoms worsen

## 2022-07-18 ENCOUNTER — OFFICE VISIT (OUTPATIENT)
Dept: FAMILY MEDICINE CLINIC | Facility: CLINIC | Age: 77
End: 2022-07-18
Payer: COMMERCIAL

## 2022-07-18 VITALS
BODY MASS INDEX: 27.42 KG/M2 | SYSTOLIC BLOOD PRESSURE: 124 MMHG | DIASTOLIC BLOOD PRESSURE: 82 MMHG | HEIGHT: 59 IN | TEMPERATURE: 98 F | WEIGHT: 136 LBS

## 2022-07-18 DIAGNOSIS — R60.9 EDEMA, UNSPECIFIED TYPE: Primary | ICD-10-CM

## 2022-07-18 PROCEDURE — 99213 OFFICE O/P EST LOW 20 MIN: CPT | Performed by: FAMILY MEDICINE

## 2022-07-18 PROCEDURE — 1160F RVW MEDS BY RX/DR IN RCRD: CPT | Performed by: FAMILY MEDICINE

## 2022-07-18 PROCEDURE — 3079F DIAST BP 80-89 MM HG: CPT | Performed by: FAMILY MEDICINE

## 2022-07-18 PROCEDURE — 3074F SYST BP LT 130 MM HG: CPT | Performed by: FAMILY MEDICINE

## 2022-07-18 RX ORDER — FUROSEMIDE 20 MG/1
20 TABLET ORAL DAILY
Qty: 30 TABLET | Refills: 5 | Status: SHIPPED | OUTPATIENT
Start: 2022-07-18 | End: 2022-10-10

## 2022-07-18 RX ORDER — POTASSIUM CHLORIDE 750 MG/1
20 CAPSULE, EXTENDED RELEASE ORAL DAILY
Qty: 60 CAPSULE | Refills: 3 | Status: SHIPPED | OUTPATIENT
Start: 2022-07-18 | End: 2022-10-10

## 2022-07-18 NOTE — PROGRESS NOTES
Patient ID: Jade Rodriguez is a 69 y o  female  HPI: 69 y  o female presents for follow up of edema  She has lost 3 pounds of water weight  She denies anyy dyspnea, or orthopnea  She still has a good amount of edema about her ankles an dtop of her feet  SUBJECTIVE    Family History   Problem Relation Age of Onset    Hypertension Mother     Hypertension Sister     Alcohol abuse Brother     Cirrhosis Brother     Diabetes Father     Other Brother         Heart transplant in his 46s    Lung cancer Sister     Diabetes Brother     Stroke Sister     No Known Problems Daughter     No Known Problems Maternal Grandmother     No Known Problems Maternal Grandfather     No Known Problems Paternal Grandmother     No Known Problems Paternal Grandfather     No Known Problems Sister      Social History     Socioeconomic History    Marital status: Single     Spouse name: Not on file    Number of children: 1    Years of education: Not on file    Highest education level: Not on file   Occupational History    Not on file   Tobacco Use    Smoking status: Current Every Day Smoker     Packs/day: 0 25     Start date: 5    Smokeless tobacco: Never Used    Tobacco comment: Started smoking at age 25; currently smoking <1ppd     Vaping Use    Vaping Use: Never used   Substance and Sexual Activity    Alcohol use: Not Currently    Drug use: Never    Sexual activity: Not Currently     Partners: Male     Birth control/protection: Post-menopausal   Other Topics Concern    Not on file   Social History Narrative    Not on file     Social Determinants of Health     Financial Resource Strain: Not on file   Food Insecurity: Not on file   Transportation Needs: Not on file   Physical Activity: Not on file   Stress: Not on file   Social Connections: Not on file   Intimate Partner Violence: Not on file   Housing Stability: Not on file     Past Medical History:   Diagnosis Date    Atypical chest pain     GERD (gastroesophageal reflux disease)     Hyperlipidemia     Hypertension     Kidney stone     Myocardial infarction ([facility] [address] )     2015     NSTEMI (non-ST elevated myocardial infarction) ([facility] [address] )     Last Assessed: 9/24/2015     Past Surgical History:   Procedure Laterality Date    A-V CARDIAC PACEMAKER INSERTION      ANKLE SURGERY      BACK SURGERY  01/2011    L4 and L5 laminectomy and fusion     BLADDER SURGERY      CORONARY ARTERY BYPASS GRAFT  09/02/2015    CABGx3 with LIMA to LAD, SVG to PDA, SVG to OM-1    EXAMINATION UNDER ANESTHESIA N/A 7/11/2020    Procedure: EXAM UNDER ANESTHESIA (EUA)  EXCISION OF POSTERIOR VAGINAL FOREIGN BODY;  Surgeon: Concepción Baez MD;  [facility];  Service: Gynecology    FL RETROGRADE PYELOGRAM  7/11/2020    FL RETROGRADE PYELOGRAM  8/7/2020    HYSTERECTOMY      UT CYSTO/URETERO W/LITHOTRIPSY &INDWELL STENT INSRT Right 8/7/2020    Procedure: CYSTOSCOPY URETEROSCOPY WITH LITHOTRIPSY HOLMIUM LASER, RETROGRADE PYELOGRAM AND INSERTION STENT URETERAL;  Surgeon: Uli Duncan MD;  [facility];  Service: Urology    UT CYSTOURETHROSCOPY,URETER CATHETER Right 7/11/2020    Procedure: CYSTOSCOPY RETROGRADE PYELOGRAM WITH INSERTION STENT URETERAL, bladder biopsy with fulguration;  Surgeon: Uli Duncan MD;  [facility];  Service: Urology    WRIST SURGERY       Allergies   Allergen Reactions    Nickel Rash       Current Outpatient Medications:     furosemide (LASIX) 20 mg tablet, Take 1 tablet (20 mg total) by mouth daily, Disp: 30 tablet, Rfl: 5    potassium chloride (MICRO-K) 10 MEQ CR capsule, Take 2 capsules (20 mEq total) by mouth daily, Disp: 60 capsule, Rfl: 3    acetaminophen (TYLENOL) 325 mg tablet, Take 2 tablets (650 mg total) by mouth every 4 (four) hours as needed for mild pain, Disp: 30 tablet, Rfl: 0    ALPRAZolam (XANAX) 0 25 mg tablet, Take 1 tablet (0 25 mg total) by mouth 3 (three) times a day as needed for anxiety, Disp: 90 tablet, Rfl: 3    amiodarone 200 mg tablet, 200 mg PO daily  , Disp: 90 tablet, Rfl: 3    apixaban (Eliquis) 5 mg, Take 1 tablet (5 mg total) by mouth 2 (two) times a day, Disp: 180 tablet, Rfl: 3    atorvastatin (LIPITOR) 40 mg tablet, Take 1 tablet (40 mg total) by mouth daily, Disp: 90 tablet, Rfl: 3    COMBIGAN 0 2-0 5 %, , Disp: , Rfl:     ferrous sulfate 325 (65 Fe) mg tablet, Take 325 mg by mouth daily with breakfast, Disp: , Rfl:     losartan (COZAAR) 25 mg tablet, Take 1 tablet (25 mg total) by mouth daily, Disp: 90 tablet, Rfl: 3    metFORMIN (GLUCOPHAGE) 500 mg tablet, Take 1 tablet (500 mg total) by mouth 2 (two) times a day with meals, Disp: 180 tablet, Rfl: 3    metoprolol succinate (TOPROL-XL) 50 mg 24 hr tablet, Take 1 tablet (50 mg total) by mouth 2 (two) times a day, Disp: 180 tablet, Rfl: 3    omeprazole (PriLOSEC) 20 mg delayed release capsule, Take 1 capsule (20 mg total) by mouth daily, Disp: 90 capsule, Rfl: 3    traMADol (ULTRAM) 50 mg tablet, Take 2 tablets (100 mg total) by mouth 2 (two) times a day (Patient not taking: Reported on 7/13/2022), Disp: 120 tablet, Rfl: 3    Review of Systems  Constitutional:     Denies fever, chills ,fatigue ,weakness ,weight loss, weight gain     ENT: Denies earache ,loss of hearing ,nosebleed, nasal discharge,nasal congestion ,sore throat ,hoarseness  Pulmonary: Denies shortness of breath ,cough  ,dyspnea on exertion, orthopnea  ,PND   Cardiovascular:  Denies bradycardia , tachycardia  ,palpations,+ lower extremity edema leg,no claudication  Breast:  Denies new or changing breast lumps ,nipple discharge ,nipple changes  Abdomen:  Denies abdominal pain , anorexia , indigestion, nausea, vomiting, constipation, diarrhea  Musculoskeletal: Denies myalgias, arthralgias, joint swelling, joint stiffness , limb pain, limb swelling  Gu: denies dysuria, polyuria  Skin: Denies skin rash, skin lesion, skin wound, itching, dry skin  Neuro: Denies headache, numbness, tingling, confusion, loss of consciousness, dizziness, vertigo  Psychiatric: Denies feelings of depression, suicidal ideation, anxiety, sleep disturbances    OBJECTIVE  /82   Temp 98 °F (36 7 °C)   Ht 4' 11" (1 499 m)   Wt 61 7 kg (136 lb)   BMI 27 47 kg/m²   Constitutional:   NAD, well appearing and well nourished      ENT:   Conjunctiva and lids: no injection, edema, or discharge     Pupils and iris: BON bilaterally    External inspection of ears and nose: normal without deformities or discharge  Otoscopic exam: Canals patent without erythema  Nasal mucosa, septum and turbinates: Normal or edema or discharge         Oropharynx:  Moist mucosa, normal tongue and tonsils without lesions  No erythema        Pulmonary:Respiratory effort normal rate and rhythm, no increased work of breathing  Auscultation of lungs:  Clear bilaterally with no adventitious breath sounds       Cardiovascular: regular rate and rhythm, S1 and S2, no murmur,+2 pitting edema edema and/or varicosities of LE      Abdomen: Soft and non-distended     Positive bowel sounds      No heptomegaly or splenomegaly      Gu: no suprapubic tenderness or CVA tenderness, no urethral discharge  Lymphatic:  No anterior or posterior cervical lymphadenopathy         Musculoskeletal:  Gait and station: Normal gait      Digits and nails normal without clubbing or cyanosis       Inspection/palpation of joints, bones, and muscles:  No joint tenderness, swelling, full active and passive range of motion       Skin: Normal skin turgor and no rashes      Neuro:    Normal reflexes     Psych:   alert and oriented to person, place and time     normal mood and affect       Assessment/Plan:Diagnoses and all orders for this visit:    Edema, unspecified type  -     furosemide (LASIX) 20 mg tablet; Take 1 tablet (20 mg total) by mouth daily  -     potassium chloride (MICRO-K) 10 MEQ CR capsule;  Take 2 capsules (20 mEq total) by mouth daily        Reviewed with patient plan to treat with above plan      Patient instructed to call in 72 hours if not feeling better or if symptoms worsen

## 2022-07-26 NOTE — TELEPHONE ENCOUNTER
Pt called to schedule her follow-up appt for edema for next week  Pt states the only day she can do is Tuesday because of transportation  Where can we place her?

## 2022-07-27 ENCOUNTER — RA CDI HCC (OUTPATIENT)
Dept: OTHER | Facility: HOSPITAL | Age: 77
End: 2022-07-27

## 2022-07-27 NOTE — PROGRESS NOTES
Absence of other right toe, other DM w/ PVD previous suggestion  [facility]  coding opportunities       Chart reviewed, no opportunity found:   [address]        Patients Insurance     Medicare Insurance: [employer]

## 2022-08-02 ENCOUNTER — OFFICE VISIT (OUTPATIENT)
Dept: FAMILY MEDICINE CLINIC | Facility: CLINIC | Age: 77
End: 2022-08-02
Payer: COMMERCIAL

## 2022-08-02 ENCOUNTER — PATIENT OUTREACH (OUTPATIENT)
Dept: FAMILY MEDICINE CLINIC | Facility: CLINIC | Age: 77
End: 2022-08-02

## 2022-08-02 VITALS
HEIGHT: 59 IN | HEART RATE: 78 BPM | DIASTOLIC BLOOD PRESSURE: 80 MMHG | SYSTOLIC BLOOD PRESSURE: 130 MMHG | WEIGHT: 132 LBS | BODY MASS INDEX: 26.61 KG/M2 | TEMPERATURE: 98.2 F

## 2022-08-02 DIAGNOSIS — R60.9 EDEMA, UNSPECIFIED TYPE: Primary | ICD-10-CM

## 2022-08-02 DIAGNOSIS — Z78.9 NEEDS ASSISTANCE WITH COMMUNITY RESOURCES: Primary | ICD-10-CM

## 2022-08-02 PROCEDURE — 99213 OFFICE O/P EST LOW 20 MIN: CPT | Performed by: FAMILY MEDICINE

## 2022-08-02 PROCEDURE — 3079F DIAST BP 80-89 MM HG: CPT | Performed by: FAMILY MEDICINE

## 2022-08-02 PROCEDURE — 1160F RVW MEDS BY RX/DR IN RCRD: CPT | Performed by: FAMILY MEDICINE

## 2022-08-02 PROCEDURE — 3075F SYST BP GE 130 - 139MM HG: CPT | Performed by: FAMILY MEDICINE

## 2022-08-02 NOTE — PROGRESS NOTES
Met patient and her daughter at PCP appointment Patient needs talking scale due to poor vision  Daughter can order scale online  I will reach out to [facility] and [facility] [employer] [facility] for referrals  Mami Hoang will reach out about [employer]

## 2022-08-03 NOTE — PROGRESS NOTES
Patient ID: Briseyda Casarez is a 69 y o  female  HPI: 69 y  o female presents for recheck of peripheral edema  Seh is down by 6 pounds and swelling has resolved  I discussed in detail with pt and her daughter a low sodium diet  SUBJECTIVE    Family History   Problem Relation Age of Onset    Hypertension Mother     Hypertension Sister     Alcohol abuse Brother     Cirrhosis Brother     Diabetes Father     Other Brother         Heart transplant in his 46s    Lung cancer Sister     Diabetes Brother     Stroke Sister     No Known Problems Daughter     No Known Problems Maternal Grandmother     No Known Problems Maternal Grandfather     No Known Problems Paternal Grandmother     No Known Problems Paternal Grandfather     No Known Problems Sister      Social History     Socioeconomic History    Marital status: Single     Spouse name: Not on file    Number of children: 1    Years of education: Not on file    Highest education level: Not on file   Occupational History    Not on file   Tobacco Use    Smoking status: Current Every Day Smoker     Packs/day: 0 25     Start date: 5    Smokeless tobacco: Never Used    Tobacco comment: Started smoking at age 25; currently smoking <1ppd  Vaping Use    Vaping Use: Never used   Substance and Sexual Activity    Alcohol use: Not Currently    Drug use: Never    Sexual activity: Not Currently     Partners: Male     Birth control/protection: Post-menopausal   Other Topics Concern    Not on file   Social History Narrative    Not on file     Social Determinants of Health     Financial Resource Strain: Medium Risk    Difficulty of Paying Living Expenses: Somewhat hard   Food Insecurity: No Food Insecurity    Worried About Running Out of Food in the Last Year: Never true    Mary of Food in the Last Year: Never true   Transportation Needs: No Transportation Needs    Lack of Transportation (Medical): No    Lack of Transportation (Non-Medical):  No Physical Activity: Not on file   Stress: Not on file   Social Connections: Not on file   Intimate Partner Violence: Not on file   Housing Stability: Not on file     Past Medical History:   Diagnosis Date    Atypical chest pain     GERD (gastroesophageal reflux disease)     Hyperlipidemia     Hypertension     Kidney stone     Myocardial infarction ([facility] [place] [address] )     2015     NSTEMI (non-ST elevated myocardial infarction) ([facility] [address] )     Last Assessed: 9/24/2015     Past Surgical History:   Procedure Laterality Date    A-V CARDIAC PACEMAKER INSERTION      ANKLE SURGERY      BACK SURGERY  01/2011    L4 and L5 laminectomy and fusion     BLADDER SURGERY      CORONARY ARTERY BYPASS GRAFT  09/02/2015    CABGx3 with LIMA to LAD, SVG to PDA, SVG to OM-1    EXAMINATION UNDER ANESTHESIA N/A 7/11/2020    Procedure: EXAM UNDER ANESTHESIA (EUA)  EXCISION OF POSTERIOR VAGINAL FOREIGN BODY;  Surgeon: Kate Esteves MD;  [facility];  Service: Gynecology    FL RETROGRADE PYELOGRAM  7/11/2020    FL RETROGRADE PYELOGRAM  8/7/2020    HYSTERECTOMY      MS CYSTO/URETERO W/LITHOTRIPSY &INDWELL STENT INSRT Right 8/7/2020    Procedure: CYSTOSCOPY URETEROSCOPY WITH LITHOTRIPSY HOLMIUM LASER, RETROGRADE PYELOGRAM AND INSERTION STENT URETERAL;  Surgeon: Eduardo Mac MD;  [facility];  Service: Urology    MS CYSTOURETHROSCOPY,URETER CATHETER Right 7/11/2020    Procedure: CYSTOSCOPY RETROGRADE PYELOGRAM WITH INSERTION STENT URETERAL, bladder biopsy with fulguration;  Surgeon: Eduardo Mac MD;  [facility];  Service: Urology    WRIST SURGERY       Allergies   Allergen Reactions    Nickel Rash       Current Outpatient Medications:     acetaminophen (TYLENOL) 325 mg tablet, Take 2 tablets (650 mg total) by mouth every 4 (four) hours as needed for mild pain, Disp: 30 tablet, Rfl: 0    ALPRAZolam (XANAX) 0 25 mg tablet, Take 1 tablet (0 25 mg total) by mouth 3 (three) times a day as needed for anxiety, Disp: 90 tablet, Rfl: 3    amiodarone 200 mg tablet, 200 mg PO daily  , Disp: 90 tablet, Rfl: 3    apixaban (Eliquis) 5 mg, Take 1 tablet (5 mg total) by mouth 2 (two) times a day, Disp: 180 tablet, Rfl: 3    atorvastatin (LIPITOR) 40 mg tablet, Take 1 tablet (40 mg total) by mouth daily, Disp: 90 tablet, Rfl: 3    COMBIGAN 0 2-0 5 %, , Disp: , Rfl:     ferrous sulfate 325 (65 Fe) mg tablet, Take 325 mg by mouth daily with breakfast, Disp: , Rfl:     furosemide (LASIX) 20 mg tablet, Take 1 tablet (20 mg total) by mouth daily, Disp: 30 tablet, Rfl: 5    losartan (COZAAR) 25 mg tablet, Take 1 tablet (25 mg total) by mouth daily, Disp: 90 tablet, Rfl: 3    metFORMIN (GLUCOPHAGE) 500 mg tablet, Take 1 tablet (500 mg total) by mouth 2 (two) times a day with meals, Disp: 180 tablet, Rfl: 3    metoprolol succinate (TOPROL-XL) 50 mg 24 hr tablet, Take 1 tablet (50 mg total) by mouth 2 (two) times a day, Disp: 180 tablet, Rfl: 3    omeprazole (PriLOSEC) 20 mg delayed release capsule, Take 1 capsule (20 mg total) by mouth daily, Disp: 90 capsule, Rfl: 3    potassium chloride (MICRO-K) 10 MEQ CR capsule, Take 2 capsules (20 mEq total) by mouth daily, Disp: 60 capsule, Rfl: 3    traMADol (ULTRAM) 50 mg tablet, Take 2 tablets (100 mg total) by mouth 2 (two) times a day, Disp: 120 tablet, Rfl: 3    Review of Systems  Constitutional:     Denies fever, chills ,fatigue ,weakness ,weight loss, weight gain     ENT: Denies earache ,loss of hearing ,nosebleed, nasal discharge,nasal congestion ,sore throat ,hoarseness  Pulmonary: Denies shortness of breath ,cough  ,dyspnea on exertion, orthopnea  ,PND   Cardiovascular:  Denies bradycardia , tachycardia  ,palpations, lower extremity edema leg, claudication  Breast:  Denies new or changing breast lumps ,nipple discharge ,nipple changes  Abdomen:  Denies abdominal pain , anorexia , indigestion, nausea, vomiting, constipation, diarrhea  Musculoskeletal: Denies myalgias, arthralgias, joint swelling, joint stiffness , limb pain, limb swelling  Gu: denies dysuria, polyuria  Skin: Denies skin rash, skin lesion, skin wound, itching, dry skin  Neuro: Denies headache, numbness, tingling, confusion, loss of consciousness, dizziness, vertigo  Psychiatric: Denies feelings of depression, suicidal ideation, anxiety, sleep disturbances    OBJECTIVE  /80   Pulse 78   Temp 98 2 °F (36 8 °C)   Ht 4' 11" (1 499 m)   Wt 59 9 kg (132 lb)   BMI 26 66 kg/m²   Constitutional:   NAD, well appearing and well nourished      ENT:   Conjunctiva and lids: no injection, edema, or discharge     Pupils and iris: BON bilaterally    External inspection of ears and nose: normal without deformities or discharge  Otoscopic exam: Canals patent without erythema  Nasal mucosa, septum and turbinates: Normal or edema or discharge         Oropharynx:  Moist mucosa, normal tongue and tonsils without lesions  No erythema        Pulmonary:Respiratory effort normal rate and rhythm, no increased work of breathing   Auscultation of lungs:  Clear bilaterally with no adventitious breath sounds       Cardiovascular: regular rate and rhythm, S1 and S2, no murmur, no edema and/or varicosities of LE      Abdomen: Soft and non-distended     Positive bowel sounds      No heptomegaly or splenomegaly      Gu: no suprapubic tenderness or CVA tenderness, no urethral discharge  Lymphatic:  No anterior or posterior cervical lymphadenopathy         Musculoskeletal:  Gait and station: Normal gait    Digits and nails normal without clubbing or cyanosis       Inspection/palpation of joints, bones, and muscles:  No joint tenderness, swelling, full active and passive range of motion       Skin: Normal skin turgor and no rashes      Neuro:      Normal reflexes     Psych:   alert and oriented to person, place and time     normal mood and affect       Assessment/Plan:Diagnoses and all orders for this visit:    Edema, unspecified type  Comments:  Improved; pt back to her baseline  Will decrease her lasix back to one daily and call if her weight is up by more than one pound; she is on low sodium diet  Reviewed with patient plan to treat with above plan      Patient instructed to call in 72 hours if not feeling better or if symptoms worsen

## 2022-08-08 ENCOUNTER — PATIENT OUTREACH (OUTPATIENT)
Dept: CASE MANAGEMENT | Facility: OTHER | Age: 77
End: 2022-08-08

## 2022-08-08 NOTE — PROGRESS NOTES
[facility] received referral from GAEL Nixon to assist patient with Meals on Wheels  1st Outreach attempt:     [facility] will call patient tomorrow to discuss MOW  [facility] left message at patient home tel # today

## 2022-08-09 ENCOUNTER — PATIENT OUTREACH (OUTPATIENT)
Dept: FAMILY MEDICINE CLINIC | Facility: CLINIC | Age: 77
End: 2022-08-09

## 2022-08-09 NOTE — PROGRESS NOTES
Spoke with Neftali Toscano she has not heard from [facility] yet  Explained I did call last week and they said they would call her  Also informed her that [facility] will be reaching out about meals on wheels  I will check back in two weeks to see if [facility] called

## 2022-08-19 ENCOUNTER — REMOTE DEVICE CLINIC VISIT (OUTPATIENT)
Dept: CARDIOLOGY CLINIC | Facility: CLINIC | Age: 77
End: 2022-08-19
Payer: COMMERCIAL

## 2022-08-19 DIAGNOSIS — Z95.0 CARDIAC PACEMAKER IN SITU: Primary | ICD-10-CM

## 2022-08-19 PROCEDURE — 93296 REM INTERROG EVL PM/IDS: CPT | Performed by: INTERNAL MEDICINE

## 2022-08-19 PROCEDURE — 93294 REM INTERROG EVL PM/LDLS PM: CPT | Performed by: INTERNAL MEDICINE

## 2022-08-19 NOTE — PROGRESS NOTES
MDT DC PPM - ACTIVE SYSTEM IS MRI CONDITIONAL   CARELINK TRANSMISSION: BATTERY VOLTAGE ADEQUATE (11 2 YRS)  AP 0%  83 8% (AAIR-DDDR 60)  ALL AVAILABLE LEAD PARAMETERS WITHIN NORMAL LIMITS  100% AF SINCE 11/2021  PT ON ELIQUIS, AMIODORONE, METOPROLOL SUCC  NORMAL DEVICE FUNCTION   ES

## 2022-08-22 ENCOUNTER — FOLLOW UP (OUTPATIENT)
Dept: URBAN - METROPOLITAN AREA CLINIC 6 | Facility: CLINIC | Age: 77
End: 2022-08-22

## 2022-08-22 DIAGNOSIS — H25.812: ICD-10-CM

## 2022-08-22 DIAGNOSIS — Z96.1: ICD-10-CM

## 2022-08-22 DIAGNOSIS — E11.9: ICD-10-CM

## 2022-08-22 DIAGNOSIS — H40.1123: ICD-10-CM

## 2022-08-22 DIAGNOSIS — H40.1112: ICD-10-CM

## 2022-08-22 PROCEDURE — 92202 OPSCPY EXTND ON/MAC DRAW: CPT

## 2022-08-22 PROCEDURE — 92133 CPTRZD OPH DX IMG PST SGM ON: CPT

## 2022-08-22 PROCEDURE — 92014 COMPRE OPH EXAM EST PT 1/>: CPT

## 2022-08-22 ASSESSMENT — TONOMETRY
OD_IOP_MMHG: 14
OS_IOP_MMHG: 12

## 2022-08-22 ASSESSMENT — VISUAL ACUITY
OD_CC: 20/60-1
OS_CC: CF 1FT

## 2022-08-23 ENCOUNTER — PATIENT OUTREACH (OUTPATIENT)
Dept: CASE MANAGEMENT | Facility: OTHER | Age: 77
End: 2022-08-23

## 2022-08-23 ENCOUNTER — PATIENT OUTREACH (OUTPATIENT)
Dept: FAMILY MEDICINE CLINIC | Facility: CLINIC | Age: 77
End: 2022-08-23

## 2022-08-23 NOTE — PROGRESS NOTES
2nd Outreach :  [facility] left message for patient to call me back  [facility] left message for Abhishek Rodarte at [facility]  RN Gillian Calhoun spoke to patient and she wants to hold on Vallie Muskrat will f/u w/ pt in 3 wks and let me know if the case needs to be closed

## 2022-08-23 NOTE — PROGRESS NOTES
Spoke with Lupe Brown she just hear from [facility] today   She is in the process of finding a new place to live so she will tell them to hold off right now  I asked about meals on wheels she also said she wants to hold off for now  I will check back in three weeks to see if she is ready for any services

## 2022-08-26 DIAGNOSIS — F41.9 ANXIETY: ICD-10-CM

## 2022-08-26 RX ORDER — ALPRAZOLAM 0.25 MG/1
0.25 TABLET ORAL 3 TIMES DAILY PRN
Qty: 90 TABLET | Refills: 3 | Status: SHIPPED | OUTPATIENT
Start: 2022-08-26 | End: 2022-10-10

## 2022-08-26 NOTE — TELEPHONE ENCOUNTER
1  [id number] 07/17/2022 02/16/2022 ALPRAZolam (Tablet)  90 0 30 0 25 MG NA [facility]  [facility]  Medicare 0 / 0 [place]    1  [id number] 06/01/2022 02/16/2022 ALPRAZolam (Tablet)  90 0 30 0 25 MG NA [facility]  [facility]  Medicare 2 / 3 PA    1  [id number] 04/07/2022 02/16/2022 ALPRAZolam (Tablet)  90 0 30 0 25 MG NA [facility]  [facility]  Medicare 01 / 03 PA

## 2022-09-13 ENCOUNTER — PATIENT OUTREACH (OUTPATIENT)
Dept: FAMILY MEDICINE CLINIC | Facility: CLINIC | Age: 77
End: 2022-09-13

## 2022-09-20 ENCOUNTER — TELEPHONE (OUTPATIENT)
Dept: CARDIOLOGY CLINIC | Facility: CLINIC | Age: 77
End: 2022-09-20

## 2022-09-20 ENCOUNTER — PATIENT OUTREACH (OUTPATIENT)
Dept: FAMILY MEDICINE CLINIC | Facility: CLINIC | Age: 77
End: 2022-09-20

## 2022-09-20 NOTE — PROGRESS NOTES
Spoke with robb  She is waiting to hear from [facility] in [place]  Is hoping to move by December  At this time she wants to hold on meals on wheels and [facility] She will call myself or PCP office if she moves sooner and can start services  Will close from care management at this time

## 2022-09-20 NOTE — TELEPHONE ENCOUNTER
Patient was prescribed lasix 20mg daily by her pcp for leg and feet swelling 2 months ago  The swelling improved and she is now only taking lasix 20mg 3 x a week  This past week she had increased shortness of breath when climbing stairs  She lives in a 3rd floor apartment and is in the process of applying to live in an assisted living facility  She denies chest pain/palpitations or significant weight gain  She scheduled an appointment with you for next week

## 2022-09-27 PROBLEM — I25.119 ATHEROSCLEROSIS OF NATIVE CORONARY ARTERY WITH ANGINA PECTORIS (HCC): Status: RESOLVED | Noted: 2021-01-06 | Resolved: 2022-09-27

## 2022-09-28 ENCOUNTER — APPOINTMENT (OUTPATIENT)
Dept: RADIOLOGY | Facility: HOSPITAL | Age: 77
DRG: 291 | End: 2022-09-28
Payer: COMMERCIAL

## 2022-09-28 ENCOUNTER — HOSPITAL ENCOUNTER (INPATIENT)
Facility: HOSPITAL | Age: 77
LOS: 12 days | Discharge: NURSING, REHAB OR LONG-TERM CARE | DRG: 291 | End: 2022-10-10
Attending: EMERGENCY MEDICINE | Admitting: INTERNAL MEDICINE
Payer: COMMERCIAL

## 2022-09-28 DIAGNOSIS — R06.02 SHORTNESS OF BREATH: ICD-10-CM

## 2022-09-28 DIAGNOSIS — K59.00 CONSTIPATION: ICD-10-CM

## 2022-09-28 DIAGNOSIS — I50.21 ACUTE HFREF (HEART FAILURE WITH REDUCED EJECTION FRACTION) (HCC): ICD-10-CM

## 2022-09-28 DIAGNOSIS — I71.9 AORTIC ANEURYSM WITH DISSECTION (HCC): ICD-10-CM

## 2022-09-28 DIAGNOSIS — F05 DELIRIUM DUE TO ANOTHER MEDICAL CONDITION: ICD-10-CM

## 2022-09-28 DIAGNOSIS — N17.9 AKI (ACUTE KIDNEY INJURY) (HCC): ICD-10-CM

## 2022-09-28 DIAGNOSIS — I71.00 AORTIC ANEURYSM WITH DISSECTION (HCC): ICD-10-CM

## 2022-09-28 DIAGNOSIS — J81.1 PULMONARY EDEMA: Primary | ICD-10-CM

## 2022-09-28 DIAGNOSIS — I71.019 THORACIC AORTIC DISSECTION: ICD-10-CM

## 2022-09-28 DIAGNOSIS — I42.9 CARDIOMYOPATHY (HCC): ICD-10-CM

## 2022-09-28 DIAGNOSIS — I25.10 ATHEROSCLEROSIS OF CORONARY ARTERY OF NATIVE HEART WITHOUT ANGINA PECTORIS, UNSPECIFIED VESSEL OR LESION TYPE: ICD-10-CM

## 2022-09-28 PROBLEM — R79.89 ELEVATED LFTS: Status: ACTIVE | Noted: 2022-09-28

## 2022-09-28 PROBLEM — I16.0 HYPERTENSIVE URGENCY: Status: ACTIVE | Noted: 2022-09-28

## 2022-09-28 LAB
2HR DELTA HS TROPONIN: 4 NG/L
4HR DELTA HS TROPONIN: 6 NG/L
ALBUMIN SERPL BCP-MCNC: 4.1 G/DL (ref 3.5–5)
ALP SERPL-CCNC: 72 U/L (ref 34–104)
ALT SERPL W P-5'-P-CCNC: 184 U/L (ref 7–52)
ANION GAP SERPL CALCULATED.3IONS-SCNC: 14 MMOL/L (ref 4–13)
AST SERPL W P-5'-P-CCNC: 153 U/L (ref 13–39)
BASOPHILS # BLD AUTO: 0.18 THOUSANDS/ΜL (ref 0–0.1)
BASOPHILS NFR BLD AUTO: 1 % (ref 0–1)
BILIRUB SERPL-MCNC: 0.97 MG/DL (ref 0.2–1)
BNP SERPL-MCNC: 1242 PG/ML (ref 0–100)
BUN SERPL-MCNC: 26 MG/DL (ref 5–25)
CALCIUM SERPL-MCNC: 8.5 MG/DL (ref 8.4–10.2)
CARDIAC TROPONIN I PNL SERPL HS: 17 NG/L
CARDIAC TROPONIN I PNL SERPL HS: 21 NG/L
CARDIAC TROPONIN I PNL SERPL HS: 23 NG/L
CHLORIDE SERPL-SCNC: 107 MMOL/L (ref 96–108)
CO2 SERPL-SCNC: 19 MMOL/L (ref 21–32)
CREAT SERPL-MCNC: 0.89 MG/DL (ref 0.6–1.3)
EOSINOPHIL # BLD AUTO: 0.1 THOUSAND/ΜL (ref 0–0.61)
EOSINOPHIL NFR BLD AUTO: 1 % (ref 0–6)
ERYTHROCYTE [DISTWIDTH] IN BLOOD BY AUTOMATED COUNT: 18.4 % (ref 11.6–15.1)
GFR SERPL CREATININE-BSD FRML MDRD: 63 ML/MIN/1.73SQ M
GLUCOSE SERPL-MCNC: 173 MG/DL (ref 65–140)
GLUCOSE SERPL-MCNC: 189 MG/DL (ref 65–140)
HCT VFR BLD AUTO: 35.3 % (ref 34.8–46.1)
HGB BLD-MCNC: 10.3 G/DL (ref 11.5–15.4)
IMM GRANULOCYTES # BLD AUTO: 0.1 THOUSAND/UL (ref 0–0.2)
IMM GRANULOCYTES NFR BLD AUTO: 1 % (ref 0–2)
LYMPHOCYTES # BLD AUTO: 1.65 THOUSANDS/ΜL (ref 0.6–4.47)
LYMPHOCYTES NFR BLD AUTO: 13 % (ref 14–44)
MCH RBC QN AUTO: 25.6 PG (ref 26.8–34.3)
MCHC RBC AUTO-ENTMCNC: 29.2 G/DL (ref 31.4–37.4)
MCV RBC AUTO: 88 FL (ref 82–98)
MONOCYTES # BLD AUTO: 0.96 THOUSAND/ΜL (ref 0.17–1.22)
MONOCYTES NFR BLD AUTO: 7 % (ref 4–12)
NEUTROPHILS # BLD AUTO: 10 THOUSANDS/ΜL (ref 1.85–7.62)
NEUTS SEG NFR BLD AUTO: 77 % (ref 43–75)
NRBC BLD AUTO-RTO: 1 /100 WBCS
PLATELET # BLD AUTO: 332 THOUSANDS/UL (ref 149–390)
PMV BLD AUTO: 11.4 FL (ref 8.9–12.7)
POTASSIUM SERPL-SCNC: 5.3 MMOL/L (ref 3.5–5.3)
PROT SERPL-MCNC: 7.2 G/DL (ref 6.4–8.4)
RBC # BLD AUTO: 4.03 MILLION/UL (ref 3.81–5.12)
SODIUM SERPL-SCNC: 140 MMOL/L (ref 135–147)
WBC # BLD AUTO: 12.99 THOUSAND/UL (ref 4.31–10.16)

## 2022-09-28 PROCEDURE — 84484 ASSAY OF TROPONIN QUANT: CPT | Performed by: EMERGENCY MEDICINE

## 2022-09-28 PROCEDURE — 93005 ELECTROCARDIOGRAM TRACING: CPT

## 2022-09-28 PROCEDURE — 99223 1ST HOSP IP/OBS HIGH 75: CPT | Performed by: NURSE PRACTITIONER

## 2022-09-28 PROCEDURE — 36415 COLL VENOUS BLD VENIPUNCTURE: CPT

## 2022-09-28 PROCEDURE — 71045 X-RAY EXAM CHEST 1 VIEW: CPT

## 2022-09-28 PROCEDURE — 80053 COMPREHEN METABOLIC PANEL: CPT | Performed by: EMERGENCY MEDICINE

## 2022-09-28 PROCEDURE — 99285 EMERGENCY DEPT VISIT HI MDM: CPT

## 2022-09-28 PROCEDURE — 83880 ASSAY OF NATRIURETIC PEPTIDE: CPT | Performed by: EMERGENCY MEDICINE

## 2022-09-28 PROCEDURE — 85025 COMPLETE CBC W/AUTO DIFF WBC: CPT | Performed by: EMERGENCY MEDICINE

## 2022-09-28 PROCEDURE — 99285 EMERGENCY DEPT VISIT HI MDM: CPT | Performed by: EMERGENCY MEDICINE

## 2022-09-28 PROCEDURE — 82948 REAGENT STRIP/BLOOD GLUCOSE: CPT

## 2022-09-28 RX ORDER — TRAMADOL HYDROCHLORIDE 50 MG/1
100 TABLET ORAL 2 TIMES DAILY
Status: DISCONTINUED | OUTPATIENT
Start: 2022-09-29 | End: 2022-10-05

## 2022-09-28 RX ORDER — FUROSEMIDE 10 MG/ML
40 INJECTION INTRAMUSCULAR; INTRAVENOUS ONCE
Status: COMPLETED | OUTPATIENT
Start: 2022-09-28 | End: 2022-09-28

## 2022-09-28 RX ORDER — LOSARTAN POTASSIUM 25 MG/1
25 TABLET ORAL DAILY
Status: DISCONTINUED | OUTPATIENT
Start: 2022-09-29 | End: 2022-09-29

## 2022-09-28 RX ORDER — METOPROLOL SUCCINATE 50 MG/1
50 TABLET, EXTENDED RELEASE ORAL 2 TIMES DAILY
Status: DISCONTINUED | OUTPATIENT
Start: 2022-09-28 | End: 2022-10-05

## 2022-09-28 RX ORDER — INSULIN LISPRO 100 [IU]/ML
1-5 INJECTION, SOLUTION INTRAVENOUS; SUBCUTANEOUS
Status: DISCONTINUED | OUTPATIENT
Start: 2022-09-29 | End: 2022-10-05

## 2022-09-28 RX ORDER — ACETAMINOPHEN 325 MG/1
650 TABLET ORAL EVERY 6 HOURS PRN
Status: DISCONTINUED | OUTPATIENT
Start: 2022-09-28 | End: 2022-10-10 | Stop reason: HOSPADM

## 2022-09-28 RX ORDER — ALPRAZOLAM 0.25 MG/1
0.25 TABLET ORAL 3 TIMES DAILY PRN
Status: DISCONTINUED | OUTPATIENT
Start: 2022-09-28 | End: 2022-10-05

## 2022-09-28 RX ORDER — ATORVASTATIN CALCIUM 40 MG/1
40 TABLET, FILM COATED ORAL DAILY
Status: DISCONTINUED | OUTPATIENT
Start: 2022-09-29 | End: 2022-10-10 | Stop reason: HOSPADM

## 2022-09-28 RX ORDER — FERROUS SULFATE 325(65) MG
325 TABLET ORAL
Status: DISCONTINUED | OUTPATIENT
Start: 2022-09-29 | End: 2022-10-10 | Stop reason: HOSPADM

## 2022-09-28 RX ORDER — PANTOPRAZOLE SODIUM 40 MG/1
40 TABLET, DELAYED RELEASE ORAL
Status: DISCONTINUED | OUTPATIENT
Start: 2022-09-29 | End: 2022-10-10 | Stop reason: HOSPADM

## 2022-09-28 RX ORDER — FUROSEMIDE 10 MG/ML
40 INJECTION INTRAMUSCULAR; INTRAVENOUS 2 TIMES DAILY
Status: DISCONTINUED | OUTPATIENT
Start: 2022-09-29 | End: 2022-09-29

## 2022-09-28 RX ORDER — INSULIN LISPRO 100 [IU]/ML
1-5 INJECTION, SOLUTION INTRAVENOUS; SUBCUTANEOUS
Status: DISCONTINUED | OUTPATIENT
Start: 2022-09-28 | End: 2022-10-05

## 2022-09-28 RX ORDER — AMIODARONE HYDROCHLORIDE 200 MG/1
200 TABLET ORAL
Status: DISCONTINUED | OUTPATIENT
Start: 2022-09-29 | End: 2022-09-29

## 2022-09-28 RX ADMIN — INSULIN LISPRO 1 UNITS: 100 INJECTION, SOLUTION INTRAVENOUS; SUBCUTANEOUS at 23:45

## 2022-09-28 RX ADMIN — FUROSEMIDE 40 MG: 10 INJECTION, SOLUTION INTRAMUSCULAR; INTRAVENOUS at 20:42

## 2022-09-28 RX ADMIN — METOPROLOL SUCCINATE 50 MG: 50 TABLET, FILM COATED, EXTENDED RELEASE ORAL at 23:10

## 2022-09-28 NOTE — ED PROVIDER NOTES
History  Chief Complaint   Patient presents with   • Shortness of Breath     Pt reports SOB x 5 day, worsening today and now "Wobbly on my feet"   SOB lying down, and nausea; lives in 3rd floor apartment and unable to do steps at this time; swollen feet and face noted (on water pills); denies CP/fever     This is a 69-year-old female who presents emergency department with progressive shortness of breath  Worse over the last 5 days  Patient has had some intermittent lower extremity edema  This is initially treated with Lasix by PCP  About 2 months ago her PCP told her to decrease Lasix to once every 3rd day  Patient continues with Lasix once every 3rd day but is now having increasing lower extremity edema and some shortness of breath with exertion  She estimates shortness of breath occurs with approximately 7-8 steps while walking  She frequently needs to stop and rest while going up stairs  Patient is taking centers for history of AFib  No known history of CHF  Prior history of coronary artery disease  Patient has not been having any chest pressure or chest pain  This is all shortness of breath  Of note the patient did have an injection today for macular degeneration  Was postop a cardiologist appointment today but daughter was concerned about her breathing so brought her into the emergency department  No radiation of symptoms  Progressive in nature  Differential diagnosis includes CHF, peripheral edema  Prior to Admission Medications   Prescriptions Last Dose Informant Patient Reported? Taking?    ALPRAZolam (XANAX) 0 25 mg tablet 9/28/2022 at Unknown time  No Yes   Sig: Take 1 tablet (0 25 mg total) by mouth 3 (three) times a day as needed for anxiety   COMBIGAN 0 2-0 5 % Unknown at Unknown time Self Yes No   acetaminophen (TYLENOL) 325 mg tablet 9/28/2022 at Unknown time Self No Yes   Sig: Take 2 tablets (650 mg total) by mouth every 4 (four) hours as needed for mild pain   amiodarone 200 mg tablet 9/28/2022 at Unknown time  No Yes   Sig: TAKE 1 TABLET DAILY   apixaban (Eliquis) 5 mg 9/28/2022 at Unknown time Self No Yes   Sig: Take 1 tablet (5 mg total) by mouth 2 (two) times a day   atorvastatin (LIPITOR) 40 mg tablet 9/28/2022 at Unknown time Self No Yes   Sig: Take 1 tablet (40 mg total) by mouth daily   ferrous sulfate 325 (65 Fe) mg tablet 9/28/2022 at Unknown time Self Yes Yes   Sig: Take 325 mg by mouth daily with breakfast   furosemide (LASIX) 20 mg tablet 9/28/2022 at Unknown time Self No Yes   Sig: Take 1 tablet (20 mg total) by mouth daily   losartan (COZAAR) 25 mg tablet 9/28/2022 at Unknown time Self No Yes   Sig: Take 1 tablet (25 mg total) by mouth daily   metFORMIN (GLUCOPHAGE) 500 mg tablet 9/28/2022 at Unknown time Self No Yes   Sig: Take 1 tablet (500 mg total) by mouth 2 (two) times a day with meals   metoprolol succinate (TOPROL-XL) 50 mg 24 hr tablet 9/28/2022 at Unknown time Self No Yes   Sig: Take 1 tablet (50 mg total) by mouth 2 (two) times a day   omeprazole (PriLOSEC) 20 mg delayed release capsule 9/28/2022 at Unknown time Self No Yes   Sig: Take 1 capsule (20 mg total) by mouth daily   potassium chloride (MICRO-K) 10 MEQ CR capsule 9/28/2022 at Unknown time Self No Yes   Sig: Take 2 capsules (20 mEq total) by mouth daily   traMADol (ULTRAM) 50 mg tablet 9/28/2022 at Unknown time  No Yes   Sig: Take 2 tablets (100 mg total) by mouth 2 (two) times a day      Facility-Administered Medications: None       Past Medical History:   Diagnosis Date   • Atypical chest pain    • GERD (gastroesophageal reflux disease)    • Hyperlipidemia    • Hypertension    • Kidney stone    • Myocardial infarction [facility])     2015    • NSTEMI (non-ST elevated myocardial infarction) [facility]     Last Assessed: 9/24/2015       Past Surgical History:   Procedure Laterality Date   • A-V CARDIAC PACEMAKER INSERTION     • ANKLE SURGERY     • BACK SURGERY  01/2011    L4 and L5 laminectomy and fusion    • BLADDER SURGERY     • CORONARY ARTERY BYPASS GRAFT  09/02/2015    CABGx3 with LIMA to LAD, SVG to PDA, SVG to OM-1   • EXAMINATION UNDER ANESTHESIA N/A 7/11/2020    Procedure: EXAM UNDER ANESTHESIA (EUA)  EXCISION OF POSTERIOR VAGINAL FOREIGN BODY;  Surgeon: Attila York MD;  [facility];  Service: Gynecology   • FL RETROGRADE PYELOGRAM  7/11/2020   • FL RETROGRADE PYELOGRAM  8/7/2020   • HYSTERECTOMY     • IL CYSTO/URETERO W/LITHOTRIPSY &INDWELL STENT INSRT Right 8/7/2020    Procedure: CYSTOSCOPY URETEROSCOPY WITH LITHOTRIPSY HOLMIUM LASER, RETROGRADE PYELOGRAM AND INSERTION STENT URETERAL;  Surgeon: Verner Squires, MD;  Location: [facility];  Service: Urology   • IL CYSTOURETHROSCOPY,URETER CATHETER Right 7/11/2020    Procedure: CYSTOSCOPY RETROGRADE PYELOGRAM WITH INSERTION STENT URETERAL, bladder biopsy with fulguration;  Surgeon: Verner Squires, MD;  Location[facility];  Service: Urology   • WRIST SURGERY         Family History   Problem Relation Age of Onset   • Hypertension Mother    • Hypertension Sister    • Alcohol abuse Brother    • Cirrhosis Brother    • Diabetes Father    • Other Brother         Heart transplant in his 46s   • Lung cancer Sister    • Diabetes Brother    • Stroke Sister    • No Known Problems Daughter    • No Known Problems Maternal Grandmother    • No Known Problems Maternal Grandfather    • No Known Problems Paternal Grandmother    • No Known Problems Paternal Grandfather    • No Known Problems Sister      I have reviewed and agree with the history as documented  E-Cigarette/Vaping   • E-Cigarette Use Never User      E-Cigarette/Vaping Substances     Social History     Tobacco Use   • Smoking status: Current Every Day Smoker     Packs/day: 0 25     Start date: 5   • Smokeless tobacco: Never Used   • Tobacco comment: Started smoking at age 25; currently smoking <1ppd     Vaping Use   • Vaping Use: Never used   Substance Use Topics   • Alcohol use: Not Currently   • Drug use: Never       Review of Systems   Constitutional: Positive for fatigue  Negative for activity change, appetite change and fever  HENT: Negative for congestion, rhinorrhea and sore throat  Eyes: Positive for visual disturbance  Negative for pain and redness  Photophobia: Chronic macular degeneration  Respiratory: Positive for shortness of breath  Negative for cough, chest tightness and wheezing  Cardiovascular: Negative for chest pain and palpitations  Gastrointestinal: Negative for abdominal pain, diarrhea, nausea and vomiting  Genitourinary: Negative for dysuria, frequency and urgency  Musculoskeletal: Negative for arthralgias and myalgias  Skin: Negative for color change and rash  Allergic/Immunologic: Negative for immunocompromised state  Neurological: Positive for weakness ( generalized)  Negative for dizziness, syncope and light-headedness  Hematological: Does not bruise/bleed easily  Psychiatric/Behavioral: Negative for confusion  All other systems reviewed and are negative  Physical Exam  Physical Exam  Vitals and nursing note reviewed  Constitutional:       General: She is not in acute distress  Appearance: She is well-developed  HENT:      Head: Normocephalic and atraumatic  Nose: Nose normal    Eyes:      General: No scleral icterus  Conjunctiva/sclera: Conjunctivae normal    Cardiovascular:      Rate and Rhythm: Normal rate and regular rhythm  Heart sounds: Normal heart sounds  Pulmonary:      Effort: Pulmonary effort is normal  No respiratory distress  Breath sounds: No stridor  Examination of the right-lower field reveals rales  Examination of the left-lower field reveals rales  Decreased breath sounds and rales present  No wheezing  Abdominal:      General: There is no distension  Palpations: Abdomen is soft  Tenderness: There is no abdominal tenderness  There is no guarding or rebound     Musculoskeletal: General: No deformity  Cervical back: Normal range of motion and neck supple  Right lower leg: Edema (2+) present  Left lower leg: Edema (2+) present  Skin:     General: Skin is warm and dry  Findings: No rash  Neurological:      Mental Status: She is alert and oriented to person, place, and time  Psychiatric:         Thought Content:  Thought content normal          Vital Signs  ED Triage Vitals [09/28/22 1420]   Temperature Pulse Respirations Blood Pressure SpO2   97 7 °F (36 5 °C) 66 20 (!) 185/74 97 %      Temp Source Heart Rate Source Patient Position - Orthostatic VS BP Location FiO2 (%)   Oral Monitor Sitting Right arm --      Pain Score       No Pain           Vitals:    10/06/22 0925 10/06/22 1120 10/06/22 2014 10/06/22 2118   BP: 104/51 110/55 140/88 130/55   Pulse: 61 63 61 66   Patient Position - Orthostatic VS:             Visual Acuity  Visual Acuity    Flowsheet Row Most Recent Value   L Pupil Size (mm) 3   R Pupil Size (mm) 3   L Pupil Shape Round   R Pupil Shape Round          ED Medications  Medications   atorvastatin (LIPITOR) tablet 40 mg (40 mg Oral Given 10/6/22 0836)   ferrous sulfate tablet 325 mg (325 mg Oral Given 10/6/22 0836)   pantoprazole (PROTONIX) EC tablet 40 mg (40 mg Oral Given 10/6/22 0552)   acetaminophen (TYLENOL) tablet 650 mg ( Oral MAR Unhold 10/5/22 0604)   magnesium oxide (MAG-OX) tablet 400 mg (400 mg Oral Given 10/6/22 1813)   isosorbide dinitrate (ISORDIL) tablet 20 mg (20 mg Oral Given 10/6/22 1813)   hydrALAZINE (APRESOLINE) tablet 25 mg (25 mg Oral Given 10/6/22 2117)   ondansetron (ZOFRAN) injection 4 mg ( Intravenous MAR Unhold 10/5/22 0604)   polyethylene glycol (MIRALAX) packet 17 g (17 g Oral Given 10/6/22 2117)   carvedilol (COREG) tablet 6 25 mg (6 25 mg Oral Given 10/6/22 1813)   melatonin tablet 6 mg (6 mg Oral Given 10/6/22 2117)   senna-docusate sodium (SENOKOT S) 8 6-50 mg per tablet 2 tablet (2 tablets Oral Given 10/6/22 1813)   insulin lispro (HumaLOG) 100 units/mL subcutaneous injection 1-6 Units (1 Units Subcutaneous Given 10/6/22 1815)   heparin (porcine) subcutaneous injection 5,000 Units (5,000 Units Subcutaneous Given 10/6/22 2117)   aspirin chewable tablet 324 mg (324 mg Oral Given 10/6/22 0836)   furosemide (LASIX) injection 40 mg (40 mg Intravenous Given 9/28/22 2042)   potassium chloride (K-DUR,KLOR-CON) CR tablet 20 mEq (20 mEq Oral Given 9/29/22 1153)   furosemide (LASIX) injection 40 mg (40 mg Intravenous Given 9/29/22 1152)   potassium chloride (K-DUR,KLOR-CON) CR tablet 20 mEq (20 mEq Oral Given 9/30/22 1022)   losartan (COZAAR) tablet 25 mg (25 mg Oral Given 9/30/22 1125)   magnesium sulfate 2 g/50 mL IVPB (premix) 2 g (2 g Intravenous New Bag 9/30/22 1126)   magnesium sulfate 2 g/50 mL IVPB (premix) 2 g (2 g Intravenous New Bag 9/30/22 1740)   potassium chloride (K-DUR,KLOR-CON) CR tablet 40 mEq (40 mEq Oral Given 10/1/22 1721)   bisacodyl (DULCOLAX) rectal suppository 10 mg (10 mg Rectal Given 10/4/22 1858)   polyethylene glycol (MIRALAX) packet 17 g (17 g Oral Given 10/4/22 1859)   iodixanol (VISIPAQUE) 320 MG/ML injection 100 mL (100 mL Intravenous Given 10/4/22 2051)   labetalol (NORMODYNE) injection 20 mg (20 mg Intravenous Given 10/5/22 0347)   aspirin chewable tablet 324 mg (324 mg Oral Given 10/5/22 0932)   labetalol (NORMODYNE) injection 10 mg (10 mg Intravenous Given 10/6/22 0852)       Diagnostic Studies  Results Reviewed     Procedure Component Value Units Date/Time    COVID only [[id number]]  (Normal) Collected: 09/29/22 0016    Lab Status: Final result Specimen: Nares from Nose Updated: 09/29/22 0130     SARS-CoV-2 Negative    Narrative:      FOR PEDIATRIC PATIENTS - copy/paste COVID Guidelines URL to browser: [email or web address]    SARS-CoV-2 assay is a Nucleic Acid Amplification assay intended for the  qualitative detection of nucleic acid from SARS-CoV-2 in nasopharyngeal  swabs  Results are for the presumptive identification of SARS-CoV-2 RNA  Positive results are indicative of infection with SARS-CoV-2, the virus  causing COVID-19, but do not rule out bacterial infection or co-infection  with other viruses  Laboratories within the [place] and its  territories are required to report all positive results to the appropriate  public health authorities  Negative results do not preclude SARS-CoV-2  infection and should not be used as the sole basis for treatment or other  patient management decisions  Negative results must be combined with  clinical observations, patient history, and epidemiological information  This test has not been FDA cleared or approved  This test has been authorized by FDA under an Emergency Use Authorization  (EUA)  This test is only authorized for the duration of time the  declaration that circumstances exist justifying the authorization of the  emergency use of an in vitro diagnostic tests for detection of SARS-CoV-2  virus and/or diagnosis of COVID-19 infection under section 564(b)(1) of  the Act, 21 U  S C  465NUW-2(U)(6), unless the authorization is terminated  or revoked sooner  The test has been validated but independent review by FDA  and CLIA is pending  Test performed using [employer] GeneXpert: This RT-PCR assay targets N2,  a region unique to SARS-CoV-2  A conserved region in the E-gene was chosen  for pan-Sarbecovirus detection which includes SARS-CoV-2  According to CMS-2020-01-R, this platform meets the definition of high-throughput technology      HS Troponin I 4hr [[id number]]  (Normal) Collected: 09/28/22 2046    Lab Status: Final result Specimen: Blood from Arm, Right Updated: 09/28/22 2113     hs TnI 4hr 23 ng/L      Delta 4hr hsTnI 6 ng/L     B-Type Natriuretic Peptide(BNP) [place], [place], [place] [facility] Only [[id number]]  (Abnormal) Collected: 09/28/22 1431    Lab Status: Final result Specimen: Blood from Arm, Right Updated: 09/28/22 2014     BNP 1,242 pg/mL     HS Troponin I 2hr [[id number]]  (Normal) Collected: 09/28/22 1847    Lab Status: Final result Specimen: Blood from Arm, Right Updated: 09/28/22 1918     hs TnI 2hr 21 ng/L      Delta 2hr hsTnI 4 ng/L     Comprehensive metabolic panel [[id number]]  (Abnormal) Collected: 09/28/22 1431    Lab Status: Final result Specimen: Blood from Arm, Right Updated: 09/28/22 1513     Sodium 140 mmol/L      Potassium 5 3 mmol/L      Chloride 107 mmol/L      CO2 19 mmol/L      ANION GAP 14 mmol/L      BUN 26 mg/dL      Creatinine 0 89 mg/dL      Glucose 173 mg/dL      Calcium 8 5 mg/dL       U/L       U/L      Alkaline Phosphatase 72 U/L      Total Protein 7 2 g/dL      Albumin 4 1 g/dL      Total Bilirubin 0 97 mg/dL      eGFR 63 ml/min/1 73sq m     Narrative:      [place] guidelines for Chronic Kidney Disease (CKD):   •  Stage 1 with normal or high GFR (GFR > 90 mL/min/1 73 square meters)  •  Stage 2 Mild CKD (GFR = 60-89 mL/min/1 73 square meters)  •  Stage 3A Moderate CKD (GFR = 45-59 mL/min/1 73 square meters)  •  Stage 3B Moderate CKD (GFR = 30-44 mL/min/1 73 square meters)  •  Stage 4 Severe CKD (GFR = 15-29 mL/min/1 73 square meters)  •  Stage 5 End Stage CKD (GFR <15 mL/min/1 73 square meters)  Note: GFR calculation is accurate only with a steady state creatinine    HS Troponin 0hr (reflex protocol) [[id number]]  (Normal) Collected: 09/28/22 1431    Lab Status: Final result Specimen: Blood from Arm, Right Updated: 09/28/22 1510     hs TnI 0hr 17 ng/L     CBC and differential [[id number]]  (Abnormal) Collected: 09/28/22 1431    Lab Status: Final result Specimen: Blood from Arm, Right Updated: 09/28/22 1437     WBC 12 99 Thousand/uL      RBC 4 03 Million/uL      Hemoglobin 10 3 g/dL      Hematocrit 35 3 %      MCV 88 fL      MCH 25 6 pg      MCHC 29 2 g/dL      RDW 18 4 %      MPV 11 4 fL      Platelets 925 Thousands/uL      nRBC 1 /100 WBCs      Neutrophils Relative 77 %      Immat GRANS % 1 %      Lymphocytes Relative 13 %      Monocytes Relative 7 %      Eosinophils Relative 1 %      Basophils Relative 1 %      Neutrophils Absolute 10 00 Thousands/µL      Immature Grans Absolute 0 10 Thousand/uL      Lymphocytes Absolute 1 65 Thousands/µL      Monocytes Absolute 0 96 Thousand/µL      Eosinophils Absolute 0 10 Thousand/µL      Basophils Absolute 0 18 Thousands/µL                  CTA dissection protocol chest/abdomen/pelvis   Final Result by Gustabo Goltz, MD (10/05 0214)      Aneurysmal dissection of the ascending aorta measuring up to 5 9 cm with dissection flap extending to the noncoronary and right coronary cusp and approaching the right brachiocephalic artery takeoff distally  Additional smaller short segment dissection    flap along the distal medial aspect of the ascending aorta  The study was marked in [facility] for immediate notification  Workstation performed: [id number]         XR abdomen 1 vw portable   Final Result by Gaby Foote MD (10/05 1007)      Descending and rectal stool predominantly may correlate with clinical concern of constipation  Workstation performed: [id number]         XR chest 1 view portable   ED Interpretation by Eden Simons MD (09/28 1906)   + chf      Final Result by Stefan De Dios MD (09/29 1279)      Congestive failure with small bilateral pleural effusions                    Workstation performed: [id number]                    Procedures  ECG 12 Lead Documentation Only    Date/Time: 9/28/2022 7:22 PM  Performed by: Eden Simons MD  Authorized by: Eden Simons MD     Indications / Diagnosis:  Sob  ECG reviewed by me, the ED Provider: yes    Patient location:  ED  Rate:     ECG rate assessment: normal    Rhythm:     Rhythm: atrial flutter and paced    Pacing:     Capture:  Complete  Ectopy:     Ectopy: none    Conduction:     Conduction: normal ST segments:     ST segments:  Non-specific             ED Course                                             MDM    Disposition  Final diagnoses:   Pulmonary edema   Aortic aneurysm with dissection ([facility] )     Time reflects when diagnosis was documented in both MDM as applicable and the Disposition within this note     Time User Action Codes Description Comment    9/28/2022  7:23 PM [place] Later Add [J81 1] Pulmonary edema     9/28/2022 10:27 PM Liyah Post Add [R06 02] Shortness of breath     10/4/2022 10:53 AM Earmon Sunfield Add [I50 21] Acute HFrEF (heart failure with reduced ejection fraction) [facility]     10/4/2022 10:53 AM Earmon Sunfield Add [I42 9] Cardiomyopathy ([facility] )     10/4/2022 10:53 AM Earmon Cirilo Add [I25 10] Atherosclerosis of coronary artery of native heart without angina pectoris, unspecified vessel or lesion type     10/4/2022 10:55 AM Earmon Sunfield Add [N17 9] LAURENCE (acute kidney injury) ([facility] )     10/4/2022 11:03 AM [place] Modify [I50 21] Acute HFrEF (heart failure with reduced ejection fraction) ([facility] )     10/4/2022 11:03 AM Mckayla Olvera Modify [I42 9] Cardiomyopathy ([facility] )     10/4/2022 11:03 AM [place] Modify [I25 10] Atherosclerosis of coronary artery of native heart without angina pectoris, unspecified vessel or lesion type     10/4/2022  4:52 PM Lenton Sylvester Modify [I50 21] Acute HFrEF (heart failure with reduced ejection fraction) ([facility] )     10/4/2022  4:52 PM [place] Sylvester Modify [I42 9] Cardiomyopathy ([facility] )     10/4/2022  4:52 PM [place] Sylvester Modify [I25 10] Atherosclerosis of coronary artery of native heart without angina pectoris, unspecified vessel or lesion type     10/5/2022  3:13 AM Ivan Lou Add [I71 00,  I71 9] Aortic aneurysm with dissection ([facility] )     10/5/2022  6:12 AM Keina Mao Modify [I71 00,  I71 9] Aortic aneurysm with dissection [facility])       ED Disposition     ED Disposition   Admit    Condition   Stable    Date/Time   Wed Sep 28, 2022 7:34 PM    Comment   Case was discussed with Dr Vlad Nelson and the patient's admission status was agreed to be Admission Status: inpatient status to the service of Dr Vlad Nelson              Follow-up Information    None         Current Discharge Medication List      CONTINUE these medications which have NOT CHANGED    Details   acetaminophen (TYLENOL) 325 mg tablet Take 2 tablets (650 mg total) by mouth every 4 (four) hours as needed for mild pain  Qty: 30 tablet, Refills: 0    Associated Diagnoses: Nephrolithiasis      ALPRAZolam (XANAX) 0 25 mg tablet Take 1 tablet (0 25 mg total) by mouth 3 (three) times a day as needed for anxiety  Qty: 90 tablet, Refills: 3    Associated Diagnoses: Anxiety      amiodarone 200 mg tablet TAKE 1 TABLET DAILY  Qty: 90 tablet, Refills: 3    Associated Diagnoses: A-fib (HCC)      apixaban (Eliquis) 5 mg Take 1 tablet (5 mg total) by mouth 2 (two) times a day  Qty: 180 tablet, Refills: 3    Associated Diagnoses: Postoperative atrial fibrillation (HCC)      atorvastatin (LIPITOR) 40 mg tablet Take 1 tablet (40 mg total) by mouth daily  Qty: 90 tablet, Refills: 3    Associated Diagnoses: Hypercholesterolemia      ferrous sulfate 325 (65 Fe) mg tablet Take 325 mg by mouth daily with breakfast      furosemide (LASIX) 20 mg tablet Take 1 tablet (20 mg total) by mouth daily  Qty: 30 tablet, Refills: 5    Associated Diagnoses: Edema, unspecified type      losartan (COZAAR) 25 mg tablet Take 1 tablet (25 mg total) by mouth daily  Qty: 90 tablet, Refills: 3    Associated Diagnoses: Essential hypertension      metFORMIN (GLUCOPHAGE) 500 mg tablet Take 1 tablet (500 mg total) by mouth 2 (two) times a day with meals  Qty: 180 tablet, Refills: 3    Associated Diagnoses: Diabetes 1 5, managed as type 2 (HCC)      metoprolol succinate (TOPROL-XL) 50 mg 24 hr tablet Take 1 tablet (50 mg total) by mouth 2 (two) times a day  Qty: 180 tablet, Refills: 3    Associated Diagnoses: Essential hypertension omeprazole (PriLOSEC) 20 mg delayed release capsule Take 1 capsule (20 mg total) by mouth daily  Qty: 90 capsule, Refills: 3    Associated Diagnoses: Gastroesophageal reflux disease without esophagitis      potassium chloride (MICRO-K) 10 MEQ CR capsule Take 2 capsules (20 mEq total) by mouth daily  Qty: 60 capsule, Refills: 3    Associated Diagnoses: Edema, unspecified type      traMADol (ULTRAM) 50 mg tablet Take 2 tablets (100 mg total) by mouth 2 (two) times a day  Qty: 120 tablet, Refills: 3    Associated Diagnoses: Lumbar back pain      COMBIGAN 0 2-0 5 %              No discharge procedures on file      PDMP Review       Value Time User    PDMP Reviewed  Yes 8/26/2022  4:40 PM Paula [place]          ED Provider  Electronically Signed by           Shakila Encarnacion MD  10/06/22 6423

## 2022-09-29 ENCOUNTER — APPOINTMENT (INPATIENT)
Dept: NON INVASIVE DIAGNOSTICS | Facility: HOSPITAL | Age: 77
DRG: 291 | End: 2022-09-29
Payer: COMMERCIAL

## 2022-09-29 PROBLEM — I50.33 ACUTE ON CHRONIC DIASTOLIC CONGESTIVE HEART FAILURE (HCC): Status: ACTIVE | Noted: 2022-09-29

## 2022-09-29 LAB
ALBUMIN SERPL BCP-MCNC: 3.5 G/DL (ref 3.5–5)
ALP SERPL-CCNC: 67 U/L (ref 34–104)
ALT SERPL W P-5'-P-CCNC: 180 U/L (ref 7–52)
ANION GAP SERPL CALCULATED.3IONS-SCNC: 8 MMOL/L (ref 4–13)
AST SERPL W P-5'-P-CCNC: 119 U/L (ref 13–39)
ATRIAL RATE: 244 BPM
BILIRUB SERPL-MCNC: 0.81 MG/DL (ref 0.2–1)
BUN SERPL-MCNC: 26 MG/DL (ref 5–25)
CALCIUM SERPL-MCNC: 8.3 MG/DL (ref 8.4–10.2)
CHLORIDE SERPL-SCNC: 106 MMOL/L (ref 96–108)
CO2 SERPL-SCNC: 27 MMOL/L (ref 21–32)
CREAT SERPL-MCNC: 0.9 MG/DL (ref 0.6–1.3)
ERYTHROCYTE [DISTWIDTH] IN BLOOD BY AUTOMATED COUNT: 18.5 % (ref 11.6–15.1)
GFR SERPL CREATININE-BSD FRML MDRD: 62 ML/MIN/1.73SQ M
GLUCOSE SERPL-MCNC: 108 MG/DL (ref 65–140)
GLUCOSE SERPL-MCNC: 112 MG/DL (ref 65–140)
GLUCOSE SERPL-MCNC: 132 MG/DL (ref 65–140)
GLUCOSE SERPL-MCNC: 181 MG/DL (ref 65–140)
GLUCOSE SERPL-MCNC: 188 MG/DL (ref 65–140)
HCT VFR BLD AUTO: 32.7 % (ref 34.8–46.1)
HGB BLD-MCNC: 9.6 G/DL (ref 11.5–15.4)
MCH RBC QN AUTO: 25.4 PG (ref 26.8–34.3)
MCHC RBC AUTO-ENTMCNC: 29.4 G/DL (ref 31.4–37.4)
MCV RBC AUTO: 87 FL (ref 82–98)
PLATELET # BLD AUTO: 271 THOUSANDS/UL (ref 149–390)
PMV BLD AUTO: 10.6 FL (ref 8.9–12.7)
POTASSIUM SERPL-SCNC: 3.4 MMOL/L (ref 3.5–5.3)
PROT SERPL-MCNC: 6.1 G/DL (ref 6.4–8.4)
QRS AXIS: -37 DEGREES
QRSD INTERVAL: 146 MS
QT INTERVAL: 510 MS
QTC INTERVAL: 513 MS
RBC # BLD AUTO: 3.78 MILLION/UL (ref 3.81–5.12)
SARS-COV-2 RNA RESP QL NAA+PROBE: NEGATIVE
SODIUM SERPL-SCNC: 141 MMOL/L (ref 135–147)
T WAVE AXIS: 120 DEGREES
VENTRICULAR RATE: 61 BPM
WBC # BLD AUTO: 12.26 THOUSAND/UL (ref 4.31–10.16)

## 2022-09-29 PROCEDURE — 97163 PT EVAL HIGH COMPLEX 45 MIN: CPT

## 2022-09-29 PROCEDURE — 97167 OT EVAL HIGH COMPLEX 60 MIN: CPT

## 2022-09-29 PROCEDURE — 99222 1ST HOSP IP/OBS MODERATE 55: CPT | Performed by: INTERNAL MEDICINE

## 2022-09-29 PROCEDURE — 93306 TTE W/DOPPLER COMPLETE: CPT

## 2022-09-29 PROCEDURE — 85027 COMPLETE CBC AUTOMATED: CPT | Performed by: NURSE PRACTITIONER

## 2022-09-29 PROCEDURE — 99232 SBSQ HOSP IP/OBS MODERATE 35: CPT | Performed by: INTERNAL MEDICINE

## 2022-09-29 PROCEDURE — U0005 INFEC AGEN DETEC AMPLI PROBE: HCPCS | Performed by: NURSE PRACTITIONER

## 2022-09-29 PROCEDURE — 80053 COMPREHEN METABOLIC PANEL: CPT | Performed by: NURSE PRACTITIONER

## 2022-09-29 PROCEDURE — 93010 ELECTROCARDIOGRAM REPORT: CPT | Performed by: INTERNAL MEDICINE

## 2022-09-29 PROCEDURE — U0003 INFECTIOUS AGENT DETECTION BY NUCLEIC ACID (DNA OR RNA); SEVERE ACUTE RESPIRATORY SYNDROME CORONAVIRUS 2 (SARS-COV-2) (CORONAVIRUS DISEASE [COVID-19]), AMPLIFIED PROBE TECHNIQUE, MAKING USE OF HIGH THROUGHPUT TECHNOLOGIES AS DESCRIBED BY CMS-2020-01-R: HCPCS | Performed by: NURSE PRACTITIONER

## 2022-09-29 PROCEDURE — 82948 REAGENT STRIP/BLOOD GLUCOSE: CPT

## 2022-09-29 RX ORDER — FUROSEMIDE 10 MG/ML
80 INJECTION INTRAMUSCULAR; INTRAVENOUS 2 TIMES DAILY
Status: DISCONTINUED | OUTPATIENT
Start: 2022-09-29 | End: 2022-10-01

## 2022-09-29 RX ORDER — LOSARTAN POTASSIUM 50 MG/1
50 TABLET ORAL DAILY
Status: DISCONTINUED | OUTPATIENT
Start: 2022-09-30 | End: 2022-09-29

## 2022-09-29 RX ORDER — FUROSEMIDE 10 MG/ML
40 INJECTION INTRAMUSCULAR; INTRAVENOUS ONCE
Status: COMPLETED | OUTPATIENT
Start: 2022-09-29 | End: 2022-09-29

## 2022-09-29 RX ORDER — POTASSIUM CHLORIDE 20 MEQ/1
20 TABLET, EXTENDED RELEASE ORAL
Status: COMPLETED | OUTPATIENT
Start: 2022-09-29 | End: 2022-09-29

## 2022-09-29 RX ORDER — LOSARTAN POTASSIUM 25 MG/1
25 TABLET ORAL DAILY
Status: DISCONTINUED | OUTPATIENT
Start: 2022-09-30 | End: 2022-09-30

## 2022-09-29 RX ADMIN — POTASSIUM CHLORIDE 20 MEQ: 1500 TABLET, EXTENDED RELEASE ORAL at 11:53

## 2022-09-29 RX ADMIN — METOPROLOL SUCCINATE 50 MG: 50 TABLET, FILM COATED, EXTENDED RELEASE ORAL at 08:54

## 2022-09-29 RX ADMIN — FUROSEMIDE 40 MG: 10 INJECTION, SOLUTION INTRAMUSCULAR; INTRAVENOUS at 11:52

## 2022-09-29 RX ADMIN — TRAMADOL HYDROCHLORIDE 100 MG: 50 TABLET, COATED ORAL at 18:04

## 2022-09-29 RX ADMIN — METOPROLOL SUCCINATE 50 MG: 50 TABLET, FILM COATED, EXTENDED RELEASE ORAL at 21:08

## 2022-09-29 RX ADMIN — APIXABAN 5 MG: 5 TABLET, FILM COATED ORAL at 18:04

## 2022-09-29 RX ADMIN — POTASSIUM CHLORIDE 20 MEQ: 1500 TABLET, EXTENDED RELEASE ORAL at 09:27

## 2022-09-29 RX ADMIN — ATORVASTATIN CALCIUM 40 MG: 40 TABLET, FILM COATED ORAL at 08:55

## 2022-09-29 RX ADMIN — INSULIN LISPRO 1 UNITS: 100 INJECTION, SOLUTION INTRAVENOUS; SUBCUTANEOUS at 21:08

## 2022-09-29 RX ADMIN — FUROSEMIDE 40 MG: 10 INJECTION, SOLUTION INTRAMUSCULAR; INTRAVENOUS at 08:55

## 2022-09-29 RX ADMIN — FUROSEMIDE 80 MG: 10 INJECTION, SOLUTION INTRAMUSCULAR; INTRAVENOUS at 18:10

## 2022-09-29 RX ADMIN — APIXABAN 5 MG: 5 TABLET, FILM COATED ORAL at 08:55

## 2022-09-29 RX ADMIN — LOSARTAN POTASSIUM 25 MG: 25 TABLET, FILM COATED ORAL at 08:54

## 2022-09-29 RX ADMIN — TRAMADOL HYDROCHLORIDE 100 MG: 50 TABLET, COATED ORAL at 08:54

## 2022-09-29 RX ADMIN — POTASSIUM CHLORIDE 20 MEQ: 1500 TABLET, EXTENDED RELEASE ORAL at 08:55

## 2022-09-29 RX ADMIN — PANTOPRAZOLE SODIUM 40 MG: 40 TABLET, DELAYED RELEASE ORAL at 05:25

## 2022-09-29 RX ADMIN — AMIODARONE HYDROCHLORIDE 200 MG: 200 TABLET ORAL at 09:27

## 2022-09-29 RX ADMIN — FERROUS SULFATE TAB 325 MG (65 MG ELEMENTAL FE) 325 MG: 325 (65 FE) TAB at 09:27

## 2022-09-29 RX ADMIN — INSULIN LISPRO 1 UNITS: 100 INJECTION, SOLUTION INTRAVENOUS; SUBCUTANEOUS at 11:48

## 2022-09-29 NOTE — ASSESSMENT & PLAN NOTE
Presentation: Patient presents with complaints of shortness of breath with exertion and orthopnea for the past 5 days  She endorses bilateral lower extremity edema  Patient reports being treated by her outpatient PCP with PO Lasix r/t bilateral lower extremity edema; however, about 2 months ago her PCP instructed her to take the PO Lasix once every 3 days  Chest x-ray: Vascular congestion and LCW pacemaker in place on my read  Official read pending  Last echocardiogram on file May 6324: LV systolic function was normal  LVEF estimated to be 60%  There were no RWMA  G2DD  Suspect Etiology: Acute Heart Failure  COVID negative  Initial troponin: 17 --> 21, delta 4  BNP: 1242    • See plan under acute on chronic diastolic heart failure

## 2022-09-29 NOTE — ASSESSMENT & PLAN NOTE
· Currently denies chest pain  · S/P grafting x3  · Most recent cardiac catheterization was in May 2020 which displayed multivessel disease that was completely revascularized  · Continue Toprol XL

## 2022-09-29 NOTE — ASSESSMENT & PLAN NOTE
Blood pressure elevated on arrival with readings in 180s-200s/70s-80s  Patient just received dose of IV Lasix, repeat BP post administration  Continue PTA medications of Toprol XL and Cozaar for now, consider holding Cozaar pending renal function while on IV Lasix inpatient  Blood Pressure: 136/68  Monitor blood pressure

## 2022-09-29 NOTE — H&P
[facility]  H&P- Jason Hall 1945, 69 y o  female MRN: [id number]  Unit/Bed#: S -01 Encounter: [id number]  Primary Care Provider: Tammy Barnhart DO   Date and time admitted to hospital: 9/28/2022  6:33 PM    * Shortness of breath  Assessment & Plan  • Presentation: Patient presents with complaints of shortness of breath with exertion and orthopnea for the past 5 days  She endorses bilateral lower extremity edema  Patient reports being treated by her outpatient PCP with PO Lasix r/t bilateral lower extremity edema; however, about 2 months ago her PCP instructed her to take the PO Lasix once every 3 days  • Chest x-ray: Vascular congestion and LCW pacemaker in place on my read  Official read pending  • Last echocardiogram on file May 1734: LV systolic function was normal  LVEF estimated to be 60%  There were no RWMA  G2DD  • Suspect Etiology: Acute Heart Failure  • COVID pending  • Initial troponin: 17 --> 21, delta 4    o Will trend x3 or to peak  • BNP: 1242  • Diuresis: PTA medication of 20 mg of PO Lasix q3 days  Received 40 mg of IV Lasix in ED  Continue 40 mg of IV Lasix bid  • Beta-blocker: Toprol XL 50 mg bid  • Monitor intake and output, daily weights  • Obtain Echo  • Diet: Fluid restriction and 2 g Sodium  • Consult Cardiology  Hypertensive urgency  Assessment & Plan  Blood pressure elevated on arrival with readings in 180s-200s/70s-80s  Patient just received dose of IV Lasix, repeat BP post administration  Continue PTA medications of Toprol XL and Cozaar for now, consider holding Cozaar pending renal function while on IV Lasix inpatient  Monitor blood pressure  Leukocytosis  Assessment & Plan  · POA, WBCs of 12 99  · Patient has a long standing history of leukocytosis with range from 12-13  · Patient does not meet two SIRS criteria on admission  · Trend CBC      CAD (coronary atherosclerotic disease)  Assessment & Plan  · Currently denies chest pain  · S/P grafting x3  · Most recent cardiac catheterization was in May 2020 which displayed multivessel disease that was completely revascularized  · Continue Toprol XL  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, without long-term current use of insulin [facility])  Assessment & Plan  Lab Results   Component Value Date    HGBA1C 6 06/29/2022       No results for input(s): POCGLU in the last 72 hours  Blood Sugar Average: Last 72 hrs:  · PTA regimen of Metformin, will hold while inpatient  · Accu-Checks and SSI  · Hypoglycemia protocol  Elevated LFTs  Assessment & Plan  · POA, , , alkaline phosphatase 72  · Patient displayed no RUQ tenderness  Consider RUQ US if patient clinically worsens  · Trend CMP  PAF (paroxysmal atrial fibrillation) (formerly [facility])  Assessment & Plan  • Rate/Rhythm Control: Toprol XL, Amiodarone  • Antiplatelet/Anticoagulation: Eliquis    Pacemaker  Assessment & Plan  · S/P PPM placement in June 2020  · History of tachy-keanu syndrome  Gastroesophageal reflux disease  Assessment & Plan  · Continue PPI  VTE Pharmacologic Prophylaxis: VTE Score: 7 High Risk (Score >/= 5) - Pharmacological DVT Prophylaxis Ordered: apixaban (Eliquis)  Sequential Compression Devices Ordered  Code Status: DNR/DNI, Level 2, per patient  Discussion with family: Patient declined call to   Anticipated Length of Stay: Patient will be admitted on an inpatient basis with an anticipated length of stay of greater than 2 midnights secondary to IV diuretics in the setting of suspected new onset heart failure  Total Time for Visit, including Counseling / Coordination of Care: 70 minutes Greater than 50% of this total time spent on direct patient counseling and coordination of care      Chief Complaint: Shortness of breath, orthopnea, bilateral leg swelling    History of Present Illness:  Christopher Mukherjee is a 69 y o  female with a PMH of PAF on Eliquis, tachy-keanu syndrome s/p PPM, CAD, HTN, HLD, GERD and DM2 who presents with complaints of shortness of breath with exertion and orthopnea for the past 5 days  She endorses bilateral lower extremity edema  Patient reports being treated by her outpatient PCP with PO Lasix r/t bilateral lower extremity edema for the past two months; however, about 1-2 weeks ago her PCP instructed her to take the PO Lasix once every 3 days since swelling had improved  Patient endorses that 3 weeks ago she bumped a bucket which caused a wound on her right lateral shin  Patient denies fever, chills, chest pain, nausea, vomiting or abdominal pain  Upon evaluation in the ED, patient required IV Lasix  Patient will be admitted for IV diuretics, echo and cardiology consult  Review of Systems:  Review of Systems   Constitutional: Positive for activity change  Negative for chills and fever  Respiratory: Positive for shortness of breath  Negative for chest tightness  Orthopnea     Cardiovascular: Positive for leg swelling  Negative for chest pain and palpitations  Gastrointestinal: Negative for nausea and vomiting  Skin: Positive for wound  All other systems reviewed and are negative        Past Medical and Surgical History:   Past Medical History:   Diagnosis Date   • Atypical chest pain    • GERD (gastroesophageal reflux disease)    • Hyperlipidemia    • Hypertension    • Kidney stone    • Myocardial infarction [facility])     2015    • [facility]     Last Assessed: 9/24/2015       Past Surgical History:   Procedure Laterality Date   • A-V CARDIAC PACEMAKER INSERTION     • ANKLE SURGERY     • BACK SURGERY  01/2011    L4 and L5 laminectomy and fusion    • BLADDER SURGERY     • CORONARY ARTERY BYPASS GRAFT  09/02/2015    CABGx3 with LIMA to LAD, SVG to PDA, SVG to OM-1   • EXAMINATION UNDER ANESTHESIA N/A 7/11/2020    Procedure: EXAM UNDER ANESTHESIA (EUA)  EXCISION OF POSTERIOR VAGINAL FOREIGN BODY;  Surgeon: Aj Baker MD;  [facility];  Service: Gynecology   • FL RETROGRADE PYELOGRAM  7/11/2020   • FL RETROGRADE PYELOGRAM  8/7/2020   • HYSTERECTOMY     • KY CYSTO/URETERO W/LITHOTRIPSY &INDWELL STENT INSRT Right 8/7/2020    Procedure: CYSTOSCOPY URETEROSCOPY WITH LITHOTRIPSY HOLMIUM LASER, RETROGRADE PYELOGRAM AND INSERTION STENT URETERAL;  Surgeon: Kelsey Hobbs MD;  Location[facility];  Service: Urology   • KY CYSTOURETHROSCOPY,URETER CATHETER Right 7/11/2020    Procedure: CYSTOSCOPY RETROGRADE PYELOGRAM WITH INSERTION STENT URETERAL, bladder biopsy with fulguration;  Surgeon: Kelsey Hobbs MD;  [facility];  Service: Urology   • WRIST SURGERY         Meds/Allergies:  Prior to Admission medications    Medication Sig Start Date End Date Taking?  Authorizing Provider   acetaminophen (TYLENOL) 325 mg tablet Take 2 tablets (650 mg total) by mouth every 4 (four) hours as needed for mild pain 8/7/20   Kelsey Hobbs MD   ALPRAZolam Karilyn Meline) 0 25 mg tablet Take 1 tablet (0 25 mg total) by mouth 3 (three) times a day as needed for anxiety 8/26/22   WendyPETE Bertrand   amiodarone 200 mg tablet TAKE 1 TABLET DAILY 8/26/22   PETE Larson   apixaban (Eliquis) 5 mg Take 1 tablet (5 mg total) by mouth 2 (two) times a day 9/22/21   Gregorio Torres DO   atorvastatin (LIPITOR) 40 mg tablet Take 1 tablet (40 mg total) by mouth daily 9/22/21   Gregorio Torres DO   COMBIGAN 0 2-0 5 %  4/3/20   Historical Provider, MD   ferrous sulfate 325 (65 Fe) mg tablet Take 325 mg by mouth daily with breakfast    Historical Provider, MD   furosemide (LASIX) 20 mg tablet Take 1 tablet (20 mg total) by mouth daily 7/18/22   Gregorio Torres DO   losartan (COZAAR) 25 mg tablet Take 1 tablet (25 mg total) by mouth daily 9/22/21   Gregorio Torres DO   metFORMIN (GLUCOPHAGE) 500 mg tablet Take 1 tablet (500 mg total) by mouth 2 (two) times a day with meals 9/22/21   Fatemeh Torres, DO   metoprolol succinate (TOPROL-XL) 50 mg 24 hr tablet Take 1 tablet (50 mg total) by mouth 2 (two) times a day 9/22/21   Fatemeh Torres, DO   omeprazole (PriLOSEC) 20 mg delayed release capsule Take 1 capsule (20 mg total) by mouth daily 9/22/21   Fatemehcharisma Torres, DO   potassium chloride (MICRO-K) 10 MEQ CR capsule Take 2 capsules (20 mEq total) by mouth daily 7/18/22   Fatemeh Torres, DO   traMADol (ULTRAM) 50 mg tablet Take 2 tablets (100 mg total) by mouth 2 (two) times a day 9/22/21   Gely Sosa DO     I have reviewed home medications with patient personally  Allergies: Allergies   Allergen Reactions   • Nickel Rash       Social History:  Marital Status: Single   Occupation: Unknown  Patient Pre-hospital Living Situation: Home  Patient Pre-hospital Level of Mobility: unable to be assessed at time of evaluation  Patient Pre-hospital Diet Restrictions: Diabetic, low salt  Substance Use History:   Social History     Substance and Sexual Activity   Alcohol Use Not Currently     Social History     Tobacco Use   Smoking Status Current Every Day Smoker   • Packs/day: 0 25   • Start date: 5   Smokeless Tobacco Never Used   Tobacco Comment    Started smoking at age 25; currently smoking <1ppd       Social History     Substance and Sexual Activity   Drug Use Never       Family History:  Family History   Problem Relation Age of Onset   • Hypertension Mother    • Hypertension Sister    • Alcohol abuse Brother    • Cirrhosis Brother    • Diabetes Father    • Other Brother         Heart transplant in his 46s   • Lung cancer Sister    • Diabetes Brother    • Stroke Sister    • No Known Problems Daughter    • No Known Problems Maternal Grandmother    • No Known Problems Maternal Grandfather    • No Known Problems Paternal Grandmother    • No Known Problems Paternal Grandfather    • No Known Problems Sister        Physical Exam:     Vitals:   Blood Pressure: (!) 179/74 (22)  Pulse: 60 (22)  Temperature: 97 6 °F (36 4 °C) (22)  Temp Source: Oral (22)  Respirations: 20 (22)  Height: 4' 11" (149 9 cm) (22)  Weight - Scale: 63 3 kg (139 lb 8 8 oz) (22)  SpO2: 98 % (22)    Physical Exam  Vitals and nursing note reviewed  Constitutional:       General: She is not in acute distress  Appearance: She is not ill-appearing or diaphoretic  HENT:      Head: Normocephalic  Nose: Nose normal       Mouth/Throat:      Pharynx: Oropharynx is clear  Eyes:      General: No scleral icterus  Conjunctiva/sclera: Conjunctivae normal    Cardiovascular:      Rate and Rhythm: Normal rate and regular rhythm  Pulses:           Posterior tibial pulses are 1+ on the right side and 1+ on the left side  Heart sounds: Normal heart sounds  Comments: LCW pacer present  Pedal edema 2+ pitting   Pulmonary:      Effort: Pulmonary effort is normal  No tachypnea or accessory muscle usage  Breath sounds: Decreased breath sounds and rales present  No wheezing or rhonchi  Abdominal:      General: Bowel sounds are normal  There is no distension  Palpations: Abdomen is soft  Tenderness: There is no abdominal tenderness  Musculoskeletal:      Cervical back: Normal range of motion  Right lower le+ Pitting Edema present  Left lower le+ Pitting Edema present  Skin:     General: Skin is warm and dry  Findings: Wound (right lateral shin with ucler) present  Neurological:      Mental Status: She is alert and oriented to person, place, and time     Psychiatric:         Speech: Speech normal           Additional Data:     Lab Results:  Results from last 7 days   Lab Units 22  1431   WBC Thousand/uL 12 99*   HEMOGLOBIN g/dL 10 3*   HEMATOCRIT % 35 3   PLATELETS Thousands/uL 332   NEUTROS PCT % 77*   LYMPHS PCT % 13* MONOS PCT % 7   EOS PCT % 1     Results from last 7 days   Lab Units 09/28/22  1431   SODIUM mmol/L 140   POTASSIUM mmol/L 5 3   CHLORIDE mmol/L 107   CO2 mmol/L 19*   BUN mg/dL 26*   CREATININE mg/dL 0 89   ANION GAP mmol/L 14*   CALCIUM mg/dL 8 5   ALBUMIN g/dL 4 1   TOTAL BILIRUBIN mg/dL 0 97   ALK PHOS U/L 72   ALT U/L 184*   AST U/L 153*   GLUCOSE RANDOM mg/dL 173*         Results from last 7 days   Lab Units 09/28/22  2304   POC GLUCOSE mg/dl 189*               Imaging: Personally reviewed the following imaging: chest xray  XR chest 1 view portable   ED Interpretation by Noel Marie MD (09/28 1906)   + chf          EKG and Other Studies Reviewed on Admission:   · EKG: Paced with underlying atrial flutter, HR 61     ** Please Note: This note has been constructed using a voice recognition system   **

## 2022-09-29 NOTE — ASSESSMENT & PLAN NOTE
· POA, , , alkaline phosphatase 72  · Patient displayed no RUQ tenderness  Consider RUQ US if patient clinically worsens  · Trend CMP

## 2022-09-29 NOTE — PROGRESS NOTES
[facility]  Progress Note Etta Sparrow 1945, 69 y o  female MRN: [id number]  Unit/Bed#: S -01 Encounter: [id number]  Primary Care Provider: Birdie Ascencio DO   Date and time admitted to hospital: 9/28/2022  6:33 PM    * Acute on chronic diastolic congestive heart failure [facility])  Assessment & Plan  Presents with  increased shortness of breath, dyspnea on exertion and lower extremity edema in setting of decreased diuretic dosing 1-2 weeks ago and high sodium diet  Patient reports being treated by her outpatient PCP with PO Lasix r/t bilateral lower extremity edema; however, about 2 months ago her PCP instructed her to take the PO Lasix once every 3 days  Dry weight: 130-132 lbs  Patient is currently volume overloaded  CXR at ED: Vascular congestion and LCW pacemaker in place  8/9104 Echo: LV systolic function was normal, LVEF estimated to be 60%, no RWMA, G2DD  COVID negative  BNP: 1242  Diuresis: PTA medication of 20 mg of PO Lasix q3 days  Received 40 mg of IV Lasix in ED  Weight (last 2 days)     Date/Time Weight    09/29/22 0600 63 3 (139 55)    09/28/22 23:07:34 63 3 (139 55)          Intake/Output Summary (Last 24 hours) at 9/29/2022 1323  Last data filed at 9/29/2022 1300  Gross per 24 hour   Intake 900 ml   Output 1500 ml   Net -600 ml     • Meds: Diuretic: IV Lasix 80 mg BID, B-Blocker: Toprol XL 50 mg BID, ACE/ARB/ARNi: Losartan 25 mg daily  · Diet: Sodium 2g, FR 1500 mL  · Labs: Trend CMP, CBC, Mag  · Maintain Mg > 2 and K > 4; Replete prn  · Elevate HOB > 30°, Daily standing weights, Measure I/Os, Monitor on Telemetry  • Follow up cardiology consult, appreciate recs  Elevated LFTs  Assessment & Plan  POA, , , alkaline phosphatase 72  Patient displayed no RUQ tenderness  Consider RUQ US if patient clinically worsens    Recent Labs     09/28/22  1431 09/29/22  0615   * 119*   * 180*   ALKPHOS 72 67   TBILI 0 97 0 81 · Trend CMP  Shortness of breath  Assessment & Plan  Presentation: Patient presents with complaints of shortness of breath with exertion and orthopnea for the past 5 days  She endorses bilateral lower extremity edema  Patient reports being treated by her outpatient PCP with PO Lasix r/t bilateral lower extremity edema; however, about 2 months ago her PCP instructed her to take the PO Lasix once every 3 days  Chest x-ray: Vascular congestion and LCW pacemaker in place on my read  Official read pending  Last echocardiogram on file May 5038: LV systolic function was normal  LVEF estimated to be 60%  There were no RWMA  G2DD  Suspect Etiology: Acute Heart Failure  COVID negative  Initial troponin: 17 --> 21, delta 4  BNP: 1242  • See plan under acute on chronic diastolic heart failure    Hypertensive urgency  Assessment & Plan  Blood pressure elevated on arrival with readings in 180s-200s/70s-80s  Patient just received dose of IV Lasix, repeat BP post administration  Continue PTA medications of Toprol XL and Cozaar for now, consider holding Cozaar pending renal function while on IV Lasix inpatient  Blood Pressure: 136/68  Monitor blood pressure  PAF (paroxysmal atrial fibrillation) ([facility])  Assessment & Plan  Rate/Rhythm Control: Toprol XL, Amiodarone  Antiplatelet/Anticoagulation: Eliquis  Per device interrogation, has been 100% AFib since 11/2021  EKG: ventricular paced rhythm with underlying flutter  · Discontinue Amiodarone per cardiology rec  · Continue Toprol XL, Eliquis    Pacemaker  Assessment & Plan  S/P PPM placement in June 2020  History of tachy-keanu syndrome  Leukocytosis  Assessment & Plan  POA, WBCs of 12 99  Patient has a long standing history of leukocytosis with range from 12-13  Patient does not meet two SIRS criteria on admission  Recent Labs     09/28/22  1431 09/29/22  0615   WBC 12 99* 12 26*     · Trend CBC      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, without long-term current use of insulin [facility])  Assessment & Plan  Lab Results   Component Value Date    HGBA1C 6 06/29/2022       Recent Labs     09/28/22  2304 09/29/22  0723 09/29/22  1135   POCGLU 189* 108 181*       Blood Sugar Average: Last 72 hrs:  · (P) 159 [id number] regimen of Metformin, will hold while inpatient  · Accu-Checks and SSI  · Hypoglycemia protocol  Gastroesophageal reflux disease  Assessment & Plan  · Continue PPI  CAD (coronary atherosclerotic disease)  Assessment & Plan  Currently denies chest pain  S/P grafting x3  Most recent cardiac catheterization was in May 2020 which displayed multivessel disease that was completely revascularized  · Continue Toprol XL  VTE Pharmacologic Prophylaxis:   VTE Score: 7 High Risk (Score >/= 5) - Pharmacological DVT Prophylaxis Ordered: Apixaban (Eliquis)  Sequential Compression Devices Ordered  Mechanical VTE Prophylaxis in Place: Yes    Patient Centered Rounds: I have performed bedside rounds with nursing staff today  Discussions with Specialists or Other Care Team Provider: Case management, Nursing, Dr Jonatan Morgan    Education and Discussions with Family / Patient: Discussed with patient, who expressed understanding  Current Length of Stay: 1 day(s)    Current Patient Status: Inpatient     Discharge Plan / Estimated Discharge Date: Anticipate discharge in 48-72 hrs to rehab facility  Code Status: Level 3 - DNAR and DNI      Subjective:   Patient was seen at bedside for continuity of care  Per night team, no major events overnight  She endorses improvement in SOB since arrival  Continues to have bilateral lower extremity edema  Endorses some nausea  Patient denies chest pain, palpitations, dizziness, lightheadedness, or confusion  Treatment plan and next steps were discussed with patient, who expresses understandable and amenable to the plan   She does not report any additional questions or concerns at this time       Objective:     Vitals:   Temp (24hrs), Av 2 °F (36 8 °C), Min:97 6 °F (36 4 °C), Max:99 4 °F (37 4 °C)    Temp:  [97 6 °F (36 4 °C)-99 4 °F (37 4 °C)] 99 4 °F (37 4 °C)  HR:  [60-66] 60  Resp:  [18-20] 18  BP: (136-207)/(68-86) 136/68  SpO2:  [92 %-98 %] 95 %  Body mass index is 28 19 kg/m²  Input and Output Summary (last 24 hours): Intake/Output Summary (Last 24 hours) at 2022 1417  Last data filed at 2022 1300  Gross per 24 hour   Intake 900 ml   Output 1500 ml   Net -600 ml       Physical Exam:     Physical Exam  Vitals and nursing note reviewed  Constitutional:       General: She is not in acute distress  Appearance: She is well-developed  She is not ill-appearing, toxic-appearing or diaphoretic  HENT:      Head: Normocephalic and atraumatic  Right Ear: External ear normal       Left Ear: External ear normal       Nose: Nose normal  No congestion or rhinorrhea  Mouth/Throat:      Mouth: Mucous membranes are moist       Pharynx: Oropharynx is clear  No oropharyngeal exudate or posterior oropharyngeal erythema  Eyes:      General: No scleral icterus  Conjunctiva/sclera: Conjunctivae normal    Cardiovascular:      Rate and Rhythm: Normal rate and regular rhythm  Heart sounds: No murmur heard  Pulmonary:      Effort: Pulmonary effort is normal  No respiratory distress  Breath sounds: Rales (mild, inspiratory, upper >lower) present  Chest:      Chest wall: No tenderness  Abdominal:      General: Bowel sounds are normal  There is no distension  Palpations: Abdomen is soft  Tenderness: There is no abdominal tenderness  Musculoskeletal:      Cervical back: Neck supple  Right lower leg: Edema (2+ pitting) present  Left lower leg: Edema (2+ pitting) present  Skin:     General: Skin is warm and dry  Capillary Refill: Capillary refill takes less than 2 seconds  Neurological:      General: No focal deficit present  Mental Status: She is alert     Psychiatric:         Mood and Affect: Mood normal          Behavior: Behavior normal         Additional Data:     Labs:  Results from last 7 days   Lab Units 09/29/22  0615 09/28/22  1431   WBC Thousand/uL 12 26* 12 99*   HEMOGLOBIN g/dL 9 6* 10 3*   HEMATOCRIT % 32 7* 35 3   PLATELETS Thousands/uL 271 332   NEUTROS PCT %  --  77*   LYMPHS PCT %  --  13*   MONOS PCT %  --  7   EOS PCT %  --  1     Results from last 7 days   Lab Units 09/29/22  0615   SODIUM mmol/L 141   POTASSIUM mmol/L 3 4*   CHLORIDE mmol/L 106   CO2 mmol/L 27   BUN mg/dL 26*   CREATININE mg/dL 0 90   ANION GAP mmol/L 8   CALCIUM mg/dL 8 3*   ALBUMIN g/dL 3 5   TOTAL BILIRUBIN mg/dL 0 81   ALK PHOS U/L 67   ALT U/L 180*   AST U/L 119*   GLUCOSE RANDOM mg/dL 112         Results from last 7 days   Lab Units 09/29/22  1135 09/29/22  0723 09/28/22  2304   POC GLUCOSE mg/dl 181* 108 189*               Imaging: Reviewed radiology reports from this admission including: chest xray    Recent Cultures (last 7 days):           Lines/Drains:  Invasive Devices  Report    Peripheral Intravenous Line  Duration           Peripheral IV 07/10/20 Right Antecubital 810 days    Peripheral IV 09/28/22 Right Wrist <1 day          Drain  Duration           Ureteral Drain/Stent Right ureter 6 Fr  810 days                Telemetry:   Telemetry Orders (From admission, onward)             48 Hour Telemetry Monitoring  (ED Bridging Orders Panel)  Continuous x 48 hours        References:    Telemetry Guidelines   Question:  Reason for 48 Hour Telemetry  Answer:  Acute Decompensated CHF (continuous diuretic infusion or total diuretic dose > 200 mg daily, associated electrolyte derangement, ionotropic drip, history of ventricular arrhythmia, or new EF <35%)                    Last 24 Hours Medication List:   Current Facility-Administered Medications   Medication Dose Route Frequency Provider Last Rate   • acetaminophen  650 mg Oral Q6H PRN Jarratt Spruce PETE Loera     • ALPRAZolam  0 25 mg Oral TID PRN PETE Lipscomb     • apixaban  5 mg Oral BID PETE Lipscomb     • atorvastatin  40 mg Oral Daily PETE Lipscomb     • ferrous sulfate  325 mg Oral Daily With Breakfast PETE Lipscomb     • furosemide  80 mg Intravenous BID Aakash Rolle PA-C     • insulin lispro  1-5 Units Subcutaneous TID AC PETE Cifuentes     • insulin lispro  1-5 Units Subcutaneous HS PETE Cifuentes     • [START ON 9/30/2022] losartan  25 mg Oral Daily Aakash Rolle PA-C     • metoprolol succinate  50 mg Oral BID PETE Lipscomb     • pantoprazole  40 mg Oral Early Morning PETE Lipscomb     • traMADol  100 mg Oral BID PETE Lipscomb          Today, Patient Was Seen By: Noemy Ray    ** Please Note: This note has been constructed using a voice recognition system   **

## 2022-09-29 NOTE — PLAN OF CARE
Problem: PHYSICAL THERAPY ADULT  Goal: Performs mobility at highest level of function for planned discharge setting  See evaluation for individualized goals  Description: Treatment/Interventions: Functional transfer training, LE strengthening/ROM, Elevations, Therapeutic exercise, Endurance training, Patient/family training, Equipment eval/education, Bed mobility, Gait training          See flowsheet documentation for full assessment, interventions and recommendations  Note:    Problem List: Decreased strength, Decreased endurance, Impaired balance, Decreased mobility, Decreased safety awareness, Impaired vision (LE edema)  Assessment: Pt presents with complaints of shortness of breath with exertion and orthopnea for the past 5 days  Dx: SOB, HTN urgency, leukocytosis, CAD, DM, elevated LFTs, and a-fib  order placed for PT eval and tx, w/ activity order of up as tolerated  pt presents w/ comorbidities of PAF, tachy-keanu syndrome s/p PPM, CAD, HTN, HLD, MI, back surgery, and DM2 and personal factors of advanced age, stair(s) to enter home and limited home support  pt presents w/ weakness, decreased endurance, impaired balance, gait deviations, visual impairment, decreased safety awareness, fall risk and LE edema  these impairments are evident in findings from physical examination (weakness and edema of extremities), mobility assessment (need for min assist w/ all phases of mobility when usually mobilizing independently, tolerance to only 20 feet of ambulation and need for cueing for mobility technique), and Barthel Index: 50/100  pt needed input for task focus and mobility technique  pt is at risk for falls due to physical and safety awareness deficits   pt's clinical presentation is unstable/unpredictable (evident in poor blood pressure control, need for assist w/ all phases of mobility when usually mobilizing independently, tolerance to only 20 feet of ambulation and need for input for task focus and mobility technique)  pt needs inpatient PT tx to improve mobility deficits and progress mobility training as appropriate  discharge recommendation is for inpatient rehab to reduce fall risk and maximize level of functional independence  PT Discharge Recommendation: Post acute rehabilitation services    See flowsheet documentation for full assessment

## 2022-09-29 NOTE — PHYSICAL THERAPY NOTE
PHYSICAL THERAPY EVALUATION NOTE    Patient Name: Ayesha Abdi  YQRMG'Y Date: 9/29/2022  AGE:   69 y o  Mrn:   [id number]  ADMIT DX:  Shortness of breath [R06 02]  Pulmonary edema [J81 1]    Past Medical History:   Diagnosis Date    Atypical chest pain     GERD (gastroesophageal reflux disease)     Hyperlipidemia     Hypertension     Kidney stone     Myocardial infarction [facility])     2015     NSTEMI (non-ST elevated myocardial infarction) ([facility] )     Last Assessed: 9/24/2015     Length Of Stay: 1  PHYSICAL THERAPY EVALUATION :    09/29/22 0847   PT Last Visit   PT Visit Date 09/29/22   Pain Assessment   Pain Assessment Tool 0-10   Pain Score No Pain   Restrictions/Precautions   Other Precautions Chair Alarm; Bed Alarm; Fall Risk;Visual impairment  ([place])   Home Living   Type of Home Apartment   Home Layout One level; Other (Comment)  (2 flights JURGEN w/ railing)   Additional Comments lives alone  ambulates w/o device  independent w/ ADLs  daughter is supportive  no falls in last 6  months  General   Additional Pertinent History 9/28/22 at 20:45 blood pressure was 207/86   Family/Caregiver Present No   Cognition   Arousal/Participation Alert   Orientation Level Oriented to person; Other (Comment)  (pt was identified w/ full name, birth date)   Following Commands Follows one step commands with increased time or repetition   Comments room air resting pulse ox 96% and 61 BPM, active 95% and 66 BPM  2+ edema LEs  Subjective   Subjective pt seen supine in bed  pt agreed to participate in PT eval  denied pain or dizziness  pt states becoming short of breath easily     RLE Assessment   RLE Assessment WFL  (3+ to 4-/5)   LLE Assessment   LLE Assessment WFL  (3+ to 4-/5)   Coordination   Sensation X  (impaired vision)   Light Touch   RLE Light Touch Grossly intact   LLE Light Touch Grossly intact   Bed Mobility   Supine to Sit 4  Minimal assistance Additional items Assist x 1;HOB elevated; Bedrails; Increased time required;Verbal cues;LE management  (for trunk/LE positioning)   Transfers   Sit to Stand 4  Minimal assistance   Additional items Assist x 1; Increased time required;Verbal cues  (for LE positioning)   Stand to Sit 4  Minimal assistance   Additional items Assist x 1; Increased time required; Impulsive;Verbal cues  (for body positioning, safety)   Toilet transfer 4  Minimal assistance   Additional items Assist x 1;Standard toilet; Impulsive; Increased time required;Verbal cues  (for grab bar use, body positioning)   Ambulation/Elevation   Gait pattern Shuffling; Foward flexed; Short stride; Excessively slow   Gait Assistance 4  Minimal assist  (w/o assistive device and w/ roller walker)   Additional items Assist x 1   Assistive Device None;Rolling walker   Distance 20 feet w/o device  pt used toilet  20 feet w/ roller walker   (additional ambulation not possible due to fatigue and dyspnea)   Stair Management Assistance Not tested  (due to limited ambulation tolerance, safety concerns)   Balance   Static Sitting Good   Static Standing Poor +   Ambulatory Poor +   Activity Tolerance   Activity Tolerance Patient limited by fatigue   Nurse Made Aware spoke to Bisi Burdick OTGabriela CM   Assessment   Problem List Decreased strength;Decreased endurance; Impaired balance;Decreased mobility; Decreased safety awareness; Impaired vision  (LE edema)   Assessment Pt presents with complaints of shortness of breath with exertion and orthopnea for the past 5 days  Dx: SOB, HTN urgency, leukocytosis, CAD, DM, elevated LFTs, and a-fib  order placed for PT eval and tx, w/ activity order of up as tolerated  pt presents w/ comorbidities of PAF, tachy-keanu syndrome s/p PPM, CAD, HTN, HLD, MI, back surgery, and DM2 and personal factors of advanced age, stair(s) to enter home and limited home support   pt presents w/ weakness, decreased endurance, impaired balance, gait deviations, visual impairment, decreased safety awareness, fall risk and LE edema  these impairments are evident in findings from physical examination (weakness and edema of extremities), mobility assessment (need for min assist w/ all phases of mobility when usually mobilizing independently, tolerance to only 20 feet of ambulation and need for cueing for mobility technique), and Barthel Index: 50/100  pt needed input for task focus and mobility technique  pt is at risk for falls due to physical and safety awareness deficits  pt's clinical presentation is unstable/unpredictable (evident in poor blood pressure control, need for assist w/ all phases of mobility when usually mobilizing independently, tolerance to only 20 feet of ambulation and need for input for task focus and mobility technique)  pt needs inpatient PT tx to improve mobility deficits and progress mobility training as appropriate  discharge recommendation is for inpatient rehab to reduce fall risk and maximize level of functional independence  Goals   Patient Goals go home   STG Expiration Date 10/09/22   Short Term Goal #1 pt will:  Increase bilateral LE strength 1/2 grade to facilitate independent mobility, Perform bed mobility modified independent to increase level of independence, Perform all transfers w/ supervision to improve independence, Ambulate 150 ft  with least restrictive assistive device w/ supervision w/o LOB to improve functional independence, Navigate 10 stair(s) w/ minx1 with unilateral handrail to facilitate return to previous living environment, Increase ambulatory balance 1 grade to decrease risk for falls, Complete exercise program independently to increase strength and endurance, Tolerate 3 hr OOB to faciliate upright tolerance, Improve Barthel Index score to 75 or greater to facilitate independence and Complete Timed Up and Go or Comfortable Gait Speed to further assess mobility and monitor progress   PT Treatment Day 0   Plan Treatment/Interventions Functional transfer training;LE strengthening/ROM; Elevations; Therapeutic exercise; Endurance training;Patient/family training;Equipment eval/education; Bed mobility;Gait training   PT Frequency Other (Comment)  (5x/week)   Recommendation   PT Discharge Recommendation Post acute rehabilitation services   AM-PAC Basic Mobility Inpatient   Turning in Bed Without Bedrails 3   Lying on Back to Sitting on Edge of Flat Bed 3   Moving Bed to Chair 3   Standing Up From Chair 3   Walk in Room 3   Climb 3-5 Stairs 1   Basic Mobility Inpatient Raw Score 16   Basic Mobility Standardized Score 38 32   Highest Level Of Mobility   -[facility] Goal 5: Stand one or more mins   -[facility] Achieved 7: Walk 25 feet or more   Barthel Index   Feeding 5   Bathing 0   Grooming Score 5   Dressing Score 5   Bladder Score 10   Bowels Score 10   Toilet Use Score 5   Transfers (Bed/Chair) Score 10   Mobility (Level Surface) Score 0   Stairs Score 0   Barthel Index Score 50   End of Consult   Patient Position at End of Consult Bedside chair;Bed/Chair alarm activated; All needs within reach     The patient's AM-PAC Basic Mobility Inpatient Short Form Raw Score is 16  A Raw score of less than or equal to 16 suggests the patient may benefit from discharge to post-acute rehabilitation services  Please also refer to the recommendation of the Physical Therapist for safe discharge planning  Skilled PT recommended while in hospital and upon DC to progress pt toward treatment goals       Peter Fitch

## 2022-09-29 NOTE — ASSESSMENT & PLAN NOTE
Presents with  increased shortness of breath, dyspnea on exertion and lower extremity edema in setting of decreased diuretic dosing 1-2 weeks ago and high sodium diet  Patient reports being treated by her outpatient PCP with PO Lasix r/t bilateral lower extremity edema; however, about 2 months ago her PCP instructed her to take the PO Lasix once every 3 days  Dry weight: 130-132 lbs  Patient is currently volume overloaded  CXR at ED: Vascular congestion and LCW pacemaker in place  2/4584 Echo: LV systolic function was normal, LVEF estimated to be 60%, no RWMA, G2DD  COVID negative  BNP: 1242  Diuresis: PTA medication of 20 mg of PO Lasix q3 days  Received 40 mg of IV Lasix in ED  Weight (last 2 days)     Date/Time Weight    09/29/22 0600 63 3 (139 55)    09/28/22 23:07:34 63 3 (139 55)          Intake/Output Summary (Last 24 hours) at 9/29/2022 1323  Last data filed at 9/29/2022 1300  Gross per 24 hour   Intake 900 ml   Output 1500 ml   Net -600 ml     • Meds: Diuretic: IV Lasix 80 mg BID, B-Blocker: Toprol XL 50 mg BID, ACE/ARB/ARNi: Losartan 25 mg daily  · Diet: Sodium 2g, FR 1500 mL  · Labs: Trend CMP, CBC, Mag  · Maintain Mg > 2 and K > 4; Replete prn  · Elevate HOB > 30°, Daily standing weights, Measure I/Os, Monitor on Telemetry  • Follow up cardiology consult, appreciate recs

## 2022-09-29 NOTE — ASSESSMENT & PLAN NOTE
Currently denies chest pain  S/P grafting x3  Most recent cardiac catheterization was in May 2020 which displayed multivessel disease that was completely revascularized  · Continue Toprol XL

## 2022-09-29 NOTE — OCCUPATIONAL THERAPY NOTE
Occupational Therapy Evaluation     Patient Name: Yandel Rae  OSNWR'N Date: 9/29/2022  Problem List  Principal Problem:    Acute on chronic diastolic congestive heart failure ([facility] )  Active Problems:    CAD (coronary atherosclerotic disease)    Gastroesophageal reflux disease    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, without long-term current use of insulin (HCC)    Leukocytosis    Pacemaker    [facility]ox[facility]    Hypertensive urgency    Shortness of breath    Elevated LFTs    Past Medical History  Past Medical History:   Diagnosis Date    Atypical chest pain     GERD (gastroesophageal reflux disease)     Hyperlipidemia     Hypertension     Kidney stone     Myocardial infarction ([facility] )     2015     NSTEMI (non-ST elevated myocardial infarction) ([place] [address] )     Last Assessed: 9/24/2015     Past Surgical History  Past Surgical History:   Procedure Laterality Date    A-V CARDIAC PACEMAKER INSERTION      ANKLE SURGERY      BACK SURGERY  01/2011    L4 and L5 laminectomy and fusion     BLADDER SURGERY      CORONARY ARTERY BYPASS GRAFT  09/02/2015    CABGx3 with LIMA to LAD, SVG to PDA, SVG to OM-1    EXAMINATION UNDER ANESTHESIA N/A 7/11/2020    Procedure: EXAM UNDER ANESTHESIA (EUA)  EXCISION OF POSTERIOR VAGINAL FOREIGN BODY;  Surgeon: Ramila Galvez MD;  [facility];  Service: Gynecology    FL RETROGRADE PYELOGRAM  7/11/2020    FL RETROGRADE PYELOGRAM  8/7/2020    HYSTERECTOMY      MN CYSTO/URETERO W/LITHOTRIPSY &INDWELL STENT INSRT Right 8/7/2020    Procedure: CYSTOSCOPY URETEROSCOPY WITH LITHOTRIPSY HOLMIUM LASER, RETROGRADE PYELOGRAM AND INSERTION STENT URETERAL;  Surgeon: Tania Dorantes MD;  [facility];  Service: Urology    MN CYSTOURETHROSCOPY,URETER CATHETER Right 7/11/2020    Procedure: CYSTOSCOPY RETROGRADE PYELOGRAM WITH INSERTION STENT URETERAL, bladder biopsy with fulguration;  Surgeon: Tania Dorantes MD;  [facility];  Service: Urology    WRIST SURGERY             09/29/22 0827   OT Last Visit   OT Visit Date 09/29/22  (Thursday)   Note Type   Note type Evaluation   Restrictions/Precautions   Weight Bearing Precautions Per Order No   Other Precautions Chair Alarm; Bed Alarm;Telemetry; Fall Risk;Visual impairment  (+ LE edema; 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")   Pain Assessment   Pain Assessment Tool 0-10   Pain Score No Pain   Pain Location/Orientation   (pt reports generalized weakness and SOB)   Home Living   Type of Home Apartment  (3rd floor w/ at least 17 JURGEN)   Home Layout One level; Able to live on main level with bedroom/bathroom;Stairs to enter with rails   Bathroom Shower/Tub Tub/shower unit   Bathroom Toilet Standard   Bathroom Equipment Grab bars in [employer] [address];Grab bars   Additional Comments Pt reports living alone 3rd floor walk up apt w/ + JURGEN  Prior Function   Level of [place] Needs assistance with IADLs   Lives With (S)  Alone   Receives Help From Family; Neighbor;Friend(s); Other (Comment)  (pt reports supportive daughter and friend assist w/ IADLs)   ADL Assistance Independent   IADLs Needs assistance   Falls in the last 6 months 0   Vocational Retired   Comments Pt reports I w/ ADLs w/ out use of AD  Pt reports friend has been assisting w/ IADLs and cleaning - de-cluttering apt  Pt added that her daughter checks is and does grocery shopping, laundry   Lifestyle   Autonomy Pt reports ling alone in apt   Reciprocal Relationships Pt reports livng alone   Supportive famly, friends / neighbors   Service to Others Pt reports retired and added that she worked at [employer] in [address] [place] [address] Pt reports watching tv as able especially game shoes   Subjective   Subjective "I was doing pretty good at home until this started"   ADL   Where Assessed Edge of bed  (vs bathroom)   Eating Assistance 6  Modified independent   Eating Deficit Setup; Increased time to complete; Other (Comment)  (able to feed self w/ + time after seated OOB In chair)   Grooming Assistance 5  Supervision/Setup   Grooming Deficit Setup;Supervision/safety; Increased time to complete;Verbal cueing;Steadying   UB Bathing Assistance Unable to assess   LB Bathing Assistance Unable to assess   UB Dressing Assistance 5  Supervision/Setup   UB Dressing Deficit Setup;Supervision/safety; Increased time to complete;Verbal cueing  (due to visual deficits seated at EOB)   LB Dressing Assistance 3  Moderate Assistance   LB Dressing Deficit Don/doff R sock; Don/doff L sock; Thread RLE into pants;Setup;Steadying; Increased time to complete;Verbal cueing;Supervision/safety   Toileting Assistance  4  Minimal Assistance   Toileting Deficit Setup;Steadying;Verbal cueing;Grab bar use   Bed Mobility   Supine to Sit 4  Minimal assistance   Additional items Assist x 1; Increased time required;Verbal cues;LE management; Bedrails  (to pt's L)   Sit to Supine Unable to assess   Additional Comments Pt seated OOB In chair post eval w/ needs met, call bell in reach and chair alarm activated eating breakfast   Transfers   Sit to Stand 4  Minimal assistance   Additional items Assist x 1; Increased time required;Verbal cues  (instruction for hand placement)   Stand to Sit Unable to assess   Toilet transfer 4  Minimal assistance   Additional items Assist x 1; Increased time required;Verbal cues;Standard toilet; Other  (L grab bar use)   Additional Comments Pt performed sit <> stand 2 X   Functional Mobility   Functional Mobility 4  Minimal assistance   Additional Comments Pt engaged in functional mobility w/ out use of AD w/ min A from EOB to bathroom   Introdcued RW and pt demosntrated improved balance, pace w/ min A   Additional items Rolling walker  (vs no AD)   Balance   Static Sitting Good   Dynamic Sitting Fair -   Static Standing Poor +   Ambulatory Poor +   Activity Tolerance   Activity Tolerance Patient limited by fatigue   Medical Staff Made Aware care coordination w/ PT, RJ and spoke to Gabriela SWAN   Nurse Made Aware per RNLong approrpriate to see pt   RUE Assessment   RUE Assessment WFL   RUE Strength   RUE Overall Strength Within Functional Limits - able to perform ADL tasks with strength   LUE Assessment   LUE Assessment WFL   LUE Strength   LUE Overall Strength Within Functional Limits - able to perform ADL tasks with strength   Hand Function   Gross Motor Coordination Functional   Fine Motor Coordination Functional   Sensation   Light Touch No apparent deficits  (B UE to light touch)   Additional Comments + 1 edema distal LE and feet   Vision-Basic Assessment   Visual History Macular degeneration  (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 during conversation and social history)   Following Commands Follows multistep commands with increased time or repetition   Comments Identified pt by full name and birthdate  Alert and generally oriented  Able to provide social history, home set- up and follow directions during ADLs   Assessment   Limitation Decreased ADL status; Decreased UE strength;Decreased endurance;Decreased self-care trans;Decreased high-level ADLs   Assessment Pt is a 70 yo female [facility]  Pt presented w/ shortness of breath w/ exertion and orthopnea X 5 days  Diagnosed w/ acute on chronic diastolic CHF  Significant PMH Impacting her performance includes CAD, DM, anxiety, macular degeneration   Personal factors impacting performance includes limited vision, lives alone, difficulty managing steps, difficulty completing IADLs  Pt reports living alone in 3rd floor apt w/ + JURGEN PTA  Pt reports I w/ ADLs and needs assistance w/ IADLs  Upon eval, pt alert and generally oriented  Pt required min A to complete bed mobility, sit <> stand and functional mobility w/ out use of AD and RW  Pt completed LBD w/ mod A and UBD w/ S  Pt completed toilet transfer w/ min A  Pt presents w/ decreased activity tolerance, decreased endurance, limited vision, decreased standing tolerance, decreased standing balance, increased edema impacting her I w/ dressing, bathing, oral hygiene, functional mobility, functional transfers, activity engagement, clothing mgmt, food prep / clean up  Pt completing ADL below baseline level of I and would benefit from OT while in acute care to address deficits  Based on Barthel Index and AMPAC 6 click recommend post acute rehab when medically stable for discharge from acute care  Will continue to follow   Goals   Patient Goals Pt stated that she would like to return home and plans to keep her friend to help  Pt added that applied for an apt at the [facility] but is waiting / has not heard  Plan   Treatment Interventions ADL retraining;Functional transfer training; Endurance training;Patient/family training;Equipment evaluation/education; Compensatory technique education;Continued evaluation; Energy conservation; Activityengagement   Goal Expiration Date 10/06/22   OT Frequency 3-5x/wk   Recommendation   OT Discharge Recommendation Post acute rehabilitation services   AM-PAC Daily Activity Inpatient   Lower Body Dressing 2   Bathing 2   Toileting 3   Upper Body Dressing 4   Grooming 3   Eating 4   Daily Activity Raw Score 18   Daily Activity Standardized Score (Calc for Raw Score >=11) 38 66   AM-PAC Applied Cognition Inpatient   Following a Speech/Presentation 3   Understanding Ordinary Conversation 4   Taking Medications 3   Remembering Where Things Are Placed or Put Away 4   Remembering List of 4-5 Errands 3   Taking Care of Complicated Tasks 3   Applied Cognition Raw Score 20   Applied Cognition Standardized Score 41 76   Barthel Index   Feeding 5   Bathing 0   Grooming Score 5   Dressing Score 5   Bladder Score 10   Bowels Score 10   Toilet Use Score 5   Transfers (Bed/Chair) Score 10   Mobility (Level Surface) Score 0   Stairs Score 0   Barthel Index Score 50   Modified Bindu Scale   Modified Bindu Scale 4      The patient's raw score on the AM-PAC Daily Activity inpatient short form is 18, standardized score is 38 66, less than 39 4  Patients at this level are likely to benefit from discharge to post-acute rehabilitation services  Please refer to the recommendation of the Occupational Therapist for safe discharge planning      Pt goals to be met b y 10/6/22 to max I and w/ ADLs and return home include:    -Pt will demonstrate good attention and participation in ongoing eval of functional cognitive skills to assist in DC planning    -Pt will consistently follow 2 step directions during ADLs w/ good recall to max I and return home    -Pt will complete bed mobility supine <> sit w/ mod I to max I w/ ADLs and return home alone    -Pt will complete functional transfers to bed, chair, and toilet using AD, DME as needed w/ S to max I w/ ADLs    -Pt will consistently engage in functional mobility using AD as needed w/ S household distances to max I w/ ADLs    -Pt will demonstrate improved activity and sitting tolerance OOB in chair for all meals    -Pt will complete LBD w/ S after set- up    -Pt will demonstrate improved functional standing tolerance for at least 10 minutes w/ fair balance using AD as needed to max I w/ IADLs and functional mobility to return home    Apurva Chang, OTR/L

## 2022-09-29 NOTE — ASSESSMENT & PLAN NOTE
Lab Results   Component Value Date    HGBA1C 6 06/29/2022       Recent Labs     09/28/22  2304 09/29/22  0723 09/29/22  1135   POCGLU 189* 108 181*       Blood Sugar Average: Last 72 hrs:  · (P) 159 [id number] regimen of Metformin, will hold while inpatient  · Accu-Checks and SSI  · Hypoglycemia protocol

## 2022-09-29 NOTE — ASSESSMENT & PLAN NOTE
Lab Results   Component Value Date    HGBA1C 6 06/29/2022       No results for input(s): POCGLU in the last 72 hours  Blood Sugar Average: Last 72 hrs:  · PTA regimen of Metformin, will hold while inpatient  · Accu-Checks and SSI  · Hypoglycemia protocol

## 2022-09-29 NOTE — ASSESSMENT & PLAN NOTE
Blood pressure elevated on arrival with readings in 180s-200s/70s-80s  Patient just received dose of IV Lasix, repeat BP post administration  Continue PTA medications of Toprol XL and Cozaar for now, consider holding Cozaar pending renal function while on IV Lasix inpatient  Monitor blood pressure

## 2022-09-29 NOTE — PLAN OF CARE
Problem: Prexisting or High Potential for Compromised Skin Integrity  Goal: Skin integrity is maintained or improved  Description: INTERVENTIONS:  - Identify patients at risk for skin breakdown  - Assess and monitor skin integrity  - Assess and monitor nutrition and hydration status  - Monitor labs   - Assess for incontinence   - Turn and reposition patient  - Assist with mobility/ambulation  - Relieve pressure over bony prominences  - Avoid friction and shearing  - Provide appropriate hygiene as needed including keeping skin clean and dry  - Evaluate need for skin moisturizer/barrier cream  - Collaborate with interdisciplinary team   - Patient/family teaching  - Consider wound care consult   Outcome: Progressing     Problem: MOBILITY - ADULT  Goal: Maintain or return to baseline ADL function  Description: INTERVENTIONS:  -  Assess patient's ability to carry out ADLs; assess patient's baseline for ADL function and identify physical deficits which impact ability to perform ADLs (bathing, care of mouth/teeth, toileting, grooming, dressing, etc )  - Assess/evaluate cause of self-care deficits   - Assess range of motion  - Assess patient's mobility; develop plan if impaired  - Assess patient's need for assistive devices and provide as appropriate  - Encourage maximum independence but intervene and supervise when necessary  - Involve family in performance of ADLs  - Assess for home care needs following discharge   - Consider OT consult to assist with ADL evaluation and planning for discharge  - Provide patient education as appropriate  Outcome: Progressing  Goal: Maintains/Returns to pre admission functional level  Description: INTERVENTIONS:  - Perform BMAT or MOVE assessment daily    - Set and communicate daily mobility goal to care team and patient/family/caregiver     - Collaborate with rehabilitation services on mobility goals if consulted  - Dangle patient   - Stand patient   - Ambulate patient   - Out of bed to chair   - Out of bed for meals   - Out of bed for toileting  - Record patient progress and toleration of activity level   Outcome: Progressing

## 2022-09-29 NOTE — ASSESSMENT & PLAN NOTE
Rate/Rhythm Control: Toprol XL, Amiodarone  Antiplatelet/Anticoagulation: Eliquis  Per device interrogation, has been 100% AFib since 11/2021  EKG: ventricular paced rhythm with underlying flutter    · Discontinue Amiodarone per cardiology rec  · Continue Toprol XL, Eliquis

## 2022-09-29 NOTE — PLAN OF CARE
Problem: OCCUPATIONAL THERAPY ADULT  Goal: Performs self-care activities at highest level of function for planned discharge setting  See evaluation for individualized goals  Description: Treatment Interventions: ADL retraining, Functional transfer training, Endurance training, Patient/family training, Equipment evaluation/education, Compensatory technique education, Continued evaluation, Energy conservation, Activityengagement          See flowsheet documentation for full assessment, interventions and recommendations  Note: Limitation: Decreased ADL status, Decreased UE strength, Decreased endurance, Decreased self-care trans, Decreased high-level ADLs     Assessment: Pt is a 70 yo female admitted to [facility] on 9/28/22  Pt presented w/ shortness of breath w/ exertion and orthopnea X 5 days  Diagnosed w/ acute on chronic diastolic CHF  Significant PMH Impacting her performance includes CAD, DM, anxiety, macular degeneration  Personal factors impacting performance includes limited vision, lives alone, difficulty managing steps, difficulty completing IADLs  Pt reports living alone in 3rd floor apt w/ + JURGEN PTA  Pt reports I w/ ADLs and needs assistance w/ IADLs  Upon eval, pt alert and generally oriented  Pt required min A to complete bed mobility, sit <> stand and functional mobility w/ out use of AD and RW  Pt completed LBD w/ mod A and UBD w/ S  Pt completed toilet transfer w/ min A  Pt presents w/ decreased activity tolerance, decreased endurance, limited vision, decreased standing tolerance, decreased standing balance, increased edema impacting her I w/ dressing, bathing, oral hygiene, functional mobility, functional transfers, activity engagement, clothing mgmt, food prep / clean up  Pt completing ADL below baseline level of I and would benefit from OT while in acute care to address deficits   Based on Barthel Index and AMPAC 6 click recommend post acute rehab when medically stable for discharge from acute care  Will continue to follow     OT Discharge Recommendation: Post acute rehabilitation services

## 2022-09-29 NOTE — CONSULTS
Consultation - Cardiology Team One  Jade Shock 69 y o  female MRN: [id number]  Unit/Bed#: S -67 Encounter: [id number]    Inpatient consult to Cardiology  Consult performed by: Herminia Dick PA-C  Consult ordered by: PETE Mendoza          Physician Requesting Consult: Tesha Gabriel MD  Reason for Consult / Principal Problem:  CHF    Assessment:    1  Acute HFpEF:  Presents with progressive dyspnea, orthopnea, lower extremity edema in the setting of decreased oral diuretic dosing and high sodium diet  Was started on oral diuretics by PCP 2 months ago for lower extremity edema with recent dose 2 weeks ago  CXR pulmonary vascular congestion and bilateral small pleural effusions  BNP 1242  Currently on IV Lasix 40 mg BID  · Echocardiogram 05/26/2020:  EF 60% with no WMA, G2DD, normal RV function, mild-moderate LA dilatation, no significant valvular abnormality  · Home diuretic regimen:  Lasix 20 mg twice weekly  · Dry weight:  130-132 lb  · Weight on admission:  139 lb per standing scale  · Weight today:  139 lb per bed scale  · I&Os:  Net output -1 0 L  · She appears volume overloaded on exam with lower extremity edema, bilateral rales and JVD  2  CAD: s/p CABG x3 in 2015 (LIMA-LAD, SVG-OM1, SVG-PDA)  Maintained on statin beta-blocker  No aspirin due to anticoagulation with Eliquis  · Cardiac catheterization 05/2020:  Patent grafts  3  SSS: s/p MDT DC PPM (MRI conditional) implanted 06/2020  4  PAF/Flutter:  Maintained on amiodarone 200 mg daily although has been 100% AFib since 11/2021 per device interrogation     EKG ventricular paced rhythm with underlying flutter  Anticoagulated on Eliquis 5 mg BID  5  Essential hypertension:  Average /76, last /68 on losartan 25 mg daily and Toprol-XL 50 mg BID  6  Hyperlipidemia:  Lipid panel 07/2022 , TG 97, HDL 60, LDL 32 on atorvastatin 40 mg daily  7  Type 2 DM:  Hemoglobin A1c 6 in 06/2022    Management per primary team   8  Transaminitis:  Elevated LFTs noted this admission  9  Anemia:  Hemoglobin at baseline around 10    Plan/Recommendations:  · Echocardiogram to evaluate heart function valvular status  · Continue IV Lasix a higher dose of 80 mg BID with goal output of 2-3 L in 24 hours  · Monitor I&Os, renal function, electrolytes and standing weight  · Maintain potassium >4 and magnesium >2  · Discontinue amiodarone  · Continue beta-blocker, losartan, statin and Eliquis  ______________________________________________________    CC:  Shortness of breath      History of Present Illness   HPI: Nay Bowers is a 69y o  year old female who has SSS s/p MDT DC PPM implanted 06/2020, PAF/flutter, CAD s/p CABG x3 in 2015 (LIMA-LAD, SVG-OM1, SVG-PDA), essential hypertension, hyperlipidemia, type 2 DM, AAA who follows with cardiologist Dr Stone Cordova  Patient presented to the emergency room at [facility] on 09/28/2022 with complaints of progressively worsening shortness of breath over the last 5 days, orthopnea, PND, lower extremity edema  She stated that her PCP started Lasix a few months ago for lower extremity edema  Two weeks ago her PCP advised her to decrease her Lasix to twice a week as her swelling had improved  On arrival to the ED patient's BP was 185/74 with oxygen saturation 97% on room air  EKG revealed ventricular paced rhythm  CXR revealed congestive failure with bilateral small pleural effusions  Labs revealed BUN 26, elevated LFTs, BNP 1242, HS troponin negative x3, stable CBC  SARS-COV-2 was negative  She was given 40 mg IV Lasix in the ED and subsequently started on 40 IV BID  Cardiology has been consulted further evaluation management of heart failure  Home medication regimen includes amiodarone 200 mg daily, Eliquis 5 mg BID, atorvastatin 40 mg daily, Lasix 20 mg daily, losartan 25 mg daily, Toprol-XL 50 mg BID      Patient resting in bed during consultation and states that since decreasing the dose of her Lasix she has noticed worsening lower extremity edema over the last 2 weeks with progressive shortness of breath, orthopnea, PND in the last week  She nurses a history of eating frequent TV dinners as well as drinking lots of water  She currently feels her breathing has slightly improved but is still not at baseline  Device interrogation 08/18/2022: MDT NESTOR PPM (MRI conditional)  AP 0%,  83%  100% AF since 11/2021  Normal device function  Cardiac catheterization 05/26/2020:  CORONARY CIRCULATION:  Left main: Normal   LAD: The vessel was small sized  There was a 90% stenosis in mid vessel  The distal LAD filled via a LIMA bypass conduit  The diagonal branches were small and free of critical disease  Circumflex: The vessel was medium sized  There was an 80% stenosis in mid vessel  The lesion was interrogated by iFR and was negative for flow significance (iFR=1 0)  The OM branch contained a 95% stenosis and filled by an SVG  RCA: The vessel was normal sized and dominant, giving rise to the PDA and a posterolateral branch  There was a total occlusion in mid vessel  The PDA and distal RCA filled by an SVG  Graft to the LAD: The graft was a LIMA  The body of the graft and the distal anastomosis were free of significant obstruction  The small LAD filled from the LIMA and was free of critical distal disease  Graft to the 1st obtuse marginal: The graft was a saphenous vein graft from the ascending aorta  The body of the graft and the anastomses were normal  The grafted OM1 filled well from the SVG and was free of critical disease  Graft to the RPDA: The graft was a saphenous vein graft from the ascending aorta  The body of the graft and the anastomses were normal  The grafted PDA filled well from the SVG and was free of critical disease  The PDA sent retrograde flow  to the distal RCA and the major posterolateral branch      Summary:  Multivessel CAD with patent grafts   The patient is completely revascularized  Plan: smoking cessation  Follow-up with cardiology  Echocardiogram 05/26/2020:  EF 60% with no WMA, G2DD, normal RV function, mild-moderate LA dilatation, no significant valvular abnormality  EKG reviewed personally: 9/28/2022-ventricular paced rhythm with underlying atrial flutter  When compared to the EKG from 07/10/2020 atrial paced rhythm was noted  Telemetry reviewed personally:  Intermittent paced rhythm with underlying flutter        Review of Systems   Constitutional: Negative  Negative for chills  Cardiovascular: Positive for leg swelling and orthopnea  Negative for chest pain, dyspnea on exertion, near-syncope, palpitations, paroxysmal nocturnal dyspnea and syncope  Respiratory: Positive for shortness of breath  Negative for cough and wheezing  Endocrine: Negative  Hematologic/Lymphatic: Negative  Skin: Negative  Musculoskeletal: Negative  Gastrointestinal: Negative  Negative for diarrhea, nausea and vomiting  Neurological: Negative for dizziness, light-headedness and weakness  Psychiatric/Behavioral: Negative  Negative for altered mental status  All other systems reviewed and are negative      Historical Information   Past Medical History:   Diagnosis Date   • Atypical chest pain    • GERD (gastroesophageal reflux disease)    • Hyperlipidemia    • Hypertension    • Kidney stone    • Myocardial infarction [facility])     2015    • [facility]STEM[facility]non[facility]ST [facility]     Last Assessed: 9/24/2015     Past Surgical History:   Procedure Laterality Date   • A-V CARDIAC PACEMAKER INSERTION     • ANKLE SURGERY     • BACK SURGERY  01/2011    L4 and L5 laminectomy and fusion    • BLADDER SURGERY     • CORONARY ARTERY BYPASS GRAFT  09/02/2015    CABGx3 with LIMA to LAD, SVG to PDA, SVG to OM-1   • EXAMINATION UNDER ANESTHESIA N/A 7/11/2020    Procedure: EXAM UNDER ANESTHESIA (EUA)  EXCISION OF POSTERIOR VAGINAL FOREIGN BODY; Surgeon: Justin Celeste MD;  [facility];  Service: Gynecology   • FL RETROGRADE PYELOGRAM  7/11/2020   • FL RETROGRADE PYELOGRAM  8/7/2020   • HYSTERECTOMY     • TX CYSTO/URETERO W/LITHOTRIPSY &INDWELL STENT INSRT Right 8/7/2020    Procedure: CYSTOSCOPY URETEROSCOPY WITH LITHOTRIPSY HOLMIUM LASER, RETROGRADE PYELOGRAM AND INSERTION STENT URETERAL;  Surgeon: Liberty Eckert MD;  [facility];  Service: Urology   • TX CYSTOURETHROSCOPY,URETER CATHETER Right 7/11/2020    Procedure: CYSTOSCOPY RETROGRADE PYELOGRAM WITH INSERTION STENT URETERAL, bladder biopsy with fulguration;  Surgeon: Liberty Eckert MD;  [facility];  Service: Urology   • WRIST SURGERY       Social History     Substance and Sexual Activity   Alcohol Use Not Currently     Social History     Substance and Sexual Activity   Drug Use Never     Social History     Tobacco Use   Smoking Status Current Every Day Smoker   • Packs/day: 0 25   • Start date: 5   Smokeless Tobacco Never Used   Tobacco Comment    Started smoking at age 25; currently smoking <1ppd       Family History:   Family History   Problem Relation Age of Onset   • Hypertension Mother    • Hypertension Sister    • Alcohol abuse Brother    • Cirrhosis Brother    • Diabetes Father    • Other Brother         Heart transplant in his 46s   • Lung cancer Sister    • Diabetes Brother    • Stroke Sister    • No Known Problems Daughter    • No Known Problems Maternal Grandmother    • No Known Problems Maternal Grandfather    • No Known Problems Paternal Grandmother    • No Known Problems Paternal Grandfather    • No Known Problems Sister        Meds/Allergies   all current active meds have been reviewed, current meds:   Current Facility-Administered Medications   Medication Dose Route Frequency   • acetaminophen (TYLENOL) tablet 650 mg  650 mg Oral Q6H PRN   • ALPRAZolam (XANAX) tablet 0 25 mg  0 25 mg Oral TID PRN   • amiodarone tablet 200 mg  200 mg Oral Daily With Breakfast   • apixaban (ELIQUIS) tablet 5 mg  5 mg Oral BID   • atorvastatin (LIPITOR) tablet 40 mg  40 mg Oral Daily   • ferrous sulfate tablet 325 mg  325 mg Oral Daily With Breakfast   • furosemide (LASIX) injection 40 mg  40 mg Intravenous BID   • insulin lispro (HumaLOG) 100 units/mL subcutaneous injection 1-5 Units  1-5 Units Subcutaneous TID AC   • insulin lispro (HumaLOG) 100 units/mL subcutaneous injection 1-5 Units  1-5 Units Subcutaneous HS   • losartan (COZAAR) tablet 25 mg  25 mg Oral Daily   • metoprolol succinate (TOPROL-XL) 24 hr tablet 50 mg  50 mg Oral BID   • pantoprazole (PROTONIX) EC tablet 40 mg  40 mg Oral Early Morning   • potassium chloride (K-DUR,KLOR-CON) CR tablet 20 mEq  20 mEq Oral Q2H   • traMADol (ULTRAM) tablet 100 mg  100 mg Oral BID    and PTA meds:   Prior to Admission Medications   Prescriptions Last Dose Informant Patient Reported? Taking?    ALPRAZolam (XANAX) 0 25 mg tablet 9/28/2022 at Unknown time  No Yes   Sig: Take 1 tablet (0 25 mg total) by mouth 3 (three) times a day as needed for anxiety   COMBIGAN 0 2-0 5 % Unknown at Unknown time Self Yes No   acetaminophen (TYLENOL) 325 mg tablet 9/28/2022 at Unknown time Self No Yes   Sig: Take 2 tablets (650 mg total) by mouth every 4 (four) hours as needed for mild pain   amiodarone 200 mg tablet 9/28/2022 at Unknown time  No Yes   Sig: TAKE 1 TABLET DAILY   apixaban (Eliquis) 5 mg 9/28/2022 at Unknown time Self No Yes   Sig: Take 1 tablet (5 mg total) by mouth 2 (two) times a day   atorvastatin (LIPITOR) 40 mg tablet 9/28/2022 at Unknown time Self No Yes   Sig: Take 1 tablet (40 mg total) by mouth daily   ferrous sulfate 325 (65 Fe) mg tablet 9/28/2022 at Unknown time Self Yes Yes   Sig: Take 325 mg by mouth daily with breakfast   furosemide (LASIX) 20 mg tablet 9/28/2022 at Unknown time Self No Yes   Sig: Take 1 tablet (20 mg total) by mouth daily   losartan (COZAAR) 25 mg tablet 9/28/2022 at Unknown time Self No Yes   Sig: Take 1 tablet (25 mg total) by mouth daily   metFORMIN (GLUCOPHAGE) 500 mg tablet 9/28/2022 at Unknown time Self No Yes   Sig: Take 1 tablet (500 mg total) by mouth 2 (two) times a day with meals   metoprolol succinate (TOPROL-XL) 50 mg 24 hr tablet 9/28/2022 at Unknown time Self No Yes   Sig: Take 1 tablet (50 mg total) by mouth 2 (two) times a day   omeprazole (PriLOSEC) 20 mg delayed release capsule 9/28/2022 at Unknown time Self No Yes   Sig: Take 1 capsule (20 mg total) by mouth daily   potassium chloride (MICRO-K) 10 MEQ CR capsule 9/28/2022 at Unknown time Self No Yes   Sig: Take 2 capsules (20 mEq total) by mouth daily   traMADol (ULTRAM) 50 mg tablet 9/28/2022 at Unknown time  No Yes   Sig: Take 2 tablets (100 mg total) by mouth 2 (two) times a day      Facility-Administered Medications: None          Allergies   Allergen Reactions   • Nickel Rash       Objective   Vitals: Blood pressure 136/68, pulse 60, temperature 99 4 °F (37 4 °C), resp  rate 18, height 4' 11" (1 499 m), weight 63 3 kg (139 lb 8 8 oz), SpO2 95 %, not currently breastfeeding ,     Body mass index is 28 19 kg/m²  ,     Systolic (44ICQ), LQA:194 , Min:136 , GNJ:441     Diastolic (47NVC), CNP:72, Min:68, Max:86    Wt Readings from Last 3 Encounters:   09/29/22 63 3 kg (139 lb 8 8 oz)   08/02/22 59 9 kg (132 lb)   07/18/22 61 7 kg (136 lb)      Lab Results   Component Value Date    CREATININE 0 90 09/29/2022    CREATININE 0 89 09/28/2022    CREATININE 0 80 07/08/2022             Intake/Output Summary (Last 24 hours) at 9/29/2022 1034  Last data filed at 9/29/2022 1006  Gross per 24 hour   Intake --   Output 1200 ml   Net -1200 ml     Weight (last 2 days)     Date/Time Weight    09/29/22 0600 63 3 (139 55)    09/28/22 23:07:34 63 3 (139 55)        Invasive Devices  Report    Peripheral Intravenous Line  Duration           Peripheral IV 07/10/20 Right Antecubital 810 days    Peripheral IV 09/28/22 Right Wrist <1 day          Drain  Duration Ureteral Drain/Stent Right ureter 6 Fr  809 days                  Physical Exam  Vitals and nursing note reviewed  Constitutional:       General: She is not in acute distress  Appearance: She is well-developed  She is obese  Comments: On RA in NAD   HENT:      Head: Normocephalic and atraumatic  Neck:      Vascular: JVD present  Cardiovascular:      Rate and Rhythm: Normal rate and regular rhythm  Heart sounds: Normal heart sounds  No murmur heard  No friction rub  Pulmonary:      Effort: Pulmonary effort is normal  No respiratory distress  Breath sounds: Rales present  No wheezing  Comments: Diminished breath sounds at the bases  Abdominal:      General: Bowel sounds are normal  There is no distension  Palpations: Abdomen is soft  Tenderness: There is no abdominal tenderness  Musculoskeletal:         General: No tenderness  Normal range of motion  Cervical back: Normal range of motion and neck supple  Right lower leg: Edema present  Left lower leg: Edema present  Comments: + edema bilateral lower extremities to upper calves   Skin:     General: Skin is warm and dry  Coloration: Skin is pale  Findings: No erythema  Neurological:      Mental Status: She is alert and oriented to person, place, and time  Psychiatric:         Mood and Affect: Mood normal          Behavior: Behavior normal          Thought Content:  Thought content normal          Judgment: Judgment normal            LABORATORY RESULTS:      CBC with diff:   Results from last 7 days   Lab Units 09/29/22  0615 09/28/22  1431   WBC Thousand/uL 12 26* 12 99*   HEMOGLOBIN g/dL 9 6* 10 3*   HEMATOCRIT % 32 7* 35 3   MCV fL 87 88   PLATELETS Thousands/uL 271 332   MCH pg 25 4* 25 6*   MCHC g/dL 29 4* 29 2*   RDW % 18 5* 18 4*   MPV fL 10 6 11 4   NRBC AUTO /100 WBCs  --  1       CMP:  Results from last 7 days   Lab Units 09/29/22  0615 09/28/22  1431   POTASSIUM mmol/L 3 4* 5  3   CHLORIDE mmol/L 106 107   CO2 mmol/L 27 19*   BUN mg/dL 26* 26*   CREATININE mg/dL 0 90 0 89   CALCIUM mg/dL 8 3* 8 5   AST U/L 119* 153*   ALT U/L 180* 184*   ALK PHOS U/L 67 72   EGFR ml/min/1 73sq m 62 63       BMP:  Results from last 7 days   Lab Units 22  0615 22  1431   POTASSIUM mmol/L 3 4* 5 3   CHLORIDE mmol/L 106 107   CO2 mmol/L 27 19*   BUN mg/dL 26* 26*   CREATININE mg/dL 0 90 0 89   CALCIUM mg/dL 8 3* 8 5          Lab Results   Component Value Date    NTBNP 3,961 (H) 2022    NTBNP 593 (H) 07/10/2020    NTBNP 702 (H) 2020     Lipid Profile:   Lab Results   Component Value Date    CHOL 164 2017    CHOL 154 2016    CHOL 163 2016     Lab Results   Component Value Date    HDL 60 2022    HDL 58 2022    HDL 58 09/15/2021     Lab Results   Component Value Date    LDLCALC 32 2022    LDLCALC 55 2022    LDLCALC 69 09/15/2021     Lab Results   Component Value Date    TRIG 97 2022    TRIG 150 2022    TRIG 139 09/15/2021         Cardiac testing:   Results for orders placed during the hospital encounter of 20    Echo complete with contrast if indicated    Narrative  [place] [address], [address] [facility]  [phone]    Transthoracic Echocardiogram  2D, M-mode, Doppler, and Color Doppler    Study date:  26-May-2020    Patient: Juan Manuel Redmond  MR number: [id number]  Account number: [de-identified]  : 1945  Age: 77 years  Gender: Female  Status: Inpatient  Location: Bedside  Height: 61 in  Weight: 124 7 lb  BP: 142/ 58 mmHg    Indications: Acute MI  Diagnoses: I24 9 - Acute ischemic heart disease, unspecified    Sonographer:  Tri Adair RDCS  Referring Physician:  Karthikeyan Rivera MD  Group:  [facility]  Interpreting Physician:  Aleksandra Jack MD    SUMMARY    LEFT VENTRICLE:  Systolic function was normal  Ejection fraction was estimated to be 60 %    There were no regional wall motion abnormalities  Wall thickness was at the upper limits of normal   Features were consistent with a pseudonormal left ventricular filling pattern, with concomitant abnormal relaxation and increased filling pressure (grade 2 diastolic dysfunction)  LEFT ATRIUM:  The atrium was mildly to moderately dilated  MITRAL VALVE:  There was mild annular calcification  There was mild regurgitation  AORTIC VALVE:  There was mild regurgitation  TRICUSPID VALVE:  There was mild regurgitation  AORTA:  The root exhibited mild dilatation  There was mild dilatation of the ascending aorta  HISTORY: PRIOR HISTORY: History of CABG x3  CAD  Hypertension  DM2  Ascending aortic aneurysm  Anxiety  GERD  Smoker  Atrial fibrillation  PROCEDURE: The procedure was performed at the bedside  This was a routine study  The transthoracic approach was used  The study included complete 2D imaging, M-mode, complete spectral Doppler, and color Doppler  The heart rate was 77 bpm,  at the start of the study  Echocardiographic views were limited due to high windows  This was a technically difficult study  LEFT VENTRICLE: Size was normal  Systolic function was normal  Ejection fraction was estimated to be 60 %  There were no regional wall motion abnormalities  Wall thickness was at the upper limits of normal  DOPPLER: Features were  consistent with a pseudonormal left ventricular filling pattern, with concomitant abnormal relaxation and increased filling pressure (grade 2 diastolic dysfunction)  RIGHT VENTRICLE: The size was normal  Systolic function was normal  Wall thickness was normal     LEFT ATRIUM: The atrium was mildly to moderately dilated  RIGHT ATRIUM: Size was normal     MITRAL VALVE: There was mild annular calcification  Valve structure was normal  There was normal leaflet separation  DOPPLER: The transmitral velocity was within the normal range  There was no evidence for stenosis   There was mild  regurgitation  AORTIC VALVE: The valve was trileaflet  Leaflets exhibited normal thickness and normal cuspal separation  DOPPLER: Transaortic velocity was within the normal range  There was no evidence for stenosis  There was mild regurgitation  TRICUSPID VALVE: The valve structure was normal  There was normal leaflet separation  DOPPLER: The transtricuspid velocity was within the normal range  There was no evidence for stenosis  There was mild regurgitation  PULMONIC VALVE: Leaflets exhibited normal thickness, no calcification, and normal cuspal separation  DOPPLER: The transpulmonic velocity was within the normal range  There was no significant regurgitation  PERICARDIUM: There was no pericardial effusion  The pericardium was normal in appearance  AORTA: The root exhibited mild dilatation  There was mild dilatation of the ascending aorta  SYSTEMIC VEINS: IVC: The inferior vena cava was normal in size  PULMONARY VEINS: DOPPLER: There was systolic blunting in the pulmonary vein(s)      SYSTEM MEASUREMENT TABLES    2D  %FS: 30 49 %  AV Diam: 4 cm  EDV(Teich): 61 09 ml  EF(Cube): 66 42 %  EF(Teich): 58 72 %  ESV(Cube): 18 1 ml  ESV(Teich): 25 22 ml  IVSd: 1 cm  LA Area: 24 64 cm2  LA Diam: 3 12 cm  LVEDV MOD A4C: 66 08 ml  LVEF MOD A4C: 63 63 %  LVESV MOD A4C: 24 03 ml  LVIDd: 3 78 cm  LVIDs: 2 63 cm  LVLd A4C: 7 09 cm  LVLs A4C: 5 73 cm  LVPWd: 1 cm  RA Area: 11 79 cm2  RV Diam: 2 76 cm  SI(Cube): 23 1 ml/m2  SI(Teich): 23 14 ml/m2  SV MOD A4C: 42 05 ml  SV(Cube): 35 81 ml  SV(Teich): 35 87 ml    CW  TR MaxP 47 mmHg  TR Vmax: 2 62 m/s    MM  TAPSE: 1 61 cm    PW  E': 0 06 m/s  E/E': 12 03  MV A Marcos: 0 8 m/s  MV Dec Stark: 3 66 m/s2  MV DecT: 210 88 ms  MV E Marcos: 0 77 m/s  MV E/A Ratio: 0 96    [employer] Accredited Echocardiography Laboratory    Prepared and electronically signed by    Lisa Saenz MD  Signed 26-May-2020 11:14:41    No results found for this or any previous visit  No valid procedures specified  No results found for this or any previous visit  Imaging: I have personally reviewed pertinent reports  XR chest 1 view portable    Result Date: 9/29/2022  Narrative: CHEST INDICATION:   SOB  COMPARISON:  6/5/2020 EXAM PERFORMED/VIEWS:  XR CHEST PORTABLE FINDINGS:  Left-sided chest wall intracardiac device is identified  Leads are intact  Heart is enlarged  Vascular congestion  Small bilateral pleural effusions  Sternal wires are present  Visualized osseous structures appear otherwise unremarkable for the patient's age  Impression: Congestive failure with small bilateral pleural effusions  Workstation performed: [id number]           Counseling / Coordination of Care  Total floor / unit time spent today 45 minutes  Greater than 50% of total time was spent with the patient and / or family counseling and / or coordination of care  A description of the counseling / coordination of care: Review of history, current assessment, development of a plan  Code Status: Level 3 - DNAR and DNI    ** Please Note: Dragon 360 Dictation voice to text software may have been used in the creation of this document   **

## 2022-09-29 NOTE — ASSESSMENT & PLAN NOTE
· S/P PPM placement in June 2020  · History of tachy-keanu syndrome  Routine  care and anticipatory guidance

## 2022-09-29 NOTE — ASSESSMENT & PLAN NOTE
POA, , , alkaline phosphatase 72  Patient displayed no RUQ tenderness  Consider RUQ US if patient clinically worsens  Recent Labs     09/28/22  1431 09/29/22  0615   * 119*   * 180*   ALKPHOS 72 67   TBILI 0 97 0 81     · Trend CMP

## 2022-09-29 NOTE — ASSESSMENT & PLAN NOTE
POA, WBCs of 12 99  Patient has a long standing history of leukocytosis with range from 12-13  Patient does not meet two SIRS criteria on admission  Recent Labs     09/28/22  1431 09/29/22  0615   WBC 12 99* 12 26*     · Trend CBC

## 2022-09-29 NOTE — ASSESSMENT & PLAN NOTE
• Presentation: Patient presents with complaints of shortness of breath with exertion and orthopnea for the past 5 days  She endorses bilateral lower extremity edema  Patient reports being treated by her outpatient PCP with PO Lasix r/t bilateral lower extremity edema; however, about 2 months ago her PCP instructed her to take the PO Lasix once every 3 days  • Chest x-ray: Vascular congestion and LCW pacemaker in place on my read  Official read pending  • Last echocardiogram on file May 1541: LV systolic function was normal  LVEF estimated to be 60%  There were no RWMA  G2DD  • Suspect Etiology: Acute Heart Failure  • COVID pending  • Initial troponin: 17 --> 21, delta 4    o Will trend x3 or to peak  • BNP: 1242  • Diuresis: PTA medication of 20 mg of PO Lasix q3 days  Received 40 mg of IV Lasix in ED  Continue 40 mg of IV Lasix bid  • Beta-blocker: Toprol XL 50 mg bid  • Monitor intake and output, daily weights  • Obtain Echo  • Diet: Fluid restriction and 2 g Sodium  • Consult Cardiology

## 2022-09-29 NOTE — ASSESSMENT & PLAN NOTE
· POA, WBCs of 12 99  · Patient has a long standing history of leukocytosis with range from 12-13  · Patient does not meet two SIRS criteria on admission  · Trend CBC

## 2022-09-29 NOTE — UTILIZATION REVIEW
Initial Clinical Review    Admission: Date/Time/Statement:   Admission Orders (From admission, onward)     Ordered        09/28/22 1934  INPATIENT ADMISSION  Once                      Orders Placed This Encounter   Procedures   • INPATIENT ADMISSION     Standing Status:   Standing     Number of Occurrences:   1     Order Specific Question:   Level of Care     Answer:   Med Surg [16]     Order Specific Question:   Estimated length of stay     Answer:   More than 2 Midnights     Order Specific Question:   Certification     Answer:   I certify that inpatient services are medically necessary for this patient for a duration of greater than two midnights  See H&P and MD Progress Notes for additional information about the patient's course of treatment  ED Arrival Information     Expected   -    Arrival   9/28/2022 14:07    Acuity   Urgent            Means of arrival   Walk-In    Escorted by   Self    Service   Hospitalist    Admission type   Emergency            Arrival complaint   sob           Chief Complaint   Patient presents with   • Shortness of Breath     Pt reports SOB x 5 day, worsening today and now "Wobbly on my feet"   SOB lying down, and nausea; lives in 3rd floor apartment and unable to do steps at this time; swollen feet and face noted (on water pills); denies CP/fever       Initial Presentation: 69 y o  female with a PMH of PAF on Eliquis, tachy-keanu syndrome s/p PPM, CAD, HTN, HLD, GERD and DM2 who presents with complaints of shortness of breath with exertion and orthopnea for the past 5 days  She endorses bilateral lower extremity edema  Patient reports being treated by her outpatient PCP with PO Lasix r/t bilateral lower extremity edema for the past two months; however, about 1-2 weeks ago her PCP instructed her to take the PO Lasix once every 3 days since swelling had improved  Patient endorses that 3 weeks ago she bumped a bucket which caused a wound on her right lateral shin   PE: +2 pitting pedal edema, +1 pitting BLE edema, decreased breath sounds with rales noted, R shin wound with ulcer  Plan: Inpatient admission for evaluation and treatment of shortness of breath, hypertensive urgency: trend troponin, IV lasix 40 mg bid, Echo, 2 gm Na diet with fluid restriction, consult Cardiology, continue home Cozaar and Toprol XL, continue Amiodarone and Eliquis  Date: 9/29   Day 2:     Cardiology consult: Acute heart failure  Echo, continue IV lasix at higher dose of 80 mg bid, discontinue amiodarone, continue BB, losartan, statin and Eliquis  Internal medicine: continue IV lasix 80 mg bid, BB, Toprol XL, Losartan, Eliquis, 2 gm Na diet with 1500 ml fluid restriction, trend CMP, CBC and Mg, telemetry  ED Triage Vitals [09/28/22 1420]   Temperature Pulse Respirations Blood Pressure SpO2   97 7 °F (36 5 °C) 66 20 (!) 185/74 97 %      Temp Source Heart Rate Source Patient Position - Orthostatic VS BP Location FiO2 (%)   Oral Monitor Sitting Right arm --      Pain Score       No Pain          Wt Readings from Last 1 Encounters:   09/29/22 63 3 kg (139 lb 8 8 oz)     Additional Vital Signs:     Date/Time Temp Pulse Resp BP MAP (mmHg) SpO2 O2 Device   09/29/22 07:23:22 99 4 °F (37 4 °C) 60 18 136/68 91 95 % --   09/28/22 23:07:34 97 6 °F (36 4 °C) 60 20 179/74 Abnormal  109 98 % None (Room air)   09/28/22 2045 -- 61 20 207/86 Abnormal  123 92 % None (Room air)   09/28/22 1834 -- 65 20 195/80 Abnormal  -- 97 % None (Room air)       Pertinent Labs/Diagnostic Test Results:   XR chest 1 view portable   ED Interpretation by Valentina Pena MD (09/28 1906)   + chf      Final Result by Ludwin Cooper MD (09/29 2952)      Congestive failure with small bilateral pleural effusions  Workstation performed: [id number]           9/29 Echo:   Interpretation Summary       •  Left Ventricle: Left ventricular cavity size is normal  Wall thickness is normal  The left ventricular ejection fraction is 36% by biplane measurement  By visual assessment, ejection fraction appears slightly better  Systolic function is moderately reduced  Endocardium is poorly visualized  There is global hypokinesis with regional variation  Diastolic function is moderately abnormal, consistent with grade II (pseudonormal) relaxation  Left atrial filling pressure is elevated  •  Right Ventricle: Systolic function is moderately reduced  •  Left Atrium: The atrium is mildly dilated  •  Aortic Valve: There is mild to moderate regurgitation  •  Mitral Valve: There is mild annular calcification  There is mild to moderate regurgitation  •  Tricuspid Valve: There is moderate regurgitation  The right ventricular systolic pressure is moderately elevated  The estimated right ventricular systolic pressure is 24 46 mmHg  •  Pulmonic Valve: There is mild to moderate regurgitation  •  Aorta: The aortic root is severely dilated  The aortic root is 5 60 cm  •  Pericardium: There is no pericardial effusion  •  Technically difficult study      9/29 EKG:  Atrial flutter  Electronic ventricular pacemaker  Abnormal ECG  When compared with ECG of 10-JUL-2020 16:34,  Atrial flutter has replaced Electronic atrial pacemaker  Questionable change in QRS duration    Results from last 7 days   Lab Units 09/29/22  0016   SARS-COV-2  Negative     Results from last 7 days   Lab Units 09/29/22  0615 09/28/22  1431   WBC Thousand/uL 12 26* 12 99*   HEMOGLOBIN g/dL 9 6* 10 3*   HEMATOCRIT % 32 7* 35 3   PLATELETS Thousands/uL 271 332   NEUTROS ABS Thousands/µL  --  10 00*         Results from last 7 days   Lab Units 09/29/22  0615 09/28/22  1431   SODIUM mmol/L 141 140   POTASSIUM mmol/L 3 4* 5 3   CHLORIDE mmol/L 106 107   CO2 mmol/L 27 19*   ANION GAP mmol/L 8 14*   BUN mg/dL 26* 26*   CREATININE mg/dL 0 90 0 89   EGFR ml/min/1 73sq m 62 63   CALCIUM mg/dL 8 3* 8 5     Results from last 7 days   Lab Units 09/29/22  0615 09/28/22  1431   AST U/L 119* 153*   ALT U/L 180* 184*   ALK PHOS U/L 67 72   TOTAL PROTEIN g/dL 6 1* 7 2   ALBUMIN g/dL 3 5 4 1   TOTAL BILIRUBIN mg/dL 0 81 0 97     Results from last 7 days   Lab Units 09/29/22  1135 09/29/22  0723 09/28/22  2304   POC GLUCOSE mg/dl 181* 108 189*     Results from last 7 days   Lab Units 09/29/22  0615 09/28/22  1431   GLUCOSE RANDOM mg/dL 112 173*           Results from last 7 days   Lab Units 09/28/22  2046 09/28/22  1847 09/28/22  1431   HS TNI 0HR ng/L  --   --  17   HS TNI 2HR ng/L  --  21  --    HSTNI D2 ng/L  --  4  --    HS TNI 4HR ng/L 23  --   --    HSTNI D4 ng/L 6  --   --            Results from last 7 days   Lab Units 09/28/22  1431   BNP pg/mL 1,242*         ED Treatment:   Medication Administration from 09/28/2022 1407 to 09/28/2022 2246       Date/Time Order Dose Route Action     09/28/2022 2042 furosemide (LASIX) injection 40 mg 40 mg Intravenous Given        Past Medical History:   Diagnosis Date   • Atypical chest pain    • GERD (gastroesophageal reflux disease)    • Hyperlipidemia    • Hypertension    • Kidney stone    • Myocardial infarction [facility])     2015    • NSTEMI (non-ST elevated myocardial infarction) [facility]     Last Assessed: 9/24/2015     Present on Admission:  • CAD (coronary atherosclerotic disease)  • Gastroesophageal reflux disease  • PAF (paroxysmal atrial fibrillation) ([facility])  • 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, without long-term current use of insulin ([facility])  • Pacemaker  • Leukocytosis      Admitting Diagnosis: Shortness of breath [R06 02]  Pulmonary edema [J81 1]  Age/Sex: 69 y o  female  Admission Orders:  Scheduled Medications:  apixaban, 5 mg, Oral, BID  atorvastatin, 40 mg, Oral, Daily  ferrous sulfate, 325 mg, Oral, Daily With Breakfast  furosemide, 80 mg, Intravenous, BID  insulin lispro, 1-5 Units, Subcutaneous, TID AC  insulin lispro, 1-5 Units, Subcutaneous, HS  [START ON 9/30/2022] losartan, 25 mg, Oral, Daily  metoprolol succinate, 50 mg, Oral, BID  pantoprazole, 40 mg, Oral, Early Morning  traMADol, 100 mg, Oral, BID      Continuous IV Infusions:     PRN Meds:  acetaminophen, 650 mg, Oral, Q6H PRN  ALPRAZolam, 0 25 mg, Oral, TID PRN        IP CONSULT TO NUTRITION SERVICES  IP CONSULT TO CARDIOLOGY    Network Utilization Review Department  ATTENTION: Please call with any questions or concerns to [phone] and carefully listen to the prompts so that you are directed to the right person  All voicemails are confidential   Janee Stubbs all requests for admission clinical reviews, approved or denied determinations and any other requests to dedicated fax number below belonging to the campus where the patient is receiving treatment   List of dedicated fax numbers for the Facilities:  [address] DENIALS (Administrative/Medical Necessity) [phone]   [address] (Maternity/NICU/Pediatrics) [phone]   910 Mariia Ramos [phone]   [facility] [place] 77 [phone]   1306 [facility] 150 [facility] [place] [address] [address] [facility] [place] 28 [phone]   1557 [facility] [employer] [facility] 134 [address] [phone]

## 2022-09-30 PROBLEM — E87.6 POTASSIUM (K) DEFICIENCY: Status: ACTIVE | Noted: 2022-09-30

## 2022-09-30 PROBLEM — S81.802A WOUND OF LEFT LEG: Status: ACTIVE | Noted: 2022-09-30

## 2022-09-30 PROBLEM — I50.21 ACUTE HFREF (HEART FAILURE WITH REDUCED EJECTION FRACTION) (HCC): Status: ACTIVE | Noted: 2022-09-29

## 2022-09-30 PROBLEM — S81.801A WOUND OF RIGHT LEG: Status: ACTIVE | Noted: 2022-09-30

## 2022-09-30 PROBLEM — E61.2 MAGNESIUM DEFICIENCY: Status: ACTIVE | Noted: 2022-09-30

## 2022-09-30 LAB
ALBUMIN SERPL BCP-MCNC: 3.5 G/DL (ref 3.5–5)
ALP SERPL-CCNC: 66 U/L (ref 34–104)
ALT SERPL W P-5'-P-CCNC: 154 U/L (ref 7–52)
ANION GAP SERPL CALCULATED.3IONS-SCNC: 9 MMOL/L (ref 4–13)
AORTIC ROOT: 5.6 CM
APICAL FOUR CHAMBER EJECTION FRACTION: 32 %
AST SERPL W P-5'-P-CCNC: 83 U/L (ref 13–39)
AV REGURGITATION PRESSURE HALF TIME: 457 MS
BASOPHILS # BLD AUTO: 0.12 THOUSANDS/ΜL (ref 0–0.1)
BASOPHILS NFR BLD AUTO: 1 % (ref 0–1)
BILIRUB SERPL-MCNC: 0.77 MG/DL (ref 0.2–1)
BUN SERPL-MCNC: 26 MG/DL (ref 5–25)
CALCIUM SERPL-MCNC: 8.1 MG/DL (ref 8.4–10.2)
CHLORIDE SERPL-SCNC: 102 MMOL/L (ref 96–108)
CO2 SERPL-SCNC: 29 MMOL/L (ref 21–32)
CREAT SERPL-MCNC: 1.08 MG/DL (ref 0.6–1.3)
E WAVE DECELERATION TIME: 146 MS
EOSINOPHIL # BLD AUTO: 0.43 THOUSAND/ΜL (ref 0–0.61)
EOSINOPHIL NFR BLD AUTO: 3 % (ref 0–6)
ERYTHROCYTE [DISTWIDTH] IN BLOOD BY AUTOMATED COUNT: 18.4 % (ref 11.6–15.1)
FRACTIONAL SHORTENING: 22 % (ref 28–44)
GFR SERPL CREATININE-BSD FRML MDRD: 49 ML/MIN/1.73SQ M
GLUCOSE SERPL-MCNC: 162 MG/DL (ref 65–140)
GLUCOSE SERPL-MCNC: 165 MG/DL (ref 65–140)
GLUCOSE SERPL-MCNC: 171 MG/DL (ref 65–140)
GLUCOSE SERPL-MCNC: 172 MG/DL (ref 65–140)
GLUCOSE SERPL-MCNC: 186 MG/DL (ref 65–140)
HCT VFR BLD AUTO: 32.8 % (ref 34.8–46.1)
HGB BLD-MCNC: 9.7 G/DL (ref 11.5–15.4)
IMM GRANULOCYTES # BLD AUTO: 0.09 THOUSAND/UL (ref 0–0.2)
IMM GRANULOCYTES NFR BLD AUTO: 1 % (ref 0–2)
INTERVENTRICULAR SEPTUM IN DIASTOLE (PARASTERNAL SHORT AXIS VIEW): 0.8 CM
INTERVENTRICULAR SEPTUM: 0.8 CM (ref 0.6–1.1)
LEFT ATRIUM AREA SYSTOLE SINGLE PLANE A4C: 18.8 CM2
LEFT ATRIUM SIZE: 4.5 CM
LEFT INTERNAL DIMENSION IN SYSTOLE: 3.6 CM (ref 2.1–4)
LEFT VENTRICLE DIASTOLIC VOLUME (MOD BIPLANE): 151 ML
LEFT VENTRICLE SYSTOLIC VOLUME (MOD BIPLANE): 96 ML
LEFT VENTRICULAR INTERNAL DIMENSION IN DIASTOLE: 4.6 CM (ref 3.5–6)
LEFT VENTRICULAR POSTERIOR WALL IN END DIASTOLE: 1.1 CM
LEFT VENTRICULAR STROKE VOLUME: 41 ML
LV EF: 36 %
LVSV (TEICH): 41 ML
LYMPHOCYTES # BLD AUTO: 2.38 THOUSANDS/ΜL (ref 0.6–4.47)
LYMPHOCYTES NFR BLD AUTO: 19 % (ref 14–44)
MAGNESIUM SERPL-MCNC: 1.1 MG/DL (ref 1.9–2.7)
MAGNESIUM SERPL-MCNC: 1.5 MG/DL (ref 1.9–2.7)
MCH RBC QN AUTO: 25.4 PG (ref 26.8–34.3)
MCHC RBC AUTO-ENTMCNC: 29.6 G/DL (ref 31.4–37.4)
MCV RBC AUTO: 86 FL (ref 82–98)
MONOCYTES # BLD AUTO: 1.15 THOUSAND/ΜL (ref 0.17–1.22)
MONOCYTES NFR BLD AUTO: 9 % (ref 4–12)
MV E'TISSUE VEL-LAT: 7 CM/S
MV PEAK A VEL: 0.36 M/S
MV PEAK E VEL: 100 CM/S
MV STENOSIS PRESSURE HALF TIME: 42 MS
MV VALVE AREA P 1/2 METHOD: 5.24 CM2
NEUTROPHILS # BLD AUTO: 8.42 THOUSANDS/ΜL (ref 1.85–7.62)
NEUTS SEG NFR BLD AUTO: 67 % (ref 43–75)
NRBC BLD AUTO-RTO: 0 /100 WBCS
PLATELET # BLD AUTO: 306 THOUSANDS/UL (ref 149–390)
PMV BLD AUTO: 11 FL (ref 8.9–12.7)
POTASSIUM SERPL-SCNC: 3.6 MMOL/L (ref 3.5–5.3)
PROT SERPL-MCNC: 6 G/DL (ref 6.4–8.4)
RA PRESSURE ESTIMATED: 8 MMHG
RBC # BLD AUTO: 3.82 MILLION/UL (ref 3.81–5.12)
RIGHT ATRIUM AREA SYSTOLE A4C: 11.8 CM2
RIGHT VENTRICLE ID DIMENSION: 3.6 CM
RV PSP: 58 MMHG
SL CV AV DECELERATION TIME RETROGRADE: 1577 MS
SL CV AV PEAK GRADIENT RETROGRADE: 43 MMHG
SL CV LV EF: 36
SL CV PED ECHO LEFT VENTRICLE DIASTOLIC VOLUME (MOD BIPLANE) 2D: 96 ML
SL CV PED ECHO LEFT VENTRICLE SYSTOLIC VOLUME (MOD BIPLANE) 2D: 54 ML
SODIUM SERPL-SCNC: 140 MMOL/L (ref 135–147)
TR MAX PG: 50 MMHG
TR PEAK VELOCITY: 3.5 M/S
TRICUSPID ANNULAR PLANE SYSTOLIC EXCURSION: 1.3 CM
TRICUSPID VALVE PEAK REGURGITATION VELOCITY: 3.53 M/S
WBC # BLD AUTO: 12.59 THOUSAND/UL (ref 4.31–10.16)

## 2022-09-30 PROCEDURE — 99232 SBSQ HOSP IP/OBS MODERATE 35: CPT | Performed by: INTERNAL MEDICINE

## 2022-09-30 PROCEDURE — 83735 ASSAY OF MAGNESIUM: CPT

## 2022-09-30 PROCEDURE — 85025 COMPLETE CBC W/AUTO DIFF WBC: CPT

## 2022-09-30 PROCEDURE — 80053 COMPREHEN METABOLIC PANEL: CPT

## 2022-09-30 PROCEDURE — 93306 TTE W/DOPPLER COMPLETE: CPT | Performed by: INTERNAL MEDICINE

## 2022-09-30 PROCEDURE — 82948 REAGENT STRIP/BLOOD GLUCOSE: CPT

## 2022-09-30 RX ORDER — ISOSORBIDE DINITRATE 10 MG/1
10 TABLET ORAL
Status: DISCONTINUED | OUTPATIENT
Start: 2022-09-30 | End: 2022-10-02

## 2022-09-30 RX ORDER — METOLAZONE 5 MG/1
5 TABLET ORAL DAILY
Status: DISCONTINUED | OUTPATIENT
Start: 2022-09-30 | End: 2022-09-30

## 2022-09-30 RX ORDER — LOSARTAN POTASSIUM 50 MG/1
50 TABLET ORAL DAILY
Status: DISCONTINUED | OUTPATIENT
Start: 2022-10-01 | End: 2022-10-01

## 2022-09-30 RX ORDER — POTASSIUM CHLORIDE 20 MEQ/1
20 TABLET, EXTENDED RELEASE ORAL DAILY
Status: DISCONTINUED | OUTPATIENT
Start: 2022-10-01 | End: 2022-10-02

## 2022-09-30 RX ORDER — MAGNESIUM SULFATE HEPTAHYDRATE 40 MG/ML
2 INJECTION, SOLUTION INTRAVENOUS ONCE
Status: COMPLETED | OUTPATIENT
Start: 2022-09-30 | End: 2022-09-30

## 2022-09-30 RX ORDER — LOSARTAN POTASSIUM 25 MG/1
25 TABLET ORAL ONCE
Status: COMPLETED | OUTPATIENT
Start: 2022-09-30 | End: 2022-09-30

## 2022-09-30 RX ORDER — METOLAZONE 5 MG/1
2.5 TABLET ORAL DAILY
Status: DISCONTINUED | OUTPATIENT
Start: 2022-09-30 | End: 2022-10-02

## 2022-09-30 RX ORDER — POTASSIUM CHLORIDE 20 MEQ/1
20 TABLET, EXTENDED RELEASE ORAL
Status: COMPLETED | OUTPATIENT
Start: 2022-09-30 | End: 2022-09-30

## 2022-09-30 RX ADMIN — APIXABAN 5 MG: 5 TABLET, FILM COATED ORAL at 09:28

## 2022-09-30 RX ADMIN — METOPROLOL SUCCINATE 50 MG: 50 TABLET, FILM COATED, EXTENDED RELEASE ORAL at 09:28

## 2022-09-30 RX ADMIN — MAGNESIUM SULFATE HEPTAHYDRATE 2 G: 40 INJECTION, SOLUTION INTRAVENOUS at 17:40

## 2022-09-30 RX ADMIN — TRAMADOL HYDROCHLORIDE 100 MG: 50 TABLET, COATED ORAL at 09:28

## 2022-09-30 RX ADMIN — INSULIN LISPRO 1 UNITS: 100 INJECTION, SOLUTION INTRAVENOUS; SUBCUTANEOUS at 09:32

## 2022-09-30 RX ADMIN — ATORVASTATIN CALCIUM 40 MG: 40 TABLET, FILM COATED ORAL at 09:28

## 2022-09-30 RX ADMIN — TRAMADOL HYDROCHLORIDE 100 MG: 50 TABLET, COATED ORAL at 17:34

## 2022-09-30 RX ADMIN — FUROSEMIDE 80 MG: 10 INJECTION, SOLUTION INTRAMUSCULAR; INTRAVENOUS at 09:28

## 2022-09-30 RX ADMIN — LOSARTAN POTASSIUM 25 MG: 25 TABLET, FILM COATED ORAL at 11:25

## 2022-09-30 RX ADMIN — METOLAZONE 2.5 MG: 5 TABLET ORAL at 17:34

## 2022-09-30 RX ADMIN — LOSARTAN POTASSIUM 25 MG: 25 TABLET, FILM COATED ORAL at 09:28

## 2022-09-30 RX ADMIN — METOPROLOL SUCCINATE 50 MG: 50 TABLET, FILM COATED, EXTENDED RELEASE ORAL at 21:05

## 2022-09-30 RX ADMIN — PANTOPRAZOLE SODIUM 40 MG: 40 TABLET, DELAYED RELEASE ORAL at 05:25

## 2022-09-30 RX ADMIN — ISOSORBIDE DINITRATE 10 MG: 10 TABLET ORAL at 11:25

## 2022-09-30 RX ADMIN — APIXABAN 5 MG: 5 TABLET, FILM COATED ORAL at 17:34

## 2022-09-30 RX ADMIN — INSULIN LISPRO 1 UNITS: 100 INJECTION, SOLUTION INTRAVENOUS; SUBCUTANEOUS at 17:45

## 2022-09-30 RX ADMIN — INSULIN LISPRO 1 UNITS: 100 INJECTION, SOLUTION INTRAVENOUS; SUBCUTANEOUS at 11:50

## 2022-09-30 RX ADMIN — MAGNESIUM SULFATE HEPTAHYDRATE 2 G: 40 INJECTION, SOLUTION INTRAVENOUS at 11:26

## 2022-09-30 RX ADMIN — INSULIN LISPRO 1 UNITS: 100 INJECTION, SOLUTION INTRAVENOUS; SUBCUTANEOUS at 21:06

## 2022-09-30 RX ADMIN — FUROSEMIDE 80 MG: 10 INJECTION, SOLUTION INTRAMUSCULAR; INTRAVENOUS at 17:35

## 2022-09-30 RX ADMIN — POTASSIUM CHLORIDE 20 MEQ: 1500 TABLET, EXTENDED RELEASE ORAL at 09:28

## 2022-09-30 RX ADMIN — POTASSIUM CHLORIDE 20 MEQ: 1500 TABLET, EXTENDED RELEASE ORAL at 10:22

## 2022-09-30 RX ADMIN — FERROUS SULFATE TAB 325 MG (65 MG ELEMENTAL FE) 325 MG: 325 (65 FE) TAB at 09:28

## 2022-09-30 RX ADMIN — ISOSORBIDE DINITRATE 10 MG: 10 TABLET ORAL at 17:35

## 2022-09-30 NOTE — ASSESSMENT & PLAN NOTE
Lab Results   Component Value Date    HGBA1C 6 06/29/2022       Recent Labs     09/29/22  1135 09/29/22  1729 09/29/22  2046 09/30/22  0721   POCGLU 181* 132 188* 162*       Blood Sugar Average: Last 72 hrs:  · (P) 160 PTA regimen of Metformin, will hold while inpatient  · Accu-Checks and SSI  · Hypoglycemia protocol

## 2022-09-30 NOTE — ASSESSMENT & PLAN NOTE
POA, WBCs of 12 99  Patient has a long standing history of leukocytosis with range from 12-13  Patient does not meet two SIRS criteria on admission  Recent Labs     09/28/22  1431 09/29/22  0615 09/30/22  0446   WBC 12 99* 12 26* 12 59*     · Trend CBC

## 2022-09-30 NOTE — PROGRESS NOTES
Attempted to print tele strip at beginning of shift  Printer malfunctioning  Will attempt to print again if printer issues resolve

## 2022-09-30 NOTE — CASE MANAGEMENT
Case Management Assessment & Discharge Planning Note    Patient name Yandel Rae  Location S /S -80 MRN [id number]  : 1945 Date 2022       Current Admission Date: 2022  Current Admission Diagnosis:Acute on chronic diastolic congestive heart failure [facility])   Patient Active Problem List    Diagnosis Date Noted   • Acute on chronic diastolic congestive heart failure ([facility] ) 2022   • Hypertensive urgency 2022   • Shortness of breath 2022   • Elevated LFTs 2022   • Acquired absence of other right toe(s) ([place] [address] ) 2022   • Continuous opioid dependence ([place] [address] ) 2022   • PAF (paroxysmal atrial fibrillation) ([place] [address] ) 02/15/2021   • Anemia, unspecified 10/14/2020   • Vitamin B 12 deficiency 10/14/2020   • Pacemaker 2020   • Preop cardiovascular exam 2020   • Foreign body in vagina 2020   • Nephrolithiasis 2020   • Hematuria 2020   • Unintentional weight loss 2020   • Generalized weakness 2020   • Gross hematuria 2020   • Leukocytosis 2020   • Elevated troponin level not due myocardial infarction 2020   • Osteopenia 2016   • Tobacco abuse 2016   • Aneurysm of ascending aorta ([facility] ) 2015   • CAD (coronary atherosclerotic disease) 2015   • Gastroesophageal reflux disease 2015   • 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[place] [address] ) 2015   • Glaucoma 2014   • Macular degeneration 2014   • 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, without long-term current use of insulin ([place] [address] ) 2013   • Neck pain 2013   • Anxiety 2013   • Essential hypertension 2013   • Hypercholesterolemia 2013      LOS (days): 2  Geometric Mean LOS (GMLOS) (days): 2 20  Days to GMLOS:0 6     OBJECTIVE:    Risk of Unplanned Readmission Score: 20 59         Current admission status: Inpatient       Preferred Pharmacy:   [address] [place], [place] [address]ma - 1019 Verified Results  (1) CBC/PLT/DIFF [id number] 03:02PM [facility] Order Number: [id number]     Test Name Result Flag Reference   WBC COUNT 15 84 Thousand/uL H 4 31-10 16   RBC COUNT 4 14 Million/uL  3 81-5 12   HEMOGLOBIN 12 6 g/dL  11 5-15 4   HEMATOCRIT 36 8 %  34 8-46  1   MCV 89 fL  82-98   MCH 30 4 pg  26 8-34 3   MCHC 34 2 g/dL  31 4-37 4   RDW 12 5 %  11 6-15 1   MPV 11 2 fL  8 9-12 7   PLATELET COUNT 973 Thousands/uL  149-390   This is an appended report  These results have been appended to a previously verified report  NEUTROPHILS - REL 34 % L 43-75   LYMPHOCYTES - REL 59 % H 14-44   MONOCYTES - REL 5 %  4-12   EOSINOPHILS - REL 1 %  0-6   BASOPHILS - REL 1 %  0-1   NEUTROPHILS ABS 5 39 Thousand/uL  1 85-7 62   LYMPHOTCYTES ABS 9 35 Thousand/uL H 0 60-4 47   MONOCYTES ABS 0 79 Thousand/uL  0 00-1 22   EOSINOPHILS ABS 0 16 Thousand/uL  0 00-0 40   BASOPHILS ABS 0 16 Thousand/uL H 0 00-0 10   TOTAL COUNTED 100     Anisocytosis Present     PLT ESTIMATE Adequate  Adequate     (1) COMPREHENSIVE METABOLIC PANEL [id number] 62:38QB [facility] Order Number: [id number]     Test Name Result Flag Reference   SODIUM 131 mmol/L L 136-145   POTASSIUM 3 6 mmol/L  3 5-5 3   CHLORIDE 93 mmol/L L 100-108   CARBON DIOXIDE 30 mmol/L  21-32   ANION GAP (CALC) 8 mmol/L  4-13   BLOOD UREA NITROGEN 26 mg/dL H 5-25   CREATININE 1 12 mg/dL  0 60-1 30   Standardized to IDMS reference method   CALCIUM 8 7 mg/dL  8 3-10 1   BILI, TOTAL 0 40 mg/dL  0 20-1 00   ALK PHOSPHATAS 57 U/L     ALT (SGPT) 26 U/L  12-78   Specimen collection should occur prior to Sulfasalazine administration due to the potential for falsely depressed results  AST(SGOT) 26 U/L  5-45   Specimen collection should occur prior to Sulfasalazine administration due to the potential for falsely depressed results     ALBUMIN 3 6 g/dL  3 5-5 0   TOTAL PROTEIN 6 8 g/dL  6 4-8 2   eGFR 45 ml/min/1 73sq [place]     [employer] Disease Education Program recommendations are as follows:  GFR calculation is accurate only with a steady state creatinine  Chronic Kidney disease less than 60 ml/min/1 73 sq  meters  Kidney failure less than 15 ml/min/1 73 sq  meters  GLUCOSE FASTING 78 mg/dL  65-99   Specimen collection should occur prior to Sulfasalazine administration due to the potential for falsely depressed results  Specimen collection should occur prior to Sulfapyridine administration due to the potential for falsely elevated results  (1) LIPID PANEL, FASTING [id number] 03:02PM [employer] Order Number: [id number]     Test Name Result Flag Reference   CHOLESTEROL 237 mg/dL H    HDL,DIRECT 94 mg/dL H 40-60   Specimen collection should occur prior to Metamizole administration due to the potential for falsley depressed results  LDL CHOLESTEROL CALCULATED 106 mg/dL H 0-100   Triglyceride:        Normal <150 mg/dl   Borderline High 150-199 mg/dl   High 200-499 mg/dl   Very High >499 mg/dl      Cholesterol:       Desirable <200 mg/dl    Borderline High 200-239 mg/dl    High >239 mg/dl      HDL Cholesterol:       High>59 mg/dL    Low <41 mg/dL      This screening LDL is a calculated result  It does not have the accuracy of the Direct Measured LDL in the monitoring of patients with hyperlipidemia and/or statin therapy  Direct Measure LDL (FGN041) must be ordered separately in these patients  TRIGLYCERIDES 186 mg/dL H <=150   Specimen collection should occur prior to N-Acetylcysteine or Metamizole administration due to the potential for falsely depressed results  (1) TSH [id number] 03:02PM [employer] Order Number: [id number]     Test Name Result Flag Reference   TSH 0 338 uIU/mL L 0 358-3 740   Patients undergoing fluorescein dye angiography may retain small amounts of fluorescein in the body for 48-72 hours post procedure   Samples containing fluorescein can produce falsely depressed [facility]  [address] [facility] [address]  Phone: [phone] Fax: [phone]    [address] [place]) [facility] 63  [address] [place] [address]  Phone: [phone] Fax: [phone] [address] [facility] [address]  Phone: [phone] Fax: [phone]    Primary Care Provider: Matti Stoddard DO    Primary Insurance: [address] [place] [address]  Secondary Insurance:     ASSESSMENT:  Chidi Hart 21 - Daughter   Primary Phone: [phone] (Mobile)               Advance Directives  Does patient have a [address]?: Yes  Does patient have Advance Directives?: Yes  Advance Directives: Living will, Power of  for health care  Primary Contact: Mert Meng [employer]  30 Day Readmission: No    Patient Information  Admitted from[de-identified] Home  Mental Status: Alert  During Assessment patient was accompanied by: Not accompanied during assessment  Assessment information provided by[de-identified] Patient  Primary Caregiver: Self  Support Systems: Daughter  [place] of Residence: [address] [facility] do you live in?: [facility] access options   Select all that apply : Stairs  Number of steps to enter home :  (2 Flights)  Do the steps have railings?: Yes  Type of Current Residence: Apartment  Floor Level: 3  Upon entering residence, is there a bedroom on the main floor (no further steps)?: Yes  Upon entering residence, is there a bathroom on the main floor (no further steps)?: Yes  In the last 12 months, was there a time when you were not able to pay the mortgage or rent on time?: No  In the last 12 months, how many places have you lived?: 1  In the last 12 months, was there a time when you did not have a steady place to sleep or slept in a shelter (including now)?: No  Homeless/housing insecurity resource given?: N/A  Living Arrangements: Lives Alone  Is patient a ?: No    Activities of Daily Living Prior to Admission  Functional Status: Independent  Completes ADLs independently?: Yes  Ambulates independently?: Yes  Does patient use assisted devices?: No  Does patient currently own DME?: Yes  What DME does the patient currently own?: [employer] Braswell  Does patient have a history of Outpatient Therapy (PT/OT)?: No  Does the patient have a history of Short-Term Rehab?: Yes ([facility])  Does patient have a history of HHC?: Yes  Does patient currently have [facility]?: No         Patient Information Continued  Income Source: Unemployed  Does patient have prescription coverage?: Yes  Within the past 12 months, you worried that your food would run out before you got the money to buy more : Never true  Within the past 12 months, the food you bought just didn't last and you didn't have money to get more : Never true  Food insecurity resource given?: N/A  Does patient receive dialysis treatments?: No  Does patient have a history of substance abuse?: No  Does patient have a history of Mental Health Diagnosis?: No    PHQ 2/9 Screening   Reviewed PHQ 2/9 Depression Screening Score?: No    Means of Transportation  Means of Transport to Appts[de-identified] Family transport  In the past 12 months, has lack of transportation kept you from medical appointments or from getting medications?: No  In the past 12 months, has lack of transportation kept you from meetings, work, or from getting things needed for daily living?: No  Was application for public transport provided?: N/A        DISCHARGE DETAILS:    Discharge planning discussed with[de-identified] Patient  Freedom of Choice: Yes  Comments - Freedom of Choice: CM discussed the recommendation of SNF which she reported she would prefer no SNF, as when she was there before she didn't feel she got much therapy   Pt reported her daughter is coming in later today and it can be TSH values  If the patient had this procedure,a specimen should be resubmitted post fluorescein clearance            The recommended reference ranges for TSH during pregnancy are as follows:  First trimester 0 1 to 2 5 uIU/mL  Second trimester  0 2 to 3 0 uIU/mL  Third trimester 0 3 to 3 0 uIU/m further discussed           Treatment Team Recommendation: Short Term Rehab

## 2022-09-30 NOTE — ASSESSMENT & PLAN NOTE
Presents with  increased shortness of breath, dyspnea on exertion and lower extremity edema in setting of decreased diuretic dosing 1-2 weeks ago and high sodium diet  Patient reports being treated by her outpatient PCP with PO Lasix r/t bilateral lower extremity edema; however, about 2 months ago her PCP instructed her to take the PO Lasix once every 3 days  Dry weight: 130-132 lbs  Patient is currently volume overloaded  CXR at ED: Vascular congestion and LCW pacemaker in place  1/0576 Echo: LV systolic function was normal, LVEF estimated to be 60%, no RWMA, G2DD  COVID negative  BNP: 1242  Diuresis: PTA medication of 20 mg of PO Lasix q3 days  Received 40 mg of IV Lasix in ED  Weight (last 2 days)     Date/Time Weight    09/30/22 0600 61 4 (135 36)    09/30/22 0300 61 4 (135 36)    09/29/22 15:41:33 63 (139)    09/29/22 0600 63 3 (139 55)    09/28/22 23:07:34 63 3 (139 55)          Intake/Output Summary (Last 24 hours) at 9/30/2022 1129  Last data filed at 9/30/2022 1022  Gross per 24 hour   Intake 960 ml   Output 1600 ml   Net -640 ml     • Meds: Diuretic: IV Lasix 80 mg BID, B-Blocker: Toprol XL 50 mg BID, ACE/ARB/ARNi: Losartan 25 mg daily  · Diet: Sodium 2g, FR 1500 mL  · Labs: Trend CMP, CBC, Mag  · Maintain Mg > 2 and K > 4; Replete prn  · Elevate HOB > 30°, Daily standing weights, Measure I/Os, Monitor on Telemetry  • Follow up cardiology consult, appreciate recs

## 2022-09-30 NOTE — ASSESSMENT & PLAN NOTE
Recent Labs     09/30/22  0446   MG 1 1*     · Replete as needed  · Discuss IV v PO magnesium repletion with cardiology in setting of volume overload

## 2022-09-30 NOTE — CASE MANAGEMENT
Case Management Discharge Planning Note    Patient name Mila Ha  Location S /S -92 MRN [id number]  : 1945 Date 2022       Current Admission Date: 2022  Current Admission Diagnosis:Acute HFrEF (heart failure with reduced ejection fraction) [facility])   Patient Active Problem List    Diagnosis Date Noted   • Magnesium deficiency 2022   • Potassium (K) deficiency 2022   • Wound of right leg 2022   • Acute [facility] failure with reduced [facility] 2022   • Hypertensive urgency 2022   • Shortness of breath 2022   • Elevated LFTs 2022   • Acquired absence of other right toe(s) ([place] [address] ) 2022   • Continuous opioid dependence ([place] [address] ) 2022   • PAF (paroxysmal atrial fibrillation) ([place] [address] ) 02/15/2021   • Anemia, unspecified 10/14/2020   • Vitamin B 12 deficiency 10/14/2020   • Pacemaker 2020   • Preop cardiovascular exam 2020   • Foreign body in vagina 2020   • Nephrolithiasis 2020   • Hematuria 2020   • Unintentional weight loss 2020   • Generalized weakness 2020   • Gross hematuria 2020   • Leukocytosis 2020   • Elevated troponin level not due myocardial infarction 2020   • Osteopenia 2016   • Tobacco abuse 2016   • Aneurysm of ascending aorta ([place] [address] ) 2015   • CAD (coronary atherosclerotic disease) 2015   • Gastroesophageal reflux disease 2015   • 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[place] [address] ) 2015   • Glaucoma 2014   • Macular degeneration 2014   • 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, without long-term current use of insulin ([place] [address] ) 2013   • Neck pain 2013   • Anxiety 2013   • Essential hypertension 2013   • Hypercholesterolemia 2013      LOS (days): 2  Geometric Mean LOS (GMLOS) (days): 2 20  Days to GMLOS:0 5     OBJECTIVE:  Risk of Unplanned Readmission Score: 23 42         Current admission status: Inpatient   Preferred Pharmacy:   [place] [facility]  [address] [facility] [address]  Phone: [phone] Fax: [phone]    [address] [place], [place] - [address]  [address] [facility] [address]  Phone: [phone] Fax: [phone] [place] [facility] [address]  Phone: [phone] Fax: [phone]    Primary Care Provider: Melanie Burnette DO    Primary Insurance: [address] [place] [address]  Secondary Insurance:     DISCHARGE DETAILS:    Discharge planning discussed with[de-identified] Patient & Yessi Guajardo     Comments - Freedom of Choice: Both Pt and Adriel Arambula reported being agreeable to the recommendation of SNF, and requested a referral to [facility]  CM discussed freedom of choice, made the requested referral and notified the possible need for other SNF choices if [facility] family Maya Moore is unable to accept  CM contacted family/caregiver?: Yes  Were Treatment Team discharge recommendations reviewed with patient/caregiver?: Yes  Did patient/caregiver verbalize understanding of patient care needs?: Yes  Were patient/caregiver advised of the risks associated with not following Treatment Team discharge recommendations?: Yes    Contacts  Patient Contacts: Adriel Arambula  Relationship to Patient[de-identified] Family  Contact Method: In Person  Reason/Outcome: Continuity of Care, Referral, Discharge Planning    Requested [address] [address]         Is the patient interested in [facility] at discharge?: No    DME Referral Provided  Referral made for DME?: No    Other Referral/Resources/Interventions Provided:  Interventions: Short Term Rehab  Referral Comments: SNF referral made to [facility]         Discharge Destination Plan[de-identified] Short Term Rehab

## 2022-09-30 NOTE — PROGRESS NOTES
General Cardiology   Progress Note -  Team One   Jadene Harada 69 y o  female MRN: [id number]    Unit/Bed#: S -01 Encounter: [id number]    Assessment:    1  Acute HFrEF:  Presents with progressive dyspnea, orthopnea, lower extremity edema in the setting of decreased oral diuretic dosing and high sodium diet  Was started on oral diuretics by PCP 2 months ago for lower extremity edema with recent dose 2 weeks ago  CXR pulmonary vascular congestion and bilateral small pleural effusions  BNP 1242  Currently on IV Lasix 40 mg BID  · Echocardiogram 09/29/2022:   EF 36% with global hypokinesis and regional variation, G2DD, moderately reduced RV function, mild-moderate MR, moderate TR, mild-moderate PI, severe aortic root dilatation at 5 6 cm  Compared to echocardiogram from 05/2020 EF was 60% with no WMA, G2DD, normal RV function with no significant valvular abnormality  · Home diuretic regimen:  Lasix 20 mg twice weekly  · Dry weight:  130-132 lb  · Weight on admission:  139 lb per standing scale  · Weight today:  135 lb per bed scale  · I&Os:  Output 24 hours -1 4 L  Net output -1 9 L  · She appears volume overloaded on exam with lower extremity edema, bilateral rales and JVD  2  New Cardiomyopathy:  EF 36% with global hypokinesis and regional variation  Currently on Toprol-XL and losartan  3  Severe Aortic root dilatation:  Measuring 5 6 cm on echo  4  CAD: s/p CABG x3 in 2015 (LIMA-LAD, SVG-OM1, SVG-PDA)  Maintained on statin beta-blocker  No aspirin due to anticoagulation with Eliquis  · Cardiac catheterization 05/2020:  Patent grafts  5  SSS: s/p MDT DC PPM (MRI conditional) implanted 06/2020   6  Chronic AF/Flutter:  Has been 100% AFib since 11/2021 per device interrogation  Amiodarone discontinued this admission  · EKG ventricular paced rhythm with underlying flutter  · Anticoagulated on Eliquis 5 mg BID    7  Essential hypertension:  Average /76 on losartan 25 mg daily,Toprol-XL 50 mg BID and IV Lasix  8  Hyperlipidemia:  Lipid panel 07/2022 , TG 97, HDL 60, LDL 32 on atorvastatin 40 mg daily  9  Type 2 DM:  Hemoglobin A1c 6 in 06/2022  Management per primary team   10  Transaminitis:  Elevated LFTs noted this admission, slowly improving  11  Anemia:  Hemoglobin at baseline around 10  Hemoglobin 9 7 today     Plan/Recommendations:  · Patient will need an and eventual ischemic evaluation once volume status is improved  · Continue IV Lasix 80 mg BID  · Will give a dose of metolazone 5 mg today  · Add Isordil 10 mg TID  · Increase losartan to 50 mg daily  · Monitor I&Os, renal function, electrolytes and standing weight  · Maintain potassium >4 and magnesium >2  · Discontinue amiodarone  · Continue beta-blocker, statin and Eliquis  · Recommend transfer to [facility] for CT surgery evaluation and cardiac catheterization  __________________________________________________________    Subjective    Patient seen and examined  No acute events overnight  She continues with orthopnea and PND  She denies cp or palpitations    Review of Systems   Constitutional: Negative  Negative for chills  Cardiovascular: Positive for leg swelling, orthopnea and paroxysmal nocturnal dyspnea  Negative for chest pain, dyspnea on exertion, near-syncope, palpitations and syncope  Respiratory: Positive for shortness of breath  Negative for cough and wheezing  Endocrine: Negative  Hematologic/Lymphatic: Negative  Skin: Negative  Musculoskeletal: Negative  Gastrointestinal: Negative  Negative for diarrhea, nausea and vomiting  Neurological: Negative for dizziness, light-headedness and weakness  Psychiatric/Behavioral: Negative  Negative for altered mental status  All other systems reviewed and are negative  Objective:   Vitals: Blood pressure 168/78, pulse 66, temperature 98 6 °F (37 °C), resp   rate 16, height 4' 11" (1 499 m), weight 61 4 kg (135 lb 5 8 oz), SpO2 93 %, not currently breastfeeding ,     Wt Readings from Last 3 Encounters:   09/30/22 61 4 kg (135 lb 5 8 oz)   08/02/22 59 9 kg (132 lb)   07/18/22 61 7 kg (136 lb)        Lab Results   Component Value Date    CREATININE 1 08 09/30/2022    CREATININE 0 90 09/29/2022    CREATININE 0 89 09/28/2022         Body mass index is 27 34 kg/m²  ,     Systolic (20VMA), FLY:262 , Min:160 , ZHP:025     Diastolic (48FJM), JTW:23, Min:69, Max:84          Intake/Output Summary (Last 24 hours) at 9/30/2022 1037  Last data filed at 9/30/2022 1022  Gross per 24 hour   Intake 960 ml   Output 1900 ml   Net -940 ml     Weight (last 2 days)     Date/Time Weight    09/30/22 0600 61 4 (135 36)    09/30/22 0300 61 4 (135 36)    09/29/22 15:41:33 63 (139)    09/29/22 0600 63 3 (139 55)    09/28/22 23:07:34 63 3 (139 55)          Telemetry Review:  Ventricular paced rhythm with underlying flutter      Physical Exam  Vitals and nursing note reviewed  Constitutional:       General: She is not in acute distress  Appearance: She is well-developed  Comments: On RA in NAD   HENT:      Head: Normocephalic and atraumatic  Neck:      Vascular: JVD present  Cardiovascular:      Rate and Rhythm: Normal rate and regular rhythm  Heart sounds: Normal heart sounds  No murmur heard  No friction rub  Pulmonary:      Effort: Pulmonary effort is normal  No respiratory distress  Breath sounds: Rales present  No wheezing  Abdominal:      General: Bowel sounds are normal  There is no distension  Palpations: Abdomen is soft  Tenderness: There is no abdominal tenderness  Musculoskeletal:         General: No tenderness  Normal range of motion  Cervical back: Normal range of motion and neck supple  Right lower leg: Edema present  Left lower leg: Edema present  Skin:     General: Skin is warm and dry  Findings: No erythema  Neurological:      Mental Status: She is alert and oriented to person, place, and time  Psychiatric:         Behavior: Behavior normal          Thought Content:  Thought content normal          Judgment: Judgment normal          LABORATORY RESULTS      CBC with diff:   Results from last 7 days   Lab Units 09/30/22 0446 09/29/22  0615 09/28/22  1431   WBC Thousand/uL 12 59* 12 26* 12 99*   HEMOGLOBIN g/dL 9 7* 9 6* 10 3*   HEMATOCRIT % 32 8* 32 7* 35 3   MCV fL 86 87 88   PLATELETS Thousands/uL 306 271 332   MCH pg 25 4* 25 4* 25 6*   MCHC g/dL 29 6* 29 4* 29 2*   RDW % 18 4* 18 5* 18 4*   MPV fL 11 0 10 6 11 4   NRBC AUTO /100 WBCs 0  --  1       CMP:  Results from last 7 days   Lab Units 09/30/22 0446 09/29/22  0615 09/28/22  1431   POTASSIUM mmol/L 3 6 3 4* 5 3   CHLORIDE mmol/L 102 106 107   CO2 mmol/L 29 27 19*   BUN mg/dL 26* 26* 26*   CREATININE mg/dL 1 08 0 90 0 89   CALCIUM mg/dL 8 1* 8 3* 8 5   AST U/L 83* 119* 153*   ALT U/L 154* 180* 184*   ALK PHOS U/L 66 67 72   EGFR ml/min/1 73sq m 49 62 63       BMP:  Results from last 7 days   Lab Units 09/30/22 0446 09/29/22  0615 09/28/22  1431   POTASSIUM mmol/L 3 6 3 4* 5 3   CHLORIDE mmol/L 102 106 107   CO2 mmol/L 29 27 19*   BUN mg/dL 26* 26* 26*   CREATININE mg/dL 1 08 0 90 0 89   CALCIUM mg/dL 8 1* 8 3* 8 5       Lab Results   Component Value Date    NTBNP 3,961 (H) 07/08/2022    NTBNP 593 (H) 07/10/2020    NTBNP 702 (H) 05/23/2020             Results from last 7 days   Lab Units 09/30/22  0446   MAGNESIUM mg/dL 1 1*                           Lipid Profile:   Lab Results   Component Value Date    CHOL 164 03/27/2017    CHOL 154 09/26/2016    CHOL 163 05/13/2016     Lab Results   Component Value Date    HDL 60 07/08/2022    HDL 58 02/08/2022    HDL 58 09/15/2021     Lab Results   Component Value Date    LDLCALC 32 07/08/2022    LDLCALC 55 02/08/2022    LDLCALC 69 09/15/2021     Lab Results   Component Value Date    TRIG 97 07/08/2022    TRIG 150 02/08/2022    TRIG 139 09/15/2021       Cardiac testing:   Results for orders placed during the hospital encounter of 20    Echo complete with contrast if indicated    Narrative  [place] [address], [address] [facility]  [phone]    Transthoracic Echocardiogram  2D, M-mode, Doppler, and Color Doppler    Study date:  26-May-2020    Patient: Yvonne Herron  MR number: [id number]  Account number: [de-identified]  : 1945  Age: 77 years  Gender: Female  Status: Inpatient  Location: Bedside  Height: 61 in  Weight: 124 7 lb  BP: 142/ 58 mmHg    Indications: Acute MI  Diagnoses: I24 9 - Acute ischemic heart disease, unspecified    Sonographer:  Dhaval Vargas RDCS  Referring Physician:  Suzan Fong MD  Group:  [id number]  Interpreting Physician:  Rosario Sosa MD    SUMMARY    LEFT VENTRICLE:  Systolic function was normal  Ejection fraction was estimated to be 60 %  There were no regional wall motion abnormalities  Wall thickness was at the upper limits of normal   Features were consistent with a pseudonormal left ventricular filling pattern, with concomitant abnormal relaxation and increased filling pressure (grade 2 diastolic dysfunction)  LEFT ATRIUM:  The atrium was mildly to moderately dilated  MITRAL VALVE:  There was mild annular calcification  There was mild regurgitation  AORTIC VALVE:  There was mild regurgitation  TRICUSPID VALVE:  There was mild regurgitation  AORTA:  The root exhibited mild dilatation  There was mild dilatation of the ascending aorta  HISTORY: PRIOR HISTORY: History of CABG x3  CAD  Hypertension  DM2  Ascending aortic aneurysm  Anxiety  GERD  Smoker  Atrial fibrillation  PROCEDURE: The procedure was performed at the bedside  This was a routine study  The transthoracic approach was used  The study included complete 2D imaging, M-mode, complete spectral Doppler, and color Doppler  The heart rate was 77 bpm,  at the start of the study  Echocardiographic views were limited due to high windows  This was a technically difficult study  LEFT VENTRICLE: Size was normal  Systolic function was normal  Ejection fraction was estimated to be 60 %  There were no regional wall motion abnormalities  Wall thickness was at the upper limits of normal  DOPPLER: Features were  consistent with a pseudonormal left ventricular filling pattern, with concomitant abnormal relaxation and increased filling pressure (grade 2 diastolic dysfunction)  RIGHT VENTRICLE: The size was normal  Systolic function was normal  Wall thickness was normal     LEFT ATRIUM: The atrium was mildly to moderately dilated  RIGHT ATRIUM: Size was normal     MITRAL VALVE: There was mild annular calcification  Valve structure was normal  There was normal leaflet separation  DOPPLER: The transmitral velocity was within the normal range  There was no evidence for stenosis  There was mild  regurgitation  AORTIC VALVE: The valve was trileaflet  Leaflets exhibited normal thickness and normal cuspal separation  DOPPLER: Transaortic velocity was within the normal range  There was no evidence for stenosis  There was mild regurgitation  TRICUSPID VALVE: The valve structure was normal  There was normal leaflet separation  DOPPLER: The transtricuspid velocity was within the normal range  There was no evidence for stenosis  There was mild regurgitation  PULMONIC VALVE: Leaflets exhibited normal thickness, no calcification, and normal cuspal separation  DOPPLER: The transpulmonic velocity was within the normal range  There was no significant regurgitation  PERICARDIUM: There was no pericardial effusion  The pericardium was normal in appearance  AORTA: The root exhibited mild dilatation  There was mild dilatation of the ascending aorta  SYSTEMIC VEINS: IVC: The inferior vena cava was normal in size  PULMONARY VEINS: DOPPLER: There was systolic blunting in the pulmonary vein(s)      SYSTEM MEASUREMENT TABLES    2D  %FS: 30 49 %  AV Diam: 4 cm  EDV(Teich): 61 09 ml  EF(Cube): 66 42 %  EF(Teich): 58 72 %  ESV(Cube): 18 1 ml  ESV(Teich): 25 22 ml  IVSd: 1 cm  LA Area: 24 64 cm2  LA Diam: 3 12 cm  LVEDV MOD A4C: 66 08 ml  LVEF MOD A4C: 63 63 %  LVESV MOD A4C: 24 03 ml  LVIDd: 3 78 cm  LVIDs: 2 63 cm  LVLd A4C: 7 09 cm  LVLs A4C: 5 73 cm  LVPWd: 1 cm  RA Area: 11 79 cm2  RV Diam: 2 76 cm  SI(Cube): 23 1 ml/m2  SI(Teich): 23 14 ml/m2  SV MOD A4C: 42 05 ml  SV(Cube): 35 81 ml  SV(Teich): 35 87 ml    CW  TR MaxP 47 mmHg  TR Vmax: 2 62 m/s    MM  TAPSE: 1 61 cm    PW  E': 0 06 m/s  E/E': 12 03  MV A Marcos: 0 8 m/s  MV Dec [place]: 3 66 m/s2  MV DecT: 210 88 ms  MV E Marcos: 0 77 m/s  MV E/A Ratio: 0 96    [facility]    Prepared and electronically signed by    Kelechi Wilkinson MD  Signed 26-May-2020 11:14:41    No results found for this or any previous visit  No results found for this or any previous visit  No valid procedures specified  No results found for this or any previous visit          Meds/Allergies   all current active meds have been reviewed, current meds:   Current Facility-Administered Medications   Medication Dose Route Frequency   • acetaminophen (TYLENOL) tablet 650 mg  650 mg Oral Q6H PRN   • ALPRAZolam (XANAX) tablet 0 25 mg  0 25 mg Oral TID PRN   • apixaban (ELIQUIS) tablet 5 mg  5 mg Oral BID   • atorvastatin (LIPITOR) tablet 40 mg  40 mg Oral Daily   • ferrous sulfate tablet 325 mg  325 mg Oral Daily With Breakfast   • furosemide (LASIX) injection 80 mg  80 mg Intravenous BID   • insulin lispro (HumaLOG) 100 units/mL subcutaneous injection 1-5 Units  1-5 Units Subcutaneous TID AC   • insulin lispro (HumaLOG) 100 units/mL subcutaneous injection 1-5 Units  1-5 Units Subcutaneous HS   • losartan (COZAAR) tablet 25 mg  25 mg Oral Daily   • metoprolol succinate (TOPROL-XL) 24 hr tablet 50 mg  50 mg Oral BID   • pantoprazole (PROTONIX) EC tablet 40 mg  40 mg Oral Early Morning   • traMADol Foster Sonia) tablet 100 mg  100 mg Oral BID    and PTA meds:   Prior to Admission Medications   Prescriptions Last Dose Informant Patient Reported? Taking?    ALPRAZolam (XANAX) 0 25 mg tablet 9/28/2022 at Unknown time  No Yes   Sig: Take 1 tablet (0 25 mg total) by mouth 3 (three) times a day as needed for anxiety   COMBIGAN 0 2-0 5 % Unknown at Unknown time Self Yes No   acetaminophen (TYLENOL) 325 mg tablet 9/28/2022 at Unknown time Self No Yes   Sig: Take 2 tablets (650 mg total) by mouth every 4 (four) hours as needed for mild pain   amiodarone 200 mg tablet 9/28/2022 at Unknown time  No Yes   Sig: TAKE 1 TABLET DAILY   apixaban (Eliquis) 5 mg 9/28/2022 at Unknown time Self No Yes   Sig: Take 1 tablet (5 mg total) by mouth 2 (two) times a day   atorvastatin (LIPITOR) 40 mg tablet 9/28/2022 at Unknown time Self No Yes   Sig: Take 1 tablet (40 mg total) by mouth daily   ferrous sulfate 325 (65 Fe) mg tablet 9/28/2022 at Unknown time Self Yes Yes   Sig: Take 325 mg by mouth daily with breakfast   furosemide (LASIX) 20 mg tablet 9/28/2022 at Unknown time Self No Yes   Sig: Take 1 tablet (20 mg total) by mouth daily   losartan (COZAAR) 25 mg tablet 9/28/2022 at Unknown time Self No Yes   Sig: Take 1 tablet (25 mg total) by mouth daily   metFORMIN (GLUCOPHAGE) 500 mg tablet 9/28/2022 at Unknown time Self No Yes   Sig: Take 1 tablet (500 mg total) by mouth 2 (two) times a day with meals   metoprolol succinate (TOPROL-XL) 50 mg 24 hr tablet 9/28/2022 at Unknown time Self No Yes   Sig: Take 1 tablet (50 mg total) by mouth 2 (two) times a day   omeprazole (PriLOSEC) 20 mg delayed release capsule 9/28/2022 at Unknown time Self No Yes   Sig: Take 1 capsule (20 mg total) by mouth daily   potassium chloride (MICRO-K) 10 MEQ CR capsule 9/28/2022 at Unknown time Self No Yes   Sig: Take 2 capsules (20 mEq total) by mouth daily   traMADol (ULTRAM) 50 mg tablet 9/28/2022 at Unknown time  No Yes   Sig: Take 2 tablets (100 mg total) by mouth 2 (two) times a day      Facility-Administered Medications: None     Medications Prior to Admission   Medication   • acetaminophen (TYLENOL) 325 mg tablet   • ALPRAZolam (XANAX) 0 25 mg tablet   • amiodarone 200 mg tablet   • apixaban (Eliquis) 5 mg   • atorvastatin (LIPITOR) 40 mg tablet   • ferrous sulfate 325 (65 Fe) mg tablet   • furosemide (LASIX) 20 mg tablet   • losartan (COZAAR) 25 mg tablet   • metFORMIN (GLUCOPHAGE) 500 mg tablet   • metoprolol succinate (TOPROL-XL) 50 mg 24 hr tablet   • omeprazole (PriLOSEC) 20 mg delayed release capsule   • potassium chloride (MICRO-K) 10 MEQ CR capsule   • traMADol (ULTRAM) 50 mg tablet   • COMBIGAN 0 2-0 5 %            Counseling / Coordination of Care  Total floor / unit time spent today 20 minutes  Greater than 50% of total time was spent with the patient and / or family counseling and / or coordination of care  ** Please Note: Dragon 360 Dictation voice to text software may have been used in the creation of this document   **

## 2022-09-30 NOTE — ASSESSMENT & PLAN NOTE
POA, , , alkaline phosphatase 72  Patient displayed no RUQ tenderness  Recent Labs     09/28/22  1431 09/29/22  0615 09/30/22  0446   * 119* 83*   * 180* 154*   ALKPHOS 72 67 66   TBILI 0 97 0 81 0 77     · Trend CMP  · Consider RUQ US if patient clinically worsens or if LFTs do not improve after diuresis

## 2022-09-30 NOTE — ASSESSMENT & PLAN NOTE
Recent Labs     09/28/22  1431 09/29/22  0615 09/30/22  0446   K 5 3 3 4* 3 6   MG  --   --  1 1*     · Replete as needed with PO potassium unless K < 3 0, in which case consider IV repletion

## 2022-09-30 NOTE — ASSESSMENT & PLAN NOTE
Presentation: Patient presents with complaints of shortness of breath with exertion and orthopnea for the past 5 days  She endorses bilateral lower extremity edema  Patient reports being treated by her outpatient PCP with PO Lasix r/t bilateral lower extremity edema; however, about 2 months ago her PCP instructed her to take the PO Lasix once every 3 days  Chest x-ray: Vascular congestion and LCW pacemaker in place on my read  Official read pending  Last echocardiogram on file May 4506: LV systolic function was normal  LVEF estimated to be 60%  There were no RWMA  G2DD  Suspect Etiology: Acute Heart Failure  COVID negative  Initial troponin: 17 --> 21, delta 4  BNP: 1242      • See plan under acute HFrEF

## 2022-09-30 NOTE — PROGRESS NOTES
[facility]  Progress Note Monalisa Marin 1945, 69 y o  female MRN: [id number]  Unit/Bed#: S -01 Encounter: [id number]  Primary Care Provider: Juliane Concepcion DO   Date and time admitted to hospital: 9/28/2022  6:33 PM    * Acute HFrEF (heart failure with reduced ejection fraction) ([facility] [address] )  Assessment & Plan  Presents with  increased shortness of breath, dyspnea on exertion and lower extremity edema in setting of decreased diuretic dosing 1-2 weeks ago and high sodium diet  Patient reports being treated by her outpatient PCP with PO Lasix r/t bilateral lower extremity edema; however, about 2 months ago her PCP instructed her to take the PO Lasix once every 3 days  Dry weight: 130-132 lbs  Patient is currently volume overloaded  CXR at ED: Vascular congestion and LCW pacemaker in place  0/7732 Echo: LV systolic function was normal, LVEF estimated to be 60%, no RWMA, G2DD  COVID negative  BNP: 1242  Diuresis: PTA medication of 20 mg of PO Lasix q3 days  Received 40 mg of IV Lasix in ED  Weight (last 2 days)     Date/Time Weight    09/30/22 0600 61 4 (135 36)    09/30/22 0300 61 4 (135 36)    09/29/22 15:41:33 63 (139)    09/29/22 0600 63 3 (139 55)    09/28/22 23:07:34 63 3 (139 55)          Intake/Output Summary (Last 24 hours) at 9/30/2022 1129  Last data filed at 9/30/2022 1022  Gross per 24 hour   Intake 960 ml   Output 1600 ml   Net -640 ml     • Meds: Diuretic: IV Lasix 80 mg BID, B-Blocker: Toprol XL 50 mg BID, ACE/ARB/ARNi: Losartan 25 mg daily  · Diet: Sodium 2g, FR 1500 mL  · Labs: Trend CMP, CBC, Mag  · Maintain Mg > 2 and K > 4; Replete prn  · Elevate HOB > 30°, Daily standing weights, Measure I/Os, Monitor on Telemetry  • Follow up cardiology consult, appreciate recs  Elevated LFTs  Assessment & Plan  POA, , , alkaline phosphatase 72  Patient displayed no RUQ tenderness      Recent Labs     09/28/22  1431 09/29/22  0615 09/30/22  0446 * 119* 83*   * 180* 154*   ALKPHOS 72 67 66   TBILI 0 97 0 81 0 77     · Trend CMP  · Consider RUQ US if patient clinically worsens or if LFTs do not improve after diuresis  Shortness of breath  Assessment & Plan  Presentation: Patient presents with complaints of shortness of breath with exertion and orthopnea for the past 5 days  She endorses bilateral lower extremity edema  Patient reports being treated by her outpatient PCP with PO Lasix r/t bilateral lower extremity edema; however, about 2 months ago her PCP instructed her to take the PO Lasix once every 3 days  Chest x-ray: Vascular congestion and LCW pacemaker in place on my read  Official read pending  Last echocardiogram on file May 4984: LV systolic function was normal  LVEF estimated to be 60%  There were no RWMA  G2DD  Suspect Etiology: Acute Heart Failure  COVID negative  Initial troponin: 17 --> 21, delta 4  BNP: 1242  • See plan under acute HFrEF    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, without long-term current use of insulin [facility])  Assessment & Plan  Lab Results   Component Value Date    HGBA1C 6 06/29/2022       Recent Labs     09/29/22  1135 09/29/22  1729 09/29/22  2046 09/30/22  0721   POCGLU 181* 132 188* 162*       Blood Sugar Average: Last 72 hrs:  · (P) 160 PTA regimen of Metformin, will hold while inpatient  · Accu-Checks and SSI  · Hypoglycemia protocol      Potassium (K) deficiency  Assessment & Plan  Recent Labs     09/28/22  1431 09/29/22  0615 09/30/22  0446   K 5 3 3 4* 3 6   MG  --   --  1 1*     · Replete as needed with PO potassium unless K < 3 0, in which case consider IV repletion    Magnesium deficiency  Assessment & Plan  Recent Labs     09/30/22  0446   MG 1 1*     · Replete as needed  · Discuss IV v PO magnesium repletion with cardiology in setting of volume overload      Wound of left leg  Assessment & Plan  1-2 cm wound on lateral surface of lower leg, central scab with granulation surrounded by erythema  Raised relative to skin level  Concerns of necrosis  · Follow up on wound care consult  Hypertensive urgency  Assessment & Plan  Blood pressure elevated on arrival with readings in 180s-200s/70s-80s  Patient just received dose of IV Lasix, repeat BP post administration  Continue PTA medications of Toprol XL and Cozaar for now, consider holding Cozaar pending renal function while on IV Lasix inpatient  Blood Pressure: 168/76  Monitor blood pressure  PAF (paroxysmal atrial fibrillation) ([facility])  Assessment & Plan  Rate/Rhythm Control: Toprol XL, Amiodarone  Antiplatelet/Anticoagulation: Eliquis  Per device interrogation, has been 100% AFib since 11/2021  EKG: ventricular paced rhythm with underlying flutter  · Discontinued Amiodarone per cardiology rec  · Continue Toprol XL, Eliquis    Pacemaker  Assessment & Plan  S/P PPM placement in June 2020  History of tachy-keanu syndrome  Leukocytosis  Assessment & Plan  POA, WBCs of 12 99  Patient has a long standing history of leukocytosis with range from 12-13  Patient does not meet two SIRS criteria on admission  Recent Labs     09/28/22  1431 09/29/22  0615 09/30/22  0446   WBC 12 99* 12 26* 12 59*     · Trend CBC  Gastroesophageal reflux disease  Assessment & Plan  · Continue PPI  CAD (coronary atherosclerotic disease)  Assessment & Plan  Currently denies chest pain  S/P grafting x3  Most recent cardiac catheterization was in May 2020 which displayed multivessel disease that was completely revascularized  · Continue Toprol XL  VTE Pharmacologic Prophylaxis:   VTE Score: 7 High Risk (Score >/= 5) - Pharmacological DVT Prophylaxis Ordered: Apixaban (Eliquis)  Sequential Compression Devices Ordered  Mechanical VTE Prophylaxis in Place: Yes    Patient Centered Rounds: I have performed bedside rounds with nursing staff today      Discussions with Specialists or Other Care Team Provider: case management, nursing, Dr Cameron Bridges    Education and Discussions with Family / Patient: Attempted to update  (daughter) via phone  Unable to contact  Will try again  Current Length of Stay: 2 day(s)    Current Patient Status: Inpatient     Discharge Plan / Estimated Discharge Date: Anticipate discharge in > 72 hrs to rehab facility  Code Status: Level 3 - DNAR and DNI      Subjective:   Patient was seen at bedside for continuity of care  Per night team, no major events overnight  She endorses improvement in respiratory symptoms and better energy levels  Patient denies chest pain, N/V, C/D, HA, and dizziness  Treatment plan and next steps were discussed with patient, who expresses understanding and is amenable to the plan  She does not report any additional questions or concerns at this time  Objective:     Vitals:   Temp (24hrs), Av 1 °F (36 7 °C), Min:97 5 °F (36 4 °C), Max:98 6 °F (37 °C)    Temp:  [97 5 °F (36 4 °C)-98 6 °F (37 °C)] 98 6 °F (37 °C)  HR:  [60-74] 62  Resp:  [16-17] 16  BP: (160-174)/(69-84) 168/76  SpO2:  [93 %-96 %] 93 %  Body mass index is 27 34 kg/m²  Input and Output Summary (last 24 hours): Intake/Output Summary (Last 24 hours) at 2022 1350  Last data filed at 2022 1022  Gross per 24 hour   Intake 240 ml   Output 1600 ml   Net -1360 ml       Physical Exam:     Physical Exam  Vitals and nursing note reviewed  Constitutional:       General: She is not in acute distress  Appearance: Normal appearance  She is well-developed  She is not ill-appearing, toxic-appearing or diaphoretic  HENT:      Head: Normocephalic and atraumatic  Right Ear: External ear normal       Left Ear: External ear normal       Nose: Nose normal  No congestion or rhinorrhea  Mouth/Throat:      Mouth: Mucous membranes are moist       Pharynx: No oropharyngeal exudate or posterior oropharyngeal erythema  Eyes:      General: No scleral icterus       Conjunctiva/sclera: Conjunctivae normal    Cardiovascular:      Rate and Rhythm: Normal rate and regular rhythm  Pulses: Normal pulses  Heart sounds: Normal heart sounds  No murmur heard  No friction rub  No gallop  Pulmonary:      Effort: Pulmonary effort is normal  No respiratory distress  Breath sounds: No stridor  Rales (mild) present  No wheezing or rhonchi  Abdominal:      General: Bowel sounds are normal       Palpations: Abdomen is soft  Tenderness: There is no abdominal tenderness  Musculoskeletal:         General: No tenderness  Normal range of motion  Cervical back: Normal range of motion and neck supple  Right lower leg: Edema (2+ pitting) present  Left lower leg: Edema (2+ pitting) present  Skin:     General: Skin is warm and dry  Capillary Refill: Capillary refill takes less than 2 seconds  Findings: Lesion (lower leg on lateral aspect) present  No rash  Neurological:      General: No focal deficit present  Mental Status: She is alert     Psychiatric:         Mood and Affect: Mood normal          Behavior: Behavior normal           Additional Data:     Labs:  Results from last 7 days   Lab Units 09/30/22  0446   WBC Thousand/uL 12 59*   HEMOGLOBIN g/dL 9 7*   HEMATOCRIT % 32 8*   PLATELETS Thousands/uL 306   NEUTROS PCT % 67   LYMPHS PCT % 19   MONOS PCT % 9   EOS PCT % 3     Results from last 7 days   Lab Units 09/30/22  0446   SODIUM mmol/L 140   POTASSIUM mmol/L 3 6   CHLORIDE mmol/L 102   CO2 mmol/L 29   BUN mg/dL 26*   CREATININE mg/dL 1 08   ANION GAP mmol/L 9   CALCIUM mg/dL 8 1*   ALBUMIN g/dL 3 5   TOTAL BILIRUBIN mg/dL 0 77   ALK PHOS U/L 66   ALT U/L 154*   AST U/L 83*   GLUCOSE RANDOM mg/dL 172*         Results from last 7 days   Lab Units 09/30/22  1138 09/30/22  0721 09/29/22  2046 09/29/22  1729 09/29/22  1135 09/29/22  0723 09/28/22  2304   POC GLUCOSE mg/dl 186* 162* 188* 132 181* 108 189*               Imaging: Reviewed radiology reports from this admission including: chest xray and ECHO    Recent Cultures (last 7 days):           Lines/Drains:  Invasive Devices  Report    Peripheral Intravenous Line  Duration           Peripheral IV 07/10/20 Right Antecubital 811 days    Peripheral IV 09/28/22 Right Wrist 1 day          Drain  Duration           Ureteral Drain/Stent Right ureter 6 Fr  810 days                Telemetry: ventricular paced, atrial flutter  Telemetry Orders (From admission, onward)             48 Hour Telemetry Monitoring  (ED Bridging Orders Panel)  Continuous x 48 hours        Expiring   References:    Telemetry Guidelines   Question:  Reason for 48 Hour Telemetry  Answer:  Acute Decompensated CHF (continuous diuretic infusion or total diuretic dose > 200 mg daily, associated electrolyte derangement, ionotropic drip, history of ventricular arrhythmia, or new EF <35%)                    Last 24 Hours Medication List:   Current Facility-Administered Medications   Medication Dose Route Frequency Provider Last Rate   • acetaminophen  650 mg Oral Q6H PRN Bambi Knee, CRNP     • ALPRAZolam  0 25 mg Oral TID PRN Bambi Knee, CRNP     • apixaban  5 mg Oral BID Bambi Knee, CRNP     • atorvastatin  40 mg Oral Daily Bambi Knee, CRNP     • ferrous sulfate  325 mg Oral Daily With Breakfast Bambi Knee, CRNP     • furosemide  80 mg Intravenous BID Tanisha Davies PA-C     • insulin lispro  1-5 Units Subcutaneous TID AC PETE Cifuentes     • insulin lispro  1-5 Units Subcutaneous HS PETE Cifuentes     • isosorbide dinitrate  10 mg Oral TID after meals Tanisha Davies PA-C     • [START ON 10/1/2022] losartan  50 mg Oral Daily Tanisha Davies PA-C     • metolazone  5 mg Oral Daily Tanisha Davies PA-C     • metoprolol succinate  50 mg Oral BID Bambi Knee, CRNP     • pantoprazole  40 mg Oral Early Morning PETE Cifuentes     • [START ON 10/1/2022] potassium chloride  20 mEq Oral Daily Tanisha Davies PA-C     • traMADol  100 mg Oral BID PETE Mae          Today, Patient Was Seen By: Alpesh Herrera    ** Please Note: This note has been constructed using a voice recognition system   **

## 2022-09-30 NOTE — ASSESSMENT & PLAN NOTE
Rate/Rhythm Control: Toprol XL, Amiodarone  Antiplatelet/Anticoagulation: Eliquis  Per device interrogation, has been 100% AFib since 11/2021  EKG: ventricular paced rhythm with underlying flutter    · Discontinued Amiodarone per cardiology rec  · Continue Toprol XL, Eliquis

## 2022-09-30 NOTE — ASSESSMENT & PLAN NOTE
Blood pressure elevated on arrival with readings in 180s-200s/70s-80s  Patient just received dose of IV Lasix, repeat BP post administration  Continue PTA medications of Toprol XL and Cozaar for now, consider holding Cozaar pending renal function while on IV Lasix inpatient  Blood Pressure: 168/76  Monitor blood pressure

## 2022-09-30 NOTE — ASSESSMENT & PLAN NOTE
1-2 cm wound on lateral surface of lower leg, central scab with granulation surrounded by erythema  Raised relative to skin level  Concerns of necrosis  · Follow up on wound care consult

## 2022-10-01 LAB
ALBUMIN SERPL BCP-MCNC: 3.6 G/DL (ref 3.5–5)
ALP SERPL-CCNC: 68 U/L (ref 34–104)
ALT SERPL W P-5'-P-CCNC: 121 U/L (ref 7–52)
ANION GAP SERPL CALCULATED.3IONS-SCNC: 10 MMOL/L (ref 4–13)
AST SERPL W P-5'-P-CCNC: 52 U/L (ref 13–39)
BASOPHILS # BLD AUTO: 0.11 THOUSANDS/ΜL (ref 0–0.1)
BASOPHILS NFR BLD AUTO: 1 % (ref 0–1)
BILIRUB SERPL-MCNC: 0.83 MG/DL (ref 0.2–1)
BUN SERPL-MCNC: 25 MG/DL (ref 5–25)
CALCIUM SERPL-MCNC: 8.1 MG/DL (ref 8.4–10.2)
CHLORIDE SERPL-SCNC: 95 MMOL/L (ref 96–108)
CO2 SERPL-SCNC: 32 MMOL/L (ref 21–32)
CREAT SERPL-MCNC: 1.18 MG/DL (ref 0.6–1.3)
EOSINOPHIL # BLD AUTO: 0.45 THOUSAND/ΜL (ref 0–0.61)
EOSINOPHIL NFR BLD AUTO: 4 % (ref 0–6)
ERYTHROCYTE [DISTWIDTH] IN BLOOD BY AUTOMATED COUNT: 17.9 % (ref 11.6–15.1)
GFR SERPL CREATININE-BSD FRML MDRD: 44 ML/MIN/1.73SQ M
GLUCOSE SERPL-MCNC: 139 MG/DL (ref 65–140)
GLUCOSE SERPL-MCNC: 141 MG/DL (ref 65–140)
GLUCOSE SERPL-MCNC: 152 MG/DL (ref 65–140)
GLUCOSE SERPL-MCNC: 184 MG/DL (ref 65–140)
GLUCOSE SERPL-MCNC: 192 MG/DL (ref 65–140)
HCT VFR BLD AUTO: 33.7 % (ref 34.8–46.1)
HGB BLD-MCNC: 9.8 G/DL (ref 11.5–15.4)
IMM GRANULOCYTES # BLD AUTO: 0.08 THOUSAND/UL (ref 0–0.2)
IMM GRANULOCYTES NFR BLD AUTO: 1 % (ref 0–2)
LYMPHOCYTES # BLD AUTO: 1.94 THOUSANDS/ΜL (ref 0.6–4.47)
LYMPHOCYTES NFR BLD AUTO: 17 % (ref 14–44)
MAGNESIUM SERPL-MCNC: 1.8 MG/DL (ref 1.9–2.7)
MCH RBC QN AUTO: 24.6 PG (ref 26.8–34.3)
MCHC RBC AUTO-ENTMCNC: 29.1 G/DL (ref 31.4–37.4)
MCV RBC AUTO: 85 FL (ref 82–98)
MONOCYTES # BLD AUTO: 1.15 THOUSAND/ΜL (ref 0.17–1.22)
MONOCYTES NFR BLD AUTO: 10 % (ref 4–12)
NEUTROPHILS # BLD AUTO: 7.89 THOUSANDS/ΜL (ref 1.85–7.62)
NEUTS SEG NFR BLD AUTO: 67 % (ref 43–75)
NRBC BLD AUTO-RTO: 0 /100 WBCS
PLATELET # BLD AUTO: 316 THOUSANDS/UL (ref 149–390)
PMV BLD AUTO: 10.8 FL (ref 8.9–12.7)
POTASSIUM SERPL-SCNC: 3.3 MMOL/L (ref 3.5–5.3)
PROT SERPL-MCNC: 6.2 G/DL (ref 6.4–8.4)
RBC # BLD AUTO: 3.98 MILLION/UL (ref 3.81–5.12)
SODIUM SERPL-SCNC: 137 MMOL/L (ref 135–147)
WBC # BLD AUTO: 11.62 THOUSAND/UL (ref 4.31–10.16)

## 2022-10-01 PROCEDURE — 83735 ASSAY OF MAGNESIUM: CPT

## 2022-10-01 PROCEDURE — 82948 REAGENT STRIP/BLOOD GLUCOSE: CPT

## 2022-10-01 PROCEDURE — 99232 SBSQ HOSP IP/OBS MODERATE 35: CPT | Performed by: INTERNAL MEDICINE

## 2022-10-01 PROCEDURE — 85025 COMPLETE CBC W/AUTO DIFF WBC: CPT

## 2022-10-01 PROCEDURE — 4010F ACE/ARB THERAPY RXD/TAKEN: CPT | Performed by: FAMILY MEDICINE

## 2022-10-01 PROCEDURE — 80053 COMPREHEN METABOLIC PANEL: CPT

## 2022-10-01 RX ORDER — TRAMADOL HYDROCHLORIDE 50 MG/1
100 TABLET ORAL 2 TIMES DAILY
Status: CANCELLED | OUTPATIENT
Start: 2022-10-01

## 2022-10-01 RX ORDER — FUROSEMIDE 40 MG/1
40 TABLET ORAL
Status: DISCONTINUED | OUTPATIENT
Start: 2022-10-02 | End: 2022-10-02

## 2022-10-01 RX ORDER — POTASSIUM CHLORIDE 20 MEQ/1
40 TABLET, EXTENDED RELEASE ORAL
Status: DISCONTINUED | OUTPATIENT
Start: 2022-10-01 | End: 2022-10-01

## 2022-10-01 RX ORDER — ATORVASTATIN CALCIUM 40 MG/1
40 TABLET, FILM COATED ORAL DAILY
Status: CANCELLED | OUTPATIENT
Start: 2022-10-02

## 2022-10-01 RX ORDER — FERROUS SULFATE 325(65) MG
325 TABLET ORAL
Status: CANCELLED | OUTPATIENT
Start: 2022-10-02

## 2022-10-01 RX ORDER — POTASSIUM CHLORIDE 20 MEQ/1
40 TABLET, EXTENDED RELEASE ORAL 2 TIMES DAILY
Status: CANCELLED | OUTPATIENT
Start: 2022-10-01 | End: 2022-10-02

## 2022-10-01 RX ORDER — INSULIN LISPRO 100 [IU]/ML
1-5 INJECTION, SOLUTION INTRAVENOUS; SUBCUTANEOUS
Status: CANCELLED | OUTPATIENT
Start: 2022-10-01

## 2022-10-01 RX ORDER — ISOSORBIDE DINITRATE 10 MG/1
20 TABLET ORAL
Status: CANCELLED | OUTPATIENT
Start: 2022-10-01

## 2022-10-01 RX ORDER — POTASSIUM CHLORIDE 20 MEQ/1
40 TABLET, EXTENDED RELEASE ORAL 2 TIMES DAILY
Status: COMPLETED | OUTPATIENT
Start: 2022-10-01 | End: 2022-10-01

## 2022-10-01 RX ORDER — POTASSIUM CHLORIDE 20 MEQ/1
20 TABLET, EXTENDED RELEASE ORAL DAILY
Status: CANCELLED | OUTPATIENT
Start: 2022-10-02

## 2022-10-01 RX ORDER — ACETAMINOPHEN 325 MG/1
650 TABLET ORAL EVERY 6 HOURS PRN
Status: CANCELLED | OUTPATIENT
Start: 2022-10-01

## 2022-10-01 RX ORDER — PANTOPRAZOLE SODIUM 40 MG/1
40 TABLET, DELAYED RELEASE ORAL
Status: CANCELLED | OUTPATIENT
Start: 2022-10-02

## 2022-10-01 RX ORDER — ALPRAZOLAM 0.25 MG/1
0.25 TABLET ORAL 3 TIMES DAILY PRN
Status: CANCELLED | OUTPATIENT
Start: 2022-10-01

## 2022-10-01 RX ORDER — METOLAZONE 5 MG/1
2.5 TABLET ORAL DAILY
Status: CANCELLED | OUTPATIENT
Start: 2022-10-02

## 2022-10-01 RX ORDER — FUROSEMIDE 40 MG/1
40 TABLET ORAL
Status: CANCELLED | OUTPATIENT
Start: 2022-10-02

## 2022-10-01 RX ORDER — ISOSORBIDE DINITRATE 10 MG/1
10 TABLET ORAL
Status: CANCELLED | OUTPATIENT
Start: 2022-10-01

## 2022-10-01 RX ORDER — METOPROLOL SUCCINATE 50 MG/1
50 TABLET, EXTENDED RELEASE ORAL 2 TIMES DAILY
Status: CANCELLED | OUTPATIENT
Start: 2022-10-01

## 2022-10-01 RX ADMIN — TRAMADOL HYDROCHLORIDE 100 MG: 50 TABLET, COATED ORAL at 17:20

## 2022-10-01 RX ADMIN — PANTOPRAZOLE SODIUM 40 MG: 40 TABLET, DELAYED RELEASE ORAL at 04:49

## 2022-10-01 RX ADMIN — INSULIN LISPRO 1 UNITS: 100 INJECTION, SOLUTION INTRAVENOUS; SUBCUTANEOUS at 21:48

## 2022-10-01 RX ADMIN — MAGNESIUM OXIDE TAB 400 MG (241.3 MG ELEMENTAL MG) 400 MG: 400 (241.3 MG) TAB at 09:24

## 2022-10-01 RX ADMIN — INSULIN LISPRO 1 UNITS: 100 INJECTION, SOLUTION INTRAVENOUS; SUBCUTANEOUS at 17:24

## 2022-10-01 RX ADMIN — METOLAZONE 2.5 MG: 5 TABLET ORAL at 08:15

## 2022-10-01 RX ADMIN — METOPROLOL SUCCINATE 50 MG: 50 TABLET, FILM COATED, EXTENDED RELEASE ORAL at 21:49

## 2022-10-01 RX ADMIN — POTASSIUM CHLORIDE 40 MEQ: 1500 TABLET, EXTENDED RELEASE ORAL at 09:24

## 2022-10-01 RX ADMIN — METOPROLOL SUCCINATE 50 MG: 50 TABLET, FILM COATED, EXTENDED RELEASE ORAL at 08:14

## 2022-10-01 RX ADMIN — MAGNESIUM OXIDE TAB 400 MG (241.3 MG ELEMENTAL MG) 400 MG: 400 (241.3 MG) TAB at 17:21

## 2022-10-01 RX ADMIN — APIXABAN 5 MG: 5 TABLET, FILM COATED ORAL at 17:21

## 2022-10-01 RX ADMIN — LOSARTAN POTASSIUM 50 MG: 50 TABLET, FILM COATED ORAL at 08:14

## 2022-10-01 RX ADMIN — POTASSIUM CHLORIDE 20 MEQ: 1500 TABLET, EXTENDED RELEASE ORAL at 08:14

## 2022-10-01 RX ADMIN — FUROSEMIDE 80 MG: 10 INJECTION, SOLUTION INTRAMUSCULAR; INTRAVENOUS at 08:45

## 2022-10-01 RX ADMIN — ISOSORBIDE DINITRATE 10 MG: 10 TABLET ORAL at 11:45

## 2022-10-01 RX ADMIN — TRAMADOL HYDROCHLORIDE 100 MG: 50 TABLET, COATED ORAL at 08:13

## 2022-10-01 RX ADMIN — INSULIN LISPRO 1 UNITS: 100 INJECTION, SOLUTION INTRAVENOUS; SUBCUTANEOUS at 11:45

## 2022-10-01 RX ADMIN — POTASSIUM CHLORIDE 40 MEQ: 1500 TABLET, EXTENDED RELEASE ORAL at 10:49

## 2022-10-01 RX ADMIN — POTASSIUM CHLORIDE 40 MEQ: 1500 TABLET, EXTENDED RELEASE ORAL at 17:21

## 2022-10-01 RX ADMIN — ATORVASTATIN CALCIUM 40 MG: 40 TABLET, FILM COATED ORAL at 08:14

## 2022-10-01 RX ADMIN — ISOSORBIDE DINITRATE 10 MG: 10 TABLET ORAL at 08:14

## 2022-10-01 RX ADMIN — FERROUS SULFATE TAB 325 MG (65 MG ELEMENTAL FE) 325 MG: 325 (65 FE) TAB at 08:14

## 2022-10-01 RX ADMIN — ISOSORBIDE DINITRATE 10 MG: 10 TABLET ORAL at 17:22

## 2022-10-01 RX ADMIN — APIXABAN 5 MG: 5 TABLET, FILM COATED ORAL at 08:14

## 2022-10-01 NOTE — ASSESSMENT & PLAN NOTE
Presentation: Patient presents with complaints of shortness of breath with exertion and orthopnea for the past 5 days  She endorses bilateral lower extremity edema  Patient reports being treated by her outpatient PCP with PO Lasix r/t bilateral lower extremity edema; however, about 2 months ago her PCP instructed her to take the PO Lasix once every 3 days  Chest x-ray: Vascular congestion and LCW pacemaker in place on my read  Official read pending  Last echocardiogram on file May 3164: LV systolic function was normal  LVEF estimated to be 60%  There were no RWMA  G2DD  Suspect Etiology: Acute Heart Failure  COVID negative  Initial troponin: 17 --> 21, delta 4  BNP: 1242      • See plan under acute HFrEF

## 2022-10-01 NOTE — ASSESSMENT & PLAN NOTE
Recent Labs     09/30/22  0446 09/30/22  1408 10/01/22  0450   MG 1 1* 1 5* 1 8*     · Replete as needed  ·  continue magnesium oxide 400 mg b i d
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
## 2022-10-01 NOTE — PROGRESS NOTES
[facility]  Progress Note Arielle Anand 1945, 69 y o  female MRN: [id number]  Unit/Bed#: S -01 Encounter: [id number]  Primary Care Provider: Latha Bermudez DO   Date and time admitted to hospital: 9/28/2022  6:33 PM    * Acute HFrEF (heart failure with reduced ejection fraction) ([facility] [address] )  Assessment & Plan  Presents with  increased shortness of breath, dyspnea on exertion and lower extremity edema in setting of decreased diuretic dosing 1-2 weeks ago and high sodium diet  Patient reports being treated by her outpatient PCP with PO Lasix r/t bilateral lower extremity edema; however, about 2 months ago her PCP instructed her to take the PO Lasix once every 3 days  Dry weight: 130-132 lbs  Patient is currently volume overloaded  CXR at ED: Vascular congestion and LCW pacemaker in place  5/5008 Echo: LV systolic function was normal, LVEF estimated to be 60%, no RWMA, G2DD  COVID negative  BNP: 1242  Diuresis: PTA medication of 20 mg of PO Lasix q3 days  Received 40 mg of IV Lasix in ED  Weight (last 2 days)     Date/Time Weight    10/01/22 0300 59 4 (130 95)    09/30/22 0600 61 4 (135 36)    09/30/22 0300 61 4 (135 36)    09/29/22 15:41:33 63 (139)    09/29/22 0600 63 3 (139 55)          Intake/Output Summary (Last 24 hours) at 10/1/2022 1451  Last data filed at 10/1/2022 0901  Gross per 24 hour   Intake 240 ml   Output 2700 ml   Net -2460 ml     • Meds: Diuretic:  Lasix 40 mg  P o  BID, B-Blocker: Toprol XL 50 mg BID,   ACE/ARB/ARNi: Losartan 25 mg daily- on hold in anticipation of cardiac catheterization and CTA  Isodril 10mg was added  · Diet: Sodium 2g, FR 1500 mL  · Labs: Trend CMP, CBC, Mag  · Maintain Mg > 2 and K > 4; Replete prn  · Elevate HOB > 30°, Daily standing weights, Measure I/Os, Monitor on Telemetry  • Follow up cardiology consult, appreciate recs    •  awaiting transfer to [facility] for cardiac catheterization/ evaluation by CT surgery/ and consultation with advanced heart failure team      Magnesium deficiency  Assessment & Plan  Recent Labs     09/30/22  0446 09/30/22  1408 10/01/22  0450   MG 1 1* 1 5* 1 8*     · Replete as needed  ·  continue magnesium oxide 400 mg b i d  Elevated LFTs  Assessment & Plan  POA, , , alkaline phosphatase 72  Patient displayed no RUQ tenderness  Likely secondary to hepatic congestion secondary to CHF    Recent Labs     09/29/22  0615 09/30/22  0446 10/01/22  0450   * 83* 52*   * 154* 121*   ALKPHOS 67 66 68   TBILI 0 81 0 77 0 83     · Trend CMP  · Consider RUQ US if patient clinically worsens or if LFTs do not improve after diuresis  Shortness of breath  Assessment & Plan  Presentation: Patient presents with complaints of shortness of breath with exertion and orthopnea for the past 5 days  She endorses bilateral lower extremity edema  Patient reports being treated by her outpatient PCP with PO Lasix r/t bilateral lower extremity edema; however, about 2 months ago her PCP instructed her to take the PO Lasix once every 3 days  Chest x-ray: Vascular congestion and LCW pacemaker in place on my read  Official read pending  Last echocardiogram on file May 1304: LV systolic function was normal  LVEF estimated to be 60%  There were no RWMA  G2DD  Suspect Etiology: Acute Heart Failure  COVID negative  Initial troponin: 17 --> 21, delta 4  BNP: 1242  • See plan under acute HFrEF    Hypertensive urgency  Assessment & Plan  Blood pressure elevated on arrival with readings in 180s-200s/70s-80s  Patient just received dose of IV Lasix, repeat BP post administration  Continue PTA medications of Toprol XL and Cozaar for now, consider holding Cozaar pending renal function while on IV Lasix inpatient  Blood Pressure: 143/66  Monitor blood pressure  PAF (paroxysmal atrial fibrillation) ([facility])  Assessment & Plan  Rate/Rhythm Control: Toprol XL, Amiodarone  Antiplatelet/Anticoagulation: Eliquis  Per device interrogation, has been 100% AFib since 11/2021  EKG: ventricular paced rhythm with underlying flutter  · Discontinued Amiodarone per cardiology rec  · Continue Toprol XL, Eliquis    Pacemaker  Assessment & Plan  S/P PPM placement in June 2020  History of tachy-keanu syndrome  Leukocytosis  Assessment & Plan  POA, WBCs of 12 99  Patient has a long standing history of leukocytosis with range from 12-13  Patient does not meet two SIRS criteria on admission  Recent Labs     09/29/22  0615 09/30/22  0446 10/01/22  0450   WBC 12 26* 12 59* 11 62*     · Trend CBC  ·  appears to be chronic  ·  no signs of any infection    Gastroesophageal reflux disease  Assessment & Plan  · Continue PPI  CAD (coronary atherosclerotic disease)  Assessment & Plan  Currently denies chest pain  S/P grafting x3  Most recent cardiac catheterization was in May 2020 which displayed multivessel disease that was completely revascularized  · Continue Toprol XL  VTE Pharmacologic Prophylaxis: VTE Score: 7 High Risk (Score >/= 5) - Pharmacological DVT Prophylaxis Ordered: apixaban (Eliquis)  Sequential Compression Devices Ordered  Patient Centered Rounds: I performed bedside rounds with nursing staff today  Discussions with Specialists or Other Care Team Provider:     Education and Discussions with Family / Patient: Updated  (daughter) at bedside  by Cardiology    Time Spent for Care: 30 minutes  More than 50% of total time spent on counseling and coordination of care as described above  Current Length of Stay: 3 day(s)  Current Patient Status: Inpatient   Certification Statement: The patient will continue to require additional inpatient hospital stay due to  heart failure with  reduced ejection fraction  Discharge Plan: Awaiting transfer to [facility] [address] [facility] [address]    Code Status: Level 3 - DNAR and DNI    Subjective:      patient resting in bed   No acute events reported overnight  She reports feeling better  Able to lie flat in bed  Not having any shortness of breath  She is agreeable to transfer to [facility] for cardiac catheterization    Objective:     Vitals:   Temp (24hrs), Av 5 °F (36 9 °C), Min:98 5 °F (36 9 °C), Max:98 5 °F (36 9 °C)    Temp:  [98 5 °F (36 9 °C)] 98 5 °F (36 9 °C)  HR:  [63-68] 63  Resp:  [16-17] 17  BP: (143-164)/(66-75) 143/66  SpO2:  [90 %-95 %] 90 %  Body mass index is 26 45 kg/m²  Input and Output Summary (last 24 hours): Intake/Output Summary (Last 24 hours) at 10/1/2022 1458  Last data filed at 10/1/2022 0901  Gross per 24 hour   Intake 240 ml   Output 2700 ml   Net -2460 ml       Physical Exam:   Physical Exam   Gen -Patient comfortable  At rest  Neck- Supple  No thyromegaly or lymphadenopathy  Lungs-Clear bilaterally without any wheeze or rales   Heart S1-S2, irregular rate and rhythm, no murmurs  Abdomen-soft nontender, no organomegaly   Bowel sounds present  Extremities-no cyanosi,  clubbing    trace edema both legs  Skin- no rash  Neuro awake alert and oriented       Additional Data:     Labs:  Results from last 7 days   Lab Units 10/01/22  0450   WBC Thousand/uL 11 62*   HEMOGLOBIN g/dL 9 8*   HEMATOCRIT % 33 7*   PLATELETS Thousands/uL 316   NEUTROS PCT % 67   LYMPHS PCT % 17   MONOS PCT % 10   EOS PCT % 4     Results from last 7 days   Lab Units 10/01/22  0450   SODIUM mmol/L 137   POTASSIUM mmol/L 3 3*   CHLORIDE mmol/L 95*   CO2 mmol/L 32   BUN mg/dL 25   CREATININE mg/dL 1 18   ANION GAP mmol/L 10   CALCIUM mg/dL 8 1*   ALBUMIN g/dL 3 6   TOTAL BILIRUBIN mg/dL 0 83   ALK PHOS U/L 68   ALT U/L 121*   AST U/L 52*   GLUCOSE RANDOM mg/dL 152*         Results from last 7 days   Lab Units 10/01/22  1129 10/01/22  0729 22  2106 22  1613 22  1138 22  0721 22  2046 22  1729 22  1135 22  0723 22  2304   POC GLUCOSE mg/dl 192* 141* 165* 171* 186* 162* 188* 132 181* 108 189*               Lines/Drains:  Invasive Devices  Report    Peripheral Intravenous Line  Duration           Peripheral IV 07/10/20 Right Antecubital 812 days    Peripheral IV 09/28/22 Right Wrist 3 days          Drain  Duration           Ureteral Drain/Stent Right ureter 6 Fr  812 days                  Telemetry:  Telemetry Orders (From admission, onward)             48 Hour Telemetry Monitoring  (ED Bridging Orders Panel)  Continuous x 48 hours        References:    Telemetry Guidelines   Question:  Reason for 48 Hour Telemetry  Answer:  Acute Decompensated CHF (continuous diuretic infusion or total diuretic dose > 200 mg daily, associated electrolyte derangement, ionotropic drip, history of ventricular arrhythmia, or new EF <35%)                 Telemetry Reviewed:  Atrial fibrillation  Indication for Continued Telemetry Use: Acute CHF on >200 mg lasix/day or equivalent dose or with new reduced EF                Imaging: Reviewed radiology reports from this admission including: chest xray    Recent Cultures (last 7 days):         Last 24 Hours Medication List:   Current Facility-Administered Medications   Medication Dose Route Frequency Provider Last Rate   • acetaminophen  650 mg Oral Q6H PRN PETE Alanis     • ALPRAZolam  0 25 mg Oral TID PRN PETE Alanis     • apixaban  5 mg Oral BID PETE Alanis     • atorvastatin  40 mg Oral Daily PETE Alanis     • ferrous sulfate  325 mg Oral Daily With Breakfast PETE Alanis     • [START ON 10/2/2022] furosemide  40 mg Oral BID (diuretic) Rich Benavides MD     • insulin lispro  1-5 Units Subcutaneous TID AC PETE Cifuentes     • insulin lispro  1-5 Units Subcutaneous HS PETE Alanis     • isosorbide dinitrate  10 mg Oral TID after meals Lizeth Pereyra PA-C     • magnesium oxide  400 mg Oral BID Akash Jones MD     • metolazone  2 5 mg Oral Daily Lizeth Pereyra PA-C     • metoprolol succinate  50 mg Oral BID Claudette Breaker, CRNP     • pantoprazole  40 mg Oral Early Morning Claudette Breaker, CRNP     • potassium chloride  20 mEq Oral Daily Delon Schroeder PA-C     • potassium chloride  40 mEq Oral BID Delilah Bravo MD     • traMADol  100 mg Oral BID Claudette Breaker, CRNP          Today, Patient Was Seen By: Yoli Regan    **Please Note: This note may have been constructed using a voice recognition system  **

## 2022-10-01 NOTE — PROGRESS NOTES
General Cardiology Progress Note   Roldan Sharpe 69 y o  female MRN: [id number]  Unit/Bed#: S -01 Encounter: [id number]      Assessment:  Principal Problem:    Acute HFrEF (heart failure with reduced ejection fraction) ([facility])  Active Problems:    CAD (coronary atherosclerotic disease)    Gastroesophageal reflux disease    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, without long-term current use of insulin (HCC)    Leukocytosis    Pacemaker    PAF (paroxysmal atrial fibrillation) ([facility])    Hypertensive urgency    Shortness of breath    Elevated LFTs    Magnesium deficiency    Potassium (K) deficiency    Wound of left leg      Impression:    • Acute systolic CHF  o Diuresing well, metolazone added yesterday x1  o 2 6 L last 24 hours  o 4 6 L negative since admission  o Weight from 139 down to 130  o Dry weight about 125  o Continues on furosemide 80 mg IV b i d   o Isordil added yesterday  o Losartan dose increased  o Creatinine stable  o Mildly hypokalemic this morning  o LFT elevations likely due to liver congestion, improving  • New cardiomyopathy  • CAD with CABG x3 in 2015, LIMA to LAD, SVG to OM1, SVG to PDA, cardiac catheterization in 2020 showed patent grafts  • Sick sinus syndrome with [employer]onic dual-chamber permanent pacemaker  • Chronic AFib/flutter, 100% AFib by device check since 11/21, amiodarone was discontinued on this admission as ineffective in maintaining sinus rhythm  o No high rate episodes per device checks, last was 8/18/2022  • Severe aortic root dilation    Plan:    • Needs CT surgery evaluation, awaiting transfer to [place]  • Will likely need to undergo repeat cardiac catheterization, with hold Eliquis 24 hours prior  • Discontinue IV Lasix, she is looking much more euvolemic, laying flat, no hypoxia today  • Continue to up titrate isosorbide dinitrate  • Will hold losartan at this time considering the need for possible CTA/cardiac catheterization  • Continue daily labs    Subjective:   Patient seen and examined  Much improved, no orthopnea, mild pedal edema, blood pressure stable, hypokalemic this morning    Review of Systems   Constitutional: Negative for diaphoresis, fever, malaise/fatigue and night sweats  Cardiovascular: Positive for leg swelling  Negative for chest pain, dyspnea on exertion, orthopnea, palpitations and syncope  Respiratory: Negative for cough, shortness of breath, sputum production and wheezing  Skin: Negative for itching and rash  Gastrointestinal: Negative for abdominal pain, change in bowel habit and diarrhea  Neurological: Negative for dizziness, focal weakness, light-headedness and weakness  All other systems reviewed and are negative  Objective   Vitals: Blood pressure 157/67, pulse 64, temperature 98 5 °F (36 9 °C), temperature source Oral, resp  rate 16, height 4' 11" (1 499 m), weight 59 4 kg (130 lb 15 3 oz), SpO2 90 %, not currently breastfeeding , Body mass index is 26 45 kg/m² , I/O last 3 completed shifts: In: 480 [P O :480]  Out: 3450 [Urine:3450]  I/O this shift:  In: -   Out: 300 [Urine:300]  Wt Readings from Last 3 Encounters:   10/01/22 59 4 kg (130 lb 15 3 oz)   08/02/22 59 9 kg (132 lb)   07/18/22 61 7 kg (136 lb)       Intake/Output Summary (Last 24 hours) at 10/1/2022 1001  Last data filed at 10/1/2022 0901  Gross per 24 hour   Intake 480 ml   Output 2700 ml   Net -2220 ml     I/O last 3 completed shifts: In: 480 [P O :480]  Out: 3450 [Urine:3450]    Telemetry shows AFib with ventricular paced rhythm    Physical Exam  Vitals reviewed  Constitutional:       General: She is not in acute distress  Appearance: She is well-developed  She is not diaphoretic  HENT:      Head: Normocephalic and atraumatic  Nose: Nose normal    Eyes:      General: No scleral icterus  Conjunctiva/sclera: Conjunctivae normal       Pupils: Pupils are equal, round, and reactive to light     Neck:      Thyroid: No thyromegaly  Vascular: Hepatojugular reflux present  No JVD  Cardiovascular:      Rate and Rhythm: Normal rate and regular rhythm  Heart sounds: Murmur heard  No gallop  Pulmonary:      Effort: Pulmonary effort is normal  No respiratory distress  Breath sounds: Normal breath sounds  No wheezing or rales  Abdominal:      General: Bowel sounds are normal  There is no distension  Palpations: Abdomen is soft  Tenderness: There is no abdominal tenderness  Musculoskeletal:         General: No deformity  Normal range of motion  Cervical back: Normal range of motion and neck supple  Right lower leg: Edema present  Left lower leg: Edema present  Skin:     General: Skin is warm and dry  Capillary Refill: Capillary refill takes less than 2 seconds  Findings: No erythema or rash  Neurological:      General: No focal deficit present  Mental Status: She is alert and oriented to person, place, and time  Cranial Nerves: No cranial nerve deficit        Coordination: Coordination normal    Psychiatric:         Behavior: Behavior normal          Judgment: Judgment normal          Meds/Allergies   Allergies   Allergen Reactions   • Nickel Rash       Current Facility-Administered Medications:   •  acetaminophen (TYLENOL) tablet 650 mg, 650 mg, Oral, Q6H PRN, PETE Olivo  •  ALPRAZolam (XANAX) tablet 0 25 mg, 0 25 mg, Oral, TID PRN, PETE Olivo  •  apixaban (ELIQUIS) tablet 5 mg, 5 mg, Oral, BID, PETE Cifuentes, 5 mg at 10/01/22 4813  •  atorvastatin (LIPITOR) tablet 40 mg, 40 mg, Oral, Daily, GOOD CifuentesNP, 40 mg at 10/01/22 9977  •  ferrous sulfate tablet 325 mg, 325 mg, Oral, Daily With Breakfast, PETE Cifuentes, 325 mg at 10/01/22 8393  •  furosemide (LASIX) injection 80 mg, 80 mg, Intravenous, BID, Katiuska Nolan PA-C, 80 mg at 10/01/22 0845  •  insulin lispro (HumaLOG) 100 units/mL subcutaneous injection 1-5 Units, 1-5 Units, Subcutaneous, TID AC, 1 Units at 09/30/22 1745 **AND** Fingerstick Glucose (POCT), , , TID AC, PETE Cifuentes  •  insulin lispro (HumaLOG) 100 units/mL subcutaneous injection 1-5 Units, 1-5 Units, Subcutaneous, HS, PETE Cifuentes, 1 Units at 09/30/22 2106  •  isosorbide dinitrate (ISORDIL) tablet 10 mg, 10 mg, Oral, TID after meals, Jose Alberto Gauthier PA-C, 10 mg at 10/01/22 5324  •  losartan (COZAAR) tablet 50 mg, 50 mg, Oral, Daily, Jose Alberto Gauthier PA-C, 50 mg at 10/01/22 2718  •  magnesium oxide (MAG-OX) tablet 400 mg, 400 mg, Oral, BID, Akash Jones MD, 400 mg at 10/01/22 9250  •  metolazone (ZAROXOLYN) tablet 2 5 mg, 2 5 mg, Oral, Daily, Jose Alberto Gauthier PA-C, 2 5 mg at 10/01/22 0815  •  metoprolol succinate (TOPROL-XL) 24 hr tablet 50 mg, 50 mg, Oral, BID, PETE Cifuentes, 50 mg at 10/01/22 6334  •  pantoprazole (PROTONIX) EC tablet 40 mg, 40 mg, Oral, Early Morning, PETE Cifuentes, 40 mg at 10/01/22 0449  •  potassium chloride (K-DUR,KLOR-CON) CR tablet 20 mEq, 20 mEq, Oral, Daily, Jose Alberto Gauthier PA-C, 20 mEq at 10/01/22 6164  •  potassium chloride (K-DUR,KLOR-CON) CR tablet 40 mEq, 40 mEq, Oral, Q2H, Akash Jones MD, 40 mEq at 10/01/22 4371  •  traMADol (ULTRAM) tablet 100 mg, 100 mg, Oral, BID, PETE Cifuentes, 100 mg at 10/01/22 0813    Laboratory Results:        CBC with diff:   Results from last 7 days   Lab Units 10/01/22  0450 09/30/22  0446 09/29/22  0615 09/28/22  1431   WBC Thousand/uL 11 62* 12 59* 12 26* 12 99*   HEMOGLOBIN g/dL 9 8* 9 7* 9 6* 10 3*   HEMATOCRIT % 33 7* 32 8* 32 7* 35 3   MCV fL 85 86 87 88   PLATELETS Thousands/uL 316 306 271 332   MCH pg 24 6* 25 4* 25 4* 25 6*   MCHC g/dL 29 1* 29 6* 29 4* 29 2*   RDW % 17 9* 18 4* 18 5* 18 4*   MPV fL 10 8 11 0 10 6 11 4   NRBC AUTO /100 WBCs 0 0  --  1       CMP:  Results from last 7 days   Lab Units 10/01/22  0450 09/30/22  0446 09/29/22  0615 09/28/22  1431   SODIUM mmol/L 137 140 141 140   POTASSIUM mmol/L 3 3* 3 6 3 4* 5 3   CHLORIDE mmol/L 95* 102 106 107   CO2 mmol/L 32 29 27 19*   BUN mg/dL 25 26* 26* 26*   CREATININE mg/dL 1 18 1 08 0 90 0 89   CALCIUM mg/dL 8 1* 8 1* 8 3* 8 5   AST U/L 52* 83* 119* 153*   ALT U/L 121* 154* 180* 184*   ALK PHOS U/L 68 66 67 72   EGFR ml/min/1 73sq m 44 49 62 63       BMP:  Results from last 7 days   Lab Units 10/01/22  0450 09/30/22  0446 09/29/22  0615 09/28/22  1431   SODIUM mmol/L 137 140 141 140   POTASSIUM mmol/L 3 3* 3 6 3 4* 5 3   CHLORIDE mmol/L 95* 102 106 107   CO2 mmol/L 32 29 27 19*   BUN mg/dL 25 26* 26* 26*   CREATININE mg/dL 1 18 1 08 0 90 0 89   CALCIUM mg/dL 8 1* 8 1* 8 3* 8 5       NT-proBNP: No results for input(s): NTBNP in the last 72 hours  Magnesium:   Results from last 7 days   Lab Units 10/01/22  0450 09/30/22  1408 09/30/22  0446   MAGNESIUM mg/dL 1 8* 1 5* 1 1*       Coags:       TSH:        Hemoglobin A1C )      Lipid Profile:   Lab Results   Component Value Date    CHOL 164 03/27/2017    CHOL 154 09/26/2016    CHOL 163 05/13/2016     Lab Results   Component Value Date    HDL 60 07/08/2022    HDL 58 02/08/2022    HDL 58 09/15/2021     Lab Results   Component Value Date    LDLCALC 32 07/08/2022    LDLCALC 55 02/08/2022    LDLCALC 69 09/15/2021     No results found for: LDLDIRECT  Lab Results   Component Value Date    TRIG 97 07/08/2022    TRIG 150 02/08/2022    TRIG 139 09/15/2021       Cardiac testing:   EKG personally reviewed by Le Kessler MD      Cath:  ECHO:  Stress TEST:  Other:        Le Kessler MD    Portions of the record may have been created with voice recognition software  Occasional wrong word or "sound a like" substitutions may have occurred due to the inherent limitations of voice recognition software  Read the chart carefully and recognize, using context, where substitutions have occurred

## 2022-10-01 NOTE — ASSESSMENT & PLAN NOTE
Presents with  increased shortness of breath, dyspnea on exertion and lower extremity edema in setting of decreased diuretic dosing 1-2 weeks ago and high sodium diet  Patient reports being treated by her outpatient PCP with PO Lasix r/t bilateral lower extremity edema; however, about 2 months ago her PCP instructed her to take the PO Lasix once every 3 days  Dry weight: 130-132 lbs  Patient is currently volume overloaded  CXR at ED: Vascular congestion and LCW pacemaker in place  3/4954 Echo: LV systolic function was normal, LVEF estimated to be 60%, no RWMA, G2DD  COVID negative  BNP: 1242  Diuresis: PTA medication of 20 mg of PO Lasix q3 days  Received 40 mg of IV Lasix in ED  Weight (last 2 days)     Date/Time Weight    10/01/22 0300 59 4 (130 95)    09/30/22 0600 61 4 (135 36)    09/30/22 0300 61 4 (135 36)    09/29/22 15:41:33 63 (139)    09/29/22 0600 63 3 (139 55)          Intake/Output Summary (Last 24 hours) at 10/1/2022 1451  Last data filed at 10/1/2022 0901  Gross per 24 hour   Intake 240 ml   Output 2700 ml   Net -2460 ml     • Meds: Diuretic:  Lasix 40 mg  P o  BID, B-Blocker: Toprol XL 50 mg BID,   ACE/ARB/ARNi: Losartan 25 mg daily- on hold in anticipation of cardiac catheterization and CTA  Isodril 10mg was added  · Diet: Sodium 2g, FR 1500 mL  · Labs: Trend CMP, CBC, Mag  · Maintain Mg > 2 and K > 4; Replete prn  · Elevate HOB > 30°, Daily standing weights, Measure I/Os, Monitor on Telemetry  • Follow up cardiology consult, appreciate recs    •  awaiting transfer to [facility] for cardiac catheterization/ evaluation by CT surgery/ and consultation with advanced heart failure team

## 2022-10-01 NOTE — ASSESSMENT & PLAN NOTE
Blood pressure elevated on arrival with readings in 180s-200s/70s-80s  Patient just received dose of IV Lasix, repeat BP post administration  Continue PTA medications of Toprol XL and Cozaar for now, consider holding Cozaar pending renal function while on IV Lasix inpatient  Blood Pressure: 143/66  Monitor blood pressure

## 2022-10-01 NOTE — ASSESSMENT & PLAN NOTE
POA, , , alkaline phosphatase 72  Patient displayed no RUQ tenderness  Likely secondary to hepatic congestion secondary to CHF    Recent Labs     09/29/22  0615 09/30/22  0446 10/01/22  0450   * 83* 52*   * 154* 121*   ALKPHOS 67 66 68   TBILI 0 81 0 77 0 83     · Trend CMP  · Consider RUQ US if patient clinically worsens or if LFTs do not improve after diuresis

## 2022-10-01 NOTE — CASE MANAGEMENT
Case Management Progress Note    Patient name Lon Brunner Location S /S -01 MRN [id number]  : 1945 Date 10/1/2022       LOS (days): 3  Geometric Mean LOS (GMLOS) (days): 2 20  Days to GMLOS:-0 7        OBJECTIVE:        Current admission status: Inpatient  Preferred Pharmacy:   [place] [address] [place] [address]  Phone: [phone] Fax: [phone]    [address] - [facility] [place] [address]  [address] [place] [address]  Phone: [phone] Fax: [phone] [address] [place] [address]  Phone: [phone] Fax: [phone]    Primary Care Provider: Melanie Burnette DO    Primary Insurance: [facility] REP  Secondary Insurance:     PROGRESS NOTE:  [facility] is following Pt for an admission to their SNF once medically ready and an auth can obtained

## 2022-10-01 NOTE — ASSESSMENT & PLAN NOTE
POA, WBCs of 12 99  Patient has a long standing history of leukocytosis with range from 12-13  Patient does not meet two SIRS criteria on admission  Recent Labs     09/29/22  0615 09/30/22  0446 10/01/22  0450   WBC 12 26* 12 59* 11 62*     · Trend CBC    ·  appears to be chronic  ·  no signs of any infection

## 2022-10-02 LAB
ALBUMIN SERPL BCP-MCNC: 3.8 G/DL (ref 3.5–5)
ALP SERPL-CCNC: 70 U/L (ref 34–104)
ALT SERPL W P-5'-P-CCNC: 95 U/L (ref 7–52)
ANION GAP SERPL CALCULATED.3IONS-SCNC: 10 MMOL/L (ref 4–13)
ANION GAP SERPL CALCULATED.3IONS-SCNC: 9 MMOL/L (ref 4–13)
AST SERPL W P-5'-P-CCNC: 34 U/L (ref 13–39)
BASOPHILS # BLD AUTO: 0.15 THOUSANDS/ΜL (ref 0–0.1)
BASOPHILS NFR BLD AUTO: 1 % (ref 0–1)
BILIRUB SERPL-MCNC: 0.79 MG/DL (ref 0.2–1)
BUN SERPL-MCNC: 33 MG/DL (ref 5–25)
BUN SERPL-MCNC: 34 MG/DL (ref 5–25)
CALCIUM SERPL-MCNC: 8.6 MG/DL (ref 8.4–10.2)
CALCIUM SERPL-MCNC: 9 MG/DL (ref 8.4–10.2)
CHLORIDE SERPL-SCNC: 93 MMOL/L (ref 96–108)
CHLORIDE SERPL-SCNC: 96 MMOL/L (ref 96–108)
CO2 SERPL-SCNC: 30 MMOL/L (ref 21–32)
CO2 SERPL-SCNC: 33 MMOL/L (ref 21–32)
CREAT SERPL-MCNC: 1.49 MG/DL (ref 0.6–1.3)
CREAT SERPL-MCNC: 1.8 MG/DL (ref 0.6–1.3)
EOSINOPHIL # BLD AUTO: 0.34 THOUSAND/ΜL (ref 0–0.61)
EOSINOPHIL NFR BLD AUTO: 2 % (ref 0–6)
ERYTHROCYTE [DISTWIDTH] IN BLOOD BY AUTOMATED COUNT: 18.3 % (ref 11.6–15.1)
GFR SERPL CREATININE-BSD FRML MDRD: 26 ML/MIN/1.73SQ M
GFR SERPL CREATININE-BSD FRML MDRD: 33 ML/MIN/1.73SQ M
GLUCOSE SERPL-MCNC: 142 MG/DL (ref 65–140)
GLUCOSE SERPL-MCNC: 163 MG/DL (ref 65–140)
GLUCOSE SERPL-MCNC: 168 MG/DL (ref 65–140)
GLUCOSE SERPL-MCNC: 177 MG/DL (ref 65–140)
GLUCOSE SERPL-MCNC: 200 MG/DL (ref 65–140)
GLUCOSE SERPL-MCNC: 230 MG/DL (ref 65–140)
HCT VFR BLD AUTO: 36.5 % (ref 34.8–46.1)
HGB BLD-MCNC: 10.5 G/DL (ref 11.5–15.4)
IMM GRANULOCYTES # BLD AUTO: 0.11 THOUSAND/UL (ref 0–0.2)
IMM GRANULOCYTES NFR BLD AUTO: 1 % (ref 0–2)
LYMPHOCYTES # BLD AUTO: 1.88 THOUSANDS/ΜL (ref 0.6–4.47)
LYMPHOCYTES NFR BLD AUTO: 13 % (ref 14–44)
MAGNESIUM SERPL-MCNC: 1.9 MG/DL (ref 1.9–2.7)
MCH RBC QN AUTO: 24.8 PG (ref 26.8–34.3)
MCHC RBC AUTO-ENTMCNC: 28.8 G/DL (ref 31.4–37.4)
MCV RBC AUTO: 86 FL (ref 82–98)
MONOCYTES # BLD AUTO: 1.4 THOUSAND/ΜL (ref 0.17–1.22)
MONOCYTES NFR BLD AUTO: 10 % (ref 4–12)
NEUTROPHILS # BLD AUTO: 10.53 THOUSANDS/ΜL (ref 1.85–7.62)
NEUTS SEG NFR BLD AUTO: 73 % (ref 43–75)
NRBC BLD AUTO-RTO: 0 /100 WBCS
PLATELET # BLD AUTO: 352 THOUSANDS/UL (ref 149–390)
PMV BLD AUTO: 11.2 FL (ref 8.9–12.7)
POTASSIUM SERPL-SCNC: 4.6 MMOL/L (ref 3.5–5.3)
POTASSIUM SERPL-SCNC: 5.4 MMOL/L (ref 3.5–5.3)
PROT SERPL-MCNC: 6.6 G/DL (ref 6.4–8.4)
RBC # BLD AUTO: 4.23 MILLION/UL (ref 3.81–5.12)
SODIUM SERPL-SCNC: 135 MMOL/L (ref 135–147)
SODIUM SERPL-SCNC: 136 MMOL/L (ref 135–147)
WBC # BLD AUTO: 14.41 THOUSAND/UL (ref 4.31–10.16)

## 2022-10-02 PROCEDURE — 99232 SBSQ HOSP IP/OBS MODERATE 35: CPT | Performed by: INTERNAL MEDICINE

## 2022-10-02 PROCEDURE — 85025 COMPLETE CBC W/AUTO DIFF WBC: CPT

## 2022-10-02 PROCEDURE — 83735 ASSAY OF MAGNESIUM: CPT

## 2022-10-02 PROCEDURE — 80053 COMPREHEN METABOLIC PANEL: CPT

## 2022-10-02 PROCEDURE — 80048 BASIC METABOLIC PNL TOTAL CA: CPT

## 2022-10-02 PROCEDURE — 82948 REAGENT STRIP/BLOOD GLUCOSE: CPT

## 2022-10-02 RX ORDER — HYDRALAZINE HYDROCHLORIDE 25 MG/1
25 TABLET, FILM COATED ORAL EVERY 8 HOURS SCHEDULED
Status: DISCONTINUED | OUTPATIENT
Start: 2022-10-02 | End: 2022-10-10 | Stop reason: HOSPADM

## 2022-10-02 RX ORDER — ISOSORBIDE DINITRATE 10 MG/1
20 TABLET ORAL
Status: DISCONTINUED | OUTPATIENT
Start: 2022-10-02 | End: 2022-10-10 | Stop reason: HOSPADM

## 2022-10-02 RX ADMIN — INSULIN LISPRO 1 UNITS: 100 INJECTION, SOLUTION INTRAVENOUS; SUBCUTANEOUS at 17:49

## 2022-10-02 RX ADMIN — TRAMADOL HYDROCHLORIDE 100 MG: 50 TABLET, COATED ORAL at 18:38

## 2022-10-02 RX ADMIN — ISOSORBIDE DINITRATE 10 MG: 10 TABLET ORAL at 08:43

## 2022-10-02 RX ADMIN — MAGNESIUM OXIDE TAB 400 MG (241.3 MG ELEMENTAL MG) 400 MG: 400 (241.3 MG) TAB at 18:38

## 2022-10-02 RX ADMIN — FERROUS SULFATE TAB 325 MG (65 MG ELEMENTAL FE) 325 MG: 325 (65 FE) TAB at 08:43

## 2022-10-02 RX ADMIN — HYDRALAZINE HYDROCHLORIDE 25 MG: 25 TABLET ORAL at 14:54

## 2022-10-02 RX ADMIN — PANTOPRAZOLE SODIUM 40 MG: 40 TABLET, DELAYED RELEASE ORAL at 05:24

## 2022-10-02 RX ADMIN — HYDRALAZINE HYDROCHLORIDE 25 MG: 25 TABLET ORAL at 21:08

## 2022-10-02 RX ADMIN — INSULIN LISPRO 1 UNITS: 100 INJECTION, SOLUTION INTRAVENOUS; SUBCUTANEOUS at 08:37

## 2022-10-02 RX ADMIN — METOPROLOL SUCCINATE 50 MG: 50 TABLET, FILM COATED, EXTENDED RELEASE ORAL at 21:08

## 2022-10-02 RX ADMIN — FUROSEMIDE 40 MG: 40 TABLET ORAL at 08:43

## 2022-10-02 RX ADMIN — TRAMADOL HYDROCHLORIDE 100 MG: 50 TABLET, COATED ORAL at 08:43

## 2022-10-02 RX ADMIN — METOLAZONE 2.5 MG: 5 TABLET ORAL at 08:43

## 2022-10-02 RX ADMIN — APIXABAN 5 MG: 5 TABLET, FILM COATED ORAL at 08:43

## 2022-10-02 RX ADMIN — METOPROLOL SUCCINATE 50 MG: 50 TABLET, FILM COATED, EXTENDED RELEASE ORAL at 08:43

## 2022-10-02 RX ADMIN — ISOSORBIDE DINITRATE 20 MG: 10 TABLET ORAL at 18:38

## 2022-10-02 RX ADMIN — MAGNESIUM OXIDE TAB 400 MG (241.3 MG ELEMENTAL MG) 400 MG: 400 (241.3 MG) TAB at 08:43

## 2022-10-02 RX ADMIN — INSULIN LISPRO 1 UNITS: 100 INJECTION, SOLUTION INTRAVENOUS; SUBCUTANEOUS at 12:22

## 2022-10-02 RX ADMIN — ATORVASTATIN CALCIUM 40 MG: 40 TABLET, FILM COATED ORAL at 08:43

## 2022-10-02 RX ADMIN — ISOSORBIDE DINITRATE 20 MG: 10 TABLET ORAL at 14:08

## 2022-10-02 NOTE — ASSESSMENT & PLAN NOTE
Blood pressure elevated on arrival with readings in 180s-200s/70s-80s  Patient just received dose of IV Lasix, repeat BP post administration  Continue PTA medications of Toprol XL and Cozaar for now, consider holding Cozaar pending renal function while on IV Lasix inpatient  Blood Pressure: 165/64  Monitor blood pressure

## 2022-10-02 NOTE — ASSESSMENT & PLAN NOTE
Presents with  increased shortness of breath, dyspnea on exertion and lower extremity edema in setting of decreased diuretic dosing 1-2 weeks ago and high sodium diet  Patient reports being treated by her outpatient PCP with PO Lasix r/t bilateral lower extremity edema; however, about 2 months ago her PCP instructed her to take the PO Lasix once every 3 days  Dry weight: 130-132 lbs  Patient is currently volume overloaded  CXR at ED: Vascular congestion and LCW pacemaker in place  2/8084 Echo: LV systolic function was normal, LVEF estimated to be 60%, no RWMA, G2DD  COVID negative  BNP: 1242  Diuresis: PTA medication of 20 mg of PO Lasix q3 days  Received IV Lasix in ED and during admission  Now on PO Lasix 40 mg BID  Weight (last 2 days)     Date/Time Weight    10/02/22 0525 58 4 (128 75)    10/01/22 0300 59 4 (130 95)    09/30/22 0600 61 4 (135 36)    09/30/22 0300 61 4 (135 36)          Intake/Output Summary (Last 24 hours) at 10/2/2022 0840  Last data filed at 10/1/2022 1701  Gross per 24 hour   Intake --   Output 1100 ml   Net -1100 ml   · Hold all diuretics  · Hold Eliquis  · Hold potassium supplementation  · Increase Isordil 20 mg t i d   · Add hydralazine    • Meds:   o Diuretic: HOLD PO Lasix 40 mg BID  o B-Blocker: Toprol XL 50 mg BID  o ACE/ARB/ARNi: HOLD Losartan 25 mg daily- on hold in anticipation of cardiac catheterization and CTA  Increase Isodril to 20mg TID after meals (added 10/1, increased 10/2 per cardiology)  o Add hydralazine 25 mg Q8H per cardiology recs  o Radha Harrell prior to transfer  · Diet: Sodium 2g, FR 1500 mL  · Labs: Trend CMP, CBC, Mg  · Maintain Mg > 2 and K > 4; Replete PRN  · Elevate HOB > 30°, Daily standing weights, Measure I/Os, Monitor on Telemetry  • Follow up cardiology consult, appreciate recs    • Awaiting transfer to [facility] for cardiac catheterization, CT surgery evaluation, and advanced heart failure team consultation

## 2022-10-02 NOTE — ASSESSMENT & PLAN NOTE
Lab Results   Component Value Date    HGBA1C 6 06/29/2022       Recent Labs     10/02/22  1612 10/02/22  2104 10/03/22  0720 10/03/22  1117   POCGLU 200* 142* 162* 217*       Blood Sugar Average: Last 72 hrs:  · (P) 171 [id number] PTA regimen of Metformin, will hold while inpatient  · Accu-Checks and SSI  · Hypoglycemia protocol

## 2022-10-02 NOTE — ASSESSMENT & PLAN NOTE
Lab Results   Component Value Date    HGBA1C 6 06/29/2022       Recent Labs     10/01/22  1740 10/01/22  2113 10/02/22  0749 10/02/22  1145   POCGLU 139 184* 168* 177*       Blood Sugar Average: Last 72 hrs:  · (P) 980 [id number] PTA regimen of Metformin, will hold while inpatient  · Accu-Checks and SSI  · Hypoglycemia protocol

## 2022-10-02 NOTE — PROGRESS NOTES
General Cardiology Progress Note   Roldan Sharpe 69 y o  female MRN: [id number]  Unit/Bed#: S -01 Encounter: [id number]      Assessment:  Principal Problem:    Acute HFrEF (heart failure with reduced ejection fraction) ([facility])  Active Problems:    CAD (coronary atherosclerotic disease)    Gastroesophageal reflux disease    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, without long-term current use of insulin (HCC)    Leukocytosis    Pacemaker    PAF (paroxysmal atrial fibrillation) ([facility])    Hypertensive urgency    Shortness of breath    Elevated LFTs    Magnesium deficiency    Potassium (K) deficiency    Wound of left leg      Impression:    • Acute systolic CHF  o Weight is down to 128 lb   o Furosemide plus metolazone offered great diuresis, 5 4 L through this hospitalization so far  o Creatinine bumped today  o Diuretics held  • New cardiomyopathy  o Suspect likely ischemic  • CAD with CABG x3 in 2015, LIMA to LAD, SVG to OM1, SVG to PDA, cardiac catheterization in 2020 showed patent grafts  • Sick sinus syndrome with [employer] dual-chamber permanent pacemaker  • Chronic AFib/flutter, 100% AFib by device check since 11/21, amiodarone was discontinued on this admission as ineffective in maintaining sinus rhythm  o No high rate episodes by last device check 8/18/2022  • Severe aortic root dilation  • Acute kidney injury today-likely in response to aggressive diuresis    Plan:    • Hold all diuretics  • Hold Eliquis  • Hold potassium supplementation  • Increase Isordil 20 mg t i d   • Add hydralazine  • Awaiting transfer to [place] for cardiac catheterization, CTA, CT surgery evaluation for new onset cardiomyopathy, 5 6 cm dilated ascending aorta  • I suspect she will likely need her CTA done before cardiac catheterization to rule out any associated dissection     Subjective:   Patient seen and examined        Orthopnea resolved  Creatinine bumped today  Blood pressure uncontrolled  Losartan held yesterday    Review of Systems   Constitutional: Negative for diaphoresis, fever, malaise/fatigue and night sweats  Cardiovascular: Positive for leg swelling  Negative for chest pain, dyspnea on exertion, orthopnea, palpitations and syncope  Respiratory: Negative for cough, shortness of breath, sputum production and wheezing  Skin: Negative for itching and rash  Gastrointestinal: Negative for abdominal pain, change in bowel habit and diarrhea  Neurological: Negative for dizziness, focal weakness, light-headedness and weakness  Objective   Vitals: Blood pressure 165/64, pulse 64, temperature 97 9 °F (36 6 °C), resp  rate 14, height 4' 11" (1 499 m), weight 58 4 kg (128 lb 12 oz), SpO2 92 %, not currently breastfeeding , Body mass index is 26 kg/m² , I/O last 3 completed shifts:  In: -   Out: 2700 [Urine:2700]  No intake/output data recorded  Wt Readings from Last 3 Encounters:   10/02/22 58 4 kg (128 lb 12 oz)   08/02/22 59 9 kg (132 lb)   07/18/22 61 7 kg (136 lb)       Intake/Output Summary (Last 24 hours) at 10/2/2022 1113  Last data filed at 10/1/2022 1701  Gross per 24 hour   Intake --   Output 800 ml   Net -800 ml     I/O last 3 completed shifts:  In: -   Out: 2700 [Urine:2700]        Physical Exam  Vitals reviewed  Constitutional:       General: She is not in acute distress  Appearance: She is well-developed  She is not diaphoretic  HENT:      Head: Normocephalic and atraumatic  Nose: Nose normal    Eyes:      General: No scleral icterus  Conjunctiva/sclera: Conjunctivae normal       Pupils: Pupils are equal, round, and reactive to light  Neck:      Thyroid: No thyromegaly  Vascular: No JVD  Cardiovascular:      Rate and Rhythm: Normal rate and regular rhythm  Heart sounds: Normal heart sounds  No murmur heard  No gallop  Pulmonary:      Effort: Pulmonary effort is normal  No respiratory distress        Breath sounds: Normal breath sounds  No wheezing or rales  Abdominal:      General: Bowel sounds are normal  There is no distension  Palpations: Abdomen is soft  Tenderness: There is no abdominal tenderness  Musculoskeletal:         General: No tenderness or deformity  Normal range of motion  Cervical back: Normal range of motion and neck supple  Right lower leg: Edema (Pedal edema) present  Left lower leg: Edema ( pedal edema) present  Skin:     General: Skin is warm and dry  Capillary Refill: Capillary refill takes less than 2 seconds  Findings: No erythema or rash  Neurological:      General: No focal deficit present  Mental Status: She is alert and oriented to person, place, and time  Cranial Nerves: No cranial nerve deficit        Coordination: Coordination normal    Psychiatric:         Behavior: Behavior normal          Judgment: Judgment normal          Meds/Allergies   Allergies   Allergen Reactions   • Nickel Rash       Current Facility-Administered Medications:   •  acetaminophen (TYLENOL) tablet 650 mg, 650 mg, Oral, Q6H PRN, PETE Andrews  •  ALPRAZolam (XANAX) tablet 0 25 mg, 0 25 mg, Oral, TID PRN, PETE Andrews  •  apixaban (ELIQUIS) tablet 5 mg, 5 mg, Oral, BID, PETE Cifuentes, 5 mg at 10/02/22 9334  •  atorvastatin (LIPITOR) tablet 40 mg, 40 mg, Oral, Daily, PETE Cifuentes, 40 mg at 10/02/22 6334  •  ferrous sulfate tablet 325 mg, 325 mg, Oral, Daily With Breakfast, PETE Cifuentes, 325 mg at 10/02/22 0548  •  furosemide (LASIX) tablet 40 mg, 40 mg, Oral, BID (diuretic), Addison Wei MD, 40 mg at 10/02/22 0843  •  insulin lispro (HumaLOG) 100 units/mL subcutaneous injection 1-5 Units, 1-5 Units, Subcutaneous, TID AC, 1 Units at 10/02/22 0837 **AND** Fingerstick Glucose (POCT), , , TID AC, PETE Cifuentes  •  insulin lispro (HumaLOG) 100 units/mL subcutaneous injection 1-5 Units, 1-5 Units, Subcutaneous, HS, Katy Enedina Parisi, 1 Units at 10/01/22 2148  •  isosorbide dinitrate (ISORDIL) tablet 10 mg, 10 mg, Oral, TID after meals, Herminia Dick PA-C, 10 mg at 10/02/22 7900  •  magnesium oxide (MAG-OX) tablet 400 mg, 400 mg, Oral, BID, Akash Jones MD, 400 mg at 10/02/22 7440  •  metolazone (ZAROXOLYN) tablet 2 5 mg, 2 5 mg, Oral, Daily, Herminia Dick PA-C, 2 5 mg at 10/02/22 7245  •  metoprolol succinate (TOPROL-XL) 24 hr tablet 50 mg, 50 mg, Oral, BID, PETE Cifuentes, 50 mg at 10/02/22 9112  •  pantoprazole (PROTONIX) EC tablet 40 mg, 40 mg, Oral, Early Morning, PETE Cifuentes, 40 mg at 10/02/22 1795  •  traMADol (ULTRAM) tablet 100 mg, 100 mg, Oral, BID, PETE Mendoza, 100 mg at 10/02/22 0843    Laboratory Results:        CBC with diff:   Results from last 7 days   Lab Units 10/02/22  0525 10/01/22  0450 09/30/22  0446 09/29/22  0615 09/28/22  1431   WBC Thousand/uL 14 41* 11 62* 12 59* 12 26* 12 99*   HEMOGLOBIN g/dL 10 5* 9 8* 9 7* 9 6* 10 3*   HEMATOCRIT % 36 5 33 7* 32 8* 32 7* 35 3   MCV fL 86 85 86 87 88   PLATELETS Thousands/uL 352 316 306 271 332   MCH pg 24 8* 24 6* 25 4* 25 4* 25 6*   MCHC g/dL 28 8* 29 1* 29 6* 29 4* 29 2*   RDW % 18 3* 17 9* 18 4* 18 5* 18 4*   MPV fL 11 2 10 8 11 0 10 6 11 4   NRBC AUTO /100 WBCs 0 0 0  --  1       CMP:  Results from last 7 days   Lab Units 10/02/22  0525 10/01/22  0450 09/30/22  0446 09/29/22  0615 09/28/22  1431   SODIUM mmol/L 136 137 140 141 140   POTASSIUM mmol/L 5 4* 3 3* 3 6 3 4* 5 3   CHLORIDE mmol/L 96 95* 102 106 107   CO2 mmol/L 30 32 29 27 19*   BUN mg/dL 33* 25 26* 26* 26*   CREATININE mg/dL 1 49* 1 18 1 08 0 90 0 89   CALCIUM mg/dL 8 6 8 1* 8 1* 8 3* 8 5   AST U/L 34 52* 83* 119* 153*   ALT U/L 95* 121* 154* 180* 184*   ALK PHOS U/L 70 68 66 67 72   EGFR ml/min/1 73sq m 33 44 49 62 63       BMP:  Results from last 7 days   Lab Units 10/02/22  0525 10/01/22  0450 09/30/22  0446 09/29/22  0615 09/28/22  1431   SODIUM mmol/L 136 137 140 141 140   POTASSIUM mmol/L 5 4* 3 3* 3 6 3 4* 5 3   CHLORIDE mmol/L 96 95* 102 106 107   CO2 mmol/L 30 32 29 27 19*   BUN mg/dL 33* 25 26* 26* 26*   CREATININE mg/dL 1 49* 1 18 1 08 0 90 0 89   CALCIUM mg/dL 8 6 8 1* 8 1* 8 3* 8 5       NT-proBNP: No results for input(s): NTBNP in the last 72 hours  Magnesium:   Results from last 7 days   Lab Units 10/02/22  0525 10/01/22  0450 09/30/22  1408 09/30/22  0446   MAGNESIUM mg/dL 1 9 1 8* 1 5* 1 1*       Coags:       TSH:        Hemoglobin A1C )      Lipid Profile:   Lab Results   Component Value Date    CHOL 164 03/27/2017    CHOL 154 09/26/2016    CHOL 163 05/13/2016     Lab Results   Component Value Date    HDL 60 07/08/2022    HDL 58 02/08/2022    HDL 58 09/15/2021     Lab Results   Component Value Date    LDLCALC 32 07/08/2022    LDLCALC 55 02/08/2022    LDLCALC 69 09/15/2021     No results found for: LDLDIRECT  Lab Results   Component Value Date    TRIG 97 07/08/2022    TRIG 150 02/08/2022    TRIG 139 09/15/2021       Cardiac testing:   EKG personally reviewed by Catalina Garza MD      Cath:  ECHO:  Stress TEST:  Other:        Catalina Garza MD    Portions of the record may have been created with voice recognition software  Occasional wrong word or "sound a like" substitutions may have occurred due to the inherent limitations of voice recognition software  Read the chart carefully and recognize, using context, where substitutions have occurred

## 2022-10-02 NOTE — ASSESSMENT & PLAN NOTE
Presents with  increased shortness of breath, dyspnea on exertion and lower extremity edema in setting of decreased diuretic dosing 1-2 weeks ago and high sodium diet  Patient reports being treated by her outpatient PCP with PO Lasix r/t bilateral lower extremity edema; however, about 2 months ago her PCP instructed her to take the PO Lasix once every 3 days  Dry weight: 130-132 lbs  Patient is currently volume overloaded  CXR at ED: Vascular congestion and LCW pacemaker in place  4/5149 Echo: LV systolic function was normal, LVEF estimated to be 60%, no RWMA, G2DD  COVID negative  BNP: 1242  Diuresis: PTA medication of 20 mg of PO Lasix q3 days  Received IV Lasix in ED and during admission  Now on PO Lasix 40 mg BID  Weight (last 2 days)     Date/Time Weight    10/02/22 0525 58 4 (128 75)    10/01/22 0300 59 4 (130 95)          Intake/Output Summary (Last 24 hours) at 10/3/2022 1336  Last data filed at 10/3/2022 1100  Gross per 24 hour   Intake 360 ml   Output --   Net 360 ml     • Meds:   o Diuretic: HOLD PO Lasix 40 mg BID  o B-Blocker: Toprol XL 50 mg BID  o ACE/ARB/ARNi: HOLD Losartan 25 mg daily- on hold in anticipation of cardiac catheterization and CTA  Increase Isodril to 20mg TID after meals (added 10/1, increased 10/2 per cardiology)  o Add hydralazine 25 mg Q8H per cardiology recs  o Myrene Lady prior to transfer  · Diet: Sodium 2g, FR 1500 mL  · Labs: Trend CMP, CBC, Mg  · Maintain Mg > 2 and K > 4; Replete PRN  · Elevate HOB > 30°, Daily standing weights, Measure I/Os, Monitor on Telemetry  • Follow up cardiology consult, appreciate recs    • Awaiting transfer to [place] for cardiac catheterization, CT surgery evaluation, and advanced heart failure team consultation

## 2022-10-02 NOTE — PROGRESS NOTES
[facility]  [facility] 1945[facility] 69 [facility]  [facility] MRN: [id number]  Unit/Bed#: S -01 Encounter: [id number]  Primary Care Provider: Aman Dye DO   Date and time admitted to hospital: 9/28/2022  6:33 PM    * Acute HFrEF (heart failure with reduced ejection fraction) ([facility] [address] )  Assessment & Plan  Presents with  increased shortness of breath, dyspnea on exertion and lower extremity edema in setting of decreased diuretic dosing 1-2 weeks ago and high sodium diet  Patient reports being treated by her outpatient PCP with PO Lasix r/t bilateral lower extremity edema; however, about 2 months ago her PCP instructed her to take the PO Lasix once every 3 days  Dry weight: 130-132 lbs  Patient is currently volume overloaded  CXR at ED: Vascular congestion and LCW pacemaker in place  2/6719 Echo: LV systolic function was normal, LVEF estimated to be 60%, no RWMA, G2DD  COVID negative  BNP: 1242  Diuresis: PTA medication of 20 mg of PO Lasix q3 days  Received IV Lasix in ED and during admission  Now on PO Lasix 40 mg BID  Weight (last 2 days)     Date/Time Weight    10/02/22 0525 58 4 (128 75)    10/01/22 0300 59 4 (130 95)    09/30/22 0600 61 4 (135 36)    09/30/22 0300 61 4 (135 36)          Intake/Output Summary (Last 24 hours) at 10/2/2022 1222  Last data filed at 10/1/2022 1701  Gross per 24 hour   Intake --   Output 800 ml   Net -800 ml     • Meds:   o Diuretic: HOLD PO Lasix 40 mg BID  o B-Blocker: Toprol XL 50 mg BID  o ACE/ARB/ARNi: HOLD Losartan 25 mg daily- on hold in anticipation of cardiac catheterization and CTA   Increase Isodril to 20mg TID after meals (added 10/1, increased 10/2 per cardiology)  o Add hydralazine 25 mg Q8H per cardiology recs  o [place] Daniels prior to transfer  · Diet: Sodium 2g, FR 1500 mL  · Labs: Trend CMP, CBC, Mg  · Maintain Mg > 2 and K > 4; Replete PRN  · Elevate HOB > 30°, Daily standing weights, Measure I/Os, Monitor on Telemetry  • Follow up cardiology consult, appreciate recs  • Awaiting transfer to [address] [place] [address] for cardiac catheterization, CT surgery evaluation, and advanced heart failure team consultation    Elevated LFTs  Assessment & Plan  POA, , , alkaline phosphatase 72  Patient displayed no RUQ tenderness  Likely secondary to hepatic congestion secondary to CHF    Recent Labs     09/30/22  0446 10/01/22  0450 10/02/22  0525   AST 83* 52* 34   * 121* 95*   ALKPHOS 66 68 70   TBILI 0 77 0 83 0 79     · Trend CMP daily  · Consider RUQ US if patient clinically worsens or if LFTs do not improve after diuresis  Shortness of breath  Assessment & Plan  Presentation: Patient presents with complaints of shortness of breath with exertion and orthopnea for the past 5 days  She endorses bilateral lower extremity edema  Patient reports being treated by her outpatient PCP with PO Lasix r/t bilateral lower extremity edema; however, about 2 months ago her PCP instructed her to take the PO Lasix once every 3 days  Chest x-ray: Vascular congestion and LCW pacemaker in place on my read  Official read pending  Last echocardiogram on file May 2145: LV systolic function was normal  LVEF estimated to be 60%  There were no RWMA  G2DD  Suspect Etiology: Acute Heart Failure  COVID negative  Initial troponin: 17 --> 21, delta 4  BNP: 1242  • See plan under acute HFrEF    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, without long-term current use of insulin [facility])  Assessment & Plan  Lab Results   Component Value Date    HGBA1C 6 06/29/2022       Recent Labs     10/01/22  1740 10/01/22  2113 10/02/22  0749 10/02/22  1145   POCGLU 139 184* 168* 177*       Blood Sugar Average: Last 72 hrs:  · (P) 163 [id number] PTA regimen of Metformin, will hold while inpatient  · Accu-Checks and SSI  · Hypoglycemia protocol      Potassium (K) deficiency  Assessment & Plan  Recent Labs     09/30/22  0446 09/30/22  1408 10/01/22  0450 10/02/22  0525   K 3 6  --  3 3* 5 4*   MG 1 1* 1 5* 1 8* 1 9     · Replete as needed with PO potassium unless K < 3 0, in which case consider IV repletion    Magnesium deficiency  Assessment & Plan  Recent Labs     09/30/22  1408 10/01/22  0450 10/02/22  0525   MG 1 5* 1 8* 1 9     · Replete as needed  · Continue magnesium oxide 400 mg b i d  Wound of right leg  Assessment & Plan  1-2 cm wound on lateral surface of lower leg, central scab with granulation surrounded by erythema  Raised relative to skin level  Concerns of necrosis  · Follow up on wound care consult  · Cleanse with normal saline  · Apply Cavilon alcohol free barrier film to periwound  · Apply thin layer of Triad paste to wound bed  · Cover with a bordered foam dressing  · Change every 5 days and as needed  · Pressure redistribution cushion to chair  · Moisturize skin daily  · Initial discharge wound care supplies provided  · Wound care nurse follow up  Hypertensive urgency  Assessment & Plan  Blood pressure elevated on arrival with readings in 180s-200s/70s-80s  Patient just received dose of IV Lasix, repeat BP post administration  Continue PTA medications of Toprol XL and Cozaar for now, consider holding Cozaar pending renal function while on IV Lasix inpatient  Blood Pressure: 165/64  Monitor blood pressure  PAF (paroxysmal atrial fibrillation) ([facility])  Assessment & Plan  Rate/Rhythm Control: Toprol XL, Amiodarone  Antiplatelet/Anticoagulation: Eliquis  Per device interrogation, has been 100% AFib since 11/2021  EKG: ventricular paced rhythm with underlying flutter  · Discontinued Amiodarone per cardiology rec  · Continue Toprol XL, Eliquis    Pacemaker  Assessment & Plan  S/P PPM placement in June 2020  History of tachy-keanu syndrome  Leukocytosis  Assessment & Plan  POA, WBCs of 12 99  Patient has a long standing history of leukocytosis with range from 12-13   Patient does not meet two SIRS criteria on admission  Recent Labs     22  0446 10/01/22  0450 10/02/22  0525   WBC 12 59* 11 62* 14 41*     · Trend CBC  ·  appears to be chronic  ·  no signs of any infection    Gastroesophageal reflux disease  Assessment & Plan  · Continue PPI  CAD (coronary atherosclerotic disease)  Assessment & Plan  Currently denies chest pain  S/P grafting x3  Most recent cardiac catheterization was in May 2020 which displayed multivessel disease that was completely revascularized  · Continue Toprol XL  VTE Pharmacologic Prophylaxis:   VTE Score: 7 High Risk (Score >/= 5) - Pharmacological DVT Prophylaxis Ordered: Apixaban (Eliquis)  Sequential Compression Devices Ordered  ELIQUIS CURRENTLY ON HOLD    Mechanical VTE Prophylaxis in Place: Yes    Patient Centered Rounds: I have performed bedside rounds with nursing staff today  Discussions with Specialists or Other Care Team Provider: Nursing, Dr Jonatan Morgan    Education and Discussions with Family / Patient: Updated  (daughter) at bedside  Current Length of Stay: 4 day(s)    Current Patient Status: Inpatient     Discharge Plan / Estimated Discharge Date: Antipate transfer in next 24 hours  Code Status: Level 3 - DNAR and DNI      Subjective:   Patient was seen at bedside for continuity of care  Per night team, no major events overnight  She endorses improvement in breathing and energy levels  Patient denies chest pain, palpitations, chest tightness, nausea, or dizziness  Treatment plan and next steps were discussed with patient, who expresses understanding and is amenable to the plan  She does not report any additional questions or concerns at this time        Objective:     Vitals:   Temp (24hrs), Av 4 °F (36 3 °C), Min:97 2 °F (36 2 °C), Max:97 9 °F (36 6 °C)    Temp:  [97 2 °F (36 2 °C)-97 9 °F (36 6 °C)] 97 9 °F (36 6 °C)  HR:  [64-70] 64  Resp:  [14-17] 14  BP: (145-165)/(64-75) 165/64  SpO2:  [88 %-92 %] 92 %  Body mass index is 26 kg/m²  Input and Output Summary (last 24 hours): Intake/Output Summary (Last 24 hours) at 10/2/2022 1223  Last data filed at 10/1/2022 1701  Gross per 24 hour   Intake --   Output 800 ml   Net -800 ml       Physical Exam:     Physical Exam  Vitals and nursing note reviewed  Constitutional:       General: She is not in acute distress  Appearance: She is well-developed  She is obese  She is not ill-appearing, toxic-appearing or diaphoretic  HENT:      Head: Normocephalic and atraumatic  Right Ear: External ear normal       Left Ear: External ear normal       Nose: Nose normal       Mouth/Throat:      Mouth: Mucous membranes are moist       Pharynx: Oropharynx is clear  Eyes:      General: No scleral icterus  Conjunctiva/sclera: Conjunctivae normal    Cardiovascular:      Rate and Rhythm: Normal rate and regular rhythm  Pulses: Normal pulses  Heart sounds: Normal heart sounds  No murmur heard  No friction rub  No gallop  Pulmonary:      Effort: Pulmonary effort is normal  No respiratory distress  Breath sounds: Normal breath sounds  Abdominal:      General: Bowel sounds are normal       Palpations: Abdomen is soft  Tenderness: There is no abdominal tenderness  Musculoskeletal:         General: Swelling (bilateral feet) present  Cervical back: Neck supple  Right lower leg: Edema (1+ pitting) present  Left lower leg: Edema (1+ pitting) present  Skin:     General: Skin is warm and dry  Capillary Refill: Capillary refill takes less than 2 seconds  Neurological:      General: No focal deficit present  Mental Status: She is alert and oriented to person, place, and time     Psychiatric:         Mood and Affect: Mood normal          Behavior: Behavior normal           Additional Data:     Labs:  Results from last 7 days   Lab Units 10/02/22  0525   WBC Thousand/uL 14 41*   HEMOGLOBIN g/dL 10 5*   HEMATOCRIT % 36 5   PLATELETS Thousands/uL 352   NEUTROS PCT % 73   LYMPHS PCT % 13*   MONOS PCT % 10   EOS PCT % 2     Results from last 7 days   Lab Units 10/02/22  0525   SODIUM mmol/L 136   POTASSIUM mmol/L 5 4*   CHLORIDE mmol/L 96   CO2 mmol/L 30   BUN mg/dL 33*   CREATININE mg/dL 1 49*   ANION GAP mmol/L 10   CALCIUM mg/dL 8 6   ALBUMIN g/dL 3 8   TOTAL BILIRUBIN mg/dL 0 79   ALK PHOS U/L 70   ALT U/L 95*   AST U/L 34   GLUCOSE RANDOM mg/dL 163*         Results from last 7 days   Lab Units 10/02/22  1145 10/02/22  0749 10/01/22  2113 10/01/22  1740 10/01/22  1129 10/01/22  0729 09/30/22  2106 09/30/22  1613 09/30/22  1138 09/30/22  0721 09/29/22  2046 09/29/22  1729   POC GLUCOSE mg/dl 177* 168* 184* 139 192* 141* 165* 171* 186* 162* 188* 132               Imaging: Reviewed radiology reports from this admission including: chest xray and ECHO    Recent Cultures (last 7 days):           Lines/Drains:  Invasive Devices  Report    Peripheral Intravenous Line  Duration           Peripheral IV 07/10/20 Right Antecubital 813 days    Peripheral IV 09/28/22 Right Wrist 3 days          Drain  Duration           Ureteral Drain/Stent Right ureter 6 Fr  812 days                Telemetry:   Telemetry Orders (From admission, onward)             48 Hour Telemetry Monitoring  (ED Bridging Orders Panel)  Continuous x 48 hours        References:    Telemetry Guidelines   Question:  Reason for 48 Hour Telemetry  Answer:  Acute Decompensated CHF (continuous diuretic infusion or total diuretic dose > 200 mg daily, associated electrolyte derangement, ionotropic drip, history of ventricular arrhythmia, or new EF <35%)                    Last 24 Hours Medication List:   Current Facility-Administered Medications   Medication Dose Route Frequency Provider Last Rate   • acetaminophen  650 mg Oral Q6H PRN GOOD ShelbyNP     • ALPRAZolam  0 25 mg Oral TID PRN GOOD ShelbyNP     • atorvastatin  40 mg Oral Daily PETE Shelby     • ferrous sulfate  325 mg Oral Daily With Breakfast Shermon Claude, CRNP     • hydrALAZINE  25 mg Oral [facility] Eric Velasquez MD     • insulin lispro  1-5 Units Subcutaneous TID AC PETE Cifuentes     • insulin lispro  1-5 Units Subcutaneous HS PETE Cifuentes     • isosorbide dinitrate  20 mg Oral TID after meals Eric Velasquez MD     • magnesium oxide  400 mg Oral BID Akash Jones MD     • metoprolol succinate  50 mg Oral BID Shermon Claude, CRNP     • pantoprazole  40 mg Oral Early Morning Shermon Claude, CRNP     • traMADol  100 mg Oral BID Shermon Claude, CRNP          Today, Patient Was Seen By: Tanisha Wong    ** Please Note: This note has been constructed using a voice recognition system   **

## 2022-10-02 NOTE — ASSESSMENT & PLAN NOTE
Recent Labs     09/30/22  0446 09/30/22  1408 10/01/22  0450 10/02/22  0525   K 3 6  --  3 3* 5 4*   MG 1 1* 1 5* 1 8* 1 9     · Replete as needed with PO potassium unless K < 3 0, in which case consider IV repletion

## 2022-10-02 NOTE — ASSESSMENT & PLAN NOTE
Presentation: Patient presents with complaints of shortness of breath with exertion and orthopnea for the past 5 days  She endorses bilateral lower extremity edema  Patient reports being treated by her outpatient PCP with PO Lasix r/t bilateral lower extremity edema; however, about 2 months ago her PCP instructed her to take the PO Lasix once every 3 days  Chest x-ray: Vascular congestion and LCW pacemaker in place on my read  Official read pending  Last echocardiogram on file May 1858: LV systolic function was normal  LVEF estimated to be 60%  There were no RWMA  G2DD  Suspect Etiology: Acute Heart Failure  COVID negative  Initial troponin: 17 --> 21, delta 4  BNP: 1242      • See plan under acute HFrEF

## 2022-10-02 NOTE — ASSESSMENT & PLAN NOTE
Recent Labs     10/01/22  0450 10/02/22  0525 10/03/22  0540   MG 1 8* 1 9 1 9     · Replete as needed  · Continue magnesium oxide 400 mg b i d

## 2022-10-02 NOTE — ASSESSMENT & PLAN NOTE
Recent Labs     10/01/22  0450 10/02/22  0525 10/02/22  1357 10/03/22  0540   K 3 3* 5 4* 4 6 5 2   MG 1 8* 1 9  --  1 9     · Replete as needed with PO potassium unless K < 3 0, in which case consider IV repletion

## 2022-10-02 NOTE — PLAN OF CARE
Problem: Prexisting or High Potential for Compromised Skin Integrity  Goal: Skin integrity is maintained or improved  Description: INTERVENTIONS:  - Identify patients at risk for skin breakdown  - Assess and monitor skin integrity  - Assess and monitor nutrition and hydration status  - Monitor labs   - Assess for incontinence   - Turn and reposition patient  - Assist with mobility/ambulation  - Relieve pressure over bony prominences  - Avoid friction and shearing  - Provide appropriate hygiene as needed including keeping skin clean and dry  - Evaluate need for skin moisturizer/barrier cream  - Collaborate with interdisciplinary team   - Patient/family teaching  - Consider wound care consult   Outcome: Progressing     Problem: MOBILITY - ADULT  Goal: Maintain or return to baseline ADL function  Description: INTERVENTIONS:  -  Assess patient's ability to carry out ADLs; assess patient's baseline for ADL function and identify physical deficits which impact ability to perform ADLs (bathing, care of mouth/teeth, toileting, grooming, dressing, etc )  - Assess/evaluate cause of self-care deficits   - Assess range of motion  - Assess patient's mobility; develop plan if impaired  - Assess patient's need for assistive devices and provide as appropriate  - Encourage maximum independence but intervene and supervise when necessary  - Involve family in performance of ADLs  - Assess for home care needs following discharge   - Consider OT consult to assist with ADL evaluation and planning for discharge  - Provide patient education as appropriate  Outcome: Progressing  Goal: Maintains/Returns to pre admission functional level  Description: INTERVENTIONS:  - Perform BMAT or MOVE assessment daily    - Set and communicate daily mobility goal to care team and patient/family/caregiver  - Collaborate with rehabilitation services on mobility goals if consulted  - Perform Range of Motion  times a day    - Reposition patient every hours   - Dangle patient  times a day  - Stand patient  times a day  - Ambulate patient  times a day  - Out of bed to chair  times a day   - Out of bed for meals times a day  - Out of bed for toileting  - Record patient progress and toleration of activity level   Outcome: Progressing     Problem: Nutrition/Hydration-ADULT  Goal: Nutrient/Hydration intake appropriate for improving, restoring or maintaining nutritional needs  Description: Monitor and assess patient's nutrition/hydration status for malnutrition  Collaborate with interdisciplinary team and initiate plan and interventions as ordered  Monitor patient's weight and dietary intake as ordered or per policy  Utilize nutrition screening tool and intervene as necessary  Determine patient's food preferences and provide high-protein, high-caloric foods as appropriate       INTERVENTIONS:  - Monitor oral intake, urinary output, labs, and treatment plans  - Assess nutrition and hydration status and recommend course of action  - Evaluate amount of meals eaten  - Assist patient with eating if necessary   - Allow adequate time for meals  - Recommend/ encourage appropriate diets, oral nutritional supplements, and vitamin/mineral supplements  - Order, calculate, and assess calorie counts as needed  - Recommend, monitor, and adjust tube feedings and TPN/PPN based on assessed needs  - Assess need for intravenous fluids  - Provide specific nutrition/hydration education as appropriate  - Include patient/family/caregiver in decisions related to nutrition  Outcome: Progressing     Problem: Potential for Falls  Goal: Patient will remain free of falls  Description: INTERVENTIONS:  - Educate patient/family on patient safety including physical limitations  - Instruct patient to call for assistance with activity   - Consult OT/PT to assist with strengthening/mobility   - Keep Call bell within reach  - Keep bed low and locked with side rails adjusted as appropriate  - Keep care items and personal belongings within reach  - Initiate and maintain comfort rounds  - Make Fall Risk Sign visible to staff  - Offer Toileting every  Hours, in advance of need  - Initiate/Maintain alarm  - Obtain necessary fall risk management equipment:   - Apply yellow socks and bracelet for high fall risk patients  - Consider moving patient to room near nurses station  Outcome: Progressing

## 2022-10-02 NOTE — ASSESSMENT & PLAN NOTE
Presentation: Patient presents with complaints of shortness of breath with exertion and orthopnea for the past 5 days  She endorses bilateral lower extremity edema  Patient reports being treated by her outpatient PCP with PO Lasix r/t bilateral lower extremity edema; however, about 2 months ago her PCP instructed her to take the PO Lasix once every 3 days  Chest x-ray: Vascular congestion and LCW pacemaker in place on my read  Official read pending  Last echocardiogram on file May 0934: LV systolic function was normal  LVEF estimated to be 60%  There were no RWMA  G2DD  Suspect Etiology: Acute Heart Failure  COVID negative  Initial troponin: 17 --> 21, delta 4  BNP: 1242      • See plan under acute HFrEF

## 2022-10-02 NOTE — ASSESSMENT & PLAN NOTE
1-2 cm wound on lateral surface of lower leg, central scab with granulation surrounded by erythema  Raised relative to skin level  Concerns of necrosis  · Follow up on wound care consult  · Cleanse with normal saline  · Apply Cavilon alcohol free barrier film to periwound  · Apply thin layer of Triad paste to wound bed  · Cover with a bordered foam dressing  · Change every 5 days and as needed  · Pressure redistribution cushion to chair  · Moisturize skin daily  · Initial discharge wound care supplies provided  · Wound care nurse follow up

## 2022-10-02 NOTE — ASSESSMENT & PLAN NOTE
Recent Labs     09/30/22  1408 10/01/22  0450 10/02/22  0525   MG 1 5* 1 8* 1 9     · Replete as needed  · Continue magnesium oxide 400 mg b i d

## 2022-10-02 NOTE — DISCHARGE SUMMARY
[facility]  Discharge- Pegge Sp 1945, 69 y o  female MRN: [id number]  Unit/Bed#: S -01 Encounter: [id number]  Primary Care Provider: Mike Freeman DO   Date and time admitted to hospital: 9/28/2022  6:33 PM    * Acute HFrEF (heart failure with reduced ejection fraction) [facility])  Assessment & Plan  Presents with  increased shortness of breath, dyspnea on exertion and lower extremity edema in setting of decreased diuretic dosing 1-2 weeks ago and high sodium diet  Patient reports being treated by her outpatient PCP with PO Lasix r/t bilateral lower extremity edema; however, about 2 months ago her PCP instructed her to take the PO Lasix once every 3 days  Dry weight: 130-132 lbs  Patient is currently volume overloaded  CXR at ED: Vascular congestion and LCW pacemaker in place  2/7450 Echo: LV systolic function was normal, LVEF estimated to be 60%, no RWMA, G2DD  COVID negative  BNP: 1242  Diuresis: PTA medication of 20 mg of PO Lasix q3 days  Received IV Lasix in ED and during admission  Now on PO Lasix 40 mg BID  Weight (last 2 days)     Date/Time Weight    10/02/22 0525 58 4 (128 75)    10/01/22 0300 59 4 (130 95)    09/30/22 0600 61 4 (135 36)    09/30/22 0300 61 4 (135 36)          Intake/Output Summary (Last 24 hours) at 10/2/2022 1222  Last data filed at 10/1/2022 1701  Gross per 24 hour   Intake --   Output 800 ml   Net -800 ml     • Meds:   o Diuretic: HOLD PO Lasix 40 mg BID  o B-Blocker: Toprol XL 50 mg BID  o ACE/ARB/ARNi: HOLD Losartan 25 mg daily- on hold in anticipation of cardiac catheterization and CTA   Increase Isodril to 20mg TID after meals (added 10/1, increased 10/2 per cardiology)  o Add hydralazine 25 mg Q8H per cardiology recs  o Luciana Onesimo prior to transfer  · Diet: Sodium 2g, FR 1500 mL  · Labs: Trend CMP, CBC, Mg  · Maintain Mg > 2 and K > 4; Replete PRN  · Elevate HOB > 30°, Daily standing weights, Measure I/Os, Monitor on Telemetry  • Follow up cardiology consult, appreciate recs  • Awaiting transfer to [facility] for cardiac catheterization, CT surgery evaluation, and advanced heart failure team consultation    Elevated LFTs  Assessment & Plan  POA, , , alkaline phosphatase 72  Patient displayed no RUQ tenderness  Likely secondary to hepatic congestion secondary to CHF    Recent Labs     09/30/22  0446 10/01/22  0450 10/02/22  0525   AST 83* 52* 34   * 121* 95*   ALKPHOS 66 68 70   TBILI 0 77 0 83 0 79     · Trend CMP daily  · Consider RUQ US if patient clinically worsens or if LFTs do not improve after diuresis  Shortness of breath  Assessment & Plan  Presentation: Patient presents with complaints of shortness of breath with exertion and orthopnea for the past 5 days  She endorses bilateral lower extremity edema  Patient reports being treated by her outpatient PCP with PO Lasix r/t bilateral lower extremity edema; however, about 2 months ago her PCP instructed her to take the PO Lasix once every 3 days  Chest x-ray: Vascular congestion and LCW pacemaker in place on my read  Official read pending  Last echocardiogram on file May 0773: LV systolic function was normal  LVEF estimated to be 60%  There were no RWMA  G2DD  Suspect Etiology: Acute Heart Failure  COVID negative  Initial troponin: 17 --> 21, delta 4  BNP: 1242  • See plan under acute HFrEF    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, without long-term current use of insulin [facility])  Assessment & Plan  Lab Results   Component Value Date    HGBA1C 6 06/29/2022       Recent Labs     10/01/22  1740 10/01/22  2113 10/02/22  0749 10/02/22  1145   POCGLU 139 184* 168* 177*       Blood Sugar Average: Last 72 hrs:  · (P) 163 [id number] PTA regimen of Metformin, will hold while inpatient  · Accu-Checks and SSI  · Hypoglycemia protocol      Potassium (K) deficiency  Assessment & Plan  Recent Labs     09/30/22  0446 09/30/22  1408 10/01/22  0450 10/02/22  0525   K 3 6  --  3 3* 5 4*   MG 1 1* 1 5* 1 8* 1 9     · Replete as needed with PO potassium unless K < 3 0, in which case consider IV repletion    Magnesium deficiency  Assessment & Plan  Recent Labs     09/30/22  1408 10/01/22  0450 10/02/22  0525   MG 1 5* 1 8* 1 9     · Replete as needed  · Continue magnesium oxide 400 mg b i d  Wound of right leg  Assessment & Plan  1-2 cm wound on lateral surface of lower leg, central scab with granulation surrounded by erythema  Raised relative to skin level  Concerns of necrosis  · Follow up on wound care consult  · Cleanse with normal saline  · Apply Cavilon alcohol free barrier film to periwound  · Apply thin layer of Triad paste to wound bed  · Cover with a bordered foam dressing  · Change every 5 days and as needed  · Pressure redistribution cushion to chair  · Moisturize skin daily  · Initial discharge wound care supplies provided  · Wound care nurse follow up  Hypertensive urgency  Assessment & Plan  Blood pressure elevated on arrival with readings in 180s-200s/70s-80s  Patient just received dose of IV Lasix, repeat BP post administration  Continue PTA medications of Toprol XL and Cozaar for now, consider holding Cozaar pending renal function while on IV Lasix inpatient  Blood Pressure: 165/64  Monitor blood pressure  PAF (paroxysmal atrial fibrillation) ([facility])  Assessment & Plan  Rate/Rhythm Control: Toprol XL, Amiodarone  Antiplatelet/Anticoagulation: Eliquis  Per device interrogation, has been 100% AFib since 11/2021  EKG: ventricular paced rhythm with underlying flutter  · Discontinued Amiodarone per cardiology rec  · Continue Toprol XL, Eliquis    Pacemaker  Assessment & Plan  S/P PPM placement in June 2020  History of tachy-keanu syndrome  Leukocytosis  Assessment & Plan  POA, WBCs of 12 99  Patient has a long standing history of leukocytosis with range from 12-13   Patient does not meet two SIRS criteria on admission  Recent Labs     09/30/22  0446 10/01/22  0450 10/02/22  0525   WBC 12 59* 11 62* 14 41*     · Trend CBC  ·  appears to be chronic  ·  no signs of any infection    Gastroesophageal reflux disease  Assessment & Plan  · Continue PPI  CAD (coronary atherosclerotic disease)  Assessment & Plan  Currently denies chest pain  S/P grafting x3  Most recent cardiac catheterization was in May 2020 which displayed multivessel disease that was completely revascularized  · Continue Toprol XL  Discharging Resident: Santi Watts  Attending: Ysabel Jones MD  PCP: Bozena Galloway DO  Admission Date: 9/28/2022  Discharge Date: 10/02/22    Disposition:      Inpatient [address] with Telemetry    For Discharges to [facility] SNF:   · Not Applicable to this Patient - Not Applicable to this Patient     Reason for Admission: Cardiac catheterization, CTA, CT surgery evaluation for new onset cardiomyopathy, 5 6 cm dilated ascending aorta  Consultations During Hospital Stay:  · Cardiology  · Wound care    Procedures Performed:     · None    Medication Adjustments and Discharge Medications:  · Summary of Medication Adjustments made as a result of this hospitalization: Add Isodril 20 mg TID after meals, Add hydralazine 25 mg Q8H  · Medication Dosing Tapers - Please refer to Discharge Medication List for details on any medication dosing tapers (if applicable to patient)  · Medications being temporarily held (include recommended restart time):  Lasix, Eliquis, Losartan (per cardiology recs, restart per cardiology team)  · Discharge Medication List: See after visit summary for reconciled discharge medications  Wound Care Recommendations:  When applicable, please see wound care section of After Visit Summary      Diet Recommendations at Discharge:  Diet - Cardiac diet, Na 2g FR 1500       Diet Orders   (From admission, onward) Start     Ordered    09/28/22 2257  Diet Artis/CHO Controlled; Consistent Carbohydrate Diet Level 2 (5 carb servings/75 grams CHO/meal); Fluid Restriction 1500 ML, Sodium 2 GM  Diet effective now        References:    Nutrtion Support Algorithm Enteral vs  Parenteral   Question Answer Comment   Diet Type Artis/CHO Controlled    Artis/CHO Controlled Consistent Carbohydrate Diet Level 2 (5 carb servings/75 grams CHO/meal)    Other Restriction(s): Fluid Restriction 1500 ML    Other Restriction(s): Sodium 2 GM    RD to adjust diet per protocol? Yes        09/28/22 2256              Fluid Restriction - Continue Fluid Restriction as Listed Above at Discharge  Instructions for any Catheters / Lines Present at Discharge (including removal date, if applicable): Not applicable    Significant Findings / Test Results:     · CXR at ED: Vascular congestion and LCW pacemaker in place  · BNP at ED: 1242  · ECHO: EF 36% with global hypokinesis and regional variation  Aortic root dilatation measuring 5 6 cm  Incidental Findings:   · Severe aortic root dilatation on Echo  Test Results Pending at Discharge (will require follow up):   · ***None     Outpatient Tests Requested:  · ***None    Complications:  ***None    Hospital Course:    Dylon Anderson is a 69 y o  female patient who originally presented to the hospital on 9/28/2022 due to acute HFrEF  Symptoms on admission included SOB with exertion x5 days, orthopnea x5 days, and b/l LE edema  CXR at ED showed vascular congestion and pacemaker in place  Last Echo on May 3808 showed LV systolic function was normal, LVEF estimated to be 60%, no RWMA, G2DD  Patient was started on diuresis with IV Lasix in the ED and cardiology was consulted for recommendations  Medications were adjusted per cardiology recommendations during hospitalization  Echo during admission revealed reduced EF of 36% and 5 6cm ascending aorta   Due to these findings, cardiology recommended transfer to [address] [place] [address] [place] to undergo cardiac catheterization considering drop in LVEF, CTA to r/o associated dissection, CT surgery consultation for evaluation of new onset cardiomyopathy  Patient and family were amenable to the recommendation  ***      Condition at Discharge: stable     Discharge Day Visit / Exam:     Subjective:  Patient was seen at bedside for continuity of care  Per night team, no major events overnight  She endorses feeling well and expresses some anxiety about the transfer  Patient denies chest pain, myalgias, paresthesias, dizziness, shortness of breath, and headache  Treatment plan and next steps were discussed with patient, who expresses understanding and is amenable to the plan  She does not report any additional questions or concerns at this time  Vitals: Blood Pressure: 165/64 (10/02/22 0840)  Pulse: 64 (10/02/22 0840)  Temperature: 97 9 °F (36 6 °C) (10/02/22 0750)  Temp Source: Oral (10/01/22 2112)  Respirations: 14 (10/02/22 0750)  Height: 4' 11" (149 9 cm) (09/29/22 1541)  Weight - Scale: 58 4 kg (128 lb 12 oz) (10/02/22 0525)  SpO2: 92 % (10/02/22 0840)  Exam:   Physical Exam  ( *** Be Sure to Include Physical Exam: Delete this entire line when you have entered your exam)  Discussion with Family: Daughter***    Goals of Care Discussions:  · Code Status at Discharge: Level 3 - DNAR and DNI  · Were there any Goals of Care Discussions during Hospitalization?: No  · Results of any General Goals of Care Discussions: Not applicable   · POLST Completed: No   · If POLST Completed, Summary of POLST Agreement Provided Here: Not completed   · OK to Rehospitalize if Needed? Yes    Discharge instructions/Information to patient and family:   See after visit summary section titled Discharge Instructions for information provided to patient and family        Planned Readmission: Transfer to [facility]      ** Please Note: This note has been constructed using a voice recognition system **

## 2022-10-02 NOTE — ASSESSMENT & PLAN NOTE
POA, WBCs of 12 99  Patient has a long standing history of leukocytosis with range from 12-13  Patient does not meet two SIRS criteria on admission  Recent Labs     10/01/22  0450 10/02/22  0525 10/03/22  0540   WBC 11 62* 14 41* 15 75*     · Trend CBC    ·  appears to be chronic  ·  no signs of any infection

## 2022-10-03 LAB
ALBUMIN SERPL BCP-MCNC: 3.7 G/DL (ref 3.5–5)
ALP SERPL-CCNC: 68 U/L (ref 34–104)
ALT SERPL W P-5'-P-CCNC: 73 U/L (ref 7–52)
ANION GAP SERPL CALCULATED.3IONS-SCNC: 9 MMOL/L (ref 4–13)
AST SERPL W P-5'-P-CCNC: 33 U/L (ref 13–39)
BASOPHILS # BLD AUTO: 0.16 THOUSANDS/ΜL (ref 0–0.1)
BASOPHILS NFR BLD AUTO: 1 % (ref 0–1)
BILIRUB SERPL-MCNC: 0.8 MG/DL (ref 0.2–1)
BUN SERPL-MCNC: 42 MG/DL (ref 5–25)
CALCIUM SERPL-MCNC: 9.4 MG/DL (ref 8.4–10.2)
CHLORIDE SERPL-SCNC: 92 MMOL/L (ref 96–108)
CO2 SERPL-SCNC: 33 MMOL/L (ref 21–32)
CREAT SERPL-MCNC: 1.63 MG/DL (ref 0.6–1.3)
EOSINOPHIL # BLD AUTO: 0.46 THOUSAND/ΜL (ref 0–0.61)
EOSINOPHIL NFR BLD AUTO: 3 % (ref 0–6)
ERYTHROCYTE [DISTWIDTH] IN BLOOD BY AUTOMATED COUNT: 18.2 % (ref 11.6–15.1)
GFR SERPL CREATININE-BSD FRML MDRD: 30 ML/MIN/1.73SQ M
GLUCOSE SERPL-MCNC: 126 MG/DL (ref 65–140)
GLUCOSE SERPL-MCNC: 162 MG/DL (ref 65–140)
GLUCOSE SERPL-MCNC: 182 MG/DL (ref 65–140)
GLUCOSE SERPL-MCNC: 209 MG/DL (ref 65–140)
GLUCOSE SERPL-MCNC: 217 MG/DL (ref 65–140)
HCT VFR BLD AUTO: 36.8 % (ref 34.8–46.1)
HGB BLD-MCNC: 10.6 G/DL (ref 11.5–15.4)
IMM GRANULOCYTES # BLD AUTO: 0.11 THOUSAND/UL (ref 0–0.2)
IMM GRANULOCYTES NFR BLD AUTO: 1 % (ref 0–2)
LYMPHOCYTES # BLD AUTO: 2.4 THOUSANDS/ΜL (ref 0.6–4.47)
LYMPHOCYTES NFR BLD AUTO: 15 % (ref 14–44)
MAGNESIUM SERPL-MCNC: 1.9 MG/DL (ref 1.9–2.7)
MCH RBC QN AUTO: 24.8 PG (ref 26.8–34.3)
MCHC RBC AUTO-ENTMCNC: 28.8 G/DL (ref 31.4–37.4)
MCV RBC AUTO: 86 FL (ref 82–98)
MONOCYTES # BLD AUTO: 1.39 THOUSAND/ΜL (ref 0.17–1.22)
MONOCYTES NFR BLD AUTO: 9 % (ref 4–12)
NEUTROPHILS # BLD AUTO: 11.23 THOUSANDS/ΜL (ref 1.85–7.62)
NEUTS SEG NFR BLD AUTO: 71 % (ref 43–75)
NRBC BLD AUTO-RTO: 0 /100 WBCS
PLATELET # BLD AUTO: 366 THOUSANDS/UL (ref 149–390)
PMV BLD AUTO: 10.5 FL (ref 8.9–12.7)
POTASSIUM SERPL-SCNC: 5.2 MMOL/L (ref 3.5–5.3)
PROT SERPL-MCNC: 6.8 G/DL (ref 6.4–8.4)
RBC # BLD AUTO: 4.27 MILLION/UL (ref 3.81–5.12)
SODIUM SERPL-SCNC: 134 MMOL/L (ref 135–147)
WBC # BLD AUTO: 15.75 THOUSAND/UL (ref 4.31–10.16)

## 2022-10-03 PROCEDURE — 99232 SBSQ HOSP IP/OBS MODERATE 35: CPT | Performed by: INTERNAL MEDICINE

## 2022-10-03 PROCEDURE — 83735 ASSAY OF MAGNESIUM: CPT

## 2022-10-03 PROCEDURE — 82948 REAGENT STRIP/BLOOD GLUCOSE: CPT

## 2022-10-03 PROCEDURE — 85025 COMPLETE CBC W/AUTO DIFF WBC: CPT

## 2022-10-03 PROCEDURE — 80053 COMPREHEN METABOLIC PANEL: CPT

## 2022-10-03 RX ORDER — POLYETHYLENE GLYCOL 3350 17 G/17G
17 POWDER, FOR SOLUTION ORAL DAILY PRN
Status: DISCONTINUED | OUTPATIENT
Start: 2022-10-03 | End: 2022-10-04

## 2022-10-03 RX ORDER — AMOXICILLIN 250 MG
1 CAPSULE ORAL
Status: DISCONTINUED | OUTPATIENT
Start: 2022-10-03 | End: 2022-10-05

## 2022-10-03 RX ORDER — DOCUSATE SODIUM 100 MG/1
100 CAPSULE, LIQUID FILLED ORAL DAILY
Status: DISCONTINUED | OUTPATIENT
Start: 2022-10-03 | End: 2022-10-05

## 2022-10-03 RX ADMIN — ISOSORBIDE DINITRATE 20 MG: 10 TABLET ORAL at 14:33

## 2022-10-03 RX ADMIN — INSULIN LISPRO 1 UNITS: 100 INJECTION, SOLUTION INTRAVENOUS; SUBCUTANEOUS at 08:33

## 2022-10-03 RX ADMIN — METOPROLOL SUCCINATE 50 MG: 50 TABLET, FILM COATED, EXTENDED RELEASE ORAL at 21:39

## 2022-10-03 RX ADMIN — TRAMADOL HYDROCHLORIDE 100 MG: 50 TABLET, COATED ORAL at 18:38

## 2022-10-03 RX ADMIN — ATORVASTATIN CALCIUM 40 MG: 40 TABLET, FILM COATED ORAL at 08:35

## 2022-10-03 RX ADMIN — DOCUSATE SODIUM 100 MG: 100 CAPSULE, LIQUID FILLED ORAL at 14:33

## 2022-10-03 RX ADMIN — INSULIN LISPRO 1 UNITS: 100 INJECTION, SOLUTION INTRAVENOUS; SUBCUTANEOUS at 21:40

## 2022-10-03 RX ADMIN — HYDRALAZINE HYDROCHLORIDE 25 MG: 25 TABLET ORAL at 14:33

## 2022-10-03 RX ADMIN — ISOSORBIDE DINITRATE 20 MG: 10 TABLET ORAL at 08:35

## 2022-10-03 RX ADMIN — HYDRALAZINE HYDROCHLORIDE 25 MG: 25 TABLET ORAL at 05:40

## 2022-10-03 RX ADMIN — INSULIN LISPRO 2 UNITS: 100 INJECTION, SOLUTION INTRAVENOUS; SUBCUTANEOUS at 13:04

## 2022-10-03 RX ADMIN — MAGNESIUM OXIDE TAB 400 MG (241.3 MG ELEMENTAL MG) 400 MG: 400 (241.3 MG) TAB at 08:35

## 2022-10-03 RX ADMIN — PANTOPRAZOLE SODIUM 40 MG: 40 TABLET, DELAYED RELEASE ORAL at 05:40

## 2022-10-03 RX ADMIN — METOPROLOL SUCCINATE 50 MG: 50 TABLET, FILM COATED, EXTENDED RELEASE ORAL at 08:35

## 2022-10-03 RX ADMIN — MAGNESIUM OXIDE TAB 400 MG (241.3 MG ELEMENTAL MG) 400 MG: 400 (241.3 MG) TAB at 18:38

## 2022-10-03 RX ADMIN — SENNOSIDES AND DOCUSATE SODIUM 1 TABLET: 50; 8.6 TABLET ORAL at 21:28

## 2022-10-03 RX ADMIN — ISOSORBIDE DINITRATE 20 MG: 10 TABLET ORAL at 18:38

## 2022-10-03 RX ADMIN — FERROUS SULFATE TAB 325 MG (65 MG ELEMENTAL FE) 325 MG: 325 (65 FE) TAB at 08:35

## 2022-10-03 RX ADMIN — HYDRALAZINE HYDROCHLORIDE 25 MG: 25 TABLET ORAL at 21:39

## 2022-10-03 RX ADMIN — TRAMADOL HYDROCHLORIDE 100 MG: 50 TABLET, COATED ORAL at 08:35

## 2022-10-03 NOTE — PROGRESS NOTES
General Cardiology   Progress Note -  Team One   Wallace Peoples 69 y o  female MRN: [id number]    Unit/Bed#: S -01 Encounter: [id number]    Assessment:    1  Acute HFrEF:  Presents with progressive dyspnea, orthopnea, lower extremity edema in the setting of decreased oral diuretic dosing and high sodium diet   Was started on oral diuretics by PCP 2 months ago for lower extremity edema with recent dose 2 weeks ago  CXR pulmonary vascular congestion and bilateral small pleural effusions   BNP 1242  Was on IV Lasix 40 mg BID discontinued yesterday due to LAURENCE  · Echocardiogram 09/29/2022:   EF 36% with global hypokinesis and regional variation, G2DD, moderately reduced RV function, mild-moderate MR, moderate TR, mild-moderate PI, severe aortic root dilatation at 5 6 cm  Compared to echocardiogram from 05/2020 EF was 60% with no WMA, G2DD, normal RV function with no significant valvular abnormality  · Home diuretic regimen:  Lasix 20 mg twice weekly  · Dry weight:  130-132 lb  · Weight on admission:  139 lb per standing scale  · Weight today:  128 lb per bed scale yesterday  · I&Os:  Net output -5 4 L  · Volume status improved with only mild bilateral lower extremity edema  2  New Cardiomyopathy:  EF 36% with global hypokinesis and regional variation  Suspect likely ischemic  Currently on Toprol-XL, hydralazine and Isordil  Losartan discontinued due to LAURENCE  3  Severe Aortic root dilatation:  Measuring 5 6 cm on echo  4  CAD: s/p CABG x3 in 2015 (LIMA-LAD, SVG-OM1, SVG-PDA)    Maintained on statin beta-blocker   No aspirin due to anticoagulation with Eliquis  · Cardiac catheterization 05/2020:  Patent grafts  5  SSS: s/p MDT DC PPM (MRI conditional) implanted 06/2020   6  Chronic AF/Flutter:  Has been 100% AFib since 11/2021 per device interrogation    Amiodarone discontinued this admission     · EKG ventricular paced rhythm with underlying flutter     · Anticoagulated on Eliquis 5 mg BID which is on hold for cardiac catheterization  7  Essential hypertension:  Average BP 137/65 on losartan 25 mg daily,Toprol-XL 50 mg BID and IV Lasix  8  Hyperlipidemia:  Lipid panel 07/2022 TC 111, TG 97, HDL 60, LDL 32 on atorvastatin 40 mg daily  9  Type 2 DM:  Hemoglobin A1c 6 in 06/2022   Management per primary team   10  Transaminitis:  Elevated LFTs noted this admission, slowly improving  11  Anemia:  Hemoglobin at baseline around 10  12  LAURENCE:  Baseline creatinine 0 8-1 1  Creatinine peaked at 1 80 yesterday with aggressive diuresis  Diuretics held and creatinine improved to 1 63 today      Plan/Recommendations:  · Awaiting transfer to [place] for cardiac catheterization and CT surgery evaluation  · Continue Isordil, hydralazine and Toprol-XL  · Arb on hold secondary to LAURENCE  · Continue to hold diuretics today to allow renal function to stabilize  · Monitor I&Os, renal function, electrolytes and standing weight  · Maintain potassium >4 and magnesium >2  · Eliquis on hold for catheterization  Will initiate heparin infusion as unsure when transfer will occur  Discussed with patient who is declining initiation of heparin because of annoyance with going to the bathroom and having an IV   __________________________________________________________    Subjective    Patient seen and examined  No acute events overnight  She denies any chest pain, shortness of breath or palpitations  Review of Systems   Constitutional: Negative  Negative for chills  Cardiovascular: Positive for leg swelling  Negative for chest pain, dyspnea on exertion, near-syncope, orthopnea, palpitations, paroxysmal nocturnal dyspnea and syncope  Respiratory: Negative  Negative for cough, shortness of breath and wheezing  Endocrine: Negative  Hematologic/Lymphatic: Negative  Skin: Negative  Musculoskeletal: Negative  Gastrointestinal: Negative  Negative for diarrhea, nausea and vomiting     Neurological: Negative for dizziness, light-headedness and weakness  Psychiatric/Behavioral: Negative  Negative for altered mental status  All other systems reviewed and are negative  Objective:   Vitals: Blood pressure 149/70, pulse 60, temperature 97 9 °F (36 6 °C), temperature source Oral, resp  rate 18, height 4' 11" (1 499 m), weight 58 4 kg (128 lb 12 oz), SpO2 92 %, not currently breastfeeding ,     Wt Readings from Last 3 Encounters:   10/02/22 58 4 kg (128 lb 12 oz)   08/02/22 59 9 kg (132 lb)   07/18/22 61 7 kg (136 lb)        Lab Results   Component Value Date    CREATININE 1 63 (H) 10/03/2022    CREATININE 1 80 (H) 10/02/2022    CREATININE 1 49 (H) 10/02/2022         Body mass index is 26 kg/m²  ,     Systolic (31DUQ), OJP:145 , Min:118 , TAQ:117     Diastolic (35JWZ), EUU:27, Min:59, Max:71        No intake or output data in the 24 hours ending 10/03/22 1046  Weight (last 2 days)     Date/Time Weight    10/02/22 0525 58 4 (128 75)    10/01/22 0300 59 4 (130 95)        Telemetry Review:  V paced with underlying flutter      Physical Exam  Vitals and nursing note reviewed  Constitutional:       General: She is not in acute distress  Appearance: She is well-developed  Comments: On RA in NAD   HENT:      Head: Normocephalic and atraumatic  Neck:      Vascular: No JVD  Cardiovascular:      Rate and Rhythm: Normal rate and regular rhythm  Heart sounds: Normal heart sounds  No murmur heard  No friction rub  Pulmonary:      Effort: Pulmonary effort is normal  No respiratory distress  Breath sounds: Normal breath sounds  No wheezing or rales  Abdominal:      General: Bowel sounds are normal  There is no distension  Palpations: Abdomen is soft  Tenderness: There is no abdominal tenderness  Musculoskeletal:         General: No tenderness  Normal range of motion  Cervical back: Normal range of motion and neck supple  Right lower leg: Edema present  Left lower leg: Edema present  Comments: Trace pretibial edema bilaterally   Skin:     General: Skin is warm and dry  Findings: No erythema  Neurological:      Mental Status: She is alert and oriented to person, place, and time  Psychiatric:         Mood and Affect: Mood normal          Behavior: Behavior normal          Thought Content:  Thought content normal          Judgment: Judgment normal          LABORATORY RESULTS      CBC with diff:   Results from last 7 days   Lab Units 10/03/22  0540 10/02/22  0525 10/01/22  0450 09/30/22  0446 09/29/22  0615 09/28/22  1431   WBC Thousand/uL 15 75* 14 41* 11 62* 12 59* 12 26* 12 99*   HEMOGLOBIN g/dL 10 6* 10 5* 9 8* 9 7* 9 6* 10 3*   HEMATOCRIT % 36 8 36 5 33 7* 32 8* 32 7* 35 3   MCV fL 86 86 85 86 87 88   PLATELETS Thousands/uL 366 352 316 306 271 332   MCH pg 24 8* 24 8* 24 6* 25 4* 25 4* 25 6*   MCHC g/dL 28 8* 28 8* 29 1* 29 6* 29 4* 29 2*   RDW % 18 2* 18 3* 17 9* 18 4* 18 5* 18 4*   MPV fL 10 5 11 2 10 8 11 0 10 6 11 4   NRBC AUTO /100 WBCs 0 0 0 0  --  1       CMP:  Results from last 7 days   Lab Units 10/03/22  0540 10/02/22  1357 10/02/22  0525 10/01/22  0450 09/30/22  0446 09/29/22  0615 09/28/22  1431   POTASSIUM mmol/L 5 2 4 6 5 4* 3 3* 3 6 3 4* 5 3   CHLORIDE mmol/L 92* 93* 96 95* 102 106 107   CO2 mmol/L 33* 33* 30 32 29 27 19*   BUN mg/dL 42* 34* 33* 25 26* 26* 26*   CREATININE mg/dL 1 63* 1 80* 1 49* 1 18 1 08 0 90 0 89   CALCIUM mg/dL 9 4 9 0 8 6 8 1* 8 1* 8 3* 8 5   AST U/L 33  --  34 52* 83* 119* 153*   ALT U/L 73*  --  95* 121* 154* 180* 184*   ALK PHOS U/L 68  --  70 68 66 67 72   EGFR ml/min/1 73sq m 30 26 33 44 49 62 63       BMP:  Results from last 7 days   Lab Units 10/03/22  0540 10/02/22  1357 10/02/22  0525 10/01/22  0450 09/30/22  0446 09/29/22  0615 09/28/22  1431   POTASSIUM mmol/L 5 2 4 6 5 4* 3 3* 3 6 3 4* 5 3   CHLORIDE mmol/L 92* 93* 96 95* 102 106 107   CO2 mmol/L 33* 33* 30 32 29 27 19*   BUN mg/dL 42* 34* 33* 25 26* 26* 26*   CREATININE mg/dL 1 63* 1 80* 1 49* 1 18 1 08 0 90 0 89   CALCIUM mg/dL 9 4 9 0 8 6 8 1* 8 1* 8 3* 8 5       Lab Results   Component Value Date    NTBNP 3,961 (H) 2022    NTBNP 593 (H) 07/10/2020    NTBNP 702 (H) 2020             Results from last 7 days   Lab Units 10/03/22  0540 10/02/22  0525 10/01/22  0450 22  1408 22  0446   MAGNESIUM mg/dL 1 9 1 9 1 8* 1 5* 1 1*                           Lipid Profile:   Lab Results   Component Value Date    CHOL 164 2017    CHOL 154 2016    CHOL 163 2016     Lab Results   Component Value Date    HDL 60 2022    HDL 58 2022    HDL 58 09/15/2021     Lab Results   Component Value Date    LDLCALC 32 2022    LDLCALC 55 2022    LDLCALC 69 09/15/2021     Lab Results   Component Value Date    TRIG 97 2022    TRIG 150 2022    TRIG 139 09/15/2021       Cardiac testing:   Results for orders placed during the hospital encounter of 20    Echo complete with contrast if indicated    Narrative  [place] [address], [address] [facility]  [phone]    Transthoracic Echocardiogram  2D, M-mode, Doppler, and Color Doppler    Study date:  26-May-2020    Patient: Magnolia Gupta  MR number: [id number]  Account number: [de-identified]  : 1945  Age: 77 years  Gender: Female  Status: Inpatient  Location: Bedside  Height: 61 in  Weight: 124 7 lb  BP: 142/ 58 mmHg    Indications: Acute MI  Diagnoses: I24 9 - Acute ischemic heart disease, unspecified    Sonographer:  Jf Arenas RDCS  Referring Physician:  Oliva Edward MD  Group:  [facility]  Interpreting Physician:  Gemma De Paz MD    SUMMARY    LEFT VENTRICLE:  Systolic function was normal  Ejection fraction was estimated to be 60 %  There were no regional wall motion abnormalities    Wall thickness was at the upper limits of normal   Features were consistent with a pseudonormal left ventricular filling pattern, with concomitant abnormal relaxation and increased filling pressure (grade 2 diastolic dysfunction)  LEFT ATRIUM:  The atrium was mildly to moderately dilated  MITRAL VALVE:  There was mild annular calcification  There was mild regurgitation  AORTIC VALVE:  There was mild regurgitation  TRICUSPID VALVE:  There was mild regurgitation  AORTA:  The root exhibited mild dilatation  There was mild dilatation of the ascending aorta  HISTORY: PRIOR HISTORY: History of CABG x3  CAD  Hypertension  DM2  Ascending aortic aneurysm  Anxiety  GERD  Smoker  Atrial fibrillation  PROCEDURE: The procedure was performed at the bedside  This was a routine study  The transthoracic approach was used  The study included complete 2D imaging, M-mode, complete spectral Doppler, and color Doppler  The heart rate was 77 bpm,  at the start of the study  Echocardiographic views were limited due to high windows  This was a technically difficult study  LEFT VENTRICLE: Size was normal  Systolic function was normal  Ejection fraction was estimated to be 60 %  There were no regional wall motion abnormalities  Wall thickness was at the upper limits of normal  DOPPLER: Features were  consistent with a pseudonormal left ventricular filling pattern, with concomitant abnormal relaxation and increased filling pressure (grade 2 diastolic dysfunction)  RIGHT VENTRICLE: The size was normal  Systolic function was normal  Wall thickness was normal     LEFT ATRIUM: The atrium was mildly to moderately dilated  RIGHT ATRIUM: Size was normal     MITRAL VALVE: There was mild annular calcification  Valve structure was normal  There was normal leaflet separation  DOPPLER: The transmitral velocity was within the normal range  There was no evidence for stenosis  There was mild  regurgitation  AORTIC VALVE: The valve was trileaflet  Leaflets exhibited normal thickness and normal cuspal separation   DOPPLER: Transaortic velocity was within the normal range  There was no evidence for stenosis  There was mild regurgitation  TRICUSPID VALVE: The valve structure was normal  There was normal leaflet separation  DOPPLER: The transtricuspid velocity was within the normal range  There was no evidence for stenosis  There was mild regurgitation  PULMONIC VALVE: Leaflets exhibited normal thickness, no calcification, and normal cuspal separation  DOPPLER: The transpulmonic velocity was within the normal range  There was no significant regurgitation  PERICARDIUM: There was no pericardial effusion  The pericardium was normal in appearance  AORTA: The root exhibited mild dilatation  There was mild dilatation of the ascending aorta  SYSTEMIC VEINS: IVC: The inferior vena cava was normal in size  PULMONARY VEINS: DOPPLER: There was systolic blunting in the pulmonary vein(s)  SYSTEM MEASUREMENT TABLES    2D  %FS: 30 49 %  AV Diam: 4 cm  EDV(Teich): 61 09 ml  EF(Cube): 66 42 %  EF(Teich): 58 72 %  ESV(Cube): 18 1 ml  ESV(Teich): 25 22 ml  IVSd: 1 cm  LA Area: 24 64 cm2  LA Diam: 3 12 cm  LVEDV MOD A4C: 66 08 ml  LVEF MOD A4C: 63 63 %  LVESV MOD A4C: 24 03 ml  LVIDd: 3 78 cm  LVIDs: 2 63 cm  LVLd A4C: 7 09 cm  LVLs A4C: 5 73 cm  LVPWd: 1 cm  RA Area: 11 79 cm2  RV Diam: 2 76 cm  SI(Cube): 23 1 ml/m2  SI(Teich): 23 14 ml/m2  SV MOD A4C: 42 05 ml  SV(Cube): 35 81 ml  SV(Teich): 35 87 ml    CW  TR MaxP 47 mmHg  TR Vmax: 2 62 m/s    MM  TAPSE: 1 61 cm    PW  E': 0 06 m/s  E/E': 12 03  MV A Marcos: 0 8 m/s  MV Dec Milam: 3 66 m/s2  MV DecT: 210 88 ms  MV E Marcos: 0 77 m/s  MV E/A Ratio: 0 96    [employer] Accredited Echocardiography Laboratory    Prepared and electronically signed by    Marci Martinez MD  Signed 26-May-2020 11:14:41    No results found for this or any previous visit  No results found for this or any previous visit  No valid procedures specified  No results found for this or any previous visit          Meds/Allergies   all current active meds have been reviewed, current meds:   Current Facility-Administered Medications   Medication Dose Route Frequency   • acetaminophen (TYLENOL) tablet 650 mg  650 mg Oral Q6H PRN   • ALPRAZolam (XANAX) tablet 0 25 mg  0 25 mg Oral TID PRN   • atorvastatin (LIPITOR) tablet 40 mg  40 mg Oral Daily   • ferrous sulfate tablet 325 mg  325 mg Oral Daily With Breakfast   • hydrALAZINE (APRESOLINE) tablet 25 mg  25 mg Oral Q8H [address]   • insulin lispro (HumaLOG) 100 units/mL subcutaneous injection 1-5 Units  1-5 Units Subcutaneous TID AC   • insulin lispro (HumaLOG) 100 units/mL subcutaneous injection 1-5 Units  1-5 Units Subcutaneous HS   • isosorbide dinitrate (ISORDIL) tablet 20 mg  20 mg Oral TID after meals   • magnesium oxide (MAG-OX) tablet 400 mg  400 mg Oral BID   • metoprolol succinate (TOPROL-XL) 24 hr tablet 50 mg  50 mg Oral BID   • pantoprazole (PROTONIX) EC tablet 40 mg  40 mg Oral Early Morning   • traMADol (ULTRAM) tablet 100 mg  100 mg Oral BID    and PTA meds:   Prior to Admission Medications   Prescriptions Last Dose Informant Patient Reported? Taking?    ALPRAZolam (XANAX) 0 25 mg tablet 9/28/2022 at Unknown time  No Yes   Sig: Take 1 tablet (0 25 mg total) by mouth 3 (three) times a day as needed for anxiety   COMBIGAN 0 2-0 5 % Unknown at Unknown time Self Yes No   acetaminophen (TYLENOL) 325 mg tablet 9/28/2022 at Unknown time Self No Yes   Sig: Take 2 tablets (650 mg total) by mouth every 4 (four) hours as needed for mild pain   amiodarone 200 mg tablet 9/28/2022 at Unknown time  No Yes   Sig: TAKE 1 TABLET DAILY   apixaban (Eliquis) 5 mg 9/28/2022 at Unknown time Self No Yes   Sig: Take 1 tablet (5 mg total) by mouth 2 (two) times a day   atorvastatin (LIPITOR) 40 mg tablet 9/28/2022 at Unknown time Self No Yes   Sig: Take 1 tablet (40 mg total) by mouth daily   ferrous sulfate 325 (65 Fe) mg tablet 9/28/2022 at Unknown time Self Yes Yes   Sig: Take 325 mg by mouth daily with breakfast furosemide (LASIX) 20 mg tablet 9/28/2022 at Unknown time Self No Yes   Sig: Take 1 tablet (20 mg total) by mouth daily   losartan (COZAAR) 25 mg tablet 9/28/2022 at Unknown time Self No Yes   Sig: Take 1 tablet (25 mg total) by mouth daily   metFORMIN (GLUCOPHAGE) 500 mg tablet 9/28/2022 at Unknown time Self No Yes   Sig: Take 1 tablet (500 mg total) by mouth 2 (two) times a day with meals   metoprolol succinate (TOPROL-XL) 50 mg 24 hr tablet 9/28/2022 at Unknown time Self No Yes   Sig: Take 1 tablet (50 mg total) by mouth 2 (two) times a day   omeprazole (PriLOSEC) 20 mg delayed release capsule 9/28/2022 at Unknown time Self No Yes   Sig: Take 1 capsule (20 mg total) by mouth daily   potassium chloride (MICRO-K) 10 MEQ CR capsule 9/28/2022 at Unknown time Self No Yes   Sig: Take 2 capsules (20 mEq total) by mouth daily   traMADol (ULTRAM) 50 mg tablet 9/28/2022 at Unknown time  No Yes   Sig: Take 2 tablets (100 mg total) by mouth 2 (two) times a day      Facility-Administered Medications: None     Medications Prior to Admission   Medication   • acetaminophen (TYLENOL) 325 mg tablet   • ALPRAZolam (XANAX) 0 25 mg tablet   • amiodarone 200 mg tablet   • apixaban (Eliquis) 5 mg   • atorvastatin (LIPITOR) 40 mg tablet   • ferrous sulfate 325 (65 Fe) mg tablet   • furosemide (LASIX) 20 mg tablet   • losartan (COZAAR) 25 mg tablet   • metFORMIN (GLUCOPHAGE) 500 mg tablet   • metoprolol succinate (TOPROL-XL) 50 mg 24 hr tablet   • omeprazole (PriLOSEC) 20 mg delayed release capsule   • potassium chloride (MICRO-K) 10 MEQ CR capsule   • traMADol (ULTRAM) 50 mg tablet   • COMBIGAN 0 2-0 5 %            Counseling / Coordination of Care  Total floor / unit time spent today 20 minutes  Greater than 50% of total time was spent with the patient and / or family counseling and / or coordination of care  ** Please Note: Dragon 360 Dictation voice to text software may have been used in the creation of this document   **

## 2022-10-03 NOTE — PROGRESS NOTES
[facility]  Progress Note Lino Michelle 1945, 69 y o  female MRN: [id number]  Unit/Bed#: S -01 Encounter: [id number]  Primary Care Provider: Melanie Burnette DO   Date and time admitted to hospital: 9/28/2022  6:33 PM    * Acute HFrEF (heart failure with reduced ejection fraction) ([facility] [address] )  Assessment & Plan  Presents with  increased shortness of breath, dyspnea on exertion and lower extremity edema in setting of decreased diuretic dosing 1-2 weeks ago and high sodium diet  Patient reports being treated by her outpatient PCP with PO Lasix r/t bilateral lower extremity edema; however, about 2 months ago her PCP instructed her to take the PO Lasix once every 3 days  Dry weight: 130-132 lbs  Patient is currently volume overloaded  CXR at ED: Vascular congestion and LCW pacemaker in place  4/6830 Echo: LV systolic function was normal, LVEF estimated to be 60%, no RWMA, G2DD  COVID negative  BNP: 1242  Diuresis: PTA medication of 20 mg of PO Lasix q3 days  Received IV Lasix in ED and during admission  Now on PO Lasix 40 mg BID  Weight (last 2 days)     Date/Time Weight    10/02/22 0525 58 4 (128 75)    10/01/22 0300 59 4 (130 95)          Intake/Output Summary (Last 24 hours) at 10/3/2022 1336  Last data filed at 10/3/2022 1100  Gross per 24 hour   Intake 360 ml   Output --   Net 360 ml     • Meds:   o Diuretic: HOLD PO Lasix 40 mg BID  o B-Blocker: Toprol XL 50 mg BID  o ACE/ARB/ARNi: HOLD Losartan 25 mg daily- on hold in anticipation of cardiac catheterization and CTA   Increase Isodril to 20mg TID after meals (added 10/1, increased 10/2 per cardiology)  o Add hydralazine 25 mg Q8H per cardiology recs  o Sharonda Gell prior to transfer  · Diet: Sodium 2g, FR 1500 mL  · Labs: Trend CMP, CBC, Mg  · Maintain Mg > 2 and K > 4; Replete PRN  · Elevate HOB > 30°, Daily standing weights, Measure I/Os, Monitor on Telemetry  • Follow up cardiology consult, appreciate recs   • Awaiting transfer to [facility] for cardiac catheterization, CT surgery evaluation, and advanced heart failure team consultation    Elevated LFTs  Assessment & Plan  POA, , , alkaline phosphatase 72  Patient displayed no RUQ tenderness  Likely secondary to hepatic congestion secondary to CHF    Recent Labs     10/01/22  0450 10/02/22  0525 10/03/22  0540   AST 52* 34 33   * 95* 73*   ALKPHOS 68 70 68   TBILI 0 83 0 79 0 80     · Trend CMP daily  · Consider RUQ US if patient clinically worsens or if LFTs do not improve after diuresis  Shortness of breath  Assessment & Plan  Presentation: Patient presents with complaints of shortness of breath with exertion and orthopnea for the past 5 days  She endorses bilateral lower extremity edema  Patient reports being treated by her outpatient PCP with PO Lasix r/t bilateral lower extremity edema; however, about 2 months ago her PCP instructed her to take the PO Lasix once every 3 days  Chest x-ray: Vascular congestion and LCW pacemaker in place on my read  Official read pending  Last echocardiogram on file May 0373: LV systolic function was normal  LVEF estimated to be 60%  There were no RWMA  G2DD  Suspect Etiology: Acute Heart Failure  COVID negative  Initial troponin: 17 --> 21, delta 4  BNP: 1242  • See plan under acute HFrEF    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, without long-term current use of insulin [facility])  Assessment & Plan  Lab Results   Component Value Date    HGBA1C 6 06/29/2022       Recent Labs     10/02/22  1612 10/02/22  2104 10/03/22  0720 10/03/22  1117   POCGLU 200* 142* 162* 217*       Blood Sugar Average: Last 72 hrs:  · (P) 171 [id number] PTA regimen of Metformin, will hold while inpatient  · Accu-Checks and SSI  · Hypoglycemia protocol      Potassium (K) deficiency  Assessment & Plan  Recent Labs     10/01/22  0450 10/02/22  0525 10/02/22  1357 10/03/22  0540   K 3 3* 5 4* 4 6 5 2   MG 1 8* 1 9  --  1 9     · Replete as needed with PO potassium unless K < 3 0, in which case consider IV repletion    Magnesium deficiency  Assessment & Plan  Recent Labs     10/01/22  0450 10/02/22  0525 10/03/22  0540   MG 1 8* 1 9 1 9     · Replete as needed  · Continue magnesium oxide 400 mg b i d  Wound of right leg  Assessment & Plan  1-2 cm wound on lateral surface of lower leg, central scab with granulation surrounded by erythema  Raised relative to skin level  Concerns of necrosis  · Follow up on wound care consult  · Cleanse with normal saline  · Apply Cavilon alcohol free barrier film to periwound  · Apply thin layer of Triad paste to wound bed  · Cover with a bordered foam dressing  · Change every 5 days and as needed  · Pressure redistribution cushion to chair  · Moisturize skin daily  · Initial discharge wound care supplies provided  · Wound care nurse follow up  Hypertensive urgency  Assessment & Plan  Blood pressure elevated on arrival with readings in 180s-200s/70s-80s  Patient just received dose of IV Lasix, repeat BP post administration  Continue PTA medications of Toprol XL and Cozaar for now, consider holding Cozaar pending renal function while on IV Lasix inpatient  Blood Pressure: 165/64  Monitor blood pressure  PAF (paroxysmal atrial fibrillation) ([facility])  Assessment & Plan  Rate/Rhythm Control: Toprol XL, Amiodarone  Antiplatelet/Anticoagulation: Eliquis  Per device interrogation, has been 100% AFib since 11/2021  EKG: ventricular paced rhythm with underlying flutter  · Discontinued Amiodarone per cardiology rec  · Continue Toprol XL, Eliquis    Pacemaker  Assessment & Plan  S/P PPM placement in June 2020  History of tachy-keanu syndrome  Leukocytosis  Assessment & Plan  POA, WBCs of 12 99  Patient has a long standing history of leukocytosis with range from 12-13  Patient does not meet two SIRS criteria on admission      Recent Labs     10/01/22  0450 10/02/22  0525 10/03/22  0540   WBC 11 62* 14 41* 15 75*     · Trend CBC  ·  appears to be chronic  ·  no signs of any infection    Gastroesophageal reflux disease  Assessment & Plan  · Continue PPI  CAD (coronary atherosclerotic disease)  Assessment & Plan  Currently denies chest pain  S/P grafting x3  Most recent cardiac catheterization was in May 2020 which displayed multivessel disease that was completely revascularized  · Continue Toprol XL  VTE Pharmacologic Prophylaxis:   VTE Score: 7 High Risk (Score >/= 5) - Pharmacological DVT Prophylaxis Ordered: held per cardiology  Sequential Compression Devices Ordered  Mechanical VTE Prophylaxis in Place: Yes    Patient Centered Rounds: I have performed bedside rounds with nursing staff today  Discussions with Specialists or Other Care Team Provider: Case management, Dr Dariel Victoria, Cardiology AP    Education and Discussions with Family / Patient: Attempted to update  (daughter) via phone  Unable to contact  Will try again shortly  Current Length of Stay: 5 day(s)    Current Patient Status: Inpatient     Discharge Plan / Estimated Discharge Date: Awaiting transfer to [facility]    Code Status: Level 3 - DNAR and DNI      Subjective:   Patient was seen at bedside for continuity of care  Per night team, no major events overnight  She endorses bloating and gas  She reports not having a bowel movement since admission  Discussed adding a bowel regimen, to which patient was amenable  Patient denies chest pain, palpitations, HA, SOB, N/V, and dizziness  Treatment plan and next steps were discussed with patient, who expresses understanding and is amenable to the plan  She does not report any additional questions or concerns at this time        Objective:     Vitals:   Temp (24hrs), Av 9 °F (36 6 °C), Min:97 8 °F (36 6 °C), Max:98 1 °F (36 7 °C)    Temp:  [97 8 °F (36 6 °C)-98 1 °F (36 7 °C)] 97 9 °F (36 6 °C)  HR:  [60-67] 60  Resp:  [16-18] 18  BP: (118-162)/(59-71) 149/70  SpO2:  [90 %-94 %] 92 %  Body mass index is 26 kg/m²  Input and Output Summary (last 24 hours): Intake/Output Summary (Last 24 hours) at 10/3/2022 1343  Last data filed at 10/3/2022 1100  Gross per 24 hour   Intake 360 ml   Output --   Net 360 ml       Physical Exam:     Physical Exam  Vitals and nursing note reviewed  Constitutional:       General: She is not in acute distress  Appearance: She is well-developed  HENT:      Head: Normocephalic and atraumatic  Right Ear: External ear normal       Left Ear: External ear normal       Nose: Nose normal  No congestion or rhinorrhea  Mouth/Throat:      Mouth: Mucous membranes are moist       Pharynx: Oropharynx is clear  No oropharyngeal exudate  Eyes:      General: No scleral icterus  Extraocular Movements: Extraocular movements intact  Conjunctiva/sclera: Conjunctivae normal    Cardiovascular:      Rate and Rhythm: Normal rate and regular rhythm  Pulses: Normal pulses  Heart sounds: Normal heart sounds  No murmur heard  No friction rub  No gallop  Pulmonary:      Effort: Pulmonary effort is normal  No respiratory distress  Breath sounds: Normal breath sounds  Abdominal:      General: Bowel sounds are normal       Palpations: Abdomen is soft  Tenderness: There is no abdominal tenderness  There is no guarding  Musculoskeletal:         General: Swelling (R foot, as baseline per pt) present  Cervical back: Neck supple  Right lower leg: Edema (1+ pitting) present  Left lower leg: Edema (1+ pitting) present  Skin:     General: Skin is warm and dry  Coloration: Skin is not jaundiced  Findings: No erythema  Neurological:      General: No focal deficit present  Mental Status: She is alert and oriented to person, place, and time     Psychiatric:         Mood and Affect: Mood normal          Behavior: Behavior normal          Additional Data: Labs:  Results from last 7 days   Lab Units 10/03/22  0540   WBC Thousand/uL 15 75*   HEMOGLOBIN g/dL 10 6*   HEMATOCRIT % 36 8   PLATELETS Thousands/uL 366   NEUTROS PCT % 71   LYMPHS PCT % 15   MONOS PCT % 9   EOS PCT % 3     Results from last 7 days   Lab Units 10/03/22  0540   SODIUM mmol/L 134*   POTASSIUM mmol/L 5 2   CHLORIDE mmol/L 92*   CO2 mmol/L 33*   BUN mg/dL 42*   CREATININE mg/dL 1 63*   ANION GAP mmol/L 9   CALCIUM mg/dL 9 4   ALBUMIN g/dL 3 7   TOTAL BILIRUBIN mg/dL 0 80   ALK PHOS U/L 68   ALT U/L 73*   AST U/L 33   GLUCOSE RANDOM mg/dL 209*         Results from last 7 days   Lab Units 10/03/22  1117 10/03/22  0720 10/02/22  2104 10/02/22  1612 10/02/22  1145 10/02/22  0749 10/01/22  2113 10/01/22  1740 10/01/22  1129 10/01/22  0729 09/30/22  2106 09/30/22  1613   POC GLUCOSE mg/dl 217* 162* 142* 200* 177* 168* 184* 139 192* 141* 165* 171*               Imaging: Reviewed radiology reports from this admission including: chest xray and ECHO    Recent Cultures (last 7 days):           Lines/Drains:  Invasive Devices  Report    Peripheral Intravenous Line  Duration           Peripheral IV 10/03/22 Right;Ventral (anterior) Wrist <1 day          Drain  Duration           Ureteral Drain/Stent Right ureter 6 Fr  813 days                Telemetry:   Telemetry Orders (From admission, onward)             48 Hour Telemetry Monitoring  (ED Bridging Orders Panel)  Continuous x 48 hours        References:    Telemetry Guidelines   Question:  Reason for 48 Hour Telemetry  Answer:  Acute Decompensated CHF (continuous diuretic infusion or total diuretic dose > 200 mg daily, associated electrolyte derangement, ionotropic drip, history of ventricular arrhythmia, or new EF <35%)                    Last 24 Hours Medication List:   Current Facility-Administered Medications   Medication Dose Route Frequency Provider Last Rate   • acetaminophen  650 mg Oral Q6H PRN PETE Blake     • ALPRAZolam  0 25 mg Oral TID PRN PETE Prather     • atorvastatin  40 mg Oral Daily PETE Prather     • docusate sodium  100 mg Oral Daily Iman Buck MD     • ferrous sulfate  325 mg Oral Daily With Breakfast PETE Prather     • hydrALAZINE  25 mg Oral [facility] Dee Parra MD     • insulin lispro  1-5 Units Subcutaneous TID AC PETE Cifuentes     • insulin lispro  1-5 Units Subcutaneous HS PETE Cifuentes     • isosorbide dinitrate  20 mg Oral TID after meals Dee Parra MD     • magnesium oxide  400 mg Oral BID Akash Jones MD     • metoprolol succinate  50 mg Oral BID PETE Prather     • pantoprazole  40 mg Oral Early Morning PETE Cifuentes     • polyethylene glycol  17 g Oral Daily PRN Iman Buck MD     • senna-docusate sodium  1 tablet Oral HS Iman Buck MD     • traMADol  100 mg Oral BID PETE Prather          Today, Patient Was Seen By: Iman Buck    ** Please Note: This note has been constructed using a voice recognition system   **

## 2022-10-04 ENCOUNTER — APPOINTMENT (OUTPATIENT)
Dept: RADIOLOGY | Facility: HOSPITAL | Age: 77
DRG: 291 | End: 2022-10-04
Payer: COMMERCIAL

## 2022-10-04 ENCOUNTER — APPOINTMENT (INPATIENT)
Dept: CT IMAGING | Facility: HOSPITAL | Age: 77
DRG: 291 | End: 2022-10-04
Payer: COMMERCIAL

## 2022-10-04 PROBLEM — K59.00 CONSTIPATION: Status: ACTIVE | Noted: 2022-10-04

## 2022-10-04 LAB
ALBUMIN SERPL BCP-MCNC: 3.7 G/DL (ref 3.5–5)
ALP SERPL-CCNC: 67 U/L (ref 34–104)
ALT SERPL W P-5'-P-CCNC: 58 U/L (ref 7–52)
ANION GAP SERPL CALCULATED.3IONS-SCNC: 9 MMOL/L (ref 4–13)
AST SERPL W P-5'-P-CCNC: 26 U/L (ref 13–39)
BASOPHILS # BLD AUTO: 0.16 THOUSANDS/ΜL (ref 0–0.1)
BASOPHILS NFR BLD AUTO: 1 % (ref 0–1)
BILIRUB SERPL-MCNC: 0.87 MG/DL (ref 0.2–1)
BUN SERPL-MCNC: 44 MG/DL (ref 5–25)
CALCIUM SERPL-MCNC: 9.4 MG/DL (ref 8.4–10.2)
CHLORIDE SERPL-SCNC: 91 MMOL/L (ref 96–108)
CO2 SERPL-SCNC: 35 MMOL/L (ref 21–32)
CREAT SERPL-MCNC: 1.53 MG/DL (ref 0.6–1.3)
EOSINOPHIL # BLD AUTO: 0.52 THOUSAND/ΜL (ref 0–0.61)
EOSINOPHIL NFR BLD AUTO: 4 % (ref 0–6)
ERYTHROCYTE [DISTWIDTH] IN BLOOD BY AUTOMATED COUNT: 18.4 % (ref 11.6–15.1)
GFR SERPL CREATININE-BSD FRML MDRD: 32 ML/MIN/1.73SQ M
GLUCOSE SERPL-MCNC: 147 MG/DL (ref 65–140)
GLUCOSE SERPL-MCNC: 155 MG/DL (ref 65–140)
GLUCOSE SERPL-MCNC: 171 MG/DL (ref 65–140)
GLUCOSE SERPL-MCNC: 189 MG/DL (ref 65–140)
GLUCOSE SERPL-MCNC: 205 MG/DL (ref 65–140)
HCT VFR BLD AUTO: 37.1 % (ref 34.8–46.1)
HGB BLD-MCNC: 11.1 G/DL (ref 11.5–15.4)
IMM GRANULOCYTES # BLD AUTO: 0.08 THOUSAND/UL (ref 0–0.2)
IMM GRANULOCYTES NFR BLD AUTO: 1 % (ref 0–2)
LYMPHOCYTES # BLD AUTO: 1.84 THOUSANDS/ΜL (ref 0.6–4.47)
LYMPHOCYTES NFR BLD AUTO: 13 % (ref 14–44)
MAGNESIUM SERPL-MCNC: 2 MG/DL (ref 1.9–2.7)
MCH RBC QN AUTO: 25.8 PG (ref 26.8–34.3)
MCHC RBC AUTO-ENTMCNC: 29.9 G/DL (ref 31.4–37.4)
MCV RBC AUTO: 86 FL (ref 82–98)
MONOCYTES # BLD AUTO: 1.42 THOUSAND/ΜL (ref 0.17–1.22)
MONOCYTES NFR BLD AUTO: 10 % (ref 4–12)
NEUTROPHILS # BLD AUTO: 9.72 THOUSANDS/ΜL (ref 1.85–7.62)
NEUTS SEG NFR BLD AUTO: 71 % (ref 43–75)
NRBC BLD AUTO-RTO: 0 /100 WBCS
PLATELET # BLD AUTO: 364 THOUSANDS/UL (ref 149–390)
PMV BLD AUTO: 10.6 FL (ref 8.9–12.7)
POTASSIUM SERPL-SCNC: 4.7 MMOL/L (ref 3.5–5.3)
PROT SERPL-MCNC: 6.8 G/DL (ref 6.4–8.4)
RBC # BLD AUTO: 4.3 MILLION/UL (ref 3.81–5.12)
SODIUM SERPL-SCNC: 135 MMOL/L (ref 135–147)
WBC # BLD AUTO: 13.74 THOUSAND/UL (ref 4.31–10.16)

## 2022-10-04 PROCEDURE — 83735 ASSAY OF MAGNESIUM: CPT

## 2022-10-04 PROCEDURE — 99232 SBSQ HOSP IP/OBS MODERATE 35: CPT | Performed by: INTERNAL MEDICINE

## 2022-10-04 PROCEDURE — 71275 CT ANGIOGRAPHY CHEST: CPT

## 2022-10-04 PROCEDURE — 82948 REAGENT STRIP/BLOOD GLUCOSE: CPT

## 2022-10-04 PROCEDURE — 74018 RADEX ABDOMEN 1 VIEW: CPT

## 2022-10-04 PROCEDURE — 80053 COMPREHEN METABOLIC PANEL: CPT

## 2022-10-04 PROCEDURE — 85025 COMPLETE CBC W/AUTO DIFF WBC: CPT

## 2022-10-04 PROCEDURE — 74174 CTA ABD&PLVS W/CONTRAST: CPT

## 2022-10-04 PROCEDURE — G1004 CDSM NDSC: HCPCS

## 2022-10-04 RX ORDER — IODIXANOL 320 MG/ML
100 INJECTION, SOLUTION INTRAVASCULAR
Status: COMPLETED | OUTPATIENT
Start: 2022-10-04 | End: 2022-10-04

## 2022-10-04 RX ORDER — ASPIRIN 81 MG/1
324 TABLET, CHEWABLE ORAL ONCE
OUTPATIENT
Start: 2022-10-04 | End: 2022-10-04

## 2022-10-04 RX ORDER — ONDANSETRON 2 MG/ML
4 INJECTION INTRAMUSCULAR; INTRAVENOUS EVERY 4 HOURS PRN
Status: DISCONTINUED | OUTPATIENT
Start: 2022-10-04 | End: 2022-10-04 | Stop reason: DRUGHIGH

## 2022-10-04 RX ORDER — POLYETHYLENE GLYCOL 3350 17 G/17G
17 POWDER, FOR SOLUTION ORAL 2 TIMES DAILY
Status: DISCONTINUED | OUTPATIENT
Start: 2022-10-04 | End: 2022-10-10 | Stop reason: HOSPADM

## 2022-10-04 RX ORDER — POLYETHYLENE GLYCOL 3350 17 G/17G
17 POWDER, FOR SOLUTION ORAL ONCE
Status: COMPLETED | OUTPATIENT
Start: 2022-10-04 | End: 2022-10-04

## 2022-10-04 RX ORDER — ONDANSETRON 2 MG/ML
4 INJECTION INTRAMUSCULAR; INTRAVENOUS ONCE AS NEEDED
Status: DISCONTINUED | OUTPATIENT
Start: 2022-10-04 | End: 2022-10-10 | Stop reason: HOSPADM

## 2022-10-04 RX ORDER — BISACODYL 10 MG
10 SUPPOSITORY, RECTAL RECTAL ONCE
Status: COMPLETED | OUTPATIENT
Start: 2022-10-04 | End: 2022-10-04

## 2022-10-04 RX ADMIN — MAGNESIUM OXIDE TAB 400 MG (241.3 MG ELEMENTAL MG) 400 MG: 400 (241.3 MG) TAB at 18:58

## 2022-10-04 RX ADMIN — ISOSORBIDE DINITRATE 20 MG: 10 TABLET ORAL at 18:58

## 2022-10-04 RX ADMIN — TRAMADOL HYDROCHLORIDE 100 MG: 50 TABLET, COATED ORAL at 08:38

## 2022-10-04 RX ADMIN — INSULIN LISPRO 1 UNITS: 100 INJECTION, SOLUTION INTRAVENOUS; SUBCUTANEOUS at 12:59

## 2022-10-04 RX ADMIN — BISACODYL 10 MG: 10 SUPPOSITORY RECTAL at 18:58

## 2022-10-04 RX ADMIN — HYDRALAZINE HYDROCHLORIDE 25 MG: 25 TABLET ORAL at 22:21

## 2022-10-04 RX ADMIN — ATORVASTATIN CALCIUM 40 MG: 40 TABLET, FILM COATED ORAL at 08:38

## 2022-10-04 RX ADMIN — METOPROLOL SUCCINATE 50 MG: 50 TABLET, FILM COATED, EXTENDED RELEASE ORAL at 08:38

## 2022-10-04 RX ADMIN — HYDRALAZINE HYDROCHLORIDE 25 MG: 25 TABLET ORAL at 05:00

## 2022-10-04 RX ADMIN — INSULIN LISPRO 1 UNITS: 100 INJECTION, SOLUTION INTRAVENOUS; SUBCUTANEOUS at 18:59

## 2022-10-04 RX ADMIN — DOCUSATE SODIUM 100 MG: 100 CAPSULE, LIQUID FILLED ORAL at 08:38

## 2022-10-04 RX ADMIN — MAGNESIUM OXIDE TAB 400 MG (241.3 MG ELEMENTAL MG) 400 MG: 400 (241.3 MG) TAB at 08:38

## 2022-10-04 RX ADMIN — PANTOPRAZOLE SODIUM 40 MG: 40 TABLET, DELAYED RELEASE ORAL at 05:00

## 2022-10-04 RX ADMIN — METOPROLOL SUCCINATE 50 MG: 50 TABLET, FILM COATED, EXTENDED RELEASE ORAL at 22:22

## 2022-10-04 RX ADMIN — ISOSORBIDE DINITRATE 20 MG: 10 TABLET ORAL at 12:59

## 2022-10-04 RX ADMIN — HYDRALAZINE HYDROCHLORIDE 25 MG: 25 TABLET ORAL at 13:00

## 2022-10-04 RX ADMIN — ISOSORBIDE DINITRATE 20 MG: 10 TABLET ORAL at 08:38

## 2022-10-04 RX ADMIN — POLYETHYLENE GLYCOL 3350 17 G: 17 POWDER, FOR SOLUTION ORAL at 18:59

## 2022-10-04 RX ADMIN — IODIXANOL 100 ML: 320 INJECTION, SOLUTION INTRAVASCULAR at 20:51

## 2022-10-04 RX ADMIN — INSULIN LISPRO 1 UNITS: 100 INJECTION, SOLUTION INTRAVENOUS; SUBCUTANEOUS at 08:38

## 2022-10-04 RX ADMIN — SENNOSIDES AND DOCUSATE SODIUM 1 TABLET: 50; 8.6 TABLET ORAL at 22:22

## 2022-10-04 RX ADMIN — FERROUS SULFATE TAB 325 MG (65 MG ELEMENTAL FE) 325 MG: 325 (65 FE) TAB at 08:38

## 2022-10-04 NOTE — ASSESSMENT & PLAN NOTE
Presents with  increased shortness of breath, dyspnea on exertion and lower extremity edema in setting of decreased diuretic dosing 1-2 weeks ago and high sodium diet  Patient reports being treated by her outpatient PCP with PO Lasix r/t bilateral lower extremity edema; however, about 2 months ago her PCP instructed her to take the PO Lasix once every 3 days  Dry weight: 130-132 lbs  Patient is currently volume overloaded  CXR at ED: Vascular congestion and LCW pacemaker in place  0/6648 Echo: LV systolic function was normal, LVEF estimated to be 60%, no RWMA, G2DD  COVID negative  BNP: 1242  Diuresis: PTA medication of 20 mg of PO Lasix q3 days  Received IV Lasix in ED and during admission  Now on PO Lasix 40 mg BID  Weight (last 2 days)     Date/Time Weight    10/04/22 0558 58 5 (128 97)    10/02/22 0525 58 4 (128 75)          Intake/Output Summary (Last 24 hours) at 10/4/2022 1010  Last data filed at 10/4/2022 0558  Gross per 24 hour   Intake 360 ml   Output 200 ml   Net 160 ml     • Meds:   o Diuretic: On hold  o B-Blocker: Toprol XL 50 mg BID  o ACE/ARB/ARNi: Isodril 20mg TID after meals (added 10/1, increased 10/2 per cardiology)  o Hydralazine 25 mg Q8H per cardiology recs  o ON HOLD: Lasix, Losartan, ELIQUIS prior to transfer  · Diet: Sodium 2g, FR 1500 mL  · Labs: Trend CMP, CBC, Mg  · Maintain Mg > 2 and K > 4; Replete PRN  · Elevate HOB > 30°, Daily standing weights, Measure I/Os, Monitor on Telemetry  • Follow up cardiology consult, appreciate recs    • Awaiting transfer to [facility] for cardiac catheterization, CT surgery evaluation, and advanced heart failure team consultation  o Reached out to [facility] at critical capacity, patient on transfer list

## 2022-10-04 NOTE — ASSESSMENT & PLAN NOTE
Associated sx: nausea, 1x low volume NBNB emesis    · docusate sodium 100 mg daily  · Senna-docusate sodium 8 6-50 mg HS  · Miralax 17g BID  · Dulcolax suppository once 10/4 PM  · Consider fleet enemas if no BM by 10/5 AM

## 2022-10-04 NOTE — QUICK NOTE
During rounds, patient was eating breakfast and felt nausea, which led to an episode of emesis  Nonbloody, nonbilious, gray-colored emesis approximately 100-150 ml in volume  Attempted to reposition patient  Nursing staff and PCA were called

## 2022-10-04 NOTE — ASSESSMENT & PLAN NOTE
POA, , , alkaline phosphatase 72  Patient displayed no RUQ tenderness  Likely secondary to hepatic congestion secondary to CHF    Recent Labs     10/02/22  0525 10/03/22  0540 10/04/22  0534   AST 34 33 26   ALT 95* 73* 58*   ALKPHOS 70 68 67   TBILI 0 79 0 80 0 87     · Trend CMP daily  · Consider RUQ US if patient clinically worsens or if LFTs do not improve after diuresis

## 2022-10-04 NOTE — ASSESSMENT & PLAN NOTE
Associated sx: nausea, 1x low volume NBNB emesis    · docusate sodium 100 mg daily  · Senna-docusate sodium 8 6-50 mg HS  · PRN Miralax 17g

## 2022-10-04 NOTE — ASSESSMENT & PLAN NOTE
Recent Labs     10/02/22  0525 10/02/22  1357 10/03/22  0540 10/04/22  0534   K 5 4* 4 6 5 2 4 7   MG 1 9  --  1 9 2 0     · Replete as needed with PO potassium if K < 4 0, unless K < 3 0 in which case consider IV repletion

## 2022-10-04 NOTE — PROGRESS NOTES
[facility]  Progress Note Tonja Toure 1945, 69 y o  female MRN: [id number]  Unit/Bed#: S -01 Encounter: [id number]  Primary Care Provider: Yvonne Cerrato DO   Date and time admitted to hospital: 9/28/2022  6:33 PM    * Acute HFrEF (heart failure with reduced ejection fraction) ([facility] [address] )  Assessment & Plan  Presents with  increased shortness of breath, dyspnea on exertion and lower extremity edema in setting of decreased diuretic dosing 1-2 weeks ago and high sodium diet  Patient reports being treated by her outpatient PCP with PO Lasix r/t bilateral lower extremity edema; however, about 2 months ago her PCP instructed her to take the PO Lasix once every 3 days  Dry weight: 130-132 lbs  Patient is currently volume overloaded  CXR at ED: Vascular congestion and LCW pacemaker in place  6/7981 Echo: LV systolic function was normal, LVEF estimated to be 60%, no RWMA, G2DD  COVID negative  BNP: 1242  Diuresis: PTA medication of 20 mg of PO Lasix q3 days  Received IV Lasix in ED and during admission  Now on PO Lasix 40 mg BID  Weight (last 2 days)     Date/Time Weight    10/04/22 0558 58 5 (128 97)    10/02/22 0525 58 4 (128 75)          Intake/Output Summary (Last 24 hours) at 10/4/2022 1010  Last data filed at 10/4/2022 0558  Gross per 24 hour   Intake 360 ml   Output 200 ml   Net 160 ml     • Meds:   o Diuretic: On hold  o B-Blocker: Toprol XL 50 mg BID  o ACE/ARB/ARNi: Isodril 20mg TID after meals (added 10/1, increased 10/2 per cardiology)  o Hydralazine 25 mg Q8H per cardiology recs  o ON HOLD: Lasix, Losartan, ELIQUIS prior to transfer  · Diet: Sodium 2g, FR 1500 mL  · Labs: Trend CMP, CBC, Mg  · Maintain Mg > 2 and K > 4; Replete PRN  · Elevate HOB > 30°, Daily standing weights, Measure I/Os, Monitor on Telemetry  • Follow up cardiology consult, appreciate recs    • Awaiting transfer to [facility] for cardiac catheterization, CT surgery evaluation, and advanced heart failure team consultation  o Reached out to PAC, [facility] at critical capacity, patient on transfer list     Elevated LFTs  Assessment & Plan  POA, , , alkaline phosphatase 72  Patient displayed no RUQ tenderness  Likely secondary to hepatic congestion secondary to CHF    Recent Labs     10/02/22  0525 10/03/22  0540 10/04/22  0534   AST 34 33 26   ALT 95* 73* 58*   ALKPHOS 70 68 67   TBILI 0 79 0 80 0 87     · Trend CMP daily  · Consider RUQ US if patient clinically worsens or if LFTs do not improve after diuresis  Shortness of breath  Assessment & Plan  Presentation: Patient presents with complaints of shortness of breath with exertion and orthopnea for the past 5 days  She endorses bilateral lower extremity edema  Patient reports being treated by her outpatient PCP with PO Lasix r/t bilateral lower extremity edema; however, about 2 months ago her PCP instructed her to take the PO Lasix once every 3 days  Chest x-ray: Vascular congestion and LCW pacemaker in place on my read  Official read pending  Last echocardiogram on file May 4337: LV systolic function was normal  LVEF estimated to be 60%  There were no RWMA  G2DD  Suspect Etiology: Acute Heart Failure  COVID negative  Initial troponin: 17 --> 21, delta 4  BNP: 1242  • See plan under acute HFrEF    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, without long-term current use of insulin [facility])  Assessment & Plan  Lab Results   Component Value Date    HGBA1C 6 06/29/2022       Recent Labs     10/03/22  1117 10/03/22  1622 10/03/22  2128 10/04/22  0731   POCGLU 217* 126 182* 189*       Blood Sugar Average: Last 72 hrs:  · (P) 663 [id number] PTA regimen of Metformin, will hold while inpatient  · Accu-Checks and SSI  · Hypoglycemia protocol      Potassium (K) deficiency  Assessment & Plan  Recent Labs     10/02/22  0525 10/02/22  1357 10/03/22  0540 10/04/22  0534   K 5 4* 4 6 5 2 4 7   MG 1 9  --  1 9 2 0 · Replete as needed with PO potassium if K < 4 0, unless K < 3 0 in which case consider IV repletion    Magnesium deficiency  Assessment & Plan  Recent Labs     10/02/22  0525 10/03/22  0540 10/04/22  0534   MG 1 9 1 9 2 0     · Replete as needed  · Continue magnesium oxide 400 mg b i d  Wound of right leg  Assessment & Plan  1-2 cm wound on lateral surface of lower leg, central scab with granulation surrounded by erythema  Raised relative to skin level  Concerns of necrosis  · Follow up on wound care consult  · Cleanse with normal saline  · Apply Cavilon alcohol free barrier film to periwound  · Apply thin layer of Triad paste to wound bed  · Cover with a bordered foam dressing  · Change every 5 days and as needed  · Pressure redistribution cushion to chair  · Moisturize skin daily  · Initial discharge wound care supplies provided  · Wound care nurse follow up  Constipation  Assessment & Plan  Associated sx: nausea, 1x low volume NBNB emesis  · docusate sodium 100 mg daily  · Senna-docusate sodium 8 6-50 mg HS  · PRN Miralax 17g      Hypertensive urgency  Assessment & Plan  Blood pressure elevated on arrival with readings in 180s-200s/70s-80s  Patient just received dose of IV Lasix, repeat BP post administration  Continue PTA medications of Toprol XL and Cozaar for now, consider holding Cozaar pending renal function while on IV Lasix inpatient  Blood Pressure: 161/72  Monitor blood pressure  PAF (paroxysmal atrial fibrillation) ([facility])  Assessment & Plan  Rate/Rhythm Control: Toprol XL, Amiodarone  Antiplatelet/Anticoagulation: Eliquis  Per device interrogation, has been 100% AFib since 11/2021  EKG: ventricular paced rhythm with underlying flutter  · Discontinued Amiodarone per cardiology rec  · Continue Toprol XL, Eliquis    Pacemaker  Assessment & Plan  S/P PPM placement in June 2020  History of tachy-keanu syndrome  Leukocytosis  Assessment & Plan  POA, WBCs of 12 99   Patient has a long standing history of leukocytosis with range from 12-13, appears to be chronic,  no signs of any infection  Patient does not meet two SIRS criteria on admission  Recent Labs     10/02/22  0525 10/03/22  0540 10/04/22  0534   WBC 14 41* 15 75* 13 74*     · Trend CBC  Gastroesophageal reflux disease  Assessment & Plan  · Continue PPI  CAD (coronary atherosclerotic disease)  Assessment & Plan  Currently denies chest pain  S/P grafting x3  Most recent cardiac catheterization was in May 2020 which displayed multivessel disease that was completely revascularized  · Continue Toprol XL  VTE Pharmacologic Prophylaxis:   VTE Score: 7 High Risk (Score >/= 5) - Pharmacological DVT Prophylaxis Ordered: Eliquis held pending transfer, Patient has refused alternative VTE prophylaxis  Sequential Compression Devices Ordered  Mechanical VTE Prophylaxis in Place: No    Patient Centered Rounds: I have performed bedside rounds with nursing staff today  Discussions with Specialists or Other Care Team Provider: Case management, Nursing, Cardiology    Education and Discussions with Family / Patient: Updated  (daughter) via phone  Current Length of Stay: 6 day(s)    Current Patient Status: Inpatient     Discharge Plan / Estimated Discharge Date: Pending transfer to [facility]    Code Status: Level 3 - DNAR and DNI      Subjective:   Patient was seen at bedside for continuity of care  Per night team, no major events overnight  She endorses feeling okay  She reports that she continues to have flatus without bowel movement  Per patient, she received bowel regimen starting yesterday  Patient denies chest pain, shortness of breath, having a bowel movement, headache, dizziness, or lightheadedness  Treatment plan and next steps were discussed with patient, who expresses understanding and is amenable to the plan  She does not report any additional questions or concerns at this time      Objective: Vitals:   Temp (24hrs), Av 9 °F (36 6 °C), Min:97 9 °F (36 6 °C), Max:97 9 °F (36 6 °C)    Temp:  [97 9 °F (36 6 °C)] 97 9 °F (36 6 °C)  HR:  [60-67] 66  Resp:  [16-20] 20  BP: (115-161)/(54-72) 161/72  SpO2:  [90 %-96 %] 96 %  Body mass index is 26 05 kg/m²  Input and Output Summary (last 24 hours): Intake/Output Summary (Last 24 hours) at 10/4/2022 1037  Last data filed at 10/4/2022 0558  Gross per 24 hour   Intake 360 ml   Output 200 ml   Net 160 ml       Physical Exam:     Physical Exam  Vitals and nursing note reviewed  Constitutional:       General: She is not in acute distress  Appearance: She is well-developed  HENT:      Head: Normocephalic and atraumatic  Right Ear: External ear normal       Left Ear: External ear normal       Nose: Nose normal  No congestion or rhinorrhea  Mouth/Throat:      Mouth: Mucous membranes are moist    Eyes:      General: No scleral icterus  Conjunctiva/sclera: Conjunctivae normal    Cardiovascular:      Rate and Rhythm: Normal rate and regular rhythm  Pulses: Normal pulses  Heart sounds: Normal heart sounds  No murmur heard  No friction rub  No gallop  Pulmonary:      Effort: Pulmonary effort is normal  No respiratory distress  Breath sounds: Normal breath sounds  Abdominal:      General: Bowel sounds are normal       Palpations: Abdomen is soft  Tenderness: There is no abdominal tenderness  Musculoskeletal:         General: No tenderness  Cervical back: Neck supple  Right lower leg: No edema  Left lower leg: No edema  Skin:     General: Skin is warm and dry  Neurological:      General: No focal deficit present  Mental Status: She is alert            Additional Data:     Labs:  Results from last 7 days   Lab Units 10/04/22  0534   WBC Thousand/uL 13 74*   HEMOGLOBIN g/dL 11 1*   HEMATOCRIT % 37 1   PLATELETS Thousands/uL 364   NEUTROS PCT % 71   LYMPHS PCT % 13*   MONOS PCT % 10   EOS PCT % 4     Results from last 7 days   Lab Units 10/04/22  0534   SODIUM mmol/L 135   POTASSIUM mmol/L 4 7   CHLORIDE mmol/L 91*   CO2 mmol/L 35*   BUN mg/dL 44*   CREATININE mg/dL 1 53*   ANION GAP mmol/L 9   CALCIUM mg/dL 9 4   ALBUMIN g/dL 3 7   TOTAL BILIRUBIN mg/dL 0 87   ALK PHOS U/L 67   ALT U/L 58*   AST U/L 26   GLUCOSE RANDOM mg/dL 155*         Results from last 7 days   Lab Units 10/04/22  0731 10/03/22  2128 10/03/22  1622 10/03/22  1117 10/03/22  0720 10/02/22  2104 10/02/22  1612 10/02/22  1145 10/02/22  0749 10/01/22  2113 10/01/22  1740 10/01/22  1129   POC GLUCOSE mg/dl 189* 182* 126 217* 162* 142* 200* 177* 168* 184* 139 192*               Imaging: Reviewed radiology reports from this admission including: chest xray and ECHO    Recent Cultures (last 7 days):           Lines/Drains:  Invasive Devices  Report    Peripheral Intravenous Line  Duration           Peripheral IV 10/03/22 Right;Ventral (anterior) Wrist 1 day    Peripheral IV 10/04/22 Right;Ventral (anterior) Forearm <1 day          Drain  Duration           Ureteral Drain/Stent Right ureter 6 Fr  814 days                Telemetry:   Telemetry Orders (From admission, onward)             48 Hour Telemetry Monitoring  (ED Bridging Orders Panel)  Continuous x 48 hours           References:    Telemetry Guidelines   Question:  Reason for 48 Hour Telemetry  Answer:  Acute Decompensated CHF (continuous diuretic infusion or total diuretic dose > 200 mg daily, associated electrolyte derangement, ionotropic drip, history of ventricular arrhythmia, or new EF <35%)                    Last 24 Hours Medication List:   Current Facility-Administered Medications   Medication Dose Route Frequency Provider Last Rate   • acetaminophen  650 mg Oral Q6H PRN PETE Andrews     • ALPRAZolam  0 25 mg Oral TID PRN PETE Andrews     • atorvastatin  40 mg Oral Daily PETE Cifuentes     • docusate sodium  100 mg Oral Daily Hetal Machado MD • ferrous sulfate  325 mg Oral Daily With Breakfast PETE Mendieta     • hydrALAZINE  25 mg Oral [facility] Pepito Mo MD     • insulin lispro  1-5 Units Subcutaneous TID AC PETE Cifuentes     • insulin lispro  1-5 Units Subcutaneous HS PETE Mendieta     • isosorbide dinitrate  20 mg Oral TID after meals Pepito Mo MD     • magnesium oxide  400 mg Oral BID Akash Jones MD     • metoprolol succinate  50 mg Oral BID PETE Mendieta     • ondansetron  4 mg Intravenous Once PRN Lissy Arguello MD     • pantoprazole  40 mg Oral Early Morning PETE Cifuentes     • polyethylene glycol  17 g Oral Daily PRN Sejal Maynard MD     • senna-docusate sodium  1 tablet Oral HS Sejal Maynard MD     • traMADol  100 mg Oral BID PETE Mendieta          Today, Patient Was Seen By: Sejal Maynard    ** Please Note: This note has been constructed using a voice recognition system   **

## 2022-10-04 NOTE — QUICK NOTE
Patient was re-evaluated after episode of emesis  RRR, CTAB, +BS on auscultation, wearing clean gown  Called patient's daughter and handed phone to patient

## 2022-10-04 NOTE — ASSESSMENT & PLAN NOTE
POA, WBCs of 12 99  Patient has a long standing history of leukocytosis with range from 12-13, appears to be chronic,  no signs of any infection  Patient does not meet two SIRS criteria on admission  Recent Labs     10/02/22  0525 10/03/22  0540 10/04/22  0534   WBC 14 41* 15 75* 13 74*     · Trend CBC

## 2022-10-04 NOTE — PROGRESS NOTES
General Cardiology   Progress Note -  [facility]   Memory Katie 69 y o  female MRN: [id number]    Unit/Bed#: S -01 Encounter: [id number]    Assessment:    1  Acute HFrEF:  Presents with progressive dyspnea, orthopnea, lower extremity edema in the setting of decreased oral diuretic dosing and high sodium diet   Was started on oral diuretics by PCP 2 months ago for lower extremity edema with recent dose 2 weeks ago  CXR pulmonary vascular congestion and bilateral small pleural effusions   BNP 1242  Was on IV Lasix 40 mg BID discontinued on 10/02 due to LAURENCE  · Echocardiogram 09/29/2022:   EF 36% with global hypokinesis and regional variation, G2DD, moderately reduced RV function, mild-moderate MR, moderate TR, mild-moderate PI, severe aortic root dilatation at 5 6 cm   Compared to echocardiogram from 05/2020 EF was 60% with no WMA, G2DD, normal RV function with no significant valvular abnormality  · Home diuretic regimen:  Lasix 20 mg twice weekly  · Dry weight:  130-132 lb  · Weight on admission:  139 lb per standing scale  · Weight today:  128 lb per bed scale today  · I&Os:  Net output -5 2 L  · Volume status improved with only mild bilateral lower extremity edema  2  New Cardiomyopathy:  EF 36% with global hypokinesis and regional variation  Suspect likely ischemic     Currently on Toprol-XL, hydralazine and Isordil  Losartan discontinued due to LAURENCE  3  Severe Aortic root dilatation:  Measuring 5 6 cm on echo  4  CAD: s/p CABG x3 in 2015 (LIMA-LAD, SVG-OM1, SVG-PDA)    Maintained on statin beta-blocker   No aspirin due to anticoagulation with Eliquis    · Cardiac catheterization 05/2020:  Patent grafts  5  SSS: s/p MDT DC PPM (MRI conditional) implanted 06/2020   6  Chronic AF/Flutter:  Has been 100% AFib since 11/2021 per device interrogation   Amiodarone discontinued this admission     · EKG ventricular paced rhythm with underlying flutter     · Anticoagulated on Eliquis 5 mg BID which is on hold for cardiac catheterization however patient refusing heparin infusion  7  Essential hypertension:  Average BP 144/63 on Isordil, hydralazine and Toprol-XL  8  Hyperlipidemia:  Lipid panel 07/2022 TC 111, TG 97, HDL 60, LDL 32 on atorvastatin 40 mg daily  9  Type 2 DM:  Hemoglobin A1c 6 in 06/2022   Management per primary team   10  Transaminitis:  Elevated LFTs noted this admission, slowly improving  11  Anemia:  Hemoglobin at baseline around 10  Hemoglobin 11 1 today  12  LAURENCE:  Baseline creatinine 0 8-1 1  Creatinine peaked at 1 80 yesterday with aggressive diuresis  Diuretics on hold and creatinine improved to 1 53 today      Plan/Recommendations:  · Awaiting transfer to [place] for CT surgery evaluation and cardiac catheterization  · Discussed patient's case and anatomy with interventionalist   Per Dr Ben Vogel if patient is still here tomorrow can have diagnostic cardiac catheterization done at [facility]  · Will place NPO after midnight  · Will consult Nephrology for renal optimization to avoid LAURENCE amos procedurally  · Continue Isordil, hydralazine and Toprol-XL  · Arb on hold secondary to LAURENCE  · Continue to hold diuretics today to allow renal function to stabilize  · Monitor I&Os, renal function, electrolytes and standing weight  · Maintain potassium >4 and magnesium >2  · Eliquis on hold for catheterization  Will initiate heparin infusion as unsure when transfer will occur  Discussed with patient who is declining initiation of heparin because of annoyance with going to the bathroom and having an IV         __________________________________________________________    Subjective    Patient seen and examined  No acute events overnight  Patient is upset this morning giving one-word answers  She is frustrated by the time it is taking for transfer  Was not able to complete physical exam as she refused needing to use the bathroom  Review of Systems   Constitutional: Negative  Negative for chills  Cardiovascular: Negative for chest pain, dyspnea on exertion, leg swelling, near-syncope, orthopnea, palpitations, paroxysmal nocturnal dyspnea and syncope  Respiratory: Negative  Negative for cough, shortness of breath and wheezing  Endocrine: Negative  Hematologic/Lymphatic: Negative  Skin: Negative  Musculoskeletal: Negative  Gastrointestinal: Negative  Negative for diarrhea, nausea and vomiting  Neurological: Negative for dizziness, light-headedness and weakness  Psychiatric/Behavioral: Negative  Negative for altered mental status  Frustrated and upset   All other systems reviewed and are negative  Objective:   Vitals: Blood pressure 161/72, pulse 66, temperature 97 9 °F (36 6 °C), temperature source Oral, resp  rate 20, height 4' 11" (1 499 m), weight 58 5 kg (128 lb 15 5 oz), SpO2 96 %, not currently breastfeeding ,     Wt Readings from Last 3 Encounters:   10/04/22 58 5 kg (128 lb 15 5 oz)   08/02/22 59 9 kg (132 lb)   07/18/22 61 7 kg (136 lb)        Lab Results   Component Value Date    CREATININE 1 53 (H) 10/04/2022    CREATININE 1 63 (H) 10/03/2022    CREATININE 1 80 (H) 10/02/2022         Body mass index is 26 05 kg/m²  ,     Systolic (53GYC), DXQ:045 , Min:115 , HNR:018     Diastolic (95TCZ), WHR:88, Min:54, Max:72          Intake/Output Summary (Last 24 hours) at 10/4/2022 0958  Last data filed at 10/4/2022 0558  Gross per 24 hour   Intake 360 ml   Output 200 ml   Net 160 ml     Weight (last 2 days)     Date/Time Weight    10/04/22 0558 58 5 (128 97)    10/02/22 0525 58 4 (128 75)        Telemetry Review:  Paced rhythm    Unable to perform complete physical exam  Physical Exam  Vitals and nursing note reviewed  Constitutional:       General: She is not in acute distress  Appearance: She is well-developed  Comments: On RA in NAD   HENT:      Head: Normocephalic and atraumatic  Musculoskeletal:         General: No tenderness  Normal range of motion  Right lower leg: No edema  Left lower leg: No edema  Skin:     General: Skin is warm and dry  Findings: No erythema  Neurological:      Mental Status: She is alert and oriented to person, place, and time           LABORATORY RESULTS      CBC with diff:   Results from last 7 days   Lab Units 10/04/22  0534 10/03/22  0540 10/02/22  0525 10/01/22  0450 09/30/22  0446 09/29/22  0615 09/28/22  1431   WBC Thousand/uL 13 74* 15 75* 14 41* 11 62* 12 59* 12 26* 12 99*   HEMOGLOBIN g/dL 11 1* 10 6* 10 5* 9 8* 9 7* 9 6* 10 3*   HEMATOCRIT % 37 1 36 8 36 5 33 7* 32 8* 32 7* 35 3   MCV fL 86 86 86 85 86 87 88   PLATELETS Thousands/uL 364 366 352 316 306 271 332   MCH pg 25 8* 24 8* 24 8* 24 6* 25 4* 25 4* 25 6*   MCHC g/dL 29 9* 28 8* 28 8* 29 1* 29 6* 29 4* 29 2*   RDW % 18 4* 18 2* 18 3* 17 9* 18 4* 18 5* 18 4*   MPV fL 10 6 10 5 11 2 10 8 11 0 10 6 11 4   NRBC AUTO /100 WBCs 0 0 0 0 0  --  1       CMP:  Results from last 7 days   Lab Units 10/04/22  0534 10/03/22  0540 10/02/22  1357 10/02/22  0525 10/01/22  0450 09/30/22  0446 09/29/22  0615 09/28/22  1431   POTASSIUM mmol/L 4 7 5 2 4 6 5 4* 3 3* 3 6 3 4* 5 3   CHLORIDE mmol/L 91* 92* 93* 96 95* 102 106 107   CO2 mmol/L 35* 33* 33* 30 32 29 27 19*   BUN mg/dL 44* 42* 34* 33* 25 26* 26* 26*   CREATININE mg/dL 1 53* 1 63* 1 80* 1 49* 1 18 1 08 0 90 0 89   CALCIUM mg/dL 9 4 9 4 9 0 8 6 8 1* 8 1* 8 3* 8 5   AST U/L 26 33  --  34 52* 83* 119* 153*   ALT U/L 58* 73*  --  95* 121* 154* 180* 184*   ALK PHOS U/L 67 68  --  70 68 66 67 72   EGFR ml/min/1 73sq m 32 30 26 33 44 49 62 63       BMP:  Results from last 7 days   Lab Units 10/04/22  0534 10/03/22  0540 10/02/22  1357 10/02/22  0525 10/01/22  0450 09/30/22  0446 09/29/22  0615   POTASSIUM mmol/L 4 7 5 2 4 6 5 4* 3 3* 3 6 3 4*   CHLORIDE mmol/L 91* 92* 93* 96 95* 102 106   CO2 mmol/L 35* 33* 33* 30 32 29 27   BUN mg/dL 44* 42* 34* 33* 25 26* 26*   CREATININE mg/dL 1 53* 1 63* 1 80* 1 49* 1 18 1 08 0 90   CALCIUM mg/dL 9 4 9 4 9 0 8 6 8 1* 8 1* 8 3*       Lab Results   Component Value Date    NTBNP 3,961 (H) 2022    NTBNP 593 (H) 07/10/2020    NTBNP 702 (H) 2020             Results from last 7 days   Lab Units 10/04/22  0534 10/03/22  0540 10/02/22  0525 10/01/22  0450 22  1408 22  0446   MAGNESIUM mg/dL 2 0 1 9 1 9 1 8* 1 5* 1 1*                           Lipid Profile:   Lab Results   Component Value Date    CHOL 164 2017    CHOL 154 2016    CHOL 163 2016     Lab Results   Component Value Date    HDL 60 2022    HDL 58 2022    HDL 58 09/15/2021     Lab Results   Component Value Date    LDLCALC 32 2022    LDLCALC 55 2022    LDLCALC 69 09/15/2021     Lab Results   Component Value Date    TRIG 97 2022    TRIG 150 2022    TRIG 139 09/15/2021       Cardiac testing:   Results for orders placed during the hospital encounter of 20    Echo complete with contrast if indicated    Narrative  [place] [address], [address] [place] [address]  [phone]    Transthoracic Echocardiogram  2D, M-mode, Doppler, and Color Doppler    Study date:  26-May-2020    Patient: Soraya Becerra  MR number: [id number]  Account number: [de-identified]  : 1945  Age: 77 years  Gender: Female  Status: Inpatient  Location: Bedside  Height: 61 in  Weight: 124 7 lb  BP: 142/ 58 mmHg    Indications: Acute MI  Diagnoses: I24 9 - Acute ischemic heart disease, unspecified    Sonographer:  Lashaun Mallory RDCS  Referring Physician:  Edith Kuhn MD  Group:  [facility]  Interpreting Physician:  Ondina Thrasher MD    SUMMARY    LEFT VENTRICLE:  Systolic function was normal  Ejection fraction was estimated to be 60 %  There were no regional wall motion abnormalities    Wall thickness was at the upper limits of normal   Features were consistent with a pseudonormal left ventricular filling pattern, with concomitant abnormal relaxation and increased filling pressure (grade 2 diastolic dysfunction)  LEFT ATRIUM:  The atrium was mildly to moderately dilated  MITRAL VALVE:  There was mild annular calcification  There was mild regurgitation  AORTIC VALVE:  There was mild regurgitation  TRICUSPID VALVE:  There was mild regurgitation  AORTA:  The root exhibited mild dilatation  There was mild dilatation of the ascending aorta  HISTORY: PRIOR HISTORY: History of CABG x3  CAD  Hypertension  DM2  Ascending aortic aneurysm  Anxiety  GERD  Smoker  Atrial fibrillation  PROCEDURE: The procedure was performed at the bedside  This was a routine study  The transthoracic approach was used  The study included complete 2D imaging, M-mode, complete spectral Doppler, and color Doppler  The heart rate was 77 bpm,  at the start of the study  Echocardiographic views were limited due to high windows  This was a technically difficult study  LEFT VENTRICLE: Size was normal  Systolic function was normal  Ejection fraction was estimated to be 60 %  There were no regional wall motion abnormalities  Wall thickness was at the upper limits of normal  DOPPLER: Features were  consistent with a pseudonormal left ventricular filling pattern, with concomitant abnormal relaxation and increased filling pressure (grade 2 diastolic dysfunction)  RIGHT VENTRICLE: The size was normal  Systolic function was normal  Wall thickness was normal     LEFT ATRIUM: The atrium was mildly to moderately dilated  RIGHT ATRIUM: Size was normal     MITRAL VALVE: There was mild annular calcification  Valve structure was normal  There was normal leaflet separation  DOPPLER: The transmitral velocity was within the normal range  There was no evidence for stenosis  There was mild  regurgitation  AORTIC VALVE: The valve was trileaflet  Leaflets exhibited normal thickness and normal cuspal separation  DOPPLER: Transaortic velocity was within the normal range   There was no evidence for stenosis  There was mild regurgitation  TRICUSPID VALVE: The valve structure was normal  There was normal leaflet separation  DOPPLER: The transtricuspid velocity was within the normal range  There was no evidence for stenosis  There was mild regurgitation  PULMONIC VALVE: Leaflets exhibited normal thickness, no calcification, and normal cuspal separation  DOPPLER: The transpulmonic velocity was within the normal range  There was no significant regurgitation  PERICARDIUM: There was no pericardial effusion  The pericardium was normal in appearance  AORTA: The root exhibited mild dilatation  There was mild dilatation of the ascending aorta  SYSTEMIC VEINS: IVC: The inferior vena cava was normal in size  PULMONARY VEINS: DOPPLER: There was systolic blunting in the pulmonary vein(s)  SYSTEM MEASUREMENT TABLES    2D  %FS: 30 49 %  AV Diam: 4 cm  EDV(Teich): 61 09 ml  EF(Cube): 66 42 %  EF(Teich): 58 72 %  ESV(Cube): 18 1 ml  ESV(Teich): 25 22 ml  IVSd: 1 cm  LA Area: 24 64 cm2  LA Diam: 3 12 cm  LVEDV MOD A4C: 66 08 ml  LVEF MOD A4C: 63 63 %  LVESV MOD A4C: 24 03 ml  LVIDd: 3 78 cm  LVIDs: 2 63 cm  LVLd A4C: 7 09 cm  LVLs A4C: 5 73 cm  LVPWd: 1 cm  RA Area: 11 79 cm2  RV Diam: 2 76 cm  SI(Cube): 23 1 ml/m2  SI(Teich): 23 14 ml/m2  SV MOD A4C: 42 05 ml  SV(Cube): 35 81 ml  SV(Teich): 35 87 ml    CW  TR MaxP 47 mmHg  TR Vmax: 2 62 m/s    MM  TAPSE: 1 61 cm    PW  E': 0 06 m/s  E/E': 12 03  MV A Marcos: 0 8 m/s  MV Dec [place]: 3 66 m/s2  MV DecT: 210 88 ms  MV E Marcos: 0 77 m/s  MV E/A Ratio: 0 96    [employer] Accredited Echocardiography Laboratory    Prepared and electronically signed by    Kenn Iniguez MD  Signed 26-May-2020 11:14:41    No results found for this or any previous visit  No results found for this or any previous visit  No valid procedures specified  No results found for this or any previous visit          Meds/Allergies   all current active meds have been reviewed, current meds:   Current Facility-Administered Medications   Medication Dose Route Frequency   • acetaminophen (TYLENOL) tablet 650 mg  650 mg Oral Q6H PRN   • ALPRAZolam (XANAX) tablet 0 25 mg  0 25 mg Oral TID PRN   • atorvastatin (LIPITOR) tablet 40 mg  40 mg Oral Daily   • docusate sodium (COLACE) capsule 100 mg  100 mg Oral Daily   • ferrous sulfate tablet 325 mg  325 mg Oral Daily With Breakfast   • hydrALAZINE (APRESOLINE) tablet 25 mg  25 mg Oral Q8H [facility]   • insulin lispro (HumaLOG) 100 units/mL subcutaneous injection 1-5 Units  1-5 Units Subcutaneous TID AC   • insulin lispro (HumaLOG) 100 units/mL subcutaneous injection 1-5 Units  1-5 Units Subcutaneous HS   • isosorbide dinitrate (ISORDIL) tablet 20 mg  20 mg Oral TID after meals   • magnesium oxide (MAG-OX) tablet 400 mg  400 mg Oral BID   • metoprolol succinate (TOPROL-XL) 24 hr tablet 50 mg  50 mg Oral BID   • ondansetron (ZOFRAN) injection 4 mg  4 mg Intravenous Q4H PRN   • pantoprazole (PROTONIX) EC tablet 40 mg  40 mg Oral Early Morning   • polyethylene glycol (MIRALAX) packet 17 g  17 g Oral Daily PRN   • senna-docusate sodium (SENOKOT S) 8 6-50 mg per tablet 1 tablet  1 tablet Oral HS   • traMADol (ULTRAM) tablet 100 mg  100 mg Oral BID    and PTA meds:   Prior to Admission Medications   Prescriptions Last Dose Informant Patient Reported? Taking?    ALPRAZolam (XANAX) 0 25 mg tablet 9/28/2022 at Unknown time  No Yes   Sig: Take 1 tablet (0 25 mg total) by mouth 3 (three) times a day as needed for anxiety   COMBIGAN 0 2-0 5 % Unknown at Unknown time Self Yes No   acetaminophen (TYLENOL) 325 mg tablet 9/28/2022 at Unknown time Self No Yes   Sig: Take 2 tablets (650 mg total) by mouth every 4 (four) hours as needed for mild pain   amiodarone 200 mg tablet 9/28/2022 at Unknown time  No Yes   Sig: TAKE 1 TABLET DAILY   apixaban (Eliquis) 5 mg 9/28/2022 at Unknown time Self No Yes   Sig: Take 1 tablet (5 mg total) by mouth 2 (two) times a day   atorvastatin (LIPITOR) 40 mg tablet 9/28/2022 at Unknown time Self No Yes   Sig: Take 1 tablet (40 mg total) by mouth daily   ferrous sulfate 325 (65 Fe) mg tablet 9/28/2022 at Unknown time Self Yes Yes   Sig: Take 325 mg by mouth daily with breakfast   furosemide (LASIX) 20 mg tablet 9/28/2022 at Unknown time Self No Yes   Sig: Take 1 tablet (20 mg total) by mouth daily   losartan (COZAAR) 25 mg tablet 9/28/2022 at Unknown time Self No Yes   Sig: Take 1 tablet (25 mg total) by mouth daily   metFORMIN (GLUCOPHAGE) 500 mg tablet 9/28/2022 at Unknown time Self No Yes   Sig: Take 1 tablet (500 mg total) by mouth 2 (two) times a day with meals   metoprolol succinate (TOPROL-XL) 50 mg 24 hr tablet 9/28/2022 at Unknown time Self No Yes   Sig: Take 1 tablet (50 mg total) by mouth 2 (two) times a day   omeprazole (PriLOSEC) 20 mg delayed release capsule 9/28/2022 at Unknown time Self No Yes   Sig: Take 1 capsule (20 mg total) by mouth daily   potassium chloride (MICRO-K) 10 MEQ CR capsule 9/28/2022 at Unknown time Self No Yes   Sig: Take 2 capsules (20 mEq total) by mouth daily   traMADol (ULTRAM) 50 mg tablet 9/28/2022 at Unknown time  No Yes   Sig: Take 2 tablets (100 mg total) by mouth 2 (two) times a day      Facility-Administered Medications: None     Medications Prior to Admission   Medication   • acetaminophen (TYLENOL) 325 mg tablet   • ALPRAZolam (XANAX) 0 25 mg tablet   • amiodarone 200 mg tablet   • apixaban (Eliquis) 5 mg   • atorvastatin (LIPITOR) 40 mg tablet   • ferrous sulfate 325 (65 Fe) mg tablet   • furosemide (LASIX) 20 mg tablet   • losartan (COZAAR) 25 mg tablet   • metFORMIN (GLUCOPHAGE) 500 mg tablet   • metoprolol succinate (TOPROL-XL) 50 mg 24 hr tablet   • omeprazole (PriLOSEC) 20 mg delayed release capsule   • potassium chloride (MICRO-K) 10 MEQ CR capsule   • traMADol (ULTRAM) 50 mg tablet   • COMBIGAN 0 2-0 5 %            Counseling / Coordination of Care  Total floor / unit time spent today 20 minutes  Greater than 50% of total time was spent with the patient and / or family counseling and / or coordination of care  ** Please Note: Dragon 360 Dictation voice to text software may have been used in the creation of this document   **

## 2022-10-04 NOTE — CASE MANAGEMENT
Case Management Progress Note    Patient name Trinity Potter S /S -04 MRN [id number]  : 1945 Date 10/4/2022       LOS (days): 6  Geometric Mean LOS (GMLOS) (days): 3 90  Days to GMLOS:-1 6        OBJECTIVE:        Current admission status: Inpatient  Preferred Pharmacy:   [address]  [address] [facility] [address]  Phone: [phone] Fax: [phone]    [address] [facility]) - [facility] [address]  [address] [facility] [address]  Phone: [phone] Fax: [phone] [facility] [address]  Phone: [phone] Fax: [phone]    Primary Care Provider: Star Huizar DO    Primary Insurance: [address] [facility] [address] [facility] REP  Secondary Insurance:     PROGRESS NOTE:  Conversation with resident about patient being transported [facility]- resident confirmed patient will be going, just unsure when  Resident notified CM that per [facility] since patient is a hospital to hospital transfer, transport is at critical capacity and they are unsure of when transport will be confirmed  Auth was not started as patient is being transferred- CM to notify facility over [address]  CM to follow up as needed

## 2022-10-04 NOTE — ASSESSMENT & PLAN NOTE
Blood pressure elevated on arrival with readings in 180s-200s/70s-80s  Patient just received dose of IV Lasix, repeat BP post administration  Continue PTA medications of Toprol XL and Cozaar for now, consider holding Cozaar pending renal function while on IV Lasix inpatient  Blood Pressure: 161/72  Monitor blood pressure

## 2022-10-04 NOTE — ASSESSMENT & PLAN NOTE
Lab Results   Component Value Date    HGBA1C 6 06/29/2022       Recent Labs     10/03/22  1117 10/03/22  1622 10/03/22  2128 10/04/22  0731   POCGLU 217* 126 182* 189*       Blood Sugar Average: Last 72 hrs:  · (P) 757 [id number] PTA regimen of Metformin, will hold while inpatient  · Accu-Checks and SSI  · Hypoglycemia protocol

## 2022-10-04 NOTE — ASSESSMENT & PLAN NOTE
· Presents with  increased shortness of breath, dyspnea on exertion and lower extremity edema in setting of decreased diuretic dosing 1-2 weeks ago and high sodium diet  · Newly found to have reduced EF 36% and hypokinesis    Initially was being recommended for cardiac catheterization but given risk with dissection, medical management was pursued instead  · Appreciate cardiology input  · Continue optimal goal directed medical therapy with beta-blocker, isordil, hydralazine

## 2022-10-04 NOTE — ASSESSMENT & PLAN NOTE
Recent Labs     10/02/22  0525 10/03/22  0540 10/04/22  0534   MG 1 9 1 9 2 0     · Replete as needed  · Continue magnesium oxide 400 mg b i d

## 2022-10-04 NOTE — ASSESSMENT & PLAN NOTE
Presentation: Patient presents with complaints of shortness of breath with exertion and orthopnea for the past 5 days  She endorses bilateral lower extremity edema  Patient reports being treated by her outpatient PCP with PO Lasix r/t bilateral lower extremity edema; however, about 2 months ago her PCP instructed her to take the PO Lasix once every 3 days  Chest x-ray: Vascular congestion and LCW pacemaker in place on my read  Official read pending  Last echocardiogram on file May 9802: LV systolic function was normal  LVEF estimated to be 60%  There were no RWMA  G2DD  Suspect Etiology: Acute Heart Failure  COVID negative  Initial troponin: 17 --> 21, delta 4  BNP: 1242      • See plan under acute HFrEF

## 2022-10-05 PROBLEM — N17.9 AKI (ACUTE KIDNEY INJURY) (HCC): Status: ACTIVE | Noted: 2022-10-05

## 2022-10-05 PROBLEM — E61.2 MAGNESIUM DEFICIENCY: Status: RESOLVED | Noted: 2022-09-30 | Resolved: 2022-10-05

## 2022-10-05 PROBLEM — E87.6 POTASSIUM (K) DEFICIENCY: Status: RESOLVED | Noted: 2022-09-30 | Resolved: 2022-10-05

## 2022-10-05 PROBLEM — I71.019 THORACIC AORTIC DISSECTION: Status: ACTIVE | Noted: 2022-10-05

## 2022-10-05 LAB
2HR DELTA HS TROPONIN: -2 NG/L
ALBUMIN SERPL BCP-MCNC: 3.9 G/DL (ref 3.5–5)
ALP SERPL-CCNC: 71 U/L (ref 34–104)
ALT SERPL W P-5'-P-CCNC: 50 U/L (ref 7–52)
ANION GAP SERPL CALCULATED.3IONS-SCNC: 10 MMOL/L (ref 4–13)
APTT PPP: 30 SECONDS (ref 23–37)
AST SERPL W P-5'-P-CCNC: 22 U/L (ref 13–39)
ATRIAL RATE: 230 BPM
BASOPHILS # BLD AUTO: 0.09 THOUSANDS/ΜL (ref 0–0.1)
BASOPHILS NFR BLD AUTO: 1 % (ref 0–1)
BILIRUB SERPL-MCNC: 0.91 MG/DL (ref 0.2–1)
BUN SERPL-MCNC: 45 MG/DL (ref 5–25)
CALCIUM SERPL-MCNC: 9.4 MG/DL (ref 8.4–10.2)
CARDIAC TROPONIN I PNL SERPL HS: 14 NG/L
CARDIAC TROPONIN I PNL SERPL HS: 16 NG/L
CHLORIDE SERPL-SCNC: 91 MMOL/L (ref 96–108)
CO2 SERPL-SCNC: 33 MMOL/L (ref 21–32)
CREAT SERPL-MCNC: 1.58 MG/DL (ref 0.6–1.3)
D DIMER PPP FEU-MCNC: 1.18 UG/ML FEU
EOSINOPHIL # BLD AUTO: 0.13 THOUSAND/ΜL (ref 0–0.61)
EOSINOPHIL NFR BLD AUTO: 1 % (ref 0–6)
ERYTHROCYTE [DISTWIDTH] IN BLOOD BY AUTOMATED COUNT: 18.3 % (ref 11.6–15.1)
GFR SERPL CREATININE-BSD FRML MDRD: 31 ML/MIN/1.73SQ M
GLUCOSE SERPL-MCNC: 140 MG/DL (ref 65–140)
GLUCOSE SERPL-MCNC: 154 MG/DL (ref 65–140)
GLUCOSE SERPL-MCNC: 184 MG/DL (ref 65–140)
GLUCOSE SERPL-MCNC: 184 MG/DL (ref 65–140)
HCT VFR BLD AUTO: 36.6 % (ref 34.8–46.1)
HGB BLD-MCNC: 10.9 G/DL (ref 11.5–15.4)
IMM GRANULOCYTES # BLD AUTO: 0.07 THOUSAND/UL (ref 0–0.2)
IMM GRANULOCYTES NFR BLD AUTO: 1 % (ref 0–2)
INR PPP: 1.25 (ref 0.84–1.19)
LYMPHOCYTES # BLD AUTO: 2.14 THOUSANDS/ΜL (ref 0.6–4.47)
LYMPHOCYTES NFR BLD AUTO: 15 % (ref 14–44)
MAGNESIUM SERPL-MCNC: 2.2 MG/DL (ref 1.9–2.7)
MCH RBC QN AUTO: 25 PG (ref 26.8–34.3)
MCHC RBC AUTO-ENTMCNC: 29.8 G/DL (ref 31.4–37.4)
MCV RBC AUTO: 84 FL (ref 82–98)
MONOCYTES # BLD AUTO: 1.05 THOUSAND/ΜL (ref 0.17–1.22)
MONOCYTES NFR BLD AUTO: 8 % (ref 4–12)
NEUTROPHILS # BLD AUTO: 10.45 THOUSANDS/ΜL (ref 1.85–7.62)
NEUTS SEG NFR BLD AUTO: 74 % (ref 43–75)
NRBC BLD AUTO-RTO: 0 /100 WBCS
P AXIS: 237 DEGREES
PLATELET # BLD AUTO: 378 THOUSANDS/UL (ref 149–390)
PMV BLD AUTO: 10.4 FL (ref 8.9–12.7)
POTASSIUM SERPL-SCNC: 4 MMOL/L (ref 3.5–5.3)
PROT SERPL-MCNC: 7.2 G/DL (ref 6.4–8.4)
PROTHROMBIN TIME: 15.9 SECONDS (ref 11.6–14.5)
QRS AXIS: 196 DEGREES
QRSD INTERVAL: 150 MS
QT INTERVAL: 528 MS
QTC INTERVAL: 540 MS
RBC # BLD AUTO: 4.36 MILLION/UL (ref 3.81–5.12)
SODIUM SERPL-SCNC: 134 MMOL/L (ref 135–147)
T WAVE AXIS: 137 DEGREES
VENTRICULAR RATE: 63 BPM
WBC # BLD AUTO: 13.93 THOUSAND/UL (ref 4.31–10.16)

## 2022-10-05 PROCEDURE — 85610 PROTHROMBIN TIME: CPT

## 2022-10-05 PROCEDURE — 84484 ASSAY OF TROPONIN QUANT: CPT

## 2022-10-05 PROCEDURE — 93005 ELECTROCARDIOGRAM TRACING: CPT

## 2022-10-05 PROCEDURE — 99292 CRITICAL CARE ADDL 30 MIN: CPT | Performed by: PHYSICIAN ASSISTANT

## 2022-10-05 PROCEDURE — 85730 THROMBOPLASTIN TIME PARTIAL: CPT

## 2022-10-05 PROCEDURE — 93010 ELECTROCARDIOGRAM REPORT: CPT | Performed by: INTERNAL MEDICINE

## 2022-10-05 PROCEDURE — 83735 ASSAY OF MAGNESIUM: CPT | Performed by: INTERNAL MEDICINE

## 2022-10-05 PROCEDURE — 99232 SBSQ HOSP IP/OBS MODERATE 35: CPT | Performed by: INTERNAL MEDICINE

## 2022-10-05 PROCEDURE — 85379 FIBRIN DEGRADATION QUANT: CPT

## 2022-10-05 PROCEDURE — 80053 COMPREHEN METABOLIC PANEL: CPT | Performed by: INTERNAL MEDICINE

## 2022-10-05 PROCEDURE — 82948 REAGENT STRIP/BLOOD GLUCOSE: CPT

## 2022-10-05 PROCEDURE — 99291 CRITICAL CARE FIRST HOUR: CPT | Performed by: PHYSICIAN ASSISTANT

## 2022-10-05 PROCEDURE — 85025 COMPLETE CBC W/AUTO DIFF WBC: CPT

## 2022-10-05 RX ORDER — LABETALOL HYDROCHLORIDE 5 MG/ML
20 INJECTION, SOLUTION INTRAVENOUS ONCE
Status: COMPLETED | OUTPATIENT
Start: 2022-10-05 | End: 2022-10-05

## 2022-10-05 RX ORDER — ASPIRIN 81 MG/1
324 TABLET, CHEWABLE ORAL ONCE
Status: COMPLETED | OUTPATIENT
Start: 2022-10-05 | End: 2022-10-05

## 2022-10-05 RX ORDER — CARVEDILOL 6.25 MG/1
6.25 TABLET ORAL 2 TIMES DAILY WITH MEALS
Status: DISCONTINUED | OUTPATIENT
Start: 2022-10-05 | End: 2022-10-07

## 2022-10-05 RX ORDER — LANOLIN ALCOHOL/MO/W.PET/CERES
6 CREAM (GRAM) TOPICAL
Status: DISCONTINUED | OUTPATIENT
Start: 2022-10-05 | End: 2022-10-10 | Stop reason: HOSPADM

## 2022-10-05 RX ORDER — ESMOLOL HYDROCHLORIDE 10 MG/ML
25-200 INJECTION, SOLUTION INTRAVENOUS
Status: DISCONTINUED | OUTPATIENT
Start: 2022-10-05 | End: 2022-10-05

## 2022-10-05 RX ORDER — AMOXICILLIN 250 MG
2 CAPSULE ORAL 2 TIMES DAILY
Status: DISCONTINUED | OUTPATIENT
Start: 2022-10-05 | End: 2022-10-10 | Stop reason: HOSPADM

## 2022-10-05 RX ORDER — TRAMADOL HYDROCHLORIDE 50 MG/1
50 TABLET ORAL 2 TIMES DAILY
Status: DISCONTINUED | OUTPATIENT
Start: 2022-10-05 | End: 2022-10-05

## 2022-10-05 RX ORDER — INSULIN LISPRO 100 [IU]/ML
1-5 INJECTION, SOLUTION INTRAVENOUS; SUBCUTANEOUS
Status: DISCONTINUED | OUTPATIENT
Start: 2022-10-05 | End: 2022-10-06

## 2022-10-05 RX ADMIN — ISOSORBIDE DINITRATE 20 MG: 10 TABLET ORAL at 09:35

## 2022-10-05 RX ADMIN — CARVEDILOL 6.25 MG: 6.25 TABLET, FILM COATED ORAL at 09:32

## 2022-10-05 RX ADMIN — PANTOPRAZOLE SODIUM 40 MG: 40 TABLET, DELAYED RELEASE ORAL at 06:35

## 2022-10-05 RX ADMIN — SENNOSIDES AND DOCUSATE SODIUM 2 TABLET: 8.6; 5 TABLET ORAL at 17:15

## 2022-10-05 RX ADMIN — ISOSORBIDE DINITRATE 20 MG: 10 TABLET ORAL at 11:53

## 2022-10-05 RX ADMIN — ASPIRIN 81 MG CHEWABLE TABLET 324 MG: 81 TABLET CHEWABLE at 09:32

## 2022-10-05 RX ADMIN — ATORVASTATIN CALCIUM 40 MG: 40 TABLET, FILM COATED ORAL at 08:14

## 2022-10-05 RX ADMIN — MAGNESIUM OXIDE TAB 400 MG (241.3 MG ELEMENTAL MG) 400 MG: 400 (241.3 MG) TAB at 08:14

## 2022-10-05 RX ADMIN — HYDRALAZINE HYDROCHLORIDE 25 MG: 25 TABLET ORAL at 06:36

## 2022-10-05 RX ADMIN — CARVEDILOL 6.25 MG: 6.25 TABLET, FILM COATED ORAL at 17:10

## 2022-10-05 RX ADMIN — ISOSORBIDE DINITRATE 20 MG: 10 TABLET ORAL at 17:10

## 2022-10-05 RX ADMIN — FERROUS SULFATE TAB 325 MG (65 MG ELEMENTAL FE) 325 MG: 325 (65 FE) TAB at 08:14

## 2022-10-05 RX ADMIN — INSULIN LISPRO 1 UNITS: 100 INJECTION, SOLUTION INTRAVENOUS; SUBCUTANEOUS at 09:34

## 2022-10-05 RX ADMIN — LABETALOL HYDROCHLORIDE 20 MG: 5 INJECTION, SOLUTION INTRAVENOUS at 03:47

## 2022-10-05 RX ADMIN — INSULIN LISPRO 1 UNITS: 100 INJECTION, SOLUTION INTRAVENOUS; SUBCUTANEOUS at 11:54

## 2022-10-05 RX ADMIN — MAGNESIUM OXIDE TAB 400 MG (241.3 MG ELEMENTAL MG) 400 MG: 400 (241.3 MG) TAB at 17:15

## 2022-10-05 RX ADMIN — SODIUM CHLORIDE 2.5 MG/HR: 0.9 INJECTION, SOLUTION INTRAVENOUS at 06:58

## 2022-10-05 NOTE — QUICK NOTE
Called for concerns of AMS  AOx3, EOMI, appropriate muscle strength/bulk/tone, no facial droop on neurocheck  No imaging indicated at this time  Continue to monitor  Neurochecks Q4H  Discussed plan with nursing

## 2022-10-05 NOTE — ASSESSMENT & PLAN NOTE
· 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    · Previously on Eliquis at home, currently on hold  · Will need to resume full-dose anticoagulation, prefer to start after initiating antiplatelets  · Consider starting heparin drip after antiplatelet agent is on board if tolerating

## 2022-10-05 NOTE — CASE MANAGEMENT
Case Management Progress Note    Patient name Jen Page  Location ICU 07/ICU 07 MRN [id number]  : 1945 Date 10/5/2022       LOS (days): 7  Geometric Mean LOS (GMLOS) (days): 3 90  Days to GMLOS:-2 7        OBJECTIVE:        Current admission status: Inpatient  Preferred Pharmacy:   [address], [address] [facility] [address] [facility] [address] [address]  Phone: [phone] Fax: [phone]    [address] [place]) [facility], [address] [address]  Phone: [phone] Fax: [phone] [address] [place] [address]  Phone: [phone] Fax: [phone]    Primary Care Provider: Omar Barone DO    Primary Insurance: Paula Barron [facility] REP  Secondary Insurance:     PROGRESS NOTE:    Weekly Care Management Length of Stay Review     Current LOS: 7 Days    Most Recent Labs:     Lab Results   Component Value Date/Time    WBC 13 93 (H) 10/05/2022 04:08 AM    HGB 10 9 (L) 10/05/2022 04:08 AM    HCT 36 6 10/05/2022 04:08 AM     10/05/2022 04:08 AM    SODIUM 134 (L) 10/05/2022 04:08 AM    K 4 0 10/05/2022 04:08 AM    CL 91 (L) 10/05/2022 04:08 AM    CO2 33 (H) 10/05/2022 04:08 AM    BUN 45 (H) 10/05/2022 04:08 AM    CREATININE 1 58 (H) 10/05/2022 04:08 AM    GLUC 154 (H) 10/05/2022 04:08 AM    ALKPHOS 71 10/05/2022 04:08 AM    ALT 50 10/05/2022 04:08 AM    AST 22 10/05/2022 04:08 AM    ALB 3 9 10/05/2022 04:08 AM    TBILI 0 91 10/05/2022 04:08 AM    INR 1 25 (H) 10/05/2022 04:08 AM       Most Recent Vitals:   Vitals:    10/05/22 0951   BP: 94/50   Pulse: 63   Resp: (!) 24   Temp:    SpO2: 93%        Identified Barriers to Discharge/Discharge Goals/Care Management Interventions: acute HFrEF, was planned to transfer to [facility] for cardiac cath, now plan to remain at [facility] for medical mgmt    Intended Discharge Disposition: TBD    Expected Discharge Date: TBD

## 2022-10-05 NOTE — ASSESSMENT & PLAN NOTE
· Baseline creatinine 0 8-1 1  · Creatinine peaked at 1 8, up trending to 1 65 from 1 58 today  · Repeat BMP at noon  · Ordering urine studies to obtain FeNA and FeUrea  · Consider gentle hydration if pre-renal and poor PO intake

## 2022-10-05 NOTE — ASSESSMENT & PLAN NOTE
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

## 2022-10-05 NOTE — QUICK NOTE
I was contacted at 2:36 a m  For the immediate findings of the CTA dissection protocol which were read at 2:15 p m  The CTA order was placed for anatomical purposes to have a cardiac catheterization today  The CTA findings were “aneurysmal dissections of the ascending aorta measuring up to 5 9 cm with dissection flap extending to the non coronary cusp and approaching the right flap along with the distal medial aspect of the ascending aorta"  I called the radiology reading room who connected me to the radiologist who read the scan  I wanted to make sure who to contact whether it was cardiac surgery versus vascular surgery  He recommended contacting cardiac surgery  Then MERARY reached out to critical care and to cardiac surgery  Evaluation of the patient was done at bedside by [facility] and critical care  Patient was resting comfortably  We woke the patient to tell her the findings and that we were working with Dr Cathi Lozada from cardiac surgery to come up with a plan  Patient voiced understanding  Patient denied chest pain or shortness of breath prior to us waking her up  However, she did say that after the information was presented to her that it was a little harder for her to breathe from the shock of the news  SBP right arm was 91 and then 112 after redo  SBP left arm was 186  I was called by Cardiac surgery after his initial look at the findings  He told me to ask the patient if she was willing to change her code status to full code to get urgent emergency surgery  He told me that she will need to be transported emergently to [place] for surgery and that if she did have the surgery she will likely die within a week  I relayed this message to the patient and she was agreeable to upgrading her code status to full code had to having the emergent surgery  She said "do whatever needs to be done"    The critical care team and SLIM called the patient's emergency contact which is her daughter, Marybeth Wallis to update her  After further evaluation of more information, cardiac surgery said that the patient does not have to have emergent surgery but will still be transferred to [facility] when bed available  They said that she well go to [place] upon arrival to [facility]  They recommend no anticoagulation and to keep SBP <140 in the left arm since that arm is always the higher BP read  20 mg of labetalol was given IV  Will monitor blood pressure closely with more frequent blood pressure checks  Patient level change from med-surge to level 2 step-down  We will keep HR less than 60 bpm and SBP <140 in left arm with PRN labetalol on top of her normal medications  Will keep patient and daughter informed with any new information received

## 2022-10-05 NOTE — PLAN OF CARE
Problem: Prexisting or High Potential for Compromised Skin Integrity  Goal: Skin integrity is maintained or improved  Description: INTERVENTIONS:  - Identify patients at risk for skin breakdown  - Assess and monitor skin integrity  - Assess and monitor nutrition and hydration status  - Monitor labs   - Assess for incontinence   - Turn and reposition patient  - Assist with mobility/ambulation  - Relieve pressure over bony prominences  - Avoid friction and shearing  - Provide appropriate hygiene as needed including keeping skin clean and dry  - Evaluate need for skin moisturizer/barrier cream  - Collaborate with interdisciplinary team   - Patient/family teaching  - Consider wound care consult   Outcome: Progressing     Problem: MOBILITY - ADULT  Goal: Maintain or return to baseline ADL function  Description: INTERVENTIONS:  -  Assess patient's ability to carry out ADLs; assess patient's baseline for ADL function and identify physical deficits which impact ability to perform ADLs (bathing, care of mouth/teeth, toileting, grooming, dressing, etc )  - Assess/evaluate cause of self-care deficits   - Assess range of motion  - Assess patient's mobility; develop plan if impaired  - Assess patient's need for assistive devices and provide as appropriate  - Encourage maximum independence but intervene and supervise when necessary  - Involve family in performance of ADLs  - Assess for home care needs following discharge   - Consider OT consult to assist with ADL evaluation and planning for discharge  - Provide patient education as appropriate  Outcome: Progressing  Goal: Maintains/Returns to pre admission functional level  Description: INTERVENTIONS:  - Perform BMAT or MOVE assessment daily    - Set and communicate daily mobility goal to care team and patient/family/caregiver  - Collaborate with rehabilitation services on mobility goals if consulted  - Perform Range of Motion 2 times a day    - Reposition patient every 2 hours   - Dangle patient 2 times a day  - Stand patient 2 times a day  - Ambulate patient 3 times a day  - Out of bed to chair 3 times a day   - Out of bed for meals 3 times a day  - Out of bed for toileting  - Record patient progress and toleration of activity level   Outcome: Progressing     Problem: Nutrition/Hydration-ADULT  Goal: Nutrient/Hydration intake appropriate for improving, restoring or maintaining nutritional needs  Description: Monitor and assess patient's nutrition/hydration status for malnutrition  Collaborate with interdisciplinary team and initiate plan and interventions as ordered  Monitor patient's weight and dietary intake as ordered or per policy  Utilize nutrition screening tool and intervene as necessary  Determine patient's food preferences and provide high-protein, high-caloric foods as appropriate       INTERVENTIONS:  - Monitor oral intake, urinary output, labs, and treatment plans  - Assess nutrition and hydration status and recommend course of action  - Evaluate amount of meals eaten  - Assist patient with eating if necessary   - Allow adequate time for meals  - Recommend/ encourage appropriate diets, oral nutritional supplements, and vitamin/mineral supplements  - Order, calculate, and assess calorie counts as needed  - Recommend, monitor, and adjust tube feedings and TPN/PPN based on assessed needs  - Assess need for intravenous fluids  - Provide specific nutrition/hydration education as appropriate  - Include patient/family/caregiver in decisions related to nutrition  Outcome: Progressing     Problem: Potential for Falls  Goal: Patient will remain free of falls  Description: INTERVENTIONS:  - Educate patient/family on patient safety including physical limitations  - Instruct patient to call for assistance with activity   - Consult OT/PT to assist with strengthening/mobility   - Keep Call bell within reach  - Keep bed low and locked with side rails adjusted as appropriate  - Keep care items and personal belongings within reach  - Initiate and maintain comfort rounds  - Make Fall Risk Sign visible to staff  - Offer Toileting every 2 Hours, in advance of need  - Initiate/Maintain bed alarm  - Obtain necessary fall risk management equipment: bed alarm  - Apply yellow socks and bracelet for high fall risk patients  - Consider moving patient to room near nurses station  Outcome: Progressing

## 2022-10-05 NOTE — ASSESSMENT & PLAN NOTE
· Known thoracic aortic aneurysm with new 5 9 cm dissection flap of the aortic arch with take off into right coronary cusp and approaching R brachiocephalic artery  · Esmolol for goal HR < 80  · Baseline rhythm is underlying a flutter, intermittently AV or V paced at 60-65  · Cardene for SBP <120  · Continue oral antihypertensives, wean Cardene as able    · Medical management in setting of likely chronic dissection given slow progression of cardiac disease over the last couple months

## 2022-10-05 NOTE — ASSESSMENT & PLAN NOTE
· Likely secondary to acute on chronic heart failure with new reduction EF  · Wean nasal cannula as able

## 2022-10-05 NOTE — PROGRESS NOTES
Critical Care Services- Interval Progress Note   Briseyda Casarez 69 y o  female MRN: [id number]  Unit/Bed#: ICU 07 Encounter: [id number]  Assessment and Plan  Interval Events:  Called by SLIM of patient on floor that had emergent CT findings of a dissecting aortic aneurysm  Patient is a 68 y/o female w/ known CAD, Afib on Eliquis and known aortic root dilation admitted on 9/28 for shortness of breath and was found to have an new cardiomyopathy w/ an EF of 36%  She was diuresed and was planned for transfer to [facility] for a high risk PCI/ischemic evaluation/CT surgery evaluation of the aortic aneurysm however due to bed availability, patient has not yet been transferred over  Her Eliquis has been on hold since 10/2   A routine CTA of the Chest was ordered by cardiology last night to further evaluate the aorta given the known dilation and was incidentally found to have an aortic dissection extending to the noncoronary and right coronary cusp approaching the right brachiocephalic artery  Upon radiology notification to primary team of emergent findings, critical care was notified for evaluation while awaiting call back from cardiothoracic surgery  Found patient sleeping in no respiratory distress  She denies chest pain or any symptoms  /72 left arm 112/44 right arm HR 73 Afib    • Diagnosis: Aortic Dissection  o Plan:   o Maintain SBP <120  Administered total 20mg labetalol IV on the floor with improvement to blood pressure to 945 systolic    o Establish arterial line  Initiate esmolol vs cardene if remains persistently >120 despite prn  Will upgrade to SD1 under CC service for closer hemodynamic monitoring  SLIM notified of plan to upgrade   o SLIM discussed case w/ oncall CT surgeon  Patient requires transfer to [facility] evaluation  Per CT surgery, this does not need to occur emergently  o Code status was readdressed w/ patient  Patient changed code status to level 1, Full Code   Advised "Do everything you can"  o PACS notified of upgrade  to SD1 under CC service  Currently there are no beds available  o Patient's daughter updated on radiographic findings and the change in the plan of care  o Case discussed w/ attending Dr Trena arreola Will notify cardiology in the morning of the CT findings  Upon my evaluation, this patient had a high probability of imminent or life-threatening deterioration due to aortic dissection, which required my direct attention, intervention, and personal management  I have personally provided 60 minutes (0430 to 0530) on 10/5/2022 of critical care time, exclusive of procedures, teaching, family meetings, and any prior time recorded by providers other than myself  Time includes review of laboratory data, review of imaging/radiology results, discussion with consultants, monitoring for potential decompensation    Interventions were performed as documented above    -------------------------------------------------------------------------------------------------------------------------------------  Hemodynamic Monitoring:  Vital Signs:   HR: 61  BP: 139/63  MAP: 90  RR: 18  SpO2: 91% on room air  Cardiac Monitoring:   Telemetry rhythm: atrial fibrillation      Laboratory   Results from last 7 days   Lab Units 10/05/22  0408 10/04/22  0534 10/03/22  0540 10/02/22  0525 10/01/22  0450 09/30/22  0446 09/29/22  0615 09/28/22  1431   WBC Thousand/uL 13 93* 13 74* 15 75* 14 41* 11 62* 12 59* 12 26* 12 99*   HEMOGLOBIN g/dL 10 9* 11 1* 10 6* 10 5* 9 8* 9 7* 9 6* 10 3*   HEMATOCRIT % 36 6 37 1 36 8 36 5 33 7* 32 8* 32 7* 35 3   PLATELETS Thousands/uL 378 364 366 352 316 306 271 332   NEUTROS PCT % 74 71 71 73 67 67  --  77*   MONOS PCT % 8 10 9 10 10 9  --  7     Results from last 7 days   Lab Units 10/05/22  0408 10/04/22  0534 10/03/22  0540 10/02/22  1357 10/02/22  0525 10/01/22  0450 09/30/22  0446 09/29/22  0615   SODIUM mmol/L 134* 135 134* 135 136 137 140 141   POTASSIUM mmol/L 4 0 4 7 5 2 4 6 5 4* 3 3* 3 6 3 4*   CHLORIDE mmol/L 91* 91* 92* 93* 96 95* 102 106   CO2 mmol/L 33* 35* 33* 33* 30 32 29 27   ANION GAP mmol/L 10 9 9 9 10 10 9 8   BUN mg/dL 45* 44* 42* 34* 33* 25 26* 26*   CREATININE mg/dL 1 58* 1 53* 1 63* 1 80* 1 49* 1 18 1 08 0 90   CALCIUM mg/dL 9 4 9 4 9 4 9 0 8 6 8 1* 8 1* 8 3*   GLUCOSE RANDOM mg/dL 154* 155* 209* 230* 163* 152* 172* 112   ALT U/L 50 58* 73*  --  95* 121* 154* 180*   AST U/L 22 26 33  --  34 52* 83* 119*   ALK PHOS U/L 71 67 68  --  70 68 66 67   ALBUMIN g/dL 3 9 3 7 3 7  --  3 8 3 6 3 5 3 5   TOTAL BILIRUBIN mg/dL 0 91 0 87 0 80  --  0 79 0 83 0 77 0 81     Results from last 7 days   Lab Units 10/05/22  0408 10/04/22  0534 10/03/22  0540 10/02/22  0525 10/01/22  0450 09/30/22  1408 09/30/22  0446   MAGNESIUM mg/dL 2 2 2 0 1 9 1 9 1 8* 1 5* 1 1*      Results from last 7 days   Lab Units 10/05/22  0408   INR  1 25*   PTT seconds 30              ABG:    VBG:          Micro        Diagnostic Imaging / Data: I have personally reviewed pertinent reports        Medications:  Current Facility-Administered Medications   Medication Dose Route Frequency   • acetaminophen (TYLENOL) tablet 650 mg  650 mg Oral Q6H PRN   • ALPRAZolam (XANAX) tablet 0 25 mg  0 25 mg Oral TID PRN   • atorvastatin (LIPITOR) tablet 40 mg  40 mg Oral Daily   • docusate sodium (COLACE) capsule 100 mg  100 mg Oral Daily   • esmolol (BREVIBLOC) 2500 mg/250 mL IV infusion (premix)   mcg/kg/min Intravenous Titrated   • ferrous sulfate tablet 325 mg  325 mg Oral Daily With Breakfast   • hydrALAZINE (APRESOLINE) tablet 25 mg  25 mg Oral Q8H [address]   • insulin lispro (HumaLOG) 100 units/mL subcutaneous injection 1-5 Units  1-5 Units Subcutaneous TID AC   • insulin lispro (HumaLOG) 100 units/mL subcutaneous injection 1-5 Units  1-5 Units Subcutaneous HS   • isosorbide dinitrate (ISORDIL) tablet 20 mg  20 mg Oral TID after meals   • magnesium oxide (MAG-OX) tablet 400 mg  400 mg Oral BID   • metoprolol succinate (TOPROL-XL) 24 hr tablet 50 mg  50 mg Oral BID   • niCARdipine (CARDENE) 25 mg (STANDARD CONCENTRATION) in sodium chloride 0 9% 250 mL  1-15 mg/hr Intravenous Titrated   • ondansetron (ZOFRAN) injection 4 mg  4 mg Intravenous Once PRN   • pantoprazole (PROTONIX) EC tablet 40 mg  40 mg Oral Early Morning   • polyethylene glycol (MIRALAX) packet 17 g  17 g Oral BID   • senna-docusate sodium (SENOKOT S) 8 6-50 mg per tablet 1 tablet  1 tablet Oral HS   • traMADol (ULTRAM) tablet 100 mg  100 mg Oral BID       SIGNATURE: Lincoln Patel PA-C    Portions of the record may have been created with voice recognition software  Occasional wrong word or "sound a like" substitutions may have occurred due to the inherent limitations of voice recognition software    Read the chart carefully and recognize, using context, where substitutions have occurred

## 2022-10-05 NOTE — PROGRESS NOTES
General Cardiology   Progress Note -  Team One   Marcos Dec 69 y o  female MRN: [id number]    Unit/Bed#: ICU 07 Encounter: [id number]    Assessment:    1  Acute HFrEF:  Presents with progressive dyspnea, orthopnea, lower extremity edema in the setting of decreased oral diuretic dosing and high sodium diet   Was started on oral diuretics by PCP 2 months ago for lower extremity edema with recent dose 2 weeks ago  CXR pulmonary vascular congestion and bilateral small pleural effusions   BNP 1242  Was on IV Lasix 40 mg BID discontinued on 10/02 due to LAURENCE  · Echocardiogram 09/29/2022:   EF 36% with global hypokinesis and regional variation, G2DD, moderately reduced RV function, mild-moderate MR, moderate TR, mild-moderate PI, severe aortic root dilatation at 5 6 cm   Compared to echocardiogram from 05/2020 EF was 60% with no WMA, G2DD, normal RV function with no significant valvular abnormality  · Home diuretic regimen:  Lasix 20 mg twice weekly  · Dry weight:  130-132 lb  · Weight on admission:  139 lb per standing scale  · Weight today:  130 lb per bed scale today  · I&Os:  Net output -5 0 L  · Appears euvolemic on exam  2  New Cardiomyopathy:  EF 36% with global hypokinesis and regional variation   Suspect likely ischemic  Currently on Toprol-XL, hydralazine and Isordil   Losartan discontinued due to LAURENCE  3  Severe Aortic root dilatation:  Measuring 5 6 cm on echo  Measuring 5 9 cm with dissection on CTA  4  CAD: s/p CABG x3 in 2015 (LIMA-LAD, SVG-OM1, SVG-PDA)    Maintained on statin and beta-blocker   No aspirin due to anticoagulation with Eliquis which was held periprocedurally    · Cardiac catheterization 05/2020:  Patent grafts  5  SSS: s/p MDT DC PPM (MRI conditional) implanted 06/2020   6  Chronic AF/Flutter:  Has been 100% AFib since 11/2021 per device interrogation   Amiodarone discontinued this admission     · EKG ventricular paced rhythm with underlying flutter     · Anticoagulated on Eliquis 5 mg BID which is on hold  7  Essential hypertension:  Average BP 126/59 on Isordil 20 mg TID, hydralazine 20 TID, Toprol-XL 50 mg BID and nicardipine infusion  8  Hyperlipidemia:  Lipid panel 07/2022 TC 111, TG 97, HDL 60, LDL 32 on atorvastatin 40 mg daily  9  Type 2 DM:  Hemoglobin A1c 6 in 06/2022   Management per primary team   10  Transaminitis:  Elevated LFTs noted this admission, slowly improving  11  Anemia:  Hemoglobin at baseline around 10  Hemoglobin 11 1 today  12  LAURENCE:  Baseline creatinine 0 8-1 1  Creatinine peaked at 1 80 yesterday with aggressive diuresis   Diuretics on hold and creatinine improved to 1 58 today  13  Type A aortic dissection:  CTA revealed Aneurysmal dissection of the ascending aorta measuring up to 5 9 cm with dissection flap extending into the non coronary and right coronary cusp approaching the right brachiocephalic artery takeoff distally with an additional small short segment dissection along the distal medial aspect of the ascending aorta  · Per discussion critical care team had with CT surgery no plan for emergent surgery  · Patient was given 20 mg IV labetalol and started on nicardipine infusion for goal SBP <120  · Patient denies any chest or back pain     Plan/Recommendations:  · Case discussed in detail with critical care team and CT surgery recommendations  In review patient will be high risk for surgical intervention    · No longer proceed with cardiac catheterization and plan medical management   · Cancel transferred to B  · Continue Isordil and hydralazine  · Switch Toprol-XL to carvedilol starting at 6 25 mg BID and titrate for goal systolic BP of <208  · Start aspirin 325 mg daily  · Will try to add clopidogrel 75 mg daily tomorrow  · Hold anticoagulation until risks versus benefits reviewed with the patients family  · Volume status appears stable therefore will hold off on further diuretic dosing  · Recommendations were reviewed with the patient at bedside  · Critical care team will discuss plan with patient's family  __________________________________________________________    Subjective    Patient seen and examined  No acute events overnight  CTA was ordered yesterday to rule out dissection given severe aortic root dilatation prior to ischemic evaluation with cardiac catheterization  She was found to have type B dissection with flap extending into the non coronary and right coronary cusp approach in the right brachiocephalic artery  CT surgery was contacted who initially recommended emergent transfer to [place]  However after further review it was felt that patient does not need emergent surgery  Case discussed in detail with the critical care team after CT surgery recommendations with consensus that patient would be high risk for redo sternotomy and will proceed with medical management  She denies any chest pain, back pain, shortness breath or palpitations  She appears nervous  Review of Systems   Constitutional: Negative  Negative for chills  Cardiovascular: Negative for chest pain, dyspnea on exertion, leg swelling, near-syncope, orthopnea, palpitations, paroxysmal nocturnal dyspnea and syncope  Respiratory: Negative  Negative for cough, shortness of breath and wheezing  Endocrine: Negative  Hematologic/Lymphatic: Negative  Skin: Negative  Musculoskeletal: Negative  Gastrointestinal: Negative  Negative for diarrhea, nausea and vomiting  Neurological: Negative for dizziness, light-headedness and weakness  Psychiatric/Behavioral: Negative for altered mental status  The patient is nervous/anxious  All other systems reviewed and are negative  Objective:   Vitals: Blood pressure 108/53, pulse 64, temperature 97 9 °F (36 6 °C), temperature source Oral, resp   rate 21, height 4' 11" (1 499 m), weight 59 kg (130 lb 1 1 oz), SpO2 93 %, not currently breastfeeding ,     Wt Readings from Last 3 Encounters:   10/05/22 59 kg (130 lb 1 1 oz)   08/02/22 59 9 kg (132 lb)   07/18/22 61 7 kg (136 lb)        Lab Results   Component Value Date    CREATININE 1 58 (H) 10/05/2022    CREATININE 1 53 (H) 10/04/2022    CREATININE 1 63 (H) 10/03/2022         Body mass index is 26 27 kg/m²  ,     Systolic (80VXG), HIO:251 , Min:91 , SNZ:811     Diastolic (02IGV), QIB:97, Min:44, Max:73        No intake or output data in the 24 hours ending 10/05/22 0909  Weight (last 2 days)     Date/Time Weight    10/05/22 0524 59 (130 07)    10/04/22 0558 58 5 (128 97)            Telemetry Review:  Ventricular paced rhythm underlying flutter      Physical Exam  Vitals and nursing note reviewed  Constitutional:       General: She is not in acute distress  Appearance: She is well-developed  Comments: Lying flat in bed on 2 L NC in NAD   HENT:      Head: Normocephalic and atraumatic  Neck:      Vascular: No JVD  Cardiovascular:      Rate and Rhythm: Normal rate and regular rhythm  Heart sounds: Normal heart sounds  No murmur heard  No friction rub  Pulmonary:      Effort: Pulmonary effort is normal  No respiratory distress  Breath sounds: Normal breath sounds  No wheezing or rales  Abdominal:      General: Bowel sounds are normal  There is no distension  Palpations: Abdomen is soft  Tenderness: There is no abdominal tenderness  Musculoskeletal:         General: No tenderness  Normal range of motion  Cervical back: Normal range of motion and neck supple  Right lower leg: No edema  Left lower leg: No edema  Skin:     General: Skin is warm and dry  Findings: No erythema  Neurological:      Mental Status: She is alert and oriented to person, place, and time  Psychiatric:         Behavior: Behavior normal          Thought Content:  Thought content normal          Judgment: Judgment normal       Comments: Nervous         LABORATORY RESULTS      CBC with diff:   Results from last 7 days   Lab Units 10/05/22  0408 10/04/22  0534 10/03/22  0540 10/02/22  0525 10/01/22  0450 09/30/22  0446 09/29/22  0615 09/28/22  1431   WBC Thousand/uL 13 93* 13 74* 15 75* 14 41* 11 62* 12 59* 12 26* 12 99*   HEMOGLOBIN g/dL 10 9* 11 1* 10 6* 10 5* 9 8* 9 7* 9 6* 10 3*   HEMATOCRIT % 36 6 37 1 36 8 36 5 33 7* 32 8* 32 7* 35 3   MCV fL 84 86 86 86 85 86 87 88   PLATELETS Thousands/uL 378 364 366 352 316 306 271 332   MCH pg 25 0* 25 8* 24 8* 24 8* 24 6* 25 4* 25 4* 25 6*   MCHC g/dL 29 8* 29 9* 28 8* 28 8* 29 1* 29 6* 29 4* 29 2*   RDW % 18 3* 18 4* 18 2* 18 3* 17 9* 18 4* 18 5* 18 4*   MPV fL 10 4 10 6 10 5 11 2 10 8 11 0 10 6 11 4   NRBC AUTO /100 WBCs 0 0 0 0 0 0  --  1       CMP:  Results from last 7 days   Lab Units 10/05/22  0408 10/04/22  0534 10/03/22  0540 10/02/22  1357 10/02/22  0525 10/01/22  0450 09/30/22  0446 09/29/22  0615   POTASSIUM mmol/L 4 0 4 7 5 2 4 6 5 4* 3 3* 3 6 3 4*   CHLORIDE mmol/L 91* 91* 92* 93* 96 95* 102 106   CO2 mmol/L 33* 35* 33* 33* 30 32 29 27   BUN mg/dL 45* 44* 42* 34* 33* 25 26* 26*   CREATININE mg/dL 1 58* 1 53* 1 63* 1 80* 1 49* 1 18 1 08 0 90   CALCIUM mg/dL 9 4 9 4 9 4 9 0 8 6 8 1* 8 1* 8 3*   AST U/L 22 26 33  --  34 52* 83* 119*   ALT U/L 50 58* 73*  --  95* 121* 154* 180*   ALK PHOS U/L 71 67 68  --  70 68 66 67   EGFR ml/min/1 73sq m 31 32 30 26 33 44 49 62       BMP:  Results from last 7 days   Lab Units 10/05/22  0408 10/04/22  0534 10/03/22  0540 10/02/22  1357 10/02/22  0525 10/01/22  0450 09/30/22  0446   POTASSIUM mmol/L 4 0 4 7 5 2 4 6 5 4* 3 3* 3 6   CHLORIDE mmol/L 91* 91* 92* 93* 96 95* 102   CO2 mmol/L 33* 35* 33* 33* 30 32 29   BUN mg/dL 45* 44* 42* 34* 33* 25 26*   CREATININE mg/dL 1 58* 1 53* 1 63* 1 80* 1 49* 1 18 1 08   CALCIUM mg/dL 9 4 9 4 9 4 9 0 8 6 8 1* 8 1*       Lab Results   Component Value Date    NTBNP 3,961 (H) 07/08/2022    NTBNP 593 (H) 07/10/2020    NTBNP 702 (H) 05/23/2020             Results from last 7 days   Lab Units 10/05/22  0408 10/04/22  0534 10/03/22  0540 10/02/22  0525 10/01/22  0450 22  1408 22  0446   MAGNESIUM mg/dL 2 2 2 0 1 9 1 9 1 8* 1 5* 1 1*                     Results from last 7 days   Lab Units 10/05/22  0408   INR  1 25*       Lipid Profile:   Lab Results   Component Value Date    CHOL 164 2017    CHOL 154 2016    CHOL 163 2016     Lab Results   Component Value Date    HDL 60 2022    HDL 58 2022    HDL 58 09/15/2021     Lab Results   Component Value Date    LDLCALC 32 2022    LDLCALC 55 2022    LDLCALC 69 09/15/2021     Lab Results   Component Value Date    TRIG 97 2022    TRIG 150 2022    TRIG 139 09/15/2021       Cardiac testing:   Results for orders placed during the hospital encounter of 20    Echo complete with contrast if indicated    Narrative  [facility] [id number], [address] [place] [address]  [phone]    Transthoracic Echocardiogram  2D, M-mode, Doppler, and Color Doppler    Study date:  26-May-2020    Patient: Montana Pretty  MR number: [id number]  Account number: [de-identified]  : 1945  Age: 77 years  Gender: Female  Status: Inpatient  Location: Bedside  Height: 61 in  Weight: 124 7 lb  BP: 142/ 58 mmHg    Indications: Acute MI  Diagnoses: I24 9 - Acute ischemic heart disease, unspecified    Sonographer:  Merlinda Abraham, RDCS  Referring Physician:  James Washington MD  Group:  [facility] [id number] [facility]  Interpreting Physician:  Jerry Garcia MD    SUMMARY    LEFT VENTRICLE:  Systolic function was normal  Ejection fraction was estimated to be 60 %  There were no regional wall motion abnormalities  Wall thickness was at the upper limits of normal   Features were consistent with a pseudonormal left ventricular filling pattern, with concomitant abnormal relaxation and increased filling pressure (grade 2 diastolic dysfunction)  LEFT ATRIUM:  The atrium was mildly to moderately dilated      MITRAL VALVE:  There was mild annular calcification  There was mild regurgitation  AORTIC VALVE:  There was mild regurgitation  TRICUSPID VALVE:  There was mild regurgitation  AORTA:  The root exhibited mild dilatation  There was mild dilatation of the ascending aorta  HISTORY: PRIOR HISTORY: History of CABG x3  CAD  Hypertension  DM2  Ascending aortic aneurysm  Anxiety  GERD  Smoker  Atrial fibrillation  PROCEDURE: The procedure was performed at the bedside  This was a routine study  The transthoracic approach was used  The study included complete 2D imaging, M-mode, complete spectral Doppler, and color Doppler  The heart rate was 77 bpm,  at the start of the study  Echocardiographic views were limited due to high windows  This was a technically difficult study  LEFT VENTRICLE: Size was normal  Systolic function was normal  Ejection fraction was estimated to be 60 %  There were no regional wall motion abnormalities  Wall thickness was at the upper limits of normal  DOPPLER: Features were  consistent with a pseudonormal left ventricular filling pattern, with concomitant abnormal relaxation and increased filling pressure (grade 2 diastolic dysfunction)  RIGHT VENTRICLE: The size was normal  Systolic function was normal  Wall thickness was normal     LEFT ATRIUM: The atrium was mildly to moderately dilated  RIGHT ATRIUM: Size was normal     MITRAL VALVE: There was mild annular calcification  Valve structure was normal  There was normal leaflet separation  DOPPLER: The transmitral velocity was within the normal range  There was no evidence for stenosis  There was mild  regurgitation  AORTIC VALVE: The valve was trileaflet  Leaflets exhibited normal thickness and normal cuspal separation  DOPPLER: Transaortic velocity was within the normal range  There was no evidence for stenosis  There was mild regurgitation  TRICUSPID VALVE: The valve structure was normal  There was normal leaflet separation   DOPPLER: The transtricuspid velocity was within the normal range  There was no evidence for stenosis  There was mild regurgitation  PULMONIC VALVE: Leaflets exhibited normal thickness, no calcification, and normal cuspal separation  DOPPLER: The transpulmonic velocity was within the normal range  There was no significant regurgitation  PERICARDIUM: There was no pericardial effusion  The pericardium was normal in appearance  AORTA: The root exhibited mild dilatation  There was mild dilatation of the ascending aorta  SYSTEMIC VEINS: IVC: The inferior vena cava was normal in size  PULMONARY VEINS: DOPPLER: There was systolic blunting in the pulmonary vein(s)  SYSTEM MEASUREMENT TABLES    2D  %FS: 30 49 %  AV Diam: 4 cm  EDV(Teich): 61 09 ml  EF(Cube): 66 42 %  EF(Teich): 58 72 %  ESV(Cube): 18 1 ml  ESV(Teich): 25 22 ml  IVSd: 1 cm  LA Area: 24 64 cm2  LA Diam: 3 12 cm  LVEDV MOD A4C: 66 08 ml  LVEF MOD A4C: 63 63 %  LVESV MOD A4C: 24 03 ml  LVIDd: 3 78 cm  LVIDs: 2 63 cm  LVLd A4C: 7 09 cm  LVLs A4C: 5 73 cm  LVPWd: 1 cm  RA Area: 11 79 cm2  RV Diam: 2 76 cm  SI(Cube): 23 1 ml/m2  SI(Teich): 23 14 ml/m2  SV MOD A4C: 42 05 ml  SV(Cube): 35 81 ml  SV(Teich): 35 87 ml    CW  TR MaxP 47 mmHg  TR Vmax: 2 62 m/s    MM  TAPSE: 1 61 cm    PW  E': 0 06 m/s  E/E': 12 03  MV A Marcos: 0 8 m/s  MV Dec [place]: 3 66 m/s2  MV DecT: 210 88 ms  MV E Marcos: 0 77 m/s  MV E/A Ratio: 0 96    [facility] Accredited Echocardiography Laboratory    Prepared and electronically signed by    Marci Martinez MD  Signed 26-May-2020 11:14:41    No results found for this or any previous visit  No results found for this or any previous visit  No valid procedures specified  No results found for this or any previous visit          Meds/Allergies   all current active meds have been reviewed, current meds:   Current Facility-Administered Medications   Medication Dose Route Frequency   • acetaminophen (TYLENOL) tablet 650 mg  650 mg Oral Q6H PRN   • ALPRAZolam (XANAX) tablet 0 25 mg  0 25 mg Oral TID PRN   • atorvastatin (LIPITOR) tablet 40 mg  40 mg Oral Daily   • docusate sodium (COLACE) capsule 100 mg  100 mg Oral Daily   • esmolol (BREVIBLOC) 2500 mg/250 mL IV infusion (premix)   mcg/kg/min Intravenous Titrated   • ferrous sulfate tablet 325 mg  325 mg Oral Daily With Breakfast   • hydrALAZINE (APRESOLINE) tablet 25 mg  25 mg Oral Q8H [facility]   • insulin lispro (HumaLOG) 100 units/mL subcutaneous injection 1-5 Units  1-5 Units Subcutaneous TID AC   • insulin lispro (HumaLOG) 100 units/mL subcutaneous injection 1-5 Units  1-5 Units Subcutaneous HS   • isosorbide dinitrate (ISORDIL) tablet 20 mg  20 mg Oral TID after meals   • magnesium oxide (MAG-OX) tablet 400 mg  400 mg Oral BID   • metoprolol succinate (TOPROL-XL) 24 hr tablet 50 mg  50 mg Oral BID   • niCARdipine (CARDENE) 25 mg (STANDARD CONCENTRATION) in sodium chloride 0 9% 250 mL  1-15 mg/hr Intravenous Titrated   • ondansetron (ZOFRAN) injection 4 mg  4 mg Intravenous Once PRN   • pantoprazole (PROTONIX) EC tablet 40 mg  40 mg Oral Early Morning   • polyethylene glycol (MIRALAX) packet 17 g  17 g Oral BID   • senna-docusate sodium (SENOKOT S) 8 6-50 mg per tablet 1 tablet  1 tablet Oral HS   • traMADol (ULTRAM) tablet 100 mg  100 mg Oral BID    and PTA meds:   Prior to Admission Medications   Prescriptions Last Dose Informant Patient Reported? Taking?    ALPRAZolam (XANAX) 0 25 mg tablet 9/28/2022 at Unknown time  No Yes   Sig: Take 1 tablet (0 25 mg total) by mouth 3 (three) times a day as needed for anxiety   COMBIGAN 0 2-0 5 % Unknown at Unknown time Self Yes No   acetaminophen (TYLENOL) 325 mg tablet 9/28/2022 at Unknown time Self No Yes   Sig: Take 2 tablets (650 mg total) by mouth every 4 (four) hours as needed for mild pain   amiodarone 200 mg tablet 9/28/2022 at Unknown time  No Yes   Sig: TAKE 1 TABLET DAILY   apixaban (Eliquis) 5 mg 9/28/2022 at Unknown time Self No Yes Sig: Take 1 tablet (5 mg total) by mouth 2 (two) times a day   atorvastatin (LIPITOR) 40 mg tablet 9/28/2022 at Unknown time Self No Yes   Sig: Take 1 tablet (40 mg total) by mouth daily   ferrous sulfate 325 (65 Fe) mg tablet 9/28/2022 at Unknown time Self Yes Yes   Sig: Take 325 mg by mouth daily with breakfast   furosemide (LASIX) 20 mg tablet 9/28/2022 at Unknown time Self No Yes   Sig: Take 1 tablet (20 mg total) by mouth daily   losartan (COZAAR) 25 mg tablet 9/28/2022 at Unknown time Self No Yes   Sig: Take 1 tablet (25 mg total) by mouth daily   metFORMIN (GLUCOPHAGE) 500 mg tablet 9/28/2022 at Unknown time Self No Yes   Sig: Take 1 tablet (500 mg total) by mouth 2 (two) times a day with meals   metoprolol succinate (TOPROL-XL) 50 mg 24 hr tablet 9/28/2022 at Unknown time Self No Yes   Sig: Take 1 tablet (50 mg total) by mouth 2 (two) times a day   omeprazole (PriLOSEC) 20 mg delayed release capsule 9/28/2022 at Unknown time Self No Yes   Sig: Take 1 capsule (20 mg total) by mouth daily   potassium chloride (MICRO-K) 10 MEQ CR capsule 9/28/2022 at Unknown time Self No Yes   Sig: Take 2 capsules (20 mEq total) by mouth daily   traMADol (ULTRAM) 50 mg tablet 9/28/2022 at Unknown time  No Yes   Sig: Take 2 tablets (100 mg total) by mouth 2 (two) times a day      Facility-Administered Medications: None     Medications Prior to Admission   Medication   • acetaminophen (TYLENOL) 325 mg tablet   • ALPRAZolam (XANAX) 0 25 mg tablet   • amiodarone 200 mg tablet   • apixaban (Eliquis) 5 mg   • atorvastatin (LIPITOR) 40 mg tablet   • ferrous sulfate 325 (65 Fe) mg tablet   • furosemide (LASIX) 20 mg tablet   • losartan (COZAAR) 25 mg tablet   • metFORMIN (GLUCOPHAGE) 500 mg tablet   • metoprolol succinate (TOPROL-XL) 50 mg 24 hr tablet   • omeprazole (PriLOSEC) 20 mg delayed release capsule   • potassium chloride (MICRO-K) 10 MEQ CR capsule   • traMADol (ULTRAM) 50 mg tablet   • COMBIGAN 0 2-0 5 %       esmolol,  mcg/kg/min, Last Rate: Stopped (10/05/22 6720)  niCARdipine, 1-15 mg/hr, Last Rate: 5 mg/hr (10/05/22 0860)        Counseling / Coordination of Care  Total floor / unit time spent today 20 minutes  Greater than 50% of total time was spent with the patient and / or family counseling and / or coordination of care  ** Please Note: Dragon 360 Dictation voice to text software may have been used in the creation of this document   **

## 2022-10-05 NOTE — ASSESSMENT & PLAN NOTE
Lab Results   Component Value Date    HGBA1C 6 06/29/2022       Recent Labs     10/04/22  2052 10/05/22  0800 10/05/22  1046 10/05/22  1712   POCGLU 147* 184* 184* 140       Blood Sugar Average: Last 72 hrs:  (P) 653 [id number]     · Continue sliding scale insulin for goal sugar 140-180  · Recently increased algorithm 3, monitor and bump up algorithm if hypoglycemic

## 2022-10-06 PROBLEM — R79.89 ELEVATED LFTS: Status: RESOLVED | Noted: 2022-09-28 | Resolved: 2022-10-06

## 2022-10-06 PROBLEM — D72.829 LEUKOCYTOSIS: Status: RESOLVED | Noted: 2020-05-25 | Resolved: 2022-10-06

## 2022-10-06 LAB
ANION GAP SERPL CALCULATED.3IONS-SCNC: 7 MMOL/L (ref 4–13)
ANION GAP SERPL CALCULATED.3IONS-SCNC: 8 MMOL/L (ref 4–13)
BUN SERPL-MCNC: 43 MG/DL (ref 5–25)
BUN SERPL-MCNC: 46 MG/DL (ref 5–25)
CALCIUM SERPL-MCNC: 8.7 MG/DL (ref 8.4–10.2)
CALCIUM SERPL-MCNC: 8.8 MG/DL (ref 8.4–10.2)
CHLORIDE SERPL-SCNC: 94 MMOL/L (ref 96–108)
CHLORIDE SERPL-SCNC: 94 MMOL/L (ref 96–108)
CO2 SERPL-SCNC: 32 MMOL/L (ref 21–32)
CO2 SERPL-SCNC: 34 MMOL/L (ref 21–32)
CREAT SERPL-MCNC: 1.54 MG/DL (ref 0.6–1.3)
CREAT SERPL-MCNC: 1.65 MG/DL (ref 0.6–1.3)
ERYTHROCYTE [DISTWIDTH] IN BLOOD BY AUTOMATED COUNT: 18 % (ref 11.6–15.1)
GFR SERPL CREATININE-BSD FRML MDRD: 29 ML/MIN/1.73SQ M
GFR SERPL CREATININE-BSD FRML MDRD: 32 ML/MIN/1.73SQ M
GLUCOSE SERPL-MCNC: 120 MG/DL (ref 65–140)
GLUCOSE SERPL-MCNC: 142 MG/DL (ref 65–140)
GLUCOSE SERPL-MCNC: 162 MG/DL (ref 65–140)
GLUCOSE SERPL-MCNC: 187 MG/DL (ref 65–140)
GLUCOSE SERPL-MCNC: 222 MG/DL (ref 65–140)
HCT VFR BLD AUTO: 33.2 % (ref 34.8–46.1)
HGB BLD-MCNC: 9.6 G/DL (ref 11.5–15.4)
MCH RBC QN AUTO: 24.5 PG (ref 26.8–34.3)
MCHC RBC AUTO-ENTMCNC: 28.9 G/DL (ref 31.4–37.4)
MCV RBC AUTO: 85 FL (ref 82–98)
PLATELET # BLD AUTO: 319 THOUSANDS/UL (ref 149–390)
PMV BLD AUTO: 10.3 FL (ref 8.9–12.7)
POTASSIUM SERPL-SCNC: 4 MMOL/L (ref 3.5–5.3)
POTASSIUM SERPL-SCNC: 4.2 MMOL/L (ref 3.5–5.3)
RBC # BLD AUTO: 3.92 MILLION/UL (ref 3.81–5.12)
SODIUM SERPL-SCNC: 134 MMOL/L (ref 135–147)
SODIUM SERPL-SCNC: 135 MMOL/L (ref 135–147)
WBC # BLD AUTO: 11.01 THOUSAND/UL (ref 4.31–10.16)

## 2022-10-06 PROCEDURE — 99232 SBSQ HOSP IP/OBS MODERATE 35: CPT | Performed by: INTERNAL MEDICINE

## 2022-10-06 PROCEDURE — 97116 GAIT TRAINING THERAPY: CPT

## 2022-10-06 PROCEDURE — 80048 BASIC METABOLIC PNL TOTAL CA: CPT

## 2022-10-06 PROCEDURE — 82948 REAGENT STRIP/BLOOD GLUCOSE: CPT

## 2022-10-06 PROCEDURE — 85027 COMPLETE CBC AUTOMATED: CPT

## 2022-10-06 PROCEDURE — 99233 SBSQ HOSP IP/OBS HIGH 50: CPT | Performed by: INTERNAL MEDICINE

## 2022-10-06 PROCEDURE — 97530 THERAPEUTIC ACTIVITIES: CPT

## 2022-10-06 RX ORDER — ASPIRIN 81 MG/1
324 TABLET, CHEWABLE ORAL DAILY
Status: DISCONTINUED | OUTPATIENT
Start: 2022-10-06 | End: 2022-10-10 | Stop reason: HOSPADM

## 2022-10-06 RX ORDER — INSULIN LISPRO 100 [IU]/ML
1-6 INJECTION, SOLUTION INTRAVENOUS; SUBCUTANEOUS
Status: DISCONTINUED | OUTPATIENT
Start: 2022-10-06 | End: 2022-10-10 | Stop reason: HOSPADM

## 2022-10-06 RX ORDER — LABETALOL HYDROCHLORIDE 5 MG/ML
10 INJECTION, SOLUTION INTRAVENOUS ONCE
Status: COMPLETED | OUTPATIENT
Start: 2022-10-06 | End: 2022-10-06

## 2022-10-06 RX ORDER — HEPARIN SODIUM 5000 [USP'U]/ML
5000 INJECTION, SOLUTION INTRAVENOUS; SUBCUTANEOUS EVERY 8 HOURS SCHEDULED
Status: DISCONTINUED | OUTPATIENT
Start: 2022-10-06 | End: 2022-10-10 | Stop reason: HOSPADM

## 2022-10-06 RX ADMIN — HYDRALAZINE HYDROCHLORIDE 25 MG: 25 TABLET ORAL at 13:19

## 2022-10-06 RX ADMIN — ASPIRIN 81 MG CHEWABLE TABLET 324 MG: 81 TABLET CHEWABLE at 08:36

## 2022-10-06 RX ADMIN — HYDRALAZINE HYDROCHLORIDE 25 MG: 25 TABLET ORAL at 21:17

## 2022-10-06 RX ADMIN — ISOSORBIDE DINITRATE 20 MG: 10 TABLET ORAL at 13:19

## 2022-10-06 RX ADMIN — ISOSORBIDE DINITRATE 20 MG: 10 TABLET ORAL at 18:13

## 2022-10-06 RX ADMIN — FERROUS SULFATE TAB 325 MG (65 MG ELEMENTAL FE) 325 MG: 325 (65 FE) TAB at 08:36

## 2022-10-06 RX ADMIN — Medication 6 MG: at 21:17

## 2022-10-06 RX ADMIN — ISOSORBIDE DINITRATE 20 MG: 10 TABLET ORAL at 08:39

## 2022-10-06 RX ADMIN — INSULIN LISPRO 1 UNITS: 100 INJECTION, SOLUTION INTRAVENOUS; SUBCUTANEOUS at 13:17

## 2022-10-06 RX ADMIN — CARVEDILOL 6.25 MG: 6.25 TABLET, FILM COATED ORAL at 18:13

## 2022-10-06 RX ADMIN — CARVEDILOL 6.25 MG: 6.25 TABLET, FILM COATED ORAL at 08:36

## 2022-10-06 RX ADMIN — POLYETHYLENE GLYCOL 3350 17 G: 17 POWDER, FOR SOLUTION ORAL at 08:36

## 2022-10-06 RX ADMIN — INSULIN LISPRO 1 UNITS: 100 INJECTION, SOLUTION INTRAVENOUS; SUBCUTANEOUS at 18:15

## 2022-10-06 RX ADMIN — HEPARIN SODIUM 5000 UNITS: 5000 INJECTION INTRAVENOUS; SUBCUTANEOUS at 21:17

## 2022-10-06 RX ADMIN — INSULIN LISPRO 1 UNITS: 100 INJECTION, SOLUTION INTRAVENOUS; SUBCUTANEOUS at 09:23

## 2022-10-06 RX ADMIN — LABETALOL HYDROCHLORIDE 10 MG: 5 INJECTION, SOLUTION INTRAVENOUS at 08:52

## 2022-10-06 RX ADMIN — PANTOPRAZOLE SODIUM 40 MG: 40 TABLET, DELAYED RELEASE ORAL at 05:52

## 2022-10-06 RX ADMIN — MAGNESIUM OXIDE TAB 400 MG (241.3 MG ELEMENTAL MG) 400 MG: 400 (241.3 MG) TAB at 08:36

## 2022-10-06 RX ADMIN — HYDRALAZINE HYDROCHLORIDE 25 MG: 25 TABLET ORAL at 05:52

## 2022-10-06 RX ADMIN — INSULIN LISPRO 2 UNITS: 100 INJECTION, SOLUTION INTRAVENOUS; SUBCUTANEOUS at 22:35

## 2022-10-06 RX ADMIN — HEPARIN SODIUM 5000 UNITS: 5000 INJECTION INTRAVENOUS; SUBCUTANEOUS at 13:19

## 2022-10-06 RX ADMIN — ATORVASTATIN CALCIUM 40 MG: 40 TABLET, FILM COATED ORAL at 08:36

## 2022-10-06 RX ADMIN — POLYETHYLENE GLYCOL 3350 17 G: 17 POWDER, FOR SOLUTION ORAL at 21:17

## 2022-10-06 RX ADMIN — SENNOSIDES AND DOCUSATE SODIUM 2 TABLET: 8.6; 5 TABLET ORAL at 18:13

## 2022-10-06 RX ADMIN — SENNOSIDES AND DOCUSATE SODIUM 2 TABLET: 8.6; 5 TABLET ORAL at 08:36

## 2022-10-06 RX ADMIN — MAGNESIUM OXIDE TAB 400 MG (241.3 MG ELEMENTAL MG) 400 MG: 400 (241.3 MG) TAB at 18:13

## 2022-10-06 NOTE — QUICK NOTE
2120 - Called to bedside by nursing pt agitated, refusing nursing assessment, evening medications and removing monitoring equipment (SpO2 monitor, NIBP, ECG leads)  On exam pt is in bed eyes closed, becomes agitated refusing exam  Will answer orientation questions sparingly or with a question, no neuro deficits appreciate however patient is very resistive during exam  Pt knows she is in the hospital but thinks we (the staff) are playing games and she wishes for the police to be called  In attempts to re-orient her she is verbally aggressive and refusing  She is refusing evening PO BP medications and removes NIBP during BP check, with strict BP control a primary intervention will restart IV Cardene to maintain SBP < 140 and place the patient in bilateral soft wrist restraint to prevent removal of NIBP cuff, IV's  Discussed with bedside RN  This is likely delirium and will continue assessing CAM-ICU, neuro-checks, reorientation  Will implemental fall precautions, delirium precautions, avoid any deliriogenic medications and ensure sleep hygiene

## 2022-10-06 NOTE — PROGRESS NOTES
General Cardiology   Progress Note -  Team One   Nay Bowers 69 y o  female MRN: [id number]    Unit/Bed#: ICU 07 Encounter: [id number]    Assessment:    1  Acute HFrEF:  Presents with progressive dyspnea, orthopnea, lower extremity edema in the setting of decreased oral diuretic dosing and high sodium diet   Was started on oral diuretics by PCP 2 months ago for lower extremity edema with recent dose 2 weeks ago  CXR pulmonary vascular congestion and bilateral small pleural effusions   BNP 1242  Was on IV Lasix 40 mg BID discontinued on 10/02 due to LAURENCE  · Echocardiogram 09/29/2022:   EF 36% with global hypokinesis and regional variation, G2DD, moderately reduced RV function, mild-moderate MR, moderate TR, mild-moderate PI, severe aortic root dilatation at 5 6 cm   Compared to echocardiogram from 05/2020 EF was 60% with no WMA, G2DD, normal RV function with no significant valvular abnormality  · Home diuretic regimen:  Lasix 20 mg twice weekly  · Dry weight:  130-132 lb  · Weight on admission:  139 lb per standing scale  · Weight today:  129 lb per bed scale today  · I&Os:  Net output -5 0 L  · Appears euvolemic on exam  2  New Cardiomyopathy:  EF 36% with global hypokinesis and regional variation   Suspect likely ischemic  Currently on Toprol-XL, hydralazine and Isordil   Losartan discontinued due to LAURENCE  3  Severe Aortic root dilatation:  Measuring 5 6 cm on echo  Measuring 5 9 cm with dissection on CTA  4  CAD: s/p CABG x3 in 2015 (LIMA-LAD, SVG-OM1, SVG-PDA)    Maintained on statin and beta-blocker   No aspirin due to anticoagulation with Eliquis which was held periprocedurally    · Cardiac catheterization 05/2020:  Patent grafts  5  SSS: s/p MDT DC PPM (MRI conditional) implanted 06/2020   6  Chronic AF/Flutter:  Has been 100% AFib since 11/2021 per device interrogation   Amiodarone discontinued this admission     · EKG ventricular paced rhythm with underlying flutter     · Anticoagulated on Eliquis 5 mg BID which has been discontinued in the setting of aortic dissection  7  Essential hypertension:  Average BP 116/55 on Isordil 20 mg TID, hydralazine 20 TID,  carvedilol 6 25 mg BID and intermittent IV labetalol  Nicardipine infusion weaned off overnight  8  Hyperlipidemia:  Lipid panel 07/2022 TC 111, TG 97, HDL 60, LDL 32 on atorvastatin 40 mg daily  9  Type 2 DM:  Hemoglobin A1c 6 in 06/2022   Management per primary team   10  Transaminitis:  Elevated LFTs noted this admission, slowly improving  11  Anemia:  Hemoglobin at baseline around 10  Hemoglobin 9 6 today  12  LAURENCE:  Baseline creatinine 0 8-1 1  Creatinine peaked at 1 80 yesterday with aggressive diuresis   Diuretics on hold and creatinine improved to 1 58 yesterday  Creatinine 1 65 today  13  Type A aortic dissection:  CTA revealed Aneurysmal dissection of the ascending aorta measuring up to 5 9 cm with dissection flap extending into the non coronary and right coronary cusp approaching the right brachiocephalic artery takeoff distally with an additional small short segment dissection along the distal medial aspect of the ascending aorta  · Per discussion critical care team had with CT surgery no plan for emergent surgery as patient would be high risk for surgery due to anatomy  · Continue medical management with SBP goal of <140  · Patient denies any chest or back pain     Plan/Recommendations:  · Continue Isordil and hydralazine  · Continue carvedilol 6 25 mg BID and titrate for goal systolic BP of <487  · Continue aspirin 325 mg daily  · Add clopidogrel 75 mg daily starting today  · Hold anticoagulation in the setting of dissection  · Volume status appears stable therefore will hold off on further diuretic dosing  __________________________________________________________    Subjective    Patient seen and examined  No acute events overnight  She denies any chest pain, shortness breath palpitations  Review of Systems   Constitutional: Negative  Negative for chills  Cardiovascular: Negative for chest pain, dyspnea on exertion, leg swelling, near-syncope, orthopnea, palpitations, paroxysmal nocturnal dyspnea and syncope  Respiratory: Negative  Negative for cough, shortness of breath and wheezing  Endocrine: Negative  Hematologic/Lymphatic: Negative  Skin: Negative  Musculoskeletal: Negative  Gastrointestinal: Negative  Negative for diarrhea, nausea and vomiting  Neurological: Negative for dizziness, light-headedness and weakness  Psychiatric/Behavioral: Negative  Negative for altered mental status  All other systems reviewed and are negative  Objective:   Vitals: Blood pressure 104/51, pulse 61, temperature 98 6 °F (37 °C), temperature source Oral, resp  rate 20, height 4' 11" (1 499 m), weight 58 9 kg (129 lb 13 6 oz), SpO2 93 %, not currently breastfeeding ,     Wt Readings from Last 3 Encounters:   10/06/22 58 9 kg (129 lb 13 6 oz)   08/02/22 59 9 kg (132 lb)   07/18/22 61 7 kg (136 lb)        Lab Results   Component Value Date    CREATININE 1 65 (H) 10/06/2022    CREATININE 1 58 (H) 10/05/2022    CREATININE 1 53 (H) 10/04/2022         Body mass index is 26 23 kg/m²  ,     Systolic (68BVF), GWP:203 , Min:85 , HJA:105     Diastolic (38PSI), XPO:40, Min:42, Max:73          Intake/Output Summary (Last 24 hours) at 10/6/2022 0939  Last data filed at 10/6/2022 0052  Gross per 24 hour   Intake 322 09 ml   Output 350 ml   Net -27 91 ml     Weight (last 2 days)     Date/Time Weight    10/06/22 0545 58 9 (129 85)    10/05/22 0524 59 (130 07)    10/04/22 0558 58 5 (128 97)            Telemetry Review:  Atrial flutter with V pacing      Physical Exam  Vitals and nursing note reviewed  Constitutional:       General: She is not in acute distress  Appearance: She is well-developed  Comments: On RA in NAD   HENT:      Head: Normocephalic and atraumatic  Neck:      Vascular: No JVD     Cardiovascular:      Rate and Rhythm: Normal rate and regular rhythm  Heart sounds: Normal heart sounds  No murmur heard  No friction rub  Pulmonary:      Effort: Pulmonary effort is normal  No respiratory distress  Breath sounds: Normal breath sounds  No wheezing or rales  Abdominal:      General: Bowel sounds are normal  There is no distension  Palpations: Abdomen is soft  Tenderness: There is no abdominal tenderness  Musculoskeletal:         General: No tenderness  Normal range of motion  Cervical back: Normal range of motion and neck supple  Right lower leg: No edema  Left lower leg: No edema  Skin:     General: Skin is warm and dry  Findings: No erythema  Neurological:      Mental Status: She is alert and oriented to person, place, and time  Psychiatric:         Mood and Affect: Mood normal          Behavior: Behavior normal          Thought Content:  Thought content normal          Judgment: Judgment normal          LABORATORY RESULTS      CBC with diff:   Results from last 7 days   Lab Units 10/06/22  0442 10/05/22  0408 10/04/22  0534 10/03/22  0540 10/02/22  0525 10/01/22  0450 09/30/22  0446   WBC Thousand/uL 11 01* 13 93* 13 74* 15 75* 14 41* 11 62* 12 59*   HEMOGLOBIN g/dL 9 6* 10 9* 11 1* 10 6* 10 5* 9 8* 9 7*   HEMATOCRIT % 33 2* 36 6 37 1 36 8 36 5 33 7* 32 8*   MCV fL 85 84 86 86 86 85 86   PLATELETS Thousands/uL 319 378 364 366 352 316 306   MCH pg 24 5* 25 0* 25 8* 24 8* 24 8* 24 6* 25 4*   MCHC g/dL 28 9* 29 8* 29 9* 28 8* 28 8* 29 1* 29 6*   RDW % 18 0* 18 3* 18 4* 18 2* 18 3* 17 9* 18 4*   MPV fL 10 3 10 4 10 6 10 5 11 2 10 8 11 0   NRBC AUTO /100 WBCs  --  0 0 0 0 0 0       CMP:  Results from last 7 days   Lab Units 10/06/22  0442 10/05/22  0408 10/04/22  0534 10/03/22  0540 10/02/22  1357 10/02/22  0525 10/01/22  0450 09/30/22  0446   POTASSIUM mmol/L 4 0 4 0 4 7 5 2 4 6 5 4* 3 3* 3 6   CHLORIDE mmol/L 94* 91* 91* 92* 93* 96 95* 102   CO2 mmol/L 32 33* 35* 33* 33* 30 32 29 BUN mg/dL 46* 45* 44* 42* 34* 33* 25 26*   CREATININE mg/dL 1 65* 1 58* 1 53* 1 63* 1 80* 1 49* 1 18 1 08   CALCIUM mg/dL 8 8 9 4 9 4 9 4 9 0 8 6 8 1* 8 1*   AST U/L  --  22 26 33  --  34 52* 83*   ALT U/L  --  50 58* 73*  --  95* 121* 154*   ALK PHOS U/L  --  71 67 68  --  70 68 66   EGFR ml/min/1 73sq m 29 31 32 30 26 33 44 49       BMP:  Results from last 7 days   Lab Units 10/06/22  0442 10/05/22  0408 10/04/22  0534 10/03/22  0540 10/02/22  1357 10/02/22  0525 10/01/22  0450   POTASSIUM mmol/L 4 0 4 0 4 7 5 2 4 6 5 4* 3 3*   CHLORIDE mmol/L 94* 91* 91* 92* 93* 96 95*   CO2 mmol/L 32 33* 35* 33* 33* 30 32   BUN mg/dL 46* 45* 44* 42* 34* 33* 25   CREATININE mg/dL 1 65* 1 58* 1 53* 1 63* 1 80* 1 49* 1 18   CALCIUM mg/dL 8 8 9 4 9 4 9 4 9 0 8 6 8 1*       Lab Results   Component Value Date    NTBNP 3,961 (H) 07/08/2022    NTBNP 593 (H) 07/10/2020    NTBNP 702 (H) 05/23/2020             Results from last 7 days   Lab Units 10/05/22  0408 10/04/22  0534 10/03/22  0540 10/02/22  0525 10/01/22  0450 09/30/22  1408 09/30/22  0446   MAGNESIUM mg/dL 2 2 2 0 1 9 1 9 1 8* 1 5* 1 1*                     Results from last 7 days   Lab Units 10/05/22  0408   INR  1 25*       Lipid Profile:   Lab Results   Component Value Date    CHOL 164 03/27/2017    CHOL 154 09/26/2016    CHOL 163 05/13/2016     Lab Results   Component Value Date    HDL 60 07/08/2022    HDL 58 02/08/2022    HDL 58 09/15/2021     Lab Results   Component Value Date    LDLCALC 32 07/08/2022    LDLCALC 55 02/08/2022    LDLCALC 69 09/15/2021     Lab Results   Component Value Date    TRIG 97 07/08/2022    TRIG 150 02/08/2022    TRIG 139 09/15/2021       Cardiac testing:   Results for orders placed during the hospital encounter of 05/23/20    Echo complete with contrast if indicated    Narrative  [place] [id number], [address] [facility]  [phone]    Transthoracic Echocardiogram  2D, M-mode, Doppler, and Color Doppler    Study date: 26-May-2020    Patient: Garfield Ortiz  MR number: [id number]  Account number: [de-identified]  : 1945  Age: 77 years  Gender: Female  Status: Inpatient  Location: Bedside  Height: 61 in  Weight: 124 7 lb  BP: 142/ 58 mmHg    Indications: Acute MI  Diagnoses: I24 9 - Acute ischemic heart disease, unspecified    Sonographer:  Latonia Monet RDCS  Referring Physician:  Darrin Lee MD  [facility]  [facility]  Interpreting Physician:  Maxwell Zamora MD    SUMMARY    LEFT VENTRICLE:  Systolic function was normal  Ejection fraction was estimated to be 60 %  There were no regional wall motion abnormalities  Wall thickness was at the upper limits of normal   Features were consistent with a pseudonormal left ventricular filling pattern, with concomitant abnormal relaxation and increased filling pressure (grade 2 diastolic dysfunction)  LEFT ATRIUM:  The atrium was mildly to moderately dilated  MITRAL VALVE:  There was mild annular calcification  There was mild regurgitation  AORTIC VALVE:  There was mild regurgitation  TRICUSPID VALVE:  There was mild regurgitation  AORTA:  The root exhibited mild dilatation  There was mild dilatation of the ascending aorta  HISTORY: PRIOR HISTORY: History of CABG x3  CAD  Hypertension  DM2  Ascending aortic aneurysm  Anxiety  GERD  Smoker  Atrial fibrillation  PROCEDURE: The procedure was performed at the bedside  This was a routine study  The transthoracic approach was used  The study included complete 2D imaging, M-mode, complete spectral Doppler, and color Doppler  The heart rate was 77 bpm,  at the start of the study  Echocardiographic views were limited due to high windows  This was a technically difficult study  LEFT VENTRICLE: Size was normal  Systolic function was normal  Ejection fraction was estimated to be 60 %  There were no regional wall motion abnormalities   Wall thickness was at the upper limits of normal  DOPPLER: Features were  consistent with a pseudonormal left ventricular filling pattern, with concomitant abnormal relaxation and increased filling pressure (grade 2 diastolic dysfunction)  RIGHT VENTRICLE: The size was normal  Systolic function was normal  Wall thickness was normal     LEFT ATRIUM: The atrium was mildly to moderately dilated  RIGHT ATRIUM: Size was normal     MITRAL VALVE: There was mild annular calcification  Valve structure was normal  There was normal leaflet separation  DOPPLER: The transmitral velocity was within the normal range  There was no evidence for stenosis  There was mild  regurgitation  AORTIC VALVE: The valve was trileaflet  Leaflets exhibited normal thickness and normal cuspal separation  DOPPLER: Transaortic velocity was within the normal range  There was no evidence for stenosis  There was mild regurgitation  TRICUSPID VALVE: The valve structure was normal  There was normal leaflet separation  DOPPLER: The transtricuspid velocity was within the normal range  There was no evidence for stenosis  There was mild regurgitation  PULMONIC VALVE: Leaflets exhibited normal thickness, no calcification, and normal cuspal separation  DOPPLER: The transpulmonic velocity was within the normal range  There was no significant regurgitation  PERICARDIUM: There was no pericardial effusion  The pericardium was normal in appearance  AORTA: The root exhibited mild dilatation  There was mild dilatation of the ascending aorta  SYSTEMIC VEINS: IVC: The inferior vena cava was normal in size  PULMONARY VEINS: DOPPLER: There was systolic blunting in the pulmonary vein(s)      SYSTEM MEASUREMENT TABLES    2D  %FS: 30 49 %  AV Diam: 4 cm  EDV(Teich): 61 09 ml  EF(Cube): 66 42 %  EF(Teich): 58 72 %  ESV(Cube): 18 1 ml  ESV(Teich): 25 22 ml  IVSd: 1 cm  LA Area: 24 64 cm2  LA Diam: 3 12 cm  LVEDV MOD A4C: 66 08 ml  LVEF MOD A4C: 63 63 %  LVESV MOD A4C: 24 03 ml  LVIDd: 3 78 cm  LVIDs: 2 63 cm  LVLd A4C: 7 09 cm  LVLs A4C: 5 73 cm  LVPWd: 1 cm  RA Area: 11 79 cm2  RV Diam: 2 76 cm  SI(Cube): 23 1 ml/m2  SI(Teich): 23 14 ml/m2  SV MOD A4C: 42 05 ml  SV(Cube): 35 81 ml  SV(Teich): 35 87 ml    CW  TR MaxP 47 mmHg  TR Vmax: 2 62 m/s    MM  TAPSE: 1 61 cm    PW  E': 0 06 m/s  E/E': 12 03  MV A Marcos: 0 8 m/s  MV Dec Lassen: 3 66 m/s2  MV DecT: 210 88 ms  MV E Marcos: 0 77 m/s  MV E/A Ratio: 0 96    [employer] Accredited Echocardiography Laboratory    Prepared and electronically signed by    Davida Alonso MD  Signed 26-May-2020 11:14:41    No results found for this or any previous visit  No results found for this or any previous visit  No valid procedures specified  No results found for this or any previous visit          Meds/Allergies   all current active meds have been reviewed, current meds:   Current Facility-Administered Medications   Medication Dose Route Frequency   • acetaminophen (TYLENOL) tablet 650 mg  650 mg Oral Q6H PRN   • aspirin chewable tablet 324 mg  324 mg Oral Daily   • atorvastatin (LIPITOR) tablet 40 mg  40 mg Oral Daily   • carvedilol (COREG) tablet 6 25 mg  6 25 mg Oral BID With Meals   • ferrous sulfate tablet 325 mg  325 mg Oral Daily With Breakfast   • heparin (porcine) subcutaneous injection 5,000 Units  5,000 Units Subcutaneous Q8H [address]   • hydrALAZINE (APRESOLINE) tablet 25 mg  25 mg Oral Q8H [address]   • insulin lispro (HumaLOG) 100 units/mL subcutaneous injection 1-6 Units  1-6 Units Subcutaneous 4x Daily (AC & HS)   • isosorbide dinitrate (ISORDIL) tablet 20 mg  20 mg Oral TID after meals   • magnesium oxide (MAG-OX) tablet 400 mg  400 mg Oral BID   • melatonin tablet 6 mg  6 mg Oral HS   • niCARdipine (CARDENE) 25 mg (STANDARD CONCENTRATION) in sodium chloride 0 9% 250 mL  1-15 mg/hr Intravenous Titrated   • ondansetron (ZOFRAN) injection 4 mg  4 mg Intravenous Once PRN   • pantoprazole (PROTONIX) EC tablet 40 mg  40 mg Oral Early Morning   • polyethylene glycol (MIRALAX) packet 17 g  17 g Oral BID   • senna-docusate sodium (SENOKOT S) 8 6-50 mg per tablet 2 tablet  2 tablet Oral BID    and PTA meds:   Prior to Admission Medications   Prescriptions Last Dose Informant Patient Reported? Taking?    ALPRAZolam (XANAX) 0 25 mg tablet 9/28/2022 at Unknown time  No Yes   Sig: Take 1 tablet (0 25 mg total) by mouth 3 (three) times a day as needed for anxiety   COMBIGAN 0 2-0 5 % Unknown at Unknown time Self Yes No   acetaminophen (TYLENOL) 325 mg tablet 9/28/2022 at Unknown time Self No Yes   Sig: Take 2 tablets (650 mg total) by mouth every 4 (four) hours as needed for mild pain   amiodarone 200 mg tablet 9/28/2022 at Unknown time  No Yes   Sig: TAKE 1 TABLET DAILY   apixaban (Eliquis) 5 mg 9/28/2022 at Unknown time Self No Yes   Sig: Take 1 tablet (5 mg total) by mouth 2 (two) times a day   atorvastatin (LIPITOR) 40 mg tablet 9/28/2022 at Unknown time Self No Yes   Sig: Take 1 tablet (40 mg total) by mouth daily   ferrous sulfate 325 (65 Fe) mg tablet 9/28/2022 at Unknown time Self Yes Yes   Sig: Take 325 mg by mouth daily with breakfast   furosemide (LASIX) 20 mg tablet 9/28/2022 at Unknown time Self No Yes   Sig: Take 1 tablet (20 mg total) by mouth daily   losartan (COZAAR) 25 mg tablet 9/28/2022 at Unknown time Self No Yes   Sig: Take 1 tablet (25 mg total) by mouth daily   metFORMIN (GLUCOPHAGE) 500 mg tablet 9/28/2022 at Unknown time Self No Yes   Sig: Take 1 tablet (500 mg total) by mouth 2 (two) times a day with meals   metoprolol succinate (TOPROL-XL) 50 mg 24 hr tablet 9/28/2022 at Unknown time Self No Yes   Sig: Take 1 tablet (50 mg total) by mouth 2 (two) times a day   omeprazole (PriLOSEC) 20 mg delayed release capsule 9/28/2022 at Unknown time Self No Yes   Sig: Take 1 capsule (20 mg total) by mouth daily   potassium chloride (MICRO-K) 10 MEQ CR capsule 9/28/2022 at Unknown time Self No Yes   Sig: Take 2 capsules (20 mEq total) by mouth daily traMADol (ULTRAM) 50 mg tablet 9/28/2022 at Unknown time  No Yes   Sig: Take 2 tablets (100 mg total) by mouth 2 (two) times a day      Facility-Administered Medications: None     Medications Prior to Admission   Medication   • acetaminophen (TYLENOL) 325 mg tablet   • ALPRAZolam (XANAX) 0 25 mg tablet   • amiodarone 200 mg tablet   • apixaban (Eliquis) 5 mg   • atorvastatin (LIPITOR) 40 mg tablet   • ferrous sulfate 325 (65 Fe) mg tablet   • furosemide (LASIX) 20 mg tablet   • losartan (COZAAR) 25 mg tablet   • metFORMIN (GLUCOPHAGE) 500 mg tablet   • metoprolol succinate (TOPROL-XL) 50 mg 24 hr tablet   • omeprazole (PriLOSEC) 20 mg delayed release capsule   • potassium chloride (MICRO-K) 10 MEQ CR capsule   • traMADol (ULTRAM) 50 mg tablet   • COMBIGAN 0 2-0 5 %       niCARdipine, 1-15 mg/hr, Last Rate: Stopped (10/06/22 0126)        Counseling / Coordination of Care  Total floor / unit time spent today 20 minutes  Greater than 50% of total time was spent with the patient and / or family counseling and / or coordination of care  ** Please Note: Dragon 360 Dictation voice to text software may have been used in the creation of this document   **

## 2022-10-06 NOTE — PROGRESS NOTES
[facility]  Progress Note Shana Varela 1945, 69 y o  female MRN: [id number]  Unit/Bed#: ICU 07 Encounter: [id number]  Primary Care Provider: Tammy Barnhart DO   Date and time admitted to hospital: 9/28/2022  6:33 PM    LAURENCE (acute kidney injury) [facility])  Assessment & Plan  · Baseline creatinine 0 8-1 1  · Creatinine peaked at 1 8, up trending to 1 65 from 1 58 today  · Repeat BMP at noon  · Ordering urine studies to obtain FeNA and FeUrea  · Consider gentle hydration if pre-renal and poor PO intake    Thoracic aortic dissection  Assessment & Plan  · Known thoracic aortic aneurysm with new 5 9 cm dissection flap of the aortic arch with take off into right coronary cusp and approaching R brachiocephalic artery  · Esmolol for goal HR < 80  · Baseline rhythm is underlying a flutter, intermittently AV or V paced at 60-65  · Cardene for SBP <120  · Continue oral antihypertensives, wean Cardene as able  · Medical management in setting of likely chronic dissection given slow progression of cardiac disease over the last couple months        Constipation  Assessment & Plan  · Continue senna b i d  And MiraLax    Wound of right leg  Assessment & Plan  · Local wound care as needed    Shortness of breath  Assessment & Plan  · Likely secondary to acute on chronic heart failure with new reduction EF  · Wean nasal cannula as able  PAF (paroxysmal atrial fibrillation) (HCC)  Assessment & Plan  · 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    · Previously on Eliquis at home, currently on hold  · Will need to resume full-dose anticoagulation, prefer to start after initiating antiplatelets  · Consider starting heparin drip after antiplatelet agent is on board if tolerating      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, without long-term current use of insulin [facility])  Assessment & Plan  Lab Results   Component Value Date    HGBA1C 6 06/29/2022       Recent Labs     10/04/22  2052 10/05/22  0800 10/05/22  1046 10/05/22  1712   POCGLU 147* 184* 184* 140       Blood Sugar Average: Last 72 hrs:  (P) 135 [id number]     · Continue sliding scale insulin for goal sugar 140-180  · Recently increased algorithm 3, monitor and bump up algorithm if hypoglycemic    Gastroesophageal reflux disease  Assessment & Plan  · Continue home PPI    CAD (coronary atherosclerotic disease)  Assessment & Plan  · Continue maximum goal directed medical therapy  · Continue home statin    * Acute HFrEF (heart failure with reduced ejection fraction) ([facility] [address] )  Assessment & Plan  · Wt Readings from Last 3 Encounters:   10/05/22 59 kg (130 lb 1 1 oz)   08/02/22 59 9 kg (132 lb)   07/18/22 61 7 kg (136 lb)     · Last LVEF in 2020 was 60% with grade 2 diastolic dysfunction  · Most recent echo on 09/29 had LVEF of 55%, grade 2 diastolic dysfunction, global hypokinesis with regional variation  · Worsening LV function likely in setting of acute on chronic dissection now extending into the coronary vasculature  · Continue maximal medical optimization  · Antihypertensives include Isordil 20 t i d , hydralazine 25 t i d   · Transition Toprol XL 50 b i d  To Coreg  · Concurrently on Coreg 6 25 b i d , titrate as able for goal systolic less than 944  · Continue cardiac diet with 2 L fluid restriction  · Continue daily weights  · Currently holding diuretics due to LAURENCE, will resume as able and diurese p r n  For respiratory status  · Currently no obvious volume overload or indication to diurese          ----------------------------------------------------------------------------------------  HPI/24hr events: Sundowning overnight with childish behavior and agitation requiring frequent redirection  Briefly on Cardene due to refusing night time medications but able to wean off rapidly         Disposition: Transfer to Stepdown Level 2  Code Status: Level 3 - DNAR and DNI  ---------------------------------------------------------------------------------------  SUBJECTIVE  Comfortably resting in AM  No CP/SOB  Review of Systems   Constitutional: Negative for chills and fever  Respiratory: Negative for shortness of breath  Cardiovascular: Negative for chest pain  Gastrointestinal: Negative for abdominal distention and abdominal pain  Musculoskeletal: Negative for arthralgias  Neurological: Negative for headaches  Psychiatric/Behavioral: Negative for confusion  Review of systems was reviewed and negative unless stated above in HPI/24-hour events   ---------------------------------------------------------------------------------------  OBJECTIVE    Vitals   Vitals:    10/06/22 0345 10/06/22 0415 10/06/22 0515 10/06/22 0545   BP: (!) 108/49 119/57 116/56 127/57   BP Location:       Pulse: 61 61 60 60   Resp: 20 16 22 (!) 41   Temp:       TempSrc:       SpO2: 96% 91% 94% 94%   Weight:    58 9 kg (129 lb 13 6 oz)   Height:         Temp (24hrs), Av 9 °F (36 6 °C), Min:97 5 °F (36 4 °C), Max:98 1 °F (36 7 °C)  Current: Temperature: 97 9 °F (36 6 °C)          Respiratory:  Comfortable on RA       Invasive/non-invasive ventilation settings   Respiratory  Report   Lab Data (Last 4 hours)    None         O2/Vent Data (Last 4 hours)    None                Physical Exam  Constitutional:       General: She is not in acute distress  Appearance: She is not ill-appearing  Comments: Resting comfortably in bed  HENT:      Head: Normocephalic and atraumatic  Eyes:      General: Lids are normal    Cardiovascular:      Rate and Rhythm: Normal rate and regular rhythm  Heart sounds: Normal heart sounds  Pulmonary:      Effort: Pulmonary effort is normal       Breath sounds: Rales present  Abdominal:      General: Abdomen is flat  There is no distension  Palpations: Abdomen is soft  Skin:     General: Skin is warm and dry        Capillary Refill: Capillary refill takes less than 2 seconds  Neurological:      General: No focal deficit present  Mental Status: She is lethargic  GCS: GCS eye subscore is 4  GCS verbal subscore is 5  GCS motor subscore is 6  Psychiatric:         Behavior: Behavior is cooperative               Laboratory and Diagnostics:  Results from last 7 days   Lab Units 10/06/22  0442 10/05/22  0408 10/04/22  0534 10/03/22  0540 10/02/22  0525 10/01/22  0450 09/30/22  0446   WBC Thousand/uL 11 01* 13 93* 13 74* 15 75* 14 41* 11 62* 12 59*   HEMOGLOBIN g/dL 9 6* 10 9* 11 1* 10 6* 10 5* 9 8* 9 7*   HEMATOCRIT % 33 2* 36 6 37 1 36 8 36 5 33 7* 32 8*   PLATELETS Thousands/uL 319 378 364 366 352 316 306   NEUTROS PCT %  --  74 71 71 73 67 67   MONOS PCT %  --  8 10 9 10 10 9     Results from last 7 days   Lab Units 10/06/22  0442 10/05/22  0408 10/04/22  0534 10/03/22  0540 10/02/22  1357 10/02/22  0525 10/01/22  0450 09/30/22  0446   SODIUM mmol/L 134* 134* 135 134* 135 136 137 140   POTASSIUM mmol/L 4 0 4 0 4 7 5 2 4 6 5 4* 3 3* 3 6   CHLORIDE mmol/L 94* 91* 91* 92* 93* 96 95* 102   CO2 mmol/L 32 33* 35* 33* 33* 30 32 29   ANION GAP mmol/L 8 10 9 9 9 10 10 9   BUN mg/dL 46* 45* 44* 42* 34* 33* 25 26*   CREATININE mg/dL 1 65* 1 58* 1 53* 1 63* 1 80* 1 49* 1 18 1 08   CALCIUM mg/dL 8 8 9 4 9 4 9 4 9 0 8 6 8 1* 8 1*   GLUCOSE RANDOM mg/dL 120 154* 155* 209* 230* 163* 152* 172*   ALT U/L  --  50 58* 73*  --  95* 121* 154*   AST U/L  --  22 26 33  --  34 52* 83*   ALK PHOS U/L  --  71 67 68  --  70 68 66   ALBUMIN g/dL  --  3 9 3 7 3 7  --  3 8 3 6 3 5   TOTAL BILIRUBIN mg/dL  --  0 91 0 87 0 80  --  0 79 0 83 0 77     Results from last 7 days   Lab Units 10/05/22  0408 10/04/22  0534 10/03/22  0540 10/02/22  0525 10/01/22  0450 09/30/22  1408 09/30/22  0446   MAGNESIUM mg/dL 2 2 2 0 1 9 1 9 1 8* 1 5* 1 1*      Results from last 7 days   Lab Units 10/05/22  0408   INR  1 25*   PTT seconds 30              ABG:    VBG:          Micro EKG: Intermittent A/V paced  Imaging: I have personally reviewed pertinent reports  Intake and Output  I/O       10/04 0701  10/05 0700 10/05 0701  10/06 0700    P  O  180 120    I V  (mL/kg)  228 3 (3 9)    Total Intake(mL/kg) 180 (3 1) 348 3 (5 9)    Urine (mL/kg/hr)  350 (0 2)    Total Output  350    Net +180 -1 7          Unmeasured Urine Occurrence 2 x 2 x          Height and Weights   Height: 4' 11" (149 9 cm)     Body mass index is 26 23 kg/m²  Weight (last 2 days)     Date/Time Weight    10/06/22 0545 58 9 (129 85)    10/05/22 0524 59 (130 07)    10/04/22 0558 58 5 (128 97)            Nutrition       Diet Orders   (From admission, onward)             Start     Ordered    10/05/22 1408  Diet Cardiovascular; Cardiac; Consistent Carbohydrate Diet Level 3 (6 carb servings/90 grams CHO/meal), Fluid Restriction 1500 ML  Diet effective now        References:    Nutrtion Support Algorithm Enteral vs  Parenteral   Question Answer Comment   Diet Type Cardiovascular    Cardiac Cardiac    Other Restriction(s): Consistent Carbohydrate Diet Level 3 (6 carb servings/90 grams CHO/meal)    Other Restriction(s): Fluid Restriction 1500 ML    RD to adjust diet per protocol?  Yes        10/05/22 1407    10/04/22 1312  Dietary nutrition supplements  Once        Question Answer Comment   Select Supplement: Ensure Compact-Vanilla    Frequency Breakfast, Dinner        10/04/22 1312                  Active Medications  Scheduled Meds:  Current Facility-Administered Medications   Medication Dose Route Frequency Provider Last Rate   • acetaminophen  650 mg Oral Q6H PRN Melissa Arnold MD     • atorvastatin  40 mg Oral Daily Melissa Arnold MD     • carvedilol  6 25 mg Oral BID With Meals Ihsna Russell     • ferrous sulfate  325 mg Oral Daily With Breakfast Melissa Arnold MD     • hydrALAZINE  25 mg Oral [facility] Margo Brand PA-C     • insulin lispro  1-6 Units Subcutaneous 4x Daily (AC & HS) Ihsan Russell     • isosorbide dinitrate  20 mg Oral TID after meals [facility] [place]     • magnesium oxide  400 [facility]ID [facility]     • melatonin  6 mg Oral HS Howard Kim     • niCARdipine  1-15 mg/hr Intravenous Titrated Howard Kim Stopped (10/06/22 0126)   • ondansetron  4 mg Intravenous Once PRN [facility]     • pantoprazole  40 mg Oral Early Morning Iman Buck MD     • polyethylene glycol  17 g Oral BID [facility]     • senna-docusate sodium  2 tablet Oral BID Jed Renteria       Continuous Infusions:  niCARdipine, 1-15 mg/hr, Last Rate: Stopped (10/06/22 0126)      PRN Meds:   acetaminophen, 650 mg, Q6H PRN  ondansetron, 4 mg, Once PRN        Invasive Devices Review  Invasive Devices  Report    Peripheral Intravenous Line  Duration           Peripheral IV 10/04/22 Right;Ventral (anterior) Forearm 2 days          Drain  Duration           Ureteral Drain/Stent Right ureter 6 Fr  816 days                Rationale for remaining devices:   ---------------------------------------------------------------------------------------  Advance Directive and Living Will:      Power of :    POLST:    ---------------------------------------------------------------------------------------  Care Time Delivered:   No Critical Care time spent       [facility]      Portions of the record may have been created with voice recognition software  Occasional wrong word or "sound a like" substitutions may have occurred due to the inherent limitations of voice recognition software    Read the chart carefully and recognize, using context, where substitutions have occurred

## 2022-10-06 NOTE — PLAN OF CARE
Problem: PHYSICAL THERAPY ADULT  Goal: Performs mobility at highest level of function for planned discharge setting  See evaluation for individualized goals  Description: Treatment/Interventions: Functional transfer training, LE strengthening/ROM, Elevations, Therapeutic exercise, Endurance training, Patient/family training, Equipment eval/education, Bed mobility, Gait training          See flowsheet documentation for full assessment, interventions and recommendations  Outcome: Progressing  Note:    Problem List: Decreased strength, Decreased endurance, Decreased mobility, Impaired balance, Decreased cognition, Impaired judgement, Decreased safety awareness, Impaired vision  Assessment: Pt seen for PT intervention, of note pt w/ escalating level of care required since last session  Pt w/ significant impairment and decrease in cognition and safety awareness then evaluation  Pt continues to require min Ax 1 for all functional mobility  Pt incontinent of urine upon arrival, appears unaware  Assisted w/ ambulating pt OOB to recliner chair, and then performed another STS from recliner chair w/ min A x 1 and stood x 3 min while attempting to get clean  Pt returned to recliner chair at end of session  Discharge recommenadtion continues to be for post-acute inpatient rehab  Pt lives home by herself and needs to navigate 2 flights of steps to access her apartment  Barriers to Discharge: [address] home environment, Decreased caregiver support     PT Discharge Recommendation: Post acute rehabilitation services    See flowsheet documentation for full assessment

## 2022-10-06 NOTE — NURSING NOTE
Bp 184/70  Morning antihypertensives administered  Howard Kim PA-C notified  Order received for labetalol  Administered   Shi Summers

## 2022-10-06 NOTE — PHYSICAL THERAPY NOTE
PHYSICAL THERAPY TREATMENT NOTE    Patient Name: Charity Wilson  TFDGY'T Date: 10/6/2022       10/06/22 1518   PT Last Visit   PT Visit Date 10/06/22   Note Type   Note Type Treatment   Pain Assessment   Pain Assessment Tool 0-10   Pain Score No Pain   Restrictions/Precautions   Weight Bearing Precautions Per Order No   Other Precautions Chair Alarm; Bed Alarm;Multiple lines;Telemetry; Fall Risk   General   Chart Reviewed Yes   Additional Pertinent History Pt transferred from med-surg to ICU for closer hemodynamic monitoring due to aortic dissection  Spoke to [facility] AP Jose Cruz, re: dissection  Plan for medical management, no surgical intervention as she is high risk  Response to Previous Treatment Patient unable to report, no changes reported from family or staff   Family/Caregiver Present No   Cognition   Overall Cognitive Status (S)  Impaired   Arousal/Participation Alert; Cooperative   Attention Attends with cues to redirect   Orientation Level Oriented to person;Disoriented to place; Disoriented to time;Disoriented to situation   Memory Decreased short term memory;Decreased recall of recent events   Following Commands Follows one step commands with increased time or repetition   Comments Per RN, pt w/ significant AMS overnight, only oriented to self  Has been lethargic today (secondary to being up last night), suspect some ICU vs hospital acquired delirium   Subjective   Subjective "Is this real?" pt reports having limited memory of what's going on  Reoriented and educated pt   Bed Mobility   Supine to Sit 4  Minimal assistance   Additional items Assist x 1; Increased time required;Verbal cues;LE management  (to pt's L)   Sit to Supine Unable to assess   Additional Comments Pt seated OOB in recliner chair at end of eval   Transfers   Sit to Stand 4  Minimal assistance   Additional items Assist x 1; Increased time required;Verbal cues   Stand to Sit 4  Minimal assistance   Additional items Assist x 1; Increased time required;Verbal cues   Ambulation/Elevation   Gait pattern Shuffling; Short stride; Excessively slow   Gait Assistance 4  Minimal assist   Additional items Assist x 1   Assistive Device Rolling walker   Distance 20 ft   Ambulation/Elevation Additional Comments additional ambulation deferred due to pt c/o SOB and fatigue, vitals unremarkable   Balance   Static Sitting Fair +   Static Standing Poor +   Ambulatory Poor +   Activity Tolerance   Activity Tolerance Patient limited by fatigue   Nurse Made Aware Spoke to GAEL Moe, GAEL Correa   Assessment   Problem List Decreased strength;Decreased endurance;Decreased mobility; Impaired balance;Decreased cognition; Impaired judgement;Decreased safety awareness; Impaired vision   Assessment Pt seen for PT intervention, of note pt w/ escalating level of care required since last session  Pt w/ significant impairment and decrease in cognition and safety awareness then evaluation  Pt continues to require min Ax 1 for all functional mobility  Pt incontinent of urine upon arrival, appears unaware  Assisted w/ ambulating pt OOB to recliner chair, and then performed another STS from recliner chair w/ min A x 1 and stood x 3 min while attempting to get clean  Pt returned to recliner chair at end of session  Discharge recommenadtion continues to be for post-acute inpatient rehab  Pt lives home by herself and needs to navigate 2 flights of steps to access her apartment   Barriers to Discharge Inaccessible home environment;Decreased caregiver support   Goals   Patient Goals none expressed, will continue to assess   STG Expiration Date 10/09/22   Short Term Goal #1 pt will:  Increase bilateral LE strength 1/2 grade to facilitate independent mobility, Perform bed mobility modified independent to increase level of independence, Perform all transfers w/ supervision to improve independence, Ambulate 150 ft  with least restrictive assistive device w/ supervision w/o LOB to improve functional independence, Navigate 10 stair(s) w/ minx1 with unilateral handrail to facilitate return to previous living environment, Increase ambulatory balance 1 grade to decrease risk for falls, Complete exercise program independently to increase strength and endurance, Tolerate 3 hr OOB to faciliate upright tolerance   PT Treatment Day 1   Plan   Treatment/Interventions Functional transfer training;LE strengthening/ROM; Therapeutic exercise;Elevations; Endurance training;Cognitive reorientation;Patient/family training;Equipment eval/education; Bed mobility;Gait training   Progress Slow progress, medical status limitations   PT Frequency 3-5x/wk   Recommendation   PT Discharge Recommendation Post acute rehabilitation services   Equipment Recommended Walker   AM-PAC Basic Mobility Inpatient   Turning in Bed Without Bedrails 3   Lying on Back to Sitting on Edge of Flat Bed 3   Moving Bed to Chair 3   Standing Up From Chair 3   Walk in Room 3   Climb 3-5 Stairs 1   Basic Mobility Inpatient Raw Score 16   Basic Mobility Standardized Score 38 32   Highest Level Of Mobility   -HLM Goal 5: Stand one or more mins   JH-HLM Achieved 6: Walk 10 steps or more   End of Consult   Patient Position at End of Consult Bedside chair;Bed/Chair alarm activated; All needs within reach     The patient's AM-PAC Basic Mobility Inpatient Short Form Raw Score is 16  A Raw score of greater than 16 suggests the patient may benefit from discharge to home  Please also refer to the recommendation of the Physical Therapist for safe discharge planning  While the [facility] score is indicative of home, therapist's current recommendation at this time of eval is for rehab   The following factors influenced this difference in recommendation and needs to be considered in the big picture for discharge: abwdcbarriers: stairs to enter home/to fully access home, limited or no home support, requires 24/7 supervision and that is not currently available, and requires constant cues to maintain safety awareness and mobility (at increased risk of falling without cues)  PT will continue to follow during this admission and change recommendation as/if appropriate       Marilu Walker, PT, DPT

## 2022-10-06 NOTE — PLAN OF CARE
Problem: Prexisting or High Potential for Compromised Skin Integrity  Goal: Skin integrity is maintained or improved  Description: INTERVENTIONS:  - Identify patients at risk for skin breakdown  - Assess and monitor skin integrity  - Assess and monitor nutrition and hydration status  - Monitor labs   - Assess for incontinence   - Turn and reposition patient  - Assist with mobility/ambulation  - Relieve pressure over bony prominences  - Avoid friction and shearing  - Provide appropriate hygiene as needed including keeping skin clean and dry  - Evaluate need for skin moisturizer/barrier cream  - Collaborate with interdisciplinary team   - Patient/family teaching  - Consider wound care consult   Outcome: Progressing     Problem: MOBILITY - ADULT  Goal: Maintain or return to baseline ADL function  Description: INTERVENTIONS:  -  Assess patient's ability to carry out ADLs; assess patient's baseline for ADL function and identify physical deficits which impact ability to perform ADLs (bathing, care of mouth/teeth, toileting, grooming, dressing, etc )  - Assess/evaluate cause of self-care deficits   - Assess range of motion  - Assess patient's mobility; develop plan if impaired  - Assess patient's need for assistive devices and provide as appropriate  - Encourage maximum independence but intervene and supervise when necessary  - Involve family in performance of ADLs  - Assess for home care needs following discharge   - Consider OT consult to assist with ADL evaluation and planning for discharge  - Provide patient education as appropriate  Outcome: Progressing  Goal: Maintains/Returns to pre admission functional level  Description: INTERVENTIONS:  - Perform BMAT or MOVE assessment daily    - Set and communicate daily mobility goal to care team and patient/family/caregiver     - Collaborate with rehabilitation services on mobility goals if consulted  - Out of bed for toileting  - Record patient progress and toleration of activity level   Outcome: Progressing     Problem: Nutrition/Hydration-ADULT  Goal: Nutrient/Hydration intake appropriate for improving, restoring or maintaining nutritional needs  Description: Monitor and assess patient's nutrition/hydration status for malnutrition  Collaborate with interdisciplinary team and initiate plan and interventions as ordered  Monitor patient's weight and dietary intake as ordered or per policy  Utilize nutrition screening tool and intervene as necessary  Determine patient's food preferences and provide high-protein, high-caloric foods as appropriate       INTERVENTIONS:  - Monitor oral intake, urinary output, labs, and treatment plans  - Assess nutrition and hydration status and recommend course of action  - Evaluate amount of meals eaten  - Assist patient with eating if necessary   - Allow adequate time for meals  - Recommend/ encourage appropriate diets, oral nutritional supplements, and vitamin/mineral supplements  - Order, calculate, and assess calorie counts as needed  - Recommend, monitor, and adjust tube feedings and TPN/PPN based on assessed needs  - Assess need for intravenous fluids  - Provide specific nutrition/hydration education as appropriate  - Include patient/family/caregiver in decisions related to nutrition  Outcome: Progressing     Problem: Potential for Falls  Goal: Patient will remain free of falls  Description: INTERVENTIONS:  - Educate patient/family on patient safety including physical limitations  - Instruct patient to call for assistance with activity   - Consult OT/PT to assist with strengthening/mobility   - Keep Call bell within reach  - Keep bed low and locked with side rails adjusted as appropriate  - Keep care items and personal belongings within reach  - Initiate and maintain comfort rounds  - Make Fall Risk Sign visible to staff  - Apply yellow socks and bracelet for high fall risk patients  - Consider moving patient to room near nurses station  Outcome: Progressing

## 2022-10-06 NOTE — NURSING NOTE
Upon 2000 assessment pt calm, cooperative, ,  alert and oriented x4  At 2100 pt agitated, screaming at staff, refusing to wear pulse ox,  take any medication, or allow RN to get blood glucose check  Pt  Is now only alert to person  Blood pressure rised at this time  CRNP aware  Pt restarted on cardene gtt at this time- NV UE restraints ordered at this time

## 2022-10-07 ENCOUNTER — APPOINTMENT (INPATIENT)
Dept: CT IMAGING | Facility: HOSPITAL | Age: 77
DRG: 291 | End: 2022-10-07
Payer: COMMERCIAL

## 2022-10-07 PROBLEM — G93.40 ENCEPHALOPATHY: Status: ACTIVE | Noted: 2022-10-07

## 2022-10-07 LAB
ANION GAP SERPL CALCULATED.3IONS-SCNC: 8 MMOL/L (ref 4–13)
BUN SERPL-MCNC: 40 MG/DL (ref 5–25)
CALCIUM SERPL-MCNC: 9 MG/DL (ref 8.4–10.2)
CHLORIDE SERPL-SCNC: 96 MMOL/L (ref 96–108)
CO2 SERPL-SCNC: 33 MMOL/L (ref 21–32)
CREAT SERPL-MCNC: 1.41 MG/DL (ref 0.6–1.3)
FOLATE SERPL-MCNC: 18.8 NG/ML (ref 3.1–17.5)
GFR SERPL CREATININE-BSD FRML MDRD: 36 ML/MIN/1.73SQ M
GLUCOSE SERPL-MCNC: 127 MG/DL (ref 65–140)
GLUCOSE SERPL-MCNC: 140 MG/DL (ref 65–140)
GLUCOSE SERPL-MCNC: 149 MG/DL (ref 65–140)
GLUCOSE SERPL-MCNC: 172 MG/DL (ref 65–140)
GLUCOSE SERPL-MCNC: 238 MG/DL (ref 65–140)
POTASSIUM SERPL-SCNC: 4.2 MMOL/L (ref 3.5–5.3)
SODIUM SERPL-SCNC: 137 MMOL/L (ref 135–147)
VIT B12 SERPL-MCNC: 552 PG/ML (ref 100–900)

## 2022-10-07 PROCEDURE — 82948 REAGENT STRIP/BLOOD GLUCOSE: CPT

## 2022-10-07 PROCEDURE — G1004 CDSM NDSC: HCPCS

## 2022-10-07 PROCEDURE — 82746 ASSAY OF FOLIC ACID SERUM: CPT | Performed by: PHYSICIAN ASSISTANT

## 2022-10-07 PROCEDURE — 97535 SELF CARE MNGMENT TRAINING: CPT

## 2022-10-07 PROCEDURE — 99222 1ST HOSP IP/OBS MODERATE 55: CPT | Performed by: INTERNAL MEDICINE

## 2022-10-07 PROCEDURE — G0008 ADMIN INFLUENZA VIRUS VAC: HCPCS | Performed by: INTERNAL MEDICINE

## 2022-10-07 PROCEDURE — 70450 CT HEAD/BRAIN W/O DYE: CPT

## 2022-10-07 PROCEDURE — 90662 IIV NO PRSV INCREASED AG IM: CPT | Performed by: INTERNAL MEDICINE

## 2022-10-07 PROCEDURE — 82607 VITAMIN B-12: CPT | Performed by: PHYSICIAN ASSISTANT

## 2022-10-07 PROCEDURE — 99233 SBSQ HOSP IP/OBS HIGH 50: CPT | Performed by: INTERNAL MEDICINE

## 2022-10-07 PROCEDURE — 80048 BASIC METABOLIC PNL TOTAL CA: CPT | Performed by: PHYSICIAN ASSISTANT

## 2022-10-07 PROCEDURE — 99232 SBSQ HOSP IP/OBS MODERATE 35: CPT | Performed by: PHYSICIAN ASSISTANT

## 2022-10-07 RX ORDER — CARVEDILOL 12.5 MG/1
12.5 TABLET ORAL 2 TIMES DAILY WITH MEALS
Status: DISCONTINUED | OUTPATIENT
Start: 2022-10-07 | End: 2022-10-10 | Stop reason: HOSPADM

## 2022-10-07 RX ORDER — CLOPIDOGREL BISULFATE 75 MG/1
75 TABLET ORAL DAILY
Status: DISCONTINUED | OUTPATIENT
Start: 2022-10-07 | End: 2022-10-10 | Stop reason: HOSPADM

## 2022-10-07 RX ORDER — CARVEDILOL 6.25 MG/1
6.25 TABLET ORAL ONCE
Status: COMPLETED | OUTPATIENT
Start: 2022-10-07 | End: 2022-10-07

## 2022-10-07 RX ADMIN — POLYETHYLENE GLYCOL 3350 17 G: 17 POWDER, FOR SOLUTION ORAL at 09:14

## 2022-10-07 RX ADMIN — CLOPIDOGREL BISULFATE 75 MG: 75 TABLET ORAL at 13:14

## 2022-10-07 RX ADMIN — CARVEDILOL 6.25 MG: 6.25 TABLET, FILM COATED ORAL at 09:13

## 2022-10-07 RX ADMIN — ISOSORBIDE DINITRATE 20 MG: 10 TABLET ORAL at 13:14

## 2022-10-07 RX ADMIN — ASPIRIN 81 MG CHEWABLE TABLET 324 MG: 81 TABLET CHEWABLE at 09:14

## 2022-10-07 RX ADMIN — POLYETHYLENE GLYCOL 3350 17 G: 17 POWDER, FOR SOLUTION ORAL at 22:32

## 2022-10-07 RX ADMIN — HEPARIN SODIUM 5000 UNITS: 5000 INJECTION INTRAVENOUS; SUBCUTANEOUS at 13:15

## 2022-10-07 RX ADMIN — FERROUS SULFATE TAB 325 MG (65 MG ELEMENTAL FE) 325 MG: 325 (65 FE) TAB at 09:13

## 2022-10-07 RX ADMIN — HEPARIN SODIUM 5000 UNITS: 5000 INJECTION INTRAVENOUS; SUBCUTANEOUS at 05:36

## 2022-10-07 RX ADMIN — HYDRALAZINE HYDROCHLORIDE 25 MG: 25 TABLET ORAL at 05:36

## 2022-10-07 RX ADMIN — HYDRALAZINE HYDROCHLORIDE 25 MG: 25 TABLET ORAL at 13:14

## 2022-10-07 RX ADMIN — ISOSORBIDE DINITRATE 20 MG: 10 TABLET ORAL at 09:14

## 2022-10-07 RX ADMIN — HEPARIN SODIUM 5000 UNITS: 5000 INJECTION INTRAVENOUS; SUBCUTANEOUS at 22:32

## 2022-10-07 RX ADMIN — SENNOSIDES AND DOCUSATE SODIUM 2 TABLET: 8.6; 5 TABLET ORAL at 17:35

## 2022-10-07 RX ADMIN — MAGNESIUM OXIDE TAB 400 MG (241.3 MG ELEMENTAL MG) 400 MG: 400 (241.3 MG) TAB at 09:13

## 2022-10-07 RX ADMIN — INSULIN LISPRO 3 UNITS: 100 INJECTION, SOLUTION INTRAVENOUS; SUBCUTANEOUS at 17:38

## 2022-10-07 RX ADMIN — HYDRALAZINE HYDROCHLORIDE 25 MG: 25 TABLET ORAL at 22:32

## 2022-10-07 RX ADMIN — Medication 6 MG: at 22:32

## 2022-10-07 RX ADMIN — INFLUENZA A VIRUS A/VICTORIA/2570/2019 IVR-215 (H1N1) ANTIGEN (FORMALDEHYDE INACTIVATED), INFLUENZA A VIRUS A/DARWIN/9/2021 SAN-010 (H3N2) ANTIGEN (FORMALDEHYDE INACTIVATED), INFLUENZA B VIRUS B/PHUKET/3073/2013 ANTIGEN (FORMALDEHYDE INACTIVATED), AND INFLUENZA B VIRUS B/MICHIGAN/01/2021 ANTIGEN (FORMALDEHYDE INACTIVATED) 0.7 ML: 60; 60; 60; 60 INJECTION, SUSPENSION INTRAMUSCULAR at 15:39

## 2022-10-07 RX ADMIN — CARVEDILOL 6.25 MG: 6.25 TABLET, FILM COATED ORAL at 13:14

## 2022-10-07 RX ADMIN — MAGNESIUM OXIDE TAB 400 MG (241.3 MG ELEMENTAL MG) 400 MG: 400 (241.3 MG) TAB at 17:34

## 2022-10-07 RX ADMIN — SENNOSIDES AND DOCUSATE SODIUM 2 TABLET: 8.6; 5 TABLET ORAL at 09:14

## 2022-10-07 RX ADMIN — PANTOPRAZOLE SODIUM 40 MG: 40 TABLET, DELAYED RELEASE ORAL at 05:35

## 2022-10-07 RX ADMIN — ATORVASTATIN CALCIUM 40 MG: 40 TABLET, FILM COATED ORAL at 09:13

## 2022-10-07 NOTE — PLAN OF CARE
Problem: OCCUPATIONAL THERAPY ADULT  Goal: Performs self-care activities at highest level of function for planned discharge setting  See evaluation for individualized goals  Description: Treatment Interventions: ADL retraining, Functional transfer training, Endurance training, Patient/family training, Equipment evaluation/education, Compensatory technique education, Continued evaluation, Energy conservation, Activityengagement          See flowsheet documentation for full assessment, interventions and recommendations  Outcome: Progressing  Note: Limitation: Decreased ADL status, Decreased UE strength, Decreased endurance, Decreased self-care trans, Decreased high-level ADLs     Assessment: Pt seen for OT treatment session from 1329 - 1405  Although experiencing fatigue and an sudden onset of nausea, pt was cooperative and willing to participate in ADL tasks  Pt required S to wash BUE and chest, min A to unfasten snaps of hospital gown sleeves, S for bed mobility, and min A to transfer to/from surfaces  Pt scored  5 on the Short Blessed Test indicating questionable cognitive impairment  Pt presents with decreased activity tolerance, decreased endurance, decreased fxal mobility, decreased orientation to situation, and delayed recall  Continue to recommend post acute rehab when medically stable for d/c from acute care       OT Discharge Recommendation: Post acute rehabilitation services

## 2022-10-07 NOTE — CASE MANAGEMENT
Case Management Discharge Planning Note    Patient name Yandel Rae  [address] 233/[address] -48 MRN [id number]  : 1945 Date 10/7/2022       Current Admission Date: 2022  Current Admission Diagnosis:Acute HFrEF (heart failure with reduced ejection fraction) [facility])   Patient Active Problem List    Diagnosis Date Noted   • Encephalopathy 10/07/2022   • Thoracic aortic dissection 10/05/2022   • LAURENCE (acute kidney injury) ([facility] ) 10/05/2022   • Constipation 10/04/2022   • Wound of right leg 2022   • Acute HFrEF (heart failure with reduced ejection fraction) ([facility] ) 2022   • Hypertensive urgency 2022   • Shortness of breath 2022   • Acquired absence of other right toe(s) ([facility] ) 2022   • Continuous opioid dependence ([facility] ) 2022   • PAF (paroxysmal atrial fibrillation) ([facility] ) 02/15/2021   • Anemia, unspecified 10/14/2020   • Vitamin B 12 deficiency 10/14/2020   • Pacemaker 2020   • Preop cardiovascular exam 2020   • Foreign body in vagina 2020   • Nephrolithiasis 2020   • Hematuria 2020   • Unintentional weight loss 2020   • Generalized weakness 2020   • Gross hematuria 2020   • Elevated troponin level not due myocardial infarction 2020   • Osteopenia 2016   • Tobacco abuse 2016   • Aneurysm of ascending aorta 2015   • CAD (coronary atherosclerotic disease) 2015   • Gastroesophageal reflux disease 2015   • 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[address] ) 2015   • Glaucoma 2014   • Macular degeneration 2014   • 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, without long-term current use of insulin ([facility] ) 2013   • Neck pain 2013   • Anxiety 2013   • Essential hypertension 2013   • Hypercholesterolemia 2013      LOS (days): 9  Geometric Mean LOS (GMLOS) (days): 3 90  Days to GMLOS:-4 8     OBJECTIVE:  Risk of Unplanned Readmission Score: 26 92 Current admission status: Inpatient   Pref[facility] Pharmacy:   [place] [facility]  [address] [facility] [address]  Phone: [phone] Fax: [phone]    [address] [facility], [address]  [address] [facility] [address]  Phone: [phone] Fax: [phone] Rakel Mota [facility] [address]  Phone: [phone] Fax: [phone]    Primary Care Provider: Omar Barone,     Primary Insurance: [address] [place] [address]  Secondary Insurance:     DISCHARGE DETAILS:    Discharge planning discussed with[de-identified] Delvis Shepard via Aidin     Comments - Freedom of Choice: CM update [facility] of the Pt tentative readiness for d/c over the weekend and provided with this NPI information to begin the auth process  CM requested updated OT notes and submitted the auth information to the CM support team to begin the auth process  CM was in contact with the Pt's Bia Ortega with an update on the Pt's d/c planning         Transport at Discharge : [facility]

## 2022-10-07 NOTE — OCCUPATIONAL THERAPY NOTE
Occupational Therapy Progress Note     Patient Name: Yary Levine  KPTPV'O Date: 10/7/2022  Problem List  Principal Problem:    Acute HFrEF (heart failure with reduced ejection fraction) ([facility] )  Active Problems:    CAD (coronary atherosclerotic disease)    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, without long-term current use of insulin (HCC)    PAF (paroxysmal atrial fibrillation) ([facility] )    Hypertensive urgency    Thoracic aortic dissection    LAURENCE (acute kidney injury) ([facility] )    Encephalopathy          10/07/22 1329   OT Last Visit   OT Visit Date 10/07/22  (Friday)   Note Type   Note Type Treatment for insurance authorization   Restrictions/Precautions   Weight Bearing Precautions Per Order No   Other Precautions Cognitive; Chair Alarm; Bed Alarm; Fall Risk  (Mayelin Joiner on room air)   General   Response to Previous Treatment Patient reporting fatigue but able to participate   Family/Caregiver Present No  Pt reports that she thinks her daughter will be in today   Lifestyle   Autonomy Pt reports ling alone in apt   Reciprocal Relationships Pt reports supportive daughter   Service to Others Pt confirms that she used to work at [employer] [place] [employer] Pt reports enjoying watching games shows especially Matching Game, Chain Reaction   Pain Assessment   Pain Assessment Tool 0-10   Pain Score No Pain   Pain Location/Orientation   (my lips hurt a little, maybe dry / chapped)   ADL   Where Assessed Edge of bed   Eating Assistance 7  Independent   Eating Comments seated OOB in chair eating lunch at end of session   Grooming Assistance 4  Minimal Assistance   Grooming Deficit Increased time to complete;Verbal cueing;Brushing hair   Grooming Comments seated in chair due to decreased act jordon, stand jordon   UB Bathing Assistance 5  Supervision/Setup  (to wash BUE and chest; required vc for continuation of task)   UB Bathing Deficit Increased time to complete   UB Bathing Comments engaged in UB bathing supine HOB elevated and OOB in chair to wash hair using shower cap   LB Bathing Assistance Unable to assess  (anticipate min A based on fxal obs skills)   UB Dressing Assistance 4  Minimal Assistance  (to unfasten snaps of hospital gown sleeves)   UB Dressing Deficit Increased time to complete; Fasteners   LB Dressing Assistance Unable to assess  (anticipate min A based on fxal obs skills)   Toileting Assistance  Unable to assess  (Pt did not need to void; anticipate S based on fxal obs skills)   Bed Mobility   Supine to Sit 5  Supervision  (to pt's R)   Additional items Bedrails; Increased time required   Additional Comments Pt seated in chair at end of session eating lunch w/ chair alarm activated and callbell in reach  Transfers   Sit to Stand 4  Minimal assistance  (CG/steadying A)   Additional items Assist x 1; Increased time required   Stand to Sit 4  Minimal assistance  (CG/ steadying A)   Additional items Assist x 1; Armrests; Increased time required   Functional Mobility   Functional Mobility 4  Minimal assistance   Additional Comments CG/steadying A   Additional items   (no AD; pt reported that she wanted to try w/o RW)   Cognition   Overall Cognitive Status Impaired   Arousal/Participation Cooperative;Lethargic   Attention Attends with cues to redirect   Orientation Level Oriented to person;Oriented to place  (generally to time for year and month)   Memory Decreased recall of recent events;Decreased short term memory  ("I don't know, I'm not sure")   Following Commands Follows one step commands with increased time or repetition   Comments identified pt by full name and birthdate; pt willing to participate in tx session; pt demonstrated flat affect; engaged in Short Blessed Test and required 1 vc to recall month before Sept  and 1 vc to recall the cue of the given address   Cognition Assessment Tools   (Short Blessed Test)   Score 5   Activity Tolerance   Activity Tolerance Patient limited by fatigue; Other (Comment)  (nausea)   Medical Staff Made Aware per RN, Cristobal Stoddard appropriate to see pt and spoke to Geovanni SWAN   Assessment   Assessment Pt seen for OT treatment session from 1329 - 1405  Although experiencing fatigue and an sudden onset of nausea, pt was cooperative and willing to participate in ADL tasks  Pt required S to wash BUE and chest, min A to unfasten snaps of hospital gown sleeves, S for bed mobility, and min A to transfer to/from surfaces  Pt scored  5 on the Short Blessed Test indicating questionable cognitive impairment  Pt presents with decreased activity tolerance, decreased endurance, decreased fxal mobility, decreased orientation to situation, and delayed recall  Continue to recommend post acute rehab when medically stable for d/c from acute care  Plan   Treatment Interventions ADL retraining; Endurance training;Cognitive reorientation;Patient/family training;Continued evaluation; Energy conservation; Activityengagement;UE strengthening/ROM   Goal Expiration Date 10/14/22  (pt continues to demonstrate progress in OT towards goals established during eval  Extended to 10/14/22)   OT Frequency 3-5x/wk   Recommendation   OT Discharge Recommendation Post acute rehabilitation services   AM-PAC Daily Activity Inpatient   Lower Body Dressing 3   Bathing 3   Toileting 3   Upper Body Dressing 3   Grooming 3   Eating 4   Daily Activity Raw Score 19   Daily Activity Standardized Score (Calc for Raw Score >=11) 40 22   AM-PAC Applied Cognition Inpatient   Following a Speech/Presentation 3   Understanding Ordinary Conversation 4   Taking Medications 3   Remembering Where Things Are Placed or Put Away 4   Remembering List of 4-5 Errands 3   Taking Care of Complicated Tasks 3   Applied Cognition Raw Score 20   Applied Cognition Standardized Score 41 76   Barthel Index   Feeding 10   Bathing 0   Grooming Score 0   Dressing Score 5   Bladder Score 10   Bowels Score 10   Toilet Use Score 10 Transfers (Bed/Chair) Score 10   Mobility (Level Surface) Score 0   Stairs Score 0   Barthel Index Score 55   Modified Bindu Scale   Modified [place] Scale 4     Taryn Quan OTS

## 2022-10-07 NOTE — PROGRESS NOTES
Cardiology Progress Note - Peggy Woody 69 y o  female MRN: [id number]    Unit/Bed#: S -01 Encounter: [id number]      Assessment/Recommendations:  1  Acute systolic heart failure:  Presented initially with progressive dyspnea and volume overload  Patient was noted to have a new cardiomyopathy with ejection fraction at 36% with global hypokinesis  Patient was diuresed and volume status is now euvolemic  Volume status is very well controlled , patient is euvolemic , will hold off on further diuretic dosing for the time being  Continued on beta-blocker, hydralazine and Isordil  Renal dysfunction limits use of Ace or ARB  2  New cardiomyopathy:  Ejection fraction 36% with global hypokinesis  Possibly ischemic in etiology  Currently on beta-blocker, aspirin, statin  Further workup with cardiac catheterization was initially recommended, however considering elevated risk in the setting of progressing aortic dissection on CTA, will favor medical management  3  Severe aortic root dilatation:  Noted to be 5 6 cm on echocardiogram and 5 9 cm on CTA along with progressing dissection  4  CAD:  With CABG x3 in 2015  New cardiomyopathy, likely ischemic  Proceeding with medical management with aspirin, statin, beta-blocker  5  Sick sinus syndrome:  Status post Medtronic dual-chamber permanent pacemaker  Follow regular outpatient evaluations  6  Chronic atrial fibrillation/flutter:  Normally anticoagulated with Eliquis, however in the setting of aortic dissection that is progressing, favor to stop this at the current time and except stroke risk  Continued on beta-blocker for rate control  7  Hypertension:  Remains elevated with current dosing  Will increase carvedilol to 12 5 mg b i d  In the setting of aortic dissection and aneurysm  8  Type 2 diabetes mellitus:  As per primary team   9  Transaminitis:  Noted on admission, improved  10  Anemia:  Baseline hemoglobin around 10  Remains stable     11  Acute kidney injury:  With stable creatinine in the 1 5 range, normally in 0 8-1 1 range, however with diuretics and dye load with a CT scan, will now accept a higher creatinine  12  Type A aortic dissection:  Critical care team has had discussions with CT surgery regarding surgical options  Considering that she would be a redo sternotomy with elevated risk considering comorbidities, favoring medical therapy with blood pressure control and systolic blood pressure goal of less than 140  Increase beta-blocker to achieve this goal this morning  Patient also with dissection flap extending into the non coronary and right coronary cusp approaching the right brachiocephalic artery takeoff  Considering that coronary is are at risk for potential extension of dissection, will continue on aspirin and add Plavix for further management  Continues also on beta-blocker therapy as described above  Subjective:   Patient seen and examined  No significant events overnight   ; pertinent negatives - chest pain, chest pressure/discomfort, dyspnea, irregular heart beat, lower extremity edema and palpitations  Objective:     Vitals: Blood pressure 146/50, pulse 60, temperature 98 8 °F (37 1 °C), resp  rate 18, height 4' 11" (1 499 m), weight 58 8 kg (129 lb 10 1 oz), SpO2 93 %, not currently breastfeeding , Body mass index is 26 18 kg/m² ,   Orthostatic Blood Pressures    Flowsheet Row Most Recent Value   Blood Pressure 146/50 filed at 10/07/2022 8264   Patient Position - Orthostatic VS Lying filed at 10/06/2022 0836            Intake/Output Summary (Last 24 hours) at 10/7/2022 1103  Last data filed at 10/6/2022 2001  Gross per 24 hour   Intake 480 ml   Output --   Net 480 ml       TELE: No significant arrhythmias seen      Physical Exam:    GEN: Pegge Sp appears well, alert and oriented x 3, pleasant and cooperative   HEENT: pupils equal, round, and reactive to light; extraocular muscles intact  NECK: supple, no carotid bruits   HEART: regular rhythm, normal S1 and S2, + systolic murmur, no clicks, gallops or rubs   LUNGS: clear to auscultation bilaterally; no wheezes, rales, or rhonchi   ABDOMEN: normal bowel sounds, soft, no tenderness, no distention  EXTREMITIES: peripheral pulses normal; no clubbing, cyanosis, or edema  NEURO: no focal findings   SKIN: normal without suspicious lesions on exposed skin    Medications:      Current Facility-Administered Medications:   •  acetaminophen (TYLENOL) tablet 650 mg, 650 mg, Oral, Q6H PRN, Danilo Fulling  •  aspirin chewable tablet 324 mg, 324 mg, Oral, Daily, Danilo Fulling, 324 mg at 10/07/22 9490  •  atorvastatin (LIPITOR) tablet 40 mg, 40 mg, Oral, Daily, Danilo Fulling, 40 mg at 10/07/22 9617  •  carvedilol (COREG) tablet 6 25 mg, 6 25 mg, Oral, BID With Meals, Danilo Fulling, 6 25 mg at 10/07/22 8988  •  ferrous sulfate tablet 325 mg, 325 mg, Oral, Daily With Breakfast, Danilo Fulling, 325 mg at 10/07/22 0913  •  heparin (porcine) subcutaneous injection 5,000 Units, 5,000 Units, Subcutaneous, Q8H [address], Danilo Fulling, 5,000 Units at 10/07/22 0536  •  hydrALAZINE (APRESOLINE) tablet 25 mg, 25 mg, Oral, Q8H [address], Jed G Dexter, 25 mg at 10/07/22 0536  •  insulin lispro (HumaLOG) 100 units/mL subcutaneous injection 1-6 Units, 1-6 Units, Subcutaneous, 4x Daily (AC & HS), 2 Units at 10/06/22 2235 **AND** Fingerstick Glucose (POCT), , , 4x Daily AC and at bedtime, Danilo Fulling  •  isosorbide dinitrate (ISORDIL) tablet 20 mg, 20 mg, Oral, TID after meals, Danilo Fulling, 20 mg at 10/07/22 2953  •  magnesium oxide (MAG-OX) tablet 400 mg, 400 mg, Oral, BID, Danilo Fulling, 400 mg at 10/07/22 0543  •  melatonin tablet 6 mg, 6 mg, Oral, HS, Danilo Carcamo, 6 mg at 10/06/22 2118  •  ondansetron (ZOFRAN) injection 4 mg, 4 mg, Intravenous, Once PRN, Danilo Carcamo  •  pantoprazole (PROTONIX) EC tablet 40 mg, 40 mg, Oral, Early Morning, Jed Renteria, 40 mg at 10/07/22 6471  • polyethylene glycol (MIRALAX) packet 17 g, 17 g, Oral, BID, Jed G Dexter, 17 g at 10/07/22 0914  •  senna-docusate sodium (SENOKOT S) 8 6-50 mg per tablet 2 tablet, 2 tablet, Oral, BID, Donel Nyla, 2 tablet at 10/07/22 0914     Labs & Results:        Results from last 7 days   Lab Units 10/06/22  0442 10/05/22  0408 10/04/22  0534   WBC Thousand/uL 11 01* 13 93* 13 74*   HEMOGLOBIN g/dL 9 6* 10 9* 11 1*   HEMATOCRIT % 33 2* 36 6 37 1   PLATELETS Thousands/uL 319 378 364         Results from last 7 days   Lab Units 10/06/22  1325 10/06/22  0442 10/05/22  0408 10/04/22  0534 10/03/22  0540   POTASSIUM mmol/L 4 2 4 0 4 0 4 7 5 2   CHLORIDE mmol/L 94* 94* 91* 91* 92*   CO2 mmol/L 34* 32 33* 35* 33*   BUN mg/dL 43* 46* 45* 44* 42*   CREATININE mg/dL 1 54* 1 65* 1 58* 1 53* 1 63*   CALCIUM mg/dL 8 7 8 8 9 4 9 4 9 4   ALK PHOS U/L  --   --  71 67 68   ALT U/L  --   --  50 58* 73*   AST U/L  --   --  22 26 33     Results from last 7 days   Lab Units 10/05/22  0408   INR  1 25*   PTT seconds 30     Results from last 7 days   Lab Units 10/05/22  0408 10/04/22  0534 10/03/22  0540   MAGNESIUM mg/dL 2 2 2 0 1 9       Echo: personally reviewed - ejection fraction 36% by biplane measurements with global hypokinesis  Grade 2 diastolic dysfunction  Left atrial filling pressure is elevated, reduced RV function  Dilated left atrium  Mild-to-moderate AR  Mild to moderate MR  Moderate pulmonary hypertension  Mild-to-moderate IA  Aortic root is significantly dilated at 5 6 cm  EKG personally reviewed by Rony Gotti

## 2022-10-07 NOTE — ASSESSMENT & PLAN NOTE
· Baseline creatinine 0 7-1 0, patient presented with creatinine which has remained elevated at 1 5, unchanged  · Avoid nephrotoxic agents  · Diuretics being held at this time

## 2022-10-07 NOTE — CASE MANAGEMENT
Case Management Discharge Planning Note    Patient name Jadene Harada  [address] 233/[address] -06 MRN [id number]  : 1945 Date 10/7/2022       Current Admission Date: 2022  Current Admission Diagnosis:Acute HFrEF (heart failure with reduced ejection fraction) [facility])   Patient Active Problem List    Diagnosis Date Noted   • Encephalopathy 10/07/2022   • Thoracic aortic dissection 10/05/2022   • LAURENCE (acute kidney injury) ([facility] ) 10/05/2022   • Constipation 10/04/2022   • Wound of right leg 2022   • Acute HFrEF (heart failure with reduced ejection fraction) ([facility] ) 2022   • Hypertensive urgency 2022   • Shortness of breath 2022   • Acquired absence of other right toe(s) ([facility] ) 2022   • Continuous [facility] dependence ([facility] ) 2022   • PAF (paroxysmal atrial fibrillation) ([facility] ) 02/15/2021   • Anemia, unspecified 10/14/2020   • Vitamin B 12 deficiency 10/14/2020   • Pacemaker 2020   • Preop cardiovascular exam 2020   • Foreign body in vagina 2020   • Nephrolithiasis 2020   • Hematuria 2020   • Unintentional weight loss 2020   • Generalized weakness 2020   • Gross hematuria 2020   • Elevated troponin level not due myocardial infarction 2020   • Osteopenia 2016   • Tobacco abuse 2016   • Aneurysm of ascending aorta 2015   • CAD (coronary atherosclerotic disease) 2015   • Gastroesophageal reflux disease 2015   • 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[facility] ) 2015   • Glaucoma 2014   • Macular degeneration 2014   • 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, without long-term current use of insulin ([facility] ) 2013   • Neck pain 2013   • Anxiety 2013   • Essential hypertension 2013   • Hypercholesterolemia 2013      LOS (days): 9  Geometric Mean LOS (GMLOS) (days): 3 90  Days to GMLOS:-4 9     OBJECTIVE:  Risk of Unplanned Readmission Score: 26 92 Current admission status: Inpatient   Preferred Pharmacy:   [address] [facility]  [address] [facility] [address] [address]  Phone: [phone] Fax: [phone]    [facility] [facility]) Bettie Smyths, [address]  [address] [facility] [address] [address]  Phone: [phone] Fax: [phone] [facility] [address]  Phone: [phone] Fax: [phone]    Primary Care Provider: Lakisha Thomas DO    Primary Insurance: [facility]  Secondary Insurance:     DISCHARGE DETAILS:                                                                                                               [facility] [address] [place] [address] Number: submitted SNF auth request to Abi Ornelas  pending ref #[id number] for [facility]  NPI: [id number]  Dr Jose Oneill  NPI: [id number]   Clinicals faxed to [phone]

## 2022-10-07 NOTE — ASSESSMENT & PLAN NOTE
· Known thoracic aortic aneurysm with new 5 9 cm dissection flap of the aortic arch with take off into right coronary cusp and approaching R brachiocephalic artery    It is likely this is chronic with slow progression  · CT surgery did not feel she was eligible for surgical intervention  · Continue medical management

## 2022-10-07 NOTE — ASSESSMENT & PLAN NOTE
· Currently denies chest pain  S/P grafting x3  Most recent cardiac catheterization was in May 2020 which displayed multivessel disease that was completely revascularized    · Continue aspirin, Plavix, beta-blocker, statin

## 2022-10-07 NOTE — ASSESSMENT & PLAN NOTE
Blood pressure elevated on arrival with readings in 180s-200s/70s-80s  Maintain blood pressure less than 964 systolic  Continue oral hydralazine, Coreg, Isordil  Monitor blood pressure

## 2022-10-07 NOTE — ASSESSMENT & PLAN NOTE
· Rate/Rhythm Control: Toprol XL, Amiodarone  Antiplatelet/Anticoagulation: Eliquis  Per device interrogation, has been 100% AFib since 11/2021  EKG: ventricular paced rhythm with underlying flutter    · Discontinued Amiodarone per cardiology rec  · Continue Toprol XL, Eliquis

## 2022-10-07 NOTE — PROGRESS NOTES
[facility]  Progress Note Arielle Anand 1945, 69 y o  female MRN: [id number]  Unit/Bed#: S -01 Encounter: [id number]  Primary Care Provider: Latha Bermudez DO   Date and time admitted to hospital: 9/28/2022  6:33 PM    * Acute HFrEF (heart failure with reduced ejection fraction) ([facility] )  Assessment & Plan  · Presents with  increased shortness of breath, dyspnea on exertion and lower extremity edema in setting of decreased diuretic dosing 1-2 weeks ago and high sodium diet  · Newly found to have reduced EF 36% and hypokinesis  Initially was being recommended for cardiac catheterization but given risk with dissection, medical management was pursued instead  · Appreciate cardiology input  · Continue optimal goal directed medical therapy with beta-blocker, isordil, hydralazine    Encephalopathy  Assessment & Plan  · Per daughter she has been having visual hallucinations, agitation, confusion  Some of this was occurring prior to the hospitalization but was certainly worse during the hospitalization  Likely some element of hospital-acquired delirium but per discussion with daughter, concerned that there is more going on as well  · She has no prior history of dementia but her sister does  · Would check head CT and basic lab workup  · Will ask for geriatric consultation    Thoracic aortic dissection  Assessment & Plan  · Known thoracic aortic aneurysm with new 5 9 cm dissection flap of the aortic arch with take off into right coronary cusp and approaching R brachiocephalic artery    It is likely this is chronic with slow progression  · CT surgery did not feel she was eligible for surgical intervention  · Continue medical management      LAURENCE (acute kidney injury) ([facility] )  Assessment & Plan  · Baseline creatinine 0 7-1 0, patient presented with creatinine which has remained elevated at 1 5, unchanged  · Avoid nephrotoxic agents  · Diuretics being held at this time    Hypertensive urgency  Assessment & Plan  Blood pressure elevated on arrival with readings in 180s-200s/70s-80s  Maintain blood pressure less than 231 systolic  Continue oral hydralazine, Coreg, Isordil  Monitor blood pressure  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, without long-term current use of insulin [facility])  Assessment & Plan  Lab Results   Component Value Date    HGBA1C 6 06/29/2022     Recent Labs     10/06/22  1814 10/06/22  2235 10/07/22  0723 10/07/22  1135   POCGLU 187* 222* 140 172*       Blood Sugar Average: Last 72 hrs:  · (P) 175 25 PTA regimen of Metformin, will hold while inpatient  · Accu-Checks and SSI  · Hypoglycemia protocol  PAF (paroxysmal atrial fibrillation) ([facility])  Assessment & Plan  · Rate/Rhythm Control: Toprol XL, Amiodarone  Antiplatelet/Anticoagulation: Eliquis  Per device interrogation, has been 100% AFib since 11/2021  EKG: ventricular paced rhythm with underlying flutter  · Discontinued Amiodarone per cardiology rec  · Continue Toprol XL, Eliquis    CAD (coronary atherosclerotic disease)  Assessment & Plan  · Currently denies chest pain  S/P grafting x3  Most recent cardiac catheterization was in May 2020 which displayed multivessel disease that was completely revascularized  · Continue aspirin, Plavix, beta-blocker, statin    VTE Pharmacologic Prophylaxis: VTE Score: 7 sc heparin    Patient Centered Rounds:spoke with RN  Discussions with Specialists or Other Care Team Provider:  Case discussed with case management, cardiology and geriatric    Education and Discussions with Family / Patient: Updated  (daughter) via phone  Addressed concerns about changes in mental status and safe living arrangements    Time Spent for Care: 30 minutes  More than 50% of total time spent on counseling and coordination of care as described above      Current Length of Stay: 9 day(s)  Current Patient Status: Inpatient   Certification Statement: The patient will continue to require additional inpatient hospital stay due to Mental status workup  Also needs insurance authorization for placement  Discharge Plan: Anticipate discharge in 48 hrs to rehab facility  Code Status: Level 3 - DNAR and DNI    Subjective:   "Can I go home today?" Patient denies any abdominal, chest or back pain, or shortness of breath    Objective:     Vitals:   Temp (24hrs), Av 6 °F (37 °C), Min:98 2 °F (36 8 °C), Max:98 8 °F (37 1 °C)    Temp:  [98 2 °F (36 8 °C)-98 8 °F (37 1 °C)] 98 8 °F (37 1 °C)  HR:  [60-79] 60  Resp:  [18] 18  BP: (130-160)/(50-88) 146/50  SpO2:  [92 %-95 %] 93 %  Body mass index is 26 18 kg/m²  Input and Output Summary (last 24 hours): Intake/Output Summary (Last 24 hours) at 10/7/2022 1213  Last data filed at 10/6/2022 2001  Gross per 24 hour   Intake 480 ml   Output --   Net 480 ml       Physical Exam:   Physical Exam  Vitals reviewed  Constitutional:       General: She is not in acute distress  Appearance: She is not ill-appearing, toxic-appearing or diaphoretic  Eyes:      General: No scleral icterus  Right eye: No discharge  Left eye: No discharge  Conjunctiva/sclera: Conjunctivae normal    Cardiovascular:      Rate and Rhythm: Normal rate and regular rhythm  Heart sounds: No murmur heard  Pulmonary:      Effort: No respiratory distress  Breath sounds: No stridor  No wheezing or rhonchi  Abdominal:      General: There is no distension  Tenderness: There is no guarding  Musculoskeletal:      Right lower leg: No edema  Left lower leg: No edema  Skin:     General: Skin is warm and dry  Coloration: Skin is not jaundiced or pale  Findings: No bruising, erythema or lesion  Neurological:      General: No focal deficit present  Mental Status: She is alert  Mental status is at baseline  Deep Tendon Reflexes: Abnormal reflex: calm and cooperative        Comments: Appropriate and conversant during out interaction          Additional Data:     Labs:  Results from last 7 days   Lab Units 10/06/22  0442 10/05/22  0408   WBC Thousand/uL 11 01* 13 93*   HEMOGLOBIN g/dL 9 6* 10 9*   HEMATOCRIT % 33 2* 36 6   PLATELETS Thousands/uL 319 378   NEUTROS PCT %  --  74   LYMPHS PCT %  --  15   MONOS PCT %  --  8   EOS PCT %  --  1     Results from last 7 days   Lab Units 10/06/22  1325 10/06/22  0442 10/05/22  0408   SODIUM mmol/L 135   < > 134*   POTASSIUM mmol/L 4 2   < > 4 0   CHLORIDE mmol/L 94*   < > 91*   CO2 mmol/L 34*   < > 33*   BUN mg/dL 43*   < > 45*   CREATININE mg/dL 1 54*   < > 1 58*   ANION GAP mmol/L 7   < > 10   CALCIUM mg/dL 8 7   < > 9 4   ALBUMIN g/dL  --   --  3 9   TOTAL BILIRUBIN mg/dL  --   --  0 91   ALK PHOS U/L  --   --  71   ALT U/L  --   --  50   AST U/L  --   --  22   GLUCOSE RANDOM mg/dL 142*   < > 154*    < > = values in this interval not displayed       Results from last 7 days   Lab Units 10/05/22  0408   INR  1 25*     Results from last 7 days   Lab Units 10/07/22  1135 10/07/22  0723 10/06/22  2235 10/06/22  1814 10/06/22  0922 10/05/22  1712 10/05/22  1046 10/05/22  0800 10/04/22  2052 10/04/22  1529 10/04/22  1127 10/04/22  0731   POC GLUCOSE mg/dl 172* 140 222* 187* 162* 140 184* 184* 147* 205* 171* 189*               Lines/Drains:  Invasive Devices  Report    Peripheral Intravenous Line  Duration           Peripheral IV 10/04/22 Right;Ventral (anterior) Forearm 3 days          Drain  Duration           Ureteral Drain/Stent Right ureter 6 Fr  817 days                  Telemetry:  Telemetry Orders (From admission, onward)             48 Hour Telemetry Monitoring  Continuous x 48 hours        References:    Telemetry Guidelines   Question:  Reason for 48 Hour Telemetry  Answer:  Acute CVA (<24 hrs old, hemispheric strokes, selected brainstem strokes, cardiac arrhythmias)                 Telemetry Reviewed: stable, pacer spikes  Indication for Continued Telemetry Use:              Imaging:    Recent Cultures (last 7 days):         Last 24 Hours Medication List:   Current Facility-Administered Medications   Medication Dose Route Frequency Provider Last Rate   • acetaminophen  650 mg Oral Q6H PRN [facility]     • aspirin  324 mg Oral Daily [facility]     • atorvastatin  40 mg Oral Daily [facility]     • carvedilol  12 5 mg Oral BID With Meals Frederic Warren MD     • carvedilol  6 25 mg Oral Once Frederic Warren MD     • clopidogrel  75 mg Oral Daily Frederic Warren MD     • ferrous sulfate  325 mg Oral Daily With Breakfast [facility]     • heparin (porcine)  5,000 Units Subcutaneous Q8H [address] [facility]     • hydrALAZINE  25 mg Oral Q8H [address] Jed Babs Gaston     • insulin lispro  1-6 Units Subcutaneous 4x Daily (AC & HS) [facility]     • isosorbide dinitrate  20 mg Oral TID after meals Jed Gaston     • magnesium oxide  400 mg Oral BID [facility]     • melatonin  6 mg Oral HS Jed Renteria     • ondansetron  4 mg Intravenous Once PRN [facility]     • pantoprazole  40 mg Oral Early Morning Jed Renteria     • polyethylene glycol  17 g Oral BID [facility]     • senna-docusate sodium  2 tablet Oral BID [facility]          Today, Patient Was Seen By: Meghan Shoemaker    **Please Note: This note may have been constructed using a voice recognition system  **

## 2022-10-07 NOTE — CONSULTS
Consultation - Geriatric Medicine   Jason Hall 69 y o  female MRN: [id number]  Unit/Bed#: S -01 Encounter: [id number]        Inpatient consult to Gerontology  Consult performed by: Marilyn Moore MD  Consult ordered by: Guero Vargas PA-C            Assessment/Plan   1 -metabolic encephalopathy -patient with fluctuating course in her confusion  When I saw her she was alert and oriented was able to answer questions appropriately  Daughter was present in the room  I did review her medications and also noted that the patient has had some elements of dementia  Suspect a combination of vascular given the fact that the patient has had atrial fibrillation and Alzheimer's  She will need a [place] evaluation to evaluate her current standing  Patient has an extensive cardiac history with evidence of heart failure with reduced ejection fraction  She also has had a pacemaker placed in the past   She has evidence of prolonged QT interval which makes the use of antipsychotics difficult because he can not elicit cardiac arrhythmias such as torsade de Kevin  Our best option would be for family to stay patient through the night and try reorientation techniques  Would only give antipsychotic such as Zyprexa if the patient becomes extremely agitated and poses harm to herself or others  I will place her on delirium precautions per geriatric protocol    Delirium precautions  -Patient is high risk of delirium due to cognitive decline  -Initiate delirium precautions  -maintain normal sleep/wake cycle  -minimize overnight interruptions, group overnight vitals/labs/nursing checks as possible  -dim lights, close blinds and turn off tv to minimize stimulation and encourage sleep environment in evenings  -ensure that pain is well controlled  consider Tylenol 975mg Q8H scheduled if not already ordered   -monitor for fecal and urinary retention which may precipitate delirium  -encourage early mobilization and ambulation  -provide frequent reorientation and redirection  -encourage family and friends at the bedside to help help calm patient if anxious  -avoid medications which may precipitate or worsen delirium such as tramadol, benzodiazepine, anticholinergics, and benadryl  -encourage hydration and nutrition   -redirect unwanted behaviors as first line, avoid physical restraints, use chemical restraint only if all other attempts have been unsuccessful, would consider Zyprexa 2 5 mg IM, monitor for orthostatic hypotension and QTc prolongation with repeat dosing, recommend lowest dose possible for shortest duration possible    2  - ascending thoracic aneurysm dissection-type a dissection  -patient felt to be an inoperable candidate at this time  Eliquis was discontinued and the patient was placed on aspirin and Plavix  Daughter aware of possibility of rupture of his ascending aorta  3  -acute heart failure with reduced ejection fraction  -patient shortness of breath has improved the edema in her lower extremities also has improved markedly the daughter states that her feet in particular were very puffy and she has noticed marked improvement  Current ejection fraction is 36% with global hypokinesis on echo  Cardiology did evaluate patient during her present admission  4  -chronic renal insufficiency stage 3B  - patient with a GFR of 36  Electrolytes within normal range  Creatinine at 1 41       5 -atrial fibrillation  -patient currently on Toprol, amiodarone anti-platelet anticoagulation Eliquis was discontinued  Patient has had atrial fib since November of 2021  Patient does have a ventricular paced rhythm has pacemaker  Cardiology recommended discontinuing her amiodarone  6 -medication reconciliation  -patient currently on aspirin 324 mg orally daily this probably can be titrated to a baby aspirin 81 mg a day, atorvastatin 40 mg orally daily, Coreg 12 5 mg orally twice daily, Plavix 75 mg orally daily, ferrous sulfate 325 mg orally daily, hydralazine 25 mg every 8 hours, isosorbide 20 mg orally daily, magnesium oxide 400 mg orally daily, Protonix 40 mg orally daily, melatonin 6 mg orally daily, MiraLax 17 g orally daily twice daily, senna 2 tablets orally daily twice daily  Of note patient had been on tramadol 50 mg 2 tablets daily 2 times a day at home  7 -anemia normochromic normocytic -I do see that she had a hemoglobin of 9 6 would recommend checking iron panel to see if there is still a need to give supplemental iron at present perhaps her anemia is mainly driven by her renal insufficiency  8 -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    9 -mildly elevated blood sugars -would check a hemoglobin A1c to further elucidate her current glycemic control  10 -small circular wound that measures about a cm in her right lower extremity appears to be healing             History of Present Illness   Physician Requesting Consult: Glenda Bethea MD  Reason for Consult / Principal Problem:  Metabolic encephalopathy  Hx and PE limited by:  No limitations I was able to obtain a  history of both from daughter and from the patient  Additional history obtained from:  Patient's daughter Vlad Cárdenas phone number [phone]  She was present in the room at the time of evaluation and was very helpful  HPI: Lon Brunner is a 69y o  year old female who presents to [facility] emergency room with a chief complaint of shortness of breath with exertion and orthopnea for the past 5 days prior to admission  Admission date was September 28, 2022  Patient has F fairly extensive past medical history relevant for paroxysmal atrial fib had been on Eliquis switched currently to aspirin and Plavix because of her ascending aortic dissection, tachy-keanu syndrome, coronary disease, hypertension, hyperlipidemia, GERD, diabetes mellitus type 2   Patient related having shortness of breath with exertion orthopnea for the prior 5 days  She saw her primary care physician who prescribed Lasix he noticed bilateral lower extremity edema for the past 2 months  Patient had been instructed previously to take p o  Lasix every 3 days as well  Patient also developed a wound in her right lateral left shin after bumping herself on a bucket  Since her admission echocardiogram revealed an ejection fracture of 36% with evidence of hypokinesis initially they recommended cardiac catheterization however the patient did appear to have on ascending thoracic aortic aneurysm with a 5 9 cm dissection flap of the aortic arch with right coronary cusps and approaching the right brachycephalic artery  Patient felt to be a high operable risk  Patient also has history of acute kidney injury, hypertension, diabetes mellitus type 2, paroxysmal atrial fibrillation, and coronary disease  Patient has developed delirium reason for which I am being consulted at present  Patient's daughter states that she had noticed a decline in her cognition prior to hospitalization mainly for recent memory  Interestingly patient had been able to manage her bills prior to admission  Review of Systems   Constitutional: Negative  HENT:        Has a full set of dentures up   Eyes:        Patient is blind in left eye   Respiratory: Negative  Cardiovascular:        History of atrial fibrillation   Gastrointestinal: Positive for constipation  Endocrine:        Elevated blood sugars noted   Genitourinary:        History of urinary incontinence since she started using diuretic at higher doses   Musculoskeletal: Positive for arthralgias and gait problem  Skin:        Small laceration on right lower extremity   Allergic/Immunologic: Negative  Psychiatric/Behavioral: Positive for confusion and decreased concentration          Patient was alert and oriented when I saw her       Memory/Cognitive screening:  Patient having visual hallucinations was seeing her grandchildren, tax collectors  She had a decline in her memory prior to current admission  Mobility:  She has fairly good mobility prior to admission  Currently using a walker with front wheels  Falls:  Daughter suspects that patient had falls in the past she recently injured her right lower extremity on a bucket  Assistive Devices:  Patient utilizes a walker with front wheels  Fraility:  No evidence of frailty  Nutrition/weight loss/grocery shopping/meal preparation:  Good nutrition  Vision impairment:  Patient has visual loss of left eye she does have evidence of wet macular degeneration on the right was getting injections  Hearing impairment:  Patient with  Incontinence:  Patient has developed urinary incontinence  Delirium:  Patient with delirium  Polypharmacy:   No current facility-administered medications on file prior to encounter       Current Outpatient Medications on File Prior to Encounter   Medication Sig Dispense Refill   • acetaminophen (TYLENOL) 325 mg tablet Take 2 tablets (650 mg total) by mouth every 4 (four) hours as needed for mild pain 30 tablet 0   • ALPRAZolam (XANAX) 0 25 mg tablet Take 1 tablet (0 25 mg total) by mouth 3 (three) times a day as needed for anxiety 90 tablet 3   • amiodarone 200 mg tablet TAKE 1 TABLET DAILY 90 tablet 3   • apixaban (Eliquis) 5 mg Take 1 tablet (5 mg total) by mouth 2 (two) times a day 180 tablet 3   • atorvastatin (LIPITOR) 40 mg tablet Take 1 tablet (40 mg total) by mouth daily 90 tablet 3   • ferrous sulfate 325 (65 Fe) mg tablet Take 325 mg by mouth daily with breakfast     • furosemide (LASIX) 20 mg tablet Take 1 tablet (20 mg total) by mouth daily 30 tablet 5   • losartan (COZAAR) 25 mg tablet Take 1 tablet (25 mg total) by mouth daily 90 tablet 3   • metFORMIN (GLUCOPHAGE) 500 mg tablet Take 1 tablet (500 mg total) by mouth 2 (two) times a day with meals 180 tablet 3   • metoprolol succinate (TOPROL-XL) 50 mg 24 hr tablet Take 1 tablet (50 mg total) by mouth 2 (two) times a day 180 tablet 3   • omeprazole (PriLOSEC) 20 mg delayed release capsule Take 1 capsule (20 mg total) by mouth daily 90 capsule 3   • potassium chloride (MICRO-K) 10 MEQ CR capsule Take 2 capsules (20 mEq total) by mouth daily 60 capsule 3   • traMADol (ULTRAM) 50 mg tablet Take 2 tablets (100 mg total) by mouth 2 (two) times a day 120 tablet 3   • COMBIGAN 0 2-0 5 %        Patients primary residence:  Patient lives in her own apartment Lives with:  She lives by herself  iADL's:  Patient was able to pay her bills prior to admission without any difficulty  ADL's:  Prior to admission patient had enjoyed all her basic activities of daily living    Historical Information   Past medical history:   Past Medical History:   Diagnosis Date   • Atypical chest pain    • GERD (gastroesophageal reflux disease)    • Hyperlipidemia    • Hypertension    • Kidney stone    • Myocardial infarction [facility])     2015    • NSTEMI (non-ST elevated myocardial infarction) ([facility] [address] )     Last Assessed: 9/24/2015     Past surgical history:   Past Surgical History:   Procedure Laterality Date   • A-V CARDIAC PACEMAKER INSERTION     • ANKLE SURGERY     • BACK SURGERY  01/2011    L4 and L5 laminectomy and fusion    • BLADDER SURGERY     • CORONARY ARTERY BYPASS GRAFT  09/02/2015    CABGx3 with LIMA to LAD, SVG to PDA, SVG to OM-1   • EXAMINATION UNDER ANESTHESIA N/A 7/11/2020    Procedure: EXAM UNDER ANESTHESIA (EUA)  EXCISION OF POSTERIOR VAGINAL FOREIGN BODY;  Surgeon: Ricardo Ruth MD;  [facility];  Service: Gynecology   • FL RETROGRADE PYELOGRAM  7/11/2020   • FL RETROGRADE PYELOGRAM  8/7/2020   • HYSTERECTOMY     • MD CYSTO/URETERO W/LITHOTRIPSY &INDWELL STENT INSRT Right 8/7/2020    Procedure: CYSTOSCOPY URETEROSCOPY WITH LITHOTRIPSY HOLMIUM LASER, RETROGRADE PYELOGRAM AND INSERTION STENT URETERAL;  Surgeon: Sayra Rush MD;  [facility];  Service: Urology   • MD CYSTOURETHROSCOPY,URETER CATHETER Right 7/11/2020    Procedure: CYSTOSCOPY RETROGRADE PYELOGRAM WITH INSERTION STENT URETERAL, bladder biopsy with fulguration;  Surgeon: Jose Bonner MD;  [facility];  Service: Urology   • WRIST SURGERY       Social history:  Social History     Socioeconomic History   • Marital status: Single     Spouse name: Not on file   • Number of children: 1   • Years of education: Not on file   • Highest education level: Not on file   Occupational History   • Not on file   Tobacco Use   • Smoking status: Current Every Day Smoker     Packs/day: 0 25     Start date: 5   • Smokeless tobacco: Never Used   • Tobacco comment: Started smoking at age 25; currently smoking <1ppd  Vaping Use   • Vaping Use: Never used   Substance and Sexual Activity   • Alcohol use: Not Currently   • Drug use: Never   • Sexual activity: Not Currently     Partners: Male     Birth control/protection: Post-menopausal   Other Topics Concern   • Not on file   Social History Narrative   • Not on file     Social Determinants of Health     Financial Resource Strain: Medium Risk   • Difficulty of Paying Living Expenses: Somewhat hard   Food Insecurity: No Food Insecurity   • Worried About Running Out of Food in the Last Year: Never true   • Ran Out of Food in the Last Year: Never true   Transportation Needs: No Transportation Needs   • Lack of Transportation (Medical): No   • Lack of Transportation (Non-Medical):  No   Physical Activity: Not on file   Stress: Not on file   Social Connections: Not on file   Intimate Partner Violence: Not on file   Housing Stability: Low Risk    • Unable to Pay for Housing in the Last Year: No   • Number of Places Lived in the Last Year: 1   • Unstable Housing in the Last Year: No     Family history:   Family History   Problem Relation Age of Onset   • Hypertension Mother    • Hypertension Sister    • Alcohol abuse Brother    • Cirrhosis Brother    • Diabetes Father    • Other Brother         Heart transplant in his 46s   • Lung cancer Sister    • Diabetes Brother    • Stroke Sister    • No Known Problems Daughter    • No Known Problems Maternal Grandmother    • No Known Problems Maternal Grandfather    • No Known Problems Paternal Grandmother    • No Known Problems Paternal Grandfather    • No Known Problems Sister        Meds/Allergies   All current active meds have been reviewed  Allergies   Allergen Reactions   • Nickel Rash       Objective   Vitals:    10/07/22 1313   BP: 124/51   Pulse: 61   Resp:    Temp:    SpO2: 91%       Intake/Output Summary (Last 24 hours) at 10/7/2022 1346  Last data filed at 10/6/2022 2001  Gross per 24 hour   Intake 480 ml   Output --   Net 480 ml     Invasive Devices  Report    Peripheral Intravenous Line  Duration           Peripheral IV 10/04/22 Right;Ventral (anterior) Forearm 3 days          Drain  Duration           Ureteral Drain/Stent Right ureter 6 Fr  817 days                Physical Exam  HENT:      Head: Normocephalic and atraumatic  Right Ear: Ear canal and external ear normal       Left Ear: Ear canal and external ear normal       Nose: Nose normal       Mouth/Throat:      Mouth: Mucous membranes are moist    Eyes:      Conjunctiva/sclera: Conjunctivae normal       Pupils: Pupils are equal, round, and reactive to light  Cardiovascular:      Rate and Rhythm: Rhythm irregular  Pulses: Normal pulses  Heart sounds: Normal heart sounds  Abdominal:      General: Bowel sounds are normal       Palpations: Abdomen is soft  Musculoskeletal:         General: Normal range of motion  Cervical back: Neck supple  Skin:     General: Skin is warm  Neurological:      General: No focal deficit present  Mental Status: She is alert  Mental status is at baseline  Psychiatric:         Mood and Affect: Mood normal          Behavior: Behavior normal          Thought Content:  Thought content normal          Lab Results:   I have personally reviewed pertinent lab and imaging results       VTE Prophylaxis:  Heparin 5000 International Units subQ every 8 hours    Code Status: Level 3 - DNAR and DNI  Advance Directive and Living Will:      Power of :    POLST:      Family and Social Support:  Patient has excellent family support has a daughter that is very caring  Discharge planning discussed with[de-identified] Ace Jean Baptiste via [place]

## 2022-10-07 NOTE — ASSESSMENT & PLAN NOTE
Lab Results   Component Value Date    HGBA1C 6 06/29/2022     Recent Labs     10/06/22  1814 10/06/22  2235 10/07/22  0723 10/07/22  1135   POCGLU 187* 222* 140 172*       Blood Sugar Average: Last 72 hrs:  · (P) 175 25 PTA regimen of Metformin, will hold while inpatient  · Accu-Checks and SSI  · Hypoglycemia protocol

## 2022-10-07 NOTE — ASSESSMENT & PLAN NOTE
· Per daughter she has been having visual hallucinations, agitation, confusion  Some of this was occurring prior to the hospitalization but was certainly worse during the hospitalization    Likely some element of hospital-acquired delirium but per discussion with daughter, concerned that there is more going on as well  · She has no prior history of dementia but her sister does  · Would check head CT and basic lab workup  · Will ask for geriatric consultation

## 2022-10-08 LAB
ANION GAP SERPL CALCULATED.3IONS-SCNC: 9 MMOL/L (ref 4–13)
BUN SERPL-MCNC: 38 MG/DL (ref 5–25)
CALCIUM SERPL-MCNC: 8.9 MG/DL (ref 8.4–10.2)
CHLORIDE SERPL-SCNC: 98 MMOL/L (ref 96–108)
CO2 SERPL-SCNC: 29 MMOL/L (ref 21–32)
CREAT SERPL-MCNC: 1.51 MG/DL (ref 0.6–1.3)
GFR SERPL CREATININE-BSD FRML MDRD: 33 ML/MIN/1.73SQ M
GLUCOSE SERPL-MCNC: 163 MG/DL (ref 65–140)
GLUCOSE SERPL-MCNC: 165 MG/DL (ref 65–140)
GLUCOSE SERPL-MCNC: 173 MG/DL (ref 65–140)
GLUCOSE SERPL-MCNC: 208 MG/DL (ref 65–140)
GLUCOSE SERPL-MCNC: 339 MG/DL (ref 65–140)
POTASSIUM SERPL-SCNC: 3.8 MMOL/L (ref 3.5–5.3)
SODIUM SERPL-SCNC: 136 MMOL/L (ref 135–147)

## 2022-10-08 PROCEDURE — 80048 BASIC METABOLIC PNL TOTAL CA: CPT | Performed by: PHYSICIAN ASSISTANT

## 2022-10-08 PROCEDURE — 99232 SBSQ HOSP IP/OBS MODERATE 35: CPT | Performed by: FAMILY MEDICINE

## 2022-10-08 PROCEDURE — 99233 SBSQ HOSP IP/OBS HIGH 50: CPT | Performed by: INTERNAL MEDICINE

## 2022-10-08 PROCEDURE — 82948 REAGENT STRIP/BLOOD GLUCOSE: CPT

## 2022-10-08 RX ADMIN — POLYETHYLENE GLYCOL 3350 17 G: 17 POWDER, FOR SOLUTION ORAL at 22:54

## 2022-10-08 RX ADMIN — ISOSORBIDE DINITRATE 20 MG: 10 TABLET ORAL at 08:23

## 2022-10-08 RX ADMIN — Medication 6 MG: at 22:54

## 2022-10-08 RX ADMIN — ATORVASTATIN CALCIUM 40 MG: 40 TABLET, FILM COATED ORAL at 08:23

## 2022-10-08 RX ADMIN — HYDRALAZINE HYDROCHLORIDE 25 MG: 25 TABLET ORAL at 22:54

## 2022-10-08 RX ADMIN — HEPARIN SODIUM 5000 UNITS: 5000 INJECTION INTRAVENOUS; SUBCUTANEOUS at 05:43

## 2022-10-08 RX ADMIN — PANTOPRAZOLE SODIUM 40 MG: 40 TABLET, DELAYED RELEASE ORAL at 05:43

## 2022-10-08 RX ADMIN — SENNOSIDES AND DOCUSATE SODIUM 2 TABLET: 8.6; 5 TABLET ORAL at 08:22

## 2022-10-08 RX ADMIN — CLOPIDOGREL BISULFATE 75 MG: 75 TABLET ORAL at 08:22

## 2022-10-08 RX ADMIN — INSULIN LISPRO 2 UNITS: 100 INJECTION, SOLUTION INTRAVENOUS; SUBCUTANEOUS at 22:00

## 2022-10-08 RX ADMIN — ISOSORBIDE DINITRATE 20 MG: 10 TABLET ORAL at 13:07

## 2022-10-08 RX ADMIN — MAGNESIUM OXIDE TAB 400 MG (241.3 MG ELEMENTAL MG) 400 MG: 400 (241.3 MG) TAB at 18:35

## 2022-10-08 RX ADMIN — HEPARIN SODIUM 5000 UNITS: 5000 INJECTION INTRAVENOUS; SUBCUTANEOUS at 22:54

## 2022-10-08 RX ADMIN — CARVEDILOL 12.5 MG: 12.5 TABLET, FILM COATED ORAL at 18:35

## 2022-10-08 RX ADMIN — INSULIN LISPRO 5 UNITS: 100 INJECTION, SOLUTION INTRAVENOUS; SUBCUTANEOUS at 13:15

## 2022-10-08 RX ADMIN — INSULIN LISPRO 1 UNITS: 100 INJECTION, SOLUTION INTRAVENOUS; SUBCUTANEOUS at 18:40

## 2022-10-08 RX ADMIN — HEPARIN SODIUM 5000 UNITS: 5000 INJECTION INTRAVENOUS; SUBCUTANEOUS at 13:07

## 2022-10-08 RX ADMIN — HYDRALAZINE HYDROCHLORIDE 25 MG: 25 TABLET ORAL at 05:43

## 2022-10-08 RX ADMIN — CARVEDILOL 12.5 MG: 12.5 TABLET, FILM COATED ORAL at 08:23

## 2022-10-08 RX ADMIN — POLYETHYLENE GLYCOL 3350 17 G: 17 POWDER, FOR SOLUTION ORAL at 08:23

## 2022-10-08 RX ADMIN — HYDRALAZINE HYDROCHLORIDE 25 MG: 25 TABLET ORAL at 13:07

## 2022-10-08 RX ADMIN — MAGNESIUM OXIDE TAB 400 MG (241.3 MG ELEMENTAL MG) 400 MG: 400 (241.3 MG) TAB at 08:22

## 2022-10-08 RX ADMIN — ISOSORBIDE DINITRATE 20 MG: 10 TABLET ORAL at 18:35

## 2022-10-08 RX ADMIN — ASPIRIN 81 MG CHEWABLE TABLET 324 MG: 81 TABLET CHEWABLE at 08:23

## 2022-10-08 RX ADMIN — FERROUS SULFATE TAB 325 MG (65 MG ELEMENTAL FE) 325 MG: 325 (65 FE) TAB at 08:22

## 2022-10-08 RX ADMIN — SENNOSIDES AND DOCUSATE SODIUM 2 TABLET: 8.6; 5 TABLET ORAL at 18:35

## 2022-10-08 NOTE — PROGRESS NOTES
Cardiology Progress Note - Harinder Bradshaw 69 y o  female MRN: [id number]    Unit/Bed#: S -01 Encounter: [id number]      Assessment/Recommendations:  1  Acute systolic heart failure:  Presented initially with progressive dyspnea and volume overload  Patient was noted to have a new cardiomyopathy with ejection fraction at 36% with global hypokinesis  Patient was diuresed and volume status is now euvolemic  Volume status is very well controlled , patient is euvolemic , will hold off on further diuretic dosing for the time being  Continued on beta-blocker, hydralazine and Isordil  Renal dysfunction limits use of Ace or ARB  2  New cardiomyopathy:  Ejection fraction 36% with global hypokinesis  Possibly ischemic in etiology  Currently on beta-blocker, aspirin, statin  Further workup with cardiac catheterization was initially recommended, however considering elevated risk in the setting of progressing aortic dissection on CTA, will favor medical management  3  Severe aortic root dilatation:  Noted to be 5 6 cm on echocardiogram and 5 9 cm on CTA along with progressing dissection  4  CAD:  With CABG x3 in 2015  New cardiomyopathy, likely ischemic  Proceeding with medical management with aspirin, statin, beta-blocker  5  Sick sinus syndrome:  Status post [employer] dual-chamber permanent pacemaker  Follow regular outpatient evaluations  6  Chronic atrial fibrillation/flutter:  Normally anticoagulated with Eliquis, however in the setting of aortic dissection that is progressing, favor to stop this at the current time and except stroke risk  Continued on beta-blocker for rate control  7  Hypertension:  Remains elevated with current dosing, but with also low blood pressures in the afternoon  Increased carvedilol to 12 5 mg b i d  In the setting of aortic dissection and aneurysm yesterday  Overall control seems to be adequate, would not make changes today    8  Type 2 diabetes mellitus:  As per primary team   9  Transaminitis:  Noted on admission, improved  10  Anemia:  Baseline hemoglobin around 10  Remains stable  11  Acute kidney injury:  With stable creatinine in the 1 5 range, normally in 0 8-1 1 range, however with diuretics and dye load with a CT scan, will now accept a higher creatinine  12  Type A aortic dissection:  Critical care team has had discussions with CT surgery regarding surgical options  Considering that she would be a redo sternotomy with elevated risk considering comorbidities, favoring medical therapy with blood pressure control and systolic blood pressure goal of less than 140  Increased beta-blocker to achieve this goal yesterday morning  Patient also with dissection flap extending into the non coronary and right coronary cusp approaching the right brachiocephalic artery takeoff  Considering that coronary is are at risk for potential extension of dissection, will continue on aspirin and add Plavix for further management  Continues also on beta-blocker therapy as described above  Subjective:   Patient seen and examined  No significant events overnight   ; pertinent negatives - chest pain, chest pressure/discomfort, dyspnea, irregular heart beat, lower extremity edema and palpitations  Objective:     Vitals: Blood pressure 150/58, pulse 60, temperature 98 9 °F (37 2 °C), temperature source Oral, resp  rate 16, height 4' 11" (1 499 m), weight 58 8 kg (129 lb 10 1 oz), SpO2 94 %, not currently breastfeeding , Body mass index is 26 18 kg/m² ,   Orthostatic Blood Pressures    Flowsheet Row Most Recent Value   Blood Pressure 150/58 filed at 10/08/2022 0740   Patient Position - Orthostatic VS Lying filed at 10/08/2022 0740            Intake/Output Summary (Last 24 hours) at 10/8/2022 0937  Last data filed at 10/8/2022 0824  Gross per 24 hour   Intake 600 ml   Output --   Net 600 ml       TELE: No significant arrhythmias seen      Physical Exam:    GEN: Jen Page appears well, alert and oriented x 3, pleasant and cooperative   HEENT: pupils equal, round, and reactive to light; extraocular muscles intact  NECK: supple, no carotid bruits   HEART: regular rhythm, normal S1 and S2, +systolic murmur, no clicks, gallops or rubs   LUNGS: clear to auscultation bilaterally; no wheezes, rales, or rhonchi   ABDOMEN: normal bowel sounds, soft, no tenderness, no distention  EXTREMITIES: peripheral pulses normal; no clubbing, cyanosis, or edema  NEURO: no focal findings   SKIN: normal without suspicious lesions on exposed skin        Medications:      Current Facility-Administered Medications:   •  acetaminophen (TYLENOL) tablet 650 mg, 650 mg, Oral, Q6H PRN, Ihsan Russell  •  aspirin chewable tablet 324 mg, 324 mg, Oral, Daily, Ihsan Russell, 324 mg at 10/08/22 4512  •  atorvastatin (LIPITOR) tablet 40 mg, 40 mg, Oral, Daily, Ihsan Russell, 40 mg at 10/08/22 6059  •  carvedilol (COREG) tablet 12 5 mg, 12 5 mg, Oral, BID With Meals, Frederic Warren MD, 12 5 mg at 10/08/22 8135  •  clopidogrel (PLAVIX) tablet 75 mg, 75 mg, Oral, Daily, Frederic Warren MD, 75 mg at 10/08/22 2323  •  ferrous sulfate tablet 325 mg, 325 mg, Oral, Daily With Breakfast, Ihsan Russell, 325 mg at 10/08/22 9097  •  heparin (porcine) subcutaneous injection 5,000 Units, 5,000 Units, Subcutaneous, Q8H [address], Ihsan Russell, 5,000 Units at 10/08/22 0543  •  hydrALAZINE (APRESOLINE) tablet 25 mg, 25 mg, Oral, Q8H [address], Jed G Dexter, 25 mg at 10/08/22 0543  •  insulin lispro (HumaLOG) 100 units/mL subcutaneous injection 1-6 Units, 1-6 Units, Subcutaneous, 4x Daily (AC & HS), 3 Units at 10/07/22 0308 **AND** Fingerstick Glucose (POCT), , , 4x Daily AC and at bedtime, Ihsan Russell  •  isosorbide dinitrate (ISORDIL) tablet 20 mg, 20 mg, Oral, TID after meals, Ihsan Russell, 20 mg at 10/08/22 1164  •  magnesium oxide (MAG-OX) tablet 400 mg, 400 mg, Oral, BID, Ihsan Russell, 400 mg at 10/08/22 2452  •  melatonin tablet 6 mg, 6 mg, Oral, HS, Geovanna Nissen, 6 mg at 10/07/22 2232  •  ondansetron (ZOFRAN) injection 4 mg, 4 mg, Intravenous, Once PRN, Geovanna Nissen  •  pantoprazole (PROTONIX) EC tablet 40 mg, 40 mg, Oral, Early Morning, Jed G Dexter, 40 mg at 10/08/22 0543  •  polyethylene glycol (MIRALAX) packet 17 g, 17 g, Oral, BID, Geovanna Nissen, 17 g at 10/08/22 7495  •  senna-docusate sodium (SENOKOT S) 8 6-50 mg per tablet 2 tablet, 2 tablet, Oral, BID, Geovanna Nissen, 2 tablet at 10/08/22 3288     Labs & Results:        Results from last 7 days   Lab Units 10/06/22  0442 10/05/22  0408 10/04/22  0534   WBC Thousand/uL 11 01* 13 93* 13 74*   HEMOGLOBIN g/dL 9 6* 10 9* 11 1*   HEMATOCRIT % 33 2* 36 6 37 1   PLATELETS Thousands/uL 319 378 364         Results from last 7 days   Lab Units 10/07/22  1310 10/06/22  1325 10/06/22  0442 10/05/22  0408 10/04/22  0534 10/03/22  0540   POTASSIUM mmol/L 4 2 4 2 4 0 4 0 4 7 5 2   CHLORIDE mmol/L 96 94* 94* 91* 91* 92*   CO2 mmol/L 33* 34* 32 33* 35* 33*   BUN mg/dL 40* 43* 46* 45* 44* 42*   CREATININE mg/dL 1 41* 1 54* 1 65* 1 58* 1 53* 1 63*   CALCIUM mg/dL 9 0 8 7 8 8 9 4 9 4 9 4   ALK PHOS U/L  --   --   --  71 67 68   ALT U/L  --   --   --  50 58* 73*   AST U/L  --   --   --  22 26 33     Results from last 7 days   Lab Units 10/05/22  0408   INR  1 25*   PTT seconds 30     Results from last 7 days   Lab Units 10/05/22  0408 10/04/22  0534 10/03/22  0540   MAGNESIUM mg/dL 2 2 2 0 1 9       Echo: personally reviewed - ejection fraction 36% by biplane measurements with global hypokinesis  Grade 2 diastolic dysfunction  Left atrial filling pressure is elevated, reduced RV function  Dilated left atrium  Mild-to-moderate AR  Mild to moderate MR  Moderate pulmonary hypertension  Mild-to-moderate GA  Aortic root is significantly dilated at 5 6 cm  EKG personally reviewed by María Elena Lazo

## 2022-10-08 NOTE — PLAN OF CARE
Problem: Prexisting or High Potential for Compromised Skin Integrity  Goal: Skin integrity is maintained or improved  Description: INTERVENTIONS:  - Identify patients at risk for skin breakdown  - Assess and monitor skin integrity  - Assess and monitor nutrition and hydration status  - Monitor labs   - Assess for incontinence   - Turn and reposition patient  - Assist with mobility/ambulation  - Relieve pressure over bony prominences  - Avoid friction and shearing  - Provide appropriate hygiene as needed including keeping skin clean and dry  - Evaluate need for skin moisturizer/barrier cream  - Collaborate with interdisciplinary team   - Patient/family teaching  - Consider wound care consult   Outcome: Progressing     Problem: MOBILITY - ADULT  Goal: Maintain or return to baseline ADL function  Description: INTERVENTIONS:  -  Assess patient's ability to carry out ADLs; assess patient's baseline for ADL function and identify physical deficits which impact ability to perform ADLs (bathing, care of mouth/teeth, toileting, grooming, dressing, etc )  - Assess/evaluate cause of self-care deficits   - Assess range of motion  - Assess patient's mobility; develop plan if impaired  - Assess patient's need for assistive devices and provide as appropriate  - Encourage maximum independence but intervene and supervise when necessary  - Involve family in performance of ADLs  - Assess for home care needs following discharge   - Consider OT consult to assist with ADL evaluation and planning for discharge  - Provide patient education as appropriate  Outcome: Progressing  Goal: Maintains/Returns to pre admission functional level  Description: INTERVENTIONS:  - Perform BMAT or MOVE assessment daily    - Set and communicate daily mobility goal to care team and patient/family/caregiver     - Collaborate with rehabilitation services on mobility goals if consulted  - Record patient progress and toleration of activity level   Outcome: Progressing     Problem: Nutrition/Hydration-ADULT  Goal: Nutrient/Hydration intake appropriate for improving, restoring or maintaining nutritional needs  Description: Monitor and assess patient's nutrition/hydration status for malnutrition  Collaborate with interdisciplinary team and initiate plan and interventions as ordered  Monitor patient's weight and dietary intake as ordered or per policy  Utilize nutrition screening tool and intervene as necessary  Determine patient's food preferences and provide high-protein, high-caloric foods as appropriate       INTERVENTIONS:  - Monitor oral intake, urinary output, labs, and treatment plans  - Assess nutrition and hydration status and recommend course of action  - Evaluate amount of meals eaten  - Assist patient with eating if necessary   - Allow adequate time for meals  - Recommend/ encourage appropriate diets, oral nutritional supplements, and vitamin/mineral supplements  - Order, calculate, and assess calorie counts as needed  - Recommend, monitor, and adjust tube feedings and TPN/PPN based on assessed needs  - Assess need for intravenous fluids  - Provide specific nutrition/hydration education as appropriate  - Include patient/family/caregiver in decisions related to nutrition  Outcome: Progressing     Problem: Potential for Falls  Goal: Patient will remain free of falls  Description: INTERVENTIONS:  - Educate patient/family on patient safety including physical limitations  - Instruct patient to call for assistance with activity   - Consult OT/PT to assist with strengthening/mobility   - Keep Call bell within reach  - Keep bed low and locked with side rails adjusted as appropriate  - Keep care items and personal belongings within reach  - Initiate and maintain comfort rounds  - Make Fall Risk Sign visible to staff  - Apply yellow socks and bracelet for high fall risk patients  - Consider moving patient to room near nurses station  Outcome: Progressing

## 2022-10-08 NOTE — ASSESSMENT & PLAN NOTE
Presentation: Patient presents with complaints of shortness of breath with exertion and orthopnea for the past 5 days  She endorses bilateral lower extremity edema  Patient reports being treated by her outpatient PCP with PO Lasix r/t bilateral lower extremity edema; however, about 2 months ago her PCP instructed her to take the PO Lasix once every 3 days  Chest x-ray: Vascular congestion and LCW pacemaker in place on my read  Official read pending  Last echocardiogram on file May 5037: LV systolic function was normal  LVEF estimated to be 60%  There were no RWMA  G2DD  Suspect Etiology: Acute Heart Failure  COVID negative  Initial troponin: 17 --> 21, delta 4  BNP: 1242      • See plan under acute HFrEF

## 2022-10-08 NOTE — PROGRESS NOTES
[facility]  Progress Note Arcadio Wagner 1945, 69 y o  female MRN: [id number]  Unit/Bed#: S -01 Encounter: [id number]  Primary Care Provider: Mike Freeman DO   Date and time admitted to hospital: 9/28/2022  6:33 PM    Encephalopathy  Assessment & Plan  · Per daughter she has been having visual hallucinations, agitation, confusion  Some of this was occurring prior to the hospitalization but was certainly worse during the hospitalization  Likely some element of hospital-acquired delirium but per discussion with daughter, concerned that there is more going on as well  · She has no prior history of dementia, but her sister does  · CT has revealed moderately advanced chronic angiopathy  · High suspicion for vascular dementia  · Reviewed geriatric consultation and discussed with the patient and her daughter they recommended management for dementia symptoms  LAURENCE (acute kidney injury) ([facility] [address] )  Assessment & Plan  · Baseline creatinine 0 7-1 0, patient presented with creatinine which has remained elevated at 1 5, unchanged  · Avoid nephrotoxic agents  · Diuretics being held at this time    Thoracic aortic dissection  Assessment & Plan  · Known thoracic aortic aneurysm with new 5 9 cm dissection flap of the aortic arch with take off into right coronary cusp and approaching R brachiocephalic artery  It is likely this is chronic with slow progression  · CT surgery did not feel she was eligible for surgical intervention  · Continue medical management      Constipation  Assessment & Plan  Associated sx: nausea, 1x low volume NBNB emesis  · docusate sodium 100 mg daily  · Senna-docusate sodium 8 6-50 mg HS  · PRN Miralax 17g      Wound of right leg  Assessment & Plan  1-2 cm wound on lateral surface of lower leg, central scab with granulation surrounded by erythema  Raised relative to skin level  Concerns of necrosis  · Follow up on wound care consult     · Cleanse with normal saline  · Apply Cavilon alcohol free barrier film to periwound  · Apply thin layer of Triad paste to wound bed  · Cover with a bordered foam dressing  · Change every 5 days and as needed  · Pressure redistribution cushion to chair  · Moisturize skin daily  · Initial discharge wound care supplies provided  · Wound care nurse follow up  Shortness of breath  Assessment & Plan  Presentation: Patient presents with complaints of shortness of breath with exertion and orthopnea for the past 5 days  She endorses bilateral lower extremity edema  Patient reports being treated by her outpatient PCP with PO Lasix r/t bilateral lower extremity edema; however, about 2 months ago her PCP instructed her to take the PO Lasix once every 3 days  Chest x-ray: Vascular congestion and LCW pacemaker in place on my read  Official read pending  Last echocardiogram on file May 8228: LV systolic function was normal  LVEF estimated to be 60%  There were no RWMA  G2DD  Suspect Etiology: Acute Heart Failure  COVID negative  Initial troponin: 17 --> 21, delta 4  BNP: 1242  • See plan under acute HFrEF    Hypertensive urgency  Assessment & Plan  Blood pressure elevated on arrival with readings in 180s-200s/70s-80s  Maintain blood pressure less than 355 systolic  Continue oral hydralazine, Coreg, Isordil  Blood pressure varies widely  Goal is to maintain the blood pressure less than 140/90  PAF (paroxysmal atrial fibrillation) ([facility])  Assessment & Plan  · Rate/Rhythm Control: Toprol XL, Amiodarone  Antiplatelet/Anticoagulation: Eliquis  Per device interrogation, has been 100% AFib since 11/2021  EKG: ventricular paced rhythm with underlying flutter    · Discontinued Amiodarone per cardiology rec  · Continue Toprol XL, Eliquis    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, without long-term current use of insulin [facility])  Assessment & Plan  Lab Results   Component Value Date    HGBA1C 6 06/29/2022     Recent Labs     10/07/22  1608 10/07/22  2100 10/08/22  0739 10/08/22  1104   POCGLU 238* 127 165* 339*       Blood Sugar Average: Last 72 hrs:  · (P) 305 [id number] PTA regimen of Metformin, will hold while inpatient  · Accu-Checks and SSI  · Hypoglycemia protocol  · Noted upon episodes of hyperglycemia today which is likely related to dietary changes  · Continue monitoring  Gastroesophageal reflux disease  Assessment & Plan  · Continue PPI  CAD (coronary atherosclerotic disease)  Assessment & Plan  · Currently denies chest pain  S/P grafting x3  Most recent cardiac catheterization was in May 2020 which displayed multivessel disease that was completely revascularized  · Continue aspirin, Plavix, beta-blocker, statin    * Acute HFrEF (heart failure with reduced ejection fraction) ([facility] [address] )  Assessment & Plan  · Presents with  increased shortness of breath, dyspnea on exertion and lower extremity edema in setting of decreased diuretic dosing 1-2 weeks ago and high sodium diet  · Newly found to have reduced EF 36% and hypokinesis  Initially was being recommended for cardiac catheterization but given risk with dissection, medical management was pursued instead  · Appreciate cardiology input  · Continue optimal goal directed medical therapy with beta-blocker, isordil, hydralazine        VTE Pharmacologic Prophylaxis:   Pharmacologic: heparin  Mechanical VTE Prophylaxis in Place: Yes    Patient Centered Rounds: I have performed bedside rounds with nursing staff today  Discussions with Specialists or Other Care Team Provider: nursing    Education and Discussions with Family / Patient: patient's daughter    Time Spent for Care: 30 minutes  More than 50% of total time spent on counseling and coordination of care as described above      Current Length of Stay: 10 day(s)    Current Patient Status: Inpatient   Certification Statement: The patient will continue to require additional inpatient hospital stay due to placement    Discharge Plan: 24-48 hrs    Code Status: Level 3 - DNAR and DNI      Subjective:   Patient was seen and examined  She is frustrated about her "memory issues"  She however, knows where she is, today's date and remembers that tomorrow is her grandson's birthday  Objective:     Vitals:   Temp (24hrs), Av 7 °F (37 1 °C), Min:98 5 °F (36 9 °C), Max:98 9 °F (37 2 °C)    Temp:  [98 5 °F (36 9 °C)-98 9 °F (37 2 °C)] 98 5 °F (36 9 °C)  HR:  [60-68] 65  Resp:  [16] 16  BP: ()/() 142/111  SpO2:  [92 %-96 %] 92 %  Body mass index is 26 18 kg/m²  Input and Output Summary (last 24 hours): Intake/Output Summary (Last 24 hours) at 10/8/2022 1438  Last data filed at 10/8/2022 0824  Gross per 24 hour   Intake 600 ml   Output --   Net 600 ml       Physical Exam:     Physical Exam  Constitutional:       Appearance: She is well-developed  Comments: frail   HENT:      Head: Normocephalic and atraumatic  Eyes:      General:         Right eye: No discharge  Cardiovascular:      Rate and Rhythm: Normal rate and regular rhythm  Heart sounds: Murmur heard  Systolic murmur is present  Pulmonary:      Effort: Pulmonary effort is normal       Breath sounds: Normal breath sounds  Abdominal:      General: There is no distension  Palpations: Abdomen is soft  Musculoskeletal:      Cervical back: Normal range of motion  Skin:     General: Skin is warm  Findings: No erythema  Neurological:      Mental Status: She is alert  Cranial Nerves: No cranial nerve deficit     Psychiatric:         Behavior: Behavior normal        Additional Data:     Labs:    Results from last 7 days   Lab Units 10/06/22  0442 10/05/22  0408   WBC Thousand/uL 11 01* 13 93*   HEMOGLOBIN g/dL 9 6* 10 9*   HEMATOCRIT % 33 2* 36 6   PLATELETS Thousands/uL 319 378   NEUTROS PCT %  --  74   LYMPHS PCT %  --  15   MONOS PCT %  --  8   EOS PCT %  --  1     Results from last 7 days Lab Units 10/07/22  1310 10/06/22  0442 10/05/22  0408   SODIUM mmol/L 137   < > 134*   POTASSIUM mmol/L 4 2   < > 4 0   CHLORIDE mmol/L 96   < > 91*   CO2 mmol/L 33*   < > 33*   BUN mg/dL 40*   < > 45*   CREATININE mg/dL 1 41*   < > 1 58*   ANION GAP mmol/L 8   < > 10   CALCIUM mg/dL 9 0   < > 9 4   ALBUMIN g/dL  --   --  3 9   TOTAL BILIRUBIN mg/dL  --   --  0 91   ALK PHOS U/L  --   --  71   ALT U/L  --   --  50   AST U/L  --   --  22   GLUCOSE RANDOM mg/dL 149*   < > 154*    < > = values in this interval not displayed  Results from last 7 days   Lab Units 10/05/22  0408   INR  1 25*     Results from last 7 days   Lab Units 10/08/22  1104 10/08/22  0739 10/07/22  2100 10/07/22  1608 10/07/22  1135 10/07/22  0723 10/06/22  2235 10/06/22  1814 10/06/22  0922 10/05/22  1712 10/05/22  1046 10/05/22  0800   POC GLUCOSE mg/dl 339* 165* 127 238* 172* 140 222* 187* 162* 140 184* 184*                   * I Have Reviewed All Lab Data Listed Above  * Additional Pertinent Lab Tests Reviewed:  All Labs Within Last 24 Hours Reviewed    Imaging:    CT brain    Recent Cultures (last 7 days):           Last 24 Hours Medication List:   Current Facility-Administered Medications   Medication Dose Route Frequency Provider Last Rate   • acetaminophen  650 mg Oral Q6H PRN Wayna Boron     • aspirin  324 mg Oral Daily Wayna Boron     • atorvastatin  40 mg Oral Daily Wayna Boron     • carvedilol  12 5 mg Oral BID With Meals Alejandro Tong MD     • clopidogrel  75 mg Oral Daily Alejandro Tong MD     • ferrous sulfate  325 mg Oral Daily With Breakfast Wayna Boron     • heparin (porcine)  5,000 Units Subcutaneous Q8H [address] Jed Stephanie Poplin     • hydrALAZINE  25 mg Oral Q8H [address] Jed Stephanie Poplin     • insulin lispro  1-6 Units Subcutaneous 4x Daily (AC & HS) Wayna Aleksey     • isosorbide dinitrate  20 mg Oral TID after meals Wayna Boron     • magnesium oxide  400 mg Oral BID Wayna Boron     • melatonin  6 mg Oral HS Carlos Persaud     • ondansetron  4 mg Intravenous Once PRN Carlos Persaud     • pantoprazole  40 mg Oral Early Morning Jed Renteria     • polyethylene glycol  17 g Oral BID Carlos Persaud     • senna-docusate sodium  2 tablet Oral BID Carlos Persaud          Today, Patient Was Seen By: Yuridia Carmona    ** Please Note: Dictation voice to text software may have been used in the creation of this document   **

## 2022-10-08 NOTE — ASSESSMENT & PLAN NOTE
Blood pressure elevated on arrival with readings in 180s-200s/70s-80s  Maintain blood pressure less than 527 systolic  Continue oral hydralazine, Coreg, Isordil  Blood pressure varies widely  Goal is to maintain the blood pressure less than 140/90

## 2022-10-08 NOTE — ASSESSMENT & PLAN NOTE
Lab Results   Component Value Date    HGBA1C 6 06/29/2022     Recent Labs     10/07/22  1608 10/07/22  2100 10/08/22  0739 10/08/22  1104   POCGLU 238* 127 165* 339*       Blood Sugar Average: Last 72 hrs:  · (P) 230 [id number] PTA regimen of Metformin, will hold while inpatient  · Accu-Checks and SSI  · Hypoglycemia protocol  · Noted upon episodes of hyperglycemia today which is likely related to dietary changes  · Continue monitoring

## 2022-10-08 NOTE — ASSESSMENT & PLAN NOTE
· Per daughter she has been having visual hallucinations, agitation, confusion  Some of this was occurring prior to the hospitalization but was certainly worse during the hospitalization  Likely some element of hospital-acquired delirium but per discussion with daughter, concerned that there is more going on as well  · She has no prior history of dementia, but her sister does  · CT has revealed moderately advanced chronic angiopathy  · High suspicion for vascular dementia  · Reviewed geriatric consultation and discussed with the patient and her daughter they recommended management for dementia symptoms

## 2022-10-09 LAB
ANION GAP SERPL CALCULATED.3IONS-SCNC: 9 MMOL/L (ref 4–13)
BASOPHILS # BLD AUTO: 0.14 THOUSANDS/ΜL (ref 0–0.1)
BASOPHILS NFR BLD AUTO: 2 % (ref 0–1)
BUN SERPL-MCNC: 37 MG/DL (ref 5–25)
CALCIUM SERPL-MCNC: 8.8 MG/DL (ref 8.4–10.2)
CHLORIDE SERPL-SCNC: 100 MMOL/L (ref 96–108)
CO2 SERPL-SCNC: 28 MMOL/L (ref 21–32)
CREAT SERPL-MCNC: 1.35 MG/DL (ref 0.6–1.3)
EOSINOPHIL # BLD AUTO: 0.19 THOUSAND/ΜL (ref 0–0.61)
EOSINOPHIL NFR BLD AUTO: 2 % (ref 0–6)
ERYTHROCYTE [DISTWIDTH] IN BLOOD BY AUTOMATED COUNT: 18.7 % (ref 11.6–15.1)
GFR SERPL CREATININE-BSD FRML MDRD: 38 ML/MIN/1.73SQ M
GLUCOSE SERPL-MCNC: 145 MG/DL (ref 65–140)
GLUCOSE SERPL-MCNC: 149 MG/DL (ref 65–140)
GLUCOSE SERPL-MCNC: 160 MG/DL (ref 65–140)
GLUCOSE SERPL-MCNC: 173 MG/DL (ref 65–140)
GLUCOSE SERPL-MCNC: 177 MG/DL (ref 65–140)
HCT VFR BLD AUTO: 36.9 % (ref 34.8–46.1)
HGB BLD-MCNC: 10 G/DL (ref 11.5–15.4)
IMM GRANULOCYTES # BLD AUTO: 0.04 THOUSAND/UL (ref 0–0.2)
IMM GRANULOCYTES NFR BLD AUTO: 1 % (ref 0–2)
LYMPHOCYTES # BLD AUTO: 1.75 THOUSANDS/ΜL (ref 0.6–4.47)
LYMPHOCYTES NFR BLD AUTO: 21 % (ref 14–44)
MCH RBC QN AUTO: 25.4 PG (ref 26.8–34.3)
MCHC RBC AUTO-ENTMCNC: 27.1 G/DL (ref 31.4–37.4)
MCV RBC AUTO: 94 FL (ref 82–98)
MONOCYTES # BLD AUTO: 0.76 THOUSAND/ΜL (ref 0.17–1.22)
MONOCYTES NFR BLD AUTO: 9 % (ref 4–12)
NEUTROPHILS # BLD AUTO: 5.67 THOUSANDS/ΜL (ref 1.85–7.62)
NEUTS SEG NFR BLD AUTO: 65 % (ref 43–75)
NRBC BLD AUTO-RTO: 0 /100 WBCS
PLATELET # BLD AUTO: 240 THOUSANDS/UL (ref 149–390)
PMV BLD AUTO: 10.4 FL (ref 8.9–12.7)
POTASSIUM SERPL-SCNC: 3.7 MMOL/L (ref 3.5–5.3)
RBC # BLD AUTO: 3.94 MILLION/UL (ref 3.81–5.12)
SODIUM SERPL-SCNC: 137 MMOL/L (ref 135–147)
WBC # BLD AUTO: 8.55 THOUSAND/UL (ref 4.31–10.16)

## 2022-10-09 PROCEDURE — 99233 SBSQ HOSP IP/OBS HIGH 50: CPT | Performed by: INTERNAL MEDICINE

## 2022-10-09 PROCEDURE — 85025 COMPLETE CBC W/AUTO DIFF WBC: CPT | Performed by: FAMILY MEDICINE

## 2022-10-09 PROCEDURE — 80048 BASIC METABOLIC PNL TOTAL CA: CPT | Performed by: FAMILY MEDICINE

## 2022-10-09 PROCEDURE — 99232 SBSQ HOSP IP/OBS MODERATE 35: CPT | Performed by: FAMILY MEDICINE

## 2022-10-09 PROCEDURE — 82948 REAGENT STRIP/BLOOD GLUCOSE: CPT

## 2022-10-09 RX ADMIN — INSULIN LISPRO 1 UNITS: 100 INJECTION, SOLUTION INTRAVENOUS; SUBCUTANEOUS at 13:10

## 2022-10-09 RX ADMIN — ISOSORBIDE DINITRATE 20 MG: 10 TABLET ORAL at 17:58

## 2022-10-09 RX ADMIN — HEPARIN SODIUM 5000 UNITS: 5000 INJECTION INTRAVENOUS; SUBCUTANEOUS at 21:54

## 2022-10-09 RX ADMIN — ASPIRIN 81 MG CHEWABLE TABLET 324 MG: 81 TABLET CHEWABLE at 08:47

## 2022-10-09 RX ADMIN — HYDRALAZINE HYDROCHLORIDE 25 MG: 25 TABLET ORAL at 21:52

## 2022-10-09 RX ADMIN — INSULIN LISPRO 1 UNITS: 100 INJECTION, SOLUTION INTRAVENOUS; SUBCUTANEOUS at 17:59

## 2022-10-09 RX ADMIN — HYDRALAZINE HYDROCHLORIDE 25 MG: 25 TABLET ORAL at 13:09

## 2022-10-09 RX ADMIN — FERROUS SULFATE TAB 325 MG (65 MG ELEMENTAL FE) 325 MG: 325 (65 FE) TAB at 08:47

## 2022-10-09 RX ADMIN — CLOPIDOGREL BISULFATE 75 MG: 75 TABLET ORAL at 08:47

## 2022-10-09 RX ADMIN — HEPARIN SODIUM 5000 UNITS: 5000 INJECTION INTRAVENOUS; SUBCUTANEOUS at 13:10

## 2022-10-09 RX ADMIN — MAGNESIUM OXIDE TAB 400 MG (241.3 MG ELEMENTAL MG) 400 MG: 400 (241.3 MG) TAB at 08:47

## 2022-10-09 RX ADMIN — INSULIN LISPRO 1 UNITS: 100 INJECTION, SOLUTION INTRAVENOUS; SUBCUTANEOUS at 08:47

## 2022-10-09 RX ADMIN — MAGNESIUM OXIDE TAB 400 MG (241.3 MG ELEMENTAL MG) 400 MG: 400 (241.3 MG) TAB at 17:58

## 2022-10-09 RX ADMIN — Medication 6 MG: at 21:53

## 2022-10-09 RX ADMIN — ISOSORBIDE DINITRATE 20 MG: 10 TABLET ORAL at 13:09

## 2022-10-09 RX ADMIN — ISOSORBIDE DINITRATE 20 MG: 10 TABLET ORAL at 08:47

## 2022-10-09 RX ADMIN — POLYETHYLENE GLYCOL 3350 17 G: 17 POWDER, FOR SOLUTION ORAL at 08:47

## 2022-10-09 RX ADMIN — SENNOSIDES AND DOCUSATE SODIUM 2 TABLET: 8.6; 5 TABLET ORAL at 17:58

## 2022-10-09 RX ADMIN — SENNOSIDES AND DOCUSATE SODIUM 2 TABLET: 8.6; 5 TABLET ORAL at 08:47

## 2022-10-09 RX ADMIN — ATORVASTATIN CALCIUM 40 MG: 40 TABLET, FILM COATED ORAL at 08:47

## 2022-10-09 RX ADMIN — CARVEDILOL 12.5 MG: 12.5 TABLET, FILM COATED ORAL at 08:47

## 2022-10-09 NOTE — ASSESSMENT & PLAN NOTE
Associated sx: nausea, 1x low volume NBNB emesis--currently resolved  · docusate sodium 100 mg daily  · Senna-docusate sodium 8 6-50 mg HS  · PRN Miralax 17g

## 2022-10-09 NOTE — ASSESSMENT & PLAN NOTE
· 1-2 cm wound on lateral surface of lower leg, central scab with granulation surrounded by erythema  Raised relative to skin level  · Follow up on wound care consult  · Cleanse with normal saline  · Apply Cavilon alcohol free barrier film to periwound  · Apply thin layer of Triad paste to wound bed  · Cover with a bordered foam dressing  · Change every 5 days and as needed  · Pressure redistribution cushion to chair  · Moisturize skin daily  · Initial discharge wound care supplies provided  · Wound care nurse follow up

## 2022-10-09 NOTE — PROGRESS NOTES
Cardiology Progress Note - Ayesha Abdi 69 y o  female MRN: [id number]    Unit/Bed#: S -01 Encounter: [id number]      Assessment/Recommendations:  1  Acute systolic heart failure:  Presented initially with progressive dyspnea and volume overload  Patient was noted to have a new cardiomyopathy with ejection fraction at 36% with global hypokinesis  Patient was diuresed and volume status is now euvolemic  Volume status is very well controlled , patient is euvolemic , will hold off on further diuretic dosing for the time being  Continued on beta-blocker, hydralazine and Isordil  Renal dysfunction limits use of Ace or ARB  2  New cardiomyopathy:  Ejection fraction 36% with global hypokinesis  Possibly ischemic in etiology  Currently on beta-blocker, aspirin, statin  Further workup with cardiac catheterization was initially recommended, however considering elevated risk in the setting of progressing aortic dissection on CTA, will favor medical management  3  Severe aortic root dilatation:  Noted to be 5 6 cm on echocardiogram and 5 9 cm on CTA along with progressing dissection  4  CAD:  With CABG x3 in 2015  New cardiomyopathy, likely ischemic  Proceeding with medical management with aspirin, statin, beta-blocker  5  Sick sinus syndrome:  Status post [employer] dual-chamber permanent pacemaker  Follow regular outpatient evaluations  6  Chronic atrial fibrillation/flutter:  Normally anticoagulated with Eliquis, however in the setting of aortic dissection that is progressing, favor to stop this at the current time and except stroke risk  Continued on beta-blocker for rate control  7  Hypertension:  Now much improved with current dosing  Increased carvedilol to 12 5 mg b i d  In the setting of aortic dissection and aneurysm yesterday  Overall control seems to be adequate, would not make changes today    8  Type 2 diabetes mellitus:  As per primary team   9  Transaminitis:  Noted on admission, improved  10  Anemia:  Baseline hemoglobin around 10  Remains stable  11  Acute kidney injury:  With stable creatinine in the 1 5 range, normally in 0 8-1 1 range, however with diuretics and dye load with a CT scan, will now accept a higher creatinine  12  Type A aortic dissection:  Critical care team has had discussions with CT surgery regarding surgical options  Considering that she would be a redo sternotomy with elevated risk considering comorbidities, favoring medical therapy with blood pressure control and systolic blood pressure goal of less than 140  Increased beta-blocker to achieve this goal yesterday morning  Patient also with dissection flap extending into the non coronary and right coronary cusp approaching the right brachiocephalic artery takeoff  Considering that coronary is are at risk for potential extension of dissection, will continue on aspirin and add Plavix for further management  Continues also on beta-blocker therapy as described above  13  Patient is stable from cardiac standpoint for discharge  Patient is awaiting rehab placement  Please call with questions  Subjective:   Patient seen and examined  No significant events overnight   ; pertinent negatives - chest pain, chest pressure/discomfort, dyspnea, irregular heart beat, lower extremity edema and palpitations  Objective:     Vitals: Blood pressure 144/55, pulse 62, temperature 98 6 °F (37 °C), resp   rate 16, height 4' 11" (1 499 m), weight 56 2 kg (123 lb 14 4 oz), SpO2 95 %, not currently breastfeeding , Body mass index is 25 02 kg/m² ,   Orthostatic Blood Pressures    Flowsheet Row Most Recent Value   Blood Pressure 144/55 filed at 10/09/2022 0740   Patient Position - Orthostatic VS Lying filed at 10/08/2022 2040            Intake/Output Summary (Last 24 hours) at 10/9/2022 0908  Last data filed at 10/9/2022 0503  Gross per 24 hour   Intake --   Output 150 ml   Net -150 ml       TELE: No significant arrhythmias seen     Physical Exam:    GEN: Jamilah Civil appears well, alert and oriented x 3, pleasant and cooperative   HEENT: pupils equal, round, and reactive to light; extraocular muscles intact  NECK: supple, no carotid bruits   HEART: regular rhythm, normal S1 and S2, +systolic murmur, no clicks, gallops or rubs   LUNGS: clear to auscultation bilaterally; no wheezes, rales, or rhonchi   ABDOMEN: normal bowel sounds, soft, no tenderness, no distention  EXTREMITIES: peripheral pulses normal; no clubbing, cyanosis, or edema  NEURO: no focal findings   SKIN: normal without suspicious lesions on exposed skin        Medications:      Current Facility-Administered Medications:   •  acetaminophen (TYLENOL) tablet 650 mg, 650 mg, Oral, Q6H PRN, Cris Oconnor  •  aspirin chewable tablet 324 mg, 324 mg, Oral, Daily, Cris Oconnor, 324 mg at 10/09/22 0847  •  atorvastatin (LIPITOR) tablet 40 mg, 40 mg, Oral, Daily, Cris Oconnor, 40 mg at 10/09/22 0847  •  carvedilol (COREG) tablet 12 5 mg, 12 5 mg, Oral, BID With Meals, Erich Nguyen MD, 12 5 mg at 10/09/22 0847  •  clopidogrel (PLAVIX) tablet 75 mg, 75 mg, Oral, Daily, Erich Nguyen MD, 75 mg at 10/09/22 0847  •  ferrous sulfate tablet 325 mg, 325 mg, Oral, Daily With Breakfast, Cris Oconnor, 325 mg at 10/09/22 0847  •  heparin (porcine) subcutaneous injection 5,000 Units, 5,000 Units, Subcutaneous, Q8H [address], Cris Elvin, 5,000 Units at 10/08/22 2254  •  hydrALAZINE (APRESOLINE) tablet 25 mg, 25 mg, Oral, Q8H [address], Jed G Dexter, 25 mg at 10/08/22 2254  •  insulin lispro (HumaLOG) 100 units/mL subcutaneous injection 1-6 Units, 1-6 Units, Subcutaneous, 4x Daily (AC & HS), 1 Units at 10/09/22 0847 **AND** Fingerstick Glucose (POCT), , , 4x Daily AC and at bedtime, Cris Oconnor  •  isosorbide dinitrate (ISORDIL) tablet 20 mg, 20 mg, Oral, TID after meals, Cris Oconnor, 20 mg at 10/09/22 0847  •  magnesium oxide (MAG-OX) tablet 400 mg, 400 mg, Oral, BID, Rosita Settles, 400 mg at 10/09/22 0847  •  melatonin tablet 6 mg, 6 mg, Oral, HS, Rosita Settles, 6 mg at 10/08/22 2254  •  ondansetron (ZOFRAN) injection 4 mg, 4 mg, Intravenous, Once PRN, Rosita Settles  •  pantoprazole (PROTONIX) EC tablet 40 mg, 40 mg, Oral, Early Morning, Jed G Dexter, 40 mg at 10/08/22 0543  •  polyethylene glycol (MIRALAX) packet 17 g, 17 g, Oral, BID, Rosita Settles, 17 g at 10/09/22 0847  •  senna-docusate sodium (SENOKOT S) 8 6-50 mg per tablet 2 tablet, 2 tablet, Oral, BID, Rosita Settles, 2 tablet at 10/09/22 0847     Labs & Results:        Results from last 7 days   Lab Units 10/09/22  0551 10/06/22  0442 10/05/22  0408   WBC Thousand/uL 8 55 11 01* 13 93*   HEMOGLOBIN g/dL 10 0* 9 6* 10 9*   HEMATOCRIT % 36 9 33 2* 36 6   PLATELETS Thousands/uL 240 319 378         Results from last 7 days   Lab Units 10/09/22  0551 10/08/22  1536 10/07/22  1310 10/06/22  0442 10/05/22  0408 10/04/22  0534 10/03/22  0540   POTASSIUM mmol/L 3 7 3 8 4 2   < > 4 0 4 7 5 2   CHLORIDE mmol/L 100 98 96   < > 91* 91* 92*   CO2 mmol/L 28 29 33*   < > 33* 35* 33*   BUN mg/dL 37* 38* 40*   < > 45* 44* 42*   CREATININE mg/dL 1 35* 1 51* 1 41*   < > 1 58* 1 53* 1 63*   CALCIUM mg/dL 8 8 8 9 9 0   < > 9 4 9 4 9 4   ALK PHOS U/L  --   --   --   --  71 67 68   ALT U/L  --   --   --   --  50 58* 73*   AST U/L  --   --   --   --  22 26 33    < > = values in this interval not displayed  Results from last 7 days   Lab Units 10/05/22  0408   INR  1 25*   PTT seconds 30     Results from last 7 days   Lab Units 10/05/22  0408 10/04/22  0534 10/03/22  0540   MAGNESIUM mg/dL 2 2 2 0 1 9       Echo: personally reviewed - ejection fraction 36% by biplane measurements with global hypokinesis  Grade 2 diastolic dysfunction  Left atrial filling pressure is elevated, reduced RV function  Dilated left atrium  Mild-to-moderate AR  Mild to moderate MR  Moderate pulmonary hypertension  Mild-to-moderate CA    Aortic root is significantly dilated at 5 6 cm  EKG personally reviewed by Marilu Lange

## 2022-10-09 NOTE — ASSESSMENT & PLAN NOTE
Lab Results   Component Value Date    HGBA1C 6 06/29/2022     Recent Labs     10/08/22  1552 10/08/22  2038 10/09/22  0740 10/09/22  1146   POCGLU 173* 208* 160* 173*       Blood Sugar Average: Last 72 hrs:  · (P) 134 [id number] PTA regimen of Metformin, will hold while inpatient  · Accu-Checks and SSI  · Hypoglycemia protocol  · Continue monitoring

## 2022-10-09 NOTE — ASSESSMENT & PLAN NOTE
· Per daughter she has been having visual hallucinations, agitation, confusion  Some of this was occurring prior to the hospitalization but was certainly worse during the hospitalization  Likely some element of hospital-acquired delirium but per discussion with daughter, concerned that there is more going on as well  · She has no prior history of dementia, but her sister does  · CT has revealed moderately advanced chronic angiopathy  · High suspicion for vascular dementia  · Reviewed geriatric consultation and discussed with the patient and her daughter they recommended expectant management for dementia symptoms

## 2022-10-09 NOTE — ASSESSMENT & PLAN NOTE
Blood pressure elevated on arrival with readings in 180s-200s/70s-80s  Maintain blood pressure less than 997 systolic  Continue oral hydralazine, Coreg, Isordil  Blood pressure varies widely  Goal is to maintain the blood pressure less than 140/90

## 2022-10-09 NOTE — NURSING NOTE
Pt refusing morning medications   States, "Let me sleep, I just want to sleep " " I will take them later when I decided to wake up, Just let me sleep "

## 2022-10-09 NOTE — PROGRESS NOTES
[facility]  Progress Note Radhikaemiliana Quesada 1945, 69 y o  female MRN: [id number]  Unit/Bed#: S -01 Encounter: [id number]  Primary Care Provider: Josiah Ramos DO   Date and time admitted to hospital: 9/28/2022  6:33 PM    Encephalopathy  Assessment & Plan  · Per daughter she has been having visual hallucinations, agitation, confusion  Some of this was occurring prior to the hospitalization but was certainly worse during the hospitalization  Likely some element of hospital-acquired delirium but per discussion with daughter, concerned that there is more going on as well  · She has no prior history of dementia, but her sister does  · CT has revealed moderately advanced chronic angiopathy  · High suspicion for vascular dementia  · Reviewed geriatric consultation and discussed with the patient and her daughter they recommended expectant management for dementia symptoms  LAURENCE (acute kidney injury) ([facility] [address] )  Assessment & Plan  · Baseline creatinine 0 7-1 0, patient presented with creatinine which has remained elevated at 1 5, unchanged  · Avoid nephrotoxic agents  · Diuretics being held at this time    Thoracic aortic dissection  Assessment & Plan  · Known thoracic aortic aneurysm with new 5 9 cm dissection flap of the aortic arch with take off into right coronary cusp and approaching R brachiocephalic artery  It is likely this is chronic with slow progression  · CT surgery did not feel she was eligible for surgical intervention  · Continue medical management      Constipation  Assessment & Plan  Associated sx: nausea, 1x low volume NBNB emesis--currently resolved  · docusate sodium 100 mg daily  · Senna-docusate sodium 8 6-50 mg HS  · PRN Miralax 17g      Wound of right leg  Assessment & Plan  · 1-2 cm wound on lateral surface of lower leg, central scab with granulation surrounded by erythema  Raised relative to skin level  · Follow up on wound care consult     · Cleanse with normal saline  · Apply Cavilon alcohol free barrier film to periwound  · Apply thin layer of Triad paste to wound bed  · Cover with a bordered foam dressing  · Change every 5 days and as needed  · Pressure redistribution cushion to chair  · Moisturize skin daily  · Initial discharge wound care supplies provided  · Wound care nurse follow up  Hypertensive urgency  Assessment & Plan  Blood pressure elevated on arrival with readings in 180s-200s/70s-80s  Maintain blood pressure less than 050 systolic  Continue oral hydralazine, Coreg, Isordil  Blood pressure varies widely  Goal is to maintain the blood pressure less than 140/90  PAF (paroxysmal atrial fibrillation) ([facility])  Assessment & Plan  · Rate/Rhythm Control: Toprol XL, Amiodarone  Antiplatelet/Anticoagulation: Eliquis  Per device interrogation, has been 100% AFib since 11/2021  EKG: ventricular paced rhythm with underlying flutter  · Discontinued Amiodarone per cardiology rec  · Continue Toprol XL, Eliquis    Pacemaker  Assessment & Plan  S/P PPM placement in June 2020  History of tachy-keanu syndrome  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, without long-term current use of insulin [facility])  Assessment & Plan  Lab Results   Component Value Date    HGBA1C 6 06/29/2022     Recent Labs     10/08/22  1552 10/08/22  2038 10/09/22  0740 10/09/22  1146   POCGLU 173* 208* 160* 173*       Blood Sugar Average: Last 72 hrs:  · (P) 431 [id number] PTA regimen of Metformin, will hold while inpatient  · Accu-Checks and SSI  · Hypoglycemia protocol  · Continue monitoring  CAD (coronary atherosclerotic disease)  Assessment & Plan  · Currently denies chest pain  S/P grafting x3  Most recent cardiac catheterization was in May 2020 which displayed multivessel disease that was completely revascularized    · Continue aspirin, Plavix, beta-blocker, statin    * Acute HFrEF (heart failure with reduced ejection fraction) [facility])  Assessment & Plan  · Presents with  increased shortness of breath, dyspnea on exertion and lower extremity edema in setting of decreased diuretic dosing 1-2 weeks ago and high sodium diet  · Newly found to have reduced EF 36% and hypokinesis  Initially was being recommended for cardiac catheterization but given risk with dissection, medical management was pursued instead  · Appreciate cardiology input  · Continue optimal goal directed medical therapy with beta-blocker, isordil, hydralazine        VTE Pharmacologic Prophylaxis:   Pharmacologic: Apixaban (Eliquis)  Mechanical VTE Prophylaxis in Place: Yes    Patient Centered Rounds: I have performed bedside rounds with nursing staff today  Discussions with Specialists or Other Care Team Provider:  Nursing    Education and Discussions with Family / Patient:  Patient's niece and grandniece at the bedside    Time Spent for Care: 30 minutes  More than 50% of total time spent on counseling and coordination of care as described above  Current Length of Stay: 11 day(s)    Current Patient Status: Inpatient   Certification Statement: The patient will continue to require additional inpatient hospital stay due to Placement    Discharge Plan:  24 hours    Code Status: Level 3 - DNAR and DNI      Subjective:   Patient was seen and examined  She denies feeling well today  Her family is at the bedside  She is eating her breakfast at this time  Objective:     Vitals:   Temp (24hrs), Av 7 °F (37 1 °C), Min:98 6 °F (37 °C), Max:98 9 °F (37 2 °C)    Temp:  [98 6 °F (37 °C)-98 9 °F (37 2 °C)] 98 6 °F (37 °C)  HR:  [60-71] 61  Resp:  [16] 16  BP: (103-153)/(45-63) 103/45  SpO2:  [91 %-95 %] 91 %  Body mass index is 25 02 kg/m²  Input and Output Summary (last 24 hours):        Intake/Output Summary (Last 24 hours) at 10/9/2022 1510  Last data filed at 10/9/2022 0503  Gross per 24 hour   Intake --   Output 150 ml   Net -150 ml       Physical Exam:     Physical Exam  Constitutional:       Appearance: She is well-developed  Cardiovascular:      Rate and Rhythm: Normal rate and regular rhythm  Pulmonary:      Effort: Pulmonary effort is normal       Breath sounds: Normal breath sounds  Abdominal:      General: There is no distension  Palpations: Abdomen is soft  Skin:     General: Skin is warm  Findings: Lesion (Right lower extremity lateral aspect healed wound scab with a palpable mass  It is nontender non erythematous) present  No erythema  Neurological:      Mental Status: She is alert  Cranial Nerves: No cranial nerve deficit  Psychiatric:         Behavior: Behavior normal            Additional Data:     Labs:    Results from last 7 days   Lab Units 10/09/22  0551   WBC Thousand/uL 8 55   HEMOGLOBIN g/dL 10 0*   HEMATOCRIT % 36 9   PLATELETS Thousands/uL 240   NEUTROS PCT % 65   LYMPHS PCT % 21   MONOS PCT % 9   EOS PCT % 2     Results from last 7 days   Lab Units 10/09/22  0551 10/06/22  0442 10/05/22  0408   SODIUM mmol/L 137   < > 134*   POTASSIUM mmol/L 3 7   < > 4 0   CHLORIDE mmol/L 100   < > 91*   CO2 mmol/L 28   < > 33*   BUN mg/dL 37*   < > 45*   CREATININE mg/dL 1 35*   < > 1 58*   ANION GAP mmol/L 9   < > 10   CALCIUM mg/dL 8 8   < > 9 4   ALBUMIN g/dL  --   --  3 9   TOTAL BILIRUBIN mg/dL  --   --  0 91   ALK PHOS U/L  --   --  71   ALT U/L  --   --  50   AST U/L  --   --  22   GLUCOSE RANDOM mg/dL 149*   < > 154*    < > = values in this interval not displayed  Results from last 7 days   Lab Units 10/05/22  0408   INR  1 25*     Results from last 7 days   Lab Units 10/09/22  1146 10/09/22  0740 10/08/22  2038 10/08/22  1552 10/08/22  1104 10/08/22  0739 10/07/22  2100 10/07/22  1608 10/07/22  1135 10/07/22  0723 10/06/22  2235 10/06/22  1814   POC GLUCOSE mg/dl 173* 160* 208* 173* 339* 165* 127 238* 172* 140 222* 187*                   * I Have Reviewed All Lab Data Listed Above  * Additional Pertinent Lab Tests Reviewed:  All Labs Within Last 24 Hours Reviewed    Imaging:      Recent Cultures (last 7 days):           Last 24 Hours Medication List:   Current Facility-Administered Medications   Medication Dose Route Frequency Provider Last Rate   • acetaminophen  650 mg Oral Q6H PRN Rosita Loly     • aspirin  324 mg Oral Daily Rosita Loly     • atorvastatin  40 mg Oral Daily Rosita Sarinas     • carvedilol  12 5 mg Oral BID With Meals Linn Jauregui MD     • clopidogrel  75 mg Oral Daily Linn Jauregui MD     • ferrous sulfate  325 mg Oral Daily With Breakfast Rosita Salcido     • heparin (porcine)  5,000 Units Subcutaneous Q8H [address] Rosita Salcido     • hydrALAZINE  25 mg Oral Q8H [address] Jed Tarango     • insulin lispro  1-6 Units Subcutaneous 4x Daily (AC & HS) Rosita Salcido     • isosorbide dinitrate  20 mg Oral TID after meals Jed Tarango     • magnesium oxide  400 mg Oral BID Rosita Salcido     • melatonin  6 mg Oral HS Jed Renteria     • ondansetron  4 mg Intravenous Once PRN Rosita Salcido     • pantoprazole  40 mg Oral Early Morning Jed Renteria     • polyethylene glycol  17 g Oral BID Rosita Salcido     • senna-docusate sodium  2 tablet Oral BID Rosita Salcido          Today, Patient Was Seen By: Trent Landon    ** Please Note: Dictation voice to text software may have been used in the creation of this document   **

## 2022-10-10 VITALS
OXYGEN SATURATION: 95 % | HEIGHT: 59 IN | HEART RATE: 60 BPM | WEIGHT: 125.22 LBS | SYSTOLIC BLOOD PRESSURE: 156 MMHG | TEMPERATURE: 97.7 F | DIASTOLIC BLOOD PRESSURE: 64 MMHG | BODY MASS INDEX: 25.24 KG/M2 | RESPIRATION RATE: 16 BRPM

## 2022-10-10 LAB
FLUAV RNA RESP QL NAA+PROBE: NEGATIVE
FLUBV RNA RESP QL NAA+PROBE: NEGATIVE
GLUCOSE SERPL-MCNC: 161 MG/DL (ref 65–140)
GLUCOSE SERPL-MCNC: 291 MG/DL (ref 65–140)
RSV RNA RESP QL NAA+PROBE: NEGATIVE
SARS-COV-2 RNA RESP QL NAA+PROBE: NEGATIVE

## 2022-10-10 PROCEDURE — 82948 REAGENT STRIP/BLOOD GLUCOSE: CPT

## 2022-10-10 PROCEDURE — 0241U HB NFCT DS VIR RESP RNA 4 TRGT: CPT | Performed by: PHYSICIAN ASSISTANT

## 2022-10-10 PROCEDURE — 99239 HOSP IP/OBS DSCHRG MGMT >30: CPT | Performed by: PHYSICIAN ASSISTANT

## 2022-10-10 RX ORDER — ASPIRIN 81 MG/1
324 TABLET, CHEWABLE ORAL DAILY
Refills: 0
Start: 2022-10-11

## 2022-10-10 RX ORDER — ISOSORBIDE DINITRATE 20 MG/1
20 TABLET ORAL
Refills: 0
Start: 2022-10-10

## 2022-10-10 RX ORDER — AMOXICILLIN 250 MG
2 CAPSULE ORAL 2 TIMES DAILY
Refills: 0
Start: 2022-10-10 | End: 2022-10-12 | Stop reason: ALTCHOICE

## 2022-10-10 RX ORDER — HYDRALAZINE HYDROCHLORIDE 25 MG/1
25 TABLET, FILM COATED ORAL EVERY 8 HOURS SCHEDULED
Refills: 0
Start: 2022-10-10

## 2022-10-10 RX ORDER — LANOLIN ALCOHOL/MO/W.PET/CERES
6 CREAM (GRAM) TOPICAL
Refills: 0
Start: 2022-10-10

## 2022-10-10 RX ORDER — CARVEDILOL 12.5 MG/1
12.5 TABLET ORAL 2 TIMES DAILY WITH MEALS
Refills: 0
Start: 2022-10-10

## 2022-10-10 RX ORDER — POLYETHYLENE GLYCOL 3350 17 G/17G
17 POWDER, FOR SOLUTION ORAL 2 TIMES DAILY
Refills: 0
Start: 2022-10-10 | End: 2022-10-12 | Stop reason: ALTCHOICE

## 2022-10-10 RX ORDER — CLOPIDOGREL BISULFATE 75 MG/1
75 TABLET ORAL DAILY
Refills: 0
Start: 2022-10-11

## 2022-10-10 RX ORDER — LOSARTAN POTASSIUM 25 MG/1
25 TABLET ORAL DAILY
Status: DISCONTINUED | OUTPATIENT
Start: 2022-10-10 | End: 2022-10-10 | Stop reason: HOSPADM

## 2022-10-10 RX ADMIN — LOSARTAN POTASSIUM 25 MG: 25 TABLET, FILM COATED ORAL at 11:10

## 2022-10-10 RX ADMIN — SENNOSIDES AND DOCUSATE SODIUM 2 TABLET: 8.6; 5 TABLET ORAL at 08:27

## 2022-10-10 RX ADMIN — MAGNESIUM OXIDE TAB 400 MG (241.3 MG ELEMENTAL MG) 400 MG: 400 (241.3 MG) TAB at 08:28

## 2022-10-10 RX ADMIN — ASPIRIN 81 MG CHEWABLE TABLET 324 MG: 81 TABLET CHEWABLE at 08:27

## 2022-10-10 RX ADMIN — FERROUS SULFATE TAB 325 MG (65 MG ELEMENTAL FE) 325 MG: 325 (65 FE) TAB at 08:28

## 2022-10-10 RX ADMIN — INSULIN LISPRO 1 UNITS: 100 INJECTION, SOLUTION INTRAVENOUS; SUBCUTANEOUS at 08:28

## 2022-10-10 RX ADMIN — CARVEDILOL 12.5 MG: 12.5 TABLET, FILM COATED ORAL at 08:28

## 2022-10-10 RX ADMIN — ISOSORBIDE DINITRATE 20 MG: 10 TABLET ORAL at 11:11

## 2022-10-10 RX ADMIN — PANTOPRAZOLE SODIUM 40 MG: 40 TABLET, DELAYED RELEASE ORAL at 05:08

## 2022-10-10 RX ADMIN — HYDRALAZINE HYDROCHLORIDE 25 MG: 25 TABLET ORAL at 05:08

## 2022-10-10 RX ADMIN — CLOPIDOGREL BISULFATE 75 MG: 75 TABLET ORAL at 08:28

## 2022-10-10 RX ADMIN — ATORVASTATIN CALCIUM 40 MG: 40 TABLET, FILM COATED ORAL at 08:28

## 2022-10-10 RX ADMIN — INSULIN LISPRO 4 UNITS: 100 INJECTION, SOLUTION INTRAVENOUS; SUBCUTANEOUS at 12:59

## 2022-10-10 RX ADMIN — HEPARIN SODIUM 5000 UNITS: 5000 INJECTION INTRAVENOUS; SUBCUTANEOUS at 05:08

## 2022-10-10 RX ADMIN — POLYETHYLENE GLYCOL 3350 17 G: 17 POWDER, FOR SOLUTION ORAL at 08:28

## 2022-10-10 RX ADMIN — ISOSORBIDE DINITRATE 20 MG: 10 TABLET ORAL at 08:28

## 2022-10-10 NOTE — ASSESSMENT & PLAN NOTE
Lab Results   Component Value Date    HGBA1C 6 06/29/2022     Recent Labs     10/09/22  1146 10/09/22  1618 10/09/22  2123 10/10/22  0707   POCGLU 173* 177* 145* 161*       Blood Sugar Average: Last 72 hrs:  · (P) 182 [id number]   · Resume metformin  · Accu-Checks with goal 140-180

## 2022-10-10 NOTE — CASE MANAGEMENT
Case Management Discharge Planning Note    Patient name Mila Ha  Location [address] 233/[address] -85 MRN [id number]  : 1945 Date 10/10/2022       Current Admission Date: 2022  Current Admission Diagnosis:Acute HFrEF (heart failure with reduced ejection fraction) [facility])   Patient Active Problem List    Diagnosis Date Noted   • Encephalopathy 10/07/2022   • Thoracic aortic dissection 10/05/2022   • LAURENCE (acute kidney injury) ([facility] [address] ) 10/05/2022   • Constipation 10/04/2022   • Wound of right leg 2022   • Acute HFrEF (heart failure with reduced ejection fraction) ([facility] [address] ) 2022   • Hypertensive urgency 2022   • Shortness of breath 2022   • Acquired absence of other right toe(s) ([facility] [address] ) 2022   • Continuous [facility] dependence ([facility] ) 2022   • PAF (paroxysmal atrial fibrillation) ([facility] [address] ) 02/15/2021   • Anemia, unspecified 10/14/2020   • Vitamin B 12 deficiency 10/14/2020   • Pacemaker 2020   • Preop cardiovascular exam 2020   • Foreign body in vagina 2020   • Nephrolithiasis 2020   • Hematuria 2020   • Unintentional weight loss 2020   • Generalized weakness 2020   • Gross hematuria 2020   • Elevated troponin level not due myocardial infarction 2020   • Osteopenia 2016   • Tobacco abuse 2016   • Aneurysm of ascending aorta 2015   • CAD (coronary atherosclerotic disease) 2015   • Gastroesophageal reflux disease 2015   • 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[facility] [address] ) 2015   • Glaucoma 2014   • Macular degeneration 2014   • 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, without long-term current use of insulin ([facility] [address] ) 2013   • Neck pain 2013   • Anxiety 2013   • Essential hypertension 2013   • Hypercholesterolemia 2013      LOS (days): 12  Geometric Mean LOS (GMLOS) (days): 3 90  Days to GMLOS:-7 7     OBJECTIVE:  Risk of Unplanned Readmission Score: 25 1 Current admission status: Inpatient   Preferred Pharmacy:   [place] [address] [place] [address]  [address] [facility] [address]  Phone: [phone] Fax: [phone] [address]) [facility]  [address] [address]  Phone: [phone] Fax: [phone] [facility] [address]  Phone: [phone] Fax: [phone]    Primary Care Provider: Zuleyma Flannery DO    Primary Insurance: [address] REP  Secondary Insurance:     DISCHARGE DETAILS:                                                                                                               [facility] [address] [place] [address] Number: approved SNF auth #[id number] for [facility]: 10/7  NRD: 10/11  Care Coord: Romeo Dumont   F: [phone]

## 2022-10-10 NOTE — CASE MANAGEMENT
Case Management Discharge Planning Note    Patient name Mini Canales  Location [address] 233/S -44 MRN [id number]  : 1945 Date 10/10/2022       Current Admission Date: 2022  Current Admission Diagnosis:Acute HFrEF (heart failure with reduced ejection fraction) [facility])   Patient Active Problem List    Diagnosis Date Noted   • Hospital acquired delirium superimposed on suspected underlying dementia 10/07/2022   • Thoracic aortic dissection 10/05/2022   • LAURENCE (acute kidney injury) ([facility] ) 10/05/2022   • Constipation 10/04/2022   • Wound of right leg 2022   • Acute HFrEF (heart failure with reduced ejection fraction) ([facility] ) 2022   • Hypertensive urgency 2022   • Shortness of breath 2022   • Acquired absence of other right toe(s) ([facility] ) 2022   • Continuous opioid dependence ([place] [address] ) 2022   • PAF (paroxysmal atrial fibrillation) ([facility] ) 02/15/2021   • Anemia, unspecified 10/14/2020   • Vitamin B 12 deficiency 10/14/2020   • Pacemaker 2020   • Preop cardiovascular exam 2020   • Foreign body in vagina 2020   • Nephrolithiasis 2020   • Hematuria 2020   • Unintentional weight loss 2020   • Generalized weakness 2020   • Gross hematuria 2020   • Elevated troponin level not due myocardial infarction 2020   • Osteopenia 2016   • Tobacco abuse 2016   • Aneurysm of ascending aorta 2015   • CAD (coronary atherosclerotic disease) 2015   • Gastroesophageal reflux disease 2015   • 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[facility] ) 2015   • Glaucoma 2014   • Macular degeneration 2014   • DM2 (diabetes mellitus, type 2) ([facility] ) 2013   • Neck pain 2013   • Anxiety 2013   • Essential hypertension 2013   • Hypercholesterolemia 2013      LOS (days): 12  Geometric Mean LOS (GMLOS) (days): 3 90  Days to GMLOS:-7 9     OBJECTIVE:  Risk of Unplanned Readmission Score: 28         Current admission status: Inpatient   Preferred Pharmacy:   [place] [address] [place] [address] [facility]  [address] [facility] [address]  Phone: [phone] Fax: [phone]    [facility] [address] [place], [place] - [address]  [address] [facility] [address]  Phone: [phone] Fax: [phone] [facility] [address]  Phone: [phone] Fax: [phone]    Primary Care Provider: Abisai Malik DO    Primary Insurance: [facility]  Secondary Insurance:     DISCHARGE DETAILS:    Discharge planning discussed with[de-identified] patient and daughter Shelton Mccarthy of Choice: Yes     CM contacted family/caregiver?: Yes  Were Treatment Team discharge recommendations reviewed with patient/caregiver?: Yes  Did patient/caregiver verbalize understanding of patient care needs?: N/A- going to facility  Were patient/caregiver advised of the risks associated with not following Treatment Team discharge recommendations?: Yes    Contacts  Patient Contacts: Tim Mobley  Relationship to Patient[de-identified] Family  Contact Method: Phone  Reason/Outcome: Continuity of Care, Emergency Contact, Discharge [address]         Is the patient interested in [facility] at discharge?: No    DME Referral Provided  Referral made for DME?: No    Other Referral/Resources/Interventions Provided:  Interventions: Short Term Rehab  Referral Comments: Patient d/c to [place] for inpatient rehab, auth approved    Treatment Team Recommendation: Short Term Rehab  Discharge Destination Plan[de-identified] Short Term Rehab  Transport at Discharge : Wheelchair van  Dispatcher Contacted: Yes  Number/Name of Dispatcher: [facility]  Transported by [employer] and Unit #):  [place]  ETA of Transport (Date): 10/10/22  ETA of Transport (Time): 1400     IMM Given (Date):: 10/10/22  IMM Given to[de-identified] Patient     CM updated patient's insurance authorization approved for [facility]  CM updated HFM with insurance authorization  Abida Gold #[id number]  [facility] 10/7  [facility] 10/11  Care coordinator Kimi Mckenzie Fax   CM spoke with patient at the bedside  CM introduced self and role  Patient updated HFM is able to accept today for inpatient rehab  Patient updated insurance authorization approved for inpatient rehab at discharge  CM discussed transport at discharge  Patient requesting CM set up transport at discharge  CM spoke with patient's daughter Maureen Negron on the phone  CM introduced self and role  Patient's daughter updated insurance authorization approved for inpatient rehab at [facility]  CM discussed transport at discharge  CM will send referral to [facility] for [facility]  CM sent referral via [facility] for [facility]  Patient's transport set up with [address] at 1400  CM updated daughter with transport time  CM updated SLIM, receiving facility and nursing with plan of care

## 2022-10-10 NOTE — ASSESSMENT & PLAN NOTE
· 1-2 cm wound on lateral surface of lower leg, central scab with granulation surrounded by erythema  Raised relative to skin level  · wound care consult  · Cleanse with normal saline  · Apply Cavilon alcohol free barrier film to periwound  · Apply thin layer of Triad paste to wound bed  · Cover with a bordered foam dressing  · Change every 5 days and as needed  · Pressure redistribution cushion to chair  · Moisturize skin daily  · Initial discharge wound care supplies provided

## 2022-10-10 NOTE — UTILIZATION REVIEW
Continued Stay Review    Date: 10/8/22                          Current Patient Class:  IP  Current Level of Care:  MS as of 10/6    HPI:77 y o  female initially admitted on 9/28/22 with acute heart failure, hypertensive urgency  New onset cardiomyopathy  Telemetry, IV diuresis   Eliquis on hold ,recommended heparin drip for bridging-pt refusing   Pt with LAURENCE likely 2/2 aggressive diuresis - all diuretics on hold 10/2  Hydralazine added, isordil increased  On Toprol   Monitoring lytes and renal function   Anticipate cardiac cath and CT surgery eval at [facility]   10/4-Decision to perform cath at [place]  likely tomorrow  Pt continues telemetry  10/5 CTA done prior to cardiac cath showed aneurysmal dissections of the ascending aorta measuring up to 5 9 cm with dissection flap extending to the non coronary cusp and approaching the right flap along with the distal medial aspect of the ascending aorta   Pt upgraded to level 1 SD 10/5  Plan for tx to [place] for cardiac surgery eval   Recommend SBP <140 , no AC   PRN Labetalol  Pt agitated aggressive with reorienting,  cardene drip started  to maintain SBP < 140 and pt was weaned off drip rapidly   Klaus Bee soft wrist restraints   Critical care team discussed with CT surgery -no plan for emergent surgery as patient would be high risk for surgery due to anatomy  Initially was being recommended for cardiac catheterization but given risk with dissection, medical management was pursued instead  10/6 Started Plavix  Continue blood pressure medication regimen as is for today with Coreg, Isordil, hydralazine  If required, can increase carvedilol if requires further blood pressure management with systolic blood pressure goal less than 140 in the setting of aortic dissection  Volume status appears euvolemic with cardiomyopathy  Will continue off of Eliquis as patient will require dual anti-platelet therapy for aortic dissection with possible coronary cusp involvement that could extend into the coronary arteries  Pt down to med surg level of care   10/7 CT head due to delirium showed nothing acute  Assessment/Plan:10/8   Pt now euvolemic, hold off on further diuretics  Continue on beta-blocker, hydralazine and Isordil  Renal dysfunction limits use of Ace or ARB  Continmue ASA, statin and Plavix  BP remains elevated with cuurent dosing but with also low blood pressures in the afternoon  Increased carvedilol to 12 5 mg b i d  In the setting of aortic dissection and aneurysm yesterday  Overall control seems to be adequate, would not make changes today  Creat stable  in the 1 5 range, normally in 0 8-1 1 range, r with diuretics and dye load with a CT scan, will now accept a higher creatinine  Pt denies chest pressure, S1 s2, + systolic murmur, lungs clear, no periph edema          Vital Signs:   Date/Time Temp Pulse Resp BP SpO2 Weight   10/08/22 2040 -- -- 16 -- -- --   10/08/22 2040 98 9 °F (37 2 °C) 61 -- 138/63 92 % --   10/08/22 1837 -- 60 -- 113/45 (Abnormal)   95 % --   10/08/22 1835 -- 60 -- 113/45 (Abnormal)   -- --   10/08/22 1535 98 8 °F (37 1 °C) 60 16 132/55 92 % --   10/08/22 1312 -- 65 -- 142/111 (Abnormal)   92 % --   10/08/22 1307 -- -- -- 142/111 (Abnormal)   -- --   10/08/22 1105 98 5 °F (36 9 °C) 62 16 108/35 (Abnormal)   94 % --   10/08/22 0740 98 9 °F (37 2 °C) 60 16 150/58 94 % --   10/08/22 0545 -- 60 -- 144/53 93 % --   10/08/22 0543 -- -- -- 144/53 -- --   10/07/22 2158 98 8 °F (37 1 °C) 64 -- 137/57 95 % --   10/07/22 2156 98 8 °F (37 1 °C) 68 -- 157/59 95 % --   10/07/22 1536 -- -- -- 92/56 -- --   10/07/22 1523 -- -- 16 -- -- --   10/07/22 1523 98 6 °F (37 °C) 62 -- 89/48 (Abnormal)   94 % --   10/07/22 1521 -- -- 16 -- -- --   10/07/22 1521 98 6 °F (37 °C) 61 -- 92/49 (Abnormal)   96 % --   10/07/22 1313 -- 61 -- 124/51 91 % --   10/07/22 0724 98 8 °F (37 1 °C) 60 18 146/50 93 % --   10/07/22 0547 -- -- -- -- -- 58 8 kg (129 lb 10 1 oz) 10/07/22 0539 -- 60 -- 160/63 92 %          Pertinent Labs/Diagnostic Results:   10/7 CT head-No acute intracranial pathology   Chronic microangiopathy  10/5 UUB-Muvvgrbqveg-ycowj rhythm with occasional intrinsic complexes        Results from last 7 days   Lab Units 10/06/22  0442 10/05/22  0408 10/04/22  0534   WBC Thousand/uL 11 01* 13 93* 13 74*   HEMOGLOBIN g/dL 9 6* 10 9* 11 1*   HEMATOCRIT % 33 2* 36 6 37 1   PLATELETS Thousands/uL 319 378 364   NEUTROS ABS Thousands/µL  --  10 45* 9 72*         Results from last 7 days   Lab Units 10/08/22  1536 10/07/22  1310 10/06/22  1325 10/06/22  0442 10/05/22  0408 10/04/22  0534   SODIUM mmol/L 136 137 135 134* 134* 135   POTASSIUM mmol/L 3 8 4 2 4 2 4 0 4 0 4 7   CHLORIDE mmol/L 98 96 94* 94* 91* 91*   CO2 mmol/L 29 33* 34* 32 33* 35*   ANION GAP mmol/L 9 8 7 8 10 9   BUN mg/dL 38* 40* 43* 46* 45* 44*   CREATININE mg/dL 1 51* 1 41* 1 54* 1 65* 1 58* 1 53*   EGFR ml/min/1 73sq m 33 36 32 29 31 32   CALCIUM mg/dL 8 9 9 0 8 7 8 8 9 4 9 4   MAGNESIUM mg/dL  --   --   --   --  2 2 2 0     Results from last 7 days   Lab Units 10/05/22  0408 10/04/22  0534   AST U/L 22 26   ALT U/L 50 58*   ALK PHOS U/L 71 67   TOTAL PROTEIN g/dL 7 2 6 8   ALBUMIN g/dL 3 9 3 7   TOTAL BILIRUBIN mg/dL 0 91 0 87     Results from last 7 days   Lab Units 10/08/22  2038 10/08/22  1552 10/08/22  1104 10/08/22  0739 10/07/22  2100 10/07/22  1608 10/07/22  1135   POC GLUCOSE mg/dl 208* 173* 339* 165* 127 238* 172*     Results from last 7 days   Lab Units 10/08/22  1536 10/07/22  1310 10/06/22  1325 10/06/22  0442 10/05/22  0408 10/04/22  0534   GLUCOSE RANDOM mg/dL 163* 149* 142* 120 154* 155*               Results from last 7 days   Lab Units 10/05/22  0617 10/05/22  0408   HS TNI 0HR ng/L  --  16   HS TNI 2HR ng/L 14  --    HSTNI D2 ng/L -2  --      Results from last 7 days   Lab Units 10/05/22  0408   D-DIMER QUANTITATIVE ug/ml FEU 1 18*     Results from last 7 days   Lab Units 10/05/22  0408 PROTIME seconds 15 9*   INR  1 25*   PTT seconds 30               Medications:   Scheduled Medications:  aspirin, 324 mg, Oral, Daily  atorvastatin, 40 mg, Oral, Daily  carvedilol, 12 5 mg, Oral, BID With Meals  clopidogrel, 75 mg, Oral, Daily  ferrous sulfate, 325 mg, Oral, Daily With Breakfast  heparin (porcine), 5,000 Units, Subcutaneous, Q8H ISRA  hydrALAZINE, 25 mg, Oral, Q8H ISRA  insulin lispro, 1-6 Units, Subcutaneous, 4x Daily (AC & HS)  isosorbide dinitrate, 20 mg, Oral, TID after meals  magnesium oxide, 400 mg, Oral, BID  melatonin, 6 mg, Oral, HS  pantoprazole, 40 mg, Oral, Early Morning  polyethylene glycol, 17 g, Oral, BID  senna-docusate sodium, 2 tablet, Oral, BID      Continuous IV Infusions:     PRN Meds:  acetaminophen, 650 mg, Oral, Q6H PRN  ondansetron, 4 mg, Intravenous, Once PRN        Discharge Plan: D    Network Utilization Review Department  ATTENTION: Please call with any questions or concerns to [phone] and carefully listen to the prompts so that you are directed to the right person  All voicemails are confidential   Karol No all requests for admission clinical reviews, approved or denied determinations and any other requests to dedicated fax number below belonging to the campus where the patient is receiving treatment   List of dedicated fax numbers for the Facilities:  [address] DENIALS (Administrative/Medical Necessity) [phone]   [address] [place] [address] (Maternity/NICU/Pediatrics) [phone]   6 Mariia Ramos [address] [facility] [place] [address] [address] [facility] [facility] [address] [place] [address] [phone]   [address] [phone]

## 2022-10-10 NOTE — ASSESSMENT & PLAN NOTE
Blood pressure elevated on arrival with readings in 180s-200s/70s-80s  Maintain blood pressure less than 472 systolic  Continue oral hydralazine, Coreg, Isordil  Per discussion with Cardiology today, will resume losartan 25 mg daily  Goal is to maintain the blood pressure less than 140/90

## 2022-10-10 NOTE — ASSESSMENT & PLAN NOTE
· Baseline creatinine 0 7-1 0, patient presented with creatinine which has remained elevated at 1 5, remains 1 3-1 5; this is likely her new baseline  · Recheck outpatient  · Avoid nephrotoxic agents  · Diuretics being held at this time

## 2022-10-10 NOTE — ASSESSMENT & PLAN NOTE
· Rate/Rhythm Control: coreg  · Per device interrogation, has been 100% AFib since 11/2021  EKG: ventricular paced rhythm with underlying flutter    · Discontinued Amiodarone per cardiology rec  · Off anticoagulation

## 2022-10-10 NOTE — ASSESSMENT & PLAN NOTE
· Presented with  increased shortness of breath and lower extremity edema in setting of decreased diuretic dosing 1-2 weeks ago and high sodium diet  · Newly found to have reduced EF 36% and hypokinesis  Initially was being recommended for cardiac catheterization but given risk with dissection, medical management was pursued instead  · Appreciate cardiology input  · Continue optimal goal directed medical therapy with beta-blocker, isordil, hydralazine, ARB    Not currently requiring any diuretics

## 2022-10-10 NOTE — ASSESSMENT & PLAN NOTE
· Per daughter she has been having visual hallucinations, agitation, confusion  Some of this was occurring prior to the hospitalization but was certainly worse during the hospitalization  Likely some element of hospital-acquired delirium but per discussion with daughter, likely also some element of underlying progressive vascular dementia  · CT has revealed moderately advanced chronic angiopathy  · Reviewed geriatric consultation and discussed with the patient and her daughter they recommended expectant management for dementia symptoms

## 2022-10-10 NOTE — DISCHARGE SUMMARY
[facility]  Discharge- Birdia Given 1945, 69 y o  female MRN: [id number]  Unit/Bed#: S -01 Encounter: [id number]  Primary Care Provider: Lakisha Thomas DO   Date and time admitted to hospital: 9/28/2022  6:33 PM    * Acute HFrEF (heart failure with reduced ejection fraction) ([facility] )  Assessment & Plan  · Presented with  increased shortness of breath and lower extremity edema in setting of decreased diuretic dosing 1-2 weeks ago and high sodium diet  · Newly found to have reduced EF 36% and hypokinesis  Initially was being recommended for cardiac catheterization but given risk with dissection, medical management was pursued instead  · Appreciate cardiology input  · Continue optimal goal directed medical therapy with beta-blocker, isordil, hydralazine, ARB  Not currently requiring any diuretics    Hospital acquired delirium superimposed on suspected underlying dementia  Assessment & Plan  · Per daughter she has been having visual hallucinations, agitation, confusion  Some of this was occurring prior to the hospitalization but was certainly worse during the hospitalization  Likely some element of hospital-acquired delirium but per discussion with daughter, likely also some element of underlying progressive vascular dementia  · CT has revealed moderately advanced chronic angiopathy  · Reviewed geriatric consultation and discussed with the patient and her daughter they recommended expectant management for dementia symptoms  Thoracic aortic dissection  Assessment & Plan  · Known thoracic aortic aneurysm with new 5 9 cm dissection flap of the aortic arch with take off into right coronary cusp and approaching R brachiocephalic artery    It is likely this is chronic with slow progression  · CT surgery did not feel she was eligible for surgical intervention  · Continue medical management      LAURENCE (acute kidney injury) ([facility] )  Assessment & Plan  · Baseline creatinine 0 7-1 0, patient presented with creatinine which has remained elevated at 1 5, remains 1 3-1 5; this is likely her new baseline  · Recheck outpatient  · Avoid nephrotoxic agents  · Diuretics being held at this time    Hypertensive urgency  Assessment & Plan  Blood pressure elevated on arrival with readings in 180s-200s/70s-80s  Maintain blood pressure less than 351 systolic  Continue oral hydralazine, Coreg, Isordil  Per discussion with Cardiology today, will resume losartan 25 mg daily  Goal is to maintain the blood pressure less than 140/90  DM2 (diabetes mellitus, type 2) [facility])  Assessment & Plan  Lab Results   Component Value Date    HGBA1C 6 06/29/2022     Recent Labs     10/09/22  1146 10/09/22  1618 10/09/22  2123 10/10/22  0707   POCGLU 173* 177* 145* 161*       Blood Sugar Average: Last 72 hrs:  · (P) 182 [id number]   · Resume metformin  · Accu-Checks with goal 140-180    PAF (paroxysmal atrial fibrillation) ([facility])  Assessment & Plan  · Rate/Rhythm Control: coreg  · Per device interrogation, has been 100% AFib since 11/2021  EKG: ventricular paced rhythm with underlying flutter  · Discontinued Amiodarone per cardiology rec  · Off anticoagulation      CAD (coronary atherosclerotic disease)  Assessment & Plan  · Currently denies chest pain  S/P grafting x3  Most recent cardiac catheterization was in May 2020 which displayed multivessel disease that was completely revascularized  · Continue aspirin, Plavix, beta-blocker, statin    Wound of right leg  Assessment & Plan  · 1-2 cm wound on lateral surface of lower leg, central scab with granulation surrounded by erythema  Raised relative to skin level  · wound care consult  · Cleanse with normal saline  · Apply Cavilon alcohol free barrier film to periwound  · Apply thin layer of Triad paste to wound bed  · Cover with a bordered foam dressing  · Change every 5 days and as needed  · Pressure redistribution cushion to chair     · Moisturize skin daily  · Initial discharge wound care supplies provided  Medical Problems             Resolved Problems  Date Reviewed: 10/10/2022          Resolved    Leukocytosis 10/6/2022     Resolved by  Carlo Ryder    Elevated LFTs 10/6/2022     Resolved by  Carlo Ryder              Discharging Physician / Practitioner: Yair Lorenzo  PCP: Star Huizar DO  Admission Date:   Admission Orders (From admission, onward)     Ordered        09/28/22 1934  INPATIENT ADMISSION  Once                      Discharge Date: 10/10/22    Consultations During Hospital Stay:  · Cardiology  · Geriatrics  · Wound care    Procedures Performed:   · 2D echocardiogram  · Head CT  · CT chest abdomen and pelvis dissection    Significant Findings / Test Results:   · As above    Incidental Findings:   · None     Test Results Pending at Discharge (will require follow up):   · none     Outpatient Tests Requested:  · Bmp in 1 week    Complications:  none    Reason for Admission: shortness of breath    Hospital Course:   Trinity Cope is a 69 y o  female patient with underlying history of coronary artery disease, diabetes, hypertension and paroxysmal AFib who originally presented to the hospital on 9/28/2022 due to shortness of breath, leg swelling, and evidence of volume overload  Patient was found on imaging to have ascending aortic aneurysm which is markedly increased in size compared to previous imaging obtained in 2020, measuring 5 9 cm with new area of dissection flap extending into the right coronary and non coronary cusp  She was seen by Cardiology and originally was planned for transfer to [facility] for evaluation by Cardiothoracic surgery and also possible cardiac catheterization  However, this was felt to be too risky and the plan was aborted in favor of medical management  Adjustments were made in her antihypertensive regimen and cardiac meds to achieve goal therapeutic regimen    Patient remained quite stable  In addition during the hospitalization patient's daughter reported concerns with her mental status and she was evaluated by Geriatrics team   It was felt that she likely had some element of hospital-acquired delirium superimposed on probable vascular dementia  She was seen by PT and OT and was recommended for rehab  She is stable for discharge there today  Overall prognosis remains guarded and patient is a level 3 DNR      Please see above list of diagnoses and related plan for additional information  Condition at Discharge: guarded    Discharge Day Visit / Exam:   Subjective:  Daughter tells me her mental status has been better  Nursing denies any concerns  Patient offers minimal history but denies chest pain, abdominal pain, back pain, shortness of breath, or any known bleeding  Vitals: Blood Pressure: 156/64 (10/10/22 0708)  Pulse: 60 (10/10/22 0708)  Temperature: 97 7 °F (36 5 °C) (10/10/22 0708)  Temp Source: Oral (10/08/22 2040)  Respirations: 16 (10/10/22 0708)  Height: 4' 11" (149 9 cm) (10/05/22 0524)  Weight - Scale: 56 8 kg (125 lb 3 5 oz) (10/10/22 0600)  SpO2: 95 % (10/10/22 0708)  Exam:   Physical Exam  Vitals reviewed  Constitutional:       General: She is not in acute distress  Appearance: She is not ill-appearing, toxic-appearing or diaphoretic  Comments: Patient seen lying comfortably in bed   Eyes:      General: No scleral icterus  Right eye: No discharge  Left eye: No discharge  Conjunctiva/sclera: Conjunctivae normal    Cardiovascular:      Rate and Rhythm: Normal rate and regular rhythm  Heart sounds: No murmur heard  Pulmonary:      Effort: No respiratory distress  Breath sounds: No stridor  No wheezing or rhonchi  Abdominal:      General: There is no distension  Tenderness: There is no abdominal tenderness  There is no guarding  Musculoskeletal:      Right lower leg: No edema  Left lower leg: No edema  Skin:     General: Skin is warm and dry  Coloration: Skin is not jaundiced or pale  Findings: No bruising, erythema, lesion or rash  Neurological:      General: No focal deficit present  Mental Status: Mental status is at baseline  Comments: Awake alert interactive, follows commands appropriately, no dysarthria or overt confusion/hallucinations   Psychiatric:      Comments: Flat affect          Discussion with Family: daughter over phone    Discharge instructions/Information to patient and family:   See after visit summary for information provided to patient and family  Provisions for Follow-Up Care:  See after visit summary for information related to follow-up care and any pertinent home health orders  Disposition:   snf    Planned Readmission: none     Discharge Statement:  I spent 35 minutes discharging the patient  This time was spent on the day of discharge  I had direct contact with the patient on the day of discharge  Greater than 50% of the total time was spent examining patient, answering all patient questions, arranging and discussing plan of care with patient as well as directly providing post-discharge instructions  Additional time then spent on discharge activities  Bedside nursing rounds performed  Case was discussed with case management and Cardiology    Discharge Medications:  See after visit summary for reconciled discharge medications provided to patient and/or family        **Please Note: This note may have been constructed using a voice recognition system**

## 2022-10-11 ENCOUNTER — NURSING HOME VISIT (OUTPATIENT)
Dept: WOUND CARE | Facility: HOSPITAL | Age: 77
End: 2022-10-11
Payer: COMMERCIAL

## 2022-10-11 DIAGNOSIS — T14.8XXA HEMATOMA: Primary | ICD-10-CM

## 2022-10-11 DIAGNOSIS — R26.2 AMBULATORY DYSFUNCTION: ICD-10-CM

## 2022-10-11 PROCEDURE — 99308 SBSQ NF CARE LOW MDM 20: CPT | Performed by: NURSE PRACTITIONER

## 2022-10-11 NOTE — ASSESSMENT & PLAN NOTE
Right lower leg  - patient bumped her leg and had hematoma, unknown onset  - stable, with no obvious sign of infection  - ordered skin prep and border from daily for close monitoring  - followup next week

## 2022-10-11 NOTE — PROGRESS NOTES
[address] MANAGEMENT   AND [facility]       Patient ID: Laly Florence is a 69 y o  female Date of Birth 1945     Location of Service: [facility]    Performed wound round with: Wound team     Chief Complaint : Right lower leg    Wound Instructions:  Wound:  Right lower leg  Discontinue previous wound order  Cleanse the wound bed with NSS   Apply non-sting skin prep to periwound area and wound bed then cover with bordered foam   Frequency : daily and prn for soiling  Offload all wounds  Turn and reposition frequently, maximum of every two hours  Increase protein intake  Monitor for any sign of infection or worsening, inform PCP or patient's primary physician in your facility  Allergies  Nickel      Assessment & Plan:  1  Hematoma  Assessment & Plan:  Right lower leg  - patient bumped her leg and had hematoma, unknown onset  - stable, with no obvious sign of infection  - ordered skin prep and border from daily for close monitoring  - followup next week      2  Ambulatory dysfunction  Assessment & Plan:  - on short term rehab             Subjective:   10/11/2022This is a 69 y o , female referred to our service for wound/ skin alterations on right lower leg  Patient have a complex medical history including but not limited to  GERD (gastroesophageal reflux disease), Hyperlipidemia, Hypertension, Kidney stone, Myocardial infarction [facility]), and NSTEMI (non-ST elevated myocardial infarction) ([facility] )    Patient was referred by Senior Care Team  Patient was seen in collaboration with the facility wound team      Wound History:   As per patients report, she bumped her right lower leg and had hematoma  Current treatment - triad paste  Receive patient in chair, seems comfortable  Denies pain  No significant issues related to the wound  Review of Systems   Constitutional: Negative  HENT: Negative  Eyes: Negative  Respiratory: Negative      Cardiovascular: Negative for chest pain and leg swelling  Gastrointestinal: Negative  Endocrine: Negative  Genitourinary: Negative  Musculoskeletal: Positive for gait problem  Skin: Positive for wound  See HPI   Neurological: Negative for dizziness and headaches  Psychiatric/Behavioral: Negative for behavioral problems  Objective:    Physical Exam  Constitutional:       Appearance: Normal appearance  HENT:      Head: Normocephalic and atraumatic  Nose: Nose normal       Mouth/Throat:      Pharynx: Oropharynx is clear  Eyes:      Conjunctiva/sclera: Conjunctivae normal    Cardiovascular:      Rate and Rhythm: Normal rate  Pulmonary:      Effort: Pulmonary effort is normal    Abdominal:      Tenderness: There is no abdominal tenderness  There is no guarding  Genitourinary:     Comments: continent  Musculoskeletal:         General: No tenderness  Cervical back: Normal range of motion  Right lower leg: No edema  Left lower leg: No edema  Comments: LROM   Skin:     Findings: Lesion present  Comments: Right lower leg - wound size is 1 x 0 5 x 0 1 cm  , + hematoma, + soft, no drainage, with no obvious sign of infection, not tenderness on palpation   Neurological:      Mental Status: She is alert  Gait: Gait abnormal    Psychiatric:         Mood and Affect: Mood normal          Behavior: Behavior normal               Procedures           Patient's care was coordinated with nursing facility staff  Recent vitals, labs and updated medications were reviewed on EMR or chart system of facility  Past Medical, surgical, social, medication and allergy history and patient's previous records were reviewed and updated as appropriate: Most up-to date information is available in the facility EMR where the patient is currently admitted      Patient Active Problem List   Diagnosis   • Aneurysm of ascending aorta   • Anxiety   • CAD (coronary atherosclerotic disease)   • Neck pain   • Essential hypertension   • Gastroesophageal reflux disease   • Glaucoma   • Hypercholesterolemia   • Macular degeneration   • Osteopenia   • 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[facility])   • Tobacco abuse   • DM2 (diabetes mellitus, type 2) ([facility])   • Elevated troponin level not due myocardial infarction   • Gross hematuria   • Generalized weakness   • Unintentional weight loss   • Nephrolithiasis   • Hematuria   • Foreign body in vagina   • Pacemaker   • Preop cardiovascular exam   • Anemia, unspecified   • Vitamin B 12 deficiency   • PAF (paroxysmal atrial fibrillation) ([facility])   • Acquired absence of other right toe(s) ([facility])   • Continuous opioid dependence ([facility])   • Hypertensive urgency   • Shortness of breath   • Acute HFrEF (heart failure with reduced ejection fraction) ([facility])   • Wound of right leg   • Constipation   • Thoracic aortic dissection   • LAURENCE (acute kidney injury) ([facility] )   • Hospital acquired delirium superimposed on suspected underlying dementia   • Hematoma   • Ambulatory dysfunction     Past Medical History:   Diagnosis Date   • Atypical chest pain    • GERD (gastroesophageal reflux disease)    • Hyperlipidemia    • Hypertension    • Kidney stone    • Myocardial infarction [facility])     2015    • NSTEMI (non-ST elevated myocardial infarction) ([facility] )     Last Assessed: 9/24/2015     Past Surgical History:   Procedure Laterality Date   • A-V CARDIAC PACEMAKER INSERTION     • ANKLE SURGERY     • BACK SURGERY  01/2011    L4 and L5 laminectomy and fusion    • BLADDER SURGERY     • CORONARY ARTERY BYPASS GRAFT  09/02/2015    CABGx3 with LIMA to LAD, SVG to PDA, SVG to OM-1   • EXAMINATION UNDER ANESTHESIA N/A 7/11/2020    Procedure: EXAM UNDER ANESTHESIA (EUA)  EXCISION OF POSTERIOR VAGINAL FOREIGN BODY;  Surgeon: Theresa Sabillon MD;  [facility];  Service: Gynecology   • FL RETROGRADE PYELOGRAM  7/11/2020   • FL RETROGRADE PYELOGRAM  8/7/2020   • HYSTERECTOMY     • MO CYSTO/URETERO W/LITHOTRIPSY &INDWELL STENT INSRT Right 8/7/2020    Procedure: CYSTOSCOPY URETEROSCOPY WITH LITHOTRIPSY HOLMIUM LASER, RETROGRADE PYELOGRAM AND INSERTION STENT URETERAL;  Surgeon: Yoseph Red MD;  [facility];  Service: Urology   • VA CYSTOURETHROSCOPY,URETER CATHETER Right 7/11/2020    Procedure: CYSTOSCOPY RETROGRADE PYELOGRAM WITH INSERTION STENT URETERAL, bladder biopsy with fulguration;  Surgeon: Yoseph Red MD;  Location: [facility];  Service: Urology   • WRIST SURGERY       Social History     Socioeconomic History   • Marital status: Single     Spouse name: None   • Number of children: 1   • Years of education: None   • Highest education level: None   Occupational History   • None   Tobacco Use   • Smoking status: Current Every Day Smoker     Packs/day: 0 25     Start date: 5   • Smokeless tobacco: Never Used   • Tobacco comment: Started smoking at age 25; currently smoking <1ppd  Vaping Use   • Vaping Use: Never used   Substance and Sexual Activity   • Alcohol use: Not Currently   • Drug use: Never   • Sexual activity: Not Currently     Partners: Male     Birth control/protection: Post-menopausal   Other Topics Concern   • None   Social History Narrative   • None     Social Determinants of Health     Financial Resource Strain: Medium Risk   • Difficulty of Paying Living Expenses: Somewhat hard   Food Insecurity: No Food Insecurity   • Worried About Running Out of Food in the Last Year: Never true   • Ran Out of Food in the Last Year: Never true   Transportation Needs: No Transportation Needs   • Lack of Transportation (Medical): No   • Lack of Transportation (Non-Medical):  No   Physical Activity: Not on file   Stress: Not on file   Social Connections: Not on file   Intimate Partner Violence: Not on file   Housing Stability: Low Risk    • Unable to Pay for Housing in the Last Year: No   • Number of Places Lived in the Last Year: 1   • Unstable Housing in the Last Year: No        Current Outpatient Medications: •  acetaminophen (TYLENOL) 325 mg tablet, Take 2 tablets (650 mg total) by mouth every 4 (four) hours as needed for mild pain, Disp: 30 tablet, Rfl: 0  •  aspirin 81 mg chewable tablet, Chew 4 tablets (324 mg total) daily Do not start before October 11, 2022 , Disp: , Rfl: 0  •  atorvastatin (LIPITOR) 40 mg tablet, Take 1 tablet (40 mg total) by mouth daily, Disp: 90 tablet, Rfl: 3  •  carvedilol (COREG) 12 5 mg tablet, Take 1 tablet (12 5 mg total) by mouth 2 (two) times a day with meals, Disp: , Rfl: 0  •  clopidogrel (PLAVIX) 75 mg tablet, Take 1 tablet (75 mg total) by mouth daily Do not start before October 11, 2022 , Disp: , Rfl: 0  •  ferrous sulfate 325 (65 Fe) mg tablet, Take 325 mg by mouth daily with breakfast, Disp: , Rfl:   •  hydrALAZINE (APRESOLINE) 25 mg tablet, Take 1 tablet (25 mg total) by mouth every 8 (eight) hours, Disp: , Rfl: 0  •  isosorbide dinitrate (ISORDIL) 20 mg tablet, Take 1 tablet (20 mg total) by mouth 3 (three) times daily after meals, Disp: , Rfl: 0  •  losartan (COZAAR) 25 mg tablet, Take 1 tablet (25 mg total) by mouth daily, Disp: 90 tablet, Rfl: 3  •  magnesium oxide (MAG-OX) 400 mg, Take 1 tablet (400 mg total) by mouth 2 (two) times a day, Disp: , Rfl: 0  •  melatonin 3 mg, Take 2 tablets (6 mg total) by mouth daily at bedtime, Disp: , Rfl: 0  •  metFORMIN (GLUCOPHAGE) 500 mg tablet, Take 1 tablet (500 mg total) by mouth 2 (two) times a day with meals, Disp: 180 tablet, Rfl: 3  •  omeprazole (PriLOSEC) 20 mg delayed release capsule, Take 1 capsule (20 mg total) by mouth daily, Disp: 90 capsule, Rfl: 3  •  polyethylene glycol (MIRALAX) 17 g packet, Take 17 g by mouth 2 (two) times a day, Disp: , Rfl: 0  •  senna-docusate sodium (SENOKOT S) 8 6-50 mg per tablet, Take 2 tablets by mouth 2 (two) times a day, Disp: , Rfl: 0  No current facility-administered medications for this visit    Family History   Problem Relation Age of Onset   • Hypertension Mother    • Hypertension Sister    • Alcohol abuse Brother    • Cirrhosis Brother    • Diabetes Father    • Other Brother         Heart transplant in his 46s   • Lung cancer Sister    • Diabetes Brother    • Stroke Sister    • No Known Problems Daughter    • No Known Problems Maternal Grandmother    • No Known Problems Maternal Grandfather    • No Known Problems Paternal Grandmother    • No Known Problems Paternal Grandfather    • No Known Problems Sister               Coordination of Care: Wound team aware of the treatment plan  Facility nurse will provide wound treatment and monitor the wound for any changes  Patient / Staff education : Patient / Staff was given education on sign of infection and pressure ulcer prevention  Patient/ Staff verbalized understanding     Follow up :  Next week    Voice-recognition software may have been used in the preparation of this document  Occasional wrong word or "sound-alike" substitutions may have occurred due to the inherent limitations of voice recognition software  Interpretation should be guided by abena Perales

## 2022-10-11 NOTE — UTILIZATION REVIEW
Notification of Discharge   This is a Notification of Discharge from our facility [address]  Please be advised that this patient has been discharge from our facility  Below you will find the admission and discharge date and time including the patient’s disposition  UTILIZATION REVIEW CONTACT:  Riky Loja MA  Utilization   Network Utilization Review Department  Phone: [phone] x carefully listen to the prompts  All voicemails are confidential   Email: [email or web address]     PHYSICIAN ADVISORY SERVICES:  FOR GUIB-IQ-MNGP REVIEW - MEDICAL NECESSITY DENIAL  Phone: [phone]  Fax: [phone]  Email: [email or web address]     PRESENTATION DATE: 9/28/2022  6:33 PM  OBERVATION ADMISSION DATE:   INPATIENT ADMISSION DATE: 9/28/22  7:35 PM   DISCHARGE DATE: 10/10/2022  2:14 PM  DISPOSITION: Non [facility] SN[facility]/TCU/SNU Non [facility] SNF/TCU/SNU      IMPORTANT INFORMATION:  Send all requests for admission clinical reviews, approved or denied determinations and any other requests to dedicated fax number below belonging to the campus where the patient is receiving treatment   List of dedicated fax numbers:  [address] [facility] [address] DENIALS (Administrative/Medical Necessity) [phone]   [address] (Maternity/NICU/Pediatrics) [phone]   ST  [facility] [phone]   [place] [address] [phone]   [address] [phone]   [address] [facility] [phone]   [address] [facility] [phone]   [address] [place] [address] [phone]   [facility]  [phone]   [address] [facility] [phone]   [address] [facility] [address] [facility] [phone]

## 2022-10-12 ENCOUNTER — NURSING HOME VISIT (OUTPATIENT)
Dept: GERIATRICS | Facility: OTHER | Age: 77
End: 2022-10-12
Payer: COMMERCIAL

## 2022-10-12 DIAGNOSIS — Z87.898 HISTORY OF DELIRIUM: ICD-10-CM

## 2022-10-12 DIAGNOSIS — I71.010 DISSECTION OF ASCENDING AORTA: ICD-10-CM

## 2022-10-12 DIAGNOSIS — N17.9 AKI (ACUTE KIDNEY INJURY) (HCC): ICD-10-CM

## 2022-10-12 DIAGNOSIS — Z66 DNR (DO NOT RESUSCITATE): ICD-10-CM

## 2022-10-12 DIAGNOSIS — I71.21 ANEURYSM OF ASCENDING AORTA WITHOUT RUPTURE: ICD-10-CM

## 2022-10-12 DIAGNOSIS — E11.9 TYPE 2 DIABETES MELLITUS WITHOUT COMPLICATION, WITHOUT LONG-TERM CURRENT USE OF INSULIN (HCC): ICD-10-CM

## 2022-10-12 DIAGNOSIS — I50.20 HEART FAILURE WITH REDUCED EJECTION FRACTION (HCC): Primary | ICD-10-CM

## 2022-10-12 DIAGNOSIS — I25.10 ATHEROSCLEROSIS OF NATIVE CORONARY ARTERY OF NATIVE HEART WITHOUT ANGINA PECTORIS: ICD-10-CM

## 2022-10-12 DIAGNOSIS — E78.5 HYPERLIPIDEMIA, UNSPECIFIED HYPERLIPIDEMIA TYPE: ICD-10-CM

## 2022-10-12 DIAGNOSIS — I49.5 TACHY-BRADY SYNDROME (HCC): ICD-10-CM

## 2022-10-12 DIAGNOSIS — I10 HYPERTENSION, UNSPECIFIED TYPE: ICD-10-CM

## 2022-10-12 DIAGNOSIS — Z95.0 CARDIAC PACEMAKER IN SITU: ICD-10-CM

## 2022-10-12 DIAGNOSIS — I48.21 PERMANENT ATRIAL FIBRILLATION (HCC): ICD-10-CM

## 2022-10-12 PROBLEM — R06.02 SHORTNESS OF BREATH: Status: RESOLVED | Noted: 2022-09-28 | Resolved: 2022-10-12

## 2022-10-12 PROCEDURE — 99306 1ST NF CARE HIGH MDM 50: CPT | Performed by: INTERNAL MEDICINE

## 2022-10-12 RX ORDER — FERROUS SULFATE 325(65) MG
325 TABLET ORAL
Status: SHIPPED
Start: 2022-10-12

## 2022-10-12 NOTE — PROGRESS NOTES
[address]  [address] [place] [address]  Facility:  [facility]  31    HISTORY AND PHYSICAL    NAME: Mary Roa  AGE: 69 y o  SEX: female    DATE OF ENCOUNTER: 10/12/2022    Code status:  DNR    Assessment and Plan     1  Heart failure with reduced ejection fraction [facility]  Assessment & Plan:  · Hospitalized from September 28, 2023 through October 10, 2022   · New finding of cardiomyopathy   · Seen in consultation by the cardiology service during hospitalization   · Coronary Angiography deferred secondary to increased risk with ascending thoracic aneurysm/dissection   · Will continue with guideline directed medical therapy  · Will continue with carvedilol, isosorbide, losartan, and hydralazine   · Currently, she is not requiring diuretic therapy   · She will be admitted to the subacute rehabilitation facility under the congestive heart failure pathway   Secondary to her decreased level of functioning from baseline, she will be admitted to the subacute rehabilitation facility and seen in consultation by a multidisciplinary rehabilitation team for evaluation and treatment to assist her in returning to her prior level of functioning  · Will continue with monitoring for change in her condition  · She will follow-up with her PCP and cardiology services upon discharge            2  Atherosclerosis of native coronary artery of native heart without angina pectoris  Assessment & Plan:  · Status post CABG in 2015 and PCI with stent placement  · Will continue with secondary prevention of aspirin and atorvastatin   · Will continue with clinical and periodic laboratory monitoring for change in her condition  · She will follow-up with her PCP and cardiology services upon discharge      3   Hypertension, unspecified type  Assessment & Plan:  · Will continue her prior to admission carvedilol, hydralazine, and losartan   · Targeting systolic blood pressure between 105 and 120 secondary to ascending thoracic aortic aneurysm with dissection  · Will continue with monitoring for change in her condition  · She will follow-up with her PCP and cardiology services upon discharge      4  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[facility])  Assessment & Plan:  · She is doing well with carvedilol for rate control   · She has a permanent pacemaker in place  · Will continue her care in collaboration with her cardiology service   · She will follow-up with her PCP and cardiology services upon discharge      5  Cardiac pacemaker in situ    6  Aneurysm of ascending aorta without rupture  Assessment & Plan:  · Seen in consultation by the cardiology and Intensive Care Unit services during hospitalization   · Secondary to comorbidities and high risk for intervention, medical therapy was elected   · Will continue with heart rate and blood pressure management with carvedilol with target systolic blood pressure between 105 and 120   · Will continue with monitoring for change in her condition  · She will follow-up with her PCP and cardiology services upon discharge      7  Dissection of ascending aorta  Assessment & Plan:  · Seen in consultation by her cardiology service during hospitalization   · Secondary to comorbidities and high surgical risk, medical therapy was recommended  · Will continue with antihypertensive therapy, including carvedilol, with target of systolic blood pressure between 105 and 120   · Will continue with monitoring for change in her condition  · She will follow-up with her PCP upon discharge      8   Permanent atrial fibrillation [facility])  Assessment & Plan:  · Seen in consultation by the cardiology service during her hospitalization   · After interrogation of her pacemaker, cardiology service recommended discontinuing amiodarone secondary to transition to per min atrial fibrillation status  · She is doing well with carvedilol for rate control   · She is not on anticoagulation secondary to increased risk of rupture of her ascending thoracic aortic aneurysm  · Cardiology service transitioned her to aspirin and clopidogrel  · Will continue with monitoring for change in her condition  · She will follow-up with her PCP and cardiology services upon discharge      9  LAURENCE (acute kidney injury) ([address] )  Assessment & Plan:  · Apparent baseline creatinine of 0 8-1, peak creatinine during hospitalization 1 8, discharge creatinine on October 9, 2022 1 35  · Likely secondary to aggressive diuresis to treat her acute heart failure  · Will avoid nephrotoxic medications and supplements   · She will be admitted to the subacute rehabilitation facility under the acute kidney injury pathway will continue with clinical and periodic laboratory monitoring for change in her condition  · She will follow-up with her PCP upon discharge      10  History of delirium  Assessment & Plan:  · She reports having confusion and being disoriented during hospital stay   · Seen in consultation by the geriatric service   · Will continue with delirium precautions  · Will continue with monitoring for change in her condition  · She will follow-up with her PCP upon discharge      11  Hyperlipidemia, unspecified hyperlipidemia type  Assessment & Plan:  · Will continue her prior to admission atorvastatin  · She will follow-up with her PCP and cardiology services upon discharge      12  Type 2 diabetes mellitus without complication, without long-term current use of insulin ([facility])  Assessment & Plan:  · Given her episode of acute kidney injury, will discontinue her metformin during her subacute rehabilitation stay  · Will continue with monitoring of her fasting fingerstick blood sugar   · As she notes possible trouble with loose bowels, to consider transition to other medication for diabetes   · She will follow-up with her PCP upon discharge      13   DNR (do not resuscitate)  Assessment & Plan:  · As discussed with her during her visit, she wishes for her resuscitation status to be “do not       See recent hospital discharge note for further information  All medications and routine orders were reviewed and updated as needed  Plan discussed with:  Patient and nursing staff  CC: PCP    Chief Complaint     She is seen for visit to perform history and physical exam to be admitted to the subacute rehabilitation facility  History of Present Illness     She is a pleasant 73-year-old woman with the comorbidities of coronary artery disease status post CABG in 2015 and PCI with coronary artery stent placement, hyperlipidemia, type 2 diabetes mellitus, tachy-keanu syndrome status post permanent pacemaker placement, hypertension, and recent hospitalization for acute heart failure with reduced ejection fraction who is seen for a visit to perform a history and physical exam to be admitted to the subacute rehabilitation facility  She presented to the emergency room on September 28, 2022 with complaint of shortness of breath with exertion and when lying down, being off balance, and swelling of her legs  Evaluation in the emergency room found her with acute heart failure  She was admitted to the [facility] from September 28, 2022 through October 10, 2022  She was seen in consultation by the Cardiology, therapy, nutrition, geriatric, and intensive care unit services during her hospitalization  She was diagnosed with new cardiomyopathy with reduced ejection fraction felt to be ischemic in nature  She was diagnosed with an ascending aortic aneurysm with dissection during her cardiac evaluation  Coronary artery catheterization was deferred secondary to increased risk with aneurysm and dissection  Medical management was recommended by the cardiology service  Her anticoagulation for atrial fibrillation was discontinued secondary to risk of rupture of her aneurysm  She was transitioned to dual antiplatelet therapy    Her heart failure responded well to diuresis, but she developed acute kidney injury  Her kidney function improved, but was not back to baseline at the time of discharge  She was felt to have a new baseline kidney function level  She was found to have delirium during her hospitalization and the geriatric service was consulted  Recommendation for delirium precautions was given  Per records, her daughter reported that her mother was experiencing hallucinations and confusion prior to her hospitalization  Therapy service recommended a post acute care rehabilitation stay prior to returning home  She was transferred to the post acute care rehabilitation facility on October 10, 2022  She reports feeling well during my visit  When asked, she reports that she has been living in an apartment by herself  She has been able to keep her bills up-to-date  She has given up driving secondary to her poor eyesight  She makes out grocery list and her daughter does the grocery shopping by report  Her daughter does her laundry secondary to her not having a washing machine in her apartment  She notes that her landlord has turned off her stove secondary to her setting off the fire alarm in the past   She is alert oriented x4 during my visit      HISTORY:  Past Medical History:   Diagnosis Date   • Atypical chest pain    • GERD (gastroesophageal reflux disease)    • Hyperlipidemia    • Hypertension    • Kidney stone    • Myocardial infarction [facility])     2015    • NSTEMI (non-ST elevated myocardial infarction) ([facility] )     Last Assessed: 9/24/2015   • Type 2 diabetes mellitus, without long-term current use of insulin ([facility] ) 9/12/2013     Past Surgical History:   Procedure Laterality Date   • A-V CARDIAC PACEMAKER INSERTION     • ANKLE SURGERY     • BACK SURGERY  01/2011    L4 and L5 laminectomy and fusion    • BLADDER SURGERY     • CORONARY ARTERY BYPASS GRAFT  09/02/2015    CABGx3 with LIMA to LAD, SVG to PDA, SVG to OM-1   • EXAMINATION UNDER ANESTHESIA N/A 7/11/2020    Procedure: EXAM UNDER ANESTHESIA (EUA)  EXCISION OF POSTERIOR VAGINAL FOREIGN BODY;  Surgeon: Refugio David MD;  [facility];  Service: Gynecology   • FL RETROGRADE PYELOGRAM  7/11/2020   • FL RETROGRADE PYELOGRAM  8/7/2020   • HYSTERECTOMY     • DC CYSTO/URETERO W/LITHOTRIPSY &INDWELL STENT INSRT Right 8/7/2020    Procedure: CYSTOSCOPY URETEROSCOPY WITH LITHOTRIPSY HOLMIUM LASER, RETROGRADE PYELOGRAM AND INSERTION STENT URETERAL;  Surgeon: Jocelyn Torres MD;  Location[facility];  Service: Urology   • DC CYSTOURETHROSCOPY,URETER CATHETER Right 7/11/2020    Procedure: CYSTOSCOPY RETROGRADE PYELOGRAM WITH INSERTION STENT URETERAL, bladder biopsy with fulguration;  Surgeon: Jocelyn Torres MD;  Location: [facility];  Service: Urology   • WRIST SURGERY       Family History   Problem Relation Age of Onset   • Hypertension Mother    • Hypertension Sister    • Alcohol abuse Brother    • Cirrhosis Brother    • Diabetes Father    • Other Brother         Heart transplant in his 46s   • Lung cancer Sister    • Diabetes Brother    • Stroke Sister    • No Known Problems Daughter    • No Known Problems Maternal Grandmother    • No Known Problems Maternal Grandfather    • No Known Problems Paternal Grandmother    • No Known Problems Paternal Grandfather    • No Known Problems Sister      Social History     Socioeconomic History   • Marital status: Single     Spouse name: Not on file   • Number of children: 1   • Years of education: Not on file   • Highest education level: Not on file   Occupational History   • Not on file   Tobacco Use   • Smoking status: Current Every Day Smoker     Packs/day: 0 25     Start date: 5   • Smokeless tobacco: Never Used   • Tobacco comment: Started smoking at age 25; currently smoking <1ppd     Vaping Use   • Vaping Use: Never used   Substance and Sexual Activity   • Alcohol use: Not Currently   • Drug use: Never   • Sexual activity: Not Currently     Partners: Male     Birth control/protection: Post-menopausal   Other Topics Concern   • Not on file   Social History Narrative   • Not on file     Social Determinants of Health     Financial Resource Strain: Medium Risk   • Difficulty of Paying Living Expenses: Somewhat hard   Food Insecurity: No Food Insecurity   • Worried About Running Out of Food in the Last Year: Never true   • Ran Out of Food in the Last Year: Never true   Transportation Needs: No Transportation Needs   • Lack of Transportation (Medical): No   • Lack of Transportation (Non-Medical): No   Physical Activity: Not on file   Stress: Not on file   Social Connections: Not on file   Intimate Partner Violence: Not on file   Housing Stability: Low Risk    • Unable to Pay for Housing in the Last Year: No   • Number of Places Lived in the Last Year: 1   • Unstable Housing in the Last Year: No       Allergies: Allergies   Allergen Reactions   • Nickel Rash       Review of Systems     Review of Systems   Constitutional: Positive for appetite change (Notes decreased appetite general) and unexpected weight change ( reports losing 10 lb, recently)  HENT: Positive for dental problem (Lower partial denture plate not fitting well secondary to weight loss)  Negative for hearing loss and trouble swallowing  Eyes: Positive for visual disturbance ( 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)  Respiratory: Negative for shortness of breath  Cardiovascular: Negative for chest pain  Gastrointestinal: Positive for diarrhea  Negative for abdominal pain and constipation  Genitourinary: Negative for difficulty urinating  Musculoskeletal: Negative for arthralgias  Skin: Negative for rash  Neurological: Negative for headaches  Had a dull self-limited headache this morning   Hematological: Does not bruise/bleed easily  Psychiatric/Behavioral: Negative for dysphoric mood and sleep disturbance         Medications and orders     All medications reviewed and updated in [address] [place] [address] EMR       Objective     Vitals:  Weight 125 2 lb, temperature 97 7° F, pulse 67, blood pressure 112/61, pulse oximetry 94% on room air, fasting fingerstick blood sugar 157  Physical Exam  Vitals reviewed  Constitutional:       General: She is awake  She is not in acute distress  Appearance: She is well-developed  She is not toxic-appearing or diaphoretic  Comments: She appears comfortable lying in bed, her stated age, and frail  HENT:      Head: Normocephalic  Nose: Nose normal       Mouth/Throat:      Mouth: Mucous membranes are moist    Eyes:      General: No scleral icterus  Conjunctiva/sclera: Conjunctivae normal    Cardiovascular:      Rate and Rhythm: Normal rate and regular rhythm  Heart sounds: Murmur (2/6 systolic ejection murmur over right 2nd intercostal space) heard  No friction rub  No gallop  Pulmonary:      Effort: Pulmonary effort is normal  No respiratory distress  Breath sounds: Normal breath sounds  No stridor  No wheezing, rhonchi or rales  Abdominal:      General: Abdomen is flat  There is no distension  Palpations: Abdomen is soft  There is no mass  Tenderness: There is no abdominal tenderness  There is no guarding  Hernia: No hernia is present  Musculoskeletal:         General: No deformity  Cervical back: Neck supple  Skin:     Findings: No rash  Neurological:      Mental Status: She is alert and oriented to person, place, and time  Comments: Alert and oriented x4  Psychiatric:         Mood and Affect: Mood normal          Behavior: Behavior normal  Behavior is cooperative  Pertinent Laboratory/Diagnostic Studies: The following labs/studies were reviewed please see chart or hospital paperwork for details      Lab Results   Component Value Date    WBC 8 55 10/09/2022    HGB 10 0 (L) 10/09/2022    HCT 36 9 10/09/2022    MCV 94 10/09/2022     10/09/2022     Lab Results   Component Value Date    SODIUM 137 10/09/2022    K 3 7 10/09/2022     10/09/2022    CO2 28 10/09/2022    BUN 37 (H) 10/09/2022    CREATININE 1 35 (H) 10/09/2022    GLUC 149 (H) 10/09/2022    CALCIUM 8 8 10/09/2022     Lab Results   Component Value Date    ALT 50 10/05/2022    AST 22 10/05/2022    ALKPHOS 71 10/05/2022    BILITOT 0 5 03/27/2017     Lab Results   Component Value Date    HGBA1C 6 06/29/2022 October 7, 2022:     CT of head without contrast:     FINDINGS:     PARENCHYMA:  No intracranial mass, mass effect or midline shift  No CT signs of acute infarction  No acute parenchymal hemorrhage  Stable diminished attenuation in the periventricular and subcortical white matter likely to moderately advanced chronic   microangiopathy      VENTRICLES AND EXTRA-AXIAL SPACES:  Normal for the patient's age      VISUALIZED ORBITS AND PARANASAL SINUSES:  Right lens replacement  Mild left maxillary sinus mucosal thickening  Tiny retention cyst in the left sphenoid sinus      CALVARIUM AND EXTRACRANIAL SOFT TISSUES:  Normal      IMPRESSION:     No acute intracranial pathology  Chronic microangiopathy  October 4, 2022:     CTA dissection protocol chest, abdomen, and pelvis:     IMPRESSION:     Aneurysmal dissection of the ascending aorta measuring up to 5 9 cm with dissection flap extending to the noncoronary and right coronary cusp and approaching the right brachiocephalic artery takeoff distally  Additional smaller short segment dissection   flap along the distal medial aspect of the ascending aorta  September 29, 2022:     2D transthoracic echocardiogram:     Interpretation Summary       •  Left Ventricle: Left ventricular cavity size is normal  Wall thickness is normal  The left ventricular ejection fraction is 36% by biplane measurement  By visual assessment, ejection fraction appears slightly better  Systolic function is moderately reduced  Endocardium is poorly visualized  There is global hypokinesis with regional variation  Diastolic function is moderately abnormal, consistent with grade II (pseudonormal) relaxation  Left atrial filling pressure is elevated  •  Right Ventricle: Systolic function is moderately reduced  •  Left Atrium: The atrium is mildly dilated  •  Aortic Valve: There is mild to moderate regurgitation  •  Mitral Valve: There is mild annular calcification  There is mild to moderate regurgitation  •  Tricuspid Valve: There is moderate regurgitation  The right ventricular systolic pressure is moderately elevated  The estimated right ventricular systolic pressure is 89 76 mmHg  •  Pulmonic Valve: There is mild to moderate regurgitation  •  Aorta: The aortic root is severely dilated  The aortic root is 5 60 cm  •  Pericardium: There is no pericardial effusion  •  Technically difficult study  October 5, 2022:     ECG 12 lead    Status: Final result       Study Result    Narrative & Impression   Ventricular-paced rhythm with occasional intrinsic complexes  Abnormal ECG     - Her admission order summary was reviewed and signed  Portions of the record may have been created with voice recognition software  Occasional wrong word or "sound a like" substitutions may have occurred due to the inherent limitations of voice recognition software  Read the chart carefully and recognize, using context, where substitutions have occurred      OSCAR Hodge   10/12/2022 9:38 PM

## 2022-10-13 NOTE — ASSESSMENT & PLAN NOTE
· She is doing well with carvedilol for rate control   · She has a permanent pacemaker in place  · Will continue her care in collaboration with her cardiology service   · She will follow-up with her PCP and cardiology services upon discharge

## 2022-10-13 NOTE — ASSESSMENT & PLAN NOTE
· Seen in consultation by her cardiology service during hospitalization   · Secondary to comorbidities and high surgical risk, medical therapy was recommended  · Will continue with antihypertensive therapy, including carvedilol, with target of systolic blood pressure between 105 and 120   · Will continue with monitoring for change in her condition  · She will follow-up with her PCP upon discharge

## 2022-10-13 NOTE — ASSESSMENT & PLAN NOTE
· Apparent baseline creatinine of 0 8-1, peak creatinine during hospitalization 1 8, discharge creatinine on October 9, 2022 1 35  · Likely secondary to aggressive diuresis to treat her acute heart failure  · Will avoid nephrotoxic medications and supplements   · She will be admitted to the subacute rehabilitation facility under the acute kidney injury pathway will continue with clinical and periodic laboratory monitoring for change in her condition  · She will follow-up with her PCP upon discharge

## 2022-10-13 NOTE — ASSESSMENT & PLAN NOTE
· Seen in consultation by the cardiology service during her hospitalization   · After interrogation of her pacemaker, cardiology service recommended discontinuing amiodarone secondary to transition to per min atrial fibrillation status  · She is doing well with carvedilol for rate control   · She is not on anticoagulation secondary to increased risk of rupture of her ascending thoracic aortic aneurysm  · Cardiology service transitioned her to aspirin and clopidogrel  · Will continue with monitoring for change in her condition  · She will follow-up with her PCP and cardiology services upon discharge

## 2022-10-13 NOTE — ASSESSMENT & PLAN NOTE
· She reports having confusion and being disoriented during hospital stay   · Seen in consultation by the geriatric service   · Will continue with delirium precautions  · Will continue with monitoring for change in her condition  · She will follow-up with her PCP upon discharge

## 2022-10-13 NOTE — ASSESSMENT & PLAN NOTE
· Hospitalized from September 28, 2023 through October 10, 2022   · New finding of cardiomyopathy   · Seen in consultation by the cardiology service during hospitalization   · Coronary Angiography deferred secondary to increased risk with ascending thoracic aneurysm/dissection   · Will continue with guideline directed medical therapy  · Will continue with carvedilol, isosorbide, losartan, and hydralazine   · Currently, she is not requiring diuretic therapy   · She will be admitted to the subacute rehabilitation facility under the congestive heart failure pathway   Secondary to her decreased level of functioning from baseline, she will be admitted to the subacute rehabilitation facility and seen in consultation by a multidisciplinary rehabilitation team for evaluation and treatment to assist her in returning to her prior level of functioning  · Will continue with monitoring for change in her condition  · She will follow-up with her PCP and cardiology services upon discharge

## 2022-10-13 NOTE — ASSESSMENT & PLAN NOTE
Goal Outcome Evaluation:  Plan of Care Reviewed With: patient        Progress: no change  Outcome Evaluation: Pt resting comfortably throughout night. Premedicated prior to turns with 1.5mg dilaudid, 2mg ativan, and 0.4mg Robinul. No family at bedside this shift. Will continue to monitor and provide palliative care.   · Status post CABG in 2015 and PCI with stent placement  · Will continue with secondary prevention of aspirin and atorvastatin   · Will continue with clinical and periodic laboratory monitoring for change in her condition  · She will follow-up with her PCP and cardiology services upon discharge

## 2022-10-13 NOTE — ASSESSMENT & PLAN NOTE
· Will continue her prior to admission atorvastatin  · She will follow-up with her PCP and cardiology services upon discharge

## 2022-10-13 NOTE — ASSESSMENT & PLAN NOTE
· Given her episode of acute kidney injury, will discontinue her metformin during her subacute rehabilitation stay  · Will continue with monitoring of her fasting fingerstick blood sugar   · As she notes possible trouble with loose bowels, to consider transition to other medication for diabetes   · She will follow-up with her PCP upon discharge

## 2022-10-13 NOTE — ASSESSMENT & PLAN NOTE
· Seen in consultation by the cardiology and Intensive Care Unit services during hospitalization   · Secondary to comorbidities and high risk for intervention, medical therapy was elected   · Will continue with heart rate and blood pressure management with carvedilol with target systolic blood pressure between 105 and 120   · Will continue with monitoring for change in her condition  · She will follow-up with her PCP and cardiology services upon discharge

## 2022-10-17 ENCOUNTER — NURSING HOME VISIT (OUTPATIENT)
Dept: GERIATRICS | Facility: OTHER | Age: 77
End: 2022-10-17
Payer: COMMERCIAL

## 2022-10-17 DIAGNOSIS — R26.2 AMBULATORY DYSFUNCTION: ICD-10-CM

## 2022-10-17 DIAGNOSIS — D64.9 ANEMIA, UNSPECIFIED TYPE: ICD-10-CM

## 2022-10-17 DIAGNOSIS — I50.20 HEART FAILURE WITH REDUCED EJECTION FRACTION (HCC): Primary | ICD-10-CM

## 2022-10-17 DIAGNOSIS — N17.9 AKI (ACUTE KIDNEY INJURY) (HCC): ICD-10-CM

## 2022-10-17 PROCEDURE — 99309 SBSQ NF CARE MODERATE MDM 30: CPT | Performed by: NURSE PRACTITIONER

## 2022-10-18 ENCOUNTER — NURSING HOME VISIT (OUTPATIENT)
Dept: WOUND CARE | Facility: HOSPITAL | Age: 77
End: 2022-10-18
Payer: COMMERCIAL

## 2022-10-18 DIAGNOSIS — R26.2 AMBULATORY DYSFUNCTION: ICD-10-CM

## 2022-10-18 DIAGNOSIS — T14.8XXA HEMATOMA: Primary | ICD-10-CM

## 2022-10-18 PROCEDURE — 99307 SBSQ NF CARE SF MDM 10: CPT | Performed by: NURSE PRACTITIONER

## 2022-10-18 NOTE — ASSESSMENT & PLAN NOTE
Right lower leg  - patient bumped her leg and had hematoma, unknown onset  - decrease in size, stable, with no obvious sign of infection  - ordered skin prep and border from daily for close monitoring  - followup next week

## 2022-10-18 NOTE — PROGRESS NOTES
[address] MANAGEMENT   AND HYPERBARIC MEDICINE CENTER       Patient ID: Briseyda Casarez is a 69 y o  female Date of Birth 1945     Location of Service: [facility]    Performed wound round with: Wound team     Chief Complaint : Right lower leg    Wound Instructions:  Wound:  Right lower leg  Discontinue previous wound order  Cleanse the wound bed with NSS   Apply non-sting skin prep to periwound area and wound bed then cover with bordered foam   Frequency : daily and prn for soiling  Offload all wounds  Turn and reposition frequently, maximum of every two hours  Increase protein intake  Monitor for any sign of infection or worsening, inform PCP or patient's primary physician in your facility  Allergies  Nickel      Assessment & Plan:  1  Hematoma  Assessment & Plan:  Right lower leg  - patient bumped her leg and had hematoma, unknown onset  - decrease in size, stable, with no obvious sign of infection  - ordered skin prep and border from daily for close monitoring  - followup next week      2  Ambulatory dysfunction  Assessment & Plan:  - on short term rehab             Subjective:   10/11/2022This is a 69 y o , female referred to our service for wound/ skin alterations on right lower leg  Patient have a complex medical history including but not limited to  GERD (gastroesophageal reflux disease), Hyperlipidemia, Hypertension, Kidney stone, Myocardial infarction [facility]), and NSTEMI (non-ST elevated myocardial infarction) ([facility] [address] )    Patient was referred by Senior Care Team  Patient was seen in collaboration with the facility wound team      Wound History:   As per patients report, she bumped her right lower leg and had hematoma  Current treatment - triad paste  Receive patient in chair, seems comfortable  Denies pain  No significant issues related to the wound  10/18/2022 Follow up for wound on the right lower leg   Received patient, not in distress   Facility staff did not report any significant issues related to the wound  Denies pain  Review of Systems   Constitutional: Negative  HENT: Negative  Eyes: Negative  Respiratory: Negative  Cardiovascular: Negative for chest pain and leg swelling  Gastrointestinal: Negative  Endocrine: Negative  Genitourinary: Negative  Musculoskeletal: Positive for gait problem  Skin: Positive for wound  See HPI   Neurological: Negative for dizziness and headaches  Psychiatric/Behavioral: Negative for behavioral problems  Objective:    Physical Exam  Constitutional:       Appearance: Normal appearance  HENT:      Head: Normocephalic and atraumatic  Nose: Nose normal       Mouth/Throat:      Pharynx: Oropharynx is clear  Eyes:      Conjunctiva/sclera: Conjunctivae normal    Pulmonary:      Effort: Pulmonary effort is normal    Abdominal:      Tenderness: There is no abdominal tenderness  There is no guarding  Genitourinary:     Comments: continent  Musculoskeletal:         General: No tenderness  Cervical back: Normal range of motion  Right lower leg: No edema  Left lower leg: No edema  Comments: LROM   Skin:     Findings: Lesion present  Comments: Right lower leg - wound size is 0 9 x 0 7 x 0 1 cm  ,, + hematoma, + soft, no drainage, with no obvious sign of infection, not tenderness on palpation   Neurological:      Mental Status: She is alert  Gait: Gait abnormal    Psychiatric:         Mood and Affect: Mood normal          Behavior: Behavior normal               Procedures           Patient's care was coordinated with nursing facility staff  Recent vitals, labs and updated medications were reviewed on EMR or chart system of facility   Past Medical, surgical, social, medication and allergy history and patient's previous records were reviewed and updated as appropriate: Most up-to date information is available in the facility EMR where the patient is currently admitted      Patient Active Problem List   Diagnosis   • Aneurysm of ascending aorta   • Anxiety   • CAD (coronary atherosclerotic disease)   • Neck pain   • Hypertension   • Gastroesophageal reflux disease   • Glaucoma   • Hyperlipidemia   • Macular degeneration   • Osteopenia   • 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HCC)   • Tobacco abuse   • Elevated troponin level not due myocardial infarction   • Gross hematuria   • Generalized weakness   • Unintentional weight loss   • Nephrolithiasis   • Hematuria   • Foreign body in vagina   • Cardiac pacemaker in situ   • Preop cardiovascular exam   • Anemia, unspecified   • Vitamin B 12 deficiency   • Permanent atrial fibrillation ([facility])   • Acquired absence of other right toe(s) ([facility])   • Continuous opioid dependence ([facility] [address] )   • Hypertensive urgency   • Heart failure with reduced ejection fraction ([facility])   • Wound of right leg   • Constipation   • Thoracic aortic dissection   • LAURENCE (acute kidney injury) [facility])   • Hospital acquired delirium superimposed on suspected underlying dementia   • Hematoma   • Ambulatory dysfunction   • DNR (do not resuscitate)   • Type 2 diabetes mellitus, without long-term current use of insulin ([facility] [address] )   • History of delirium     Past Medical History:   Diagnosis Date   • Atypical chest pain    • GERD (gastroesophageal reflux disease)    • Hyperlipidemia    • Hypertension    • Kidney stone    • Myocardial infarction ([facility] [address] )     2015    • NSTEMI (non-ST elevated myocardial infarction) ([facility] [address] )     Last Assessed: 9/24/2015   • Type 2 diabetes mellitus, without long-term current use of insulin ([facility] [address] ) 9/12/2013     Past Surgical History:   Procedure Laterality Date   • A-V CARDIAC PACEMAKER INSERTION     • ANKLE SURGERY     • BACK SURGERY  01/2011    L4 and L5 laminectomy and fusion    • BLADDER SURGERY     • CORONARY ARTERY BYPASS GRAFT  09/02/2015    CABGx3 with LIMA to LAD, SVG to PDA, SVG to OM-1   • EXAMINATION UNDER ANESTHESIA N/A 7/11/2020    Procedure: EXAM UNDER ANESTHESIA (EUA)  EXCISION OF POSTERIOR VAGINAL FOREIGN BODY;  Surgeon: Nafisa Mayo MD;  [facility];  Service: Gynecology   • FL RETROGRADE PYELOGRAM  7/11/2020   • FL RETROGRADE PYELOGRAM  8/7/2020   • HYSTERECTOMY     • WV CYSTO/URETERO W/LITHOTRIPSY &INDWELL STENT INSRT Right 8/7/2020    Procedure: CYSTOSCOPY URETEROSCOPY WITH LITHOTRIPSY HOLMIUM LASER, RETROGRADE PYELOGRAM AND INSERTION STENT URETERAL;  Surgeon: Jeffry Barrera MD;  [facility]: [facility];  Service: Urology   • WV CYSTOURETHROSCOPY,URETER CATHETER Right 7/11/2020    Procedure: CYSTOSCOPY RETROGRADE PYELOGRAM WITH INSERTION STENT URETERAL, bladder biopsy with fulguration;  Surgeon: Jeffry Barrera MD;  [facility];  Service: Urology   • WRIST SURGERY       Social History     Socioeconomic History   • Marital status: Single     Spouse name: None   • Number of children: 1   • Years of education: None   • Highest education level: None   Occupational History   • None   Tobacco Use   • Smoking status: Current Every Day Smoker     Packs/day: 0 25     Start date: 5   • Smokeless tobacco: Never Used   • Tobacco comment: Started smoking at age 25; currently smoking <1ppd  Vaping Use   • Vaping Use: Never used   Substance and Sexual Activity   • Alcohol use: Not Currently   • Drug use: Never   • Sexual activity: Not Currently     Partners: Male     Birth control/protection: Post-menopausal   Other Topics Concern   • None   Social History Narrative   • None     Social Determinants of Health     Financial Resource Strain: Medium Risk   • Difficulty of Paying Living Expenses: Somewhat hard   Food Insecurity: No Food Insecurity   • Worried About Running Out of Food in the Last Year: Never true   • Ran Out of Food in the Last Year: Never true   Transportation Needs: No Transportation Needs   • Lack of Transportation (Medical): No   • Lack of Transportation (Non-Medical):  No   Physical Activity: Not on file   Stress: Not on file   Social Connections: Not on file   Intimate Partner Violence: Not on file   Housing Stability: Low Risk    • Unable to Pay for Housing in the Last Year: No   • Number of Places Lived in the Last Year: 1   • Unstable Housing in the Last Year: No        Current Outpatient Medications:   •  acetaminophen (TYLENOL) 325 mg tablet, Take 2 tablets (650 mg total) by mouth every 4 (four) hours as needed for mild pain, Disp: 30 tablet, Rfl: 0  •  aspirin 81 mg chewable tablet, Chew 4 tablets (324 mg total) daily Do not start before October 11, 2022 , Disp: , Rfl: 0  •  atorvastatin (LIPITOR) 40 mg tablet, Take 1 tablet (40 mg total) by mouth daily, Disp: 90 tablet, Rfl: 3  •  carvedilol (COREG) 12 5 mg tablet, Take 1 tablet (12 5 mg total) by mouth 2 (two) times a day with meals, Disp: , Rfl: 0  •  clopidogrel (PLAVIX) 75 mg tablet, Take 1 tablet (75 mg total) by mouth daily Do not start before October 11, 2022 , Disp: , Rfl: 0  •  ferrous sulfate 325 (65 Fe) mg tablet, Take 1 tablet (325 mg total) by mouth every other day, Disp: , Rfl:   •  hydrALAZINE (APRESOLINE) 25 mg tablet, Take 1 tablet (25 mg total) by mouth every 8 (eight) hours, Disp: , Rfl: 0  •  isosorbide dinitrate (ISORDIL) 20 mg tablet, Take 1 tablet (20 mg total) by mouth 3 (three) times daily after meals, Disp: , Rfl: 0  •  losartan (COZAAR) 25 mg tablet, Take 1 tablet (25 mg total) by mouth daily, Disp: 90 tablet, Rfl: 3  •  magnesium oxide (MAG-OX) 400 mg, Take 1 tablet (400 mg total) by mouth 2 (two) times a day, Disp: , Rfl: 0  •  melatonin 3 mg, Take 2 tablets (6 mg total) by mouth daily at bedtime, Disp: , Rfl: 0  •  metFORMIN (GLUCOPHAGE) 500 mg tablet, Take 1 tablet (500 mg total) by mouth 2 (two) times a day with meals, Disp: 180 tablet, Rfl: 3  •  omeprazole (PriLOSEC) 20 mg delayed release capsule, Take 1 capsule (20 mg total) by mouth daily, Disp: 90 capsule, Rfl: 3  Family History   Problem Relation Age of Onset   • Hypertension Mother    • Hypertension Sister    • Alcohol abuse Brother    • Cirrhosis Brother    • Diabetes Father    • Other Brother         Heart transplant in his 46s   • Lung cancer Sister    • Diabetes Brother    • Stroke Sister    • No Known Problems Daughter    • No Known Problems Maternal Grandmother    • No Known Problems Maternal Grandfather    • No Known Problems Paternal Grandmother    • No Known Problems Paternal Grandfather    • No Known Problems Sister               Coordination of Care: Wound team aware of the treatment plan  Facility nurse will provide wound treatment and monitor the wound for any changes  Patient / Staff education : Patient / Staff was given education on sign of infection and pressure ulcer prevention  Patient/ Staff verbalized understanding     Follow up :  Next week    Voice-recognition software may have been used in the preparation of this document  Occasional wrong word or "sound-alike" substitutions may have occurred due to the inherent limitations of voice recognition software  Interpretation should be guided by abena Valverde

## 2022-10-22 ENCOUNTER — NURSING HOME VISIT (OUTPATIENT)
Dept: GERIATRICS | Facility: OTHER | Age: 77
End: 2022-10-22
Payer: COMMERCIAL

## 2022-10-22 DIAGNOSIS — I10 HYPERTENSION, UNSPECIFIED TYPE: ICD-10-CM

## 2022-10-22 DIAGNOSIS — I71.010 DISSECTION OF ASCENDING AORTA: ICD-10-CM

## 2022-10-22 DIAGNOSIS — N17.9 AKI (ACUTE KIDNEY INJURY) (HCC): ICD-10-CM

## 2022-10-22 DIAGNOSIS — I49.5 TACHY-BRADY SYNDROME (HCC): ICD-10-CM

## 2022-10-22 DIAGNOSIS — I50.20 HEART FAILURE WITH REDUCED EJECTION FRACTION (HCC): Primary | ICD-10-CM

## 2022-10-22 DIAGNOSIS — E11.9 TYPE 2 DIABETES MELLITUS WITHOUT COMPLICATION, WITHOUT LONG-TERM CURRENT USE OF INSULIN (HCC): ICD-10-CM

## 2022-10-22 DIAGNOSIS — E78.5 HYPERLIPIDEMIA, UNSPECIFIED HYPERLIPIDEMIA TYPE: ICD-10-CM

## 2022-10-22 DIAGNOSIS — I71.21 ANEURYSM OF ASCENDING AORTA WITHOUT RUPTURE: ICD-10-CM

## 2022-10-22 DIAGNOSIS — I48.21 PERMANENT ATRIAL FIBRILLATION (HCC): ICD-10-CM

## 2022-10-22 PROCEDURE — 99316 NF DSCHRG MGMT 30 MIN+: CPT | Performed by: NURSE PRACTITIONER

## 2022-10-25 NOTE — ASSESSMENT & PLAN NOTE
· Last Creatinine 0 92 and Gfr 65  · Avoid nephrotoxins and NSAIDS  · Avoid hypotension  · Encourage PO fluid intake with avoidance of fluid overload  · Follow up with PCP upon discharge

## 2022-10-25 NOTE — ASSESSMENT & PLAN NOTE
Wt Readings from Last 3 Encounters:   10/10/22 56 8 kg (125 lb 3 5 oz)   08/02/22 59 9 kg (132 lb)   07/18/22 61 7 kg (136 lb)     · Current wt 129  · Will continue to monitor BMP   · Continue carvedilol, isosorbide, losartan, and hydralazine  · Continue CRISTIAN diet  · Continue to monitor daily weights

## 2022-10-25 NOTE — ASSESSMENT & PLAN NOTE
· Current wt 132  · Continue daily weight monitoring  · Continue Carvedilol, Isosorbide, Losartan, and Hydralazine daily  · Continue CRISTIAN diet  · Follow up with Cardiology/PCP upon dishcarge

## 2022-10-25 NOTE — ASSESSMENT & PLAN NOTE
· Last Creatinine 0 99 and Gfr 59  · Will continue to monitor BMP  · Avoid nephrotoxins and NSAIDS  · Avoid hypotension  · Encourage PO fluid intake monitoring for fluid overload

## 2022-10-25 NOTE — ASSESSMENT & PLAN NOTE
· Continue on carvedilol for rate control  · Patient is currently not on any anticoagulant r/t increase risk for rupture of ascending thoracic aortic aneurysm  · Continue aspirin and Plavix for stroke prevention  · Follow-up with cardiology/PCP upon discharge

## 2022-10-25 NOTE — ASSESSMENT & PLAN NOTE
· Continue carvedilol, hydralazine, and losartan  · Continue to monitor blood pressure closely  · Follow-up with cardiology/PCP upon discharge

## 2022-10-25 NOTE — ASSESSMENT & PLAN NOTE
Lab Results   Component Value Date    HGBA1C 6 06/29/2022   · Patient bgs readings showed she was stable without use of Metformin which was d/c'd upon admission to [facility] due to LAURENCE and c/o loose stools  · Per current geriatric guidelines treatment with >7 0 HGBA1C  · Follow up with PCP for further discussion upon d/c

## 2022-10-25 NOTE — PROGRESS NOTES
[address]  [address] [facility]  [phone]  MAVIS POLANCO [facility]  [address]  Discharge Summary      NAME: Сергей Woods  AGE: 69 y o  SEX: female  :  1945  DATE OF ENCOUNTER: 10/22/2022    Chief Complaint   Patient seen and examined for discharge  History of Present Illness     Сергей Woods is a patient of [facility] STR with acute and chronic medical conditions of GERD, type 2 diabetes, CAD, heart failure, aneurysm of ascending aorta, hypertension, atrial fibrillation, tachy-keanu syndrome, delirium, osteopenia, LAURENCE, nephrolithiasis, acquired absence of right toe, anemia, anxiety, constipation, and ambulatory dysfunction  The patient is being seen and examined today for follow-up on discharge  Upon examination, the patient is  alert, cooperative, and in no acute distress  She denies pain, sob, chest pain, abdominal pain, fever, chills, nausea/vomiting, diarrhea/constipation, or dysuria  The patient reports she has a fair appetite and is drinking an adequate amount of fluids  The patient offers no concerns today  During the patient's STR stay the patient received PT/OT treatment for restorative services  The patient will be discharged with PT, OT, and nursing services with a community home health agency  The following portions of the patient's history were reviewed and updated as appropriate: allergies, current medications, past family history, past medical history, past social history, past surgical history and problem list     Review of Systems     A comprehensive 10-point review of systems was obtained with pertinent positives and negatives noted per HPI      History     Past Medical History:   Diagnosis Date   • Atypical chest pain    • GERD (gastroesophageal reflux disease)    • Hyperlipidemia    • Hypertension    • Kidney stone    • Myocardial infarction [facility])         • NSTEMI (non-ST elevated myocardial infarction) ([facility] )     Last Assessed: 2015   • Type 2 diabetes mellitus, without long-term current use of insulin ([facility] [address] ) 9/12/2013     Past Surgical History:   Procedure Laterality Date   • A-V CARDIAC PACEMAKER INSERTION     • ANKLE SURGERY     • BACK SURGERY  01/2011    L4 and L5 laminectomy and fusion    • BLADDER SURGERY     • CORONARY ARTERY BYPASS GRAFT  09/02/2015    CABGx3 with LIMA to LAD, SVG to PDA, SVG to OM-1   • EXAMINATION UNDER ANESTHESIA N/A 7/11/2020    Procedure: EXAM UNDER ANESTHESIA (EUA)  EXCISION OF POSTERIOR VAGINAL FOREIGN BODY;  Surgeon: Osmani Luis MD;  [facility];  Service: Gynecology   • FL RETROGRADE PYELOGRAM  7/11/2020   • FL RETROGRADE PYELOGRAM  8/7/2020   • HYSTERECTOMY     • [place] CYSTO/URETERO W/LITHOTRIPSY &INDWELL STENT INSRT Right 8/7/2020    Procedure: CYSTOSCOPY URETEROSCOPY WITH LITHOTRIPSY HOLMIUM LASER, RETROGRADE PYELOGRAM AND INSERTION STENT URETERAL;  Surgeon: Alena Claire MD;  Location[facility];  Service: Urology   • IL CYSTOURETHROSCOPY,URETER CATHETER Right 7/11/2020    Procedure: CYSTOSCOPY RETROGRADE PYELOGRAM WITH INSERTION STENT URETERAL, bladder biopsy with fulguration;  Surgeon: Alena Claire MD;  Location: [facility];  Service: Urology   • WRIST SURGERY       Family History   Problem Relation Age of Onset   • Hypertension Mother    • Hypertension Sister    • Alcohol abuse Brother    • Cirrhosis Brother    • Diabetes Father    • Other Brother         Heart transplant in his 46s   • Lung cancer Sister    • Diabetes Brother    • Stroke Sister    • No Known Problems Daughter    • No Known Problems Maternal Grandmother    • No Known Problems Maternal Grandfather    • No Known Problems Paternal Grandmother    • No Known Problems Paternal Grandfather    • No Known Problems Sister      Social History     Socioeconomic History   • Marital status: Single     Spouse name: Not on file   • Number of children: 1   • Years of education: Not on file   • Highest education level: Not on file Occupational History   • Not on file   Tobacco Use   • Smoking status: Current Every Day Smoker     Packs/day: 0 25     Start date: 5   • Smokeless tobacco: Never Used   • Tobacco comment: Started smoking at age 25; currently smoking <1ppd  Vaping Use   • Vaping Use: Never used   Substance and Sexual Activity   • Alcohol use: Not Currently   • Drug use: Never   • Sexual activity: Not Currently     Partners: Male     Birth control/protection: Post-menopausal   Other Topics Concern   • Not on file   Social History Narrative   • Not on file     Social Determinants of Health     Financial Resource Strain: Medium Risk   • Difficulty of Paying Living Expenses: Somewhat hard   Food Insecurity: No Food Insecurity   • Worried About Running Out of Food in the Last Year: Never true   • Ran Out of Food in the Last Year: Never true   Transportation Needs: No Transportation Needs   • Lack of Transportation (Medical): No   • Lack of Transportation (Non-Medical):  No   Physical Activity: Not on file   Stress: Not on file   Social Connections: Not on file   Intimate Partner Violence: Not on file   Housing Stability: Low Risk    • Unable to Pay for Housing in the Last Year: No   • Number of Places Lived in the Last Year: 1   • Unstable Housing in the Last Year: No     Allergies   Allergen Reactions   • Nickel Rash       Objective     Vital Signs  BP:  155/74       HR:  73 T:  97 2    RR:  19 O2Sat:  96% W:  132 6  General: NAD, Frail, Thin  Oral: Oropharynx Moist and Clear  Neck: Supple, +ROM  CV: S1, S2, normal rate, regular rhythm, + murmur appreciated  Pulmonary: Lung sounds clear to air, no wheezing, rhonchi, rales  Abdominal:BS + x4 in all quadrants, soft, no mass, no tenderness  Extremities: No edema, +ROM, +Strength  Skin: Warm, Dry, no lesions, no rash, no erythema present, no ecchymosis present  Neurological/Psych: Alert and oriented times 3, normal mood, no affect, fair judgement    Pertinent Laboratory/Diagnostic Studies have been independently reviewed  Current Medications     Current Medications Reviewed and updated in Nursing Home EMR      Assessment and Plan     Heart failure with reduced ejection fraction (HCC)  · Current wt 132  · Continue daily weight monitoring  · Continue Carvedilol, Isosorbide, Losartan, and Hydralazine daily  · Continue CRISTIAN diet  · Follow up with Cardiology/PCP upon dishcarge            Hypertension  · Continue carvedilol, hydralazine, and losartan  · Continue to monitor blood pressure closely  · Follow-up with cardiology/PCP upon discharge    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HCC)  · Patient with permanent pacemaker and to continue on carvedilol  · Follow-up with cardiology/PCP upon discharge    Aneurysm of ascending aorta (HCC)  · As stated above, continue with all blood pressure medications  · Target BP between 194/333 systolic   · Follow-up with cardiology/PCP upon discharge    Permanent atrial fibrillation (HCC)  · Continue on carvedilol for rate control  · Patient is currently not on any anticoagulant r/t increase risk for rupture of ascending thoracic aortic aneurysm  · Continue aspirin and Plavix for stroke prevention  · Follow-up with cardiology/PCP upon discharge    LAURENCE (acute kidney injury) ([facility] [address] )  · Last Creatinine 0 92 and Gfr 65  · Avoid nephrotoxins and NSAIDS  · Avoid hypotension  · Encourage PO fluid intake with avoidance of fluid overload  · Follow up with PCP upon discharge      Hyperlipidemia  · Continue Atorvastatin  · Follow up with Cardiology/PCP upon discharge    Type 2 diabetes mellitus, without long-term current use of insulin [facility]    Lab Results   Component Value Date    HGBA1C 6 06/29/2022   · Patient bgs readings showed she was stable without use of Metformin which was d/c'd upon admission to [facility] due to LAURENCE and c/o loose stools  · Per current geriatric guidelines treatment with >7 0 HGBA1C  · Follow up with PCP for further discussion upon d/c      Discussion with patient/family and further instructions:  -Fall precautions  -Aspiration precautions  -Bleeding precautions  -Monitor for signs/symptoms of infection  -Medication list was reviewed and signed  -DME form was completed     Follow-up Recommendations: Please follow-up with your primary care physician within 7-10 days of discharge to review medication changes and current status  Problem List Follow-up Recommendations:  I have spent 40 minutes with Patient /Family today in which greater than 50% of this time was spent in counseling/coordination of care      [facility]  Geriatric Medicine  10/22/2022

## 2022-10-25 NOTE — ASSESSMENT & PLAN NOTE
· Patient with permanent pacemaker and to continue on carvedilol  · Follow-up with cardiology/PCP upon discharge

## 2022-10-25 NOTE — ASSESSMENT & PLAN NOTE
· As stated above, continue with all blood pressure medications  · Target BP between 520/313 systolic   · Follow-up with cardiology/PCP upon discharge

## 2022-10-25 NOTE — PROGRESS NOTES
[address] [address]  [phone]  [address]  PROGRESS NOTE        NAME: Fede Roa  AGE: 69 y o  SEX: female  :  1945  DATE OF ENCOUNTER: 10/17/2022    Chief Complaint   Patient seen and examined for follow up on chronic conditions  History of Present Illness     Melvina Uribe is a patient of [facility] with acute and chronic medical conditions of GERD, type 2 diabetes, CAD, heart failure, aneurysm of ascending aorta, hypertension, atrial fibrillation, tachy-keanu syndrome, delirium, osteopenia, LAURENCE, nephrolithiasis, acquired absence of right toe, anemia, anxiety, constipation, and ambulatory dysfunction      The patient is being seen and examined today for acute and chronic medical conditions  Upon examination, the patient is  alert, cooperative, and in no acute distress  She denies pain, sob, chest pain, abdominal pain, fever, chills, nausea/vomiting, diarrhea/constipation, or dysuria  The patient reports she has a fair appetite and is drinking an adequate amount of fluids  The patient offers no concerns today  PT/OT have been providing therapy treatment for restorative services  The following portions of the patient's history were reviewed and updated as appropriate: allergies, current medications, past family history, past medical history, past social history, past surgical history and problem list     Review of Systems     A 12-point comprehensive review of symptoms was obtained with pertinent positives and negatives noted per HPI      History     Past Medical History:   Diagnosis Date   • Atypical chest pain    • GERD (gastroesophageal reflux disease)    • Hyperlipidemia    • Hypertension    • Kidney stone    • Myocardial infarction [facility])         • NSTEMI (non-ST elevated myocardial infarction) ([facility] )     Last Assessed: 2015   • Type 2 diabetes mellitus, without long-term current use of insulin ([facility] ) 2013     Past Surgical History:   Procedure Laterality Date   • A-V CARDIAC PACEMAKER INSERTION     • ANKLE SURGERY     • BACK SURGERY  01/2011    L4 and L5 laminectomy and fusion    • BLADDER SURGERY     • CORONARY ARTERY BYPASS GRAFT  09/02/2015    CABGx3 with LIMA to LAD, SVG to PDA, SVG to OM-1   • EXAMINATION UNDER ANESTHESIA N/A 7/11/2020    Procedure: EXAM UNDER ANESTHESIA (EUA)  EXCISION OF POSTERIOR VAGINAL FOREIGN BODY;  Surgeon: Carlos Hernandez MD;  [facility];  Service: Gynecology   • FL RETROGRADE PYELOGRAM  7/11/2020   • FL RETROGRADE PYELOGRAM  8/7/2020   • HYSTERECTOMY     • WA CYSTO/URETERO W/LITHOTRIPSY &INDWELL STENT INSRT Right 8/7/2020    Procedure: CYSTOSCOPY URETEROSCOPY WITH LITHOTRIPSY HOLMIUM LASER, RETROGRADE PYELOGRAM AND INSERTION STENT URETERAL;  Surgeon: Daniella Aguilar MD;  [facility];  Service: Urology   • WA CYSTOURETHROSCOPY,URETER CATHETER Right 7/11/2020    Procedure: CYSTOSCOPY RETROGRADE PYELOGRAM WITH INSERTION STENT URETERAL, bladder biopsy with fulguration;  Surgeon: Daniella Aguilar MD;  [facility];  Service: Urology   • WRIST SURGERY       Family History   Problem Relation Age of Onset   • Hypertension Mother    • Hypertension Sister    • Alcohol abuse Brother    • Cirrhosis Brother    • Diabetes Father    • Other Brother         Heart transplant in his 46s   • Lung cancer Sister    • Diabetes Brother    • Stroke Sister    • No Known Problems Daughter    • No Known Problems Maternal Grandmother    • No Known Problems Maternal Grandfather    • No Known Problems Paternal Grandmother    • No Known Problems Paternal Grandfather    • No Known Problems Sister      Social History     Socioeconomic History   • Marital status: Single     Spouse name: Not on file   • Number of children: 1   • Years of education: Not on file   • Highest education level: Not on file   Occupational History   • Not on file   Tobacco Use   • Smoking status: Current Every Day Smoker     Packs/day: 0 25     Start date: 5   • Smokeless tobacco: Never Used   • Tobacco comment: Started smoking at age 25; currently smoking <1ppd  Vaping Use   • Vaping Use: Never used   Substance and Sexual Activity   • Alcohol use: Not Currently   • Drug use: Never   • Sexual activity: Not Currently     Partners: Male     Birth control/protection: Post-menopausal   Other Topics Concern   • Not on file   Social History Narrative   • Not on file     Social Determinants of Health     Financial Resource Strain: Medium Risk   • Difficulty of Paying Living Expenses: Somewhat hard   Food Insecurity: No Food Insecurity   • Worried About Running Out of Food in the Last Year: Never true   • Ran Out of Food in the Last Year: Never true   Transportation Needs: No Transportation Needs   • Lack of Transportation (Medical): No   • Lack of Transportation (Non-Medical): No   Physical Activity: Not on file   Stress: Not on file   Social Connections: Not on file   Intimate Partner Violence: Not on file   Housing Stability: Low Risk    • Unable to Pay for Housing in the Last Year: No   • Number of Places Lived in the Last Year: 1   • Unstable Housing in the Last Year: No     Allergies   Allergen Reactions   • Nickel Rash       Objective     Vital Signs  BP:  169/68       HR:  63 T:  97 3    RR:  18 O2Sat:  98% W:  129 6  General: NAD, Frail, Thin  Oral: Oropharynx Moist and Clear  Neck: Supple, +ROM  CV: S1, S2, normal rate, regular rhythm, + murmur appreciated  Pulmonary: Lung sounds clear to air, no wheezing, rhonchi, rales  Abdominal:BS + x4 in all quadrants, soft, no mass, no tenderness  Extremities: No edema, +ROM, +Strength  Skin: Warm, Dry, no lesions, no rash, no erythema present, no ecchymosis present  Neurological/Psych: Alert and oriented times 3, normal mood, no affect, fair judgement  Pertinent Laboratory/Diagnostic Studies have been independently reviewed        Current Medications     Current Medications Reviewed and updated in Nursing Home EMR      Assessment and Plan     Heart failure with reduced ejection fraction (HCC)  Wt Readings from Last 3 Encounters:   10/10/22 56 8 kg (125 lb 3 5 oz)   08/02/22 59 9 kg (132 lb)   07/18/22 61 7 kg (136 lb)     · Current wt 129  · Will continue to monitor BMP   · Continue carvedilol, isosorbide, losartan, and hydralazine  · Continue CRISTIAN diet  · Continue to monitor daily weights          LAURENCE (acute kidney injury) ([facility] )  · Last Creatinine 0 99 and Gfr 59  · Will continue to monitor BMP  · Avoid nephrotoxins and NSAIDS  · Avoid hypotension  · Encourage PO fluid intake monitoring for fluid overload      Anemia, unspecified  · Hgb 8 5/Hct 28 6  · Will continue to monitor CBC    Ambulatory dysfunction  • Maintain fall and safety precautions  • Encourage use of call bell and asst devices  • Continue PT/OT services  • Assist with transfers, mobility, and ADLs        [facility]  Geriatric Medicine  10/17/2022

## 2022-11-01 ENCOUNTER — TELEPHONE (OUTPATIENT)
Dept: FAMILY MEDICINE CLINIC | Facility: CLINIC | Age: 77
End: 2022-11-01

## 2022-11-01 NOTE — TELEPHONE ENCOUNTER
Received fax stating that patient has sore throat and they were asking for an order  Left detailed message inquiring if they are requesting an prescription be called in to pharmacy?

## 2022-11-03 NOTE — TELEPHONE ENCOUNTER
Spoke to Adriana at [facility] regarding prescription request for sore throat  Dr Shira Rivera [facility] Doctor for [facility]) prescribed medication for patient's sore throat   Dr Frankie Conteh aware

## 2022-11-15 ENCOUNTER — TELEPHONE (OUTPATIENT)
Dept: FAMILY MEDICINE CLINIC | Facility: CLINIC | Age: 77
End: 2022-11-15

## 2022-11-15 ENCOUNTER — APPOINTMENT (EMERGENCY)
Dept: RADIOLOGY | Facility: HOSPITAL | Age: 77
End: 2022-11-15

## 2022-11-15 ENCOUNTER — HOSPITAL ENCOUNTER (EMERGENCY)
Facility: HOSPITAL | Age: 77
Discharge: HOME | End: 2022-11-15
Attending: EMERGENCY MEDICINE

## 2022-11-15 VITALS
WEIGHT: 134.26 LBS | DIASTOLIC BLOOD PRESSURE: 74 MMHG | TEMPERATURE: 97.7 F | RESPIRATION RATE: 18 BRPM | BODY MASS INDEX: 27.12 KG/M2 | HEART RATE: 63 BPM | SYSTOLIC BLOOD PRESSURE: 182 MMHG | OXYGEN SATURATION: 97 %

## 2022-11-15 DIAGNOSIS — R06.02 SOB (SHORTNESS OF BREATH): Primary | ICD-10-CM

## 2022-11-15 LAB
ALBUMIN SERPL BCP-MCNC: 3.6 G/DL (ref 3.5–5)
ALP SERPL-CCNC: 46 U/L (ref 34–104)
ALT SERPL W P-5'-P-CCNC: 7 U/L (ref 7–52)
ANION GAP SERPL CALCULATED.3IONS-SCNC: 5 MMOL/L (ref 4–13)
AST SERPL W P-5'-P-CCNC: 11 U/L (ref 13–39)
BASOPHILS # BLD AUTO: 0.1 THOUSANDS/ÂΜL (ref 0–0.1)
BASOPHILS NFR BLD AUTO: 1 % (ref 0–1)
BILIRUB SERPL-MCNC: 0.43 MG/DL (ref 0.2–1)
BNP SERPL-MCNC: 286 PG/ML (ref 0–100)
BUN SERPL-MCNC: 27 MG/DL (ref 5–25)
CALCIUM SERPL-MCNC: 8.7 MG/DL (ref 8.4–10.2)
CARDIAC TROPONIN I PNL SERPL HS: 7 NG/L
CHLORIDE SERPL-SCNC: 103 MMOL/L (ref 96–108)
CO2 SERPL-SCNC: 29 MMOL/L (ref 21–32)
CREAT SERPL-MCNC: 1.21 MG/DL (ref 0.6–1.3)
EOSINOPHIL # BLD AUTO: 0.55 THOUSAND/ÂΜL (ref 0–0.61)
EOSINOPHIL NFR BLD AUTO: 5 % (ref 0–6)
ERYTHROCYTE [DISTWIDTH] IN BLOOD BY AUTOMATED COUNT: 17.6 % (ref 11.6–15.1)
GFR SERPL CREATININE-BSD FRML MDRD: 43 ML/MIN/1.73SQ M
GLUCOSE SERPL-MCNC: 179 MG/DL (ref 65–140)
HCT VFR BLD AUTO: 31.5 % (ref 34.8–46.1)
HGB BLD-MCNC: 9.2 G/DL (ref 11.5–15.4)
IMM GRANULOCYTES # BLD AUTO: 0.06 THOUSAND/UL (ref 0–0.2)
IMM GRANULOCYTES NFR BLD AUTO: 1 % (ref 0–2)
LACTATE SERPL-SCNC: 1.3 MMOL/L (ref 0.5–2)
LYMPHOCYTES # BLD AUTO: 1.88 THOUSANDS/ÂΜL (ref 0.6–4.47)
LYMPHOCYTES NFR BLD AUTO: 17 % (ref 14–44)
MCH RBC QN AUTO: 26.1 PG (ref 26.8–34.3)
MCHC RBC AUTO-ENTMCNC: 29.2 G/DL (ref 31.4–37.4)
MCV RBC AUTO: 90 FL (ref 82–98)
MONOCYTES # BLD AUTO: 0.77 THOUSAND/ÂΜL (ref 0.17–1.22)
MONOCYTES NFR BLD AUTO: 7 % (ref 4–12)
NEUTROPHILS # BLD AUTO: 7.46 THOUSANDS/ÂΜL (ref 1.85–7.62)
NEUTS SEG NFR BLD AUTO: 69 % (ref 43–75)
NRBC BLD AUTO-RTO: 0 /100 WBCS
PLATELET # BLD AUTO: 278 THOUSANDS/UL (ref 149–390)
PMV BLD AUTO: 10 FL (ref 8.9–12.7)
POTASSIUM SERPL-SCNC: 4.7 MMOL/L (ref 3.5–5.3)
PROT SERPL-MCNC: 6.7 G/DL (ref 6.4–8.4)
RBC # BLD AUTO: 3.52 MILLION/UL (ref 3.81–5.12)
SODIUM SERPL-SCNC: 137 MMOL/L (ref 135–147)
WBC # BLD AUTO: 10.82 THOUSAND/UL (ref 4.31–10.16)

## 2022-11-15 RX ORDER — FUROSEMIDE 20 MG/1
40 TABLET ORAL DAILY
Qty: 7 TABLET | Refills: 0 | Status: SHIPPED | OUTPATIENT
Start: 2022-11-15 | End: 2022-11-15 | Stop reason: SDUPTHER

## 2022-11-15 RX ORDER — FUROSEMIDE 10 MG/ML
40 INJECTION INTRAMUSCULAR; INTRAVENOUS ONCE
Status: COMPLETED | OUTPATIENT
Start: 2022-11-15 | End: 2022-11-15

## 2022-11-15 RX ORDER — FUROSEMIDE 20 MG/1
40 TABLET ORAL DAILY
Qty: 7 TABLET | Refills: 0 | Status: SHIPPED | OUTPATIENT
Start: 2022-11-15

## 2022-11-15 RX ADMIN — FUROSEMIDE 40 MG: 10 INJECTION, SOLUTION INTRAMUSCULAR; INTRAVENOUS at 12:21

## 2022-11-15 NOTE — ED PROVIDER NOTES
History  Chief Complaint   Patient presents with   • Cough     Cough "for awhile" dx with covid on Wednesday  States cough increased over the last two days  Also reprots weight gain of 4 lbs  Has h/o CHF  Denies SOB/CP     This is a 80-year-old female with a relevant past medical history of CAD, hypertension, hyperlipidemia, diabetes, heart failure with reduced ejection fraction, tachy-keanu syndrome, status post AICD/pacer placement, presenting to the ED today for complaint of shortness of breath  Patient was diagnosed with COVID 6 days ago, and since then has had worsening cough  She also has had some shortness of breath associated  She states her cough is nonproductive  She has orthopnea, which is new  She lives at [facility], and the physician at [facility] prescribed her a couple days of Lasix, and it has been 3 days since she has taken Lasix, and her cough has been worsening  She has gained 4 lb over the past 1 week  She does not have any chest pain pressure discomfort associated, fever or chills, nausea vomiting, diarrhea, constipation, dysuria frequency hesitancy, or any other significantly related symptoms  Prior to Admission Medications   Prescriptions Last Dose Informant Patient Reported? Taking?   acetaminophen (TYLENOL) 325 mg tablet  Self No No   Sig: Take 2 tablets (650 mg total) by mouth every 4 (four) hours as needed for mild pain   aspirin 81 mg chewable tablet   No No   Sig: Chew 4 tablets (324 mg total) daily Do not start before October 11, 2022  atorvastatin (LIPITOR) 40 mg tablet  Self No No   Sig: Take 1 tablet (40 mg total) by mouth daily   carvedilol (COREG) 12 5 mg tablet   No No   Sig: Take 1 tablet (12 5 mg total) by mouth 2 (two) times a day with meals   clopidogrel (PLAVIX) 75 mg tablet   No No   Sig: Take 1 tablet (75 mg total) by mouth daily Do not start before October 11, 2022     ferrous sulfate 325 (65 Fe) mg tablet   No No   Sig: Take 1 tablet (325 mg total) by mouth every other day   hydrALAZINE (APRESOLINE) 25 mg tablet   No No   Sig: Take 1 tablet (25 mg total) by mouth every 8 (eight) hours   isosorbide dinitrate (ISORDIL) 20 mg tablet   No No   Sig: Take 1 tablet (20 mg total) by mouth 3 (three) times daily after meals   losartan (COZAAR) 25 mg tablet  Self No No   Sig: Take 1 tablet (25 mg total) by mouth daily   magnesium oxide (MAG-OX) 400 mg   No No   Sig: Take 1 tablet (400 mg total) by mouth 2 (two) times a day   melatonin 3 mg   No No   Sig: Take 2 tablets (6 mg total) by mouth daily at bedtime   metFORMIN (GLUCOPHAGE) 500 mg tablet  Self No No   Sig: Take 1 tablet (500 mg total) by mouth 2 (two) times a day with meals   omeprazole (PriLOSEC) 20 mg delayed release capsule  Self No No   Sig: Take 1 capsule (20 mg total) by mouth daily      Facility-Administered Medications: None       Past Medical History:   Diagnosis Date   • Atypical chest pain    • GERD (gastroesophageal reflux disease)    • Hyperlipidemia    • Hypertension    • Kidney stone    • Myocardial infarction ([facility] )     2015    • NSTEMI (non-ST elevated myocardial infarction) ([facility] )     Last Assessed: 9/24/2015   • Type 2 diabetes mellitus, without long-term current use of insulin ([facility] ) 9/12/2013       Past Surgical History:   Procedure Laterality Date   • A-V CARDIAC PACEMAKER INSERTION     • ANKLE SURGERY     • BACK SURGERY  01/2011    L4 and L5 laminectomy and fusion    • BLADDER SURGERY     • CORONARY ARTERY BYPASS GRAFT  09/02/2015    CABGx3 with LIMA to LAD, SVG to PDA, SVG to OM-1   • EXAMINATION UNDER ANESTHESIA N/A 7/11/2020    Procedure: EXAM UNDER ANESTHESIA (EUA)  EXCISION OF POSTERIOR VAGINAL FOREIGN BODY;  Surgeon: Darylene Kitchen, MD;  [facility];  Service: Gynecology   • FL RETROGRADE PYELOGRAM  7/11/2020   • FL RETROGRADE PYELOGRAM  8/7/2020   • HYSTERECTOMY     • WI CYSTO/URETERO W/LITHOTRIPSY &INDWELL STENT INSRT Right 8/7/2020    Procedure: CYSTOSCOPY URETEROSCOPY WITH LITHOTRIPSY HOLMIUM LASER, RETROGRADE PYELOGRAM AND INSERTION STENT URETERAL;  Surgeon: Bijal Redding MD;  [facility];  Service: Urology   • AK CYSTOURETHROSCOPY,URETER CATHETER Right 2020    Procedure: CYSTOSCOPY RETROGRADE PYELOGRAM WITH INSERTION STENT URETERAL, bladder biopsy with fulguration;  Surgeon: Bijal Redding MD;  [facility];  Service: Urology   • WRIST SURGERY         Family History   Problem Relation Age of Onset   • Hypertension Mother    • Hypertension Sister    • Alcohol abuse Brother    • Cirrhosis Brother    • Diabetes Father    • Other Brother         Heart transplant in his 46s   • Lung cancer Sister    • Diabetes Brother    • Stroke Sister    • No Known Problems Daughter    • No Known Problems Maternal Grandmother    • No Known Problems Maternal Grandfather    • No Known Problems Paternal Grandmother    • No Known Problems Paternal Grandfather    • No Known Problems Sister      I have reviewed and agree with the history as documented  E-Cigarette/Vaping   • E-Cigarette Use Never User      E-Cigarette/Vaping Substances     Social History     Tobacco Use   • Smoking status: Former Smoker     Packs/day: 0 25     Start date:      Quit date:      Years since quittin 8   • Smokeless tobacco: Never Used   • Tobacco comment: Started smoking at age 25; currently smoking <1ppd  Vaping Use   • Vaping Use: Never used   Substance Use Topics   • Alcohol use: Not Currently   • Drug use: Never       Review of Systems   Constitutional: Negative for activity change, chills and fever  HENT: Positive for congestion  Negative for rhinorrhea  Eyes: Negative for photophobia and visual disturbance  Respiratory: Positive for cough and shortness of breath  Negative for chest tightness  Cardiovascular: Negative for chest pain and leg swelling  Gastrointestinal: Negative for abdominal distention, nausea and vomiting     Genitourinary: Negative for dysuria and frequency  Musculoskeletal: Negative for back pain and neck stiffness  Skin: Negative for rash and wound  Neurological: Negative for dizziness and weakness  Psychiatric/Behavioral: Negative for agitation and suicidal ideas  Physical Exam  Physical Exam  Vitals and nursing note reviewed  Constitutional:       General: She is not in acute distress  Appearance: Normal appearance  She is normal weight  She is not ill-appearing or toxic-appearing  HENT:      Head: Normocephalic and atraumatic  Right Ear: External ear normal       Left Ear: External ear normal       Nose: Nose normal  No congestion or rhinorrhea  Mouth/Throat:      Mouth: Mucous membranes are moist       Pharynx: Oropharynx is clear  No oropharyngeal exudate  Eyes:      General: No scleral icterus  Conjunctiva/sclera: Conjunctivae normal    Cardiovascular:      Rate and Rhythm: Normal rate and regular rhythm  Pulses: Normal pulses  Heart sounds: Normal heart sounds  No murmur heard  No gallop  Pulmonary:      Effort: Pulmonary effort is normal  No respiratory distress  Breath sounds: No stridor  Rales (In the bilateral bases, most pronounced in the right side) present  No wheezing  Abdominal:      General: Abdomen is flat  Bowel sounds are normal  There is no distension  Palpations: Abdomen is soft  There is no mass  Tenderness: There is no abdominal tenderness  Musculoskeletal:         General: No swelling or tenderness  Normal range of motion  Cervical back: Normal range of motion and neck supple  No tenderness  Right lower leg: No edema  Left lower leg: No edema  Skin:     General: Skin is warm and dry  Capillary Refill: Capillary refill takes less than 2 seconds  Coloration: Skin is not jaundiced  Findings: No bruising  Neurological:      General: No focal deficit present        Mental Status: She is alert and oriented to person, place, and time  Mental status is at baseline  Sensory: No sensory deficit  Motor: No weakness  Psychiatric:         Mood and Affect: Mood normal          Behavior: Behavior normal          Thought Content:  Thought content normal          Judgment: Judgment normal          Vital Signs  ED Triage Vitals   Temperature Pulse Respirations Blood Pressure SpO2   11/15/22 1057 11/15/22 1052 11/15/22 1052 11/15/22 1052 11/15/22 1052   97 7 °F (36 5 °C) 74 20 126/72 96 %      Temp Source Heart Rate Source Patient Position - Orthostatic VS BP Location FiO2 (%)   11/15/22 1057 11/15/22 1052 11/15/22 1052 11/15/22 1052 --   Oral Monitor Sitting Right arm       Pain Score       --                  Vitals:    11/15/22 1052   BP: 126/72   Pulse: 74   Patient Position - Orthostatic VS: Sitting         Visual Acuity      ED Medications  Medications   furosemide (LASIX) injection 40 mg (has no administration in time range)       Diagnostic Studies  Results Reviewed     Procedure Component Value Units Date/Time    Comprehensive metabolic panel [[id number]]  (Abnormal) Collected: 11/15/22 1104    Lab Status: Final result Specimen: Blood from Arm, Left Updated: 11/15/22 1139     Sodium 137 mmol/L      Potassium 4 7 mmol/L      Chloride 103 mmol/L      CO2 29 mmol/L      ANION GAP 5 mmol/L      BUN 27 mg/dL      Creatinine 1 21 mg/dL      Glucose 179 mg/dL      Calcium 8 7 mg/dL      AST 11 U/L      ALT 7 U/L      Alkaline Phosphatase 46 U/L      Total Protein 6 7 g/dL      Albumin 3 6 g/dL      Total Bilirubin 0 43 mg/dL      eGFR 43 ml/min/1 73sq m     Narrative:      [place] guidelines for Chronic Kidney Disease (CKD):   •  Stage 1 with normal or high GFR (GFR > 90 mL/min/1 73 square meters)  •  Stage 2 Mild CKD (GFR = 60-89 mL/min/1 73 square meters)  •  Stage 3A Moderate CKD (GFR = 45-59 mL/min/1 73 square meters)  •  Stage 3B Moderate CKD (GFR = 30-44 mL/min/1 73 square meters)  •  Stage 4 Severe CKD (GFR = 15-29 mL/min/1 73 square meters)  •  Stage 5 End Stage CKD (GFR <15 mL/min/1 73 square meters)  Note: GFR calculation is accurate only with a steady state creatinine    Blood culture #1 [[id number]]     Lab Status: No result Specimen: Blood     Blood culture #2 [[id number]]     Lab Status: No result Specimen: Blood     Lactic acid [[id number]]     Lab Status: No result Specimen: Blood     CBC and differential [[id number]]  (Abnormal) Collected: 11/15/22 1104    Lab Status: Final result Specimen: Blood from Arm, Left Updated: 11/15/22 1110     WBC 10 82 Thousand/uL      RBC 3 52 Million/uL      Hemoglobin 9 2 g/dL      Hematocrit 31 5 %      MCV 90 fL      MCH 26 1 pg      MCHC 29 2 g/dL      RDW 17 6 %      MPV 10 0 fL      Platelets 252 Thousands/uL      nRBC 0 /100 WBCs      Neutrophils Relative 69 %      Immat GRANS % 1 %      Lymphocytes Relative 17 %      Monocytes Relative 7 %      Eosinophils Relative 5 %      Basophils Relative 1 %      Neutrophils Absolute 7 46 Thousands/µL      Immature Grans Absolute 0 06 Thousand/uL      Lymphocytes Absolute 1 88 Thousands/µL      Monocytes Absolute 0 77 Thousand/µL      Eosinophils Absolute 0 55 Thousand/µL      Basophils Absolute 0 10 Thousands/µL     B-Type Natriuretic Peptide(BNP) AN, [place], [facility] Campuses Only [[id number]] Collected: 11/15/22 1104    Lab Status: In process Specimen: Blood from Arm, Left Updated: 11/15/22 1108    HS Troponin 0hr (reflex protocol) [[id number]] Collected: 11/15/22 1104    Lab Status: In process Specimen: Blood from Arm, Left Updated: 11/15/22 1108                 XR chest 1 view portable   ED Interpretation by Zena Marie MD (11/15 1145)   Cephalization, increased interstitial markings                 Procedures  Procedures         ED Course                                             MDM  Number of Diagnoses or Management Options  SOB (shortness of breath)  Diagnosis management comments:  This is a 69-year-old female coming into the ED for a complaint of shortness of breath  She has had orthopnea  She has not had any other significantly associated symptoms aside from a cough  She was diagnosed with COVID approximately 6 days ago  She, on physical exam, has rales in the bilateral bases, most pronounced on the right side  The rest of her physical exam for the most part is unremarkable  Her differential diagnosis includes:  COVID versus heart failure exacerbation versus LAURENCE versus other  Patient had chest x-ray showing mild interstitial markings, with central pulmonary vascular congestion, concerning for mild fluid overload  Her BNP was only slightly elevated, and she has had a BMP more than 5 times that previously  She otherwise does not have any significant abnormalities on her testing, and was given a dose of Lasix here, discharged home in stable condition with a short-term prescription for Lasix, as well as instructed to follow-up with her PCP and/or Cardiology as an outpatient  Patient was given strict return precautions with which she agreed to comply  She was discharged home in stable condition and felt safe going home  Disposition  Final diagnoses:   None     ED Disposition     None      Follow-up Information    None         Patient's Medications   Discharge Prescriptions    No medications on file       No discharge procedures on file      PDMP Review       Value Time User    PDMP Reviewed  Yes 8/26/2022  4:40 PM Jeet Roa [address]          ED Provider  Electronically Signed by           Rajiv Bennett MD  11/15/22 2000

## 2022-11-15 NOTE — ED NOTES
Ambulatory pulse ox at start 97% with a heart rate of 87 and at the end pulse ox 95% with a heart rate of 92     Janelle Eaton  11/15/22 2321

## 2022-11-15 NOTE — DISCHARGE INSTRUCTIONS
You were seen in the ED for shortness of breath  You had bloodwork, EKG, chest xrays, all showing evidence for slight fluid overload  You were given Lasix IV today, which seemed to improve your symptoms  Please follow up with your primary care physician and/or cardiologist within the next 1-2 weeks for continued management of your condition  Please come back to the ED if your symptoms return or worsen or if you develop uncontrollable pain, shortness fo breath  Thank you very much for utilizing the ED this afternoon

## 2022-11-15 NOTE — TELEPHONE ENCOUNTER
Please remove Dr Lane Lujan as PCP, patient is now residing in [facility] and seeing the house  - Dr Marcell Otto

## 2022-11-17 LAB
ATRIAL RATE: 277 BPM
QRS AXIS: -60 DEGREES
QRSD INTERVAL: 146 MS
QT INTERVAL: 500 MS
QTC INTERVAL: 503 MS
T WAVE AXIS: 124 DEGREES
VENTRICULAR RATE: 61 BPM

## 2022-11-20 LAB
BACTERIA BLD CULT: NORMAL
BACTERIA BLD CULT: NORMAL

## 2022-11-21 ENCOUNTER — REMOTE DEVICE CLINIC VISIT (OUTPATIENT)
Dept: CARDIOLOGY CLINIC | Facility: CLINIC | Age: 77
End: 2022-11-21

## 2022-11-21 DIAGNOSIS — Z95.0 CARDIAC PACEMAKER IN SITU: Primary | ICD-10-CM

## 2022-11-21 NOTE — PROGRESS NOTES
Results for orders placed or performed in visit on 11/21/22   Cardiac EP device report    Narrative    MDT NESTOR PPM - [facility]: BATTERY STATUS "[address] [place] [address] " AP 0%  85%  ALL AVAILABLE LEAD PARAMETERS WITHIN NORMAL LIMITS  100% AT/AF BURDEN; rATP TURNED ON  NO AC DUE TO PROGRESSING AORTIC DISSECTION  PT ON COREG; ASA & PLAVIX  NORMAL DEVICE FUNCTION   NC

## 2022-11-23 NOTE — TELEPHONE ENCOUNTER
11/23/22 7:58 AM        The office's request has been received, reviewed, and the patient chart updated  The PCP has successfully been removed with a patient attribution note  This message will now be completed          Thank you  Ankita Ayers

## 2023-01-18 ENCOUNTER — TELEPHONE (OUTPATIENT)
Dept: CARDIOLOGY CLINIC | Facility: CLINIC | Age: 78
End: 2023-01-18

## 2023-01-18 NOTE — TELEPHONE ENCOUNTER
Per message from Dr Chato Brown, he will overbook to either see the patient in person in the office on Friday, 1/20, or will do a virtual visit that day if pt cannot come to the office  Pt resides at [facility] in [place]  Per call from daughter Hafsa Mcgowan today, pt needs cardiac clearance for a tooth extraction scheduled on 2/1  If virtual, pt would need to have a list of meds and recent BP  I left a message for daughter and I also left a message at number listed in chart for [facility]

## 2023-01-20 ENCOUNTER — TELEMEDICINE (OUTPATIENT)
Dept: CARDIOLOGY CLINIC | Facility: CLINIC | Age: 78
End: 2023-01-20

## 2023-01-20 VITALS
DIASTOLIC BLOOD PRESSURE: 60 MMHG | SYSTOLIC BLOOD PRESSURE: 126 MMHG | HEIGHT: 59 IN | BODY MASS INDEX: 27.42 KG/M2 | HEART RATE: 68 BPM | WEIGHT: 136 LBS

## 2023-01-20 DIAGNOSIS — Z01.810 PREOP CARDIOVASCULAR EXAM: ICD-10-CM

## 2023-01-20 DIAGNOSIS — Z95.0 CARDIAC PACEMAKER IN SITU: ICD-10-CM

## 2023-01-20 DIAGNOSIS — I48.21 PERMANENT ATRIAL FIBRILLATION (HCC): ICD-10-CM

## 2023-01-20 DIAGNOSIS — Z95.1 S/P CABG (CORONARY ARTERY BYPASS GRAFT): ICD-10-CM

## 2023-01-20 DIAGNOSIS — E78.5 HYPERLIPIDEMIA, UNSPECIFIED HYPERLIPIDEMIA TYPE: ICD-10-CM

## 2023-01-20 DIAGNOSIS — I25.10 ATHEROSCLEROSIS OF NATIVE CORONARY ARTERY OF NATIVE HEART WITHOUT ANGINA PECTORIS: Primary | ICD-10-CM

## 2023-01-20 DIAGNOSIS — I10 HYPERTENSION, UNSPECIFIED TYPE: ICD-10-CM

## 2023-01-20 RX ORDER — BRIMONIDINE TARTRATE AND TIMOLOL MALEATE 2; 5 MG/ML; MG/ML
SOLUTION OPHTHALMIC
COMMUNITY
Start: 2023-01-11

## 2023-01-20 RX ORDER — PANTOPRAZOLE SODIUM 40 MG/1
TABLET, DELAYED RELEASE ORAL
COMMUNITY
Start: 2023-01-11

## 2023-01-20 RX ORDER — LEVOTHYROXINE SODIUM 0.05 MG/1
TABLET ORAL
COMMUNITY
Start: 2022-12-30

## 2023-01-20 RX ORDER — PENICILLIN V POTASSIUM 500 MG/1
TABLET ORAL
COMMUNITY
Start: 2023-01-16

## 2023-01-20 RX ORDER — BLOOD-GLUCOSE METER
EACH MISCELLANEOUS
COMMUNITY
Start: 2022-11-02

## 2023-01-20 RX ORDER — MINERAL OIL 100 G/100ML
ENEMA RECTAL
COMMUNITY
Start: 2022-10-24

## 2023-01-20 RX ORDER — AMOXICILLIN 500 MG/1
CAPSULE ORAL
COMMUNITY
Start: 2023-01-11

## 2023-01-20 RX ORDER — BLOOD SUGAR DIAGNOSTIC
STRIP MISCELLANEOUS
COMMUNITY
Start: 2022-12-18

## 2023-01-20 RX ORDER — VIT A/VIT C/VIT E/ZINC/COPPER 2148-113
TABLET ORAL
COMMUNITY
Start: 2023-01-11

## 2023-01-20 NOTE — PROGRESS NOTES
Virtual Regular Visit    Verification of patient location:    Patient is located in the following state in which I hold an active license [place]      Assessment/Plan:    Problem List Items Addressed This Visit        Cardiovascular and Mediastinum    CAD (coronary atherosclerotic disease) - Primary    Hypertension    Permanent atrial fibrillation ([address] )       Other    Hyperlipidemia    Cardiac pacemaker in situ    Preop cardiovascular exam    S/P CABG (coronary artery bypass graft)            Reason for visit is   Chief Complaint   Patient presents with   • Follow-up     CC- no cardiac complaints         Encounter provider Pat Shearer MD    Provider located at [place] [id number]  [address] [place] [id number]      Recent Visits  Date Type Provider Dept   01/18/23 [address] Unit, [facility] [address]  recent visits within past 7 days and meeting all other requirements  Today's Visits  Date Type Provider Dept   01/20/23 Telemedicine Pat Shearer, [facility] [address]  today's visits and meeting all other requirements  Future Appointments  No visits were found meeting these conditions  Showing future appointments within next 150 days and meeting all other requirements       The patient was identified by name and date of birth  Rufina Garcia was informed that this is a telemedicine visit and that the visit is being conducted through [facility]  She agrees to proceed     My office door was closed  No one else was in the room  She acknowledged consent and understanding of privacy and security of the video platform  The patient has agreed to participate and understands they can discontinue the visit at any time  Patient is aware this is a billable service  Impression:  1  CAD s/p CABG - doing well   No obstructive dx in grafts per cath 5/20   2  Paroxysmal atrial fibrillation/flutter - s/p PPM     3   Hypertension - controlled  4  PAD - aortic dissection 10/22  Holding anticoagulation       Recommendations:  1  Continue current medications  2   Patient may have tooth pulled - Dr Jaylene Marin  Hold aspirin and clopidogrel for 5 days prior  3  Follow up in 3 months  Will check CTA chest at that time  Subjective  Jimena Donahue is a 69 y o  female with CAD s/p CABG x3 (patent grafts 5/20), PAF/flutter, sss s/p PPM 6/20, HTN, PAD , who presents for follow up   Doing well from a cardiac standpoint  No chest pain, shortness of breath, or palpitations  Major complaint is tooth pain, and scheduled to get a tooth extraction         HPI     Past Medical History:   Diagnosis Date   • Atypical chest pain    • GERD (gastroesophageal reflux disease)    • Hyperlipidemia    • Hypertension    • Kidney stone    • Myocardial infarction [facility])     2015    • NSTEMI (non-ST elevated myocardial infarction) ([address] )     Last Assessed: 9/24/2015   • Type 2 diabetes mellitus, without long-term current use of insulin ([address] [address] ) 9/12/2013       Past Surgical History:   Procedure Laterality Date   • A-V CARDIAC PACEMAKER INSERTION     • ANKLE SURGERY     • BACK SURGERY  01/2011    L4 and L5 laminectomy and fusion    • BLADDER SURGERY     • CORONARY ARTERY BYPASS GRAFT  09/02/2015    CABGx3 with LIMA to LAD, SVG to PDA, SVG to OM-1   • EXAMINATION UNDER ANESTHESIA N/A 7/11/2020    Procedure: EXAM UNDER ANESTHESIA (EUA)  EXCISION OF POSTERIOR VAGINAL FOREIGN BODY;  Surgeon: Saniya Paige MD;  [facility];  Service: Gynecology   • FL RETROGRADE PYELOGRAM  7/11/2020   • FL RETROGRADE PYELOGRAM  8/7/2020   • HYSTERECTOMY     • ME CYSTO BLADDER W/URETERAL CATHETERIZATION Right 7/11/2020    Procedure: CYSTOSCOPY RETROGRADE PYELOGRAM WITH INSERTION STENT URETERAL, bladder biopsy with fulguration;  Surgeon: Karine Denson MD;  [facility];  Service: Urology   • ME CYSTO/URETERO W/LITHOTRIPSY &INDWELL STENT INSRT Right 8/7/2020 Procedure: CYSTOSCOPY URETEROSCOPY WITH LITHOTRIPSY HOLMIUM LASER, RETROGRADE PYELOGRAM AND INSERTION STENT URETERAL;  Surgeon: Amairani Jarrett MD;  [facility];  Service: Urology   • WRIST SURGERY         Current Outpatient Medications   Medication Sig Dispense Refill   • acetaminophen (TYLENOL) 325 mg tablet Take 2 tablets (650 mg total) by mouth every 4 (four) hours as needed for mild pain 30 tablet 0   • aspirin 81 mg chewable tablet Chew 4 tablets (324 mg total) daily Do not start before October 11, 2022   0   • atorvastatin (LIPITOR) 40 mg tablet Take 1 tablet (40 mg total) by mouth daily 90 tablet 3   • Blood Glucose Monitoring Suppl (ONE TOUCH ULTRA 2) w/Device KIT      • brimonidine-timolol (COMBIGAN) 0 2-0 5 %      • carvedilol (COREG) 12 5 mg tablet Take 1 tablet (12 5 mg total) by mouth 2 (two) times a day with meals  0   • clopidogrel (PLAVIX) 75 mg tablet Take 1 tablet (75 mg total) by mouth daily Do not start before October 11, 2022   0   • ferrous sulfate 325 (65 Fe) mg tablet Take 1 tablet (325 mg total) by mouth every other day     • Fleet Oil enema      • furosemide (LASIX) 20 mg tablet Take 2 tablets (40 mg total) by mouth daily 7 tablet 0   • hydrALAZINE (APRESOLINE) 25 mg tablet Take 1 tablet (25 mg total) by mouth every 8 (eight) hours  0   • isosorbide dinitrate (ISORDIL) 20 mg tablet Take 1 tablet (20 mg total) by mouth 3 (three) times daily after meals  0   • levothyroxine 50 mcg tablet      • losartan (COZAAR) 25 mg tablet Take 1 tablet (25 mg total) by mouth daily 90 tablet 3   • magnesium oxide (MAG-OX) 400 mg Take 1 tablet (400 mg total) by mouth 2 (two) times a day  0   • melatonin 3 mg Take 2 tablets (6 mg total) by mouth daily at bedtime  0   • Multiple Vitamins-Minerals (PreserVision AREDS) TABS      • OneTouch Ultra test strip      • pantoprazole (PROTONIX) 40 mg tablet      • penicillin V potassium (VEETID) 500 mg tablet      • amoxicillin (AMOXIL) 500 mg capsule (Patient not taking: Reported on 1/20/2023)     • metFORMIN (GLUCOPHAGE) 500 mg tablet Take 1 tablet (500 mg total) by mouth 2 (two) times a day with meals (Patient not taking: Reported on 1/20/2023) 180 tablet 3   • omeprazole (PriLOSEC) 20 mg delayed release capsule Take 1 capsule (20 mg total) by mouth daily (Patient not taking: Reported on 1/20/2023) 90 capsule 3     No current facility-administered medications for this visit  Allergies   Allergen Reactions   • Nickel Rash       Review of Systems   Constitutional: Negative  HENT: Positive for dental problem  Eyes: Negative  Respiratory: Negative for chest tightness and shortness of breath  Cardiovascular: Negative for chest pain, palpitations and leg swelling  Gastrointestinal: Negative  Endocrine: Negative  Genitourinary: Negative  Musculoskeletal: Negative  Skin: Negative  Allergic/Immunologic: Negative  Neurological: Negative  Hematological: Negative  Psychiatric/Behavioral: Negative  All other systems reviewed and are negative  Video Exam    Vitals:    01/20/23 1041   BP: 126/60   BP Location: Right arm   Patient Position: Sitting   Cuff Size: Standard   Pulse: 68   Weight: 61 7 kg (136 lb)   Height: 4' 11" (1 499 m)       Physical Exam  Constitutional:       Appearance: Normal appearance  HENT:      Head: Normocephalic  Mouth/Throat:      Mouth: Mucous membranes are moist    Eyes:      Extraocular Movements: Extraocular movements intact  Pulmonary:      Effort: Pulmonary effort is normal    Abdominal:      General: Abdomen is flat  Musculoskeletal:         General: Normal range of motion  Cervical back: Normal range of motion  Skin:     Findings: No rash  Neurological:      Mental Status: She is alert and oriented to person, place, and time     Psychiatric:         Mood and Affect: Mood normal          Behavior: Behavior normal           I spent 15 minutes directly with the patient during this visit

## 2023-01-20 NOTE — PATIENT INSTRUCTIONS
Recommendations:  1  Continue current medications  2   Patient may have tooth pulled - Dr George Johnson  Hold aspirin and clopidogrel for 5 days prior  3  Follow up in 3 months  Will check CTA chest at that time

## 2023-02-22 ENCOUNTER — REMOTE DEVICE CLINIC VISIT (OUTPATIENT)
Dept: CARDIOLOGY CLINIC | Facility: CLINIC | Age: 78
End: 2023-02-22

## 2023-02-22 DIAGNOSIS — Z95.0 CARDIAC PACEMAKER IN SITU: Primary | ICD-10-CM

## 2023-02-22 NOTE — PROGRESS NOTES
Results for orders placed or performed in visit on 02/22/23   Cardiac EP device report    Narrative    MDT DC PPM - ACTIVE SYSTEM IS MRI CONDITIONAL  CARELINK TRANSMISSION: BATTERY VOLTAGE ADEQUATE (10 6 YRS)  AP<0 1%, -100% (>40% [email or web address])  ALL AVAILABLE LEAD PARAMETERS WITHIN NORMAL LIMITS  NO NEW SIGNIFICANT HIGH RATE EPISODES  PT IN % OF TIME  HX: PAF & ON ASA 81MG, CLOPIDOGREL & METOPROLOL; NO ANTICOAGULATION DUE TO AORTIC DISSECTION  NORMAL DEVICE FUNCTION   GV

## 2023-05-23 ENCOUNTER — ESTABLISHED COMPREHENSIVE EXAM (OUTPATIENT)
Dept: URBAN - METROPOLITAN AREA CLINIC 6 | Facility: CLINIC | Age: 78
End: 2023-05-23

## 2023-05-23 DIAGNOSIS — H04.123: ICD-10-CM

## 2023-05-23 DIAGNOSIS — E11.9: ICD-10-CM

## 2023-05-23 DIAGNOSIS — Z96.1: ICD-10-CM

## 2023-05-23 DIAGNOSIS — H40.1112: ICD-10-CM

## 2023-05-23 DIAGNOSIS — H43.811: ICD-10-CM

## 2023-05-23 DIAGNOSIS — H40.1123: ICD-10-CM

## 2023-05-23 DIAGNOSIS — H25.812: ICD-10-CM

## 2023-05-23 PROCEDURE — 92020 GONIOSCOPY: CPT

## 2023-05-23 PROCEDURE — 92202 OPSCPY EXTND ON/MAC DRAW: CPT

## 2023-05-23 PROCEDURE — 92014 COMPRE OPH EXAM EST PT 1/>: CPT

## 2023-05-23 PROCEDURE — 92015 DETERMINE REFRACTIVE STATE: CPT

## 2023-05-23 ASSESSMENT — VISUAL ACUITY: OD_CC: 20/60

## 2023-05-23 ASSESSMENT — TONOMETRY
OD_IOP_MMHG: 13
OS_IOP_MMHG: 15

## 2023-06-08 ENCOUNTER — REMOTE DEVICE CLINIC VISIT (OUTPATIENT)
Dept: CARDIOLOGY CLINIC | Facility: CLINIC | Age: 78
End: 2023-06-08
Payer: COMMERCIAL

## 2023-06-08 DIAGNOSIS — Z95.0 CARDIAC PACEMAKER IN SITU: Primary | ICD-10-CM

## 2023-06-08 PROCEDURE — 93296 REM INTERROG EVL PM/IDS: CPT | Performed by: INTERNAL MEDICINE

## 2023-06-08 PROCEDURE — 93294 REM INTERROG EVL PM/LDLS PM: CPT | Performed by: INTERNAL MEDICINE

## 2023-06-08 NOTE — PROGRESS NOTES
"Results for orders placed or performed in visit on 06/08/23   Cardiac EP device report    Narrative    MDT DC PPM - [facility] TRANSMISSION: BATTERY STATUS \"10 YRS  \" AP 0%  98%  ALL AVAILABLE LEAD PARAMETERS WITHIN NORMAL LIMITS  100% AF BURDEN; PT ON ASA & PLAVIX-NO ANTICOAGULATION DUE TO AORTIC DISSECTION  NORMAL DEVICE FUNCTION   NC         "

## 2023-06-22 ENCOUNTER — OFFICE VISIT (OUTPATIENT)
Dept: CARDIOLOGY CLINIC | Facility: CLINIC | Age: 78
End: 2023-06-22
Payer: COMMERCIAL

## 2023-06-22 VITALS
OXYGEN SATURATION: 97 % | HEART RATE: 66 BPM | DIASTOLIC BLOOD PRESSURE: 50 MMHG | BODY MASS INDEX: 30 KG/M2 | WEIGHT: 148.8 LBS | HEIGHT: 59 IN | SYSTOLIC BLOOD PRESSURE: 110 MMHG

## 2023-06-22 DIAGNOSIS — I71.21 ANEURYSM OF ASCENDING AORTA WITHOUT RUPTURE (HCC): ICD-10-CM

## 2023-06-22 DIAGNOSIS — E78.5 HYPERLIPIDEMIA, UNSPECIFIED HYPERLIPIDEMIA TYPE: ICD-10-CM

## 2023-06-22 DIAGNOSIS — I25.10 ATHEROSCLEROSIS OF NATIVE CORONARY ARTERY OF NATIVE HEART WITHOUT ANGINA PECTORIS: ICD-10-CM

## 2023-06-22 DIAGNOSIS — I48.21 PERMANENT ATRIAL FIBRILLATION (HCC): Primary | ICD-10-CM

## 2023-06-22 DIAGNOSIS — Z95.0 CARDIAC PACEMAKER IN SITU: ICD-10-CM

## 2023-06-22 DIAGNOSIS — Z95.1 S/P CABG (CORONARY ARTERY BYPASS GRAFT): ICD-10-CM

## 2023-06-22 DIAGNOSIS — I10 PRIMARY HYPERTENSION: ICD-10-CM

## 2023-06-22 DIAGNOSIS — I71.010 DISSECTION OF ASCENDING AORTA (HCC): ICD-10-CM

## 2023-06-22 PROCEDURE — 93000 ELECTROCARDIOGRAM COMPLETE: CPT | Performed by: INTERNAL MEDICINE

## 2023-06-22 PROCEDURE — 99214 OFFICE O/P EST MOD 30 MIN: CPT | Performed by: INTERNAL MEDICINE

## 2023-06-22 RX ORDER — HYDROXYZINE HYDROCHLORIDE 25 MG/1
TABLET, FILM COATED ORAL
Status: ON HOLD | COMMUNITY

## 2023-06-22 NOTE — PROGRESS NOTES
Cardiology   Jonymay Roa 69 y o  female MRN: [id number]        Impression:  1  CAD s/p CABG - doing well   No obstructive dx in grafts per cath 5/20   2  Paroxysmal atrial fibrillation/flutter - s/p PPM   Not on anticoagulation  3  Hypertension - controlled  4  PAD - aortic dissection 10/22  Holding anticoagulation       Recommendations:  1  Discontinue clopidogrel  2  Start Eliquis 5mg 2x/day  3  Continue remainder of medications  4  Check complete metabolic profile and fasting lipid panel  5  Check CTA of thoracic aorta to evaluate progression  6  Follow up in 6 months  HPI: Sylvie Shell is a 69y o  year old female with CAD s/p CABG x3 (patent grafts 5/20), PAF/flutter, sss s/p PPM 6/20, HTN, PAD , who presents for follow up   Doing well from a cardiac standpoint   No chest pain, shortness of breath, or palpitations   Major complaint is tooth pain, and scheduled to get a tooth extraction           Review of Systems      Past Medical History:   Diagnosis Date   • Atypical chest pain    • GERD (gastroesophageal reflux disease)    • Hyperlipidemia    • Hypertension    • Kidney stone    • Myocardial infarction [facility])     2015    • NSTEMI (non-ST elevated myocardial infarction) ([facility] [address] )     Last Assessed: 9/24/2015   • Type 2 diabetes mellitus, without long-term current use of insulin ([facility] [address] ) 9/12/2013     Past Surgical History:   Procedure Laterality Date   • A-V CARDIAC PACEMAKER INSERTION     • ANKLE SURGERY     • BACK SURGERY  01/2011    L4 and L5 laminectomy and fusion    • BLADDER SURGERY     • CORONARY ARTERY BYPASS GRAFT  09/02/2015    CABGx3 with LIMA to LAD, SVG to PDA, SVG to OM-1   • EXAMINATION UNDER ANESTHESIA N/A 7/11/2020    Procedure: EXAM UNDER ANESTHESIA (EUA)  EXCISION OF POSTERIOR VAGINAL FOREIGN BODY;  Surgeon: Alphia Cowden, MD;  [facility];  Service: Gynecology   • FL RETROGRADE PYELOGRAM  7/11/2020   • FL RETROGRADE PYELOGRAM  8/7/2020   • HYSTERECTOMY     • TX CYSTO BLADDER W/URETERAL CATHETERIZATION Right 2020    Procedure: CYSTOSCOPY RETROGRADE PYELOGRAM WITH INSERTION STENT URETERAL, bladder biopsy with fulguration;  Surgeon: Stephen Bates MD;  [facility];  Service: Urology   • MI CYSTO/URETERO W/LITHOTRIPSY &INDWELL STENT INSRT Right 2020    Procedure: CYSTOSCOPY URETEROSCOPY WITH LITHOTRIPSY HOLMIUM LASER, RETROGRADE PYELOGRAM AND INSERTION STENT URETERAL;  Surgeon: Stephen Bates MD;  [facility];  Service: Urology   • WRIST SURGERY       Social History     Substance and Sexual Activity   Alcohol Use Not Currently     Social History     Substance and Sexual Activity   Drug Use Never     Social History     Tobacco Use   Smoking Status Former   • Packs/day: 0 25   • Types: Cigarettes   • Start date:    • Quit date:    • Years since quittin 4   Smokeless Tobacco Never   Tobacco Comments    Started smoking at age 25; currently smoking <1ppd  Family History   Problem Relation Age of Onset   • Hypertension Mother    • Hypertension Sister    • Alcohol abuse Brother    • Cirrhosis Brother    • Diabetes Father    • Other Brother         Heart transplant in his 46s   • Lung cancer Sister    • Diabetes Brother    • Stroke Sister    • No Known Problems Daughter    • No Known Problems Maternal Grandmother    • No Known Problems Maternal Grandfather    • No Known Problems Paternal Grandmother    • No Known Problems Paternal Grandfather    • No Known Problems Sister        Allergies:   Allergies   Allergen Reactions   • Nickel Rash       Medications:     Current Outpatient Medications:   •  acetaminophen (TYLENOL) 325 mg tablet, Take 2 tablets (650 mg total) by mouth every 4 (four) hours as needed for mild pain, Disp: 30 tablet, Rfl: 0  •  aspirin 81 mg chewable tablet, Chew 4 tablets (324 mg total) daily Do not start before 2022 , Disp: , Rfl: 0  •  atorvastatin (LIPITOR) 40 mg tablet, Take 1 tablet (40 mg total) by mouth daily, Disp: 90 tablet, Rfl: 3  •  Blood Glucose Monitoring Suppl (ONE TOUCH ULTRA 2) w/Device KIT, , Disp: , Rfl:   •  brimonidine-timolol (COMBIGAN) 0 2-0 5 %, , Disp: , Rfl:   •  carvedilol (COREG) 12 5 mg tablet, Take 1 tablet (12 5 mg total) by mouth 2 (two) times a day with meals, Disp: , Rfl: 0  •  clopidogrel (PLAVIX) 75 mg tablet, Take 1 tablet (75 mg total) by mouth daily Do not start before October 11, 2022 , Disp: , Rfl: 0  •  ferrous sulfate 325 (65 Fe) mg tablet, Take 1 tablet (325 mg total) by mouth every other day, Disp: , Rfl:   •  Fleet Oil enema, , Disp: , Rfl:   •  furosemide (LASIX) 20 mg tablet, Take 2 tablets (40 mg total) by mouth daily, Disp: 7 tablet, Rfl: 0  •  hydrALAZINE (APRESOLINE) 25 mg tablet, Take 1 tablet (25 mg total) by mouth every 8 (eight) hours, Disp: , Rfl: 0  •  hydrOXYzine HCL (ATARAX) 25 mg tablet, Take 1/2 tablet daily, Disp: , Rfl:   •  isosorbide dinitrate (ISORDIL) 20 mg tablet, Take 1 tablet (20 mg total) by mouth 3 (three) times daily after meals, Disp: , Rfl: 0  •  levothyroxine 50 mcg tablet, , Disp: , Rfl:   •  losartan (COZAAR) 25 mg tablet, Take 1 tablet (25 mg total) by mouth daily (Patient taking differently: Take 50 mg by mouth daily), Disp: 90 tablet, Rfl: 3  •  magnesium oxide (MAG-OX) 400 mg, Take 1 tablet (400 mg total) by mouth 2 (two) times a day, Disp: , Rfl: 0  •  melatonin 3 mg, Take 2 tablets (6 mg total) by mouth daily at bedtime, Disp: , Rfl: 0  •  Multiple Vitamins-Minerals (PreserVision AREDS) TABS, , Disp: , Rfl:   •  OneTouch Ultra test strip, , Disp: , Rfl:   •  pantoprazole (PROTONIX) 40 mg tablet, , Disp: , Rfl:   •  amoxicillin (AMOXIL) 500 mg capsule, , Disp: , Rfl:   •  metFORMIN (GLUCOPHAGE) 500 mg tablet, Take 1 tablet (500 mg total) by mouth 2 (two) times a day with meals (Patient not taking: Reported on 1/20/2023), Disp: 180 tablet, Rfl: 3  •  omeprazole (PriLOSEC) 20 mg delayed release capsule, Take 1 capsule (20 mg total) by mouth daily (Patient not taking: Reported on 2023), Disp: 90 capsule, Rfl: 3  •  penicillin V potassium (VEETID) 500 mg tablet, , Disp: , Rfl:       Wt Readings from Last 3 Encounters:   23 67 5 kg (148 lb 12 8 oz)   23 61 7 kg (136 lb)   11/15/22 60 9 kg (134 lb 4 2 oz)     Temp Readings from Last 3 Encounters:   11/15/22 97 7 °F (36 5 °C) (Oral)   10/10/22 97 7 °F (36 5 °C)   22 98 2 °F (36 8 °C)     BP Readings from Last 3 Encounters:   23 110/50   23 126/60   11/15/22 (!) 182/74     Pulse Readings from Last 3 Encounters:   23 66   23 68   11/15/22 63         Physical Exam      Laboratory Studies:  CMP:  Lab Results   Component Value Date     2017    K 4 7 11/15/2022     11/15/2022    CO2 29 11/15/2022    ANIONGAP 7 2015    BUN 27 (H) 11/15/2022    CREATININE 1 21 11/15/2022    GLUCOSE 152 (H) 2015    AST 11 (L) 11/15/2022    ALT 7 11/15/2022    BILITOT 0 5 2017    EGFR 43 11/15/2022       Lipid Profile:   Lab Results   Component Value Date    CHOL 164 2017     Lab Results   Component Value Date    HDL 60 2022     Lab Results   Component Value Date    LDLCALC 32 2022     Lab Results   Component Value Date    TRIG 97 2022       Cardiac testing:   EKG reviewed personally: Aflutter 66 V-paced  Results for orders placed during the hospital encounter of 20    Echo complete with contrast if indicated    Narrative  [facility] [address] [facility]  [phone]    Transthoracic Echocardiogram  2D, M-mode, Doppler, and Color Doppler    Study date:  26-May-2020    Patient: Alessandro Ramos  MR number: [id number]  Account number: [de-identified]  : 1945  Age: 77 years  Gender: Female  Status: Inpatient  Location: Bedside  Height: 61 in  Weight: 124 7 lb  BP: 142/ 58 mmHg    Indications: Acute MI      Diagnoses: I24 9 - Acute ischemic heart disease, unspecified    Sonographer: Felicia Aponte [facility]  Referring Physician:  Gonzalo Michelle MD  Group:  [facility]  Interpreting Physician:  Quincy Cueva MD    SUMMARY    LEFT VENTRICLE:  Systolic function was normal  Ejection fraction was estimated to be 60 %  There were no regional wall motion abnormalities  Wall thickness was at the upper limits of normal   Features were consistent with a pseudonormal left ventricular filling pattern, with concomitant abnormal relaxation and increased filling pressure (grade 2 diastolic dysfunction)  LEFT ATRIUM:  The atrium was mildly to moderately dilated  MITRAL VALVE:  There was mild annular calcification  There was mild regurgitation  AORTIC VALVE:  There was mild regurgitation  TRICUSPID VALVE:  There was mild regurgitation  AORTA:  The root exhibited mild dilatation  There was mild dilatation of the ascending aorta  HISTORY: PRIOR HISTORY: History of CABG x3  CAD  Hypertension  DM2  Ascending aortic aneurysm  Anxiety  GERD  Smoker  Atrial fibrillation  PROCEDURE: The procedure was performed at the bedside  This was a routine study  The transthoracic approach was used  The study included complete 2D imaging, M-mode, complete spectral Doppler, and color Doppler  The heart rate was 77 bpm,  at the start of the study  Echocardiographic views were limited due to high windows  This was a technically difficult study  LEFT VENTRICLE: Size was normal  Systolic function was normal  Ejection fraction was estimated to be 60 %  There were no regional wall motion abnormalities  Wall thickness was at the upper limits of normal  DOPPLER: Features were  consistent with a pseudonormal left ventricular filling pattern, with concomitant abnormal relaxation and increased filling pressure (grade 2 diastolic dysfunction)      RIGHT VENTRICLE: The size was normal  Systolic function was normal  Wall thickness was normal     LEFT ATRIUM: The atrium was mildly to moderately dilated  RIGHT ATRIUM: Size was normal     MITRAL VALVE: There was mild annular calcification  Valve structure was normal  There was normal leaflet separation  DOPPLER: The transmitral velocity was within the normal range  There was no evidence for stenosis  There was mild  regurgitation  AORTIC VALVE: The valve was trileaflet  Leaflets exhibited normal thickness and normal cuspal separation  DOPPLER: Transaortic velocity was within the normal range  There was no evidence for stenosis  There was mild regurgitation  TRICUSPID VALVE: The valve structure was normal  There was normal leaflet separation  DOPPLER: The transtricuspid velocity was within the normal range  There was no evidence for stenosis  There was mild regurgitation  PULMONIC VALVE: Leaflets exhibited normal thickness, no calcification, and normal cuspal separation  DOPPLER: The transpulmonic velocity was within the normal range  There was no significant regurgitation  PERICARDIUM: There was no pericardial effusion  The pericardium was normal in appearance  AORTA: The root exhibited mild dilatation  There was mild dilatation of the ascending aorta  SYSTEMIC VEINS: IVC: The inferior vena cava was normal in size  PULMONARY VEINS: DOPPLER: There was systolic blunting in the pulmonary vein(s)      SYSTEM MEASUREMENT TABLES    2D  %FS: 30 49 %  AV Diam: 4 cm  EDV(Teich): 61 09 ml  EF(Cube): 66 42 %  EF(Teich): 58 72 %  ESV(Cube): 18 1 ml  ESV(Teich): 25 22 ml  IVSd: 1 cm  LA Area: 24 64 cm2  LA Diam: 3 12 cm  LVEDV MOD A4C: 66 08 ml  LVEF MOD A4C: 63 63 %  LVESV MOD A4C: 24 03 ml  LVIDd: 3 78 cm  LVIDs: 2 63 cm  LVLd A4C: 7 09 cm  LVLs A4C: 5 73 cm  LVPWd: 1 cm  RA Area: 11 79 cm2  RV Diam: 2 76 cm  SI(Cube): 23 1 ml/m2  SI(Teich): 23 14 ml/m2  SV MOD A4C: 42 05 ml  SV(Cube): 35 81 ml  SV(Teich): 35 87 ml    CW  TR MaxP 47 mmHg  TR Vmax: 2 62 m/s    MM  TAPSE: 1 61 cm    PW  E': 0 06 m/s  E/E': 12 03  MV A Marcos: 0 8 m/s  MV Dec Towner: 3 66 m/s2  MV DecT: 210 88 ms  MV E Marcos: 0 77 m/s  MV E/A Ratio: 0 96    [employer] Accredited Echocardiography Laboratory    Prepared and electronically signed by    Armando Martinez MD  Signed 26-May-2020 11:14:41    No results found for this or any previous visit  Results for orders placed during the hospital encounter of 20    Cardiac catheterization    Narrative  [facility] [address] [employer]  [phone]    [facility]    Invasive Cardiovascular Lab Complete Report    Patient: Jono Moreno  MR number: [id number]  Account number: [de-identified]  Study date: 2020  Gender: Female  : 1945  Height: 61 in  Weight: 124 7 lb  BSA: 1 55 mï¾²    Allergies: NO KNOWN ALLERGIES    Diagnostic Cardiologist:  Gerhard Mak MD    SUMMARY    CORONARY CIRCULATION:  Left main: Normal   LAD: The vessel was small sized  There was a 90% stenosis in mid vessel  The distal LAD filled via a LIMA bypass conduit  The diagonal branches were small and free of critical disease  Circumflex: The vessel was medium sized  There was an 80% stenosis in mid vessel  The lesion was interrogated by iFR and was negative for flow significance (iFR=1 0)  The OM branch contained a 95% stenosis and filled by an SVG  RCA: The vessel was normal sized and dominant, giving rise to the PDA and a posterolateral branch  There was a total occlusion in mid vessel  The PDA and distal RCA filled by an SVG  Graft to the LAD: The graft was a LIMA  The body of the graft and the distal anastomosis were free of significant obstruction  The small LAD filled from the LIMA and was free of critical distal disease  Graft to the 1st obtuse marginal: The graft was a saphenous vein graft from the ascending aorta  The body of the graft and the anastomses were normal  The grafted OM1 filled well from the SVG and was free of critical disease    Graft to the RPDA: The graft was a saphenous vein graft from the ascending aorta  The body of the graft and the anastomses were normal  The grafted PDA filled well from the SVG and was free of critical disease  The PDA sent retrograde flow  to the distal RCA and the major posterolateral branch  Summary:  Multivessel CAD with patent grafts  The patient is completely revascularized  Plan: smoking cessation  Follow-up with cardiology  INDICATIONS:  --  Possible CAD: myocardial infarction without ST elevation (NSTEMI)  PROCEDURES PERFORMED    --  Left coronary angiography  --  Right coronary angiography  --  Saphenous vein graft angiography  --  Saphenous vein graft angiography  --  LIMA graft angiography  --  Diagnostic myocardial fractional flow reserve  --  Inpatient  --  Mod Sedation Same Physician Initial 15min  --  Mod Sedation Same Physician Add 15min  --  Coronary Catheterization (w/o LHC, w/Grafts)  --  Myocardial Flow Reserve  PROCEDURE: The risks and alternatives of the procedures and conscious sedation were explained to the patient and informed consent was obtained  The patient was brought to the cath lab and placed on the table  The planned puncture sites  were prepped and draped in the usual sterile fashion  --  Right femoral artery access  The puncture site was infiltrated with local anesthetic  The vessel was accessed using the modified Seldinger technique, a wire was advanced into the vessel, and a sheath was advanced over the wire into the  vessel  --  Left coronary artery angiography  A catheter was advanced over a guidewire into the aorta and positioned in the left coronary artery ostium under fluoroscopic guidance  Angiography was performed  --  Right coronary artery angiography  A catheter was advanced over a guidewire into the aorta and positioned in the right coronary artery ostium under fluoroscopic guidance  Angiography was performed  --  Saphenous vein graft angiography   A catheter was advanced to the aorta and "positioned at the aortic anastomosis of the graft over a guidewire under fluoroscopic guidance  Angiography was performed  --  Saphenous vein graft angiography  A catheter was advanced to the aorta and positioned at the aortic anastomosis of the graft over a guidewire under fluoroscopic guidance  Angiography was performed  --  Left internal mammary graft angiography  A catheter was advanced to the aorta and positioned in the left subclavian artery over a guidewire under fluoroscopic guidance  Angiography was performed  --  Myocardial fractional flow reserve  Flow reserve was measured using a 0 014\" pressure-monitoring guide-wire  Steady baseline values were obtained  Mean arterial pressure and mean distal coronary pressures were then obtained at maximum  hyperemia  --  Inpatient  --  Mod Sedation Same Physician Initial 15min  --  Mod Sedation Same Physician Add 15min  --  Coronary Catheterization (w/o LHC, w/Grafts)  --  Myocardial Flow Reserve  PROCEDURE COMPLETION: The patient tolerated the procedure well and was discharged from the cath lab  TIMING: Test started at 09:50  Test concluded at 10:35  HEMOSTASIS: The sheath was removed over a wire and the Angioseal delivery sheath  was inserted into the femoral artery  Hemostasis was obtained using a closure device ( Angioseal) deployed through the delivery sheath  MEDICATIONS GIVEN: Fentanyl (1OOmcg/2 ml), 50 mcg, IV, at 09:55  Versed (2mg/2ml), 2 mg, IV, at 09:55   1% Lidocaine, 5 ml, subcutaneously, at 10:06  Heparin 1000 units/ml, 5,000 units, IV, at 10:23  CONTRAST GIVEN: 100 ml Omnipaque (350mg I /ml)  20 ml Omnipaque (350mg I /ml)  RADIATION EXPOSURE: Fluoroscopy time: 8 75 min  CORONARY VESSELS:   --  Left main: Normal   --  LAD: The vessel was small sized  There was a 90% stenosis in mid vessel  The distal LAD filled via a LIMA bypass conduit  The diagonal branches were small and free of critical disease    --  Circumflex: The " vessel was medium sized  There was an 80% stenosis in mid vessel  The lesion was interrogated by iFR and was negative for flow significance (iFR=1 0)  The OM branch contained a 95% stenosis and filled by an SVG  --  RCA: The vessel was normal sized and dominant, giving rise to the PDA and a posterolateral branch  There was a total occlusion in mid vessel  The PDA and distal RCA filled by an SVG  --  Graft to the LAD: The graft was a LIMA  The body of the graft and the distal anastomosis were free of significant obstruction  The small LAD filled from the LIMA and was free of critical distal disease  --  Graft to the 1st obtuse marginal: The graft was a saphenous vein graft from the ascending aorta  The body of the graft and the anastomses were normal  The grafted OM1 filled well from the SVG and was free of critical disease  --  Graft to the RPDA: The graft was a saphenous vein graft from the ascending aorta  The body of the graft and the anastomses were normal  The grafted PDA filled well from the SVG and was free of critical disease  The PDA sent retrograde  flow to the distal RCA and the major posterolateral branch  NOTE: Right femoral access  Hemostasis was achieved with an Angioseal device  There were no complications  iFR was performed using a pressure wire advanced across the stenosis in the circumflex and measuring the relative pressure distal and proximal to the stenosis  Prepared and signed by    Alfonso Grossman MD  Signed 05/26/2020 11:04:02    CC: Dr Landy Estes    CC: Dr Charity Wilson    Study diagram    Hemodynamic tables    Pressures:  Baseline  Pressures:  - HR: 110  Pressures:  - Rhythm:  Pressures:  -- Aortic Pressure (S/D/M): 102/70/78    Outputs:  Baseline  Outputs:  -- CALCULATIONS: [age]  Outputs:  -- CALCULATIONS: Body Surface Area: 1 55  Outputs:  -- CALCULATIONS: Height in cm: 155 00  Outputs:  -- CALCULATIONS: Sex: Female  Outputs:  -- CALCULATIONS: Weight in kg: 56 70    No results found for this or any previous visit

## 2023-06-22 NOTE — PATIENT INSTRUCTIONS
Recommendations:  1  Discontinue clopidogrel  2  Start Eliquis 5mg 2x/day  3  Continue remainder of medications  4  Check complete metabolic profile and fasting lipid panel  5  Check CTA of thoracic aorta to evaluate progression  6  Follow up in 6 months

## 2023-06-29 ENCOUNTER — APPOINTMENT (EMERGENCY)
Dept: RADIOLOGY | Facility: HOSPITAL | Age: 78
DRG: 377 | End: 2023-06-29
Payer: COMMERCIAL

## 2023-06-29 ENCOUNTER — HOSPITAL ENCOUNTER (INPATIENT)
Facility: HOSPITAL | Age: 78
LOS: 5 days | Discharge: HOME | DRG: 377 | End: 2023-07-04
Attending: EMERGENCY MEDICINE | Admitting: INTERNAL MEDICINE
Payer: COMMERCIAL

## 2023-06-29 DIAGNOSIS — I48.21 PERMANENT ATRIAL FIBRILLATION (HCC): ICD-10-CM

## 2023-06-29 DIAGNOSIS — I25.10 ATHEROSCLEROSIS OF NATIVE CORONARY ARTERY OF NATIVE HEART WITHOUT ANGINA PECTORIS: ICD-10-CM

## 2023-06-29 DIAGNOSIS — I71.21 ANEURYSM OF ASCENDING AORTA WITHOUT RUPTURE (HCC): ICD-10-CM

## 2023-06-29 DIAGNOSIS — K92.2 GI BLEED: ICD-10-CM

## 2023-06-29 DIAGNOSIS — I71.019 THORACIC AORTIC DISSECTION (HCC): ICD-10-CM

## 2023-06-29 DIAGNOSIS — D62 ACUTE BLOOD LOSS ANEMIA: ICD-10-CM

## 2023-06-29 DIAGNOSIS — R53.83 FATIGUE: ICD-10-CM

## 2023-06-29 DIAGNOSIS — I50.9 CHF (CONGESTIVE HEART FAILURE) (HCC): ICD-10-CM

## 2023-06-29 DIAGNOSIS — I49.5 TACHY-BRADY SYNDROME (HCC): ICD-10-CM

## 2023-06-29 DIAGNOSIS — Z95.1 S/P CABG (CORONARY ARTERY BYPASS GRAFT): ICD-10-CM

## 2023-06-29 DIAGNOSIS — D64.9 SYMPTOMATIC ANEMIA: Primary | ICD-10-CM

## 2023-06-29 LAB
2HR DELTA HS TROPONIN: 0 NG/L
ABO GROUP BLD: NORMAL
ALBUMIN SERPL BCP-MCNC: 3.6 G/DL (ref 3.5–5)
ALP SERPL-CCNC: 30 U/L (ref 34–104)
ALT SERPL W P-5'-P-CCNC: 10 U/L (ref 7–52)
ANION GAP SERPL CALCULATED.3IONS-SCNC: 9 MMOL/L
AST SERPL W P-5'-P-CCNC: 11 U/L (ref 13–39)
BASOPHILS # BLD AUTO: 0.06 THOUSANDS/ÂΜL (ref 0–0.1)
BASOPHILS NFR BLD AUTO: 1 % (ref 0–1)
BILIRUB SERPL-MCNC: 0.34 MG/DL (ref 0.2–1)
BLD GP AB SCN SERPL QL: NEGATIVE
BUN SERPL-MCNC: 61 MG/DL (ref 5–25)
CALCIUM SERPL-MCNC: 8.4 MG/DL (ref 8.4–10.2)
CARDIAC TROPONIN I PNL SERPL HS: 12 NG/L
CARDIAC TROPONIN I PNL SERPL HS: 12 NG/L
CHLORIDE SERPL-SCNC: 98 MMOL/L (ref 96–108)
CO2 SERPL-SCNC: 29 MMOL/L (ref 21–32)
CREAT SERPL-MCNC: 1.66 MG/DL (ref 0.6–1.3)
EOSINOPHIL # BLD AUTO: 0.32 THOUSAND/ÂΜL (ref 0–0.61)
EOSINOPHIL NFR BLD AUTO: 4 % (ref 0–6)
ERYTHROCYTE [DISTWIDTH] IN BLOOD BY AUTOMATED COUNT: 15 % (ref 11.6–15.1)
GFR SERPL CREATININE-BSD FRML MDRD: 29 ML/MIN/1.73SQ M
GLUCOSE SERPL-MCNC: 238 MG/DL (ref 65–140)
GLUCOSE SERPL-MCNC: 272 MG/DL (ref 65–140)
HCT VFR BLD AUTO: 20.9 % (ref 34.8–46.1)
HGB BLD-MCNC: 6.5 G/DL (ref 11.5–15.4)
IMM GRANULOCYTES # BLD AUTO: 0.1 THOUSAND/UL (ref 0–0.2)
IMM GRANULOCYTES NFR BLD AUTO: 1 % (ref 0–2)
LYMPHOCYTES # BLD AUTO: 1.69 THOUSANDS/ÂΜL (ref 0.6–4.47)
LYMPHOCYTES NFR BLD AUTO: 20 % (ref 14–44)
MCH RBC QN AUTO: 31 PG (ref 26.8–34.3)
MCHC RBC AUTO-ENTMCNC: 31.1 G/DL (ref 31.4–37.4)
MCV RBC AUTO: 100 FL (ref 82–98)
MONOCYTES # BLD AUTO: 0.6 THOUSAND/ÂΜL (ref 0.17–1.22)
MONOCYTES NFR BLD AUTO: 7 % (ref 4–12)
NEUTROPHILS # BLD AUTO: 5.62 THOUSANDS/ÂΜL (ref 1.85–7.62)
NEUTS SEG NFR BLD AUTO: 67 % (ref 43–75)
NRBC BLD AUTO-RTO: 1 /100 WBCS
PLATELET # BLD AUTO: 213 THOUSANDS/UL (ref 149–390)
PMV BLD AUTO: 10.7 FL (ref 8.9–12.7)
POTASSIUM SERPL-SCNC: 4.2 MMOL/L (ref 3.5–5.3)
PROT SERPL-MCNC: 5.9 G/DL (ref 6.4–8.4)
RBC # BLD AUTO: 2.1 MILLION/UL (ref 3.81–5.12)
RH BLD: POSITIVE
SODIUM SERPL-SCNC: 136 MMOL/L (ref 135–147)
SPECIMEN EXPIRATION DATE: NORMAL
WBC # BLD AUTO: 8.39 THOUSAND/UL (ref 4.31–10.16)

## 2023-06-29 PROCEDURE — 99285 EMERGENCY DEPT VISIT HI MDM: CPT

## 2023-06-29 PROCEDURE — 86850 RBC ANTIBODY SCREEN: CPT | Performed by: EMERGENCY MEDICINE

## 2023-06-29 PROCEDURE — 99291 CRITICAL CARE FIRST HOUR: CPT | Performed by: EMERGENCY MEDICINE

## 2023-06-29 PROCEDURE — 80053 COMPREHEN METABOLIC PANEL: CPT | Performed by: EMERGENCY MEDICINE

## 2023-06-29 PROCEDURE — 85025 COMPLETE CBC W/AUTO DIFF WBC: CPT | Performed by: EMERGENCY MEDICINE

## 2023-06-29 PROCEDURE — 86920 COMPATIBILITY TEST SPIN: CPT

## 2023-06-29 PROCEDURE — 86901 BLOOD TYPING SEROLOGIC RH(D): CPT | Performed by: EMERGENCY MEDICINE

## 2023-06-29 PROCEDURE — 93005 ELECTROCARDIOGRAM TRACING: CPT

## 2023-06-29 PROCEDURE — 96375 TX/PRO/DX INJ NEW DRUG ADDON: CPT

## 2023-06-29 PROCEDURE — 86900 BLOOD TYPING SEROLOGIC ABO: CPT | Performed by: EMERGENCY MEDICINE

## 2023-06-29 PROCEDURE — 84484 ASSAY OF TROPONIN QUANT: CPT | Performed by: EMERGENCY MEDICINE

## 2023-06-29 PROCEDURE — 36415 COLL VENOUS BLD VENIPUNCTURE: CPT

## 2023-06-29 PROCEDURE — C9113 INJ PANTOPRAZOLE SODIUM, VIA: HCPCS

## 2023-06-29 PROCEDURE — 96365 THER/PROPH/DIAG IV INF INIT: CPT

## 2023-06-29 PROCEDURE — P9016 RBC LEUKOCYTES REDUCED: HCPCS

## 2023-06-29 PROCEDURE — 36430 TRANSFUSION BLD/BLD COMPNT: CPT

## 2023-06-29 PROCEDURE — 99223 1ST HOSP IP/OBS HIGH 75: CPT | Performed by: HOSPITALIST

## 2023-06-29 PROCEDURE — 82948 REAGENT STRIP/BLOOD GLUCOSE: CPT

## 2023-06-29 PROCEDURE — 71045 X-RAY EXAM CHEST 1 VIEW: CPT

## 2023-06-29 RX ORDER — LEVOTHYROXINE SODIUM 0.07 MG/1
75 TABLET ORAL
Status: DISCONTINUED | OUTPATIENT
Start: 2023-06-30 | End: 2023-07-04 | Stop reason: HOSPADM

## 2023-06-29 RX ORDER — TIMOLOL MALEATE 5 MG/ML
1 SOLUTION/ DROPS OPHTHALMIC 2 TIMES DAILY
Status: DISCONTINUED | OUTPATIENT
Start: 2023-06-29 | End: 2023-07-04

## 2023-06-29 RX ORDER — ATORVASTATIN CALCIUM 40 MG/1
40 TABLET, FILM COATED ORAL
Status: DISCONTINUED | OUTPATIENT
Start: 2023-06-29 | End: 2023-07-04 | Stop reason: HOSPADM

## 2023-06-29 RX ORDER — LANOLIN ALCOHOL/MO/W.PET/CERES
400 CREAM (GRAM) TOPICAL 2 TIMES DAILY
Status: DISCONTINUED | OUTPATIENT
Start: 2023-06-29 | End: 2023-07-04 | Stop reason: HOSPADM

## 2023-06-29 RX ORDER — INSULIN LISPRO 100 [IU]/ML
1-5 INJECTION, SOLUTION INTRAVENOUS; SUBCUTANEOUS
Status: DISCONTINUED | OUTPATIENT
Start: 2023-06-29 | End: 2023-07-04 | Stop reason: HOSPADM

## 2023-06-29 RX ORDER — CARVEDILOL 12.5 MG/1
12.5 TABLET ORAL 2 TIMES DAILY WITH MEALS
COMMUNITY

## 2023-06-29 RX ORDER — GLIMEPIRIDE 1 MG/1
1 TABLET ORAL
COMMUNITY
End: 2023-07-04

## 2023-06-29 RX ORDER — BRIMONIDINE TARTRATE 2 MG/ML
1 SOLUTION/ DROPS OPHTHALMIC 2 TIMES DAILY
Status: DISCONTINUED | OUTPATIENT
Start: 2023-06-29 | End: 2023-07-04

## 2023-06-29 RX ORDER — INSULIN LISPRO 100 [IU]/ML
1-5 INJECTION, SOLUTION INTRAVENOUS; SUBCUTANEOUS
Status: DISCONTINUED | OUTPATIENT
Start: 2023-06-30 | End: 2023-07-04 | Stop reason: HOSPADM

## 2023-06-29 RX ORDER — LANOLIN ALCOHOL/MO/W.PET/CERES
6 CREAM (GRAM) TOPICAL
Status: DISCONTINUED | OUTPATIENT
Start: 2023-06-29 | End: 2023-07-04 | Stop reason: HOSPADM

## 2023-06-29 RX ORDER — ACETAMINOPHEN 325 MG/1
650 TABLET ORAL EVERY 4 HOURS PRN
Status: DISCONTINUED | OUTPATIENT
Start: 2023-06-29 | End: 2023-07-04 | Stop reason: HOSPADM

## 2023-06-29 RX ADMIN — PANTOPRAZOLE SODIUM 8 MG/HR: 40 INJECTION, POWDER, FOR SOLUTION INTRAVENOUS at 20:23

## 2023-06-29 RX ADMIN — Medication 400 MG: at 20:02

## 2023-06-29 RX ADMIN — ACETAMINOPHEN 650 MG: 325 TABLET ORAL at 23:17

## 2023-06-29 RX ADMIN — Medication 6 MG: at 21:28

## 2023-06-29 RX ADMIN — ATORVASTATIN CALCIUM 40 MG: 40 TABLET, FILM COATED ORAL at 20:02

## 2023-06-29 RX ADMIN — TIMOLOL MALEATE 1 DROP: 5 SOLUTION/ DROPS OPHTHALMIC at 20:31

## 2023-06-29 RX ADMIN — DESMOPRESSIN ACETATE 27.6 MCG: 4 INJECTION, SOLUTION INTRAVENOUS; SUBCUTANEOUS at 15:21

## 2023-06-29 RX ADMIN — INSULIN LISPRO 3 UNITS: 100 INJECTION, SOLUTION INTRAVENOUS; SUBCUTANEOUS at 23:21

## 2023-06-29 RX ADMIN — Medication 2000 UNITS: at 15:20

## 2023-06-29 NOTE — ASSESSMENT & PLAN NOTE
Pulmonary pacemaker in place. Hold carvedilol due to hypotension in the setting of acute blood loss anemia.

## 2023-06-29 NOTE — ED PROVIDER NOTES
History  Chief Complaint   Patient presents with   • Fatigue     Patient here by ems from [facility] d/t generally feeling unwell the past few days. Per ems patient was found in dining room during lunch pale with low BP's   Only change per ems is being restarted on eliquis approx 1 week ago     HPI Pt is a 70 y/o f pmhx of chf, aortic aneurysm, afib (pacer), CAD, presenting to the ED w/ 2 days of worsening fatigue and hypotension at nursing home, she appeared pale and with malaise while in dining room today before being sent to ED. Cardiology restarted her eliquis this past week after a few month hiatus. Pt states the donavan in her mid to low back is uncomfortable over the past few days as well but feels similar to prior back pain from the donavan, positional. Otherwise just profound fatigue. Prior to Admission Medications   Prescriptions Last Dose Informant Patient Reported? Taking? Blood Glucose Monitoring Suppl (ONE TOUCH ULTRA 2) w/Device KIT 6/29/2023 Care Giver Yes Yes   Fleet Oil enema Unknown Care Giver Yes No   Multiple Vitamins-Minerals (PreserVision AREDS) TABS Unknown Care Giver Yes No   OneTouch Ultra test strip 6/29/2023 Care Giver Yes Yes   acetaminophen (TYLENOL) 325 mg tablet Unknown Care Giver No No   Sig: Take 2 tablets (650 mg total) by mouth every 4 (four) hours as needed for mild pain   apixaban (Eliquis) 5 mg 6/29/2023  No Yes   Sig: Take 1 tablet (5 mg total) by mouth 2 (two) times a day   aspirin 81 mg chewable tablet 6/29/2023 Care Giver No Yes   Sig: Chew 4 tablets (324 mg total) daily Do not start before October 11, 2022.    atorvastatin (LIPITOR) 40 mg tablet 6/28/2023 Care Giver No Yes   Sig: Take 1 tablet (40 mg total) by mouth daily   brimonidine-timolol (COMBIGAN) 0.2-0.5 % 6/29/2023 Care Giver Yes Yes   carvedilol (COREG) 12.5 mg tablet 6/29/2023  Yes Yes   Sig: Take 12.5 mg by mouth 2 (two) times a day with meals   ferrous sulfate 325 (65 Fe) mg tablet 6/29/2023 Care Giver No Yes   Sig: Take 1 tablet (325 mg total) by mouth every other day   furosemide (LASIX) 20 mg tablet 6/29/2023 Care Giver No Yes   Sig: Take 2 tablets (40 mg total) by mouth daily   glimepiride (AMARYL) 1 mg tablet 6/29/2023  Yes Yes   Sig: Take 1 mg by mouth daily with breakfast   hydrALAZINE (APRESOLINE) 25 mg tablet 6/29/2023 Care Giver No Yes   Sig: Take 1 tablet (25 mg total) by mouth every 8 (eight) hours   hydrOXYzine HCL (ATARAX) 25 mg tablet Past Week  Yes Yes   Sig: Take 1/2 tablet daily   isosorbide dinitrate (ISORDIL) 20 mg tablet 6/29/2023 Care Giver No Yes   Sig: Take 1 tablet (20 mg total) by mouth 3 (three) times daily after meals   levothyroxine 50 mcg tablet 6/28/2023 Care Giver Yes Yes   losartan (COZAAR) 25 mg tablet 6/29/2023 Care Giver No Yes   Sig: Take 1 tablet (25 mg total) by mouth daily   Patient taking differently: Take 50 mg by mouth daily   magnesium oxide (MAG-OX) 400 mg 6/29/2023 Care Giver No Yes   Sig: Take 1 tablet (400 mg total) by mouth 2 (two) times a day   melatonin 3 mg 6/28/2023 Care Giver No Yes   Sig: Take 2 tablets (6 mg total) by mouth daily at bedtime   pantoprazole (PROTONIX) 40 mg tablet 6/29/2023 Care Giver Yes Yes      Facility-Administered Medications: None       Past Medical History:   Diagnosis Date   • Atypical chest pain    • GERD (gastroesophageal reflux disease)    • Hyperlipidemia    • Hypertension    • Kidney stone    • Myocardial infarction ([address])     2015    • NSTEMI (non-ST elevated myocardial infarction) ([address])     Last Assessed: 9/24/2015   • Type 2 diabetes mellitus, without long-term current use of insulin ([address]) 9/12/2013       Past Surgical History:   Procedure Laterality Date   • A-V CARDIAC PACEMAKER INSERTION     • ANKLE SURGERY     • BACK SURGERY  01/2011    L4 and L5 laminectomy and fusion    • BLADDER SURGERY     • CORONARY ARTERY BYPASS GRAFT  09/02/2015    CABGx3 with LIMA to LAD, SVG to PDA, SVG to OM-1   • EXAMINATION UNDER ANESTHESIA N/A 2020    Procedure: EXAM UNDER ANESTHESIA (EUA)  EXCISION OF POSTERIOR VAGINAL FOREIGN BODY;  Surgeon: Leanna Grossman MD;  [facility];  Service: Gynecology   • FL RETROGRADE PYELOGRAM  2020   • FL RETROGRADE PYELOGRAM  2020   • HYSTERECTOMY     • CA CYSTO BLADDER W/URETERAL CATHETERIZATION Right 2020    Procedure: CYSTOSCOPY RETROGRADE PYELOGRAM WITH INSERTION STENT URETERAL, bladder biopsy with fulguration;  Surgeon: Varsha Purcell MD;  [facility];  Service: Urology   • CA CYSTO/URETERO W/LITHOTRIPSY &INDWELL STENT INSRT Right 2020    Procedure: CYSTOSCOPY URETEROSCOPY WITH LITHOTRIPSY HOLMIUM LASER, RETROGRADE PYELOGRAM AND INSERTION STENT URETERAL;  Surgeon: Varsha Purcell MD;  [facility];  Service: Urology   • WRIST SURGERY         Family History   Problem Relation Age of Onset   • Hypertension Mother    • Hypertension Sister    • Alcohol abuse Brother    • Cirrhosis Brother    • Diabetes Father    • Other Brother         Heart transplant in his 46s   • Lung cancer Sister    • Diabetes Brother    • Stroke Sister    • No Known Problems Daughter    • No Known Problems Maternal Grandmother    • No Known Problems Maternal Grandfather    • No Known Problems Paternal Grandmother    • No Known Problems Paternal Grandfather    • No Known Problems Sister      I have reviewed and agree with the history as documented. E-Cigarette/Vaping   • E-Cigarette Use Never User      E-Cigarette/Vaping Substances     Social History     Tobacco Use   • Smoking status: Former     Packs/day: 0.25     Types: Cigarettes     Start date: 5     Quit date:      Years since quittin.4   • Smokeless tobacco: Never   • Tobacco comments:     Started smoking at age 25; currently smoking <1ppd. Vaping Use   • Vaping Use: Never used   Substance Use Topics   • Alcohol use: Not Currently   • Drug use: Never        Review of Systems   Constitutional: Positive for fatigue. Musculoskeletal: Positive for back pain (acute on chronic "from donavan" in back). Skin: Positive for pallor. Physical Exam  ED Triage Vitals   Temperature Pulse Respirations Blood Pressure SpO2   06/29/23 1326 06/29/23 1324 06/29/23 1324 06/29/23 1324 06/29/23 1324   97.9 °F (36.6 °C) 76 16 103/54 100 %      Temp Source Heart Rate Source Patient Position - Orthostatic VS BP Location FiO2 (%)   06/29/23 1326 06/29/23 1324 06/29/23 1930 06/29/23 1930 --   Oral Monitor Lying Right arm       Pain Score       06/29/23 1324       10 - Worst Possible Pain             Orthostatic Vital Signs  Vitals:    06/29/23 1834 06/29/23 1930 06/29/23 2127 06/29/23 2222   BP: 107/53 (!) 113/43 (!) 101/35 (!) 105/40   Pulse: 60 61 62 63   Patient Position - Orthostatic VS:  Lying         Physical Exam  Vitals and nursing note reviewed. Constitutional:       General: She is in acute distress (due to profound fatigue). Appearance: She is ill-appearing. HENT:      Head: Normocephalic and atraumatic. Nose: Nose normal.      Mouth/Throat:      Mouth: Mucous membranes are dry. Pharynx: Oropharynx is clear. No oropharyngeal exudate or posterior oropharyngeal erythema. Eyes:      Extraocular Movements: Extraocular movements intact. Conjunctiva/sclera: Conjunctivae normal.      Pupils: Pupils are equal, round, and reactive to light. Cardiovascular:      Rate and Rhythm: Normal rate. Comments: Paced, pulses mildly diminished. Pulmonary:      Effort: Pulmonary effort is normal. No respiratory distress. Breath sounds: Normal breath sounds. No stridor. No wheezing, rhonchi or rales. Chest:      Chest wall: No tenderness. Abdominal:      General: Abdomen is flat. There is no distension. Palpations: Abdomen is soft. Tenderness: There is no abdominal tenderness. There is no guarding or rebound. Genitourinary:     Rectum: Guaiac result positive.    Musculoskeletal:         General: No swelling, tenderness, deformity or signs of injury. Normal range of motion. Cervical back: Normal range of motion and neck supple. Right lower leg: No edema. Left lower leg: No edema. Skin:     General: Skin is warm and dry. Coloration: Skin is pale. Skin is not jaundiced. Findings: No bruising, erythema, lesion or rash. Neurological:      General: No focal deficit present. Mental Status: She is alert and oriented to person, place, and time. Motor: Weakness (generalized diffuse, not unilateral) present.    Psychiatric:         Mood and Affect: Mood normal.         Behavior: Behavior normal.         ED Medications  Medications   melatonin tablet 6 mg (6 mg Oral Given 6/29/23 2128)   magnesium Oxide (MAG-OX) tablet 400 mg (400 mg Oral Given 6/29/23 2002)   levothyroxine tablet 75 mcg (has no administration in time range)   brimonidine tartrate 0.2 % ophthalmic solution 1 drop (1 drop Both Eyes Not Given 6/29/23 2031)     And   timolol (TIMOPTIC) 0.5 % ophthalmic solution 1 drop (1 drop Both Eyes Given 6/29/23 2031)   atorvastatin (LIPITOR) tablet 40 mg (40 mg Oral Given 6/29/23 2002)   acetaminophen (TYLENOL) tablet 650 mg (has no administration in time range)   insulin lispro (HumaLOG) 100 units/mL subcutaneous injection 1-5 Units (has no administration in time range)   insulin lispro (HumaLOG) 100 units/mL subcutaneous injection 1-5 Units (has no administration in time range)   pantoprazole (PROTONIX) 80 mg in sodium chloride 0.9 % 100 mL infusion (8 mg/hr Intravenous New Bag 6/29/23 2023)   desmopressin (DDAVP) 27.6 mcg in sodium chloride 0.9 % 50 mL IVPB (0 mcg Intravenous Stopped 6/29/23 1552)   prothrombin complex concentrate (human) (Kcentra) 2,000 Units (2,000 Units Intravenous Given 6/29/23 1520)       Diagnostic Studies  Results Reviewed     Procedure Component Value Units Date/Time    MRSA culture [[id number]]     Lab Status: No result Specimen: Nares from Nose     HS Troponin I 2hr [[id number]]  (Normal) Collected: 06/29/23 1530    Lab Status: Final result Specimen: Blood from Arm, Left Updated: 06/29/23 1607     hs TnI 2hr 12 ng/L      Delta 2hr hsTnI 0 ng/L     UA w Reflex to Microscopic w Reflex to Culture [[id number]]     Lab Status: No result Specimen: Urine     HS Troponin 0hr (reflex protocol) [[id number]]  (Normal) Collected: 06/29/23 1333    Lab Status: Final result Specimen: Blood from Arm, Left Updated: 06/29/23 1411     hs TnI 0hr 12 ng/L     CBC and differential [[id number]]  (Abnormal) Collected: 06/29/23 1333    Lab Status: Final result Specimen: Blood from Arm, Left Updated: 06/29/23 1410     WBC 8.39 Thousand/uL      RBC 2.10 Million/uL      Hemoglobin 6.5 g/dL      Hematocrit 20.9 %       fL      MCH 31.0 pg      MCHC 31.1 g/dL      RDW 15.0 %      MPV 10.7 fL      Platelets 856 Thousands/uL      nRBC 1 /100 WBCs      Neutrophils Relative 67 %      Immat GRANS % 1 %      Lymphocytes Relative 20 %      Monocytes Relative 7 %      Eosinophils Relative 4 %      Basophils Relative 1 %      Neutrophils Absolute 5.62 Thousands/µL      Immature Grans Absolute 0.10 Thousand/uL      Lymphocytes Absolute 1.69 Thousands/µL      Monocytes Absolute 0.60 Thousand/µL      Eosinophils Absolute 0.32 Thousand/µL      Basophils Absolute 0.06 Thousands/µL     Narrative: This is an appended report. These results have been appended to a previously verified report.     Comprehensive metabolic panel [[id number]]  (Abnormal) Collected: 06/29/23 1333    Lab Status: Final result Specimen: Blood from Arm, Left Updated: 06/29/23 1404     Sodium 136 mmol/L      Potassium 4.2 mmol/L      Chloride 98 mmol/L      CO2 29 mmol/L      ANION GAP 9 mmol/L      BUN 61 mg/dL      Creatinine 1.66 mg/dL      Glucose 238 mg/dL      Calcium 8.4 mg/dL      AST 11 U/L      ALT 10 U/L      Alkaline Phosphatase 30 U/L      Total Protein 5.9 g/dL      Albumin 3.6 g/dL      Total Bilirubin 0.34 mg/dL eGFR 29 ml/min/1.73sq m     Narrative:      [facility] guidelines for Chronic Kidney Disease (CKD):   •  Stage 1 with normal or high GFR (GFR > 90 mL/min/1.73 square meters)  •  Stage 2 Mild CKD (GFR = 60-89 mL/min/1.73 square meters)  •  Stage 3A Moderate CKD (GFR = 45-59 mL/min/1.73 square meters)  •  Stage 3B Moderate CKD (GFR = 30-44 mL/min/1.73 square meters)  •  Stage 4 Severe CKD (GFR = 15-29 mL/min/1.73 square meters)  •  Stage 5 End Stage CKD (GFR <15 mL/min/1.73 square meters)  Note: GFR calculation is accurate only with a steady state creatinine                 XR chest 1 view portable    (Results Pending)         Procedures  ECG 12 Lead Documentation Only    Date/Time: 6/29/2023 1:38 PM    Performed by: Ashwini Black DO  Authorized by: Ashwini Black DO    Indications / Diagnosis:  Fatigue  ECG reviewed by me, the ED Provider: yes    Patient location:  ED  Previous ECG:     Previous ECG:  Compared to current    Comparison ECG info:  11/15/23    Similarity:  No change    Comparison to cardiac monitor: Yes    Interpretation:     Interpretation: non-specific    Rate:     ECG rate:  62    ECG rate assessment: normal    Rhythm:     Rhythm: paced    Pacing:     Capture:  Complete  Comments:      No STEMI. Paced rhythm at 62. ED Course  ED Course as of 06/29/23 2226   Thu Jun 29, 2023   1416 Hemoglobin(!!): 6.5   1416 HCT(!): 20.9   1416 MCV(!): 100   1416 Red Blood Cell Count(!): 2.10   1416 Creatinine(!): 1.66   1416 BUN(!): 61     Hemoccult positive. Pt consented to blood transfusion, will transfuse 2 units prbcs. Kcentra administered. DDAVP in progress. Pt is awake, alert oriented - BP mildly improved with 500 ml bolus ns - must gently add pressure support due to reduced EF. D/W critical care AP, they will be down to see the pt for evaluation of step down 1 vs 2.      D/W Dr. Rad Murray ([facility]), pt will be admitted to his care after discussion with critical care AP - pt is alert and oriented in NAD currently, responsive to fluids. Medical Decision Making  Pt is a 70 y/o f pmhx of chf, aortic aneurysm, afib (pacer), CAD, presenting to the ED w/ 2 days of worsening fatigue and hypotension at nursing home, she appeared pale and with malaise while in dining room today before being sent to ED. Cardiology restarted her eliquis this past week after a few month hiatus. Pt states the donavan in her mid to low back is uncomfortable over the past few days as well but feels similar to prior back pain from the donavan, positional. Otherwise just profound fatigue. Hemoccult positive. Pt consented to blood transfusion, will transfuse 2 units prbcs. Kcentra administered. DDAVP in progress. Pt is awake, alert oriented - BP mildly improved with 500 ml bolus ns - must gently add pressure support due to reduced EF. D/W critical care AP, they will be down to see the pt for evaluation of step down 1 vs 2. D/[facility] pt will be admitted to his care after discussion with critical care AP - pt is alert and oriented in NAD currently, responsive to fluids. Amount and/or Complexity of Data Reviewed  Labs: ordered. Decision-making details documented in ED Course. Risk  Decision regarding hospitalization. DDx including but not limited to: anemia, iron deficiency, upper vs lower GI bleed, lab error, other active bleeding, TAA, AAA, occult cancer, other malignancy, adverse reaction. Disposition  Final diagnoses:   Symptomatic anemia   Fatigue   CHF (congestive heart failure) (HCC)   GI bleed - Hemoccult positive, no susan rectal bleeding nor melena.      Time reflects when diagnosis was documented in both MDM as applicable and the Disposition within this note     Time User Action Codes Description Comment    6/29/2023  4:34 PM Tran Galeas Add [D64.9] Symptomatic anemia     6/29/2023  4:34 PM Ricky Mares Chandrika Colin Add [R53.83] Fatigue     6/29/2023  4:34 PM Olden Needles Add [I50.9] CHF (congestive heart failure) ([address])     6/29/2023  4:38 PM Olden Needles Add [K92.2] GI bleed     6/29/2023  4:38 PM Olden Needles Modify [K92.2] GI bleed Hemoccult positive, no susan rectal bleeding nor melena. ED Disposition     ED Disposition   Admit    Condition   Stable    Date/Time   Thu Jun 29, 2023  4:34 PM    Comment   Case was discussed with MERARY and the patient's admission status was agreed to be Admission Status: inpatient status to the service of Dr. Anitha Chisholm. Follow-up Information    None         Current Discharge Medication List      CONTINUE these medications which have NOT CHANGED    Details   apixaban (Eliquis) 5 mg Take 1 tablet (5 mg total) by mouth 2 (two) times a day  Qty: 180 tablet, Refills: 3    Associated Diagnoses: Dissection of ascending aorta (HCC)      aspirin 81 mg chewable tablet Chew 4 tablets (324 mg total) daily Do not start before October 11, 2022.   Refills: 0    Associated Diagnoses: Thoracic aortic dissection (HCC)      atorvastatin (LIPITOR) 40 mg tablet Take 1 tablet (40 mg total) by mouth daily  Qty: 90 tablet, Refills: 3    Associated Diagnoses: Hypercholesterolemia      Blood Glucose Monitoring Suppl (ONE TOUCH ULTRA 2) w/Device KIT       brimonidine-timolol (COMBIGAN) 0.2-0.5 %       carvedilol (COREG) 12.5 mg tablet Take 12.5 mg by mouth 2 (two) times a day with meals      ferrous sulfate 325 (65 Fe) mg tablet Take 1 tablet (325 mg total) by mouth every other day      furosemide (LASIX) 20 mg tablet Take 2 tablets (40 mg total) by mouth daily  Qty: 7 tablet, Refills: 0    Associated Diagnoses: SOB (shortness of breath)      glimepiride (AMARYL) 1 mg tablet Take 1 mg by mouth daily with breakfast      hydrALAZINE (APRESOLINE) 25 mg tablet Take 1 tablet (25 mg total) by mouth every 8 (eight) hours  Refills: 0    Associated Diagnoses: Thoracic aortic dissection (HCC)      hydrOXYzine HCL (ATARAX) 25 mg tablet Take 1/2 tablet daily      isosorbide dinitrate (ISORDIL) 20 mg tablet Take 1 tablet (20 mg total) by mouth 3 (three) times daily after meals  Refills: 0    Associated Diagnoses: Thoracic aortic dissection (HCC)      levothyroxine 50 mcg tablet       losartan (COZAAR) 25 mg tablet Take 1 tablet (25 mg total) by mouth daily  Qty: 90 tablet, Refills: 3    Associated Diagnoses: Essential hypertension      magnesium oxide (MAG-OX) 400 mg Take 1 tablet (400 mg total) by mouth 2 (two) times a day  Refills: 0    Associated Diagnoses: Thoracic aortic dissection (HCC)      melatonin 3 mg Take 2 tablets (6 mg total) by mouth daily at bedtime  Refills: 0    Associated Diagnoses: Thoracic aortic dissection (HCC)      OneTouch Ultra test strip       pantoprazole (PROTONIX) 40 mg tablet       acetaminophen (TYLENOL) 325 mg tablet Take 2 tablets (650 mg total) by mouth every 4 (four) hours as needed for mild pain  Qty: 30 tablet, Refills: 0    Associated Diagnoses: Nephrolithiasis      Fleet Oil enema       Multiple Vitamins-Minerals (PreserVision AREDS) TABS            No discharge procedures on file. PDMP Review       Value Time User    PDMP Reviewed  Yes 8/26/2022  4:40 PM Davis Bagley, [address] [place] [address]           ED Provider  Attending physically available and evaluated Annette Ortiz. I managed the patient along with the ED Attending.     Electronically Signed by         Shiloh Beckham DO  06/29/23 9409

## 2023-06-29 NOTE — ASSESSMENT & PLAN NOTE
Patient's BP goal is 034-337 systolic in the setting of aneurysm of the aorta. Antihypertensives currently held in the setting of hypotension secondary to acute blood loss anemia. Continue to monitor blood pressure  Interval monitoring of aortic aneurysm with CTA scheduled as outpatient. but  If any acute decompensation may consider completing the CTA imaging inpatient.   Consider cardiology consult

## 2023-06-29 NOTE — QUICK NOTE
Progress Note - Triage Asssessment   Tatiana Shelton 69 y.o. female MRN: [id number]    Time Called ( Time): 327PM   Date Called: 06/29/23  Room#: ED19  Person requesting evaluation: Farzaneh Kearney     Situation:    Tatiana Shelton 42-year-old female history of CHF, EF of 36%, aneurysm, A-fib, CAD, recently restarted her Eliquis, was priorly also on Plavix. She reports a few days of fatigue, lives at a nursing home. On evaluation patient is alert oriented, does not appear in any acute distress, patient is pale, blood pressure with a MAP of 67, patient does not appear toxic. Patient is in no acute respiratory distress, per ED staff she had a slightly positive Hemoccult. Patient's hemoglobin is 6.5 from 9.2 over a year ago. Patient likely has chronic anemia. Interventions:     Is going to receive a blood transfusion, patient's blood pressure does appear stable at this time. I would suggest only giving the patient 1 unit of PBRC's and then rechecking her hemoglobin. I suggested to run the blood in 200 mL an hour as to not overload the patient. If patient then needs second unit this can be administered, caution with heavy fluid boluses in the setting of the patient's decreased EF of 36%. Consult GI if needed for positive Hemoccult in the setting of anticoagulation         Triage Assessment:     Recommendations discussed with Ana Michael, Dr. Peter Hall, patient should be able to be accepted slim if she decompensates or her hemoglobin does not respond appropriately, persistent hypotension can reach out to critical care if needed.

## 2023-06-29 NOTE — ASSESSMENT & PLAN NOTE
On carvedilol for rate control  Anticoagulation with Eliquis was discontinued during hospitalization in 10/22 due to increased risk of rupture of her ascending thoracic aortic aneurysm. She was transitioned to Plavix and aspirin for stroke prevention. Transition back to Eliquis by her cardiologist Dr. Mayelin Galeas on 6/22/2023, Plavix was discontinued at that time. Kcentra given on admission  Hold anticoagulations and antihypertensives  Continue to monitor for any active signs of bleeding  Monitor vitals.     Seen in consultation by the cardiology service during her hospitalization   • After interrogation of her pacemaker, cardiology service recommended discontinuing amiodarone secondary to transition to per min atrial fibrillation status  • She is doing well with carvedilol for rate control   • She is not on anticoagulation secondary to increased risk of rupture of her ascending thoracic aortic aneurysm  • Cardiology service transitioned her to aspirin and clopidogrel  • Will continue with monitoring for change in her condition  • She will follow-up with her PCP and cardiology services upon discharge

## 2023-06-29 NOTE — H&P
[address] [facility]  H&P  Name: Sammi Herron 69 y.o. female I MRN: [id number]  Unit/Bed#: S -01 I Date of Admission: 6/29/2023   Date of Service: 6/29/2023 I Hospital Day: 0      Assessment/Plan   * Acute blood loss anemia  Assessment & Plan  - Presents with progressive fatigue generalized body ache and shortness of breath for the past 2 days. -Was recently transitioned back to Eliquis by her cardiologist.  -Hemoglobin on admission was 6.5, Kcentra given to reverse effects of Eliquis. -BUN elevated to the 60s, indicative of recent bleeding.  -In the last 2 weeks she completed a Medrol Dosepak secondary to generalized itching.  -Although patient denies any abdominal pain, due to her vision impairment she is unable to confirm or deny any hematochezia or melena.  -In the setting of recent steroid use and Eliquis, patient may have a superficial ulcer which has been bleeding at a slower rate. - 2 units of PRBCs transfused. -Monitor H&H Q8  -Monitor vitals provide fluid support for hypotension.  -Protonix drip  -GI consulted and aware of patient, if any active bleeding noted notify GI on-call immediately.  -N.P. O past midnight for endoscopy in the a.m. Aneurysm of ascending aorta [facility])  Assessment & Plan  CTA 10/22: Aneurysmal dissection of the ascending aorta measuring up to 5.9 cm with dissection flap extending to the noncoronary and right coronary cusp and approaching the right brachiocephalic artery takeoff distally. Additional smaller short segment dissection flap along the distal medial aspect of the ascending aorta. Patient's BP goal is 212-992 systolic in the setting of aneurysm of the aorta. Antihypertensives currently held in the setting of hypotension secondary to acute blood loss anemia. Continue to monitor blood pressure  Interval monitoring of aortic aneurysm with CTA scheduled as outpatient.  but  If any acute decompensation may consider completing the CTA imaging inpatient. Consider cardiology consult    Permanent atrial fibrillation [facility])  Assessment & Plan  On carvedilol for rate control  Anticoagulation with Eliquis was discontinued during hospitalization in 10/22 due to increased risk of rupture of her ascending thoracic aortic aneurysm. She was transitioned to Plavix and aspirin for stroke prevention. Transition back to Eliquis by her cardiologist Dr. Zaida Sims on 6/22/2023, Plavix was discontinued at that time. Kcentra given on admission  Hold anticoagulations and antihypertensives  Continue to monitor for any active signs of bleeding  Monitor vitals. Seen in consultation by the cardiology service during her hospitalization   • After interrogation of her pacemaker, cardiology service recommended discontinuing amiodarone secondary to transition to per min atrial fibrillation status  • She is doing well with carvedilol for rate control   • She is not on anticoagulation secondary to increased risk of rupture of her ascending thoracic aortic aneurysm  • Cardiology service transitioned her to aspirin and clopidogrel  • Will continue with monitoring for change in her condition  • She will follow-up with her PCP and cardiology services upon discharge    Heart failure with reduced ejection fraction [facility])  Assessment & Plan  Wt Readings from Last 3 Encounters:   06/29/23 69.1 kg (152 lb 5.4 oz)   06/22/23 67.5 kg (148 lb 12.8 oz)   01/20/23 61.7 kg (136 lb)     Echo 9/22: Left Ventricle: EF 36%, by visual assessment, ejection fraction appears slightly better. Grade II diastolic dysfunction. Mild to moderate MR And AR. Moderate TR and moderate increase in RV pressure. At 58 mmHg     -Lisinopril, carvedilol, isosorbide, losartan and hydralazine currently held due to hypotension in the setting of acute blood loss anemia. 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[facility])  Assessment & Plan  Pulmonary pacemaker in place.   Hold carvedilol due to hypotension in the setting of acute blood loss anemia. Hypertension  Assessment & Plan  Patient is hypotensive secondary to acute blood loss anemia. Home regimen: Carvedilol, losartan, Isordil, lasix all held. -Hold all antihypertensive, diuretics and vasodilators at this time. Glaucoma  Assessment & Plan  Continue Brimonidine and Timolol drops in both eyes. VTE Pharmacologic Prophylaxis: VTE Score: 5 High Risk (Score >/= 5) - Pharmacological DVT Prophylaxis Contraindicated. Sequential Compression Devices Ordered. Code Status: Level 1 - Full Code Patient and Daughter  Discussion with family: Updated  (daughter) at bedside. Anticipated Length of Stay: Patient will be admitted on an inpatient basis with an anticipated length of stay of greater than 2 midnights secondary to Acute blood loss anemia. Chief Complaint: Fatigue and shortness of breath    History of Present Illness:  Patrice Berger is a 69 y.o. female with a PMH of coronary artery disease status post CABG, hypertension, diabetes, dilated aortic root, HFrEF, atrial fibrillation, tachy-bradyc syndrome status post AICD who presents with generalized fatigue and shortness of breath. In the past patient was on Eliquis for atrial fibrillation which was stopped secondary to increased risk of rupture of her ascending thoracic aneurysm. Patient was transitioned to aspirin and clopidogrel. She followed up with her cardiologist Dr. Luz Smith on 6/22/2023 at which time he transitioned her back to Eliquis and discontinued clopidogrel. Patient was recently on a Medrol Dosepak due to itching which was later discovered to be secondary to bedbug infestation at her facility. In the setting of acute hemoglobin drop to 6.5 along with BUN elevation patient likely has a GI bleed. Patient has impaired vision due to glaucoma and macular degeneration and is not able to report any change in stool color.   In the ED patient was found to be hypotensive and anemic she responded well to fluid resuscitation and blood transfusion. FOBT borderline positive in the ED. Gastroenterology is consulted and is aware of the patient. Patient currently reports symptomatic improvement, denies any fevers, chills, nausea, vomiting, shortness of breath or chest pain. Review of Systems:  Review of Systems   Constitutional: Positive for fatigue. Negative for activity change, chills, diaphoresis and fever. HENT: Negative for mouth sores, rhinorrhea and sore throat. Respiratory: Positive for shortness of breath. Negative for cough, chest tightness and wheezing. Cardiovascular: Negative for chest pain, palpitations and leg swelling. Gastrointestinal: Positive for constipation. Negative for abdominal distention, abdominal pain, anal bleeding, diarrhea, nausea and vomiting. Due to impaired vision could not report if there was any blood in her stool. Genitourinary: Negative for dysuria, hematuria and urgency. Musculoskeletal: Positive for arthralgias and back pain. Negative for gait problem and myalgias. Neurological: Negative for dizziness, tremors and headaches.        Past Medical and Surgical History:   Past Medical History:   Diagnosis Date   • Atypical chest pain    • GERD (gastroesophageal reflux disease)    • Hyperlipidemia    • Hypertension    • Kidney stone    • Myocardial infarction [facility])     2015    • NSTEMI (non-ST elevated myocardial infarction) ([address])     Last Assessed: 9/24/2015   • Type 2 diabetes mellitus, without long-term current use of insulin ([address]) 9/12/2013       Past Surgical History:   Procedure Laterality Date   • A-V CARDIAC PACEMAKER INSERTION     • ANKLE SURGERY     • BACK SURGERY  01/2011    L4 and L5 laminectomy and fusion    • BLADDER SURGERY     • CORONARY ARTERY BYPASS GRAFT  09/02/2015    CABGx3 with LIMA to LAD, SVG to PDA, SVG to OM-1   • EXAMINATION UNDER ANESTHESIA N/A 7/11/2020    Procedure: EXAM UNDER ANESTHESIA (EUA)  EXCISION OF POSTERIOR VAGINAL FOREIGN BODY;  Surgeon: Coty Valenzuela MD;  [facility];  Service: Gynecology   • FL RETROGRADE PYELOGRAM  7/11/2020   • FL RETROGRADE PYELOGRAM  8/7/2020   • HYSTERECTOMY     • WY CYSTO BLADDER W/URETERAL CATHETERIZATION Right 7/11/2020    Procedure: CYSTOSCOPY RETROGRADE PYELOGRAM WITH INSERTION STENT URETERAL, bladder biopsy with fulguration;  Surgeon: Beau Wilder MD;  [facility];  Service: Urology   • WY CYSTO/URETERO W/LITHOTRIPSY &INDWELL STENT INSRT Right 8/7/2020    Procedure: CYSTOSCOPY URETEROSCOPY WITH LITHOTRIPSY HOLMIUM LASER, RETROGRADE PYELOGRAM AND INSERTION STENT URETERAL;  Surgeon: Beau Wilder MD;  [facility];  Service: Urology   • WRIST SURGERY         Meds/Allergies:  Prior to Admission medications    Medication Sig Start Date End Date Taking?  Authorizing Provider   apixaban (Eliquis) 5 mg Take 1 tablet (5 mg total) by mouth 2 (two) times a day 6/22/23  Yes Julienne Church MD   aspirin 81 mg chewable tablet Chew 4 tablets (324 mg total) daily Do not start before October 11, 2022. 10/11/22  Yes Anamaria Donovan PA-C   atorvastatin (LIPITOR) 40 mg tablet Take 1 tablet (40 mg total) by mouth daily 9/22/21  Yes Sindy Torres,    Blood Glucose Monitoring Suppl (ONE TOUCH ULTRA 2) w/Device KIT  11/2/22  Yes Historical Provider, MD   brimonidine-timolol (COMBIGAN) 0.2-0.5 %  1/11/23  Yes Historical Provider, MD   carvedilol (COREG) 12.5 mg tablet Take 12.5 mg by mouth 2 (two) times a day with meals   Yes Historical Provider, MD   ferrous sulfate 325 (65 Fe) mg tablet Take 1 tablet (325 mg total) by mouth every other day 10/12/22  Yes Claudean Flavin, MD   furosemide (LASIX) 20 mg tablet Take 2 tablets (40 mg total) by mouth daily 11/15/22  Yes Idalia Marquez MD   glimepiride (AMARYL) 1 mg tablet Take 1 mg by mouth daily with breakfast   Yes Historical Provider, MD   hydrALAZINE (APRESOLINE) 25 mg tablet Take 1 tablet (25 mg total) by mouth every 8 (eight) hours 10/10/22  Yes Anamaria Donovan PA-C   hydrOXYzine HCL (ATARAX) 25 mg tablet Take 1/2 tablet daily   Yes Historical Provider, MD   isosorbide dinitrate (ISORDIL) 20 mg tablet Take 1 tablet (20 mg total) by mouth 3 (three) times daily after meals 10/10/22  Yes Anamaria Donovan PA-C   levothyroxine 50 mcg tablet  12/30/22  Yes Historical Provider, MD   losartan (COZAAR) 25 mg tablet Take 1 tablet (25 mg total) by mouth daily  Patient taking differently: Take 50 mg by mouth daily 9/22/21  Yes Suraj Torres DO   magnesium oxide (MAG-OX) 400 mg Take 1 tablet (400 mg total) by mouth 2 (two) times a day 10/10/22  Yes Anamaria Donovan PA-C   melatonin 3 mg Take 2 tablets (6 mg total) by mouth daily at bedtime 10/10/22  Yes Anamaria Donovan PA-C   OneTouch Ultra test strip  12/18/22  Yes Historical Provider, MD   pantoprazole (PROTONIX) 40 mg tablet  1/11/23  Yes Historical Provider, MD   acetaminophen (TYLENOL) 325 mg tablet Take 2 tablets (650 mg total) by mouth every 4 (four) hours as needed for mild pain 8/7/20   Cristino Dumont MD   Fleet Oil enema  10/24/22   Historical Provider, MD   Multiple Vitamins-Minerals (PreserVision AREDS) TABS  1/11/23   Historical Provider, MD   amoxicillin (AMOXIL) 500 mg capsule  1/11/23 6/29/23  Historical Provider, MD   clopidogrel (PLAVIX) 75 mg tablet Take 1 tablet (75 mg total) by mouth daily Do not start before October 11, 2022.   Patient not taking: Reported on 6/29/2023 10/11/22 6/29/23  Anamaria Donovan PA-C   metFORMIN (GLUCOPHAGE) 500 mg tablet Take 1 tablet (500 mg total) by mouth 2 (two) times a day with meals  Patient not taking: Reported on 1/20/2023 9/22/21 6/29/23  Suraj Torres DO   omeprazole (PriLOSEC) 20 mg delayed release capsule Take 1 capsule (20 mg total) by mouth daily  Patient not taking: Reported on 1/20/2023 9/22/21 6/29/23  Suraj Torres DO   penicillin V potassium (VEETID) 500 mg tablet  1/16/23 6/29/23  Historical Provider, MD     I have reveiwed home medications using records provided by [facility]. Allergies: Allergies   Allergen Reactions   • Nickel Rash       Social History:  Marital Status: Single   Occupation: Retired  Patient Pre-hospital Living Situation: Assisted Living  Patient Pre-hospital Level of Mobility: walks with walker  Patient Pre-hospital Diet Restrictions: Carb controlled diet  Substance Use History:   Social History     Substance and Sexual Activity   Alcohol Use Not Currently     Social History     Tobacco Use   Smoking Status Former   • Packs/day: 0.25   • Types: Cigarettes   • Start date:    • Quit date:    • Years since quittin.4   Smokeless Tobacco Never   Tobacco Comments    Started smoking at age 25; currently smoking <1ppd. Social History     Substance and Sexual Activity   Drug Use Never       Family History:  Family History   Problem Relation Age of Onset   • Hypertension Mother    • Hypertension Sister    • Alcohol abuse Brother    • Cirrhosis Brother    • Diabetes Father    • Other Brother         Heart transplant in his 46s   • Lung cancer Sister    • Diabetes Brother    • Stroke Sister    • No Known Problems Daughter    • No Known Problems Maternal Grandmother    • No Known Problems Maternal Grandfather    • No Known Problems Paternal Grandmother    • No Known Problems Paternal Grandfather    • No Known Problems Sister        Physical Exam:     Vitals:   Blood Pressure: (!) 101/35 (23)  Pulse: 62 (23)  Temperature: 98.2 °F (36.8 °C) (23)  Temp Source: Oral (23)  Respirations: 18 (23)  Height: 4' 11" (149.9 cm) (23 1324)  Weight - Scale: 69.1 kg (152 lb 5.4 oz) (23 132)  SpO2: 97 % (23)    Physical Exam  Vitals reviewed. Constitutional:       Appearance: Normal appearance. HENT:      Head: Normocephalic and atraumatic.       Mouth/Throat:      Mouth: Mucous membranes are moist.      Pharynx: Oropharynx is clear. No oropharyngeal exudate or posterior oropharyngeal erythema. Eyes:      General: No scleral icterus. Extraocular Movements: Extraocular movements intact. Conjunctiva/sclera: Conjunctivae normal.   Cardiovascular:      Rate and Rhythm: Normal rate. Rhythm irregular. Pulses: Normal pulses. Heart sounds: No murmur heard. Pulmonary:      Effort: Pulmonary effort is normal. No respiratory distress. Breath sounds: Normal breath sounds. No stridor. No wheezing or rhonchi. Abdominal:      General: Bowel sounds are normal. There is no distension. Palpations: Abdomen is soft. There is no mass. Tenderness: There is no abdominal tenderness. Hernia: No hernia is present. Musculoskeletal:         General: No swelling or tenderness. Normal range of motion. Right lower leg: No edema. Left lower leg: No edema. Skin:     General: Skin is warm. Coloration: Skin is pale. Skin is not jaundiced. Findings: No bruising, erythema or lesion. Neurological:      Mental Status: She is alert and oriented to person, place, and time. Mental status is at baseline. Psychiatric:         Mood and Affect: Mood normal.         Behavior: Behavior normal.         Thought Content:  Thought content normal.         Judgment: Judgment normal.          Additional Data:     Lab Results:  Results from last 7 days   Lab Units 06/29/23  1333   WBC Thousand/uL 8.39   HEMOGLOBIN g/dL 6.5*   HEMATOCRIT % 20.9*   PLATELETS Thousands/uL 213   NEUTROS PCT % 67   LYMPHS PCT % 20   MONOS PCT % 7   EOS PCT % 4     Results from last 7 days   Lab Units 06/29/23  1333   SODIUM mmol/L 136   POTASSIUM mmol/L 4.2   CHLORIDE mmol/L 98   CO2 mmol/L 29   BUN mg/dL 61*   CREATININE mg/dL 1.66*   ANION GAP mmol/L 9   CALCIUM mg/dL 8.4   ALBUMIN g/dL 3.6   TOTAL BILIRUBIN mg/dL 0.34   ALK PHOS U/L 30*   ALT U/L 10   AST U/L 11*   GLUCOSE RANDOM mg/dL 238*         Results from last 7 days   Lab Units 06/29/23  2108   POC GLUCOSE mg/dl 272*               Lines/Drains:  Invasive Devices     Peripheral Intravenous Line  Duration           Peripheral IV 06/29/23 Dorsal (posterior); Right Forearm <1 day    Peripheral IV 06/29/23 Left Antecubital <1 day          Drain  Duration           Ureteral Drain/Stent Right ureter 6 Fr. 1083 days                    Imaging: Reviewed radiology reports from this admission including: chest xray  XR chest 1 view portable    (Results Pending)       EKG and Other Studies Reviewed on Admission:   · EKG: Atrial flutter. HR 62.    ** Please Note: This note has been constructed using a voice recognition system.  **

## 2023-06-29 NOTE — ASSESSMENT & PLAN NOTE
Patient is hypotensive secondary to acute blood loss anemia. Home regimen: Carvedilol, losartan, Isordil, lasix all held. -Hold all antihypertensive, diuretics and vasodilators at this time.

## 2023-06-29 NOTE — ED PROVIDER NOTES
History  Chief Complaint   Patient presents with   • Fatigue     Patient here by ems from [facility] d/t generally feeling unwell the past few days. Per ems patient was found in dining room during lunch pale with low BP's   Only change per ems is being restarted on eliquis approx 1 week ago     69 yr female sent from chronic care facility  fro generally weak with transient low bp at care facility -- pt has no comps-       History provided by:  Patient   used: No        Prior to Admission Medications   Prescriptions Last Dose Informant Patient Reported? Taking? Blood Glucose Monitoring Suppl (ONE TOUCH ULTRA 2) w/Device KIT  Care Giver Yes No   Fleet Oil enema  Care Giver Yes No   Multiple Vitamins-Minerals (PreserVision AREDS) TABS  Care Giver Yes No   OneTouch Ultra test strip  Care Giver Yes No   acetaminophen (TYLENOL) 325 mg tablet  Care Giver No No   Sig: Take 2 tablets (650 mg total) by mouth every 4 (four) hours as needed for mild pain   amoxicillin (AMOXIL) 500 mg capsule  Care Giver Yes No   Patient not taking: Reported on 1/20/2023   apixaban (Eliquis) 5 mg   No No   Sig: Take 1 tablet (5 mg total) by mouth 2 (two) times a day   aspirin 81 mg chewable tablet  Care Giver No No   Sig: Chew 4 tablets (324 mg total) daily Do not start before October 11, 2022. atorvastatin (LIPITOR) 40 mg tablet  Care Giver No No   Sig: Take 1 tablet (40 mg total) by mouth daily   brimonidine-timolol (COMBIGAN) 0.2-0.5 %  Care Giver Yes No   clopidogrel (PLAVIX) 75 mg tablet  Care Giver No No   Sig: Take 1 tablet (75 mg total) by mouth daily Do not start before October 11, 2022.    ferrous sulfate 325 (65 Fe) mg tablet  Care Giver No No   Sig: Take 1 tablet (325 mg total) by mouth every other day   furosemide (LASIX) 20 mg tablet  Care Giver No No   Sig: Take 2 tablets (40 mg total) by mouth daily   hydrALAZINE (APRESOLINE) 25 mg tablet  Care Giver No No   Sig: Take 1 tablet (25 mg total) by mouth every 8 (eight) hours   hydrOXYzine HCL (ATARAX) 25 mg tablet   Yes No   Sig: Take 1/2 tablet daily   isosorbide dinitrate (ISORDIL) 20 mg tablet  Care Giver No No   Sig: Take 1 tablet (20 mg total) by mouth 3 (three) times daily after meals   levothyroxine 50 mcg tablet  Care Giver Yes No   losartan (COZAAR) 25 mg tablet  Care Giver No No   Sig: Take 1 tablet (25 mg total) by mouth daily   Patient taking differently: Take 50 mg by mouth daily   magnesium oxide (MAG-OX) 400 mg  Care Giver No No   Sig: Take 1 tablet (400 mg total) by mouth 2 (two) times a day   melatonin 3 mg  Care Giver No No   Sig: Take 2 tablets (6 mg total) by mouth daily at bedtime   metFORMIN (GLUCOPHAGE) 500 mg tablet  Care Giver No No   Sig: Take 1 tablet (500 mg total) by mouth 2 (two) times a day with meals   Patient not taking: Reported on 1/20/2023   omeprazole (PriLOSEC) 20 mg delayed release capsule  Care Giver No No   Sig: Take 1 capsule (20 mg total) by mouth daily   Patient not taking: Reported on 1/20/2023   pantoprazole (PROTONIX) 40 mg tablet  Care Giver Yes No   penicillin V potassium (VEETID) 500 mg tablet  Care Giver Yes No   Patient not taking: Reported on 6/22/2023      Facility-Administered Medications: None       Past Medical History:   Diagnosis Date   • Atypical chest pain    • GERD (gastroesophageal reflux disease)    • Hyperlipidemia    • Hypertension    • Kidney stone    • Myocardial infarction ([address])     2015    • NSTEMI (non-ST elevated myocardial infarction) ([address])     Last Assessed: 9/24/2015   • Type 2 diabetes mellitus, without long-term current use of insulin ([address]) 9/12/2013       Past Surgical History:   Procedure Laterality Date   • A-V CARDIAC PACEMAKER INSERTION     • ANKLE SURGERY     • BACK SURGERY  01/2011    L4 and L5 laminectomy and fusion    • BLADDER SURGERY     • CORONARY ARTERY BYPASS GRAFT  09/02/2015    CABGx3 with LIMA to LAD, SVG to PDA, SVG to OM-1   • EXAMINATION UNDER ANESTHESIA N/A 7/11/2020 Procedure: EXAM UNDER ANESTHESIA (EUA)  EXCISION OF POSTERIOR VAGINAL FOREIGN BODY;  Surgeon: Gonzalo Gallego MD;  [facility];  Service: Gynecology   • FL RETROGRADE PYELOGRAM  2020   • FL RETROGRADE PYELOGRAM  2020   • HYSTERECTOMY     • ME CYSTO BLADDER W/URETERAL CATHETERIZATION Right 2020    Procedure: CYSTOSCOPY RETROGRADE PYELOGRAM WITH INSERTION STENT URETERAL, bladder biopsy with fulguration;  Surgeon: Cristino Dumont MD;  [facility];  Service: Urology   • ME CYSTO/URETERO W/LITHOTRIPSY &INDWELL STENT INSRT Right 2020    Procedure: CYSTOSCOPY URETEROSCOPY WITH LITHOTRIPSY HOLMIUM LASER, RETROGRADE PYELOGRAM AND INSERTION STENT URETERAL;  Surgeon: Cristino Dumont MD;  [facility];  Service: Urology   • WRIST SURGERY         Family History   Problem Relation Age of Onset   • Hypertension Mother    • Hypertension Sister    • Alcohol abuse Brother    • Cirrhosis Brother    • Diabetes Father    • Other Brother         Heart transplant in his 46s   • Lung cancer Sister    • Diabetes Brother    • Stroke Sister    • No Known Problems Daughter    • No Known Problems Maternal Grandmother    • No Known Problems Maternal Grandfather    • No Known Problems Paternal Grandmother    • No Known Problems Paternal Grandfather    • No Known Problems Sister      I have reviewed and agree with the history as documented. E-Cigarette/Vaping   • E-Cigarette Use Never User      E-Cigarette/Vaping Substances     Social History     Tobacco Use   • Smoking status: Former     Packs/day: 0.25     Types: Cigarettes     Start date: 5     Quit date:      Years since quittin.4   • Smokeless tobacco: Never   • Tobacco comments:     Started smoking at age 25; currently smoking <1ppd. Vaping Use   • Vaping Use: Never used   Substance Use Topics   • Alcohol use: Not Currently   • Drug use: Never       Review of Systems   Constitutional: Positive for fatigue.  Negative for activity change, appetite change, chills, diaphoresis, fever and unexpected weight change. HENT: Negative. Eyes: Negative. Respiratory: Negative. Cardiovascular: Negative. Gastrointestinal: Negative. Endocrine: Negative. Genitourinary: Negative. Musculoskeletal: Negative. Skin: Negative. Allergic/Immunologic: Negative. Neurological: Negative. Hematological: Negative. Psychiatric/Behavioral: Negative. Physical Exam  Physical Exam  Vitals and nursing note reviewed. Constitutional:       General: She is not in acute distress. Appearance: She is ill-appearing. She is not toxic-appearing or diaphoretic. Comments: avss- intermitently hypotensive in er-- pale appearing-- pulse ox 94 % on r- interpretation is low- normal    HENT:      Head: Normocephalic and atraumatic. Nose: Nose normal.      Mouth/Throat:      Mouth: Mucous membranes are moist.   Eyes:      General: No scleral icterus. Right eye: No discharge. Left eye: No discharge. Extraocular Movements: Extraocular movements intact. Pupils: Pupils are equal, round, and reactive to light. Comments: Mm pale   Neck:      Vascular: No carotid bruit. Comments: No pmt c/t/l/s spine   Cardiovascular:      Rate and Rhythm: Normal rate and regular rhythm. Pulses: Normal pulses. Heart sounds: Murmur heard. No friction rub. No gallop. Pulmonary:      Effort: Pulmonary effort is normal. No respiratory distress. Breath sounds: Normal breath sounds. No stridor. No wheezing, rhonchi or rales. Chest:      Chest wall: No tenderness. Abdominal:      General: Bowel sounds are normal. There is no distension. Palpations: Abdomen is soft. There is no mass. Tenderness: There is no abdominal tenderness. There is no right CVA tenderness, left CVA tenderness, guarding or rebound. Hernia: No hernia is present.       Comments: Soft nt/nd- no hsm - no cva tenderness- no acites- no peritoneal signs-    Musculoskeletal:         General: No swelling, tenderness, deformity or signs of injury. Normal range of motion. Cervical back: Normal range of motion and neck supple. No rigidity or tenderness. Right lower leg: Edema present. Left lower leg: Edema present. Comments: Trace ble pretibial edema- nt- no asym/ erythema- equal bilateral radial/dp    Lymphadenopathy:      Cervical: No cervical adenopathy. Skin:     Capillary Refill: Capillary refill takes less than 2 seconds. Coloration: Skin is pale. Skin is not jaundiced. Findings: No bruising, erythema, lesion or rash. Neurological:      General: No focal deficit present. Mental Status: She is alert and oriented to person, place, and time. Mental status is at baseline. Cranial Nerves: No cranial nerve deficit. Sensory: No sensory deficit. Motor: No weakness.       Comments: Non focal neuro exam    Psychiatric:         Mood and Affect: Mood normal.         Behavior: Behavior normal.         Vital Signs  ED Triage Vitals   Temperature Pulse Respirations Blood Pressure SpO2   06/29/23 1326 06/29/23 1324 06/29/23 1324 06/29/23 1324 06/29/23 1324   97.9 °F (36.6 °C) 76 16 103/54 100 %      Temp Source Heart Rate Source Patient Position - Orthostatic VS BP Location FiO2 (%)   06/29/23 1326 06/29/23 1324 -- -- --   Oral Monitor         Pain Score       06/29/23 1324       10 - Worst Possible Pain           Vitals:    06/29/23 1342 06/29/23 1415 06/29/23 1445 06/29/23 1459   BP: (!) 82/41 107/53 (!) 97/45 (!) 77/41   Pulse: 60 62 63          Visual Acuity      ED Medications  Medications - No data to display    Diagnostic Studies  Results Reviewed     Procedure Component Value Units Date/Time    UA w Reflex to Microscopic w Reflex to Culture [[id number]]     Lab Status: No result Specimen: Urine     HS Troponin I 2hr [[id number]]     Lab Status: No result Specimen: Blood     HS Troponin 0hr (reflex protocol) [[id number]]  (Normal) Collected: 06/29/23 1333    Lab Status: Final result Specimen: Blood from Arm, Left Updated: 06/29/23 1411     hs TnI 0hr 12 ng/L     CBC and differential [[id number]]  (Abnormal) Collected: 06/29/23 1333    Lab Status: Final result Specimen: Blood from Arm, Left Updated: 06/29/23 1410     WBC 8.39 Thousand/uL      RBC 2.10 Million/uL      Hemoglobin 6.5 g/dL      Hematocrit 20.9 %       fL      MCH 31.0 pg      MCHC 31.1 g/dL      RDW 15.0 %      MPV 10.7 fL      Platelets 241 Thousands/uL      nRBC 1 /100 WBCs      Neutrophils Relative 67 %      Immat GRANS % 1 %      Lymphocytes Relative 20 %      Monocytes Relative 7 %      Eosinophils Relative 4 %      Basophils Relative 1 %      Neutrophils Absolute 5.62 Thousands/µL      Immature Grans Absolute 0.10 Thousand/uL      Lymphocytes Absolute 1.69 Thousands/µL      Monocytes Absolute 0.60 Thousand/µL      Eosinophils Absolute 0.32 Thousand/µL      Basophils Absolute 0.06 Thousands/µL     Narrative: This is an appended report. These results have been appended to a previously verified report.     Comprehensive metabolic panel [[id number]]  (Abnormal) Collected: 06/29/23 1333    Lab Status: Final result Specimen: Blood from Arm, Left Updated: 06/29/23 1404     Sodium 136 mmol/L      Potassium 4.2 mmol/L      Chloride 98 mmol/L      CO2 29 mmol/L      ANION GAP 9 mmol/L      BUN 61 mg/dL      Creatinine 1.66 mg/dL      Glucose 238 mg/dL      Calcium 8.4 mg/dL      AST 11 U/L      ALT 10 U/L      Alkaline Phosphatase 30 U/L      Total Protein 5.9 g/dL      Albumin 3.6 g/dL      Total Bilirubin 0.34 mg/dL      eGFR 29 ml/min/1.73sq m     Narrative:      [place] guidelines for Chronic Kidney Disease (CKD):   •  Stage 1 with normal or high GFR (GFR > 90 mL/min/1.73 square meters)  •  Stage 2 Mild CKD (GFR = 60-89 mL/min/1.73 square meters)  •  Stage 3A Moderate CKD (GFR = 45-59 mL/min/1.73 square meters)  •  Stage 3B Moderate CKD (GFR = 30-44 mL/min/1.73 square meters)  •  Stage 4 Severe CKD (GFR = 15-29 mL/min/1.73 square meters)  •  Stage 5 End Stage CKD (GFR <15 mL/min/1.73 square meters)  Note: GFR calculation is accurate only with a steady state creatinine                 No orders to display              Procedures  Procedures         ED Course  ED Course as of 07/05/23 2241   Thu Jun 29, 2023   1512 -- er md medical decision making note-  discussed with ptgy current gi bleede- on aPIXIBAN/ASA-- WITH LOW BP AND LOW HB--  PT AWARE OF RISKS OF REVERSAL- CLOTTING-- INFORMED THAT CVA RISK FOR AFIB IS OVER 1 YR-- AND NOT OVER SEVERAL DAYS- PT UNDERSTANDS THAT THERE IS A ANDIE OF CLOTTING BUT RISK IS SMALL  AND AGREES WITH REVEWSAL OF DOAC AND ASA   1514 ER MD MEDICAL DECISION MAKING NOTE- PT C/O 2 DAYS OF MID BACK PAIN- SEE ATTRIBUTES THIS TO HER IM NOMAN IN BACK-- MILD AND WORSE WITH MOVEMENTS -- NOT ABRUPT ONSET -MIGRATORY - NORMAL/EQUAL BUE PULSE OX PLETH   % ON RA WITH 3 PERFUSION DOTS -- 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     PDMP Review       Value Time User    PDMP Reviewed  Yes 8/26/2022  4:40 PM Derrell Montgomery, [address] [place] [address]          ED Provider  Electronically Signed by           Rai Chavez MD  07/05/23 9285

## 2023-06-30 LAB
ABO GROUP BLD BPU: NORMAL
ABO GROUP BLD BPU: NORMAL
ALBUMIN SERPL BCP-MCNC: 3.5 G/DL (ref 3.5–5)
ALP SERPL-CCNC: 27 U/L (ref 34–104)
ALT SERPL W P-5'-P-CCNC: 9 U/L (ref 7–52)
ANION GAP SERPL CALCULATED.3IONS-SCNC: 8 MMOL/L
AST SERPL W P-5'-P-CCNC: 11 U/L (ref 13–39)
BASOPHILS # BLD AUTO: 0.05 THOUSANDS/ÂΜL (ref 0–0.1)
BASOPHILS NFR BLD AUTO: 1 % (ref 0–1)
BILIRUB SERPL-MCNC: 0.56 MG/DL (ref 0.2–1)
BILIRUB UR QL STRIP: NEGATIVE
BPU ID: NORMAL
BPU ID: NORMAL
BUN SERPL-MCNC: 55 MG/DL (ref 5–25)
CALCIUM SERPL-MCNC: 8.3 MG/DL (ref 8.4–10.2)
CHLORIDE SERPL-SCNC: 104 MMOL/L (ref 96–108)
CLARITY UR: CLEAR
CO2 SERPL-SCNC: 28 MMOL/L (ref 21–32)
COLOR UR: COLORLESS
CREAT SERPL-MCNC: 1.54 MG/DL (ref 0.6–1.3)
CROSSMATCH: NORMAL
CROSSMATCH: NORMAL
EOSINOPHIL # BLD AUTO: 0.4 THOUSAND/ÂΜL (ref 0–0.61)
EOSINOPHIL NFR BLD AUTO: 5 % (ref 0–6)
ERYTHROCYTE [DISTWIDTH] IN BLOOD BY AUTOMATED COUNT: 15 % (ref 11.6–15.1)
GFR SERPL CREATININE-BSD FRML MDRD: 32 ML/MIN/1.73SQ M
GLUCOSE SERPL-MCNC: 163 MG/DL (ref 65–140)
GLUCOSE SERPL-MCNC: 166 MG/DL (ref 65–140)
GLUCOSE SERPL-MCNC: 175 MG/DL (ref 65–140)
GLUCOSE SERPL-MCNC: 181 MG/DL (ref 65–140)
GLUCOSE SERPL-MCNC: 213 MG/DL (ref 65–140)
GLUCOSE UR STRIP-MCNC: NEGATIVE MG/DL
HCT VFR BLD AUTO: 28.7 % (ref 34.8–46.1)
HCT VFR BLD AUTO: 30.6 % (ref 34.8–46.1)
HCT VFR BLD AUTO: 31.7 % (ref 34.8–46.1)
HGB BLD-MCNC: 10 G/DL (ref 11.5–15.4)
HGB BLD-MCNC: 10.3 G/DL (ref 11.5–15.4)
HGB BLD-MCNC: 9.4 G/DL (ref 11.5–15.4)
HGB UR QL STRIP.AUTO: NEGATIVE
IMM GRANULOCYTES # BLD AUTO: 0.06 THOUSAND/UL (ref 0–0.2)
IMM GRANULOCYTES NFR BLD AUTO: 1 % (ref 0–2)
KETONES UR STRIP-MCNC: NEGATIVE MG/DL
LEUKOCYTE ESTERASE UR QL STRIP: NEGATIVE
LYMPHOCYTES # BLD AUTO: 2.07 THOUSANDS/ÂΜL (ref 0.6–4.47)
LYMPHOCYTES NFR BLD AUTO: 25 % (ref 14–44)
MAGNESIUM SERPL-MCNC: 2.4 MG/DL (ref 1.9–2.7)
MCH RBC QN AUTO: 31.8 PG (ref 26.8–34.3)
MCHC RBC AUTO-ENTMCNC: 32.8 G/DL (ref 31.4–37.4)
MCV RBC AUTO: 97 FL (ref 82–98)
MONOCYTES # BLD AUTO: 0.6 THOUSAND/ÂΜL (ref 0.17–1.22)
MONOCYTES NFR BLD AUTO: 7 % (ref 4–12)
NEUTROPHILS # BLD AUTO: 5.1 THOUSANDS/ÂΜL (ref 1.85–7.62)
NEUTS SEG NFR BLD AUTO: 61 % (ref 43–75)
NITRITE UR QL STRIP: NEGATIVE
NRBC BLD AUTO-RTO: 1 /100 WBCS
PH UR STRIP.AUTO: 6 [PH]
PLATELET # BLD AUTO: 154 THOUSANDS/UL (ref 149–390)
PMV BLD AUTO: 10.4 FL (ref 8.9–12.7)
POTASSIUM SERPL-SCNC: 3.6 MMOL/L (ref 3.5–5.3)
PROT SERPL-MCNC: 5.6 G/DL (ref 6.4–8.4)
PROT UR STRIP-MCNC: NEGATIVE MG/DL
RBC # BLD AUTO: 2.96 MILLION/UL (ref 3.81–5.12)
SODIUM SERPL-SCNC: 140 MMOL/L (ref 135–147)
SP GR UR STRIP.AUTO: 1.01 (ref 1–1.03)
UNIT DISPENSE STATUS: NORMAL
UNIT DISPENSE STATUS: NORMAL
UNIT PRODUCT CODE: NORMAL
UNIT PRODUCT CODE: NORMAL
UNIT PRODUCT VOLUME: 350 ML
UNIT PRODUCT VOLUME: 350 ML
UNIT RH: NORMAL
UNIT RH: NORMAL
UROBILINOGEN UR STRIP-ACNC: <2 MG/DL
WBC # BLD AUTO: 8.28 THOUSAND/UL (ref 4.31–10.16)

## 2023-06-30 PROCEDURE — 85014 HEMATOCRIT: CPT

## 2023-06-30 PROCEDURE — 80053 COMPREHEN METABOLIC PANEL: CPT

## 2023-06-30 PROCEDURE — 0DJD8ZZ INSPECTION OF LOWER INTESTINAL TRACT, VIA NATURAL OR ARTIFICIAL OPENING ENDOSCOPIC: ICD-10-PCS | Performed by: HOSPITALIST

## 2023-06-30 PROCEDURE — 85018 HEMOGLOBIN: CPT

## 2023-06-30 PROCEDURE — 81003 URINALYSIS AUTO W/O SCOPE: CPT

## 2023-06-30 PROCEDURE — 99221 1ST HOSP IP/OBS SF/LOW 40: CPT | Performed by: STUDENT IN AN ORGANIZED HEALTH CARE EDUCATION/TRAINING PROGRAM

## 2023-06-30 PROCEDURE — C9113 INJ PANTOPRAZOLE SODIUM, VIA: HCPCS

## 2023-06-30 PROCEDURE — 99232 SBSQ HOSP IP/OBS MODERATE 35: CPT | Performed by: HOSPITALIST

## 2023-06-30 PROCEDURE — 0DB78ZX EXCISION OF STOMACH, PYLORUS, VIA NATURAL OR ARTIFICIAL OPENING ENDOSCOPIC, DIAGNOSTIC: ICD-10-PCS | Performed by: HOSPITALIST

## 2023-06-30 PROCEDURE — 0DB98ZX EXCISION OF DUODENUM, VIA NATURAL OR ARTIFICIAL OPENING ENDOSCOPIC, DIAGNOSTIC: ICD-10-PCS | Performed by: HOSPITALIST

## 2023-06-30 PROCEDURE — C9113 INJ PANTOPRAZOLE SODIUM, VIA: HCPCS | Performed by: PHYSICIAN ASSISTANT

## 2023-06-30 PROCEDURE — 85025 COMPLETE CBC W/AUTO DIFF WBC: CPT

## 2023-06-30 PROCEDURE — 83735 ASSAY OF MAGNESIUM: CPT

## 2023-06-30 PROCEDURE — 82948 REAGENT STRIP/BLOOD GLUCOSE: CPT

## 2023-06-30 PROCEDURE — 99223 1ST HOSP IP/OBS HIGH 75: CPT | Performed by: INTERNAL MEDICINE

## 2023-06-30 PROCEDURE — 87081 CULTURE SCREEN ONLY: CPT

## 2023-06-30 RX ORDER — AMOXICILLIN 250 MG
1 CAPSULE ORAL
Status: DISCONTINUED | OUTPATIENT
Start: 2023-06-30 | End: 2023-07-04 | Stop reason: HOSPADM

## 2023-06-30 RX ORDER — PANTOPRAZOLE SODIUM 40 MG/1
40 TABLET, DELAYED RELEASE ORAL
Status: DISCONTINUED | OUTPATIENT
Start: 2023-06-30 | End: 2023-07-04 | Stop reason: HOSPADM

## 2023-06-30 RX ORDER — PANTOPRAZOLE SODIUM 40 MG/10ML
40 INJECTION, POWDER, LYOPHILIZED, FOR SOLUTION INTRAVENOUS EVERY 12 HOURS SCHEDULED
Status: DISCONTINUED | OUTPATIENT
Start: 2023-06-30 | End: 2023-06-30

## 2023-06-30 RX ORDER — POLYETHYLENE GLYCOL 3350 17 G/17G
17 POWDER, FOR SOLUTION ORAL 2 TIMES DAILY
Status: DISCONTINUED | OUTPATIENT
Start: 2023-06-30 | End: 2023-07-04 | Stop reason: HOSPADM

## 2023-06-30 RX ADMIN — INSULIN LISPRO 1 UNITS: 100 INJECTION, SOLUTION INTRAVENOUS; SUBCUTANEOUS at 09:25

## 2023-06-30 RX ADMIN — LEVOTHYROXINE SODIUM 75 MCG: 75 TABLET ORAL at 05:12

## 2023-06-30 RX ADMIN — ATORVASTATIN CALCIUM 40 MG: 40 TABLET, FILM COATED ORAL at 17:47

## 2023-06-30 RX ADMIN — PANTOPRAZOLE SODIUM 8 MG/HR: 40 INJECTION, POWDER, FOR SOLUTION INTRAVENOUS at 03:19

## 2023-06-30 RX ADMIN — BRIMONIDINE TARTRATE 1 DROP: 2 SOLUTION/ DROPS OPHTHALMIC at 20:58

## 2023-06-30 RX ADMIN — INSULIN LISPRO 2 UNITS: 100 INJECTION, SOLUTION INTRAVENOUS; SUBCUTANEOUS at 11:36

## 2023-06-30 RX ADMIN — INSULIN LISPRO 1 UNITS: 100 INJECTION, SOLUTION INTRAVENOUS; SUBCUTANEOUS at 21:02

## 2023-06-30 RX ADMIN — SENNOSIDES AND DOCUSATE SODIUM 1 TABLET: 50; 8.6 TABLET ORAL at 21:01

## 2023-06-30 RX ADMIN — POLYETHYLENE GLYCOL 3350 17 G: 17 POWDER, FOR SOLUTION ORAL at 14:07

## 2023-06-30 RX ADMIN — INSULIN LISPRO 1 UNITS: 100 INJECTION, SOLUTION INTRAVENOUS; SUBCUTANEOUS at 17:47

## 2023-06-30 RX ADMIN — BRIMONIDINE TARTRATE 1 DROP: 2 SOLUTION/ DROPS OPHTHALMIC at 09:28

## 2023-06-30 RX ADMIN — PANTOPRAZOLE SODIUM 40 MG: 40 INJECTION, POWDER, FOR SOLUTION INTRAVENOUS at 10:34

## 2023-06-30 RX ADMIN — PANTOPRAZOLE SODIUM 40 MG: 40 TABLET, DELAYED RELEASE ORAL at 20:57

## 2023-06-30 RX ADMIN — Medication 6 MG: at 21:01

## 2023-06-30 RX ADMIN — ACETAMINOPHEN 650 MG: 325 TABLET ORAL at 20:58

## 2023-06-30 RX ADMIN — TIMOLOL MALEATE 1 DROP: 5 SOLUTION/ DROPS OPHTHALMIC at 17:48

## 2023-06-30 RX ADMIN — Medication 400 MG: at 17:47

## 2023-06-30 RX ADMIN — TIMOLOL MALEATE 1 DROP: 5 SOLUTION/ DROPS OPHTHALMIC at 09:28

## 2023-06-30 RX ADMIN — Medication 400 MG: at 09:23

## 2023-06-30 NOTE — CONSULTS
Consultation - Cardiology Team One  Darlene Cleary 69 y.o. female MRN: [id number]  Unit/Bed#: S -31 Encounter: [id number]    Inpatient consult to Cardiology  Consult performed by: Uvaldo Conway PA-C  Consult ordered by: Kady Pritchett MD          Physician Requesting Consult: Lucero Avendano MD  Reason for Consult / Principal Problem: Antiplatelet, anticoagulation recommendations    Assessment:    1. Acute blood loss anemia: Baseline hemoglobin 9-10. Presented with worsening fatigue and pallor. Hemoglobin 6.5 on admission with elevated BUN and positive FOBT concerning for upper GI bleed. Anticoagulation with Eliquis was just initiated on 6/22 with continuation of aspirin. Plavix was discontinued at that time. All antiplatelet/anticoagulation is currently on hold. · s/p 2 units PRBC with improvement in hemoglobin to 10.3  · GI consulted  2. Chronic HFrEF: Volume status appears stable on Lasix 40 mg daily. 3.  Chronic atrial fibrillation/flutter: Maintained on carvedilol 12.5 mg BID. · [id number]: Was started on Eliquis 5 mg BID at office visit 6/22. Now on hold in the setting of ABLA. 4.  Cardiomyopathy: Likely ischemic with EF 36%. Due to aortic dissection medical management was pursued. Maintained on carvedilol, losartan, Isordil and hydralazine at baseline. 5.  CAD: s/p CABG x3 in 2015 (LIMA-LAD, SVG-OM1, SVG-PDA). Maintained on aspirin, beta-blocker and statin at baseline. Currently on hold in the setting of ABLA. 6.  Severe aortic root dilatation with dissection 10/2022: Not a surgical candidate due to elevated risk of redo sternotomy therefore medically managing with systolic BP goal of < 723. Repeat CTA ordered at recent office visit for evaluation of progression. 7.  SSS s/p MDT DC PPM implanted 6/2020: (MRI conditional)   98%, 100% AF burden, normal device function on interrogation 6/8/2023.  8.  Essential hypertension: Average BP 95/46.   Home medication regimen of carvedilol 12.5 mg BID, hydralazine 25 mg TID, Isordil 20 mg TID, losartan 25 mg daily on hold due to hypotension. 9.  Hyperlipidemia: Lipid profile 7/2022 , TG 97, HDL 60, LDL 32 on atorvastatin 40 mg daily. 10.  Type II DM: Hemoglobin A1c 7.83/23. Management per primary team.      Plan/Recommendations:  · Continue to hold anticoagulation and antiplatelet until cleared by GI for re-initiation  · Once cleared by GI, discussed reinitiating Eliquis but patient refused to try [facility] again. She understands the risk of stroke but prefers to stick with aspirin and Plavix which did not cause any issue for her  · Once deemed safe would restart aspirin 81 mg and Plavix 75 mg daily  · Continue to hold antihypertensive medication until blood pressures improve and then can slowly re-initiate with goal SBP of <140  · Continue statin  · Can consider doing the repeat CTA during this admission to evaluate dissection progression  · Please await attending attestation for final recommendations  _________________________________________________________________    CC: Fatigue      History of Present Illness   HPI: Blanca Reyes is a 69y.o. year old female who has chronic HFrEF, cardiomyopathy, severe aortic root dilatation with dissection 10/2022, CAD s/p CABG x3 in 2015 (LIMA-LAD, SVG-OM1, SVG-PDA), SSS s/p MDT DC PPM implanted 6/2020, chronic AF/flutter, essential hypertension, hyperlipidemia, type II DM  who follows with cardiologist Dr. Heide Root. Patient was seen in the office on 6/22/2023 where patient appeared stable from a cardiac standpoint. Plavix was discontinued and Eliquis 5 mg BID was initiated. CTA of the thoracic aorta was ordered to evaluate dissection progression. Patient presented to the emergency room at [address] [place] [address] on/29/23 via EMS from [facility] with generalized fatigue. Patient was found to be pale with low blood pressure in the dining gutierrez.   On arrival to the ED patient's BP was 103/52 with oxygen saturation 100% on room air. EKG revealed a ventricular paced rhythm. CXR was negative for acute cardiopulmonary disease. Labs revealed BUN 61, creatinine 1.66, negative troponin x2, hemoglobin 6.5, hematocrit 20. FOBT was heme positive. He came hypotensive shortly after arrival to the ED. Patient was admitted with suspicion of acute anemia from upper GI bleed. 2 units PRBCs was ordered and GI was consulted. Cardiology has been consulted for anticoagulation/antiplatelet recommendations. Medication regimen includes Eliquis 5 mg BID, aspirin 81 mg daily, atorvastatin 40 mg daily, Lasix 40 mg daily, hydralazine 25 mg TID, Isordil 20 mg TID, losartan 25 mg daily, carvedilol 12.5 mg BID. Resting bed during consultation denies any chest pain, palpitations, lower extremity edema, orthopnea or PND. She felt somewhat short of breath with exertion and fatigue over the last few days that is currently improved after transfusion. Device interrogation 6/8/2023: MDT DC PPM (MRI conditional).  98%, 100% AF burden, normal device function. Echocardiogram 9/29/2022: EF 36% with global hypokinesis and regional variation, moderately reduced RV function, mild LA dilatation, mild-moderate AI, moderate MR, moderate TR. severe aortic root dilatation at 5.6 cm. EKG reviewed personally: 6/29/2023-ventricular paced rhythm at 62 bpm with underlying atrial flutter. No significant change when compared to the prior EKG from 11/15/2022. Telemetry reviewed personally: Not on telemetry      Review of Systems   Constitutional: Positive for malaise/fatigue. Negative for chills. Cardiovascular: Negative for chest pain, dyspnea on exertion, leg swelling, near-syncope, orthopnea, palpitations, paroxysmal nocturnal dyspnea and syncope. Respiratory: Negative. Negative for cough, shortness of breath and wheezing. Endocrine: Negative. Hematologic/Lymphatic: Negative. Skin: Negative. Musculoskeletal: Negative. Gastrointestinal: Negative. Negative for diarrhea, nausea and vomiting. Neurological: Negative for dizziness, light-headedness and weakness. Psychiatric/Behavioral: Negative. Negative for altered mental status. All other systems reviewed and are negative.     Historical Information   Past Medical History:   Diagnosis Date   • Atypical chest pain    • GERD (gastroesophageal reflux disease)    • Hyperlipidemia    • Hypertension    • Kidney stone    • Myocardial infarction [facility])     2015    • NSTEMI (non-ST elevated myocardial infarction) ([address])     Last Assessed: 9/24/2015   • Type 2 diabetes mellitus, without long-term current use of insulin ([address]) 9/12/2013     Past Surgical History:   Procedure Laterality Date   • A-V CARDIAC PACEMAKER INSERTION     • ANKLE SURGERY     • BACK SURGERY  01/2011    L4 and L5 laminectomy and fusion    • BLADDER SURGERY     • CORONARY ARTERY BYPASS GRAFT  09/02/2015    CABGx3 with LIMA to LAD, SVG to PDA, SVG to OM-1   • EXAMINATION UNDER ANESTHESIA N/A 7/11/2020    Procedure: EXAM UNDER ANESTHESIA (EUA)  EXCISION OF POSTERIOR VAGINAL FOREIGN BODY;  Surgeon: Melissa Robertson MD;  [facility];  Service: Gynecology   • FL RETROGRADE PYELOGRAM  7/11/2020   • FL RETROGRADE PYELOGRAM  8/7/2020   • HYSTERECTOMY     • AL CYSTO BLADDER W/URETERAL CATHETERIZATION Right 7/11/2020    Procedure: CYSTOSCOPY RETROGRADE PYELOGRAM WITH INSERTION STENT URETERAL, bladder biopsy with fulguration;  Surgeon: Ashlee Candelario MD;  [facility];  Service: Urology   • AL CYSTO/URETERO W/LITHOTRIPSY &INDWELL STENT INSRT Right 8/7/2020    Procedure: CYSTOSCOPY URETEROSCOPY WITH LITHOTRIPSY HOLMIUM LASER, RETROGRADE PYELOGRAM AND INSERTION STENT URETERAL;  Surgeon: Ashlee Candelario MD;  [facility];  Service: Urology   • WRIST SURGERY       Social History     Substance and Sexual Activity   Alcohol Use Not Currently     Social History Substance and Sexual Activity   Drug Use Never     Social History     Tobacco Use   Smoking Status Former   • Packs/day: 0.25   • Types: Cigarettes   • Start date: 5   • Quit date:    • Years since quittin.4   Smokeless Tobacco Never   Tobacco Comments    Started smoking at age 25; currently smoking <1ppd.      Family History:   Family History   Problem Relation Age of Onset   • Hypertension Mother    • Hypertension Sister    • Alcohol abuse Brother    • Cirrhosis Brother    • Diabetes Father    • Other Brother         Heart transplant in his 46s   • Lung cancer Sister    • Diabetes Brother    • Stroke Sister    • No Known Problems Daughter    • No Known Problems Maternal Grandmother    • No Known Problems Maternal Grandfather    • No Known Problems Paternal Grandmother    • No Known Problems Paternal Grandfather    • No Known Problems Sister        Meds/Allergies   all current active meds have been reviewed, current meds:   Current Facility-Administered Medications   Medication Dose Route Frequency   • acetaminophen (TYLENOL) tablet 650 mg  650 mg Oral Q4H PRN   • atorvastatin (LIPITOR) tablet 40 mg  40 mg Oral Daily With Dinner   • brimonidine tartrate 0.2 % ophthalmic solution 1 drop  1 drop Both Eyes BID    And   • timolol (TIMOPTIC) 0.5 % ophthalmic solution 1 drop  1 drop Both Eyes BID   • insulin lispro (HumaLOG) 100 units/mL subcutaneous injection 1-5 Units  1-5 Units Subcutaneous TID AC   • insulin lispro (HumaLOG) 100 units/mL subcutaneous injection 1-5 Units  1-5 Units Subcutaneous HS   • levothyroxine tablet 75 mcg  75 mcg Oral Once per day on    • magnesium Oxide (MAG-OX) tablet 400 mg  400 mg Oral BID   • melatonin tablet 6 mg  6 mg Oral HS   • pantoprazole (PROTONIX) EC tablet 40 mg  40 mg Oral BID AC   • [START ON 2023] polyethylene glycol (GOLYTELY) bowel prep 4,000 mL  4,000 mL Oral Once   • polyethylene glycol (MIRALAX) packet 17 g  17 g Oral BID   • senna-docusate sodium (SENOKOT S) 8.6-50 mg per tablet 1 tablet  1 tablet Oral HS    and PTA meds:   Prior to Admission Medications   Prescriptions Last Dose Informant Patient Reported? Taking? Blood Glucose Monitoring Suppl (ONE TOUCH ULTRA 2) w/Device KIT 6/29/2023 Care Giver Yes Yes   Fleet Oil enema Unknown Care Giver Yes No   Multiple Vitamins-Minerals (PreserVision AREDS) TABS Unknown Care Giver Yes No   OneTouch Ultra test strip 6/29/2023 Care Giver Yes Yes   acetaminophen (TYLENOL) 325 mg tablet Unknown Care Giver No No   Sig: Take 2 tablets (650 mg total) by mouth every 4 (four) hours as needed for mild pain   apixaban (Eliquis) 5 mg 6/29/2023  No Yes   Sig: Take 1 tablet (5 mg total) by mouth 2 (two) times a day   aspirin 81 mg chewable tablet 6/29/2023 Care Giver No Yes   Sig: Chew 4 tablets (324 mg total) daily Do not start before October 11, 2022.    atorvastatin (LIPITOR) 40 mg tablet 6/28/2023 Care Giver No Yes   Sig: Take 1 tablet (40 mg total) by mouth daily   brimonidine-timolol (COMBIGAN) 0.2-0.5 % 6/29/2023 Care Giver Yes Yes   carvedilol (COREG) 12.5 mg tablet 6/29/2023  Yes Yes   Sig: Take 12.5 mg by mouth 2 (two) times a day with meals   ferrous sulfate 325 (65 Fe) mg tablet 6/29/2023 Care Giver No Yes   Sig: Take 1 tablet (325 mg total) by mouth every other day   furosemide (LASIX) 20 mg tablet 6/29/2023 Care Giver No Yes   Sig: Take 2 tablets (40 mg total) by mouth daily   glimepiride (AMARYL) 1 mg tablet 6/29/2023  Yes Yes   Sig: Take 1 mg by mouth daily with breakfast   hydrALAZINE (APRESOLINE) 25 mg tablet 6/29/2023 Care Giver No Yes   Sig: Take 1 tablet (25 mg total) by mouth every 8 (eight) hours   hydrOXYzine HCL (ATARAX) 25 mg tablet Past Week  Yes Yes   Sig: Take 1/2 tablet daily   isosorbide dinitrate (ISORDIL) 20 mg tablet 6/29/2023 Care Giver No Yes   Sig: Take 1 tablet (20 mg total) by mouth 3 (three) times daily after meals   levothyroxine 50 mcg tablet 6/28/2023 Care Giver Yes Yes   losartan (COZAAR) 25 mg tablet 2023 Care Giver No Yes   Sig: Take 1 tablet (25 mg total) by mouth daily   Patient taking differently: Take 50 mg by mouth daily   magnesium oxide (MAG-OX) 400 mg 2023 Care Giver No Yes   Sig: Take 1 tablet (400 mg total) by mouth 2 (two) times a day   melatonin 3 mg 2023 Care Giver No Yes   Sig: Take 2 tablets (6 mg total) by mouth daily at bedtime   pantoprazole (PROTONIX) 40 mg tablet 2023 Care Giver Yes Yes      Facility-Administered Medications: None          Allergies   Allergen Reactions   • Nickel Rash       Objective   Vitals: Blood pressure 124/52, pulse 69, temperature 97.7 °F (36.5 °C), resp. rate 17, height 4' 11" (1.499 m), weight 69.1 kg (152 lb 5.4 oz), SpO2 93 %, not currently breastfeeding.,     Body mass index is 30.77 kg/m². ,     Systolic (44CKA), UIQ:68 , Min:73 , VPO:871     Diastolic (17FQB), RK, Min:35, Max:58    Wt Readings from Last 3 Encounters:   23 69.1 kg (152 lb 5.4 oz)   23 67.5 kg (148 lb 12.8 oz)   23 61.7 kg (136 lb)      Lab Results   Component Value Date    CREATININE 1.54 (H) 2023    CREATININE 1.66 (H) 2023    CREATININE 1.21 11/15/2022             Intake/Output Summary (Last 24 hours) at 2023 1359  Last data filed at 2023 0601  Gross per 24 hour   Intake 1058.33 ml   Output 500 ml   Net 558.33 ml     Weight (last 2 days)     Date/Time Weight    23 1324 69.1 (152.34)        Invasive Devices     Peripheral Intravenous Line  Duration           Peripheral IV 23 Dorsal (posterior); Right Forearm 1 day    Peripheral IV 23 Left Antecubital 1 day          Drain  Duration           Ureteral Drain/Stent Right ureter 6 Fr. 1084 days                  Physical Exam  Vitals and nursing note reviewed. Constitutional:       General: She is not in acute distress. Appearance: She is well-developed. She is obese. Comments: On RA in NAD   HENT:      Head: Normocephalic and atraumatic. Neck:      Vascular: No JVD. Cardiovascular:      Rate and Rhythm: Normal rate and regular rhythm. Heart sounds: Normal heart sounds. No murmur heard. No friction rub. Pulmonary:      Effort: Pulmonary effort is normal. No respiratory distress. Breath sounds: Normal breath sounds. No wheezing or rales. Abdominal:      General: Bowel sounds are normal. There is no distension. Palpations: Abdomen is soft. Tenderness: There is no abdominal tenderness. Musculoskeletal:         General: No tenderness. Normal range of motion. Cervical back: Normal range of motion and neck supple. Right lower leg: No edema. Left lower leg: No edema. Skin:     General: Skin is warm and dry. Coloration: Skin is pale. Findings: No erythema. Neurological:      Mental Status: She is alert and oriented to person, place, and time. Psychiatric:         Mood and Affect: Mood normal.         Behavior: Behavior normal.         Thought Content:  Thought content normal.         Judgment: Judgment normal.           LABORATORY RESULTS:      CBC with diff:   Results from last 7 days   Lab Units 06/30/23  1323 06/30/23  0449 06/29/23  1333   WBC Thousand/uL  --  8.28 8.39   HEMOGLOBIN g/dL 10.3* 9.4* 6.5*   HEMATOCRIT % 31.7* 28.7* 20.9*   MCV fL  --  97 100*   PLATELETS Thousands/uL  --  154 213   RBC Million/uL  --  2.96* 2.10*   MCH pg  --  31.8 31.0   MCHC g/dL  --  32.8 31.1*   RDW %  --  15.0 15.0   MPV fL  --  10.4 10.7   NRBC AUTO /100 WBCs  --  1 1       CMP:  Results from last 7 days   Lab Units 06/30/23  0449 06/29/23  1333   POTASSIUM mmol/L 3.6 4.2   CHLORIDE mmol/L 104 98   CO2 mmol/L 28 29   BUN mg/dL 55* 61*   CREATININE mg/dL 1.54* 1.66*   CALCIUM mg/dL 8.3* 8.4   AST U/L 11* 11*   ALT U/L 9 10   ALK PHOS U/L 27* 30*   EGFR ml/min/1.73sq m 32 29       BMP:  Results from last 7 days   Lab Units 06/30/23  0449 06/29/23  1333   POTASSIUM mmol/L 3.6 4.2   CHLORIDE mmol/L 104 98 CO2 mmol/L 28 29   BUN mg/dL 55* 61*   CREATININE mg/dL 1.54* 1.66*   CALCIUM mg/dL 8.3* 8.4          Lab Results   Component Value Date    NTBNP 3,961 (H) 2022    NTBNP 593 (H) 07/10/2020    NTBNP 702 (H) 2020            Results from last 7 days   Lab Units 23  0449   MAGNESIUM mg/dL 2.4     Lipid Profile:   Lab Results   Component Value Date    CHOL 164 2017    CHOL 154 2016    CHOL 163 2016     Lab Results   Component Value Date    HDL 60 2022    HDL 58 2022    HDL 58 09/15/2021     Lab Results   Component Value Date    LDLCALC 32 2022    LDLCALC 55 2022    LDLCALC 69 09/15/2021     Lab Results   Component Value Date    TRIG 97 2022    TRIG 150 2022    TRIG 139 09/15/2021         Cardiac testing:   Results for orders placed during the hospital encounter of 20    Echo complete with contrast if indicated    Narrative  [address] [place] [address]  [phone]    Transthoracic Echocardiogram  2D, M-mode, Doppler, and Color Doppler    Study date:  26-May-2020    Patient: Mercedes Samano  MR number: [id number]  Account number: [de-identified]  : 1945  Age: 77 years  Gender: Female  Status: Inpatient  Location: Bedside  Height: 61 in  Weight: 124.7 lb  BP: 142/ 58 mmHg    Indications: Acute MI. Diagnoses: I24.9 - Acute ischemic heart disease, unspecified    Sonographer:  Juana Peace RDCS  Referring Physician:  Stephania Fall MD  Group:  [facility]  Interpreting Physician:  Noel Bullock MD    SUMMARY    LEFT VENTRICLE:  Systolic function was normal. Ejection fraction was estimated to be 60 %. There were no regional wall motion abnormalities.   Wall thickness was at the upper limits of normal.  Features were consistent with a pseudonormal left ventricular filling pattern, with concomitant abnormal relaxation and increased filling pressure (grade 2 diastolic dysfunction). LEFT ATRIUM:  The atrium was mildly to moderately dilated. MITRAL VALVE:  There was mild annular calcification. There was mild regurgitation. AORTIC VALVE:  There was mild regurgitation. TRICUSPID VALVE:  There was mild regurgitation. AORTA:  The root exhibited mild dilatation. There was mild dilatation of the ascending aorta. HISTORY: PRIOR HISTORY: History of CABG x3. CAD. Hypertension. DM2. Ascending aortic aneurysm. Anxiety. GERD. Smoker. Atrial fibrillation. PROCEDURE: The procedure was performed at the bedside. This was a routine study. The transthoracic approach was used. The study included complete 2D imaging, M-mode, complete spectral Doppler, and color Doppler. The heart rate was 77 bpm,  at the start of the study. Echocardiographic views were limited due to high windows. This was a technically difficult study. LEFT VENTRICLE: Size was normal. Systolic function was normal. Ejection fraction was estimated to be 60 %. There were no regional wall motion abnormalities. Wall thickness was at the upper limits of normal. DOPPLER: Features were  consistent with a pseudonormal left ventricular filling pattern, with concomitant abnormal relaxation and increased filling pressure (grade 2 diastolic dysfunction). RIGHT VENTRICLE: The size was normal. Systolic function was normal. Wall thickness was normal.    LEFT ATRIUM: The atrium was mildly to moderately dilated. RIGHT ATRIUM: Size was normal.    MITRAL VALVE: There was mild annular calcification. Valve structure was normal. There was normal leaflet separation. DOPPLER: The transmitral velocity was within the normal range. There was no evidence for stenosis. There was mild  regurgitation. AORTIC VALVE: The valve was trileaflet. Leaflets exhibited normal thickness and normal cuspal separation. DOPPLER: Transaortic velocity was within the normal range. There was no evidence for stenosis.  There was mild regurgitation. TRICUSPID VALVE: The valve structure was normal. There was normal leaflet separation. DOPPLER: The transtricuspid velocity was within the normal range. There was no evidence for stenosis. There was mild regurgitation. PULMONIC VALVE: Leaflets exhibited normal thickness, no calcification, and normal cuspal separation. DOPPLER: The transpulmonic velocity was within the normal range. There was no significant regurgitation. PERICARDIUM: There was no pericardial effusion. The pericardium was normal in appearance. AORTA: The root exhibited mild dilatation. There was mild dilatation of the ascending aorta. SYSTEMIC VEINS: IVC: The inferior vena cava was normal in size. PULMONARY VEINS: DOPPLER: There was systolic blunting in the pulmonary vein(s). SYSTEM MEASUREMENT TABLES    2D  %FS: 30.49 %  AV Diam: 4 cm  EDV(Teich): 61.09 ml  EF(Cube): 66.42 %  EF(Teich): 58.72 %  ESV(Cube): 18.1 ml  ESV(Teich): 25.22 ml  IVSd: 1 cm  LA Area: 24.64 cm2  LA Diam: 3.12 cm  LVEDV MOD A4C: 66.08 ml  LVEF MOD A4C: 63.63 %  LVESV MOD A4C: 24.03 ml  LVIDd: 3.78 cm  LVIDs: 2.63 cm  LVLd A4C: 7.09 cm  LVLs A4C: 5.73 cm  LVPWd: 1 cm  RA Area: 11.79 cm2  RV Diam: 2.76 cm  SI(Cube): 23.1 ml/m2  SI(Teich): 23.14 ml/m2  SV MOD A4C: 42.05 ml  SV(Cube): 35.81 ml  SV(Teich): 35.87 ml    CW  TR MaxP.47 mmHg  TR Vmax: 2.62 m/s    MM  TAPSE: 1.61 cm    PW  E': 0.06 m/s  E/E': 12.03  MV A Marcos: 0.8 m/s  MV Dec Clark: 3.66 m/s2  MV DecT: 210.88 ms  MV E Marcos: 0.77 m/s  MV E/A Ratio: 0.96    [employer] Commission Accredited Echocardiography Laboratory    Prepared and electronically signed by    Feroz Paige MD  Signed 26-May-2020 11:14:41    No results found for this or any previous visit. No valid procedures specified. No results found for this or any previous visit. Imaging: I have personally reviewed pertinent reports.     XR chest 1 view portable    Result Date: 2023  Narrative: CHEST INDICATION:   admit. COMPARISON:  CXR 11/15/2022 and chest CT  10/4/2022. EXAM PERFORMED/VIEWS:  XR CHEST PORTABLE. FINDINGS: Mild cardiomegaly with left subclavian pacemaker leads in the right atrium and right ventricle. CABG. Lungs clear. No effusion or pneumothorax. Upper abdomen normal. Bones normal for age. Impression: No acute cardiopulmonary disease. Workstation performed: [id number]     Cardiac EP device report    Result Date: 6/8/2023  Narrative: MDT DC PPM - ACTIVE SYSTEM IS MRI CONDITIONAL CARELINK TRANSMISSION: BATTERY STATUS "10 YRS." AP 0%  98%. ALL AVAILABLE LEAD PARAMETERS WITHIN NORMAL LIMITS. 100% AF BURDEN; PT ON ASA & PLAVIX-NO ANTICOAGULATION DUE TO AORTIC DISSECTION. NORMAL DEVICE FUNCTION. NC           Counseling / Coordination of Care  Total floor / unit time spent today 45 minutes. Greater than 50% of total time was spent with the patient and / or family counseling and / or coordination of care. A description of the counseling / coordination of care: Review of history, current assessment, development of a plan. Code Status: Level 1 - Full Code    ** Please Note: Dragon 360 Dictation voice to text software may have been used in the creation of this document.  **

## 2023-06-30 NOTE — ASSESSMENT & PLAN NOTE
- Presents with progressive fatigue generalized body ache and shortness of breath for the past 2 days. -Was recently transitioned back to Eliquis by her cardiologist.  -Hemoglobin on admission was 6.5, Kcentra given to reverse effects of Eliquis. -BUN elevated to the 60s, indicative of recent bleeding.  -In the last 2 weeks she completed a Medrol Dosepak secondary to generalized itching.  -Although patient denies any abdominal pain, due to her vision impairment she is unable to confirm or deny any hematochezia or melena.  -In the setting of recent steroid use and Eliquis, patient may have a superficial ulcer which has been bleeding at a slower rate. HGB stable. EGD not indicated at this time. - 2 units of PRBCs transfused. -Monitor H&H Q12 then Q24H if remains stable  - monitor stool for signs of bleeding  -Monitor vitals provide fluid support for hypotension.  -Protonix transitioned to PO  -GI consulted and aware of patient, if any active bleeding noted notify GI on-call immediately.   - EGD and colonoscopy outpatient  - PT/OT  - non-ulcerogenic diet and bowel regimen ordered

## 2023-06-30 NOTE — PLAN OF CARE
Problem: Prexisting or High Potential for Compromised Skin Integrity  Goal: Skin integrity is maintained or improved  Description: INTERVENTIONS:  - Identify patients at risk for skin breakdown  - Assess and monitor skin integrity  - Assess and monitor nutrition and hydration status  - Monitor labs   - Assess for incontinence   - Turn and reposition patient  - Assist with mobility/ambulation  - Relieve pressure over bony prominences  - Avoid friction and shearing  - Provide appropriate hygiene as needed including keeping skin clean and dry  - Evaluate need for skin moisturizer/barrier cream  - Collaborate with interdisciplinary team   - Patient/family teaching  - Consider wound care consult   Outcome: Progressing     Problem: MOBILITY - ADULT  Goal: Maintain or return to baseline ADL function  Description: INTERVENTIONS:  -  Assess patient's ability to carry out ADLs; assess patient's baseline for ADL function and identify physical deficits which impact ability to perform ADLs (bathing, care of mouth/teeth, toileting, grooming, dressing, etc.)  - Assess/evaluate cause of self-care deficits   - Assess range of motion  - Assess patient's mobility; develop plan if impaired  - Assess patient's need for assistive devices and provide as appropriate  - Encourage maximum independence but intervene and supervise when necessary  - Involve family in performance of ADLs  - Assess for home care needs following discharge   - Consider OT consult to assist with ADL evaluation and planning for discharge  - Provide patient education as appropriate  Outcome: Progressing  Goal: Maintains/Returns to pre admission functional level  Description: INTERVENTIONS:  - Perform BMAT or MOVE assessment daily.   - Set and communicate daily mobility goal to care team and patient/family/caregiver. - Collaborate with rehabilitation services on mobility goals if consulted  - Perform Range of Motion 2 times a day.   - Reposition patient every 2 hours.  - Dangle patient 2 times a day  - Stand patient 2 times a day  - Ambulate patient 2 times a day  - Out of bed to chair 2 times a day   - Out of bed for meals 2 times a day  - Out of bed for toileting  - Record patient progress and toleration of activity level   Outcome: Progressing     Problem: PAIN - ADULT  Goal: Verbalizes/displays adequate comfort level or baseline comfort level  Description: Interventions:  - Encourage patient to monitor pain and request assistance  - Assess pain using appropriate pain scale  - Administer analgesics based on type and severity of pain and evaluate response  - Implement non-pharmacological measures as appropriate and evaluate response  - Consider cultural and social influences on pain and pain management  - Notify physician/advanced practitioner if interventions unsuccessful or patient reports new pain  Outcome: Progressing     Problem: INFECTION - ADULT  Goal: Absence or prevention of progression during hospitalization  Description: INTERVENTIONS:  - Assess and monitor for signs and symptoms of infection  - Monitor lab/diagnostic results  - Monitor all insertion sites, i.e. indwelling lines, tubes, and drains  - Monitor endotracheal if appropriate and nasal secretions for changes in amount and color  - [place] appropriate cooling/warming therapies per order  - Administer medications as ordered  - Instruct and encourage patient and family to use good hand hygiene technique  - Identify and instruct in appropriate isolation precautions for identified infection/condition  Outcome: Progressing  Goal: Absence of fever/infection during neutropenic period  Description: INTERVENTIONS:  - Monitor WBC    Outcome: Progressing     Problem: SAFETY ADULT  Goal: Maintain or return to baseline ADL function  Description: INTERVENTIONS:  -  Assess patient's ability to carry out ADLs; assess patient's baseline for ADL function and identify physical deficits which impact ability to perform ADLs (bathing, care of mouth/teeth, toileting, grooming, dressing, etc.)  - Assess/evaluate cause of self-care deficits   - Assess range of motion  - Assess patient's mobility; develop plan if impaired  - Assess patient's need for assistive devices and provide as appropriate  - Encourage maximum independence but intervene and supervise when necessary  - Involve family in performance of ADLs  - Assess for home care needs following discharge   - Consider OT consult to assist with ADL evaluation and planning for discharge  - Provide patient education as appropriate  Outcome: Progressing  Goal: Maintains/Returns to pre admission functional level  Description: INTERVENTIONS:  - Perform BMAT or MOVE assessment daily.   - Set and communicate daily mobility goal to care team and patient/family/caregiver. - Collaborate with rehabilitation services on mobility goals if consulted  - Perform Range of Motion 2 times a day. - Reposition patient every 2 hours.   - Dangle patient 2 times a day  - Stand patient 2 times a day  - Ambulate patient 2 times a day  - Out of bed to chair 2 times a day   - Out of bed for meals 2 times a day  - Out of bed for toileting  - Record patient progress and toleration of activity level   Outcome: Progressing  Goal: Patient will remain free of falls  Description: INTERVENTIONS:  - Educate patient/family on patient safety including physical limitations  - Instruct patient to call for assistance with activity   - Consult OT/PT to assist with strengthening/mobility   - Keep Call bell within reach  - Keep bed low and locked with side rails adjusted as appropriate  - Keep care items and personal belongings within reach  - Initiate and maintain comfort rounds  - Make Fall Risk Sign visible to staff  - Offer Toileting every 2 Hours, in advance of need  - Initiate/Maintain alarm  - Obtain necessary fall risk management equipment:   - Apply yellow socks and bracelet for high fall risk patients  - Consider moving patient to room near nurses station  Outcome: Progressing     Problem: DISCHARGE PLANNING  Goal: Discharge to home or other facility with appropriate resources  Description: INTERVENTIONS:  - Identify barriers to discharge w/patient and caregiver  - Arrange for needed discharge resources and transportation as appropriate  - Identify discharge learning needs (meds, wound care, etc.)  - Arrange for interpretive services to assist at discharge as needed  - Refer to Case Management Department for coordinating discharge planning if the patient needs post-hospital services based on physician/advanced practitioner order or complex needs related to functional status, cognitive ability, or social support system  Outcome: Progressing     Problem: Knowledge Deficit  Goal: Patient/family/caregiver demonstrates understanding of disease process, treatment plan, medications, and discharge instructions  Description: Complete learning assessment and assess knowledge base.   Interventions:  - Provide teaching at level of understanding  - Provide teaching via preferred learning methods  Outcome: Progressing

## 2023-06-30 NOTE — CONSULTS
Consultation - [address] [place] [address] [facility]  Nigel Martinez 69 y.o. female MRN: [id number]  Unit/Bed#: S -01 Encounter: [id number]        Inpatient consult to gastroenterology  Consult performed by: Claudetta Cowman, PA-C  Consult ordered by: Charisse Nguyen MD          Reason for Consult / Principal Problem:  Anemia, heme positive stool    HPI: Nigel Martinez is a 69y.o. year old female with history of CKD, CABGx3, atrial fibrillation with pacemaker placement and anticoagulated with Eliquis, ascending aortic aneurysm, coronary artery disease who presented to the emergency room yesterday afternoon via EMS with reports of worsening fatigue and low blood pressure at the nursing home. Reportedly she had been off her Eliquis for a few months and was restarted on this medication over the last week by cardiology. She tells me that over the last 3 days she had been having weakness in her legs and increased shortness of breath and dyspnea on exertion, which was substantially worse yesterday, prompting her to come to the ER. She was noted with heme positive stool and a hemoglobin of 6.5, compared with 9.2 in November. MCV of 100. Was transfused 2 units PRBCs and hemoglobin this morning appears to be 9.4. She does have history of a large ascending aortic aneurysm with aortic dissection in 10/2022. Her Eliquis was held until a cardiology appointment 1 week ago on 6/22, at which point this was resumed, and the Plavix she was previously on was discontinued. The patient tells me she has also been taking 325 mg aspirin daily until yesterday. Her last dose of Eliquis was yesterday morning. Patient says she is visually impaired and so cannot tell if she has been having dark-colored or bloody stools. She says her last bowel movement was 2 days ago. She denies any knowledge of any history of GI bleeding.   She denies any problems with acid reflux or heartburn recently, any difficulty or discomfort with swallowing, she says her appetite has been generally poor over the last week, though she does not think she has been losing weight unintentionally. Denies abdominal pain. Denies any significant problems with diarrhea or constipation. She tells me she has never had any endoscopic evaluations including EGD/colonoscopy, and she tells me she is not interested in having a colonoscopy. REVIEW OF SYSTEMS:    CONSTITUTIONAL: Denies any fever, chills, or rigors. Good appetite, and no recent weight loss. HEENT: No earache or tinnitus. Denies hearing loss or visual disturbances. CARDIOVASCULAR: No chest pain or palpitations. RESPIRATORY: Denies any cough, hemoptysis, shortness of breath or dyspnea on exertion. GASTROINTESTINAL: As noted in the History of Present Illness. GENITOURINARY: No problems with urination. Denies any hematuria or dysuria. NEUROLOGIC: No dizziness or vertigo, denies headaches. MUSCULOSKELETAL: Denies any muscle or joint pain. SKIN: Denies skin rashes or itching. ENDOCRINE: Denies excessive thirst. Denies intolerance to heat or cold. PSYCHOSOCIAL: Denies depression or anxiety. Denies any recent memory loss.        Historical Information   Past Medical History:   Diagnosis Date   • Atypical chest pain    • GERD (gastroesophageal reflux disease)    • Hyperlipidemia    • Hypertension    • Kidney stone    • Myocardial infarction [facility])     2015    • NSTEMI (non-ST elevated myocardial infarction) ([address])     Last Assessed: 9/24/2015   • Type 2 diabetes mellitus, without long-term current use of insulin ([address]) 9/12/2013     Past Surgical History:   Procedure Laterality Date   • A-V CARDIAC PACEMAKER INSERTION     • ANKLE SURGERY     • BACK SURGERY  01/2011    L4 and L5 laminectomy and fusion    • BLADDER SURGERY     • CORONARY ARTERY BYPASS GRAFT  09/02/2015    CABGx3 with LIMA to LAD, SVG to PDA, SVG to OM-1   • EXAMINATION UNDER ANESTHESIA N/A 7/11/2020    Procedure: EXAM UNDER ANESTHESIA (EUA)  EXCISION OF POSTERIOR VAGINAL FOREIGN BODY;  Surgeon: Mary Carmen Singh MD;  [facility];  Service: Gynecology   • FL RETROGRADE PYELOGRAM  2020   • FL RETROGRADE PYELOGRAM  2020   • HYSTERECTOMY     • IA CYSTO BLADDER W/URETERAL CATHETERIZATION Right 2020    Procedure: CYSTOSCOPY RETROGRADE PYELOGRAM WITH INSERTION STENT URETERAL, bladder biopsy with fulguration;  Surgeon: Stevie Godinez MD;  [facility];  Service: Urology   • IA CYSTO/URETERO W/LITHOTRIPSY &INDWELL STENT INSRT Right 2020    Procedure: CYSTOSCOPY URETEROSCOPY WITH LITHOTRIPSY HOLMIUM LASER, RETROGRADE PYELOGRAM AND INSERTION STENT URETERAL;  Surgeon: Stevie Godinez MD;  [facility];  Service: Urology   • WRIST SURGERY       Social History   Social History     Substance and Sexual Activity   Alcohol Use Not Currently     Social History     Substance and Sexual Activity   Drug Use Never     Social History     Tobacco Use   Smoking Status Former   • Packs/day: 0.25   • Types: Cigarettes   • Start date:    • Quit date:    • Years since quittin.4   Smokeless Tobacco Never   Tobacco Comments    Started smoking at age 25; currently smoking <1ppd.      Family History   Problem Relation Age of Onset   • Hypertension Mother    • Hypertension Sister    • Alcohol abuse Brother    • Cirrhosis Brother    • Diabetes Father    • Other Brother         Heart transplant in his 46s   • Lung cancer Sister    • Diabetes Brother    • Stroke Sister    • No Known Problems Daughter    • No Known Problems Maternal Grandmother    • No Known Problems Maternal Grandfather    • No Known Problems Paternal Grandmother    • No Known Problems Paternal Grandfather    • No Known Problems Sister        Meds/Allergies     Medications Prior to Admission   Medication   • apixaban (Eliquis) 5 mg   • aspirin 81 mg chewable tablet   • atorvastatin (LIPITOR) 40 mg tablet   • Blood Glucose Monitoring Suppl (ONE TOUCH ULTRA 2) w/Device KIT   • brimonidine-timolol (COMBIGAN) 0.2-0.5 %   • carvedilol (COREG) 12.5 mg tablet   • ferrous sulfate 325 (65 Fe) mg tablet   • furosemide (LASIX) 20 mg tablet   • glimepiride (AMARYL) 1 mg tablet   • hydrALAZINE (APRESOLINE) 25 mg tablet   • hydrOXYzine HCL (ATARAX) 25 mg tablet   • isosorbide dinitrate (ISORDIL) 20 mg tablet   • levothyroxine 50 mcg tablet   • losartan (COZAAR) 25 mg tablet   • magnesium oxide (MAG-OX) 400 mg   • melatonin 3 mg   • OneTouch Ultra test strip   • pantoprazole (PROTONIX) 40 mg tablet   • acetaminophen (TYLENOL) 325 mg tablet   • Fleet Oil enema   • Multiple Vitamins-Minerals (PreserVision AREDS) TABS     Current Facility-Administered Medications   Medication Dose Route Frequency   • acetaminophen (TYLENOL) tablet 650 mg  650 mg Oral Q4H PRN   • atorvastatin (LIPITOR) tablet 40 mg  40 mg Oral Daily With Dinner   • brimonidine tartrate 0.2 % ophthalmic solution 1 drop  1 drop Both Eyes BID    And   • timolol (TIMOPTIC) 0.5 % ophthalmic solution 1 drop  1 drop Both Eyes BID   • insulin lispro (HumaLOG) 100 units/mL subcutaneous injection 1-5 Units  1-5 Units Subcutaneous TID AC   • insulin lispro (HumaLOG) 100 units/mL subcutaneous injection 1-5 Units  1-5 Units Subcutaneous HS   • levothyroxine tablet 75 mcg  75 mcg Oral Once per day on Mon Wed Fri   • magnesium Oxide (MAG-OX) tablet 400 mg  400 mg Oral BID   • melatonin tablet 6 mg  6 mg Oral HS   • pantoprazole (PROTONIX) 80 mg in sodium chloride 0.9 % 100 mL infusion  8 mg/hr Intravenous Continuous       Allergies   Allergen Reactions   • Nickel Rash           Objective     Blood pressure 124/52, pulse 69, temperature 97.7 °F (36.5 °C), resp. rate 17, height 4' 11" (1.499 m), weight 69.1 kg (152 lb 5.4 oz), SpO2 93 %, not currently breastfeeding.       Intake/Output Summary (Last 24 hours) at 6/30/2023 0817  Last data filed at 6/30/2023 0601  Gross per 24 hour   Intake 1158.33 ml   Output 500 ml   Net 658.33 ml         PHYSICAL EXAM     General Appearance:   Alert, cooperative, no distress, appears stated age    HEENT:   Normocephalic, atraumatic, anicteric.     Neck:  Supple, symmetrical, trachea midline, no adenopathy;    thyroid: no enlargement/tenderness/nodules; no carotid  bruit or JVD    Lungs:   Clear to auscultation bilaterally; no rales, rhonchi or wheezing; respirations unlabored    Heart[de-identified]   S1 and S2 normal; regular rate and rhythm; no murmur, rub, or gallop.    Abdomen:   Soft, non-tender, non-distended; normal bowel sounds; no masses, no organomegaly    Genitalia:   Deferred    Rectal:   Deferred    Extremities:  No cyanosis, clubbing or edema    Pulses:  2+ and symmetric all extremities    Skin:  Skin color, texture, turgor normal, no rashes or lesions    Lymph nodes:  No palpable cervical, axillary or inguinal lymphadenopathy        Lab Results:   Admission on 06/29/2023   Component Date Value   • WBC 06/29/2023 8.39    • RBC 06/29/2023 2.10 (L)    • Hemoglobin 06/29/2023 6.5 (LL)    • Hematocrit 06/29/2023 20.9 (L)    • MCV 06/29/2023 100 (H)    • MCH 06/29/2023 31.0    • MCHC 06/29/2023 31.1 (L)    • RDW 06/29/2023 15.0    • MPV 06/29/2023 10.7    • Platelets 36/97/4105 213    • nRBC 06/29/2023 1    • Neutrophils Relative 06/29/2023 67    • Immat GRANS % 06/29/2023 1    • Lymphocytes Relative 06/29/2023 20    • Monocytes Relative 06/29/2023 7    • Eosinophils Relative 06/29/2023 4    • Basophils Relative 06/29/2023 1    • Neutrophils Absolute 06/29/2023 5.62    • Immature Grans Absolute 06/29/2023 0.10    • Lymphocytes Absolute 06/29/2023 1.69    • Monocytes Absolute 06/29/2023 0.60    • Eosinophils Absolute 06/29/2023 0.32    • Basophils Absolute 06/29/2023 0.06    • Sodium 06/29/2023 136    • Potassium 06/29/2023 4.2    • Chloride 06/29/2023 98    • CO2 06/29/2023 29    • ANION GAP 06/29/2023 9    • BUN 06/29/2023 61 (H)    • Creatinine 06/29/2023 1.66 (H)    • Glucose 06/29/2023 238 (H)    • Calcium 06/29/2023 8.4    • AST 06/29/2023 11 (L)    • ALT 06/29/2023 10    • Alkaline Phosphatase 06/29/2023 30 (L)    • Total Protein 06/29/2023 5.9 (L)    • Albumin 06/29/2023 3.6    • Total Bilirubin 06/29/2023 0.34    • eGFR 06/29/2023 29    • hs TnI 0hr 06/29/2023 12    • Ventricular Rate 06/29/2023 62    • Atrial Rate 06/29/2023 286    • QRSD Interval 06/29/2023 156    • QT Interval 06/29/2023 496    • QTC Interval 06/29/2023 503    • QRS Axis 06/29/2023 -29    • T Wave Axis 06/29/2023 120    • hs TnI 2hr 06/29/2023 12    • Delta 2hr hsTnI 06/29/2023 0    • ABO Grouping 06/29/2023 O    • Rh Factor 06/29/2023 Positive    • Antibody Screen 06/29/2023 Negative    • Specimen Expiration Date 06/29/2023 [id number]    • Unit Product Code 06/30/2023 [id number]    • Unit Number 06/30/2023 [id number]    • Unit ABO 06/30/2023 O    • Unit [facility] 06/30/2023 POS    • Crossmatch 06/30/2023 Compatible    • Unit Dispense Status 06/30/2023 Presumed Trans    • Unit Product Volume 06/30/2023 350    • Unit Product Code 06/30/2023 [id number]    • Unit Number 06/30/2023 [id number]    • Unit ABO 06/30/2023 O    • Unit [facility] 06/30/2023 POS    • Crossmatch 06/30/2023 Compatible    • Unit Dispense Status 06/30/2023 Presumed Trans    • Unit Product Volume 06/30/2023 350    • POC Glucose 06/29/2023 272 (H)    • Sodium 06/30/2023 140    • Potassium 06/30/2023 3.6    • Chloride 06/30/2023 104    • CO2 06/30/2023 28    • ANION GAP 06/30/2023 8    • BUN 06/30/2023 55 (H)    • Creatinine 06/30/2023 1.54 (H)    • Glucose 06/30/2023 175 (H)    • Calcium 06/30/2023 8.3 (L)    • AST 06/30/2023 11 (L)    • ALT 06/30/2023 9    • Alkaline Phosphatase 06/30/2023 27 (L)    • Total Protein 06/30/2023 5.6 (L)    • Albumin 06/30/2023 3.5    • Total Bilirubin 06/30/2023 0.56    • eGFR 06/30/2023 32    • WBC 06/30/2023 8.28    • RBC 06/30/2023 2.96 (L)    • Hemoglobin 06/30/2023 9.4 (L)    • Hematocrit 06/30/2023 28.7 (L)    • MCV 06/30/2023 97    • MCH 06/30/2023 31.8    • MCHC 06/30/2023 32.8    • RDW 06/30/2023 15.0    • MPV 06/30/2023 10.4    • Platelets 24/08/0148 154    • nRBC 06/30/2023 1    • Neutrophils Relative 06/30/2023 61    • Immat GRANS % 06/30/2023 1    • Lymphocytes Relative 06/30/2023 25    • Monocytes Relative 06/30/2023 7    • Eosinophils Relative 06/30/2023 5    • Basophils Relative 06/30/2023 1    • Neutrophils Absolute 06/30/2023 5.10    • Immature Grans Absolute 06/30/2023 0.06    • Lymphocytes Absolute 06/30/2023 2.07    • Monocytes Absolute 06/30/2023 0.60    • Eosinophils Absolute 06/30/2023 0.40    • Basophils Absolute 06/30/2023 0.05    • Magnesium 06/30/2023 2.4    • POC Glucose 06/30/2023 181 (H)          Imaging Studies: I have personally reviewed pertinent reports. ASSESSMENT/PLAN:     #1.  Symptomatic macrocytic anemia; stool noted heme-positive but this appears nonspecific in the setting of anticoagulation; she took Eliquis yesterday morning as well as 325 mg aspirin. Shows no signs of active GI bleeding currently, and hemoglobin appears to be appropriately improved after PRBC transfusions. Suspect anemia to be mainly related to chronic disease, however chronic occult GI blood loss remains in the differential, including from such sources as gastrointestinal malignancy, GI AVMs, or peptic ulcer disease considering her longstanding aspirin use and reported loss of appetite.     Of note, she is anticoagulated and received Eliquis and 325 mg aspirin yesterday morning.      -Monitor hemoglobin, transfuse as needed, and closely monitor patient/stool output for signs of gross GI bleeding; can pursue urgent endoscopic evaluation if she shows signs of hemodynamically significant active GI bleeding but this currently does not appear to be the case     -Continue Protonix but may discontinue drip in favor of twice daily dosing, she is not showing signs of active upper GI hemorrhage    -I spoke with patient about GI evaluation for different etiologies for anemia, including peptic ulcer disease, arteriovenous malformations, precancerous polyps or malignancies; a complete and ideal evaluation for such would include both EGD and colonoscopy. She tells me she does not want to think about that right now, does not elaborate about any specific concerns; she expresses that she is open to the idea of performing EGD alone however. I advised her that EGD alone would provide us some information and would help us to exclude a peptic ulcer for which she has some risk factors, but it would still leave the question of an underlying colorectal malignancy unresolved, and she would ultimately need to have another procedure requiring anesthesia and associated risks. I asked her to continue consideration for colonoscopy and she says she will think about this    -In the context of her current anticoagulation, and her absence of hemodynamically significant GI bleeding, the risk would most likely outweigh benefit for endoscopic evaluation at this time. Whether patient proves agreeable for EGD/colonoscopy or EGD alone, this should be undertaken after patient's Eliquis has been held for at least 48 hours; can consider to have procedure done Monday if patient remains admitted by such time        The patient was seen and examined by Dr. David Wood, all [place] medical decisions were made with Dr. David Wood. Thank you for allowing us to participate in the care of this pleasant patient. We will follow up with you closely.

## 2023-06-30 NOTE — UTILIZATION REVIEW
Initial Clinical Review    Admission: Date/Time/Statement:   Admission Orders (From admission, onward)     Ordered        06/29/23 1638  INPATIENT ADMISSION  Once                      Orders Placed This Encounter   Procedures   • INPATIENT ADMISSION     Standing Status:   Standing     Number of Occurrences:   1     Order Specific Question:   Level of Care     Answer:   Med Surg [16]     Order Specific Question:   Estimated length of stay     Answer:   More than 2 Midnights     Order Specific Question:   Certification     Answer:   I certify that inpatient services are medically necessary for this patient for a duration of greater than two midnights. See H&P and MD Progress Notes for additional information about the patient's course of treatment. ED Arrival Information     Expected   -    Arrival   6/29/2023 13:20    Acuity   Urgent            Means of arrival   Ambulance    Escorted by   [facility]    Service   Hospitalist    Admission type   Emergency            Arrival complaint   -           Chief Complaint   Patient presents with   • Fatigue     Patient here by ems from [facility] d/t generally feeling unwell the past few days. Per ems patient was found in dining room during lunch pale with low BP's   Only change per ems is being restarted on eliquis approx 1 week ago       Initial Presentation: 69 y.o. female with hx CAD, s/p CABG, HTN, DM, dilated aortic root, HFrEF, atrial fibrillation, tachy-bradyc syndrome status post AICD, macular degeneration, glauconma  who presents to ED from facility via EMS with generalized fatigue and shortness of breath. Pt was on  Eliquis for atrial fibrillation which was stopped 2/2 increased risk of rupture of her ascending thoracic aneurysm. Patient was transitioned to aspirin and clopidogrel. On 6/22/22, cardiology transitioned her back to Eliquis and discontinued clopidogrel. Recent medrol dose pack for itching related to bed bugs .  On exam, pt hypotensive, pale, Guiac + stool. .Due to pts vision , she is unable to confirm /deny melena or hematochezia. Labs- Hgb 6.5, BUN 61. Pt given 2 U PRBC as well as DDAVP and K centra in ED. Pt admitted as Inpatient with acute blood loss anemia. Plan- Monitor H.H q8h, IVF support for hypotension, monitor vitals, GI consult, start PPI drip. NPO after MN. BP goal is 302-444 systolic in the setting of aneurysm of the aorta. Hold home antihypertensives and hold AC. Monitor for signs bleeding. Date: 6/30  Day 2: BP improved this am .   GI consult- Hgb up to 9.4 today s/p 2 U PRBC yesterday. No hx GI bleed. Last bm 2 days ago . Abdomen soft, non-tender, non-distended; normal bowel sounds. Symptomatic macrocytic anemia. No signs of active GI bleeding currently. Reports loss of appetite . Suspect anemia  mainly related to chronic disease,  chronic occult GI blood loss remains in ddx . PLan - Continue to monitor Hgb, monitor stools , continue PPI but IV BID, d/c IV PPI drip. Pursue urgent endoscopic evaluation if  shows signs of hemodynamically significant active GI bleeding . Whether patient proves agreeable for EGD/colonoscopy or EGD alone, this should be undertaken after patient's Eliquis has been held for at least 48 hours; can consider to have procedure done Monday if patient remains admitted by such time.  Pts last Eliquis and ASA was 6/29 am     .   ED Triage Vitals   Temperature Pulse Respirations Blood Pressure SpO2   06/29/23 1326 06/29/23 1324 06/29/23 1324 06/29/23 1324 06/29/23 1324   97.9 °F (36.6 °C) 76 16 103/54 100 %      Temp Source Heart Rate Source Patient Position - Orthostatic VS BP Location FiO2 (%)   06/29/23 1326 06/29/23 1324 06/29/23 1930 06/29/23 1930 --   Oral Monitor Lying Right arm       Pain Score       06/29/23 1324       10 - Worst Possible Pain          Wt Readings from Last 1 Encounters:   06/29/23 69.1 kg (152 lb 5.4 oz)     Additional Vital Signs:   Date/Time Temp Pulse Resp BP MAP (mmHg) SpO2   06/30/23 07:23:58 97.7 °F (36.5 °C) 69 17 124/52 76 93 %   06/30/23 04:43:24 -- 61 -- 114/51 72 94 %   06/30/23 0145 -- -- -- 102/48 Abnormal  -- --   06/30/23 0132 -- -- 18 -- -- --   06/30/23 01:28:55 98.2 °F (36.8 °C) 61 18 94/38 Abnormal  57 Abnormal  94 %   06/29/23 23:12:20 97.8 °F (36.6 °C) 60 18 102/43 Abnormal  63 Abnormal  95 %   06/29/23 22:57:31 98.1 °F (36.7 °C) 61 18 110/38 Abnormal  62 Abnormal  95 %   06/29/23 22:22:08 98.1 °F (36.7 °C) 63 16 105/40 Abnormal  62 Abnormal  95 %   06/29/23 21:27:29 98.2 °F (36.8 °C) 62 -- 101/35 Abnormal  57 Abnormal  97 %   06/29/23 19:30:46 98.2 °F (36.8 °C) 61 18 113/43 Abnormal  66 96 %   06/29/23 1834 -- 60 18 107/53 -- 100 %   06/29/23 1813 -- -- -- 95/52 68 --   06/29/23 1800 -- 60 18 85/52 Abnormal  64 Abnormal  99 %   06/29/23 1730 98 °F (36.7 °C) 61 18 83/46 Abnormal  -- 98 %   06/29/23 1715 97.9 °F (36.6 °C) 61 18 85/47 Abnormal  -- 98 %   06/29/23 1700 98.1 °F (36.7 °C) 61 18 87/49 Abnormal  -- 98 %   06/29/23 1645 97.9 °F (36.6 °C) 60 18 87/58 Abnormal  -- 98 %   06/29/23 1636 98 °F (36.7 °C) 62 18 87/51 Abnormal  -- 100 %   06/29/23 1626 97.8 °F (36.6 °C) -- -- 91/51 -- --   06/29/23 1600 -- 60 18 81/43 Abnormal  59 Abnormal  98 %   06/29/23 1558 -- -- -- 73/36 Abnormal  49 Abnormal    --   06/29/23 1544 -- -- -- 78/37 Abnormal    54 Abnormal  --   06/29/23 1530 -- 60 18 84/46 Abnormal  63 Abnormal  98 %   06/29/23 1500 -- 60 18 91/52 67 99 %   06/29/23 1459 -- -- -- 77/41 Abnormal    55 Abnormal  --   06/29/23 1445 -- 63 18 97/45 Abnormal  65 --   06/29/23 1415 -- 62 16 107/53 77 98 %   06/29/23 1342 -- 60 16 82/41 Abnormal  -- 100 %     Pertinent Labs/Diagnostic Test Results:    6/29 ECG- ED read- ECG rate:  62     ECG rate assessment: normal     Rhythm:     Rhythm: paced     Pacing:     Capture:  Complete   Comments:      No STEMI. Paced rhythm at 62.   XR chest 1 view portable   Final Result by Bree Kelly MD (06/30 5300)      No acute cardiopulmonary disease.                Workstation performed: [id number]               Results from last 7 days   Lab Units 06/30/23  0449 06/29/23  1333   WBC Thousand/uL 8.28 8.39   HEMOGLOBIN g/dL 9.4* 6.5*   HEMATOCRIT % 28.7* 20.9*   PLATELETS Thousands/uL 154 213   NEUTROS ABS Thousands/µL 5.10 5.62         Results from last 7 days   Lab Units 06/30/23  0449 06/29/23  1333   SODIUM mmol/L 140 136   POTASSIUM mmol/L 3.6 4.2   CHLORIDE mmol/L 104 98   CO2 mmol/L 28 29   ANION GAP mmol/L 8 9   BUN mg/dL 55* 61*   CREATININE mg/dL 1.54* 1.66*   EGFR ml/min/1.73sq m 32 29   CALCIUM mg/dL 8.3* 8.4   MAGNESIUM mg/dL 2.4  --      Results from last 7 days   Lab Units 06/30/23  0449 06/29/23  1333   AST U/L 11* 11*   ALT U/L 9 10   ALK PHOS U/L 27* 30*   TOTAL PROTEIN g/dL 5.6* 5.9*   ALBUMIN g/dL 3.5 3.6   TOTAL BILIRUBIN mg/dL 0.56 0.34     Results from last 7 days   Lab Units 06/30/23  1101 06/30/23  0722 06/29/23  2108   POC GLUCOSE mg/dl 213* 181* 272*     Results from last 7 days   Lab Units 06/30/23  0449 06/29/23  1333   GLUCOSE RANDOM mg/dL 175* 238*               Results from last 7 days   Lab Units 06/29/23  1530 06/29/23  1333   HS TNI 0HR ng/L  --  12   HS TNI 2HR ng/L 12  --    HSTNI D2 ng/L 0  --                        Results from last 7 days   Lab Units 06/30/23  0547   UNIT PRODUCT CODE  [id number]  [id number]   UNIT NUMBER  [id number]  [id number]   UNITABO  O  O   UNITRH  POS  POS   CROSSMATCH  Compatible  Compatible   UNIT DISPENSE STATUS  Presumed Trans  Presumed Trans   UNIT PRODUCT VOL ml 350  350                         Results from last 7 days   Lab Units 06/30/23  0700   CLARITY UA  Clear   COLOR UA  Colorless   SPEC GRAV UA  1.014   PH UA  6.0   GLUCOSE UA mg/dl Negative   KETONES UA mg/dl Negative   BLOOD UA  Negative   PROTEIN UA mg/dl Negative   NITRITE UA  Negative   BILIRUBIN UA  Negative   UROBILINOGEN UA (BE) mg/dl <2.0   LEUKOCYTES UA  Negative                     ED Treatment:   Medication Administration from 06/29/2023 1320 to 06/29/2023 1929       Date/Time Order Dose Route Action     06/29/2023 1552 EDT desmopressin (DDAVP) 27.6 mcg in sodium chloride 0.9 % 50 mL IVPB 0 mcg Intravenous Stopped     06/29/2023 1521 EDT desmopressin (DDAVP) 27.6 mcg in sodium chloride 0.9 % 50 mL IVPB 27.6 mcg Intravenous New Bag     06/29/2023 1520 EDT prothrombin complex concentrate (human) (Kcentra) 2,000 Units 2,000 Units Intravenous Given        Past Medical History:   Diagnosis Date   • Atypical chest pain    • GERD (gastroesophageal reflux disease)    • Hyperlipidemia    • Hypertension    • Kidney stone    • Myocardial infarction ([address])     2015    • NSTEMI (non-ST elevated myocardial infarction) ([address])     Last Assessed: 9/24/2015   • Type 2 diabetes mellitus, without long-term current use of insulin ([address]) 9/12/2013     Present on Admission:  • Hypertension  • Aneurysm of ascending aorta (HCC)  • Glaucoma  • Permanent atrial fibrillation (HCC)  • 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HCC)  • Heart failure with reduced ejection fraction (HCC)      Admitting Diagnosis: CHF (congestive heart failure) ([address]) [I50.9]  Fatigue [R53.83]  Weakness [R53.1]  GI bleed [K92.2]  Symptomatic anemia [D64.9]  Age/Sex: 69 y.o. female  Admission Orders:  Scheduled Medications:  atorvastatin, 40 mg, Oral, Daily With Dinner  brimonidine tartrate, 1 drop, Both Eyes, BID   And  timolol, 1 drop, Both Eyes, BID  insulin lispro, 1-5 Units, Subcutaneous, TID AC  insulin lispro, 1-5 Units, Subcutaneous, HS  levothyroxine, 75 mcg, Oral, Once per day on Mon Wed Fri  magnesium Oxide, 400 mg, Oral, BID  melatonin, 6 mg, Oral, HS  pantoprazole, 40 mg, Oral, BID AC  senna-docusate sodium, 1 tablet, Oral, HS    pantoprazole (PROTONIX) injection 40 mg  Dose: 40 mg  Freq: Every 12 hours scheduled Route: IV  Start: 06/30/23 0930 End: 06/30/23 1302  Continuous IV Infusions:    pantoprazole (PROTONIX) 80 mg in sodium chloride 0.9 % 100 mL infusion  Rate: 10 mL/hr Dose: 8 mg/hr  Freq: Continuous Route: IV  Last Dose: Stopped (06/30/23 0928)  Start: 06/29/23 1900 End: 06/30/23 0926    PRN Meds:  acetaminophen, 650 mg, Oral, Q4H PRN x1 6/29 6/30 reg non ulcerogenic diet   H/H q8h    IP CONSULT TO GASTROENTEROLOGY    Network Utilization Review Department  ATTENTION: Please call with any questions or concerns to [phone] and carefully listen to the prompts so that you are directed to the right person. All voicemails are confidential.  Sahra Daniels all requests for admission clinical reviews, approved or denied determinations and any other requests to dedicated fax number below belonging to the campus where the patient is receiving treatment.  List of dedicated fax numbers for the Facilities:  [place] DENIALS (Administrative/Medical Necessity) [phone] [address] [facility] (Maternity/NICU/Pediatrics) [phone]   [address] [place] [address] Drive [phone]   [facility] [address] [facility] [phone] [address] [facility] [address] [facility] [address] [facility] [address] [place] [address] [id number] [place] [address] [phone]   [address] [facility] [address] [phone]

## 2023-06-30 NOTE — PROGRESS NOTES
[facility]  Progress Note  Name: Man Mistry  MRN: [id number]  Unit/Bed#: S -03 I Date of Admission: 6/29/2023   Date of Service: 6/30/2023 I Hospital Day: 1    Assessment/Plan   * Acute blood loss anemia  Assessment & Plan  - Presents with progressive fatigue generalized body ache and shortness of breath for the past 2 days. -Was recently transitioned back to Eliquis by her cardiologist.  -Hemoglobin on admission was 6.5, Kcentra given to reverse effects of Eliquis. -BUN elevated to the 60s, indicative of recent bleeding.  -In the last 2 weeks she completed a Medrol Dosepak secondary to generalized itching.  -Although patient denies any abdominal pain, due to her vision impairment she is unable to confirm or deny any hematochezia or melena.  -In the setting of recent steroid use and Eliquis, patient may have a superficial ulcer which has been bleeding at a slower rate. HGB stable. EGD not indicated at this time. - 2 units of PRBCs transfused. -Monitor H&H Q12 then Q24H if remains stable  - monitor stool for signs of bleeding  -Monitor vitals provide fluid support for hypotension.  -Protonix transitioned to PO  -GI consulted and aware of patient, if any active bleeding noted notify GI on-call immediately. - EGD and colonoscopy outpatient  - PT/OT  - non-ulcerogenic diet and bowel regimen ordered     Heart failure with reduced ejection fraction [facility])  Assessment & Plan  Wt Readings from Last 3 Encounters:   06/29/23 69.1 kg (152 lb 5.4 oz)   06/22/23 67.5 kg (148 lb 12.8 oz)   01/20/23 61.7 kg (136 lb)     Echo 9/22: Left Ventricle: EF 36%, by visual assessment, ejection fraction appears slightly better. Grade II diastolic dysfunction. Mild to moderate MR And AR. Moderate TR and moderate increase in RV pressure. At 58 mmHg     -Lisinopril, carvedilol, isosorbide, losartan and hydralazine currently held due to hypotension in the setting of acute blood loss anemia.   - as above, will resume when BP normalises and HGB remains stable        Permanent atrial fibrillation (HCC)  Assessment & Plan  On carvedilol for rate control  Anticoagulation with Eliquis was discontinued during hospitalization in 10/22 due to increased risk of rupture of her ascending thoracic aortic aneurysm. She was transitioned to Plavix and aspirin for stroke prevention. Transition back to Eliquis by her cardiologist Dr. Manohar Granados on 6/22/2023    Kcentra given on admission  Hold anticoagulations and antihypertensives  Continue to monitor for any active signs of bleeding  Monitor vitals. • After interrogation of her pacemaker, cardiology recommended discontinuing amiodarone secondary to transition to per min atrial fibrillation status  • carvedilol for rate control   • Holding AC and antiplatelet at this time due to high likelihood of bleed  • Will continue with monitoring for change in her condition  • She will follow-up with her PCP and cardiology services upon discharge    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[facility])  Assessment & Plan  Pulmonary pacemaker in place. Hold carvedilol due to hypotension in the setting of acute blood loss anemia. Glaucoma  Assessment & Plan  Continue Brimonidine and Timolol drops in both eyes. Hypertension  Assessment & Plan  Patient is hypotensive secondary to acute blood loss anemia. Home regimen: Carvedilol, losartan, Isordil, lasix all held. - Hold all antihypertensive, diuretics and vasodilators at this time. - will resume when BP returns to baseline pre-medication levels      Aneurysm of ascending aorta [facility])  Assessment & Plan  CTA 10/22: Aneurysmal dissection of the ascending aorta measuring up to 5.9 cm with dissection flap extending to the noncoronary and right coronary cusp and approaching the right brachiocephalic artery takeoff distally. Additional smaller short segment dissection flap along the distal medial aspect of the ascending aorta.     Patient's BP goal is 017-695 systolic in the setting of aneurysm of the aorta. Antihypertensives currently held in the setting of hypotension secondary to acute blood loss anemia. Continue to monitor blood pressure  Interval monitoring of aortic aneurysm with CTA scheduled as outpatient. but  If any acute decompensation may consider completing the CTA imaging inpatient. VTE Pharmacologic Prophylaxis: VTE Score: 5 High Risk (Score >/= 5) - Pharmacological DVT Prophylaxis Contraindicated. Sequential Compression Devices Ordered. Patient Centered Rounds: I performed bedside rounds with nursing staff today. Discussions with Specialists or Other Care Team Provider: BENY    Education and Discussions with Family / Patient: Updated  (daughter) at bedside. Current Length of Stay: 1 day(s)  Current Patient Status: Inpatient   Discharge Plan: Anticipate discharge in 24-48 hrs to discharge location to be determined pending rehab evaluations. Code Status: Level 1 - Full Code    Subjective:   Patient sleep this morning though roused easily. She reports feeling normally today. However does report chronic constipation; last bowel movement 2 days ago. At time patient was n.p.o. since midnight. 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 She reports increased fatigue and weakness over the past week leading to admission. Objective:     Vitals:   Temp (24hrs), Av °F (36.7 °C), Min:97.7 °F (36.5 °C), Max:98.2 °F (36.8 °C)    Temp:  [97.7 °F (36.5 °C)-98.2 °F (36.8 °C)] 97.7 °F (36.5 °C)  HR:  [60-76] 69  Resp:  [16-18] 17  BP: ()/(35-58) 124/52  SpO2:  [93 %-100 %] 93 %  Body mass index is 30.77 kg/m². Input and Output Summary (last 24 hours):      Intake/Output Summary (Last 24 hours) at 2023 1321  Last data filed at 2023 0601  Gross per 24 hour   Intake 1158.33 ml   Output 500 ml   Net 658.33 ml Physical Exam:   Physical Exam  Vitals and nursing note reviewed. Constitutional:       General: She is sleeping. She is not in acute distress. HENT:      Head: Normocephalic and atraumatic. Right Ear: External ear normal.      Left Ear: External ear normal.      Nose: Nose normal.      Mouth/Throat:      Pharynx: Oropharynx is clear. Eyes:      General: No scleral icterus. Extraocular Movements: Extraocular movements intact. Conjunctiva/sclera: Conjunctivae normal.   Cardiovascular:      Rate and Rhythm: Normal rate. Rhythm irregular. Pulses: Normal pulses. Pulmonary:      Effort: Pulmonary effort is normal. No respiratory distress. Breath sounds: Normal breath sounds. No stridor. Abdominal:      General: There is no distension. Palpations: Abdomen is soft. Tenderness: There is no abdominal tenderness. Musculoskeletal:         General: No swelling or tenderness. Normal range of motion. Right lower leg: No edema. Left lower leg: No edema. Skin:     General: Skin is warm. Coloration: Skin is pale. Skin is not jaundiced. Findings: No bruising, erythema or lesion. Neurological:      General: No focal deficit present. Mental Status: She is oriented to person, place, and time and easily aroused. Psychiatric:         Behavior: Behavior is cooperative.           Additional Data:     Labs:  Results from last 7 days   Lab Units 06/30/23  0449   WBC Thousand/uL 8.28   HEMOGLOBIN g/dL 9.4*   HEMATOCRIT % 28.7*   PLATELETS Thousands/uL 154   NEUTROS PCT % 61   LYMPHS PCT % 25   MONOS PCT % 7   EOS PCT % 5     Results from last 7 days   Lab Units 06/30/23  0449   SODIUM mmol/L 140   POTASSIUM mmol/L 3.6   CHLORIDE mmol/L 104   CO2 mmol/L 28   BUN mg/dL 55*   CREATININE mg/dL 1.54*   ANION GAP mmol/L 8   CALCIUM mg/dL 8.3*   ALBUMIN g/dL 3.5   TOTAL BILIRUBIN mg/dL 0.56   ALK PHOS U/L 27*   ALT U/L 9   AST U/L 11*   GLUCOSE RANDOM mg/dL 175* Results from last 7 days   Lab Units 06/30/23  1101 06/30/23  0722 06/29/23  2108   POC GLUCOSE mg/dl 213* 181* 272*               Lines/Drains:  Invasive Devices     Peripheral Intravenous Line  Duration           Peripheral IV 06/29/23 Left Antecubital 1 day    Peripheral IV 06/29/23 Dorsal (posterior); Right Forearm <1 day          Drain  Duration           Ureteral Drain/Stent Right ureter 6 Fr. 1083 days                      Imaging: Reviewed radiology reports from this admission including: chest xray    Recent Cultures (last 7 days):         Last 24 Hours Medication List:   Current Facility-Administered Medications   Medication Dose Route Frequency Provider Last Rate   • acetaminophen  650 mg Oral Q4H PRN Charly Steele MD     • atorvastatin  40 mg Oral Daily With Effie Norris MD     • brimonidine tartrate  1 drop Both Eyes BID Charly Steele MD      And   • timolol  1 drop Both Eyes BID Charly Steele MD     • insulin lispro  1-5 Units Subcutaneous TID AC Charly Steele MD     • insulin lispro  1-5 Units Subcutaneous HS Charly Steele MD     • levothyroxine  75 mcg Oral Once per day on Mon Wed Fri Charly Steele MD     • magnesium Oxide  400 mg Oral BID Charly Steele MD     • melatonin  6 mg Oral HS Charly Steele MD     • pantoprazole  40 mg Oral BID AC Birdie Ludwig MD     • senna-docusate sodium  1 tablet Oral HS Birdie Ludwig MD          Today, Patient Was Seen By: Birdie Ludwig MD    **Please Note: This note may have been constructed using a voice recognition system. **

## 2023-06-30 NOTE — ASSESSMENT & PLAN NOTE
Patient is hypotensive secondary to acute blood loss anemia. Home regimen: Carvedilol, losartan, Isordil, lasix all held. - Hold all antihypertensive, diuretics and vasodilators at this time.   - will resume when BP returns to baseline pre-medication levels

## 2023-06-30 NOTE — ASSESSMENT & PLAN NOTE
On carvedilol for rate control  Anticoagulation with Eliquis was discontinued during hospitalization in 10/22 due to increased risk of rupture of her ascending thoracic aortic aneurysm. She was transitioned to Plavix and aspirin for stroke prevention. Transition back to Eliquis by her cardiologist Dr. Fuentes Mcclain on 6/22/2023    Kcentra given on admission  Hold anticoagulations and antihypertensives  Continue to monitor for any active signs of bleeding  Monitor vitals.     • After interrogation of her pacemaker, cardiology recommended discontinuing amiodarone secondary to transition to per min atrial fibrillation status  • carvedilol for rate control   • Holding AC and antiplatelet at this time due to high likelihood of bleed  • Will continue with monitoring for change in her condition  • She will follow-up with her PCP and cardiology services upon discharge

## 2023-06-30 NOTE — ASSESSMENT & PLAN NOTE
CTA 10/22: Aneurysmal dissection of the ascending aorta measuring up to 5.9 cm with dissection flap extending to the noncoronary and right coronary cusp and approaching the right brachiocephalic artery takeoff distally. Additional smaller short segment dissection flap along the distal medial aspect of the ascending aorta. Patient's BP goal is 609-772 systolic in the setting of aneurysm of the aorta. Antihypertensives currently held in the setting of hypotension secondary to acute blood loss anemia. Continue to monitor blood pressure  Interval monitoring of aortic aneurysm with CTA scheduled as outpatient. but  If any acute decompensation may consider completing the CTA imaging inpatient.

## 2023-06-30 NOTE — ASSESSMENT & PLAN NOTE
Wt Readings from Last 3 Encounters:   06/29/23 69.1 kg (152 lb 5.4 oz)   06/22/23 67.5 kg (148 lb 12.8 oz)   01/20/23 61.7 kg (136 lb)     Echo 9/22: Left Ventricle: EF 36%, by visual assessment, ejection fraction appears slightly better. Grade II diastolic dysfunction. Mild to moderate MR And AR. Moderate TR and moderate increase in RV pressure. At 58 mmHg     -Lisinopril, carvedilol, isosorbide, losartan and hydralazine currently held due to hypotension in the setting of acute blood loss anemia.   - as above, will resume when BP normalises and HGB remains stable

## 2023-06-30 NOTE — ASSESSMENT & PLAN NOTE
- Presents with progressive fatigue generalized body ache and shortness of breath for the past 2 days. -Was recently transitioned back to Eliquis by her cardiologist.  -Hemoglobin on admission was 6.5, Kcentra given to reverse effects of Eliquis. -BUN elevated to the 60s, indicative of recent bleeding.  -In the last 2 weeks she completed a Medrol Dosepak secondary to generalized itching.  -Although patient denies any abdominal pain, due to her vision impairment she is unable to confirm or deny any hematochezia or melena.  -In the setting of recent steroid use and Eliquis, patient may have a superficial ulcer which has been bleeding at a slower rate. - 2 units of PRBCs transfused. -Monitor H&H Q8  -Monitor vitals provide fluid support for hypotension.  -Protonix drip  -GI consulted and aware of patient, if any active bleeding noted notify GI on-call immediately.  -N.P. O past midnight for endoscopy in the a.m.

## 2023-07-01 ENCOUNTER — APPOINTMENT (INPATIENT)
Dept: CT IMAGING | Facility: HOSPITAL | Age: 78
DRG: 377 | End: 2023-07-01
Payer: COMMERCIAL

## 2023-07-01 LAB
ANION GAP SERPL CALCULATED.3IONS-SCNC: 7 MMOL/L
ATRIAL RATE: 286 BPM
BASOPHILS # BLD AUTO: 0.06 THOUSANDS/ÂΜL (ref 0–0.1)
BASOPHILS NFR BLD AUTO: 1 % (ref 0–1)
BUN SERPL-MCNC: 33 MG/DL (ref 5–25)
CALCIUM SERPL-MCNC: 8.4 MG/DL (ref 8.4–10.2)
CHLORIDE SERPL-SCNC: 108 MMOL/L (ref 96–108)
CO2 SERPL-SCNC: 25 MMOL/L (ref 21–32)
CREAT SERPL-MCNC: 1.18 MG/DL (ref 0.6–1.3)
EOSINOPHIL # BLD AUTO: 0.52 THOUSAND/ÂΜL (ref 0–0.61)
EOSINOPHIL NFR BLD AUTO: 4 % (ref 0–6)
ERYTHROCYTE [DISTWIDTH] IN BLOOD BY AUTOMATED COUNT: 15.6 % (ref 11.6–15.1)
GFR SERPL CREATININE-BSD FRML MDRD: 44 ML/MIN/1.73SQ M
GLUCOSE SERPL-MCNC: 128 MG/DL (ref 65–140)
GLUCOSE SERPL-MCNC: 174 MG/DL (ref 65–140)
GLUCOSE SERPL-MCNC: 187 MG/DL (ref 65–140)
GLUCOSE SERPL-MCNC: 189 MG/DL (ref 65–140)
GLUCOSE SERPL-MCNC: 233 MG/DL (ref 65–140)
HCT VFR BLD AUTO: 30 % (ref 34.8–46.1)
HCT VFR BLD AUTO: 30.9 % (ref 34.8–46.1)
HCT VFR BLD AUTO: 31 % (ref 34.8–46.1)
HCT VFR BLD AUTO: 33.1 % (ref 34.8–46.1)
HGB BLD-MCNC: 10 G/DL (ref 11.5–15.4)
HGB BLD-MCNC: 10.4 G/DL (ref 11.5–15.4)
HGB BLD-MCNC: 9.5 G/DL (ref 11.5–15.4)
HGB BLD-MCNC: 9.8 G/DL (ref 11.5–15.4)
IMM GRANULOCYTES # BLD AUTO: 0.08 THOUSAND/UL (ref 0–0.2)
IMM GRANULOCYTES NFR BLD AUTO: 1 % (ref 0–2)
LYMPHOCYTES # BLD AUTO: 1.44 THOUSANDS/ÂΜL (ref 0.6–4.47)
LYMPHOCYTES NFR BLD AUTO: 12 % (ref 14–44)
MCH RBC QN AUTO: 31.4 PG (ref 26.8–34.3)
MCHC RBC AUTO-ENTMCNC: 31.7 G/DL (ref 31.4–37.4)
MCV RBC AUTO: 99 FL (ref 82–98)
MONOCYTES # BLD AUTO: 0.67 THOUSAND/ÂΜL (ref 0.17–1.22)
MONOCYTES NFR BLD AUTO: 6 % (ref 4–12)
MRSA NOSE QL CULT: NORMAL
NEUTROPHILS # BLD AUTO: 9.31 THOUSANDS/ÂΜL (ref 1.85–7.62)
NEUTS SEG NFR BLD AUTO: 76 % (ref 43–75)
NRBC BLD AUTO-RTO: 1 /100 WBCS
PLATELET # BLD AUTO: 185 THOUSANDS/UL (ref 149–390)
PMV BLD AUTO: 10.7 FL (ref 8.9–12.7)
POTASSIUM SERPL-SCNC: 4 MMOL/L (ref 3.5–5.3)
QRS AXIS: -29 DEGREES
QRSD INTERVAL: 156 MS
QT INTERVAL: 496 MS
QTC INTERVAL: 503 MS
RBC # BLD AUTO: 3.03 MILLION/UL (ref 3.81–5.12)
SODIUM SERPL-SCNC: 140 MMOL/L (ref 135–147)
T WAVE AXIS: 120 DEGREES
VENTRICULAR RATE: 62 BPM
WBC # BLD AUTO: 12.08 THOUSAND/UL (ref 4.31–10.16)

## 2023-07-01 PROCEDURE — 85014 HEMATOCRIT: CPT

## 2023-07-01 PROCEDURE — 85025 COMPLETE CBC W/AUTO DIFF WBC: CPT

## 2023-07-01 PROCEDURE — 99232 SBSQ HOSP IP/OBS MODERATE 35: CPT | Performed by: HOSPITALIST

## 2023-07-01 PROCEDURE — 85018 HEMOGLOBIN: CPT

## 2023-07-01 PROCEDURE — 71275 CT ANGIOGRAPHY CHEST: CPT

## 2023-07-01 PROCEDURE — G1004 CDSM NDSC: HCPCS

## 2023-07-01 PROCEDURE — 93010 ELECTROCARDIOGRAM REPORT: CPT | Performed by: INTERNAL MEDICINE

## 2023-07-01 PROCEDURE — 80048 BASIC METABOLIC PNL TOTAL CA: CPT

## 2023-07-01 PROCEDURE — 82948 REAGENT STRIP/BLOOD GLUCOSE: CPT

## 2023-07-01 RX ADMIN — SENNOSIDES AND DOCUSATE SODIUM 1 TABLET: 50; 8.6 TABLET ORAL at 21:00

## 2023-07-01 RX ADMIN — INSULIN LISPRO 1 UNITS: 100 INJECTION, SOLUTION INTRAVENOUS; SUBCUTANEOUS at 08:03

## 2023-07-01 RX ADMIN — BRIMONIDINE TARTRATE 1 DROP: 2 SOLUTION/ DROPS OPHTHALMIC at 08:01

## 2023-07-01 RX ADMIN — PANTOPRAZOLE SODIUM 40 MG: 40 TABLET, DELAYED RELEASE ORAL at 08:01

## 2023-07-01 RX ADMIN — TIMOLOL MALEATE 1 DROP: 5 SOLUTION/ DROPS OPHTHALMIC at 17:15

## 2023-07-01 RX ADMIN — ATORVASTATIN CALCIUM 40 MG: 40 TABLET, FILM COATED ORAL at 17:15

## 2023-07-01 RX ADMIN — Medication 6 MG: at 21:00

## 2023-07-01 RX ADMIN — TIMOLOL MALEATE 1 DROP: 5 SOLUTION/ DROPS OPHTHALMIC at 08:01

## 2023-07-01 RX ADMIN — POLYETHYLENE GLYCOL 3350 17 G: 17 POWDER, FOR SOLUTION ORAL at 08:01

## 2023-07-01 RX ADMIN — Medication 400 MG: at 17:15

## 2023-07-01 RX ADMIN — Medication 400 MG: at 08:01

## 2023-07-01 RX ADMIN — INSULIN LISPRO 1 UNITS: 100 INJECTION, SOLUTION INTRAVENOUS; SUBCUTANEOUS at 13:58

## 2023-07-01 RX ADMIN — PANTOPRAZOLE SODIUM 40 MG: 40 TABLET, DELAYED RELEASE ORAL at 21:00

## 2023-07-01 RX ADMIN — ACETAMINOPHEN 650 MG: 325 TABLET ORAL at 21:00

## 2023-07-01 RX ADMIN — POLYETHYLENE GLYCOL 3350 17 G: 17 POWDER, FOR SOLUTION ORAL at 20:59

## 2023-07-01 RX ADMIN — BRIMONIDINE TARTRATE 1 DROP: 2 SOLUTION/ DROPS OPHTHALMIC at 20:59

## 2023-07-01 RX ADMIN — INSULIN LISPRO 2 UNITS: 100 INJECTION, SOLUTION INTRAVENOUS; SUBCUTANEOUS at 21:00

## 2023-07-01 RX ADMIN — SODIUM CHLORIDE 500 ML: 0.9 INJECTION, SOLUTION INTRAVENOUS at 16:02

## 2023-07-01 RX ADMIN — IOHEXOL 80 ML: 350 INJECTION, SOLUTION INTRAVENOUS at 14:35

## 2023-07-01 NOTE — ASSESSMENT & PLAN NOTE
Patient is hypotensive secondary to acute blood loss anemia. Home regimen: Carvedilol, losartan, Isordil, lasix all held.     -Systolic on 7/2 is 144 up from 117, so restarted carvedilol, holding all other antihypertensives, diuretics, vasodilators

## 2023-07-01 NOTE — ASSESSMENT & PLAN NOTE
On carvedilol for rate control  Anticoagulation with Eliquis was discontinued during hospitalization in 10/22 due to increased risk of rupture of her ascending thoracic aortic aneurysm. She was transitioned to Plavix and aspirin for stroke prevention. Transition back to Eliquis by her cardiologist Dr. Justen Lopez on 6/22/2023    Kcentra given on admission  Hold anticoagulations and antihypertensives  Continue to monitor for any active signs of bleeding  Monitor vitals.   Will re-evaluate [facility] for discharge after EGD/colonoscopy done on 7/3    • After interrogation of her pacemaker, cardiology recommended discontinuing amiodarone secondary to transition to per min atrial fibrillation status  • carvedilol for rate control   • Holding AC and antiplatelet at this time due to high likelihood of bleed  • Will continue with monitoring for change in her condition  • She will follow-up with her PCP and cardiology services upon discharge

## 2023-07-01 NOTE — ASSESSMENT & PLAN NOTE
CTA 10/22: Aneurysmal dissection of the ascending aorta measuring up to 5.9 cm with dissection flap extending to the noncoronary and right coronary cusp and approaching the right brachiocephalic artery takeoff distally. Additional smaller short segment dissection flap along the distal medial aspect of the ascending aorta. Patient's BP goal is 153-439 systolic in the setting of aneurysm of the aorta. Antihypertensives currently held in the setting of hypotension secondary to acute blood loss anemia. Continue to monitor blood pressure  Pt is due for Interval monitoring of aortic aneurysm with CTA scheduled as outpatient however given hx of CKD pt will benefit from CTA done inpatient settings with postprocedure hydration 500 cc IVF bolus for kidney protection. CTA done on 7/1 results pending.  H+H down today from 10.4->9.7 potentially from dilution from IVF

## 2023-07-01 NOTE — ASSESSMENT & PLAN NOTE
Pulmonary pacemaker in place. Hold carvedilol due to hypotension in the setting of acute blood loss anemia.     -see above hypertension

## 2023-07-01 NOTE — UTILIZATION REVIEW
Date: 7/1    Day 3: Has surpassed a 2nd midnight with active treatments and services, which include IV PPI and possible EGD/colonoscopy on Monday. See initial review completed by Patricio Trujillo on 6/30.

## 2023-07-01 NOTE — ASSESSMENT & PLAN NOTE
- Presents with progressive fatigue generalized body ache and shortness of breath for the past 2 days. -Was recently transitioned back to Eliquis by her cardiologist.  -Hemoglobin on admission was 6.5, Kcentra given to reverse effects of Eliquis. -BUN elevated to the 60s, indicative of recent bleeding.  -In the last 2 weeks she completed a Medrol Dosepak secondary to generalized itching.  -Although patient denies any abdominal pain, due to her vision impairment she is unable to confirm or deny any hematochezia or melena.  -In the setting of recent steroid use and Eliquis, patient may have a superficial ulcer which has been bleeding at a slower rate. HGB stable. - 2 units of PRBCs transfused. -Monitor H&H Q24H if remains stable  - monitor stool for signs of bleeding  -Monitor vitals provide fluid support for hypotension.  -Protonix transitioned to PO  -GI consulted and aware of patient, if any active bleeding noted notify GI on-call immediately.   - EGD and colonoscopy are planned for Monday, keep NPO from [place] Sunday  - PT/OT  - non-ulcerogenic diet and bowel regimen ordered

## 2023-07-01 NOTE — PROGRESS NOTES
[facility]  Progress Note  Name: Carmen Connolly  MRN: [id number]  Unit/Bed#: S -67 I Date of Admission: 6/29/2023   Date of Service: 7/1/2023 I Hospital Day: 2    Assessment/Plan   Heart failure with reduced ejection fraction [facility])  Assessment & Plan  Wt Readings from Last 3 Encounters:   06/29/23 69.1 kg (152 lb 5.4 oz)   06/22/23 67.5 kg (148 lb 12.8 oz)   01/20/23 61.7 kg (136 lb)     Echo 9/22: Left Ventricle: EF 36%, by visual assessment, ejection fraction appears slightly better. Grade II diastolic dysfunction. Mild to moderate MR And AR. Moderate TR and moderate increase in RV pressure. At 58 mmHg     -Lisinopril, carvedilol, isosorbide, losartan and hydralazine currently held due to hypotension in the setting of acute blood loss anemia. - as above, will resume when BP normalises and HGB remains stable        Permanent atrial fibrillation (HCC)  Assessment & Plan  On carvedilol for rate control  Anticoagulation with Eliquis was discontinued during hospitalization in 10/22 due to increased risk of rupture of her ascending thoracic aortic aneurysm. She was transitioned to Plavix and aspirin for stroke prevention. Transition back to Eliquis by her cardiologist Dr. Zaida Sims on 6/22/2023    Kcentra given on admission  Hold anticoagulations and antihypertensives  Continue to monitor for any active signs of bleeding  Monitor vitals. 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   Hold carvedilol due to hypotension in the setting of acute blood loss anemia. Glaucoma  Assessment & Plan  Continue Brimonidine and Timolol drops in both eyes. Hypertension  Assessment & Plan  Patient is hypotensive secondary to acute blood loss anemia. Home regimen: Carvedilol, losartan, Isordil, lasix all held. - Hold all antihypertensive, diuretics and vasodilators at this time. - will resume when BP returns to baseline pre-medication levels      Aneurysm of ascending aorta [facility])  Assessment & Plan  CTA 10/22: Aneurysmal dissection of the ascending aorta measuring up to 5.9 cm with dissection flap extending to the noncoronary and right coronary cusp and approaching the right brachiocephalic artery takeoff distally. Additional smaller short segment dissection flap along the distal medial aspect of the ascending aorta. Patient's BP goal is 263-327 systolic in the setting of aneurysm of the aorta. Antihypertensives currently held in the setting of hypotension secondary to acute blood loss anemia. Continue to monitor blood pressure  Pt is due for Interval monitoring of aortic aneurysm with CTA scheduled as outpatient however given hx of CKD pt will benefit from CTA done inpatient settings with postprocedure hydration 500 cc IVF bolus for kidney protection. Order for repeat chest CTA placed. * Acute blood loss anemia  Assessment & Plan  - Presents with progressive fatigue generalized body ache and shortness of breath for the past 2 days. -Was recently transitioned back to Eliquis by her cardiologist.  -Hemoglobin on admission was 6.5, Kcentra given to reverse effects of Eliquis.   -BUN elevated to the 60s, indicative of recent bleeding.  -In the last 2 weeks she completed a Medrol Dosepak secondary to generalized itching.  -Although patient denies any abdominal pain, due to her vision impairment she is unable to confirm or deny any hematochezia or melena.  -In the setting of recent steroid use and Eliquis, patient may have a superficial ulcer which has been bleeding at a slower rate. HGB stable. EGD not indicated at this time. - 2 units of PRBCs transfused. -Monitor H&H Q12 then Q24H if remains stable  - monitor stool for signs of bleeding  -Monitor vitals provide fluid support for hypotension.  -Protonix transitioned to PO  -GI consulted and aware of patient, if any active bleeding noted notify GI on-call immediately. - EGD and colonoscopy are planned for Monday, keep NPO from [place]   - PT/OT  - non-ulcerogenic diet and bowel regimen ordered              VTE Pharmacologic Prophylaxis: VTE Score: 5 High Risk (Score >/= 5) - Pharmacological DVT Prophylaxis Contraindicated. Sequential Compression Devices Ordered. Patient Centered Rounds: I performed bedside rounds with nursing staff today. Discussions with Specialists or Other Care Team Provider: BENY    Education and Discussions with Family / Patient: Updated  (daughter) at bedside. Current Length of Stay: 2 day(s)  Current Patient Status: Inpatient   Discharge Plan: Anticipate discharge in 24-48 hrs to discharge location to be determined pending rehab evaluations. Code Status: Level 1 - Full Code    Subjective:   No acute events overnight, patient is hemodynamically stable. Patient is sleepy during physical examination, answering appropriately. She states that she is feeling fatigue and admits generalized weakness. Patient notified that she is right eye blinded. Hemoglobin remained stable. EGD and colonoscopy are planned for Monday. We will repeat CT chest given history of CKD. Objective:     Vitals:   Temp (24hrs), Av °F (37.2 °C), Min:98.5 °F (36.9 °C), Max:99.3 °F (37.4 °C)    Temp:  [98.5 °F (36.9 °C)-99.3 °F (37.4 °C)] 98.7 °F (37.1 °C)  HR:  [65-68] 67  Resp:  [17-18] 18  BP: (119-133)/(65-93) 119/65  SpO2:  [89 %-93 %] 89 %  Body mass index is 30.77 kg/m².      Input and Output Summary (last 24 hours):   No intake or output data in the 24 hours ending 23 1335    Physical Exam:   Physical Exam  Vitals and nursing note reviewed. Constitutional:       General: She is sleeping. She is not in acute distress. HENT:      Head: Normocephalic and atraumatic. Right Ear: External ear normal.      Left Ear: External ear normal.      Nose: Nose normal.      Mouth/Throat:      Pharynx: Oropharynx is clear. Eyes:      General: No scleral icterus. Extraocular Movements: Extraocular movements intact. Conjunctiva/sclera: Conjunctivae normal.   Cardiovascular:      Rate and Rhythm: Normal rate. Rhythm irregular. Pulses: Normal pulses. Pulmonary:      Effort: Pulmonary effort is normal. No respiratory distress. Breath sounds: Normal breath sounds. No stridor. Abdominal:      General: There is no distension. Palpations: Abdomen is soft. Tenderness: There is no abdominal tenderness. Musculoskeletal:         General: No swelling or tenderness. Normal range of motion. Right lower leg: No edema. Left lower leg: No edema. Skin:     General: Skin is warm. Coloration: Skin is pale. Skin is not jaundiced. Findings: No bruising, erythema or lesion. Neurological:      General: No focal deficit present. Mental Status: She is oriented to person, place, and time and easily aroused. Psychiatric:         Behavior: Behavior is cooperative.           Additional Data:     Labs:  Results from last 7 days   Lab Units 07/01/23  1255 07/01/23  0614   WBC Thousand/uL  --  12.08*   HEMOGLOBIN g/dL 10.4* 9.5*  9.8*   HEMATOCRIT % 33.1* 30.0*  31.0*   PLATELETS Thousands/uL  --  185   NEUTROS PCT %  --  76*   LYMPHS PCT %  --  12*   MONOS PCT %  --  6   EOS PCT %  --  4     Results from last 7 days   Lab Units 07/01/23  0614 06/30/23  0449   SODIUM mmol/L 140 140   POTASSIUM mmol/L 4.0 3.6   CHLORIDE mmol/L 108 104   CO2 mmol/L 25 28   BUN mg/dL 33* 55*   CREATININE mg/dL 1.18 1.54*   ANION GAP mmol/L 7 8   CALCIUM mg/dL 8.4 8.3* ALBUMIN g/dL  --  3.5   TOTAL BILIRUBIN mg/dL  --  0.56   ALK PHOS U/L  --  27*   ALT U/L  --  9   AST U/L  --  11*   GLUCOSE RANDOM mg/dL 187* 175*         Results from last 7 days   Lab Units 07/01/23  1115 07/01/23  0721 06/30/23  2048 06/30/23  1606 06/30/23  1101 06/30/23  0722 06/29/23  2108   POC GLUCOSE mg/dl 174* 189* 166* 163* 213* 181* 272*               Lines/Drains:  Invasive Devices     Peripheral Intravenous Line  Duration           Peripheral IV 06/29/23 Dorsal (posterior); Right Forearm 2 days          Drain  Duration           Ureteral Drain/Stent Right ureter 6 Fr. 1084 days                      Imaging: Reviewed radiology reports from this admission including: chest xray    Recent Cultures (last 7 days):         Last 24 Hours Medication List:   Current Facility-Administered Medications   Medication Dose Route Frequency Provider Last Rate   • acetaminophen  650 mg Oral Q4H PRN Adelina Daniel MD     • atorvastatin  40 mg Oral Daily With Jairon Salmon MD     • brimonidine tartrate  1 drop Both Eyes BID Adelina Daniel MD      And   • timolol  1 drop Both Eyes BID Adelina Daniel MD     • insulin lispro  1-5 Units Subcutaneous TID AC Adelina Daniel MD     • insulin lispro  1-5 Units Subcutaneous HS Adelina Daniel MD     • levothyroxine  75 mcg Oral Once per day on Mon Wed Fri Adelina Daniel MD     • magnesium Oxide  400 mg Oral BID Adelina Daniel MD     • melatonin  6 mg Oral HS Adelina Daniel MD     • pantoprazole  40 mg Oral BID AC Tonia Epps MD     • [START ON 7/2/2023] polyethylene glycol  4,000 mL Oral Once Kirsty Arzola PA-C     • polyethylene glycol  17 g Oral BID Kirsty Arzola PA-C     • senna-docusate sodium  1 tablet Oral HS Tonia Epps MD          Today, Patient Was Seen By: Mason Joseph MD    **Please Note: This note may have been constructed using a voice recognition system. **

## 2023-07-02 LAB
ANION GAP SERPL CALCULATED.3IONS-SCNC: 6 MMOL/L
BASOPHILS # BLD AUTO: 0.07 THOUSANDS/ÂΜL (ref 0–0.1)
BASOPHILS NFR BLD AUTO: 1 % (ref 0–1)
BUN SERPL-MCNC: 20 MG/DL (ref 5–25)
CALCIUM SERPL-MCNC: 8.2 MG/DL (ref 8.4–10.2)
CHLORIDE SERPL-SCNC: 109 MMOL/L (ref 96–108)
CO2 SERPL-SCNC: 27 MMOL/L (ref 21–32)
CREAT SERPL-MCNC: 1.14 MG/DL (ref 0.6–1.3)
EOSINOPHIL # BLD AUTO: 0.57 THOUSAND/ÂΜL (ref 0–0.61)
EOSINOPHIL NFR BLD AUTO: 7 % (ref 0–6)
ERYTHROCYTE [DISTWIDTH] IN BLOOD BY AUTOMATED COUNT: 15.6 % (ref 11.6–15.1)
GFR SERPL CREATININE-BSD FRML MDRD: 46 ML/MIN/1.73SQ M
GLUCOSE SERPL-MCNC: 134 MG/DL (ref 65–140)
GLUCOSE SERPL-MCNC: 135 MG/DL (ref 65–140)
GLUCOSE SERPL-MCNC: 152 MG/DL (ref 65–140)
GLUCOSE SERPL-MCNC: 174 MG/DL (ref 65–140)
GLUCOSE SERPL-MCNC: 182 MG/DL (ref 65–140)
HCT VFR BLD AUTO: 31 % (ref 34.8–46.1)
HGB BLD-MCNC: 9.7 G/DL (ref 11.5–15.4)
IMM GRANULOCYTES # BLD AUTO: 0.06 THOUSAND/UL (ref 0–0.2)
IMM GRANULOCYTES NFR BLD AUTO: 1 % (ref 0–2)
LYMPHOCYTES # BLD AUTO: 2.37 THOUSANDS/ÂΜL (ref 0.6–4.47)
LYMPHOCYTES NFR BLD AUTO: 29 % (ref 14–44)
MCH RBC QN AUTO: 31.5 PG (ref 26.8–34.3)
MCHC RBC AUTO-ENTMCNC: 31.3 G/DL (ref 31.4–37.4)
MCV RBC AUTO: 101 FL (ref 82–98)
MONOCYTES # BLD AUTO: 0.69 THOUSAND/ÂΜL (ref 0.17–1.22)
MONOCYTES NFR BLD AUTO: 8 % (ref 4–12)
NEUTROPHILS # BLD AUTO: 4.47 THOUSANDS/ÂΜL (ref 1.85–7.62)
NEUTS SEG NFR BLD AUTO: 54 % (ref 43–75)
NRBC BLD AUTO-RTO: 1 /100 WBCS
PLATELET # BLD AUTO: 175 THOUSANDS/UL (ref 149–390)
PMV BLD AUTO: 10.3 FL (ref 8.9–12.7)
POTASSIUM SERPL-SCNC: 4.1 MMOL/L (ref 3.5–5.3)
RBC # BLD AUTO: 3.08 MILLION/UL (ref 3.81–5.12)
SODIUM SERPL-SCNC: 142 MMOL/L (ref 135–147)
WBC # BLD AUTO: 8.23 THOUSAND/UL (ref 4.31–10.16)

## 2023-07-02 PROCEDURE — 99232 SBSQ HOSP IP/OBS MODERATE 35: CPT | Performed by: STUDENT IN AN ORGANIZED HEALTH CARE EDUCATION/TRAINING PROGRAM

## 2023-07-02 PROCEDURE — 80048 BASIC METABOLIC PNL TOTAL CA: CPT | Performed by: STUDENT IN AN ORGANIZED HEALTH CARE EDUCATION/TRAINING PROGRAM

## 2023-07-02 PROCEDURE — 99232 SBSQ HOSP IP/OBS MODERATE 35: CPT | Performed by: HOSPITALIST

## 2023-07-02 PROCEDURE — 85025 COMPLETE CBC W/AUTO DIFF WBC: CPT | Performed by: HOSPITALIST

## 2023-07-02 PROCEDURE — 82948 REAGENT STRIP/BLOOD GLUCOSE: CPT

## 2023-07-02 RX ORDER — CARVEDILOL 12.5 MG/1
12.5 TABLET ORAL 2 TIMES DAILY WITH MEALS
Status: DISCONTINUED | OUTPATIENT
Start: 2023-07-02 | End: 2023-07-04 | Stop reason: HOSPADM

## 2023-07-02 RX ADMIN — INSULIN LISPRO 1 UNITS: 100 INJECTION, SOLUTION INTRAVENOUS; SUBCUTANEOUS at 08:02

## 2023-07-02 RX ADMIN — INSULIN LISPRO 1 UNITS: 100 INJECTION, SOLUTION INTRAVENOUS; SUBCUTANEOUS at 11:25

## 2023-07-02 RX ADMIN — BRIMONIDINE TARTRATE 1 DROP: 2 SOLUTION/ DROPS OPHTHALMIC at 08:02

## 2023-07-02 RX ADMIN — PANTOPRAZOLE SODIUM 40 MG: 40 TABLET, DELAYED RELEASE ORAL at 08:01

## 2023-07-02 RX ADMIN — Medication 6 MG: at 21:07

## 2023-07-02 RX ADMIN — CARVEDILOL 12.5 MG: 12.5 TABLET, FILM COATED ORAL at 16:26

## 2023-07-02 RX ADMIN — POLYETHYLENE GLYCOL 3350, SODIUM SULFATE ANHYDROUS, SODIUM BICARBONATE, SODIUM CHLORIDE, POTASSIUM CHLORIDE 4000 ML: 236; 22.74; 6.74; 5.86; 2.97 POWDER, FOR SOLUTION ORAL at 16:28

## 2023-07-02 RX ADMIN — TIMOLOL MALEATE 1 DROP: 5 SOLUTION/ DROPS OPHTHALMIC at 08:02

## 2023-07-02 RX ADMIN — INSULIN LISPRO 1 UNITS: 100 INJECTION, SOLUTION INTRAVENOUS; SUBCUTANEOUS at 21:32

## 2023-07-02 RX ADMIN — POLYETHYLENE GLYCOL 3350 17 G: 17 POWDER, FOR SOLUTION ORAL at 08:01

## 2023-07-02 RX ADMIN — TIMOLOL MALEATE 1 DROP: 5 SOLUTION/ DROPS OPHTHALMIC at 16:46

## 2023-07-02 RX ADMIN — Medication 400 MG: at 16:46

## 2023-07-02 RX ADMIN — ATORVASTATIN CALCIUM 40 MG: 40 TABLET, FILM COATED ORAL at 16:26

## 2023-07-02 RX ADMIN — Medication 400 MG: at 08:01

## 2023-07-02 RX ADMIN — PANTOPRAZOLE SODIUM 40 MG: 40 TABLET, DELAYED RELEASE ORAL at 21:07

## 2023-07-02 NOTE — ASSESSMENT & PLAN NOTE
CTA 10/22: Aneurysmal dissection of the ascending aorta  Patient's BP goal is 757-475 systolic in the setting of aneurysm of the aorta. Continue to monitor blood pressure  CTA done on 7/1 results pending.

## 2023-07-02 NOTE — ASSESSMENT & PLAN NOTE
- Presents with progressive fatigue generalized body ache and shortness of breath for the past 2 days. -Was recently transitioned back to Eliquis by her cardiologist, but hemoglobin on admission was 6.5, so Kcentra given to reverse effects of Eliquis.  -In the setting of recent steroid use and Eliquis, patient may have a superficial ulcer which has been bleeding at a slower rate. HGB stable. - monitor stool for signs of bleeding  -Monitor vitals provide fluid support for hypotension. -GI consulted and aware of patient, if any active bleeding noted notify GI on-call immediately.   - EGD and colonoscopy are planned for Monday, keep NPO from [place] Sunday  - non-ulcerogenic diet and bowel regimen ordered

## 2023-07-02 NOTE — ASSESSMENT & PLAN NOTE
Wt Readings from Last 3 Encounters:   06/29/23 69.1 kg (152 lb 5.4 oz)   06/22/23 67.5 kg (148 lb 12.8 oz)   01/20/23 61.7 kg (136 lb)     -Lisinopril, isosorbide, losartan and hydralazine currently held

## 2023-07-02 NOTE — PROGRESS NOTES
[facility]  Progress Note  Name: Herman Gibson  MRN: [id number]  Unit/Bed#: S -14 I Date of Admission: 6/29/2023   Date of Service: 7/2/2023 I Hospital Day: 3    Assessment/Plan   * Acute blood loss anemia  Assessment & Plan  - Presents with progressive fatigue generalized body ache and shortness of breath for the past 2 days. -Was recently transitioned back to Eliquis by her cardiologist.  -Hemoglobin on admission was 6.5, Kcentra given to reverse effects of Eliquis. -BUN elevated to the 60s, indicative of recent bleeding.  -In the last 2 weeks she completed a Medrol Dosepak secondary to generalized itching.  -Although patient denies any abdominal pain, due to her vision impairment she is unable to confirm or deny any hematochezia or melena.  -In the setting of recent steroid use and Eliquis, patient may have a superficial ulcer which has been bleeding at a slower rate. HGB stable. - 2 units of PRBCs transfused. -Monitor H&H Q24H if remains stable  - monitor stool for signs of bleeding  -Monitor vitals provide fluid support for hypotension.  -Protonix transitioned to PO  -GI consulted and aware of patient, if any active bleeding noted notify GI on-call immediately. - EGD and colonoscopy are planned for Monday, keep NPO from [place] Sunday  - PT/OT  - non-ulcerogenic diet and bowel regimen ordered     Heart failure with reduced ejection fraction [facility])  Assessment & Plan  Wt Readings from Last 3 Encounters:   06/29/23 69.1 kg (152 lb 5.4 oz)   06/22/23 67.5 kg (148 lb 12.8 oz)   01/20/23 61.7 kg (136 lb)     Echo 9/22: Left Ventricle: EF 36%, by visual assessment, ejection fraction appears slightly better. Grade II diastolic dysfunction. Mild to moderate MR And AR. Moderate TR and moderate increase in RV pressure. At 58 mmHg     -Lisinopril, carvedilol, isosorbide, losartan and hydralazine currently held due to hypotension in the setting of acute blood loss anemia.   - as above, will resume when BP normalises and HGB remains stable        Permanent atrial fibrillation (HCC)  Assessment & Plan  On carvedilol for rate control  Anticoagulation with Eliquis was discontinued during hospitalization in 10/22 due to increased risk of rupture of her ascending thoracic aortic aneurysm. She was transitioned to Plavix and aspirin for stroke prevention. Transition back to Eliquis by her cardiologist Dr. Kash Marin on 6/22/2023    Kcentra given on admission  Hold anticoagulations and antihypertensives  Continue to monitor for any active signs of bleeding  Monitor vitals. 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 Hold carvedilol due to hypotension in the setting of acute blood loss anemia. -see above hypertension    Glaucoma  Assessment & Plan  Continue Brimonidine and Timolol drops in both eyes. Hypertension  Assessment & Plan  Patient is hypotensive secondary to acute blood loss anemia. Home regimen: Carvedilol, losartan, Isordil, lasix all held. -Systolic on 7/2 is 517 up from 117, so restarted carvedilol, holding all other antihypertensives, diuretics, vasodilators    Aneurysm of ascending aorta ([address])  Assessment & Plan  CTA 10/22: Aneurysmal dissection of the ascending aorta measuring up to 5.9 cm with dissection flap extending to the noncoronary and right coronary cusp and approaching the right brachiocephalic artery takeoff distally.  Additional smaller short segment dissection flap along the distal medial aspect of the ascending aorta. Patient's BP goal is 092-820 systolic in the setting of aneurysm of the aorta. Antihypertensives currently held in the setting of hypotension secondary to acute blood loss anemia. Continue to monitor blood pressure  Pt is due for Interval monitoring of aortic aneurysm with CTA scheduled as outpatient however given hx of CKD pt will benefit from CTA done inpatient settings with postprocedure hydration 500 cc IVF bolus for kidney protection. CTA done on  results pending. H+H down today from 10.4->9.7 potentially from dilution from IVF           VTE Pharmacologic Prophylaxis: VTE Score: 5 High Risk (Score >/= 5) - Pharmacological DVT Prophylaxis Contraindicated. Sequential Compression Devices Ordered. Patient Centered Rounds: I performed bedside rounds with nursing staff today. Discussions with Specialists or Other Care Team Provider: spoke with nursing    Education and Discussions with Family / Patient: Updated  (daughter) via phone. Current Length of Stay: 3 day(s)  Current Patient Status: Inpatient   Discharge Plan: Anticipate discharge in 48 hrs to discharge location to be determined pending rehab evaluations. Code Status: Level 1 - Full Code    Subjective:   No acute events overnight. Patient notes she feels more tired today, but believes this is likely due to being woken up constantly in hospital. Is aware of needed for EGD and colonoscopy tomorrow, understands she will only receive clears today with bowel prep and NPO tomorrow. She has questions about her [facility] and results of CTA, will update her when we know more. Objective:     Vitals:   Temp (24hrs), Av.5 °F (36.9 °C), Min:98.3 °F (36.8 °C), Max:98.7 °F (37.1 °C)    Temp:  [98.3 °F (36.8 °C)-98.7 °F (37.1 °C)] 98.7 °F (37.1 °C)  HR:  [60-63] 63  Resp:  [18] 18  BP: (110-147)/(42-69) 147/69  SpO2:  [90 %-96 %] 95 %  Body mass index is 30.77 kg/m².      Input and Output Summary (last 24 hours): Intake/Output Summary (Last 24 hours) at 7/2/2023 1225  Last data filed at 7/1/2023 1846  Gross per 24 hour   Intake 180 ml   Output --   Net 180 ml       Physical Exam:   Physical Exam  Vitals and nursing note reviewed. Constitutional:       Appearance: Normal appearance. HENT:      Head: Normocephalic and atraumatic. Eyes:      General: No scleral icterus. Extraocular Movements: Extraocular movements intact. 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 Pulmonary:      Effort: Pulmonary effort is normal.      Breath sounds: Normal breath sounds. Neurological:      Mental Status: She is alert and oriented to person, place, and time. Additional Data:     Labs:  Results from last 7 days   Lab Units 07/02/23  0503   WBC Thousand/uL 8.23   HEMOGLOBIN g/dL 9.7*   HEMATOCRIT % 31.0*   PLATELETS Thousands/uL 175   NEUTROS PCT % 54   LYMPHS PCT % 29   MONOS PCT % 8   EOS PCT % 7*     Results from last 7 days   Lab Units 07/02/23  0503 07/01/23  0614 06/30/23  0449   SODIUM mmol/L 142   < > 140   POTASSIUM mmol/L 4.1   < > 3.6   CHLORIDE mmol/L 109*   < > 104   CO2 mmol/L 27   < > 28   BUN mg/dL 20   < > 55*   CREATININE mg/dL 1.14   < > 1.54*   ANION GAP mmol/L 6   < > 8   CALCIUM mg/dL 8.2*   < > 8.3*   ALBUMIN g/dL  --   --  3.5   TOTAL BILIRUBIN mg/dL  --   --  0.56   ALK PHOS U/L  --   --  27*   ALT U/L  --   --  9   AST U/L  --   --  11*   GLUCOSE RANDOM mg/dL 135   < > 175*    < > = values in this interval not displayed.          Results from last 7 days   Lab Units 07/02/23  1050 07/02/23  0718 07/01/23 2058 07/01/23  1607 07/01/23  1115 07/01/23  0721 06/30/23  2048 06/30/23  1606 06/30/23  1101 06/30/23  0722 06/29/23  2108   POC GLUCOSE mg/dl 182* 174* 233* 128 174* 189* 166* 163* 213* 181* 272*               Lines/Drains:  Invasive Devices Peripheral Intravenous Line  Duration           Peripheral IV 06/29/23 Dorsal (posterior); Right Forearm 2 days          Drain  Duration           Ureteral Drain/Stent Right ureter 6 Fr. 1085 days                       Imaging: No pertinent imaging reviewed. Will need to follow up with Radiology, CTA from 7/1 not resulted    Recent Cultures (last 7 days):         Last 24 Hours Medication List:   Current Facility-Administered Medications   Medication Dose Route Frequency Provider Last Rate   • acetaminophen  650 mg Oral Q4H PRN Desi Ruano MD     • atorvastatin  40 mg Oral Daily With Elly Belle MD     • brimonidine tartrate  1 drop Both Eyes BID Desi Ruano MD      And   • timolol  1 drop Both Eyes BID Desi Ruano MD     • carvedilol  12.5 mg Oral BID With Meals Bony Hackett DO     • insulin lispro  1-5 Units Subcutaneous TID AC Desi Ruano MD     • insulin lispro  1-5 Units Subcutaneous HS Desi Ruano MD     • levothyroxine  75 mcg Oral Once per day on Mon Wed Fri Desi Ruano MD     • magnesium Oxide  400 mg Oral BID Desi Runao MD     • melatonin  6 mg Oral HS Desi Ruano MD     • pantoprazole  40 mg Oral BID AC Bernardo Monteiro MD     • polyethylene glycol  4,000 mL Oral Once Tiffany Clinton DO     • polyethylene glycol  17 g Oral BID Neri Davidson PA-C     • senna-docusate sodium  1 tablet Oral HS Bernardo Monteiro MD          Today, Patient Was Seen By: Tomi Gutierres MD    **Please Note: This note may have been constructed using a voice recognition system. **

## 2023-07-02 NOTE — PROGRESS NOTES
Progress Note - Kolby Webb 69 y.o. female MRN: [id number]    Unit/Bed#: S -01 Encounter: [id number]    Assessment and Plan:     1. Symptomatic microcytic anemia  Patient presented few days ago with fatigue and hemoglobin of 6.5 after recently starting Eliquis a week ago. She denies any obvious signs of bleeding. Hemoglobin has since been stable at 9.7. She has never had an EGD or colonoscopy. - Patient is planned for EGD and colonoscopy tomorrow. - Continue clear liquid diet and n.p.o. at midnight. - GoLytely split prep was ordered. She will drink 2 L of bowel prep starting today at 5 PM and administer the following 2 L starting at 6 AM tomorrow. Patient is to have nothing overnight besides prep and small sips with medications.  -Patient had no questions regarding procedures tomorrow and is agreeable to continue to proceed. - Further recommendations based on results. -Monitor hemoglobin and transfuse as needed per primary team.    ----------------------------------------------------------------------------------------------------------------    Subjective:     Patient reports doing well this morning. She reports starting to move her bowels already. No abdominal pain. Denies any blood in the bowel movements though difficult for her to assess due to visual impairment. Objective:     Vitals: Blood pressure 147/69, pulse 63, temperature 98.7 °F (37.1 °C), temperature source Oral, resp. rate 18, height 4' 11" (1.499 m), weight 69.1 kg (152 lb 5.4 oz), SpO2 95 %, not currently breastfeeding. ,Body mass index is 30.77 kg/m². Intake/Output Summary (Last 24 hours) at 7/2/2023 1048  Last data filed at 7/1/2023 1846  Gross per 24 hour   Intake 180 ml   Output --   Net 180 ml       Physical Exam:     General Appearance: Sleeping prior to my arrival.  Does not appear in distress.   Lungs: Clear to auscultation bilaterally, no rales or rhonchi, no labored breathing/accessory muscle use  Heart: Regular rate and rhythm, S1, S2 normal, no murmur, click, rub or gallop  Abdomen: Soft, non-tender, non-distended; bowel sounds normal; no masses or no organomegaly  Extremities: No cyanosis, clubbing, or edema    Invasive Devices     Peripheral Intravenous Line  Duration           Peripheral IV 06/29/23 Dorsal (posterior); Right Forearm 2 days          Drain  Duration           Ureteral Drain/Stent Right ureter 6 Fr. 1085 days                Lab Results:  Results from last 7 days   Lab Units 07/02/23  0503   WBC Thousand/uL 8.23   HEMOGLOBIN g/dL 9.7*   HEMATOCRIT % 31.0*   PLATELETS Thousands/uL 175   NEUTROS PCT % 54   LYMPHS PCT % 29   MONOS PCT % 8   EOS PCT % 7*     Results from last 7 days   Lab Units 07/02/23  0503 07/01/23  0614 06/30/23  0449   POTASSIUM mmol/L 4.1   < > 3.6   CHLORIDE mmol/L 109*   < > 104   CO2 mmol/L 27   < > 28   BUN mg/dL 20   < > 55*   CREATININE mg/dL 1.14   < > 1.54*   CALCIUM mg/dL 8.2*   < > 8.3*   ALK PHOS U/L  --   --  27*   ALT U/L  --   --  9   AST U/L  --   --  11*    < > = values in this interval not displayed. Invalid input(s): "BILI"            Imaging Studies: I have personally reviewed pertinent imaging studies. XR chest 1 view portable    Result Date: 6/30/2023  Impression: No acute cardiopulmonary disease.  Workstation performed: [id number]

## 2023-07-02 NOTE — PHYSICAL THERAPY NOTE
PHYSICAL THERAPY Screen  NAME:  Dulce Méndez  DATE: 07/02/23    AGE:   69 y.o. Mrn:   [id number]  Principal problem: Principal Problem:    Acute blood loss anemia  Active Problems:    Aneurysm of ascending aorta (HCC)    Hypertension    Glaucoma    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HCC)    Permanent atrial fibrillation (HCC)    Heart failure with reduced ejection fraction (HCC)      Vitals:    07/01/23 2158 07/01/23 2201 07/02/23 0720 07/02/23 1505   BP: (!) 110/42 (!) 117/47 147/69 142/59   BP Location: Right arm Left arm     Pulse: 60 61 63 60   Resp: 18 18 18 18   Temp: 98.7 °F (37.1 °C)      TempSrc: Oral      SpO2: 91% 90% 95% 95%   Weight:       Height:         Length Of Stay: 3  Performed at least 2 patient identifiers during session: Name and Epic photo  PHYSICAL THERAPY SCREEN :     07/02/23 1717   PT Last Visit   PT Visit Date 07/02/23   Note Type   Note type Screen   Additional Comments Chart reviewed and spoke to pt who recently started a bowel prep for a procedure tomorrow. She reports that she does not feel that IPPT is indicated at this time, as she is able to get up and amb to/from bathroom w/o difficulty using her walker. She said this is similar to her prior mobility from [facility]. She politely declined formal assessment, and was agreeable to PT screen from IPPT services. Will remove pt from census per her request. Please reconsult if indicated.      Eleuterio Gomez, PT

## 2023-07-02 NOTE — ASSESSMENT & PLAN NOTE
Patient is hypotensive secondary to acute blood loss anemia. Home regimen: Carvedilol, losartan, Isordil, lasix all held.     -Systolic today is 778 so held carvedilol as well as all other antihypertensives, diuretics, vasodilators

## 2023-07-02 NOTE — ASSESSMENT & PLAN NOTE
carvedilol for rate control  AC switched from eliquis to Plavix and aspirin in 10/22 due to risk of rupture for ascending thoracic aortic aneurysm, back to Eliquis 6/23, Kcentra given on admission  Will re-evaluate [facility] for discharge after EGD/colonoscopy done on 7/3 but patient states she will not go back on Eliquis  Holding [facility] and antiplatelet at this time due to high likelihood of bleed  She will follow-up with her PCP and cardiology services upon discharge

## 2023-07-03 ENCOUNTER — ANESTHESIA (INPATIENT)
Dept: GASTROENTEROLOGY | Facility: HOSPITAL | Age: 78
DRG: 377 | End: 2023-07-03
Payer: COMMERCIAL

## 2023-07-03 ENCOUNTER — ANESTHESIA EVENT (INPATIENT)
Dept: GASTROENTEROLOGY | Facility: HOSPITAL | Age: 78
DRG: 377 | End: 2023-07-03
Payer: COMMERCIAL

## 2023-07-03 ENCOUNTER — APPOINTMENT (INPATIENT)
Dept: GASTROENTEROLOGY | Facility: HOSPITAL | Age: 78
DRG: 377 | End: 2023-07-03
Payer: COMMERCIAL

## 2023-07-03 LAB
ANION GAP SERPL CALCULATED.3IONS-SCNC: 8 MMOL/L
BUN SERPL-MCNC: 16 MG/DL (ref 5–25)
CALCIUM SERPL-MCNC: 8.3 MG/DL (ref 8.4–10.2)
CHLORIDE SERPL-SCNC: 107 MMOL/L (ref 96–108)
CO2 SERPL-SCNC: 26 MMOL/L (ref 21–32)
CREAT SERPL-MCNC: 1.09 MG/DL (ref 0.6–1.3)
ERYTHROCYTE [DISTWIDTH] IN BLOOD BY AUTOMATED COUNT: 14.8 % (ref 11.6–15.1)
GFR SERPL CREATININE-BSD FRML MDRD: 49 ML/MIN/1.73SQ M
GLUCOSE SERPL-MCNC: 145 MG/DL (ref 65–140)
GLUCOSE SERPL-MCNC: 146 MG/DL (ref 65–140)
GLUCOSE SERPL-MCNC: 160 MG/DL (ref 65–140)
GLUCOSE SERPL-MCNC: 169 MG/DL (ref 65–140)
GLUCOSE SERPL-MCNC: 264 MG/DL (ref 65–140)
HCT VFR BLD AUTO: 32.2 % (ref 34.8–46.1)
HGB BLD-MCNC: 10 G/DL (ref 11.5–15.4)
MCH RBC QN AUTO: 31.1 PG (ref 26.8–34.3)
MCHC RBC AUTO-ENTMCNC: 31.1 G/DL (ref 31.4–37.4)
MCV RBC AUTO: 100 FL (ref 82–98)
PLATELET # BLD AUTO: 206 THOUSANDS/UL (ref 149–390)
PMV BLD AUTO: 9.7 FL (ref 8.9–12.7)
POTASSIUM SERPL-SCNC: 4.2 MMOL/L (ref 3.5–5.3)
RBC # BLD AUTO: 3.22 MILLION/UL (ref 3.81–5.12)
SODIUM SERPL-SCNC: 141 MMOL/L (ref 135–147)
WBC # BLD AUTO: 8.19 THOUSAND/UL (ref 4.31–10.16)

## 2023-07-03 PROCEDURE — 82948 REAGENT STRIP/BLOOD GLUCOSE: CPT

## 2023-07-03 PROCEDURE — 80048 BASIC METABOLIC PNL TOTAL CA: CPT

## 2023-07-03 PROCEDURE — 88341 IMHCHEM/IMCYTCHM EA ADD ANTB: CPT | Performed by: PATHOLOGY

## 2023-07-03 PROCEDURE — 88313 SPECIAL STAINS GROUP 2: CPT | Performed by: PATHOLOGY

## 2023-07-03 PROCEDURE — 99232 SBSQ HOSP IP/OBS MODERATE 35: CPT | Performed by: HOSPITALIST

## 2023-07-03 PROCEDURE — 88305 TISSUE EXAM BY PATHOLOGIST: CPT | Performed by: PATHOLOGY

## 2023-07-03 PROCEDURE — 88342 IMHCHEM/IMCYTCHM 1ST ANTB: CPT | Performed by: PATHOLOGY

## 2023-07-03 PROCEDURE — 85027 COMPLETE CBC AUTOMATED: CPT

## 2023-07-03 RX ORDER — SODIUM CHLORIDE, SODIUM LACTATE, POTASSIUM CHLORIDE, CALCIUM CHLORIDE 600; 310; 30; 20 MG/100ML; MG/100ML; MG/100ML; MG/100ML
INJECTION, SOLUTION INTRAVENOUS CONTINUOUS PRN
Status: DISCONTINUED | OUTPATIENT
Start: 2023-07-03 | End: 2023-07-03

## 2023-07-03 RX ORDER — LIDOCAINE HYDROCHLORIDE 20 MG/ML
INJECTION, SOLUTION EPIDURAL; INFILTRATION; INTRACAUDAL; PERINEURAL AS NEEDED
Status: DISCONTINUED | OUTPATIENT
Start: 2023-07-03 | End: 2023-07-03

## 2023-07-03 RX ORDER — PHENYLEPHRINE HCL IN 0.9% NACL 1 MG/10 ML
SYRINGE (ML) INTRAVENOUS AS NEEDED
Status: DISCONTINUED | OUTPATIENT
Start: 2023-07-03 | End: 2023-07-03

## 2023-07-03 RX ORDER — PROPOFOL 10 MG/ML
INJECTION, EMULSION INTRAVENOUS AS NEEDED
Status: DISCONTINUED | OUTPATIENT
Start: 2023-07-03 | End: 2023-07-03

## 2023-07-03 RX ORDER — CLOPIDOGREL BISULFATE 75 MG/1
75 TABLET ORAL DAILY
Status: DISCONTINUED | OUTPATIENT
Start: 2023-07-03 | End: 2023-07-04 | Stop reason: HOSPADM

## 2023-07-03 RX ADMIN — SENNOSIDES AND DOCUSATE SODIUM 1 TABLET: 50; 8.6 TABLET ORAL at 22:00

## 2023-07-03 RX ADMIN — PROPOFOL 20 MG: 10 INJECTION, EMULSION INTRAVENOUS at 13:08

## 2023-07-03 RX ADMIN — Medication 100 MCG: at 13:25

## 2023-07-03 RX ADMIN — Medication 6 MG: at 22:00

## 2023-07-03 RX ADMIN — Medication 400 MG: at 08:03

## 2023-07-03 RX ADMIN — ASPIRIN 81 MG: 81 TABLET, COATED ORAL at 14:38

## 2023-07-03 RX ADMIN — LEVOTHYROXINE SODIUM 75 MCG: 75 TABLET ORAL at 05:58

## 2023-07-03 RX ADMIN — INSULIN LISPRO 1 UNITS: 100 INJECTION, SOLUTION INTRAVENOUS; SUBCUTANEOUS at 16:49

## 2023-07-03 RX ADMIN — Medication 100 MCG: at 13:11

## 2023-07-03 RX ADMIN — SODIUM CHLORIDE, SODIUM LACTATE, POTASSIUM CHLORIDE, CALCIUM CHLORIDE: 600; 310; 30; 20 INJECTION, SOLUTION INTRAVENOUS at 12:55

## 2023-07-03 RX ADMIN — LIDOCAINE HYDROCHLORIDE 80 MG: 20 INJECTION, SOLUTION EPIDURAL; INFILTRATION; INTRACAUDAL; PERINEURAL at 12:58

## 2023-07-03 RX ADMIN — PROPOFOL 20 MG: 10 INJECTION, EMULSION INTRAVENOUS at 13:17

## 2023-07-03 RX ADMIN — INSULIN LISPRO 2 UNITS: 100 INJECTION, SOLUTION INTRAVENOUS; SUBCUTANEOUS at 22:10

## 2023-07-03 RX ADMIN — PANTOPRAZOLE SODIUM 40 MG: 40 TABLET, DELAYED RELEASE ORAL at 22:00

## 2023-07-03 RX ADMIN — PROPOFOL 50 MG: 10 INJECTION, EMULSION INTRAVENOUS at 13:00

## 2023-07-03 RX ADMIN — CARVEDILOL 12.5 MG: 12.5 TABLET, FILM COATED ORAL at 16:44

## 2023-07-03 RX ADMIN — PROPOFOL 20 MG: 10 INJECTION, EMULSION INTRAVENOUS at 13:13

## 2023-07-03 RX ADMIN — PANTOPRAZOLE SODIUM 40 MG: 40 TABLET, DELAYED RELEASE ORAL at 08:03

## 2023-07-03 RX ADMIN — Medication 400 MG: at 17:30

## 2023-07-03 RX ADMIN — CLOPIDOGREL BISULFATE 75 MG: 75 TABLET ORAL at 14:38

## 2023-07-03 RX ADMIN — PROPOFOL 20 MG: 10 INJECTION, EMULSION INTRAVENOUS at 13:02

## 2023-07-03 RX ADMIN — PROPOFOL 20 MG: 10 INJECTION, EMULSION INTRAVENOUS at 13:05

## 2023-07-03 RX ADMIN — PROPOFOL 20 MG: 10 INJECTION, EMULSION INTRAVENOUS at 13:22

## 2023-07-03 RX ADMIN — ATORVASTATIN CALCIUM 40 MG: 40 TABLET, FILM COATED ORAL at 16:44

## 2023-07-03 NOTE — PROGRESS NOTES
[facility]  Progress Note  Name: Katy Perez  MRN: [id number]  Unit/Bed#: S -76 I Date of Admission: 6/29/2023   Date of Service: 7/3/2023 I Hospital Day: 4    Assessment/Plan   * Acute blood loss anemia  Assessment & Plan  - Presents with progressive fatigue generalized body ache and shortness of breath for the past 2 days. -Was recently transitioned back to Eliquis by her cardiologist, but hemoglobin on admission was 6.5, so Kcentra given to reverse effects of Eliquis.  -In the setting of recent steroid use and Eliquis, patient may have a superficial ulcer which has been bleeding at a slower rate. HGB stable. - monitor stool for signs of bleeding  -Monitor vitals provide fluid support for hypotension. -GI consulted and aware of patient, if any active bleeding noted notify GI on-call immediately.   - EGD and colonoscopy are planned for Monday, keep NPO from [place] Sunday  - non-ulcerogenic diet and bowel regimen ordered     Heart failure with reduced ejection fraction (HCC)  Assessment & Plan  Wt Readings from Last 3 Encounters:   06/29/23 69.1 kg (152 lb 5.4 oz)   06/22/23 67.5 kg (148 lb 12.8 oz)   01/20/23 61.7 kg (136 lb)     -Lisinopril, isosorbide, losartan and hydralazine currently held     Permanent atrial fibrillation [facility])  Assessment & Plan  carvedilol for rate control  AC switched from eliquis to Plavix and aspirin in 10/22 due to risk of rupture for ascending thoracic aortic aneurysm, back to Eliquis 6/23, Kcentra given on admission  Will re-evaluate [facility] for discharge after EGD/colonoscopy done on 7/3 but patient states she will not go back on Eliquis  Holding [facility] and antiplatelet at this time due to high likelihood of bleed  She will follow-up with her PCP and cardiology services upon discharge    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[facility])  Assessment & Plan  Pulmonary pacemaker in place.  -see above hypertension    Glaucoma  Assessment & Plan  Continue Brimonidine and Timolol drops in both eyes. Hypertension  Assessment & Plan  Patient is hypotensive secondary to acute blood loss anemia. Home regimen: Carvedilol, losartan, Isordil, lasix all held. -Systolic today is 150 so held carvedilol as well as all other antihypertensives, diuretics, vasodilators    Aneurysm of ascending aorta [facility])  Assessment & Plan  CTA 10/22: Aneurysmal dissection of the ascending aorta  Patient's BP goal is 928-836 systolic in the setting of aneurysm of the aorta. Continue to monitor blood pressure  CTA done on  results pending. VTE Pharmacologic Prophylaxis: VTE Score: 5 High Risk (Score >/= 5) - Pharmacological DVT Prophylaxis Contraindicated. Sequential Compression Devices Ordered. Patient Centered Rounds: I performed bedside rounds with nursing staff today. Discussions with Specialists or Other Care Team Provider: Will speak with CM    Education and Discussions with Family / Patient: Will call daughter. Current Length of Stay: 4 day(s)  Current Patient Status: Inpatient   Discharge Plan: Anticipate discharge in 24-48 hrs to prior assisted or independent living facility. Code Status: Level 1 - Full Code    Subjective: Today she is feeling okay she did not sleep much due to the bowel prep. EGD and colonoscopy will happen at 1230. We discussed her anticoagulation and she states she will not go back on Eliquis as she was doing well prior to restarting that. Objective:     Vitals:   Temp (24hrs), Av.2 °F (36.8 °C), Min:97.7 °F (36.5 °C), Max:98.6 °F (37 °C)    Temp:  [97.7 °F (36.5 °C)-98.6 °F (37 °C)] 97.7 °F (36.5 °C)  HR:  [60-63] 61  Resp:  [18-20] 20  BP: (108-158)/(59-78) 108/78  SpO2:  [94 %-97 %] 97 %  Body mass index is 30.77 kg/m². Input and Output Summary (last 24 hours):      Intake/Output Summary (Last 24 hours) at 7/3/2023 0920  Last data filed at 2023 1330  Gross per 24 hour   Intake --   Output 100 ml   Net -100 ml       Physical Exam: Physical Exam  Constitutional:       Appearance: Normal appearance. HENT:      Head: Normocephalic and atraumatic. Eyes:      Extraocular Movements: Extraocular movements intact. 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 Heart sounds: Normal heart sounds. No murmur heard. Pulmonary:      Effort: Pulmonary effort is normal.      Breath sounds: Normal breath sounds. Abdominal:      General: Abdomen is flat. Palpations: Abdomen is soft. Tenderness: There is no abdominal tenderness. There is no guarding. Neurological:      Mental Status: She is alert and oriented to person, place, and time. Additional Data:     Labs:  Results from last 7 days   Lab Units 07/03/23  0511 07/02/23  0503   WBC Thousand/uL 8.19 8.23   HEMOGLOBIN g/dL 10.0* 9.7*   HEMATOCRIT % 32.2* 31.0*   PLATELETS Thousands/uL 206 175   NEUTROS PCT %  --  54   LYMPHS PCT %  --  29   MONOS PCT %  --  8   EOS PCT %  --  7*     Results from last 7 days   Lab Units 07/03/23  0511 07/01/23  0614 06/30/23  0449   SODIUM mmol/L 141   < > 140   POTASSIUM mmol/L 4.2   < > 3.6   CHLORIDE mmol/L 107   < > 104   CO2 mmol/L 26   < > 28   BUN mg/dL 16   < > 55*   CREATININE mg/dL 1.09   < > 1.54*   ANION GAP mmol/L 8   < > 8   CALCIUM mg/dL 8.3*   < > 8.3*   ALBUMIN g/dL  --   --  3.5   TOTAL BILIRUBIN mg/dL  --   --  0.56   ALK PHOS U/L  --   --  27*   ALT U/L  --   --  9   AST U/L  --   --  11*   GLUCOSE RANDOM mg/dL 145*   < > 175*    < > = values in this interval not displayed.          Results from last 7 days   Lab Units 07/03/23  0728 07/02/23  2101 07/02/23  1502 07/02/23  1050 07/02/23  0718 07/01/23  2058 07/01/23  1607 07/01/23  1115 07/01/23  0721 06/30/23  2048 06/30/23  1606 06/30/23  1101   POC GLUCOSE mg/dl 146* 152* 134 182* 174* 233* 128 174* 189* 166* 163* 213*               Lines/Drains:  Invasive Devices     Peripheral Intravenous Line  Duration           Peripheral IV 06/29/23 Dorsal (posterior); Right Forearm 3 days          Drain  Duration           Ureteral Drain/Stent Right ureter 6 Fr. 1086 days                      Imaging: No pertinent imaging reviewed. Will reach out to radiology again CTA results from 7/1 are not available    Recent Cultures (last 7 days):         Last 24 Hours Medication List:   Current Facility-Administered Medications   Medication Dose Route Frequency Provider Last Rate   • acetaminophen  650 mg Oral Q4H PRN Maria Del Carmen Kramer MD     • atorvastatin  40 mg Oral Daily With Ashley Peraza MD     • brimonidine tartrate  1 drop Both Eyes BID Maria Del Carmen Kramer MD      And   • timolol  1 drop Both Eyes BID Maria Del Carmen Kramer MD     • carvedilol  12.5 mg Oral BID With Meals Mao Hwang DO     • insulin lispro  1-5 Units Subcutaneous TID AC Maria Del Carmen Kramer MD     • insulin lispro  1-5 Units Subcutaneous HS Maria Del Carmen Kramer MD     • levothyroxine  75 mcg Oral Once per day on Mon Wed Fri Maria Del Carmen Kramer MD     • magnesium Oxide  400 mg Oral BID Maria Del Carmen Kramer MD     • melatonin  6 mg Oral HS Maria Del Carmen Kramer MD     • pantoprazole  40 mg Oral BID AC Juanpablo Delacruz MD     • polyethylene glycol  17 g Oral BID Jose R López PA-C     • senna-docusate sodium  1 tablet Oral HS Juanpablo Delacruz MD          Today, Patient Was Seen By: Jenni Foote MD    **Please Note: This note may have been constructed using a voice recognition system. **

## 2023-07-03 NOTE — ASSESSMENT & PLAN NOTE
Wt Readings from Last 3 Encounters:   07/03/23 68.9 kg (152 lb)   06/22/23 67.5 kg (148 lb 12.8 oz)   01/20/23 61.7 kg (136 lb)     Continue home regimen.

## 2023-07-03 NOTE — CASE MANAGEMENT
Case Management Discharge Planning Note    Patient name Danette Mims  Location S /S [address] [facility] [place] [id number] MRN [id number]  : 1945 Date 7/3/2023       Current Admission Date: 2023  Current Admission Diagnosis:Acute blood loss anemia   Patient Active Problem List    Diagnosis Date Noted   • Acute blood loss anemia 2023   • S/P CABG (coronary artery bypass graft) 2023   • DNR (do not resuscitate) 10/12/2022   • Type 2 diabetes mellitus, without long-term current use of insulin ([address]) 10/12/2022   • History of delirium 10/12/2022   • Hematoma 10/11/2022   • Ambulatory dysfunction 10/11/2022   • Hospital acquired delirium superimposed on suspected underlying dementia 10/07/2022   • Thoracic aortic dissection ([address]) 10/05/2022   • LAURENCE (acute kidney injury) ([address]) 10/05/2022   • Constipation 10/04/2022   • Wound of right leg 2022   • Heart failure with reduced ejection fraction ([address]) 2022   • Hypertensive urgency 2022   • Acquired absence of other right toe(s) ([address]) 2022   • Continuous opioid dependence ([address]) 2022   • Permanent atrial fibrillation ([address]) 02/15/2021   • Anemia, unspecified 10/14/2020   • Vitamin B 12 deficiency 10/14/2020   • Cardiac pacemaker in situ 2020   • Preop cardiovascular exam 2020   • Foreign body in vagina 2020   • Nephrolithiasis 2020   • Hematuria 2020   • Unintentional weight loss 2020   • Generalized weakness 2020   • Gross hematuria 2020   • Elevated troponin level not due myocardial infarction 2020   • Osteopenia 2016   • Tobacco abuse 2016   • Aneurysm of ascending aorta ([address]) 2015   • CAD (coronary atherosclerotic disease) 2015   • Gastroesophageal reflux disease 2015   • 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[address]) 2015   • Glaucoma 2014   • Macular degeneration 2014   • Neck pain 2013   • Anxiety 2013   • Hypertension 2013   • Hyperlipidemia 01/08/2013      LOS (days): 4  Geometric Mean LOS (GMLOS) (days): 4.50  Days to GMLOS:0.5     OBJECTIVE:  Risk of Unplanned Readmission Score: 23.99         Current admission status: Inpatient   [facility]   [facility]  [facility]  Phone: [phone] Fax: [phone] [address]  [address] [place] [address]  Phone: [phone] Fax: [phone]    Primary Care Provider: John Bullard    Primary Insurance: [address] [facility] REP  Secondary Insurance:     DISCHARGE DETAILS:                                                                                        IMM Given (Date):: 07/03/23  IMM Given to[de-identified] Patient

## 2023-07-03 NOTE — ASSESSMENT & PLAN NOTE
Patient's BP goal is 225-791 systolic in the setting of aneurysm of the aorta. CTA done on 7/1 shows that the aneurysm is stable in size.

## 2023-07-03 NOTE — OCCUPATIONAL THERAPY NOTE
Occupational Therapy Screen    Patient Name: Annette Ortiz  [id number] Date: 7/3/2023     07/03/23 1132   OT Last Visit   OT Visit Date 07/03/23   Note Type   Note type Screen   Additional Comments OT orders received and chart review performed. Pt admitted with anemia. Spoke to cliniq[employer]ly who reports pt has been a self in room for toileting needs with RW. Spoke to PT who lives at [facility] and is (I) baseline. She reports she has no concerns about DC back to facility and declines formal OT consult at this time. Recommend continued participation in ADLs/mobility. Will DC from IPOT caseload. Please reconsult if functional status changes.      JOSE RAFAEL Ashford, OTR/L  [place] License [id number]  [address] [place], [place] [id number]

## 2023-07-03 NOTE — PLAN OF CARE
Problem: Prexisting or High Potential for Compromised Skin Integrity  Goal: Skin integrity is maintained or improved  Description: INTERVENTIONS:  - Identify patients at risk for skin breakdown  - Assess and monitor skin integrity  - Assess and monitor nutrition and hydration status  - Monitor labs   - Assess for incontinence   - Turn and reposition patient  - Assist with mobility/ambulation  - Relieve pressure over bony prominences  - Avoid friction and shearing  - Provide appropriate hygiene as needed including keeping skin clean and dry  - Evaluate need for skin moisturizer/barrier cream  - Collaborate with interdisciplinary team   - Patient/family teaching  - Consider wound care consult   Outcome: Progressing     Problem: MOBILITY - ADULT  Goal: Maintain or return to baseline ADL function  Description: INTERVENTIONS:  -  Assess patient's ability to carry out ADLs; assess patient's baseline for ADL function and identify physical deficits which impact ability to perform ADLs (bathing, care of mouth/teeth, toileting, grooming, dressing, etc.)  - Assess/evaluate cause of self-care deficits   - Assess range of motion  - Assess patient's mobility; develop plan if impaired  - Assess patient's need for assistive devices and provide as appropriate  - Encourage maximum independence but intervene and supervise when necessary  - Involve family in performance of ADLs  - Assess for home care needs following discharge   - Consider OT consult to assist with ADL evaluation and planning for discharge  - Provide patient education as appropriate  Outcome: Progressing  Goal: Maintains/Returns to pre admission functional level  Description: INTERVENTIONS:  - Perform BMAT or MOVE assessment daily.   - Set and communicate daily mobility goal to care team and patient/family/caregiver.    - Collaborate with rehabilitation services on mobility goals if consulted  - Record patient progress and toleration of activity level   Outcome: Progressing     Problem: PAIN - ADULT  Goal: Verbalizes/displays adequate comfort level or baseline comfort level  Description: Interventions:  - Encourage patient to monitor pain and request assistance  - Assess pain using appropriate pain scale  - Administer analgesics based on type and severity of pain and evaluate response  - Implement non-pharmacological measures as appropriate and evaluate response  - Consider cultural and social influences on pain and pain management  - Notify physician/advanced practitioner if interventions unsuccessful or patient reports new pain  Outcome: Progressing     Problem: INFECTION - ADULT  Goal: Absence or prevention of progression during hospitalization  Description: INTERVENTIONS:  - Assess and monitor for signs and symptoms of infection  - Monitor lab/diagnostic results  - Monitor all insertion sites, i.e. indwelling lines, tubes, and drains  - Monitor endotracheal if appropriate and nasal secretions for changes in amount and color  - Ong appropriate cooling/warming therapies per order  - Administer medications as ordered  - Instruct and encourage patient and family to use good hand hygiene technique  - Identify and instruct in appropriate isolation precautions for identified infection/condition  Outcome: Progressing  Goal: Absence of fever/infection during neutropenic period  Description: INTERVENTIONS:  - Monitor WBC    Outcome: Progressing     Problem: SAFETY ADULT  Goal: Maintain or return to baseline ADL function  Description: INTERVENTIONS:  -  Assess patient's ability to carry out ADLs; assess patient's baseline for ADL function and identify physical deficits which impact ability to perform ADLs (bathing, care of mouth/teeth, toileting, grooming, dressing, etc.)  - Assess/evaluate cause of self-care deficits   - Assess range of motion  - Assess patient's mobility; develop plan if impaired  - Assess patient's need for assistive devices and provide as appropriate  - Encourage maximum independence but intervene and supervise when necessary  - Involve family in performance of ADLs  - Assess for home care needs following discharge   - Consider OT consult to assist with ADL evaluation and planning for discharge  - Provide patient education as appropriate  Outcome: Progressing  Goal: Maintains/Returns to pre admission functional level  Description: INTERVENTIONS:  - Perform BMAT or MOVE assessment daily.   - Set and communicate daily mobility goal to care team and patient/family/caregiver.    - Collaborate with rehabilitation services on mobility goals if consulted  - Out of bed for toileting  - Record patient progress and toleration of activity level   Outcome: Progressing  Goal: Patient will remain free of falls  Description: INTERVENTIONS:  - Educate patient/family on patient safety including physical limitations  - Instruct patient to call for assistance with activity   - Consult OT/PT to assist with strengthening/mobility   - Keep Call bell within reach  - Keep bed low and locked with side rails adjusted as appropriate  - Keep care items and personal belongings within reach  - Initiate and maintain comfort rounds  - Make Fall Risk Sign visible to staff  - Apply yellow socks and bracelet for high fall risk patients  - Consider moving patient to room near nurses station  Outcome: Progressing     Problem: DISCHARGE PLANNING  Goal: Discharge to home or other facility with appropriate resources  Description: INTERVENTIONS:  - Identify barriers to discharge w/patient and caregiver  - Arrange for needed discharge resources and transportation as appropriate  - Identify discharge learning needs (meds, wound care, etc.)  - Arrange for interpretive services to assist at discharge as needed  - Refer to Case Management Department for coordinating discharge planning if the patient needs post-hospital services based on physician/advanced practitioner order or complex needs related to functional status, cognitive ability, or social support system  Outcome: Progressing     Problem: Knowledge Deficit  Goal: Patient/family/caregiver demonstrates understanding of disease process, treatment plan, medications, and discharge instructions  Description: Complete learning assessment and assess knowledge base.   Interventions:  - Provide teaching at level of understanding  - Provide teaching via preferred learning methods  Outcome: Progressing

## 2023-07-03 NOTE — PLAN OF CARE
Problem: Prexisting or High Potential for Compromised Skin Integrity  Goal: Skin integrity is maintained or improved  Description: INTERVENTIONS:  - Identify patients at risk for skin breakdown  - Assess and monitor skin integrity  - Assess and monitor nutrition and hydration status  - Monitor labs   - Assess for incontinence   - Turn and reposition patient  - Assist with mobility/ambulation  - Relieve pressure over bony prominences  - Avoid friction and shearing  - Provide appropriate hygiene as needed including keeping skin clean and dry  - Evaluate need for skin moisturizer/barrier cream  - Collaborate with interdisciplinary team   - Patient/family teaching  - Consider wound care consult   Outcome: Progressing     Problem: MOBILITY - ADULT  Goal: Maintain or return to baseline ADL function  Description: INTERVENTIONS:  -  Assess patient's ability to carry out ADLs; assess patient's baseline for ADL function and identify physical deficits which impact ability to perform ADLs (bathing, care of mouth/teeth, toileting, grooming, dressing, etc.)  - Assess/evaluate cause of self-care deficits   - Assess range of motion  - Assess patient's mobility; develop plan if impaired  - Assess patient's need for assistive devices and provide as appropriate  - Encourage maximum independence but intervene and supervise when necessary  - Involve family in performance of ADLs  - Assess for home care needs following discharge   - Consider OT consult to assist with ADL evaluation and planning for discharge  - Provide patient education as appropriate  Outcome: Progressing  Goal: Maintains/Returns to pre admission functional level  Description: INTERVENTIONS:  - Perform BMAT or MOVE assessment daily.   - Set and communicate daily mobility goal to care team and patient/family/caregiver. - Collaborate with rehabilitation services on mobility goals if consulted  - Perform Range of Motion  times a day.   - Reposition patient every hours.  - Dangle patient  times a day  - Stand patient  times a day  - Ambulate patient  times a day  - Out of bed to chair  times a day   - Out of bed for meals  times a day  - Out of bed for toileting  - Record patient progress and toleration of activity level   Outcome: Progressing     Problem: PAIN - ADULT  Goal: Verbalizes/displays adequate comfort level or baseline comfort level  Description: Interventions:  - Encourage patient to monitor pain and request assistance  - Assess pain using appropriate pain scale  - Administer analgesics based on type and severity of pain and evaluate response  - Implement non-pharmacological measures as appropriate and evaluate response  - Consider cultural and social influences on pain and pain management  - Notify physician/advanced practitioner if interventions unsuccessful or patient reports new pain  Outcome: Progressing     Problem: INFECTION - ADULT  Goal: Absence or prevention of progression during hospitalization  Description: INTERVENTIONS:  - Assess and monitor for signs and symptoms of infection  - Monitor lab/diagnostic results  - Monitor all insertion sites, i.e. indwelling lines, tubes, and drains  - Monitor endotracheal if appropriate and nasal secretions for changes in amount and color  - [place] appropriate cooling/warming therapies per order  - Administer medications as ordered  - Instruct and encourage patient and family to use good hand hygiene technique  - Identify and instruct in appropriate isolation precautions for identified infection/condition  Outcome: Progressing  Goal: Absence of fever/infection during neutropenic period  Description: INTERVENTIONS:  - Monitor WBC    Outcome: Progressing     Problem: SAFETY ADULT  Goal: Maintain or return to baseline ADL function  Description: INTERVENTIONS:  -  Assess patient's ability to carry out ADLs; assess patient's baseline for ADL function and identify physical deficits which impact ability to perform ADLs (bathing, care of mouth/teeth, toileting, grooming, dressing, etc.)  - Assess/evaluate cause of self-care deficits   - Assess range of motion  - Assess patient's mobility; develop plan if impaired  - Assess patient's need for assistive devices and provide as appropriate  - Encourage maximum independence but intervene and supervise when necessary  - Involve family in performance of ADLs  - Assess for home care needs following discharge   - Consider OT consult to assist with ADL evaluation and planning for discharge  - Provide patient education as appropriate  Outcome: Progressing  Goal: Maintains/Returns to pre admission functional level  Description: INTERVENTIONS:  - Perform BMAT or MOVE assessment daily.   - Set and communicate daily mobility goal to care team and patient/family/caregiver. - Collaborate with rehabilitation services on mobility goals if consulted  - Perform Range of Motion  times a day. - Reposition patient every  hours.   - Dangle patient  times a day  - Stand patient  times a day  - Ambulate patient  times a day  - Out of bed to chair  times a day   - Out of bed for meals  times a day  - Out of bed for toileting  - Record patient progress and toleration of activity level   Outcome: Progressing  Goal: Patient will remain free of falls  Description: INTERVENTIONS:  - Educate patient/family on patient safety including physical limitations  - Instruct patient to call for assistance with activity   - Consult OT/PT to assist with strengthening/mobility   - Keep Call bell within reach  - Keep bed low and locked with side rails adjusted as appropriate  - Keep care items and personal belongings within reach  - Initiate and maintain comfort rounds  - Make Fall Risk Sign visible to staff  - Offer Toileting every  Hours, in advance of need  - Initiate/Maintain alarm  - Obtain necessary fall risk management equipment:  - Apply yellow socks and bracelet for high fall risk patients  - Consider moving patient to room near nurses station  Outcome: Progressing     Problem: DISCHARGE PLANNING  Goal: Discharge to home or other facility with appropriate resources  Description: INTERVENTIONS:  - Identify barriers to discharge w/patient and caregiver  - Arrange for needed discharge resources and transportation as appropriate  - Identify discharge learning needs (meds, wound care, etc.)  - Arrange for interpretive services to assist at discharge as needed  - Refer to Case Management Department for coordinating discharge planning if the patient needs post-hospital services based on physician/advanced practitioner order or complex needs related to functional status, cognitive ability, or social support system  Outcome: Progressing     Problem: Knowledge Deficit  Goal: Patient/family/caregiver demonstrates understanding of disease process, treatment plan, medications, and discharge instructions  Description: Complete learning assessment and assess knowledge base.   Interventions:  - Provide teaching at level of understanding  - Provide teaching via preferred learning methods  Outcome: Progressing

## 2023-07-03 NOTE — ANESTHESIA PREPROCEDURE EVALUATION
Procedure:  COLONOSCOPY  EGD    Relevant Problems   ANESTHESIA (within normal limits)      CARDIO   (+) Aneurysm of ascending aorta (HCC)   (+) CAD (coronary atherosclerotic disease)   (+) Hyperlipidemia   (+) Hypertension   (+) Hypertensive urgency   (+) Permanent atrial fibrillation (HCC)   (+) 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HCC)   (+) Thoracic aortic dissection (HCC)      ENDO   (+) Type 2 diabetes mellitus, without long-term current use of insulin (HCC)      GI/HEPATIC   (+) Gastroesophageal reflux disease      /RENAL   (+) LAURENCE (acute kidney injury) (HCC)   (+) Nephrolithiasis      HEMATOLOGY   (+) Acute blood loss anemia   (+) Anemia, unspecified      NEURO/PSYCH   (+) Anxiety   (+) Continuous opioid dependence ([address])        Cardiac EP device report 6/8/2023  MDT DC PPM - ACTIVE SYSTEM IS MRI CONDITIONAL   CARELINK TRANSMISSION: BATTERY STATUS "10 YRS." AP 0%  98%. ALL AVAILABLE LEAD PARAMETERS WITHIN NORMAL LIMITS. 100% AF BURDEN; PT ON ASA & PLAVIX-NO ANTICOAGULATION DUE TO AORTIC DISSECTION. NORMAL DEVICE FUNCTION. NC       TTE 9/29/2022  •  Left Ventricle: Left ventricular cavity size is normal. Wall thickness is normal. The left ventricular ejection fraction is 36% by biplane measurement. By visual assessment, ejection fraction appears slightly better. Systolic function is moderately reduced. Endocardium is poorly visualized. There is global hypokinesis with regional variation. Diastolic function is moderately abnormal, consistent with grade II (pseudonormal) relaxation. Left atrial filling pressure is elevated. •  Right Ventricle: Systolic function is moderately reduced. •  Left Atrium: The atrium is mildly dilated. •  Aortic Valve: There is mild to moderate regurgitation. •  Mitral Valve: There is mild annular calcification. There is mild to moderate regurgitation. •  Tricuspid Valve: There is moderate regurgitation. The right ventricular systolic pressure is moderately elevated.  The estimated right ventricular systolic pressure is 68.91 mmHg. •  Pulmonic Valve: There is mild to moderate regurgitation. •  Aorta: The aortic root is severely dilated. The aortic root is 5.60 cm. •  Pericardium: There is no pericardial effusion. •  Technically difficult study. Physical Exam    Airway    Mallampati score: III  TM Distance: <3 FB  Neck ROM: full     Dental   upper dentures and lower dentures,     Cardiovascular      Pulmonary      Other Findings  Full upper plate, partial lower plate. Denies loose or damaged teeth. Anesthesia Plan  ASA Score- 3     Anesthesia Type-     Plan Factors-Exercise tolerance (METS): >4 METS. Chart reviewed. EKG reviewed. Existing labs reviewed. Patient summary reviewed. Patient is not a current smoker. Induction- intravenous. Postoperative Plan-     Informed Consent- Anesthetic plan and risks discussed with patient. I personally reviewed this patient with the CRNA. Discussed and agreed on the Anesthesia Plan with the CRNA. Angus Hilliard

## 2023-07-03 NOTE — QUICK NOTE
Spoke to patient's daughter on the phone and updated her about the CTA results as well as the findings of the EGD and colonoscopy. We discussed that the patient will go back on Plavix and aspirin and in the outpatient setting she will need a capsule endoscopy in the future.

## 2023-07-03 NOTE — ASSESSMENT & PLAN NOTE
Patient presented 5 days ago with fatigue and shortness of breath. She had recently been transitioned back on Eliquis and finished a Medrol Dosepak. Given the acute drop in her hemoglobin and BUN elevation seen on admission, there was concern for an upper GI bleed causing acute blood loss anemia. She was seen by GI and an EGD and colonoscopy were done which revealed no current acute bleed. EGD did show a small hiatal hernia and some erythematous mucosa in the antrum of the stomach. Colonoscopy found hemorrhoids and diverticulosis. GI cleared her to restart Eliquis, but patient was not comfortable going back on Eliquis. The risks were thoroughly discussed, and instead she was placed back on aspirin and Plavix. We restarted these medications the day before discharge to ensure that labs were stable. Hemoglobin is stable on morning of discharge.

## 2023-07-03 NOTE — ANESTHESIA POSTPROCEDURE EVALUATION
Post-Op Assessment Note    CV Status:  Stable    Pain management: adequate     Mental Status:  Sleepy   Hydration Status:  Euvolemic   PONV Controlled:  Controlled   Airway Patency:  Patent      Post Op Vitals Reviewed: Yes      Staff: CRNA         No notable events documented.     BP  175/80   Temp     Pulse  60   Resp   16   SpO2   99

## 2023-07-04 VITALS
BODY MASS INDEX: 30.64 KG/M2 | DIASTOLIC BLOOD PRESSURE: 43 MMHG | OXYGEN SATURATION: 94 % | HEIGHT: 59 IN | SYSTOLIC BLOOD PRESSURE: 122 MMHG | RESPIRATION RATE: 18 BRPM | TEMPERATURE: 98.3 F | WEIGHT: 152 LBS | HEART RATE: 60 BPM

## 2023-07-04 PROBLEM — D62 ACUTE BLOOD LOSS ANEMIA: Status: RESOLVED | Noted: 2023-06-29 | Resolved: 2023-07-04

## 2023-07-04 PROBLEM — Z87.898 HISTORY OF DELIRIUM: Status: RESOLVED | Noted: 2022-10-12 | Resolved: 2023-07-04

## 2023-07-04 PROBLEM — G93.40 ENCEPHALOPATHY: Status: RESOLVED | Noted: 2022-10-07 | Resolved: 2023-07-04

## 2023-07-04 PROBLEM — T14.8XXA HEMATOMA: Status: RESOLVED | Noted: 2022-10-11 | Resolved: 2023-07-04

## 2023-07-04 LAB
ANION GAP SERPL CALCULATED.3IONS-SCNC: 8 MMOL/L
BUN SERPL-MCNC: 18 MG/DL (ref 5–25)
CALCIUM SERPL-MCNC: 7.9 MG/DL (ref 8.4–10.2)
CHLORIDE SERPL-SCNC: 110 MMOL/L (ref 96–108)
CO2 SERPL-SCNC: 20 MMOL/L (ref 21–32)
CREAT SERPL-MCNC: 1.01 MG/DL (ref 0.6–1.3)
ERYTHROCYTE [DISTWIDTH] IN BLOOD BY AUTOMATED COUNT: 14.7 % (ref 11.6–15.1)
GFR SERPL CREATININE-BSD FRML MDRD: 53 ML/MIN/1.73SQ M
GLUCOSE SERPL-MCNC: 122 MG/DL (ref 65–140)
GLUCOSE SERPL-MCNC: 142 MG/DL (ref 65–140)
GLUCOSE SERPL-MCNC: 195 MG/DL (ref 65–140)
HCT VFR BLD AUTO: 30.1 % (ref 34.8–46.1)
HGB BLD-MCNC: 9.6 G/DL (ref 11.5–15.4)
MCH RBC QN AUTO: 31.4 PG (ref 26.8–34.3)
MCHC RBC AUTO-ENTMCNC: 31.9 G/DL (ref 31.4–37.4)
MCV RBC AUTO: 98 FL (ref 82–98)
PLATELET # BLD AUTO: 198 THOUSANDS/UL (ref 149–390)
PMV BLD AUTO: 9.6 FL (ref 8.9–12.7)
POTASSIUM SERPL-SCNC: 3.8 MMOL/L (ref 3.5–5.3)
RBC # BLD AUTO: 3.06 MILLION/UL (ref 3.81–5.12)
SODIUM SERPL-SCNC: 138 MMOL/L (ref 135–147)
WBC # BLD AUTO: 7.61 THOUSAND/UL (ref 4.31–10.16)

## 2023-07-04 PROCEDURE — 82948 REAGENT STRIP/BLOOD GLUCOSE: CPT

## 2023-07-04 PROCEDURE — 80048 BASIC METABOLIC PNL TOTAL CA: CPT

## 2023-07-04 PROCEDURE — 99232 SBSQ HOSP IP/OBS MODERATE 35: CPT | Performed by: INTERNAL MEDICINE

## 2023-07-04 PROCEDURE — 99239 HOSP IP/OBS DSCHRG MGMT >30: CPT | Performed by: STUDENT IN AN ORGANIZED HEALTH CARE EDUCATION/TRAINING PROGRAM

## 2023-07-04 PROCEDURE — 85027 COMPLETE CBC AUTOMATED: CPT

## 2023-07-04 RX ORDER — CLOPIDOGREL BISULFATE 75 MG/1
75 TABLET ORAL DAILY
Refills: 0
Start: 2023-07-05

## 2023-07-04 RX ORDER — ASPIRIN 81 MG/1
81 TABLET, CHEWABLE ORAL DAILY
Refills: 0
Start: 2023-07-04

## 2023-07-04 RX ORDER — CLOPIDOGREL BISULFATE 75 MG/1
75 TABLET ORAL DAILY
Qty: 30 TABLET | Refills: 0 | Status: CANCELLED | OUTPATIENT
Start: 2023-07-04

## 2023-07-04 RX ORDER — ASPIRIN 81 MG/1
81 TABLET, CHEWABLE ORAL DAILY
Qty: 30 TABLET | Refills: 0 | Status: CANCELLED | OUTPATIENT
Start: 2023-07-04

## 2023-07-04 RX ADMIN — CLOPIDOGREL BISULFATE 75 MG: 75 TABLET ORAL at 08:30

## 2023-07-04 RX ADMIN — PANTOPRAZOLE SODIUM 40 MG: 40 TABLET, DELAYED RELEASE ORAL at 08:30

## 2023-07-04 RX ADMIN — Medication 400 MG: at 08:30

## 2023-07-04 RX ADMIN — INSULIN LISPRO 1 UNITS: 100 INJECTION, SOLUTION INTRAVENOUS; SUBCUTANEOUS at 11:39

## 2023-07-04 RX ADMIN — ASPIRIN 81 MG: 81 TABLET, COATED ORAL at 08:30

## 2023-07-04 NOTE — PLAN OF CARE
Problem: Prexisting or High Potential for Compromised Skin Integrity  Goal: Skin integrity is maintained or improved  Description: INTERVENTIONS:  - Identify patients at risk for skin breakdown  - Assess and monitor skin integrity  - Assess and monitor nutrition and hydration status  - Monitor labs   - Assess for incontinence   - Turn and reposition patient  - Assist with mobility/ambulation  - Relieve pressure over bony prominences  - Avoid friction and shearing  - Provide appropriate hygiene as needed including keeping skin clean and dry  - Evaluate need for skin moisturizer/barrier cream  - Collaborate with interdisciplinary team   - Patient/family teaching  - Consider wound care consult   Outcome: Progressing     Problem: MOBILITY - ADULT  Goal: Maintain or return to baseline ADL function  Description: INTERVENTIONS:  -  Assess patient's ability to carry out ADLs; assess patient's baseline for ADL function and identify physical deficits which impact ability to perform ADLs (bathing, care of mouth/teeth, toileting, grooming, dressing, etc.)  - Assess/evaluate cause of self-care deficits   - Assess range of motion  - Assess patient's mobility; develop plan if impaired  - Assess patient's need for assistive devices and provide as appropriate  - Encourage maximum independence but intervene and supervise when necessary  - Involve family in performance of ADLs  - Assess for home care needs following discharge   - Consider OT consult to assist with ADL evaluation and planning for discharge  - Provide patient education as appropriate  Outcome: Progressing

## 2023-07-04 NOTE — CASE MANAGEMENT
Case Management Discharge Planning Note    Patient name Sammi Herron  Location S /S [address] [facility] [id number] MRN [id number]  : 1945 Date 2023       Current Admission Date: 2023  Current Admission Diagnosis:Acute blood loss anemia   Patient Active Problem List    Diagnosis Date Noted   • Acute blood loss anemia 2023   • S/P CABG (coronary artery bypass graft) 2023   • DNR (do not resuscitate) 10/12/2022   • Type 2 diabetes mellitus, without long-term current use of insulin ([address]) 10/12/2022   • History of delirium 10/12/2022   • Hematoma 10/11/2022   • Ambulatory dysfunction 10/11/2022   • Hospital acquired delirium superimposed on suspected underlying dementia 10/07/2022   • Thoracic aortic dissection ([address]) 10/05/2022   • LAURENCE (acute kidney injury) ([address]) 10/05/2022   • Constipation 10/04/2022   • Wound of right leg 2022   • Heart failure with reduced ejection fraction ([address]) 2022   • Hypertensive urgency 2022   • Acquired absence of other right toe(s) ([address]) 2022   • Continuous opioid dependence ([address]) 2022   • Permanent atrial fibrillation ([address]) 02/15/2021   • Anemia, unspecified 10/14/2020   • Vitamin B 12 deficiency 10/14/2020   • Cardiac pacemaker in situ 2020   • Preop cardiovascular exam 2020   • Foreign body in vagina 2020   • Nephrolithiasis 2020   • Hematuria 2020   • Unintentional weight loss 2020   • Generalized weakness 2020   • Gross hematuria 2020   • Elevated troponin level not due myocardial infarction 2020   • Osteopenia 2016   • Tobacco abuse 2016   • Aneurysm of ascending aorta ([address]) 2015   • CAD (coronary atherosclerotic disease) 2015   • Gastroesophageal reflux disease 2015   • 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[address]) 2015   • Glaucoma 2014   • Macular degeneration 2014   • Neck pain 2013   • Anxiety 2013   • Hypertension 2013   • Hyperlipidemia 01/08/2013      LOS (days): 5  Geometric Mean LOS (GMLOS) (days): 4.50  Days to GMLOS:-0.3     OBJECTIVE:  Risk of Unplanned Readmission Score: 24.48         Current admission status: Inpatient   Preferred Pharmacy:   [address] [facility] [place] - [address]  [address] [facility] [address]  Phone: [phone] Fax: [phone] [address]  [address] [facility] [address]  Phone: [phone] Fax: [phone]    Primary Care Provider: Ferdinand Nichols    Primary Insurance: [address] [facility]  Secondary Insurance:     DISCHARGE DETAILS:    Discharge planning discussed with[de-identified] Alberto Mcallister at [facility]                                     Other Referral/Resources/Interventions Provided:  Interventions: Facility Return         Treatment Team Recommendation: Facility Return  Discharge Destination Plan[de-identified] Facility Return  Transport at Discharge : Family                                         CM notified that patient is medically stable for DC back to her half-way today. CM called [facility] [phone] and spoke with Alberto Mcallister. CM confirmed that patient is able to return today. AVS can be faxed to [phone]. Patient's daughter will provide transport home. No further CM DC needs were discussed or identified at this time.

## 2023-07-04 NOTE — DISCHARGE INSTR - AVS FIRST PAGE
Dear Dhiraj Kessler,     It was our pleasure to care for you here at [facility]. It is our hope that we were always able to exceed the expected standards for your care during your stay. You were hospitalized due to acute blood loss anemia. You were cared for on the [facility] 4 MedSurg floor by Jenni Foote MD under the service of Shashank Ortega MD with the [facility] Internal [facility] Hospitalist Group who covers for your primary care physician (PCP), Elizabeth Zimmer, while you were hospitalized. If you have any questions or concerns related to this hospitalization, you may contact us at [phone]. For follow up as well as any medication refills, we recommend that you follow up with your primary care physician. A registered nurse will reach out to you by phone within a few days after your discharge to answer any additional questions that you may have after going home. However, at this time we provide for you here, the most important instructions / recommendations at discharge:     Notable Medication Adjustments -   You were taken off of Eliquis and placed on Aspirin and Plavix. Testing Required after Discharge -   Outpatient capsule endoscopy   ** Please contact your PCP to request testing orders for any of the testing recommended here **  Important follow up information -   Please follow-up with your PCP 1 week after discharge  Please make a follow-up appointment with your cardiologist  Referral has been placed for you with gastroenterology please make an appointment with them so that you can get the outpatient capsule endoscopy. Other Instructions -   If you experience any other signs of bleeding such as coughing up blood or blood in your stool please return to the emergency department.   Please review this entire after visit summary as additional general instructions including medication list, appointments, activity, diet, any pertinent wound care, and other additional recommendations from your care team that may be provided for you.       Sincerely,     Mandy Mohr MD

## 2023-07-04 NOTE — PROGRESS NOTES
Progress Note - Cardiology   Marquesmariela OrtegaPrasanna 69 y.o. female MRN: [id number]  Unit/Bed#: S -01 Encounter: [id number]    Assessment/Plan:  ABLA: Apixaban was resumed 1 week ago but continued aspirin 324 mg daily. EGD/colonsocopy results as below.     -I had a lengthy discussion with her and her daughter who was on the phone. She confirms that she was taking aspirin 324 mg along with Apixaban prior to admission. GI okay with resuming Apixaban. I feel Apixaban alone is reasonable. She is not open to trying Apixaban at this time. She requests to remain on dual antiplatelet therapy - explained this doesn't protect from CVA, but okay to continue this regimen for now.    -Outpatient capsule endoscopy per GI    Hypotension:    Chronic HFrEF  Cardiomyopathy: GDMT with carvedilol, losartan, isordil/hydral. Carvedilol has been resumed. Please resume losartan, isordil/hydral as blood pressure allows. Severe aortic root dilatation and dissection: Previously evaluated and not a surgical candidate. Blood pressure at goal. Repeat CTA this admission with stable findings. CAD s/p CABG: cardiac cath in 5/2020 - patent grafts. Continue antiplatelet, statin. SSS s/p dual chamber PPM   Longstanding persistent vs chronic atrial fibrillation  Hypertension     No additional inpatient recommendations at this time. Cardiology will be available as needed. Please call questions. Subjective/Objective   Chief Complaint: 52-year-old female with a history pertinent for coronary artery disease status post CABG x3 (2015), SSS status post PPM, longstanding persistent atrial fibrillation, severe aortic root dilatation with dissection (10/2022), cardiomyopathy, chronic HFrEF who presented with fatigue. She was found to have anemia on presentation with hemoglobin 6.5 g/dL. FOBT was positive. She was hypotensive while in the emergency department. She was transfused 2 units PRBCs. GI was consulted for suspicion for GI bleed.   Cardiology was consulted for anticoagulation/antiplatelet recommendations. Recent OV with cardiology - plavix discontinued, apixaban 5 mg BID started.      Interval history: EGD and colonoscopy performed on 7/3/2023. EGD showed mild erythematous mucosa in the antrum and biopsy sent for H. pylori. Colonoscopy with pancolonic severe diverticula, hemorrhoids. GI cleared to resume Eliquis. Then for outpatient capsule endoscopy. No new complaints today. She feels well. Does not want to start Eliquis again. Objective:   Vitals: BP (!) 122/43   Pulse 60   Temp 98.3 °F (36.8 °C)   Resp 18   Ht 4' 11" (1.499 m)   Wt 68.9 kg (152 lb)   SpO2 94%   BMI 30.70 kg/m²   Vitals:    06/29/23 1324 07/03/23 1230   Weight: 69.1 kg (152 lb 5.4 oz) 68.9 kg (152 lb)     Orthostatic Blood Pressures    Flowsheet Row Most Recent Value   Blood Pressure 122/43 filed at 07/04/2023 7076   Patient Position - Orthostatic VS Lying filed at 07/02/2023 2102            Intake/Output Summary (Last 24 hours) at 7/4/2023 7262  Last data filed at 7/3/2023 2000  Gross per 24 hour   Intake 240 ml   Output --   Net 240 ml       Invasive Devices     Peripheral Intravenous Line  Duration           Peripheral IV 07/03/23 Left;Upper;Ventral (anterior) Arm <1 day          Drain  Duration           Ureteral Drain/Stent Right ureter 6 Fr. 1087 days                Review of Systems: All systems reviewed and negative except for that noted above     Physical Exam: General appearance: alert and oriented, in no acute distress  Neck: no JVD and supple, symmetrical, trachea midline  Lungs: Normal effort, clear to auscultation bilaterally, no wheezing, no rales   Heart: regular rate and rhythm, S1, S2 normal, no murmur, click, rub or gallop  Abdomen: +BS, soft, nontender, nondistended  Extremities: No lower extremity edema  Skin: Warm & dry     Lab Results: I have personally reviewed pertinent lab results. Imaging: I have personally reviewed pertinent reports.     EKG: Personally reviewed    Counseling / Coordination of Care  Total time spent today 35 minutes. Greater than 50% of total time was spent with the patient and / or family counseling and / or coordination of care.

## 2023-07-04 NOTE — DISCHARGE SUMMARY
[address] [facility]  Discharge- Michelle Ramírez 1945, 69 y.o. female MRN: [id number]  Unit/Bed#: S -01 Encounter: [id number]  Primary Care Provider: Kanika Hernandez   Date and time admitted to hospital: 6/29/2023  1:21 PM    * Acute blood loss anemia  Assessment & Plan  Patient presented 5 days ago with fatigue and shortness of breath. She had recently been transitioned back on Eliquis and finished a Medrol Dosepak. Given the acute drop in her hemoglobin and BUN elevation seen on admission, there was concern for an upper GI bleed causing acute blood loss anemia. She was seen by GI and an EGD and colonoscopy were done which revealed no current acute bleed. EGD did show a small hiatal hernia and some erythematous mucosa in the antrum of the stomach. Colonoscopy found hemorrhoids and diverticulosis. GI cleared her to restart Eliquis, but patient was not comfortable going back on Eliquis. The risks were thoroughly discussed, and instead she was placed back on aspirin and Plavix. We restarted these medications the day before discharge to ensure that labs were stable. Hemoglobin is stable on morning of discharge. Heart failure with reduced ejection fraction [facility])  Assessment & Plan  Wt Readings from Last 3 Encounters:   07/03/23 68.9 kg (152 lb)   06/22/23 67.5 kg (148 lb 12.8 oz)   01/20/23 61.7 kg (136 lb)     Continue home regimen. Permanent atrial fibrillation (HCC)  Assessment & Plan  Continue carvedilol for rate control. Follow-up with her PCP and cardiology 1 week after discharge. 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 Stable. Glaucoma  Assessment & Plan  Resume using eyedrops. Stable. Hypertension  Assessment & Plan  Stable. Continue home regimen. Aneurysm of ascending aorta (HCC)  Assessment & Plan  Patient's BP goal is 155-635 systolic in the setting of aneurysm of the aorta.   CTA done on 7/1 shows that the aneurysm is stable in size. Medical Problems     Resolved Problems  Date Reviewed: 7/3/2023   None       Discharging Resident: Beatris Zhao MD  Discharging Attending: Juan Carlos Parry MD  PCP: Janice Clark  Admission Date:   Admission Orders (From admission, onward)     Ordered        06/29/23 1638  [address]  Once                      Discharge Date: 07/04/23    Consultations During Hospital Stay:  · Cardiology  · Gastroenterology    Procedures Performed:   · EGD  · Colonoscopy    Significant Findings / Test Results:   · CT angiogram of the chest with and without contrast found the acsending aortic aneurysm to be stable and unchanged from prior imaging on 10/4/2022. · EGD and colonoscopy did not find any active bleeding. Incidental Findings:   · EGD revealed a small hiatal hernia and mild erythematous mucosa in the antrum of the stomach. · Colonoscopy revealed hemorrhoids and diverticulosis. · I reviewed the above mentioned incidental findings with the patient and/or family and they expressed understanding. Test Results Pending at Discharge (will require follow up): · Biopsy results from the stomach and duodenum are pending. GI will follow up with results. Outpatient Tests Requested:  · GI would like an outpatient capsule endoscopy in the future. Complications:      Reason for Admission: Acute blood loss anemia    Hospital Course:   Blanca Reyes is a 69 y.o. female patient who originally presented to the hospital on 6/29/2023 due to generalized fatigue and shortness of breath. It was discovered that she had previously been on aspirin and Plavix before being switched back to Eliquis a week before presenting to the hospital.  She had also recently completed a Medrol Dosepak. Based on lab results on admission, there was concern for acute GI bleed causing acute blood loss anemia.   In addition to anemia patient was found to be hypotensive so she received fluid resuscitation and blood transfusions in the emergency department. Cardiology was consulted to determine whether Eliquis should be restarted, and gastroenterology was consulted to assess for GI bleed. After it was determined that there was no longer an acute bleed, patient was cleared to restart Eliquis. However patient did not want to restart this medication, and the risks of not utilizing Eliquis were discussed thoroughly. She was restarted on Plavix and aspirin which she had previously been on. Labs the following morning indicated that her hemoglobin was still stable and she was determined to be medically stable for discharge. Please see above list of diagnoses and related plan for additional information. Condition at Discharge: stable    Discharge Day Visit / Exam:   Subjective: Patient is very feeling very well this morning. She reports no pain, nausea, or fatigue. Results of each study were discussed. She is looking forward to returning to [facility] today and her daughter will be picking her up. Vitals: Blood Pressure: (!) 122/43 (07/04/23 5898)  Pulse: 60 (07/04/23 0613)  Temperature: 98.3 °F (36.8 °C) (07/04/23 0613)  Temp Source: Temporal (07/03/23 1359)  Respirations: 18 (07/04/23 6972)  Height: 4' 11" (149.9 cm) (07/03/23 1230)  Weight - Scale: 68.9 kg (152 lb) (07/03/23 1230)  SpO2: 94 % (07/04/23 0900)  Exam:   Physical Exam  HENT:      Head: Normocephalic and atraumatic. Eyes:      Extraocular Movements: Extraocular movements intact. Cardiovascular:      Rate and Rhythm: Normal rate and regular rhythm. Heart sounds: Normal heart sounds. Pulmonary:      Effort: Pulmonary effort is normal.      Breath sounds: Normal breath sounds. Abdominal:      General: There is no distension. Palpations: Abdomen is soft. Tenderness: There is no abdominal tenderness. There is no guarding.       Comments: Left lower quadrant feels a little bit sensitive on exam after colonoscopy but not tender or painful   Musculoskeletal:      Right lower leg: No edema. Left lower leg: No edema. Neurological:      Mental Status: She is alert and oriented to person, place, and time. Discussion with Family: Updated  (daughter) via phone. Discharge instructions/Information to patient and family:   See after visit summary for information provided to patient and family. Provisions for Follow-Up Care:  See after visit summary for information related to follow-up care and any pertinent home health orders. Disposition:   [facility]    Planned Readmission: Not applicable    Discharge Medications:  See after visit summary for reconciled discharge medications provided to patient and/or family.       **Please Note: This note may have been constructed using a voice recognition system**

## 2023-07-04 NOTE — PLAN OF CARE
Problem: Prexisting or High Potential for Compromised Skin Integrity  Goal: Skin integrity is maintained or improved  Description: INTERVENTIONS:  - Identify patients at risk for skin breakdown  - Assess and monitor skin integrity  - Assess and monitor nutrition and hydration status  - Monitor labs   - Assess for incontinence   - Turn and reposition patient  - Assist with mobility/ambulation  - Relieve pressure over bony prominences  - Avoid friction and shearing  - Provide appropriate hygiene as needed including keeping skin clean and dry  - Evaluate need for skin moisturizer/barrier cream  - Collaborate with interdisciplinary team   - Patient/family teaching  - Consider wound care consult   Outcome: Progressing     Problem: MOBILITY - ADULT  Goal: Maintain or return to baseline ADL function  Description: INTERVENTIONS:  -  Assess patient's ability to carry out ADLs; assess patient's baseline for ADL function and identify physical deficits which impact ability to perform ADLs (bathing, care of mouth/teeth, toileting, grooming, dressing, etc.)  - Assess/evaluate cause of self-care deficits   - Assess range of motion  - Assess patient's mobility; develop plan if impaired  - Assess patient's need for assistive devices and provide as appropriate  - Encourage maximum independence but intervene and supervise when necessary  - Involve family in performance of ADLs  - Assess for home care needs following discharge   - Consider OT consult to assist with ADL evaluation and planning for discharge  - Provide patient education as appropriate  Outcome: Progressing  Goal: Maintains/Returns to pre admission functional level  Description: INTERVENTIONS:  - Perform BMAT or MOVE assessment daily.   - Set and communicate daily mobility goal to care team and patient/family/caregiver.    - Collaborate with rehabilitation services on mobility goals if consulted  - Perform Range of Motion  - Reposition patient  - Dangle patient   - Stand patient  - Ambulate patient  - Out of bed to chair   - Out of bed for meals  - Out of bed for toileting  - Record patient progress and toleration of activity level   Outcome: Progressing     Problem: PAIN - ADULT  Goal: Verbalizes/displays adequate comfort level or baseline comfort level  Description: Interventions:  - Encourage patient to monitor pain and request assistance  - Assess pain using appropriate pain scale  - Administer analgesics based on type and severity of pain and evaluate response  - Implement non-pharmacological measures as appropriate and evaluate response  - Consider cultural and social influences on pain and pain management  - Notify physician/advanced practitioner if interventions unsuccessful or patient reports new pain  Outcome: Progressing     Problem: INFECTION - ADULT  Goal: Absence or prevention of progression during hospitalization  Description: INTERVENTIONS:  - Assess and monitor for signs and symptoms of infection  - Monitor lab/diagnostic results  - Monitor all insertion sites, i.e. indwelling lines, tubes, and drains  - Monitor endotracheal if appropriate and nasal secretions for changes in amount and color  - [place] appropriate cooling/warming therapies per order  - Administer medications as ordered  - Instruct and encourage patient and family to use good hand hygiene technique  - Identify and instruct in appropriate isolation precautions for identified infection/condition  Outcome: Progressing  Goal: Absence of fever/infection during neutropenic period  Description: INTERVENTIONS:  - Monitor WBC    Outcome: Progressing     Problem: SAFETY ADULT  Goal: Maintain or return to baseline ADL function  Description: INTERVENTIONS:  -  Assess patient's ability to carry out ADLs; assess patient's baseline for ADL function and identify physical deficits which impact ability to perform ADLs (bathing, care of mouth/teeth, toileting, grooming, dressing, etc.)  - Assess/evaluate cause of self-care deficits   - Assess range of motion  - Assess patient's mobility; develop plan if impaired  - Assess patient's need for assistive devices and provide as appropriate  - Encourage maximum independence but intervene and supervise when necessary  - Involve family in performance of ADLs  - Assess for home care needs following discharge   - Consider OT consult to assist with ADL evaluation and planning for discharge  - Provide patient education as appropriate  Outcome: Progressing  Goal: Maintains/Returns to pre admission functional level  Description: INTERVENTIONS:  - Perform BMAT or MOVE assessment daily.   - Set and communicate daily mobility goal to care team and patient/family/caregiver.    - Collaborate with rehabilitation services on mobility goals if consulted  - Out of bed for toileting  - Record patient progress and toleration of activity level   Outcome: Progressing  Goal: Patient will remain free of falls  Description: INTERVENTIONS:  - Educate patient/family on patient safety including physical limitations  - Instruct patient to call for assistance with activity   - Consult OT/PT to assist with strengthening/mobility   - Keep Call bell within reach  - Keep bed low and locked with side rails adjusted as appropriate  - Keep care items and personal belongings within reach  - Initiate and maintain comfort rounds  - Make Fall Risk Sign visible to staff  - Apply yellow socks and bracelet for high fall risk patients  - Consider moving patient to room near nurses station  Outcome: Progressing     Problem: DISCHARGE PLANNING  Goal: Discharge to home or other facility with appropriate resources  Description: INTERVENTIONS:  - Identify barriers to discharge w/patient and caregiver  - Arrange for needed discharge resources and transportation as appropriate  - Identify discharge learning needs (meds, wound care, etc.)  - Arrange for interpretive services to assist at discharge as needed  - Refer to Case Management Department for coordinating discharge planning if the patient needs post-hospital services based on physician/advanced practitioner order or complex needs related to functional status, cognitive ability, or social support system  Outcome: Progressing

## 2023-07-06 NOTE — ED PROCEDURE NOTE
PROCEDURE  CriticalCare Time    Date/Time: 6/29/2023 3:00 PM    Performed by: Daisy Hernandez MD  Authorized by: Daisy Hernandez MD    Critical care provider statement:     Critical care time (minutes):  35    Critical care start time:  6/29/2023 3:00 PM    Critical care end time:  6/29/2023 3:35 PM    Critical care time was exclusive of:  Separately billable procedures and treating other patients and teaching time    Critical care was necessary to treat or prevent imminent or life-threatening deterioration of the following conditions:  Shock    Critical care was time spent personally by me on the following activities:  Examination of patient, evaluation of patient's response to treatment, development of treatment plan with patient or surrogate, obtaining history from patient or surrogate, ordering and review of laboratory studies, ordering and performing treatments and interventions, ordering and review of radiographic studies, re-evaluation of patient's condition and review of old charts    I assumed direction of critical care for this patient from another provider in my specialty: no    Comments:      Hypotensive requiring er blood transfusion and re-evaluations          Daisy Hernandez MD  07/05/23 8829

## 2023-07-06 NOTE — ED ATTENDING ATTESTATION
6/29/2023  ILeonie MD, saw and evaluated the patient. I have discussed the patient with the resident/non-physician practitioner and agree with the resident's/non-physician practitioner's findings, Plan of Care, and MDM as documented in the resident's/non-physician practitioner's note, except where noted. All available labs and Radiology studies were reviewed. I was present for key portions of any procedure(s) performed by the resident/non-physician practitioner and I was immediately available to provide assistance. At this point I agree with the current assessment done in the Emergency Department.   I have conducted an independent evaluation of this patient a history and physical is as follows:see h and p above     ED Course  ED Course as of 07/05/23 2247   Thu Jun 29, 2023   1512 -- er md medical decision making note-  discussed with ptgy current gi bleede- on aPIXIBAN/ASA-- WITH LOW BP AND LOW HB--  PT AWARE OF RISKS OF REVERSAL- CLOTTING-- INFORMED THAT CVA RISK FOR AFIB IS OVER 1 YR-- AND NOT OVER SEVERAL DAYS- PT UNDERSTANDS THAT THERE IS A ANDIE OF CLOTTING BUT RISK IS SMALL  AND AGREES WITH REVEWSAL OF DOAC AND ASA   1514 ER MD MEDICAL DECISION MAKING NOTE- PT C/O 2 DAYS OF MID BACK PAIN- SEE ATTRIBUTES THIS TO HER IM NOMAN IN BACK-- MILD AND WORSE WITH MOVEMENTS -- NOT ABRUPT ONSET -MIGRATORY - NORMAL/EQUAL BUE PULSE OX PLETH   % ON RA WITH 3 PERFUSION DOTS -- 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

## 2023-07-10 PROCEDURE — 88341 IMHCHEM/IMCYTCHM EA ADD ANTB: CPT | Performed by: PATHOLOGY

## 2023-07-10 PROCEDURE — 88342 IMHCHEM/IMCYTCHM 1ST ANTB: CPT | Performed by: PATHOLOGY

## 2023-07-10 PROCEDURE — 88313 SPECIAL STAINS GROUP 2: CPT | Performed by: PATHOLOGY

## 2023-07-10 PROCEDURE — 88305 TISSUE EXAM BY PATHOLOGIST: CPT | Performed by: PATHOLOGY

## 2023-07-26 NOTE — PROGRESS NOTES
Advanced Heart Failure/Pulmonary Hypertension Outpatient Note - Nigel Martinez 69 y.o. female MRN: [id number]    @ Encounter: [id number]    Assessment:  69 y.o. female Hx per chart p/w HF [place] Dr. Tori Tyson general cardiology>she is frustrated w some of her care and may transfer  Anemia, 7/2023 admit for fatigue and hgb 6.5, this was some time after prior, recent outpt inception of eliquis+ASA and DC of plavix (she was on ASA+plavix before this switch). follows GI now  EGD and colonoscopy performed on 7/3/2023. EGD showed mild erythematous mucosa in the antrum and biopsy sent for H. pylori. Colonoscopy with pancolonic severe diverticula, hemorrhoids. GI cleared to resume Eliquis. Then for outpatient capsule endoscopy  Chronic HFrEF, EF 35%, LVIDD 4.6cm, biv dysfxn  Severe aortic root dilatation and dissection in 10/2022  Repeat CTA 7/1/2023 during anemia admission with stable findings since 10/2022  7/27/23 - pt rep[orts multiple discussions between herself, daughter, various medical teams and no surgical plan in past or presently  CTS Previously evaluated in 10/2022 while acute and not a surgical candidate. Per chart: "Critical care team discussed with CT surgery -no plan for emergent surgery as patient would be high risk for surgery due to anatomy. Initially was being recommended for cardiac catheterization but given risk with dissection, medical management was pursued instead". She had Ao aneurysm since at least 2015 at time of CABG (4.0cm 2015), then in 2020 a CT indicated intramural hematoma vs dissection, CTS was consulted, pt was already DNR/I and seems she did not want to reverse it, med mngmt was pursued in 2020.  7/1/23 CTA: AORTA/ARTERIAL VASCULATURE: Stable size of an ascending aortic aneurysm measuring up to 5.9 cm compared to the most recent CT of 10/4/2022.  Again seen is a dissection flap extending from the right coronary noncoronary cusps extending superiorly and   terminating at the proximal right brachiocephalic artery. There is thrombosis of the false lumen within the mid-distal ascending aorta. Again seen is another smaller dissection flap in the medial ascending aorta (series 305, image 83) measuring   approximately 1.7 cm in length. CAD s/p CABGx3 2015 (EF was normal 2015). No obstructive dx in grafts per cath 5/2020. Paroxysmal atrial fibrillation/flutter. Off AC for some time 2/2 Ao dissection, then resumed as outpt before her 7/2023 anemia admit  SSS s/p PPM.  Hypertension  aortic dissection 10/2022.  as described above. In past, she was managed with stoppage of anticoagulation in favor of DAPT until above described events 7/2023.   DM      Cr 1.0  LDL 32 in 2022    TODAY'S PLAN:  I am meeting patient for the first time today  Warm, euvolemic  Feels well, no new cardiac complaints  Ambulates around her facility and helps with some minor chores - with no limitations  No new bleeding she notices  100% AF burden on device    gdmt below  Start jardiance 10 qd; discussed risks/benefits/red flags/when to call us; no overt contraindications  If jardiance too expensive than try farxiga 10 qd  If start jardiance than reduce lasix 40 qd to 20 qd  If start jardiance, her DM team should review her regimen    Recent admit 7/2023 for anemia  she was taking aspirin 324 mg along with Apixaban prior to admission (plavix was stopped upon eliquis inception prior to anemia admit)  Fu GI recs, may get capsule study outpt    7/27/23 we had long discussion readdressing risks vs benefits of AC  She is thinking over possible future switch from DAPT to plavix 15 qd+eliquis 5 bid  Preponderance of evidence supports mitigating CVA risk in her vs some increased risk of rebleed  Her prior bleed counfounded by higher  qd dose  She may call clinic to switch meds before next visit w me in 3 mo    Prior discussions between pt and cardiology teams noted, as below     Per inpt cards notes:  "-I had a lengthy discussion with her and her daughter who was on the phone. She confirms that she was taking aspirin 324 mg along with Apixaban prior to admission. GI okay with resuming Apixaban. I feel Apixaban alone is reasonable. She is not open to trying Apixaban at this time. She requests to remain on dual antiplatelet therapy - explained this doesn't protect from CVA, but okay to continue this regimen for now."    Warrants Pal care cx in future   7/27/23 - We had long discussion today about risk of Ao dissection progression and rupture  She has long standing prior plans and discussions in past, ongoing med mngmt now  She purports understanding risk of sudden change in her condition including death  Repeat CTA 6mo - 1/2024 to 6/2024 or if new Sx, no intervention planned since well before we met, see discussion above, for med mngmt now    Neurohormonal Blockade/GDMT:  --Beta-Blocker: coreg 12.5 bid  --ACEi, ARB, ARNi: losartan 25 qd  --MRA: not on, maybe future  --SGLT2i: start jardiance 10 qd  --Hydralazine/nitrates: on hydral/isordil 25/20 tid    --Diuretic: lasix 40 qd    Other HF pharmaco-invasive therapy (if applicable):   PPM,  ICD , CRT (if applicable): Interrogation:  6/8/23:  MDT DC PPM - ACTIVE SYSTEM IS MRI CONDITIONAL   CARELINK TRANSMISSION: BATTERY STATUS "10 YRS." AP 0%  98%. ALL AVAILABLE LEAD PARAMETERS WITHIN NORMAL LIMITS. 100% AF BURDEN; PT ON ASA & PLAVIX-NO ANTICOAGULATION DUE TO AORTIC DISSECTION. NORMAL DEVICE FUNCTION. Advanced Therapies (if applicable): --Inotrope:  --LVAD/Transplant Candidacy:    Studies:  I have reviewed all pertinent patient data/labs/imaging where available, including but not limited to the below studies. Selected results may be displayed here but comprehensive listing is omitted for note clarity and can be found in the epic chart. ECG. Echo. Stress. Cath. HPI:   69 y.o. female Hx per chart p/w HF fu.  No new CP/SOB/dizziness/palpitations/syncope.   No new fatigue. No new unintentional weight changes. No new leg swelling, PND, pillow orthopnea. No new fevers, chills, cough, nausea, vomiting, diarrhea, dysuria. Interval History:   As noted in 'plan' section above and prior epic chart notes.       Past Medical History:   Diagnosis Date   • Atypical chest pain    • GERD (gastroesophageal reflux disease)    • Hyperlipidemia    • Hypertension    • Kidney stone    • Myocardial infarction [facility])     2015    • NSTEMI (non-ST elevated myocardial infarction) ([address])     Last Assessed: 9/24/2015   • Type 2 diabetes mellitus, without long-term current use of insulin ([address]) 9/12/2013     Patient Active Problem List    Diagnosis Date Noted   • Acute blood loss anemia 06/29/2023   • S/P CABG (coronary artery bypass graft) 01/20/2023   • DNR (do not resuscitate) 10/12/2022   • Type 2 diabetes mellitus, without long-term current use of insulin ([address]) 10/12/2022   • Ambulatory dysfunction 10/11/2022   • Thoracic aortic dissection ([address]) 10/05/2022   • LAURENCE (acute kidney injury) ([address]) 10/05/2022   • Constipation 10/04/2022   • Wound of right leg 09/30/2022   • Heart failure with reduced ejection fraction ([address]) 09/29/2022   • Hypertensive urgency 09/28/2022   • Acquired absence of other right toe(s) ([address]) 02/16/2022   • Continuous opioid dependence ([address]) 02/16/2022   • Permanent atrial fibrillation ([address]) 02/15/2021   • Anemia, unspecified 10/14/2020   • Vitamin B 12 deficiency 10/14/2020   • Cardiac pacemaker in situ 07/17/2020   • Preop cardiovascular exam 07/17/2020   • Foreign body in vagina 07/12/2020   • Nephrolithiasis 06/16/2020   • Hematuria 06/16/2020   • Unintentional weight loss 06/01/2020   • Generalized weakness 05/31/2020   • Gross hematuria 05/25/2020   • Elevated troponin level not due myocardial infarction 05/23/2020   • Osteopenia 09/29/2016   • Tobacco abuse 09/29/2016   • Aneurysm of ascending aorta ([address]) 09/24/2015   • CAD (coronary atherosclerotic disease) 09/24/2015   • Gastroesophageal reflux disease 09/24/2015   • 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   • Glaucoma 05/13/2014   • Macular degeneration 05/13/2014   • Neck pain 05/09/2013   • Anxiety 01/08/2013   • Hypertension 01/08/2013   • Hyperlipidemia 01/08/2013       ROS:  10 point ROS negative except as specified in HPI/interval history    Allergies   Allergen Reactions   • Nickel Rash       Current Outpatient Medications   Medication Instructions   • acetaminophen (TYLENOL) 650 mg, Oral, Every 4 hours PRN   • aspirin 81 mg, Oral, Daily   • atorvastatin (LIPITOR) 40 mg, Oral, Daily   • Blood Glucose Monitoring Suppl (ONE TOUCH ULTRA 2) w/Device KIT No dose, route, or frequency recorded. • brimonidine-timolol (COMBIGAN) 0.2-0.5 % No dose, route, or frequency recorded. • carvedilol (COREG) 12.5 mg, Oral, 2 times daily with meals   • clopidogrel (PLAVIX) 75 mg, Oral, Daily   • Empagliflozin (JARDIANCE) 10 mg, Oral, Every morning   • ferrous sulfate 325 mg, Oral, Every 48 hours   • furosemide (LASIX) 40 mg, Oral, Daily   • glimepiride (AMARYL) 1 mg tablet No dose, route, or frequency recorded. • hydrALAZINE (APRESOLINE) 25 mg, Oral, Every 8 hours scheduled   • isosorbide dinitrate (ISORDIL) 20 mg, Oral, 3 times daily after meals   • levothyroxine 50 mcg tablet No dose, route, or frequency recorded. • losartan (COZAAR) 25 mg, Oral, Daily   • melatonin 6 mg, Oral, Daily at bedtime   • OneTouch Ultra test strip No dose, route, or frequency recorded. • pantoprazole (PROTONIX) 40 mg tablet No dose, route, or frequency recorded.         Social History     Socioeconomic History   • Marital status: Single     Spouse name: Not on file   • Number of children: 1   • Years of education: Not on file   • Highest education level: Not on file   Occupational History   • Not on file   Tobacco Use   • Smoking status: Former     Packs/day: 0.25     Types: Cigarettes     Start date: 5     Quit date: 2021     Years since quittin.5   • Smokeless tobacco: Never   • Tobacco comments:     Started smoking at age 25; currently smoking <1ppd. Vaping Use   • Vaping Use: Never used   Substance and Sexual Activity   • Alcohol use: Not Currently   • Drug use: Never   • Sexual activity: Not Currently     Partners: Male     Birth control/protection: Post-menopausal   Other Topics Concern   • Not on file   Social History Narrative   • Not on file     Social Determinants of Health     Financial Resource Strain: Medium Risk (2022)    Overall Financial Resource Strain (CARDIA)    • Difficulty of Paying Living Expenses: Somewhat hard   Food Insecurity: No Food Insecurity (2022)    Hunger Vital Sign    • Worried About Running Out of Food in the Last Year: Never true    • Ran Out of Food in the Last Year: Never true   Transportation Needs: No Transportation Needs (2022)    PRAPARE - Transportation    • Lack of Transportation (Medical): No    • Lack of Transportation (Non-Medical):  No   Physical Activity: Not on file   Stress: Not on file   Social Connections: Not on file   Intimate Partner Violence: Not on file   Housing Stability: Low Risk  (2022)    Housing Stability Vital Sign    • Unable to Pay for Housing in the Last Year: No    • Number of Places Lived in the Last Year: 1    • Unstable Housing in the Last Year: No       Family History   Problem Relation Age of Onset   • Hypertension Mother    • Hypertension Sister    • Alcohol abuse Brother    • Cirrhosis Brother    • Diabetes Father    • Other Brother         Heart transplant in his 46s   • Lung cancer Sister    • Diabetes Brother    • Stroke Sister    • No Known Problems Daughter    • No Known Problems Maternal Grandmother    • No Known Problems Maternal Grandfather    • No Known Problems Paternal Grandmother    • No Known Problems Paternal Grandfather    • No Known Problems Sister        Physical Exam:  Vitals:    23 1302   BP: 114/70   BP Location: Right arm Patient Position: Sitting   Cuff Size: Standard   Pulse: 76   SpO2: 100%   Weight: 67.3 kg (148 lb 6.4 oz)   Height: 4' 11" (1.499 m)     Constitutional: NAD, non toxic  Ears/nose/mouth/throat: atraumatic  CV: RRR, nl S1S2, no murmurs/rubs/gallups, no JVD, no HJR  Resp: ctabl  GI: Soft, NTND  MSK: no swollen joints in exposed areas  Extr: No edema, warm LE  Pysche: Normal affect  Neuro: appropriate in conversation  Skin: dry and intact in exposed areas    Labs & Results:    Lab Results   Component Value Date    SODIUM 138 07/04/2023    K 3.8 07/04/2023     (H) 07/04/2023    CO2 20 (L) 07/04/2023    BUN 18 07/04/2023    CREATININE 1.01 07/04/2023    GLUC 122 07/04/2023    CALCIUM 7.9 (L) 07/04/2023     Lab Results   Component Value Date    WBC 7.61 07/04/2023    HGB 9.6 (L) 07/04/2023    HCT 30.1 (L) 07/04/2023    MCV 98 07/04/2023     07/04/2023     Lab Results   Component Value Date     (H) 11/15/2022      Lab Results   Component Value Date    CHOLESTEROL 111 07/08/2022    CHOLESTEROL 143 02/08/2022    CHOLESTEROL 155 09/15/2021     Lab Results   Component Value Date    HDL 60 07/08/2022    HDL 58 02/08/2022    HDL 58 09/15/2021     Lab Results   Component Value Date    TRIG 97 07/08/2022    TRIG 150 02/08/2022    TRIG 139 09/15/2021     Lab Results   Component Value Date    [address] 05/23/2020    [address] 114 03/27/2017    [address] 106 09/26/2016       Counseling / Coordination of Care  Time spent today 27 minutes. Greater than 50% of total time was spent with the patient and / or family counseling and / or coordination of care. We discussed diagnoses, most recent studies, tests and any changes in treatment plan. Thank you for the opportunity to participate in the care of this patient.     Adolfo Marshall MD  Attending Physician  Advanced Heart Failure and Transplant Cardiology  [facility]

## 2023-07-27 ENCOUNTER — OFFICE VISIT (OUTPATIENT)
Dept: CARDIOLOGY CLINIC | Facility: CLINIC | Age: 78
End: 2023-07-27
Payer: COMMERCIAL

## 2023-07-27 VITALS
SYSTOLIC BLOOD PRESSURE: 114 MMHG | BODY MASS INDEX: 29.92 KG/M2 | HEIGHT: 59 IN | HEART RATE: 76 BPM | WEIGHT: 148.4 LBS | OXYGEN SATURATION: 100 % | DIASTOLIC BLOOD PRESSURE: 70 MMHG

## 2023-07-27 DIAGNOSIS — Z95.1 S/P CABG (CORONARY ARTERY BYPASS GRAFT): ICD-10-CM

## 2023-07-27 DIAGNOSIS — I71.21 ANEURYSM OF ASCENDING AORTA WITHOUT RUPTURE (HCC): ICD-10-CM

## 2023-07-27 DIAGNOSIS — I50.20 HEART FAILURE WITH REDUCED EJECTION FRACTION (HCC): Primary | ICD-10-CM

## 2023-07-27 PROCEDURE — 99214 OFFICE O/P EST MOD 30 MIN: CPT | Performed by: STUDENT IN AN ORGANIZED HEALTH CARE EDUCATION/TRAINING PROGRAM

## 2023-07-27 RX ORDER — GLIMEPIRIDE 1 MG/1
TABLET ORAL
COMMUNITY
Start: 2023-07-24

## 2023-08-21 DIAGNOSIS — R06.02 SOB (SHORTNESS OF BREATH): ICD-10-CM

## 2023-08-21 RX ORDER — FUROSEMIDE 20 MG/1
TABLET ORAL
Qty: 30 TABLET | Refills: 0 | Status: SHIPPED | OUTPATIENT
Start: 2023-08-21 | End: 2023-08-28 | Stop reason: DRUGHIGH

## 2023-08-28 ENCOUNTER — TELEPHONE (OUTPATIENT)
Dept: CARDIOLOGY CLINIC | Facility: CLINIC | Age: 78
End: 2023-08-28

## 2023-08-28 DIAGNOSIS — I50.20 HEART FAILURE WITH REDUCED EJECTION FRACTION (HCC): Primary | ICD-10-CM

## 2023-08-28 RX ORDER — FUROSEMIDE 40 MG/1
40 TABLET ORAL DAILY
Qty: 90 TABLET | Refills: 3 | Status: SHIPPED | OUTPATIENT
Start: 2023-08-28

## 2023-08-28 NOTE — TELEPHONE ENCOUNTER
Anila Donald called to let you know that she has stopped taking the Jardiance as of yesterday. She believes she has side effects from the Jardiance in addition to being unhappy with the cost.    She said she developed aching on the  entire right side of her body. The aching then became concentrated in her right knee and now she has a lot of pain in her right knee along with swelling of that knee. Right knee only. She said she was prescribed pain pills by the provider at [facility] where she resides. She said a x ray was done which did not show anything. I suggested orthopedic consult but Anila Donald said she is convinced the symptom is caused by the [facility] and she won't take it any longer. She also said she has been gaining weight. No weight today, but yesterday's weight was 152.9. She said her baseline is 142-144. At  on 7/27 weight was 148 lbs. She has no edema anywhere but the right knee. No CHF symptoms. She would like to go back on her previous diuretic and refuses the Jardiance. Please advise.

## 2023-08-28 NOTE — TELEPHONE ENCOUNTER
To clarify, patient may d/c Jardiance and resume lasix 40 mg daily. Please sign off  script to facility for lasix 40 mg daily.

## 2023-08-29 NOTE — TELEPHONE ENCOUNTER
Patient is aware she may d/c jardiance and resume lasix 40 mg daily. I also spoke with Kb Diaz at the [facility] who is aware of the med change. Script for lasix sent to [facility] pharmacy.

## 2023-08-31 ENCOUNTER — OFFICE VISIT (OUTPATIENT)
Dept: OBGYN CLINIC | Facility: CLINIC | Age: 78
End: 2023-08-31
Payer: COMMERCIAL

## 2023-08-31 ENCOUNTER — TELEPHONE (OUTPATIENT)
Dept: OBGYN CLINIC | Facility: CLINIC | Age: 78
End: 2023-08-31

## 2023-08-31 ENCOUNTER — TELEPHONE (OUTPATIENT)
Age: 78
End: 2023-08-31

## 2023-08-31 VITALS
HEART RATE: 65 BPM | WEIGHT: 148 LBS | SYSTOLIC BLOOD PRESSURE: 122 MMHG | BODY MASS INDEX: 29.84 KG/M2 | DIASTOLIC BLOOD PRESSURE: 73 MMHG | HEIGHT: 59 IN

## 2023-08-31 DIAGNOSIS — M25.461 EFFUSION OF RIGHT KNEE: ICD-10-CM

## 2023-08-31 DIAGNOSIS — M25.561 ACUTE PAIN OF RIGHT KNEE: Primary | ICD-10-CM

## 2023-08-31 DIAGNOSIS — M17.11 PRIMARY OSTEOARTHRITIS OF RIGHT KNEE: ICD-10-CM

## 2023-08-31 PROCEDURE — 99203 OFFICE O/P NEW LOW 30 MIN: CPT | Performed by: PHYSICIAN ASSISTANT

## 2023-08-31 RX ORDER — LORATADINE 10 MG/1
TABLET ORAL
COMMUNITY
Start: 2023-06-27

## 2023-08-31 RX ORDER — VIT A/VIT C/VIT E/ZINC/COPPER 2148-113
TABLET ORAL
COMMUNITY
Start: 2023-08-09

## 2023-08-31 RX ORDER — PEN NEEDLE, DIABETIC 31 GX5/16"
NEEDLE, DISPOSABLE MISCELLANEOUS
COMMUNITY
Start: 2023-07-19

## 2023-08-31 RX ORDER — MELOXICAM 15 MG/1
TABLET ORAL
COMMUNITY
Start: 2023-08-25

## 2023-08-31 NOTE — TELEPHONE ENCOUNTER
Caller: Vitaliy Lewis at [address] [place] [address] Dr: Milad Robertson    Reason for call:     Vitaliy Lewis is calling about the imaging of the right knee, [employer] not sure if they will be getting this on time. She is asking   For an email address that they can email the imaging. Please call her back to confirm for todays appointment.     Call back#: Vitaliy Lewis [phone]

## 2023-08-31 NOTE — PATIENT INSTRUCTIONS
Hyaluronic Acid (By injection)   Hyaluronic Acid ([id number]-tc-[id number]-ON-ate AS-id)  Treats severe pain in your knee due to osteoarthritis. Brand Name(s): Durolane, Euflexxa, Gel-One, GelSyn-3, GenVisc 850, Hyalgan, Hymovis, Monovisc, Orthovisc, Supartz FX, TriLURON, TriVisc, Visco-3   There may be other brand names for this medicine. When This Medicine Should Not Be Used: This medicine is not right for everyone. You should not receive it if you had an allergic reaction to hyaluronic acid or if you have a bleeding disorder. How to Use This Medicine:   Injectable  Your doctor will tell you how many injections you will need. This medicine is injected into your knee joint. A nurse or other health provider will give you this medicine. Drugs and Foods to Avoid:      Ask your doctor or pharmacist before using any other medicine, including over-the-counter medicines, vitamins, and herbal products. Warnings While Using This Medicine:   Tell your doctor if you are pregnant or breastfeeding, or if you have any allergies, including to birds, feathers, or eggs. Rest your knee for 48 hours after you receive an injection. Do not do strenuous, weightbearing activities, such as jogging or tennis. Avoid activities that keep you standing for longer than 1 hour. Possible Side Effects While Using This Medicine:   Call your doctor right away if you notice any of these side effects: Allergic reaction: Itching or hives, swelling in your face or hands, swelling or tingling in your mouth or throat, chest tightness, trouble breathing  If you notice these less serious side effects, talk with your doctor:   Mild increase in pain or swelling in your knee  Pain, redness, or swelling where the medicine is injected  If you notice other side effects that you think are caused by this medicine, tell your doctor. Call your doctor for medical advice about side effects.  You may report side effects to FDA at 0-700-FDA-3874  © Copyright [employer] 2022 Information is for End User's use only and may not be sold, redistributed or otherwise used for commercial purposes. The above information is an  only. It is not intended as medical advice for individual conditions or treatments. Talk to your doctor, nurse or pharmacist before following any medical regimen to see if it is safe and effective for you.

## 2023-08-31 NOTE — TELEPHONE ENCOUNTER
Spoke with [facility] advised them that we do not have an email address to send the images for the patient's xray. Asked if they could fax over the report and they stated that they would, she also stated that they would be sending the report over when the patient comes over later on today.

## 2023-08-31 NOTE — PROGRESS NOTES
Orthopaedic Surgery - Office Note  Mitch Butler (35 y.o. female)   : 1945   MRN: [id number]  Encounter Date: 2023    Chief Complaint   Patient presents with   • Right Knee - Pain         Assessment/Plan  Diagnoses and all orders for this visit:    Acute pain of right knee  -     Durable Medical Equipment    Primary osteoarthritis of right knee  -     Durable Medical Equipment    Effusion of right knee    Other orders  -     Multiple Vitamins-Minerals (PreserVision AREDS) TABS  -     meloxicam (MOBIC) 15 mg tablet  -     loratadine (CLARITIN) 10 mg tablet  -     Alcohol Swabs (Alcohol Prep) PADS    The diagnosis as well as treatment options were reviewed with the patient in the office today. She has degenerative changes of her right knee. I would not recommend oral NSAIDs secondary to concurrent anticoagulation therapy. I would not recommend a cortisone injection due to elevated hemoglobin A1c. The best available treatment options for the right knee osteoarthritis would be icing 20 minutes on 1 hour off 3 times a day. She may use oral Tylenol as needed for pain control. The benefits of physical therapy were reviewed and offered. Visco injections as a treatment option were reviewed. She will be provided a hinged knee brace for comfort and support and continue use of the rolling walker. I reviewed with the patient and daughter that she has knee effusion likely secondary to osteoarthritis and unrelated to a change in her cardiac medications. Her lower extremity edema is likely related to her Lasix dosage but that is out of my realm of specialty and would recommend she follow-up with cardiologist/medical doctor regarding those concerns. Patient will try the conservative treatment of icing the knee regularly with continued Tylenol and bracing.   If the symptoms do not continue to improve or worsen she will call the office and we will place an order for Visco injection series which may be completed. Return -Patient will call if symptoms not improved in 1 to 2 weeks and we can order Visco.        History of Present Illness  This is a new patient with right knee pain and effusion. She is a resident at [facility]. She has a nickel allergy. The pain is located in the lateral knee. She denies any trauma. She reports that the right knee has been swollen and bothering her. This has been slightly coincided to increased leg edema after a switch of her Lasix medication. She is here today with her daughter looking for clarification of her right knee symptoms. She has not tried any treatment for the right knee pain. She reports overall it is getting better and then the swelling has been decreasing. She states she had x-rays taken at her facility and the physician who ordered it recommended she come today for an evaluation of the right knee. Patient reports the pain level in the knee at worst is 6 out of 10. At best it is 0 out of 10. She is aware she is a poor surgical candidate at this time. Patient is diabetic with the most recent A1c being 7.8. She is on chronic anticoagulation therapy with significant coronary medical history. Review of Systems  Pertinent items are noted in HPI. All other systems were reviewed and are negative. Physical Exam  /73 (BP Location: Left arm, Patient Position: Sitting, Cuff Size: Standard)   Pulse 65   Ht 4' 11" (1.499 m)   Wt 67.1 kg (148 lb)   BMI 29.89 kg/m²   Cons: Appears well. No apparent distress. Psych: Alert. Oriented x3. Mood and affect normal.  On examination patient's right knee is with a 1+ intra-articular effusion. She is tender on the lateral compartment. She has a slight valgus deformity. She is lacking 3 to 4 degrees of full extension and flexes to 110 degrees in the office today. Quad and hamstring strength are 5- out of 5. There is no pain or instability to valgus and varus stressing.   She has a negative medial lateral Marques's. There is no knee instability on clinical examination. She has right lower extremity pitting edema which patient and daughter report has been present since a change in her Lasix medication. No calf tenderness is noted           Studies Reviewed  X-ray report from 8/25/2023 was reviewed by myself in the office today and scanned into media section. No x-rays are available for review    Procedures  No procedures today. Medical, Surgical, Family, and Social History  The patient's medical history, family history, and social history, were reviewed and updated as appropriate.     Past Medical History:   Diagnosis Date   • Atypical chest pain    • GERD (gastroesophageal reflux disease)    • Hyperlipidemia    • Hypertension    • Kidney stone    • Myocardial infarction [facility])     2015    • NSTEMI (non-ST elevated myocardial infarction) ([address])     Last Assessed: 9/24/2015   • Type 2 diabetes mellitus, without long-term current use of insulin ([address]) 9/12/2013       Past Surgical History:   Procedure Laterality Date   • A-V CARDIAC PACEMAKER INSERTION     • ANKLE SURGERY     • BACK SURGERY  01/2011    L4 and L5 laminectomy and fusion    • BLADDER SURGERY     • CORONARY ARTERY BYPASS GRAFT  09/02/2015    CABGx3 with LIMA to LAD, SVG to PDA, SVG to OM-1   • EXAMINATION UNDER ANESTHESIA N/A 7/11/2020    Procedure: EXAM UNDER ANESTHESIA (EUA)  EXCISION OF POSTERIOR VAGINAL FOREIGN BODY;  Surgeon: Pop Black MD;  [facility];  Service: Gynecology   • FL RETROGRADE PYELOGRAM  7/11/2020   • FL RETROGRADE PYELOGRAM  8/7/2020   • HYSTERECTOMY     • TN CYSTO BLADDER W/URETERAL CATHETERIZATION Right 7/11/2020    Procedure: CYSTOSCOPY RETROGRADE PYELOGRAM WITH INSERTION STENT URETERAL, bladder biopsy with fulguration;  Surgeon: Prema Howell MD;  [facility];  Service: Urology   • TN CYSTO/URETERO W/LITHOTRIPSY &INDWELL STENT INSRT Right 8/7/2020    Procedure: CYSTOSCOPY URETEROSCOPY WITH LITHOTRIPSY HOLMIUM LASER, RETROGRADE PYELOGRAM AND INSERTION STENT URETERAL;  Surgeon: Dc Mishra MD;  [facility];  Service: Urology   • WRIST SURGERY         Family History   Problem Relation Age of Onset   • Hypertension Mother    • Hypertension Sister    • Alcohol abuse Brother    • Cirrhosis Brother    • Diabetes Father    • Other Brother         Heart transplant in his 46s   • Lung cancer Sister    • Diabetes Brother    • Stroke Sister    • No Known Problems Daughter    • No Known Problems Maternal Grandmother    • No Known Problems Maternal Grandfather    • No Known Problems Paternal Grandmother    • No Known Problems Paternal Grandfather    • No Known Problems Sister        Social History     Occupational History   • Not on file   Tobacco Use   • Smoking status: Former     Packs/day: 0.25     Types: Cigarettes     Start date: 5     Quit date:      Years since quittin.6   • Smokeless tobacco: Never   • Tobacco comments:     Started smoking at age 25; currently smoking <1ppd.    Vaping Use   • Vaping Use: Never used   Substance and Sexual Activity   • Alcohol use: Not Currently   • Drug use: Never   • Sexual activity: Not Currently     Partners: Male     Birth control/protection: Post-menopausal       Allergies   Allergen Reactions   • Nickel Rash         Current Outpatient Medications:   •  acetaminophen (TYLENOL) 325 mg tablet, Take 2 tablets (650 mg total) by mouth every 4 (four) hours as needed for mild pain, Disp: 30 tablet, Rfl: 0  •  Alcohol Swabs (Alcohol Prep) PADS, , Disp: , Rfl:   •  aspirin 81 mg chewable tablet, Chew 1 tablet (81 mg total) daily, Disp: , Rfl: 0  •  atorvastatin (LIPITOR) 40 mg tablet, Take 1 tablet (40 mg total) by mouth daily, Disp: 90 tablet, Rfl: 3  •  Blood Glucose Monitoring Suppl (ONE TOUCH ULTRA 2) w/Device KIT, , Disp: , Rfl:   •  brimonidine-timolol (COMBIGAN) 0.2-0.5 %, , Disp: , Rfl:   •  carvedilol (COREG) 12.5 mg tablet, Take 12.5 mg by mouth 2 (two) times a day with meals, Disp: , Rfl:   •  clopidogrel (PLAVIX) 75 mg tablet, Take 1 tablet (75 mg total) by mouth daily Do not start before July 5, 2023., Disp: , Rfl: 0  •  ferrous sulfate 325 (65 Fe) mg tablet, Take 1 tablet (325 mg total) by mouth every other day, Disp: , Rfl:   •  furosemide (LASIX) 40 mg tablet, Take 1 tablet (40 mg total) by mouth daily, Disp: 90 tablet, Rfl: 3  •  glimepiride (AMARYL) 1 mg tablet, , Disp: , Rfl:   •  hydrALAZINE (APRESOLINE) 25 mg tablet, Take 1 tablet (25 mg total) by mouth every 8 (eight) hours, Disp: , Rfl: 0  •  isosorbide dinitrate (ISORDIL) 20 mg tablet, Take 1 tablet (20 mg total) by mouth 3 (three) times daily after meals, Disp: , Rfl: 0  •  levothyroxine 50 mcg tablet, , Disp: , Rfl:   •  loratadine (CLARITIN) 10 mg tablet, , Disp: , Rfl:   •  losartan (COZAAR) 25 mg tablet, Take 1 tablet (25 mg total) by mouth daily, Disp: 90 tablet, Rfl: 3  •  melatonin 3 mg, Take 2 tablets (6 mg total) by mouth daily at bedtime, Disp: , Rfl: 0  •  meloxicam (MOBIC) 15 mg tablet, , Disp: , Rfl:   •  Multiple Vitamins-Minerals (PreserVision AREDS) TABS, , Disp: , Rfl:   •  OneTouch Ultra test strip, , Disp: , Rfl:   •  pantoprazole (PROTONIX) 40 mg tablet, , Disp: , Rfl:       Cain Baum PA-C

## 2023-09-07 ENCOUNTER — REMOTE DEVICE CLINIC VISIT (OUTPATIENT)
Dept: CARDIOLOGY CLINIC | Facility: CLINIC | Age: 78
End: 2023-09-07
Payer: COMMERCIAL

## 2023-09-07 DIAGNOSIS — Z95.0 CARDIAC PACEMAKER IN SITU: Primary | ICD-10-CM

## 2023-09-07 PROCEDURE — 93294 REM INTERROG EVL PM/LDLS PM: CPT | Performed by: INTERNAL MEDICINE

## 2023-09-07 PROCEDURE — 93296 REM INTERROG EVL PM/IDS: CPT | Performed by: INTERNAL MEDICINE

## 2023-09-07 NOTE — PROGRESS NOTES
Results for orders placed or performed in visit on 09/07/23   Cardiac EP device report    Narrative    MDT DC PPM - ACTIVE SYSTEM IS MRI CONDITIONAL  CARELINK TRANSMISSION: BATTERY VOLTAGE ADEQUATE (9.9 YRS). AP <0.1%  100% (>40%/AAIR~DDDR 60) ALL AVAILABLE LEAD PARAMETERS WITHIN NORMAL LIMITS. NO NEW SIGNIFICANT HIGH RATE EPISODES; CURRENTLY IN AF SINCE 10/5/2022. AT/AF BURDEN 100% SINCE 6/7/23. TAKES CARVEDILOL; NO A/C DUE TO RECENT ANEMIA ADMISSION PER DR. Shyann Hood RECENT OFFICE NOTE. NORMAL DEVICE FUNCTION.  AM/GV

## 2023-10-30 NOTE — PROGRESS NOTES
Advanced Heart Failure/Pulmonary Hypertension Outpatient Note - Meena Mcneil 69 y.o. female MRN: [id number]    @ Encounter: [id number]    Assessment:  69 y.o. female Hx per chart p/w HF [facility]>she is frustrated w some of her care and may transfer  I first met pt 7/27/23  Anemia, 7/4/2023 DC for fatigue and hgb 6.5, this was some time after the recent outpt inception of eliquis+ASA and DC of plavix (she was on ASA+plavix before this switch). follows GI now  EGD and colonoscopy performed on 7/3/2023. EGD showed mild erythematous mucosa in the antrum and biopsy sent for H. pylori. Colonoscopy with pancolonic severe diverticula, hemorrhoids. GI cleared to resume Eliquis. Then for outpatient capsule endoscopy  ICM, Chronic HFrEF, EF 35%, LVIDD 4.6cm, biv dysfxn  CAD s/p CABGx3 2015 (EF was normal 2015). No obstructive dz in grafts per cath 5/2020. Paroxysmal atrial fibrillation/flutter. Off AC for some time 2/2 Ao dissection, then resumed as outpt before her 7/2023 anemia admit  SSS s/p PPM  Severe aortic root dilatation and dissection in 10/2022  Repeat CTA 7/1/2023 during anemia admission with stable findings since 10/2022  7/27/23 - pt reports multiple discussions between herself, daughter, various medical teams and no surgical plan in past or presently  CTS Previously evaluated in 10/2022 while acute and not a surgical candidate. Per chart: "Critical care team discussed with CT surgery -no plan for emergent surgery as patient would be high risk for surgery due to anatomy. Initially was being recommended for cardiac catheterization but given risk with dissection, medical management was pursued instead".   She had Ao aneurysm since at least 2015 at time of CABG (4.0cm 2015), then in 2020 a CT indicated intramural hematoma vs dissection, CTS was consulted, pt was already DNR/I and seems she did not want to reverse it, med mngmt was pursued in 2020.  7/1/23 CTA: AORTA/ARTERIAL VASCULATURE: Stable size of an ascending aortic aneurysm measuring up to 5.9 cm compared to the most recent CT of 10/4/2022. Again seen is a dissection flap extending from the right coronary noncoronary cusps extending superiorly and   terminating at the proximal right brachiocephalic artery. There is thrombosis of the false lumen within the mid-distal ascending aorta. Again seen is another smaller dissection flap in the medial ascending aorta (series 305, image 83) measuring   approximately 1.7 cm in length. Hypertension  aortic dissection 10/2022. as described above. In past, she was managed with stoppage of anticoagulation in favor of DAPT until above described anemia events 7/2023.   DM      Lab Results   Component Value Date    CREATININE 1.01 07/04/2023     Lab Results   Component Value Date    K 3.8 07/04/2023     Lab Results   Component Value Date    HGBA1C 7.8 (H) 03/28/2023     Lab Results   Component Value Date    IFT4WBFVAJVY 9.140 (H) 07/07/2020    TSH 2.40 12/09/2019     Lab Results   Component Value Date    LDLCALC 32 07/08/2022     Lab Results   Component Value Date     (H) 11/15/2022      Lab Results   Component Value Date    NTBNP 3,961 (H) 07/08/2022       TODAY'S PLAN:  Warm, euvolemic  No new cardiac complaints, feels generally well  BP at goal  Did not tolerate jardiance and self stopped  Somewhat self selective about her medications  No bleeding    gdmt below  Long discussion about risks vs benefits of starting aldactone 25 qd - this is recommended for her HFrEF  If she decides to start it, she will notify her primary team at facility; must check BMP 1 week after starting this medication for Cr and K    Primary team should check TSH, deranged previously    Long discussion again today about her anti-plt and AC regimen options  No changes today  Accepts risk of CVA off AC  See prior documentation as well    Long discussion again today about her Aortic dz  She again reaffirms no desire for intervention, purports understanding her possible risks including death    No med changes today but primary team can pursue aldactone as above    RTC in 1 year  Sooner if worse cardiac symptoms    Key info from my prior notes:    Recent admit 7/2023 for anemia  she was taking aspirin 324 mg along with Apixaban prior to admission (plavix was stopped upon eliquis inception prior to the anemia admit)  Fu GI recs, may get capsule study outpt    7/27/23 we had long discussion readdressing risks vs benefits of AC  She is thinking over possible future switch from current DAPT to plavix 75 qd+eliquis 5 bid  Preponderance of evidence supports mitigating CVA risk in her vs some increased risk of rebleed  Her prior bleed counfounded by higher  qd dose  She may call clinic to switch meds before next visit w me in 3 mo    Prior discussions between pt and cardiology teams noted, as below     Per inpt cards notes:  "-I had a lengthy discussion with her and her daughter who was on the phone. She confirms that she was taking aspirin 324 mg along with Apixaban prior to admission. GI okay with resuming Apixaban. I feel Apixaban alone is reasonable. She is not open to trying Apixaban at this time.  She requests to remain on dual antiplatelet therapy - explained this doesn't protect from CVA, but okay to continue this regimen for now."    Warrants Pal care cx in future   7/27/23 - We had long discussion today about risk of Ao dissection progression and rupture  She has long standing prior plans and discussions in past, ongoing med mngmt now  She purports understanding risk of sudden change in her condition including death  Repeat CTA 6mo - 1/2024 to 6/2024 or if new Sx, no intervention planned since well before we met, see discussion above  for med mngmt now    Neurohormonal Blockade/GDMT:  --Beta-Blocker: coreg 12.5 bid  --ACEi, ARB, ARNi: losartan 25 qd  --MRA: not on, maybe future  --SGLT2i: start jardiance 10 qd>Did not tolerate jardiance and self stopped  --Hydralazine/nitrates: on hydral/isordil 25/20 tid    --Diuretic: lasix 40 qd    Other HF pharmaco-invasive therapy (if applicable):   PPM,  ICD , CRT (if applicable): Interrogation:  6/8/23:  KILEY BAEZ PPM - ACTIVE SYSTEM IS MRI CONDITIONAL   CARELINK TRANSMISSION: BATTERY STATUS "10 YRS." AP 0%  98%. ALL AVAILABLE LEAD PARAMETERS WITHIN NORMAL LIMITS. 100% AF BURDEN; PT ON ASA & PLAVIX-NO ANTICOAGULATION DUE TO AORTIC DISSECTION. NORMAL DEVICE FUNCTION. Advanced Therapies (if applicable): --Inotrope:  --LVAD/Transplant Candidacy:    Studies:  I have reviewed all pertinent patient data/labs/imaging where available, including but not limited to the below studies. Selected results may be displayed here but comprehensive listing is omitted for note clarity and can be found in the epic chart. ECG. Echo. Stress. Cath. HPI:   69 y.o. female Hx per chart p/w HF fu.  No new CP/SOB/dizziness/palpitations/syncope. No new fatigue. No new unintentional weight changes. No new leg swelling, PND, pillow orthopnea. No new fevers, chills, cough, nausea, vomiting, diarrhea, dysuria. Interval History:   As noted in 'plan' section above and prior epic chart notes. No new CP/SOB/dizziness/palpitations/syncope. No new fatigue. No new unintentional weight changes. No new leg swelling, PND, pillow orthopnea. No new fevers, chills, cough, nausea, vomiting, diarrhea, dysuria.       Past Medical History:   Diagnosis Date    Atypical chest pain     GERD (gastroesophageal reflux disease)     Hyperlipidemia     Hypertension     Kidney stone     Myocardial infarction [facility])     2015     NSTEMI (non-ST elevated myocardial infarction) ([address])     Last Assessed: 9/24/2015    Type 2 diabetes mellitus, without long-term current use of insulin ([address]) 9/12/2013     Patient Active Problem List    Diagnosis Date Noted    Primary osteoarthritis of right knee 08/31/2023    Acute pain of right knee 08/31/2023    Acute blood loss anemia 06/29/2023    S/P CABG (coronary artery bypass graft) 01/20/2023    DNR (do not resuscitate) 10/12/2022    Type 2 diabetes mellitus, without long-term current use of insulin ([address]) 10/12/2022    Ambulatory dysfunction 10/11/2022    Thoracic aortic dissection ([address]) 10/05/2022    LAURENCE (acute kidney injury) ([address]) 10/05/2022    Constipation 10/04/2022    Wound of right leg 09/30/2022    Heart failure with reduced ejection fraction ([address]) 09/29/2022    Hypertensive urgency 09/28/2022    Acquired absence of other right toe(s) ([address]) 02/16/2022    Continuous opioid dependence ([address]) 02/16/2022    Permanent atrial fibrillation ([address]) 02/15/2021    Anemia, unspecified 10/14/2020    Vitamin B 12 deficiency 10/14/2020    Cardiac pacemaker in situ 07/17/2020    Preop cardiovascular exam 07/17/2020    Foreign body in vagina 07/12/2020    Nephrolithiasis 06/16/2020    Hematuria 06/16/2020    Unintentional weight loss 06/01/2020    Generalized weakness 05/31/2020    Gross hematuria 05/25/2020    Elevated troponin level not due myocardial infarction 05/23/2020    Osteopenia 09/29/2016    Tobacco abuse 09/29/2016    Aneurysm of ascending aorta ([address]) 09/24/2015    CAD (coronary atherosclerotic disease) 09/24/2015    Gastroesophageal reflux disease 09/24/2015    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     aspirin 81 mg, Oral, Daily    atorvastatin (LIPITOR) 40 mg, Oral, Daily    Blood Glucose Monitoring Suppl (ONE TOUCH ULTRA 2) w/Device KIT No dose, route, or frequency recorded. brimonidine-timolol (COMBIGAN) 0.2-0.5 % No dose, route, or frequency recorded. carvedilol (COREG) 12.5 mg, Oral, 2 times daily with meals    clopidogrel (PLAVIX) 75 mg, Oral, Daily    ferrous sulfate 325 mg, Oral, Every 48 hours    furosemide (LASIX) 40 mg, Oral, Daily    glimepiride (AMARYL) 1 mg tablet No dose, route, or frequency recorded. hydrALAZINE (APRESOLINE) 25 mg, Oral, Every 8 hours scheduled    isosorbide dinitrate (ISORDIL) 20 mg, Oral, 3 times daily after meals    levothyroxine 50 mcg tablet No dose, route, or frequency recorded. loratadine (CLARITIN) 10 mg tablet No dose, route, or frequency recorded. losartan (COZAAR) 25 mg, Oral, Daily    melatonin 6 mg, Oral, Daily at bedtime    meloxicam (MOBIC) 15 mg tablet No dose, route, or frequency recorded. Multiple Vitamins-Minerals (PreserVision AREDS) TABS No dose, route, or frequency recorded. OneTouch Ultra test strip No dose, route, or frequency recorded. pantoprazole (PROTONIX) 40 mg tablet No dose, route, or frequency recorded. Social History     Socioeconomic History    Marital status: Single     Spouse name: Not on file    Number of children: 1    Years of education: Not on file    Highest education level: Not on file   Occupational History    Not on file   Tobacco Use    Smoking status: Former     Packs/day: 0.25     Types: Cigarettes     Start date: 5     Quit date:      Years since quittin.8    Smokeless tobacco: Never    Tobacco comments:     Started smoking at age 25; currently smoking <1ppd.    Vaping Use    Vaping Use: Never used   Substance and Sexual Activity    Alcohol use: Not Currently    Drug use: Never    Sexual activity: Not Currently     Partners: Male     Birth control/protection: Post-menopausal   Other Topics Concern    Not on file   Social History Narrative    Not on file     Social Determinants of Health     Financial Resource Strain: Medium Risk (8/2/2022)    Overall Financial Resource Strain (CARDIA)     Difficulty of Paying Living Expenses: Somewhat hard   Food Insecurity: No Food Insecurity (9/30/2022)    Hunger Vital Sign     Worried About Running Out of Food in the Last Year: Never true     Ran Out of Food in the Last Year: Never true   Transportation Needs: No Transportation Needs (9/30/2022)    PRAPARE - Transportation     Lack of Transportation (Medical): No     Lack of Transportation (Non-Medical):  No   Physical Activity: Not on file   Stress: Not on file   Social Connections: Not on file   Intimate Partner Violence: Not on file   Housing Stability: Low Risk  (9/30/2022)    Housing Stability Vital Sign     Unable to Pay for Housing in the Last Year: No     Number of Places Lived in the Last Year: 1     Unstable Housing in the Last Year: No       Family History   Problem Relation Age of Onset    Hypertension Mother     Hypertension Sister     Alcohol abuse Brother     Cirrhosis Brother     Diabetes Father     Other Brother         Heart transplant in his 46s    Lung cancer Sister     Diabetes Brother     Stroke Sister     No Known Problems Daughter     No Known Problems Maternal Grandmother     No Known Problems Maternal Grandfather     No Known Problems Paternal Grandmother     No Known Problems Paternal Grandfather     No Known Problems Sister        Physical Exam:  Vitals:    10/31/23 1303   BP: 128/78   BP Location: Right arm   Patient Position: Sitting   Cuff Size: Standard   Pulse: 61   SpO2: 96%   Weight: 67.8 kg (149 lb 6.4 oz)   Height: 4' 11" (1.499 m)       Constitutional: NAD, non toxic  Ears/nose/mouth/throat: atraumatic  CV: RRR, nl S1S2, no murmurs/rubs/gallups, no JVD, no HJR  Resp: ctabl  GI: Soft, NTND  MSK: no swollen joints in exposed areas  Extr: No edema, warm LE  Pysche: Normal affect  Neuro: appropriate in conversation  Skin: dry and intact in exposed areas    Labs & Results:    Lab Results   Component Value Date    SODIUM 138 07/04/2023    K 3.8 07/04/2023     (H) 07/04/2023    CO2 20 (L) 07/04/2023    BUN 18 07/04/2023    CREATININE 1.01 07/04/2023    GLUC 122 07/04/2023    CALCIUM 7.9 (L) 07/04/2023     Lab Results   Component Value Date    WBC 7.61 07/04/2023    HGB 9.6 (L) 07/04/2023    HCT 30.1 (L) 07/04/2023    MCV 98 07/04/2023     07/04/2023     Lab Results   Component Value Date     (H) 11/15/2022      Lab Results   Component Value Date    CHOLESTEROL 111 07/08/2022    CHOLESTEROL 143 02/08/2022    CHOLESTEROL 155 09/15/2021     Lab Results   Component Value Date    HDL 60 07/08/2022    HDL 58 02/08/2022    HDL 58 09/15/2021     Lab Results   Component Value Date    TRIG 97 07/08/2022    TRIG 150 02/08/2022    TRIG 139 09/15/2021     Lab Results   Component Value Date    [address] 05/23/2020    [address] 114 03/27/2017    [address] 106 09/26/2016       Counseling / Coordination of Care  Time spent today 28 minutes. Greater than 50% of total time was spent with the patient and / or family counseling and / or coordination of care. We discussed diagnoses, most recent studies, tests and any changes in treatment plan. Thank you for the opportunity to participate in the care of this patient.     Bony Anglin MD  Attending Physician  Advanced Heart Failure and Transplant Cardiology  [facility]

## 2023-10-31 ENCOUNTER — OFFICE VISIT (OUTPATIENT)
Dept: CARDIOLOGY CLINIC | Facility: CLINIC | Age: 78
End: 2023-10-31
Payer: COMMERCIAL

## 2023-10-31 VITALS
HEART RATE: 61 BPM | SYSTOLIC BLOOD PRESSURE: 128 MMHG | OXYGEN SATURATION: 96 % | BODY MASS INDEX: 30.12 KG/M2 | HEIGHT: 59 IN | WEIGHT: 149.4 LBS | DIASTOLIC BLOOD PRESSURE: 78 MMHG

## 2023-10-31 DIAGNOSIS — Z95.1 S/P CABG (CORONARY ARTERY BYPASS GRAFT): ICD-10-CM

## 2023-10-31 DIAGNOSIS — I71.010 DISSECTION OF ASCENDING AORTA (HCC): ICD-10-CM

## 2023-10-31 DIAGNOSIS — I50.20 HEART FAILURE WITH REDUCED EJECTION FRACTION (HCC): Primary | ICD-10-CM

## 2023-10-31 DIAGNOSIS — Z95.0 CARDIAC PACEMAKER IN SITU: ICD-10-CM

## 2023-10-31 DIAGNOSIS — I71.21 ANEURYSM OF ASCENDING AORTA WITHOUT RUPTURE (HCC): ICD-10-CM

## 2023-10-31 PROCEDURE — 3074F SYST BP LT 130 MM HG: CPT | Performed by: STUDENT IN AN ORGANIZED HEALTH CARE EDUCATION/TRAINING PROGRAM

## 2023-10-31 PROCEDURE — 99214 OFFICE O/P EST MOD 30 MIN: CPT | Performed by: STUDENT IN AN ORGANIZED HEALTH CARE EDUCATION/TRAINING PROGRAM

## 2023-10-31 PROCEDURE — 3078F DIAST BP <80 MM HG: CPT | Performed by: STUDENT IN AN ORGANIZED HEALTH CARE EDUCATION/TRAINING PROGRAM

## 2023-11-09 ENCOUNTER — VBI (OUTPATIENT)
Dept: ADMINISTRATIVE | Facility: OTHER | Age: 78
End: 2023-11-09

## 2023-12-07 ENCOUNTER — REMOTE DEVICE CLINIC VISIT (OUTPATIENT)
Dept: CARDIOLOGY CLINIC | Facility: CLINIC | Age: 78
End: 2023-12-07
Payer: COMMERCIAL

## 2023-12-07 DIAGNOSIS — Z95.0 CARDIAC PACEMAKER IN SITU: Primary | ICD-10-CM

## 2023-12-07 PROCEDURE — 93294 REM INTERROG EVL PM/LDLS PM: CPT | Performed by: INTERNAL MEDICINE

## 2023-12-07 PROCEDURE — 93296 REM INTERROG EVL PM/IDS: CPT | Performed by: INTERNAL MEDICINE

## 2023-12-07 NOTE — PROGRESS NOTES
Results for orders placed or performed in visit on 12/07/23   Cardiac EP device report    Narrative    MDT DC PPM - ACTIVE SYSTEM IS MRI CONDITIONAL  CARELINK TRANSMISSION: BATTERY VOLTAGE ADEQUATE (9.8 YRS). AP 0%  100% (>40%/AAIR-DDDR 60) ALL AVAILABLE LEAD PARAMETERS WITHIN NORMAL LIMITS. NO NEW SIGNIFICANT HIGH RATE EPISODES; AF IN PROGRESS SINCE 10/5/22. AT/AF BURDEN 100% SINCE 9/7/23. TAKES CARVEDILOL, PLAVIX, & ASA; REFUSES A/C. NORMAL DEVICE FUNCTION.  AM/GV

## 2024-03-12 ENCOUNTER — REMOTE DEVICE CLINIC VISIT (OUTPATIENT)
Dept: CARDIOLOGY CLINIC | Facility: CLINIC | Age: 79
End: 2024-03-12
Payer: COMMERCIAL

## 2024-03-12 DIAGNOSIS — Z95.0 PRESENCE OF CARDIAC PACEMAKER: Primary | ICD-10-CM

## 2024-03-12 PROCEDURE — 93294 REM INTERROG EVL PM/LDLS PM: CPT | Performed by: INTERNAL MEDICINE

## 2024-03-12 PROCEDURE — 93296 REM INTERROG EVL PM/IDS: CPT | Performed by: INTERNAL MEDICINE

## 2024-03-12 NOTE — PROGRESS NOTES
Results for orders placed or performed in visit on 03/12/24   Cardiac EP device report    Narrative    MDT DC PPM - ACTIVE SYSTEM IS MRI CONDITIONAL  CARELINK TRANSMISSION: BATTERY VOLTAGE ADEQUATE (9.5 YRS). AP: 0%. : 100% (>40%~MVP-ON/60). ALL AVAILABLE LEAD PARAMETERS WITHIN NORMAL LIMITS. 1 TREATED AT/AF EPISODE W/ rATP ~(0). AF BURDEN: 100%. PT REFUSES AC. PT TAKES PLAVIX, ASA 81MG, COREG. EF: 36% (ECHO 9/29/22). NORMAL DEVICE FUNCTION. CH

## 2024-05-21 ENCOUNTER — ESTABLISHED COMPREHENSIVE EXAM (OUTPATIENT)
Dept: URBAN - METROPOLITAN AREA CLINIC 6 | Facility: CLINIC | Age: 79
End: 2024-05-21

## 2024-05-21 DIAGNOSIS — H40.1123: ICD-10-CM

## 2024-05-21 DIAGNOSIS — H35.3231: ICD-10-CM

## 2024-05-21 DIAGNOSIS — Z96.1: ICD-10-CM

## 2024-05-21 DIAGNOSIS — H35.033: ICD-10-CM

## 2024-05-21 DIAGNOSIS — E11.9: ICD-10-CM

## 2024-05-21 DIAGNOSIS — H43.811: ICD-10-CM

## 2024-05-21 DIAGNOSIS — H04.123: ICD-10-CM

## 2024-05-21 DIAGNOSIS — H40.1112: ICD-10-CM

## 2024-05-21 DIAGNOSIS — H25.812: ICD-10-CM

## 2024-05-21 PROCEDURE — 92014 COMPRE OPH EXAM EST PT 1/>: CPT

## 2024-05-21 PROCEDURE — 92083 EXTENDED VISUAL FIELD XM: CPT

## 2024-05-21 PROCEDURE — 92020 GONIOSCOPY: CPT

## 2024-05-21 PROCEDURE — 92133 CPTRZD OPH DX IMG PST SGM ON: CPT

## 2024-05-21 ASSESSMENT — VISUAL ACUITY
OU_CC: J5
OD_PH: 20/50-2
OD_CC: 20/60-1

## 2024-05-21 ASSESSMENT — TONOMETRY
OS_IOP_MMHG: 19
OD_IOP_MMHG: 11
OD_IOP_MMHG: 13
OS_IOP_MMHG: 16

## 2024-06-11 ENCOUNTER — REMOTE DEVICE CLINIC VISIT (OUTPATIENT)
Dept: CARDIOLOGY CLINIC | Facility: CLINIC | Age: 79
End: 2024-06-11
Payer: COMMERCIAL

## 2024-06-11 DIAGNOSIS — Z95.0 CARDIAC PACEMAKER IN SITU: Primary | ICD-10-CM

## 2024-06-11 PROCEDURE — 93296 REM INTERROG EVL PM/IDS: CPT | Performed by: INTERNAL MEDICINE

## 2024-06-11 PROCEDURE — 93294 REM INTERROG EVL PM/LDLS PM: CPT | Performed by: INTERNAL MEDICINE

## 2024-06-11 NOTE — PROGRESS NOTES
"Results for orders placed or performed in visit on 06/11/24   Cardiac EP device report    Narrative    MDT DC PPM - ACTIVE SYSTEM IS MRI CONDITIONAL  CARELINK TRANSMISSION: BATTERY STATUS \"9 YRS.\" AP 0%  100%. ALL AVAILABLE LEAD PARAMETERS WITHIN NORMAL LIMITS. 100% AF BURDEN. PT ONLY ON ASA & PLAVIX. REFUSES AC & GI BLD. NORMAL DEVICE FUNCTION. NC         "

## 2024-07-23 ENCOUNTER — HOSPITAL ENCOUNTER (OUTPATIENT)
Dept: VASCULAR ULTRASOUND | Facility: HOSPITAL | Age: 79
Discharge: HOME | End: 2024-07-23
Payer: COMMERCIAL

## 2024-07-23 DIAGNOSIS — I82.402 ACUTE EMBOLISM AND THROMBOSIS OF UNSPECIFIED DEEP VEINS OF LEFT LOWER EXTREMITY (HCC): ICD-10-CM

## 2024-07-23 PROCEDURE — 93971 EXTREMITY STUDY: CPT

## 2024-07-23 PROCEDURE — 93971 EXTREMITY STUDY: CPT | Performed by: SURGERY

## 2024-09-10 ENCOUNTER — REMOTE DEVICE CLINIC VISIT (OUTPATIENT)
Dept: CARDIOLOGY CLINIC | Facility: CLINIC | Age: 79
End: 2024-09-10
Payer: COMMERCIAL

## 2024-09-10 ENCOUNTER — TELEPHONE (OUTPATIENT)
Dept: VASCULAR SURGERY | Facility: CLINIC | Age: 79
End: 2024-09-10

## 2024-09-10 DIAGNOSIS — Z95.0 PRESENCE OF PERMANENT CARDIAC PACEMAKER: Primary | ICD-10-CM

## 2024-09-10 PROCEDURE — 93296 REM INTERROG EVL PM/IDS: CPT | Performed by: INTERNAL MEDICINE

## 2024-09-10 PROCEDURE — 93294 REM INTERROG EVL PM/LDLS PM: CPT | Performed by: INTERNAL MEDICINE

## 2024-09-10 NOTE — PROGRESS NOTES
Results for orders placed or performed in visit on 09/10/24   Cardiac EP device report    Narrative    MDT DC PPM - ACTIVE SYSTEM IS MRI CONDITIONAL  CARELINK TRANSMISSION: BATTERY VOLTAGE ADEQUATE. (8.8 YRS)  99%. ALL AVAILABLE LEAD PARAMETERS WITHIN NORMAL LIMITS. NO SIGNIFICANT HIGH RATE EPISODES. CURRENTLY IN AF (100% OF TIME). PATIENT IS ON PLAVIX AND ASA 81mg, REFUSES AC'S AND HX OF GI BLEED. NORMAL DEVICE FUNCTION.---ROONEY

## 2024-09-17 ENCOUNTER — OFFICE VISIT (OUTPATIENT)
Dept: VASCULAR SURGERY | Facility: CLINIC | Age: 79
End: 2024-09-17
Payer: COMMERCIAL

## 2024-09-17 VITALS
HEIGHT: 59 IN | BODY MASS INDEX: 30.16 KG/M2 | HEART RATE: 65 BPM | RESPIRATION RATE: 18 BRPM | OXYGEN SATURATION: 97 % | DIASTOLIC BLOOD PRESSURE: 36 MMHG | SYSTOLIC BLOOD PRESSURE: 104 MMHG | WEIGHT: 149.6 LBS

## 2024-09-17 DIAGNOSIS — M79.89 PAIN AND SWELLING OF LEFT LOWER LEG: Primary | ICD-10-CM

## 2024-09-17 DIAGNOSIS — M79.662 PAIN AND SWELLING OF LEFT LOWER LEG: Primary | ICD-10-CM

## 2024-09-17 DIAGNOSIS — I83.819 VARICOSE VEINS OF UNSPECIFIED LOWER EXTREMITY WITH PAIN: ICD-10-CM

## 2024-09-17 PROCEDURE — 99203 OFFICE O/P NEW LOW 30 MIN: CPT | Performed by: NURSE PRACTITIONER

## 2024-09-17 RX ORDER — SPIRONOLACTONE 25 MG/1
TABLET ORAL
COMMUNITY
Start: 2024-09-13

## 2024-10-15 ENCOUNTER — TELEPHONE (OUTPATIENT)
Dept: CARDIOLOGY CLINIC | Facility: CLINIC | Age: 79
End: 2024-10-15

## 2024-10-15 NOTE — TELEPHONE ENCOUNTER
Left voicemail for patient to call back and reschedule appointment with Dr Edwards (10/31) due to change in provider's schedule.

## 2024-10-28 NOTE — ASSESSMENT & PLAN NOTE
S/P PPM placement in June 2020  History of tachy-keanu syndrome  <-- Click to add NO significant Past Surgical History

## 2024-11-11 NOTE — PROGRESS NOTES
"Advanced Heart Failure/Pulmonary Hypertension Outpatient Note - Quynh Roa 79 y.o. female MRN: [id number]    @ Encounter: [id number]    Assessment:  79 y.o. female Hx per chart p/w HF fu.    Historically Follows Dr. Estes general cardiology>she was frustrated w some of her care and transferred her cardiac care she says.  I first met pt 7/27/23 after she decided to switch her care.  ICM, Chronic HFrEF, EF 35%, LVIDD 4.6cm, biv dysfxn  CAD s/p CABGx3 2015 (EF was normal 2015). No obstructive dz in grafts per cath 5/2020.  Paroxysmal atrial fibrillation/flutter. Off AC for some time [id number] due to Ao dissection, then AC was resumed as outpt by her general cardiology team before her 7/2023 anemia admit; subsequently off since [id number] due to personal preference after bleed.  SSS s/p D-PPM placed 2020  Severe Asc aortic root dilatation and dissection in 10/2022  Repeat CTA 7/1/2023 during anemia admission with stable Ao findings since 10/2022. 7/1/23 CTA: AORTA/ARTERIAL VASCULATURE: Stable size of an ascending aortic aneurysm measuring up to 5.9 cm compared to the most recent CT of 10/4/2022. Again seen is a dissection flap extending from the right coronary noncoronary cusps extending superiorly and   terminating at the proximal right brachiocephalic artery. There is thrombosis of the false lumen within the mid-distal ascending aorta. Again seen is another smaller dissection flap in the medial ascending aorta (series 305, image 83) measuring approximately 1.7 cm in length.  CTS Previously evaluated in 10/2022 and determined she was not a surgical candidate. Per chart: \"Critical care team discussed with CT surgery -no plan for emergent surgery as patient would be high risk for surgery due to anatomy. Initially was being recommended for cardiac catheterization but given risk with dissection, medical management was pursued instead\".  7/27/23 - pt reports multiple discussions between herself, daughter, various " medical teams and no surgical plan in past or presently.  She had Ao aneurysm since at least 2015 at time of CABG (4.0cm 2015), then in 2020 a CT indicated intramural hematoma vs dissection, CTS was consulted, pt was already DNR/I and seems she did not want to reverse it, med mngmt was pursued in 2020.  Hypertension  DM  Anemia, 7/4/2023 admit for fatigue and hgb 6.5, this was some time (1-2 weeks) after the outpt inception of eliquis+higher dose ASA 324mg per her prior general cardiology team and DC of prior plavix (she was on ASA+plavix before this switch to AC+ASA).  EGD and colonoscopy performed on 7/3/2023.  EGD showed mild erythematous mucosa in the antrum and biopsy sent for H. pylori.  Colonoscopy with pancolonic severe diverticula, hemorrhoids.  GI cleared to resume Eliquis. Then for outpatient capsule endoscopy per 7/2023 GI plan.      Lab Results   Component Value Date    CREATININE 1.01 07/04/2023     Lab Results   Component Value Date    K 3.8 07/04/2023     Lab Results   Component Value Date    HGBA1C 7.8 (H) 03/28/2023     Lab Results   Component Value Date    EXF1LVDKXUSD 9.140 (H) 07/07/2020    TSH 3.33 03/21/2023     Lab Results   Component Value Date    LDLCALC 32 07/08/2022     Lab Results   Component Value Date     (H) 11/15/2022      Lab Results   Component Value Date    NTBNP 3,961 (H) 07/08/2022       TODAY'S PLAN:     11/12/24  Warm, euvolemic  No new cardiac complaints, feels generally well  Ambulatory, no new Sx provoked  BP at goal  Somewhat self selective about her medications, especially after her 7/2023 anemia admit    Check CBC, ordered 11/12/24  If CBC acceptable than will switch her current ASA+plavix to eliquis 5 bid+plavix 75 qd (drop ASA), based on the below discussions as well as many prior. Pt and daughter assent to AC re-attempt based on risks vs benefits.  extensive discussions about risks vs benefits of her current DAPT vs re-attempt of eliquis+AP. She has  historically  declined this after she had a GIB following inception of eliquis 5 bid +  qd by her prior general cardiology team. On 11/12/24 she says she is ready to reattempt eliquis 5 bid + plavix 75 and drop ASA > will do so after checking CBC 11/12/24.    Per 2023 notes and prior GI plan, she was planned for outpt capsule endoscopy per GI; this does not appear to have occurred > fu with PCP to coordinate completion of this GI evaluation    Warrants a repeat BMP > Repeat BMP per PCP, would obtain near term    gdmt below  No changes 11/12/24    Ongoing heavy RV pacing burden noted, chronic  If becomes more symptomatic and LVEF worsens, may need to re-evaluate based on her wishes    Long discussion again today about her Aortic dz, with pt and daughter  She again reaffirms no desire for intervention, understands her anatomic limitations and surgical limitations as determined by CTS in past; she purports understanding her possible risks including death  She does not wish for repeat Ao imaging anytime soon as unlikely to     Follow up:  With me in 6 months or sooner if symptoms evolve.  In addition to follow up with their other medical providers     Key info from my prior notes:    Long discussion again today about her anti-plt and AC regimen options  No changes today  Accepts risk of CVA off AC  See prior documentation as well    Recent admit 7/2023 for anemia  she was taking aspirin 324 mg along with Apixaban prior to admission (plavix was stopped upon eliquis inception prior to the anemia admit)  Fu GI recs, may get capsule study outpt    7/27/23 we had long discussion readdressing risks vs benefits of AC  She is thinking over possible future switch from current DAPT to plavix 75 qd+eliquis 5 bid  Preponderance of evidence supports mitigating CVA risk in her vs some increased risk of rebleed  Her prior bleed counfounded by higher  qd dose  She may call clinic to switch meds before  "next visit w me in 3 mo    Prior discussions between pt and cardiology teams noted, as below     Per inpt cards notes:  \"-I had a lengthy discussion with her and her daughter who was on the phone. She confirms that she was taking aspirin 324 mg along with Apixaban prior to admission. GI okay with resuming Apixaban. I feel Apixaban alone is reasonable. She is not open to trying Apixaban at this time. She requests to remain on dual antiplatelet therapy - explained this doesn't protect from CVA, but okay to continue this regimen for now.\"    Warrants Pal care cx in future   7/27/23 - We had long discussion today about risk of Ao dissection progression and rupture  She has long standing prior plans and discussions in past, ongoing med mngmt now  She purports understanding risk of sudden change in her condition including death  Repeat CTA 6mo - 1/2024 to 6/2024 or if new Sx, no intervention planned since well before we met, see discussion above  for med mngmt now    Neurohormonal Blockade/GDMT:  --Beta-Blocker: coreg 12.5 bid  --ACEi, ARB, ARNi: losartan 25 qd  --MRA: aldactone 25 qd  --SGLT2i: start jardiance 10 qd>Did not tolerate jardiance and self stopped  --Hydralazine/nitrates: on hydral/isordil 25/20 tid    --Diuretic: lasix 40 qd    Other HF pharmaco-invasive therapy (if applicable):   PPM,  ICD , CRT (if applicable):  Interrogation:  9/2024  MDT DC PPM - ACTIVE SYSTEM IS MRI CONDITIONAL   CARELINK TRANSMISSION: BATTERY VOLTAGE ADEQUATE. (8.8 YRS)  99%. ALL AVAILABLE LEAD PARAMETERS WITHIN NORMAL LIMITS. NO SIGNIFICANT HIGH RATE EPISODES. CURRENTLY IN AF (100% OF TIME). PATIENT IS ON PLAVIX AND ASA 81mg, REFUSES AC'S AND HX OF GI BLEED. NORMAL DEVICE FUNCTION   Advanced Therapies (if applicable):   --Inotrope:  --LVAD/Transplant Candidacy:    Studies:  I have reviewed all pertinent patient data/labs/imaging where available, including but not limited to the below studies. Selected results may be displayed here " but comprehensive listing is omitted for note clarity and can be found in the epic chart.    ECG.    Echo.    Stress.    Cath.    HPI:   78 y.o. female Hx per chart p/w HF fu.  No new CP/SOB/dizziness/palpitations/syncope.  No new fatigue.  No new unintentional weight changes.  No new leg swelling, PND, pillow orthopnea.  No new fevers, chills, cough, nausea, vomiting, diarrhea, dysuria.      Interval History:   As noted in 'plan' section above and prior epic chart notes.    No new CP/SOB/dizziness/palpitations/syncope.  No new fatigue.  No new unintentional weight changes.  No new leg swelling, PND, pillow orthopnea.  No new fevers, chills, cough, nausea, vomiting, diarrhea, dysuria.      Past Medical History:   Diagnosis Date    Atypical chest pain     GERD (gastroesophageal reflux disease)     Hyperlipidemia     Hypertension     Kidney stone     Myocardial infarction ([facility])     2015     NSTEMI (non-ST elevated myocardial infarction) ([facility])     Last Assessed: 9/24/2015    Type 2 diabetes mellitus, without long-term current use of insulin ([facility]) 9/12/2013     Patient Active Problem List    Diagnosis Date Noted    Acute on chronic diastolic (congestive) heart failure ([facility]) 11/12/2024    Type 2 diabetes mellitus with hyperglycemia, without long-term current use of insulin ([facility]) 11/12/2024    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, without long-term current use of insulin ([facility]) 11/12/2024    Chronic kidney disease, stage 3b ([facility]) 11/12/2024    Primary osteoarthritis of right knee 08/31/2023    Acute pain of right knee 08/31/2023    Acute blood loss anemia 06/29/2023    S/P CABG (coronary artery bypass graft) 01/20/2023    DNR (do not resuscitate) 10/12/2022    Type 2 diabetes mellitus, without long-term current use of insulin ([facility]) 10/12/2022    Ambulatory dysfunction 10/11/2022    Thoracic aortic dissection ([facility]) 10/05/2022    LAURENCE (acute kidney injury) ([facility]) 10/05/2022     Constipation 10/04/2022    Wound of right leg 09/30/2022    Heart failure with reduced ejection fraction ([facility]) 09/29/2022    Hypertensive urgency 09/28/2022    Acquired absence of other right toe(s) ([facility]) 02/16/2022    Continuous opioid dependence ([facility]) 02/16/2022    Permanent atrial fibrillation ([facility]) 02/15/2021    Anemia, unspecified 10/14/2020    Vitamin B 12 deficiency 10/14/2020    Cardiac pacemaker in situ 07/17/2020    Preop cardiovascular exam 07/17/2020    Foreign body in vagina 07/12/2020    Nephrolithiasis 06/16/2020    Hematuria 06/16/2020    Unintentional weight loss 06/01/2020    Generalized weakness 05/31/2020    Gross hematuria 05/25/2020    Elevated troponin level not due myocardial infarction 05/23/2020    Osteopenia 09/29/2016    Tobacco abuse 09/29/2016    Aneurysm of ascending aorta ([facility]) 09/24/2015    CAD (coronary atherosclerotic disease) 09/24/2015    Gastroesophageal reflux disease 09/24/2015    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    aspirin 81 mg, Oral, Daily    atorvastatin (LIPITOR) 40 mg, Oral, Daily    Blood Glucose Monitoring Suppl (ONE TOUCH ULTRA 2) w/Device KIT No dose, route, or frequency recorded.    brimonidine-timolol (COMBIGAN) 0.2-0.5 % No dose, route, or frequency recorded.    carvedilol (COREG) 12.5 mg, Oral, 2 times daily with meals    clopidogrel (PLAVIX) 75 mg, Oral, Daily    ferrous sulfate 325 mg, Oral, Every 48 hours    furosemide (LASIX) 40 mg, Oral, Daily    glimepiride (AMARYL) 1 mg  tablet No dose, route, or frequency recorded.    hydrALAZINE (APRESOLINE) 25 mg, Oral, Every 8 hours scheduled    isosorbide dinitrate (ISORDIL) 20 mg, Oral, 3 times daily after meals    levothyroxine 50 mcg tablet No dose, route, or frequency recorded.    loratadine (CLARITIN) 10 mg tablet No dose, route, or frequency recorded.    losartan (COZAAR) 25 mg, Oral, Daily    melatonin 6 mg, Oral, Daily at bedtime    meloxicam (MOBIC) 15 mg tablet No dose, route, or frequency recorded.    Multiple Vitamins-Minerals (PreserVision AREDS) TABS No dose, route, or frequency recorded.    OneTouch Ultra test strip No dose, route, or frequency recorded.    pantoprazole (PROTONIX) 40 mg tablet No dose, route, or frequency recorded.    spironolactone (ALDACTONE) 25 mg tablet         Social History     Socioeconomic History    Marital status: Single     Spouse name: Not on file    Number of children: 1    Years of education: Not on file    Highest education level: Not on file   Occupational History    Not on file   Tobacco Use    Smoking status: Former     Current packs/day: 0.00     Average packs/day: 0.3 packs/day for 54.0 years (13.5 ttl pk-yrs)     Types: Cigarettes     Start date: 1967     Quit date: 2021     Years since quitting: 3.8    Smokeless tobacco: Never    Tobacco comments:     Started smoking at age 22; currently smoking <1ppd.   Vaping Use    Vaping status: Never Used   Substance and Sexual Activity    Alcohol use: Not Currently    Drug use: Never    Sexual activity: Not Currently     Partners: Male     Birth control/protection: Post-menopausal   Other Topics Concern    Not on file   Social History Narrative    Not on file     Social Determinants of Health     Financial Resource Strain: Medium Risk (8/2/2022)    Overall Financial Resource Strain (CARDIA)     Difficulty of Paying Living Expenses: Somewhat hard   Food Insecurity: No Food Insecurity (9/30/2022)    Hunger Vital Sign     Worried About Running Out of Food  "in the Last Year: Never true     Ran Out of Food in the Last Year: Never true   Transportation Needs: No Transportation Needs (9/30/2022)    PRAPARE - Transportation     Lack of Transportation (Medical): No     Lack of Transportation (Non-Medical): No   Physical Activity: Not on file   Stress: Not on file   Social Connections: Not on file   Intimate Partner Violence: Not on file   Housing Stability: Low Risk  (9/30/2022)    Housing Stability Vital Sign     Unable to Pay for Housing in the Last Year: No     Number of Places Lived in the Last Year: 1     Unstable Housing in the Last Year: No       Family History   Problem Relation Age of Onset    Hypertension Mother     Hypertension Sister     Alcohol abuse Brother     Cirrhosis Brother     Diabetes Father     Other Brother         Heart transplant in his 50s    Lung cancer Sister     Diabetes Brother     Stroke Sister     No Known Problems Daughter     No Known Problems Maternal Grandmother     No Known Problems Maternal Grandfather     No Known Problems Paternal Grandmother     No Known Problems Paternal Grandfather     No Known Problems Sister        Physical Exam:  Vitals:    11/12/24 1645   BP: 138/76   BP Location: Left arm   Patient Position: Sitting   Cuff Size: Standard   Pulse: 65   SpO2: 99%   Weight: 68.4 kg (150 lb 12.8 oz)   Height: 4' 11\" (1.499 m)     Constitutional: NAD, non toxic  Ears/nose/mouth/throat: atraumatic  CV: RRR, nl S1S2, no murmurs/rubs/gallups, no JVD, no HJR  Resp: ctabl  GI: Soft, NTND  MSK: no swollen joints in exposed areas  Extr: No edema, warm LE  Pysche: Normal affect  Neuro: appropriate in conversation  Skin: dry and intact in exposed areas    Labs & Results:    Lab Results   Component Value Date    SODIUM 138 07/04/2023    K 3.8 07/04/2023     (H) 07/04/2023    CO2 20 (L) 07/04/2023    BUN 18 07/04/2023    CREATININE 1.01 07/04/2023    GLUC 122 07/04/2023    CALCIUM 7.9 (L) 07/04/2023     Lab Results   Component Value " Date    WBC 7.61 07/04/2023    HGB 9.6 (L) 07/04/2023    HCT 30.1 (L) 07/04/2023    MCV 98 07/04/2023     07/04/2023     Lab Results   Component Value Date     (H) 11/15/2022      Lab Results   Component Value Date    CHOLESTEROL 111 07/08/2022    CHOLESTEROL 143 02/08/2022    CHOLESTEROL 155 09/15/2021     Lab Results   Component Value Date    HDL 60 07/08/2022    HDL 58 02/08/2022    HDL 58 09/15/2021     Lab Results   Component Value Date    TRIG 97 07/08/2022    TRIG 150 02/08/2022    TRIG 139 09/15/2021     Lab Results   Component Value Date    NONHDLC 68 05/23/2020    NONHDLC 114 03/27/2017    NONHDLC 106 09/26/2016       Counseling / Coordination of Care  Greater than 50% of total time was spent with the patient and / or family counseling and / or coordination of care.  We discussed diagnoses, most recent studies, tests and any changes in treatment plan.    Thank you for the opportunity to participate in the care of this patient.    Conrad Edwards MD  Attending Physician  Advanced Heart Failure and Transplant Cardiology  [facility]

## 2024-11-12 ENCOUNTER — OFFICE VISIT (OUTPATIENT)
Dept: CARDIOLOGY CLINIC | Facility: CLINIC | Age: 79
End: 2024-11-12
Payer: COMMERCIAL

## 2024-11-12 VITALS
HEIGHT: 59 IN | WEIGHT: 150.8 LBS | BODY MASS INDEX: 30.4 KG/M2 | OXYGEN SATURATION: 99 % | HEART RATE: 65 BPM | SYSTOLIC BLOOD PRESSURE: 138 MMHG | DIASTOLIC BLOOD PRESSURE: 76 MMHG

## 2024-11-12 DIAGNOSIS — N18.32 CHRONIC KIDNEY DISEASE, STAGE 3B (HCC): ICD-10-CM

## 2024-11-12 DIAGNOSIS — E11.65 TYPE 2 DIABETES MELLITUS WITH HYPERGLYCEMIA, WITHOUT LONG-TERM CURRENT USE OF INSULIN (HCC): ICD-10-CM

## 2024-11-12 DIAGNOSIS — E11.3553 TYPE 2 DIABETES MELLITUS WITH STABLE PROLIFERATIVE RETINOPATHY OF BOTH EYES, WITHOUT LONG-TERM CURRENT USE OF INSULIN (HCC): ICD-10-CM

## 2024-11-12 DIAGNOSIS — I50.20 HEART FAILURE WITH REDUCED EJECTION FRACTION (HCC): Primary | ICD-10-CM

## 2024-11-12 DIAGNOSIS — I25.10 ATHEROSCLEROSIS OF NATIVE CORONARY ARTERY OF NATIVE HEART WITHOUT ANGINA PECTORIS: ICD-10-CM

## 2024-11-12 DIAGNOSIS — I71.010 DISSECTION OF ASCENDING AORTA (HCC): ICD-10-CM

## 2024-11-12 DIAGNOSIS — I49.5 SICK SINUS SYNDROME (HCC): ICD-10-CM

## 2024-11-12 PROBLEM — I50.33 ACUTE ON CHRONIC DIASTOLIC (CONGESTIVE) HEART FAILURE (HCC): Status: ACTIVE | Noted: 2024-11-12

## 2024-11-12 PROCEDURE — G2211 COMPLEX E/M VISIT ADD ON: HCPCS | Performed by: STUDENT IN AN ORGANIZED HEALTH CARE EDUCATION/TRAINING PROGRAM

## 2024-11-12 PROCEDURE — 99214 OFFICE O/P EST MOD 30 MIN: CPT | Performed by: STUDENT IN AN ORGANIZED HEALTH CARE EDUCATION/TRAINING PROGRAM

## 2024-11-18 ENCOUNTER — TRANSCRIBE ORDERS (OUTPATIENT)
Dept: LAB | Facility: CLINIC | Age: 79
End: 2024-11-18

## 2024-11-18 ENCOUNTER — APPOINTMENT (OUTPATIENT)
Dept: LAB | Facility: CLINIC | Age: 79
End: 2024-11-18
Payer: COMMERCIAL

## 2024-11-18 DIAGNOSIS — I50.9 HEART FAILURE, UNSPECIFIED HF CHRONICITY, UNSPECIFIED HEART FAILURE TYPE (HCC): ICD-10-CM

## 2024-11-18 DIAGNOSIS — I25.10 ATHEROSCLEROSIS OF NATIVE CORONARY ARTERY OF NATIVE HEART WITHOUT ANGINA PECTORIS: ICD-10-CM

## 2024-11-18 DIAGNOSIS — D50.0 IRON DEFICIENCY ANEMIA SECONDARY TO BLOOD LOSS (CHRONIC): ICD-10-CM

## 2024-11-18 DIAGNOSIS — I51.9 MYXEDEMA HEART DISEASE: ICD-10-CM

## 2024-11-18 DIAGNOSIS — E11.3391 MODERATE NONPROLIFERATIVE DIABETIC RETINOPATHY OF RIGHT EYE ASSOCIATED WITH TYPE 2 DIABETES MELLITUS, MACULAR EDEMA PRESENCE UNSPECIFIED (HCC): ICD-10-CM

## 2024-11-18 DIAGNOSIS — E11.3391 MODERATE NONPROLIFERATIVE DIABETIC RETINOPATHY OF RIGHT EYE ASSOCIATED WITH TYPE 2 DIABETES MELLITUS, MACULAR EDEMA PRESENCE UNSPECIFIED (HCC): Primary | ICD-10-CM

## 2024-11-18 DIAGNOSIS — E78.5 HYPERLIPIDEMIA, UNSPECIFIED HYPERLIPIDEMIA TYPE: ICD-10-CM

## 2024-11-18 DIAGNOSIS — E03.9 MYXEDEMA HEART DISEASE: ICD-10-CM

## 2024-11-18 LAB
ANION GAP SERPL CALCULATED.3IONS-SCNC: 11 MMOL/L (ref 4–13)
BASOPHILS # BLD AUTO: 0.13 THOUSANDS/ÂΜL (ref 0–0.1)
BASOPHILS NFR BLD AUTO: 1 % (ref 0–1)
BUN SERPL-MCNC: 40 MG/DL (ref 5–25)
CALCIUM SERPL-MCNC: 8.9 MG/DL (ref 8.4–10.2)
CHLORIDE SERPL-SCNC: 102 MMOL/L (ref 96–108)
CHOLEST SERPL-MCNC: 130 MG/DL (ref ?–200)
CO2 SERPL-SCNC: 26 MMOL/L (ref 21–32)
CREAT SERPL-MCNC: 1.41 MG/DL (ref 0.6–1.3)
EOSINOPHIL # BLD AUTO: 0.39 THOUSAND/ÂΜL (ref 0–0.61)
EOSINOPHIL NFR BLD AUTO: 4 % (ref 0–6)
ERYTHROCYTE [DISTWIDTH] IN BLOOD BY AUTOMATED COUNT: 17 % (ref 11.6–15.1)
EST. AVERAGE GLUCOSE BLD GHB EST-MCNC: 157 MG/DL
GFR SERPL CREATININE-BSD FRML MDRD: 35 ML/MIN/1.73SQ M
GLUCOSE P FAST SERPL-MCNC: 191 MG/DL (ref 65–99)
HBA1C MFR BLD: 7.1 %
HCT VFR BLD AUTO: 31.6 % (ref 34.8–46.1)
HDLC SERPL-MCNC: 53 MG/DL
HGB BLD-MCNC: 9.1 G/DL (ref 11.5–15.4)
IMM GRANULOCYTES # BLD AUTO: 0.07 THOUSAND/UL (ref 0–0.2)
IMM GRANULOCYTES NFR BLD AUTO: 1 % (ref 0–2)
LDLC SERPL CALC-MCNC: 56 MG/DL (ref 0–100)
LYMPHOCYTES # BLD AUTO: 1.42 THOUSANDS/ÂΜL (ref 0.6–4.47)
LYMPHOCYTES NFR BLD AUTO: 13 % (ref 14–44)
MCH RBC QN AUTO: 25.8 PG (ref 26.8–34.3)
MCHC RBC AUTO-ENTMCNC: 28.8 G/DL (ref 31.4–37.4)
MCV RBC AUTO: 90 FL (ref 82–98)
MONOCYTES # BLD AUTO: 0.75 THOUSAND/ÂΜL (ref 0.17–1.22)
MONOCYTES NFR BLD AUTO: 7 % (ref 4–12)
NEUTROPHILS # BLD AUTO: 8.42 THOUSANDS/ÂΜL (ref 1.85–7.62)
NEUTS SEG NFR BLD AUTO: 74 % (ref 43–75)
NONHDLC SERPL-MCNC: 77 MG/DL
NRBC BLD AUTO-RTO: 0 /100 WBCS
PLATELET # BLD AUTO: 387 THOUSANDS/UL (ref 149–390)
PMV BLD AUTO: 9.8 FL (ref 8.9–12.7)
POTASSIUM SERPL-SCNC: 4.7 MMOL/L (ref 3.5–5.3)
RBC # BLD AUTO: 3.53 MILLION/UL (ref 3.81–5.12)
SODIUM SERPL-SCNC: 139 MMOL/L (ref 135–147)
TRIGL SERPL-MCNC: 103 MG/DL (ref ?–150)
TSH SERPL DL<=0.05 MIU/L-ACNC: 1.86 UIU/ML (ref 0.45–4.5)
WBC # BLD AUTO: 11.18 THOUSAND/UL (ref 4.31–10.16)

## 2024-11-18 PROCEDURE — 83036 HEMOGLOBIN GLYCOSYLATED A1C: CPT

## 2024-11-18 PROCEDURE — 85025 COMPLETE CBC W/AUTO DIFF WBC: CPT

## 2024-11-18 PROCEDURE — 80048 BASIC METABOLIC PNL TOTAL CA: CPT

## 2024-11-18 PROCEDURE — 84443 ASSAY THYROID STIM HORMONE: CPT

## 2024-11-18 PROCEDURE — 80061 LIPID PANEL: CPT

## 2024-11-18 PROCEDURE — 36415 COLL VENOUS BLD VENIPUNCTURE: CPT

## 2024-11-27 ENCOUNTER — TELEPHONE (OUTPATIENT)
Age: 79
End: 2024-11-27

## 2024-11-27 NOTE — TELEPHONE ENCOUNTER
Patient completed a CBC on 11/18.  There was your order and an order from the PCP.  The lab leonard the PCP order along with other labs.    Patient asked for you to review and advise on med change.  She is aware you are out of the office for a few days and would address when you return.

## 2024-12-09 DIAGNOSIS — D64.9 ANEMIA, UNSPECIFIED TYPE: Primary | ICD-10-CM

## 2024-12-09 DIAGNOSIS — I48.21 PERMANENT ATRIAL FIBRILLATION (HCC): Primary | ICD-10-CM

## 2024-12-10 ENCOUNTER — REMOTE DEVICE CLINIC VISIT (OUTPATIENT)
Dept: CARDIOLOGY CLINIC | Facility: CLINIC | Age: 79
End: 2024-12-10
Payer: COMMERCIAL

## 2024-12-10 DIAGNOSIS — Z95.0 PRESENCE OF PERMANENT CARDIAC PACEMAKER: Primary | ICD-10-CM

## 2024-12-10 PROCEDURE — 93294 REM INTERROG EVL PM/LDLS PM: CPT | Performed by: INTERNAL MEDICINE

## 2024-12-10 PROCEDURE — 93296 REM INTERROG EVL PM/IDS: CPT | Performed by: INTERNAL MEDICINE

## 2024-12-10 NOTE — PROGRESS NOTES
MDT DC PM - ACTIVE SYSTEM IS MRI CONDITIONAL   CARELINK TRANSMISSION:  BATTERY VOLTAGE ADEQUATE (8.5 YR.).  AP <0.1%  99.8% (MODE SWITCHED).  ALL LEAD PARAMETERS WITHIN NORMAL LIMITS.  NO NEW HIGH RATE EPISODES.  100% AF, ON ELIQUIS AND CARVEDILOL.  NORMAL DEVICE FUNCTION.  RG

## 2024-12-22 ENCOUNTER — RESULTS FOLLOW-UP (OUTPATIENT)
Dept: CARDIOLOGY CLINIC | Facility: CLINIC | Age: 79
End: 2024-12-22

## 2024-12-25 ENCOUNTER — HOSPITAL ENCOUNTER (INPATIENT)
Facility: HOSPITAL | Age: 79
LOS: 2 days | Discharge: NURSING, REHAB OR LONG-TERM CARE | DRG: 812 | End: 2024-12-28
Attending: EMERGENCY MEDICINE | Admitting: STUDENT IN AN ORGANIZED HEALTH CARE EDUCATION/TRAINING PROGRAM
Payer: COMMERCIAL

## 2024-12-25 ENCOUNTER — APPOINTMENT (EMERGENCY)
Dept: RADIOLOGY | Facility: HOSPITAL | Age: 79
DRG: 812 | End: 2024-12-25
Payer: COMMERCIAL

## 2024-12-25 DIAGNOSIS — D64.9 SYMPTOMATIC ANEMIA: ICD-10-CM

## 2024-12-25 DIAGNOSIS — R74.8 ELEVATED CREATINE KINASE: ICD-10-CM

## 2024-12-25 DIAGNOSIS — M17.12 PRIMARY OSTEOARTHRITIS OF LEFT KNEE: ICD-10-CM

## 2024-12-25 DIAGNOSIS — I50.20 HEART FAILURE WITH REDUCED EJECTION FRACTION (HCC): ICD-10-CM

## 2024-12-25 DIAGNOSIS — D64.9 ANEMIA, UNSPECIFIED TYPE: Primary | ICD-10-CM

## 2024-12-25 DIAGNOSIS — I48.91 ATRIAL FIBRILLATION (HCC): ICD-10-CM

## 2024-12-25 DIAGNOSIS — K92.1 MELENA: ICD-10-CM

## 2024-12-25 DIAGNOSIS — K31.A0 GASTRIC INTESTINAL METAPLASIA: ICD-10-CM

## 2024-12-25 LAB
2HR DELTA HS TROPONIN: 0 NG/L
ABO GROUP BLD: NORMAL
ALBUMIN SERPL BCG-MCNC: 4 G/DL (ref 3.5–5)
ALP SERPL-CCNC: 30 U/L (ref 34–104)
ALT SERPL W P-5'-P-CCNC: 7 U/L (ref 7–52)
ANION GAP SERPL CALCULATED.3IONS-SCNC: 13 MMOL/L (ref 4–13)
AST SERPL W P-5'-P-CCNC: 9 U/L (ref 13–39)
ATRIAL RATE: 312 BPM
BACTERIA UR QL AUTO: ABNORMAL /HPF
BASOPHILS # BLD AUTO: 0.08 THOUSANDS/ÂΜL (ref 0–0.1)
BASOPHILS NFR BLD AUTO: 1 % (ref 0–1)
BILIRUB SERPL-MCNC: 0.3 MG/DL (ref 0.2–1)
BILIRUB UR QL STRIP: NEGATIVE
BLD GP AB SCN SERPL QL: NEGATIVE
BUN SERPL-MCNC: 73 MG/DL (ref 5–25)
CALCIUM SERPL-MCNC: 9 MG/DL (ref 8.4–10.2)
CARDIAC TROPONIN I PNL SERPL HS: 14 NG/L (ref ?–50)
CARDIAC TROPONIN I PNL SERPL HS: 14 NG/L (ref ?–50)
CHLORIDE SERPL-SCNC: 98 MMOL/L (ref 96–108)
CLARITY UR: CLEAR
CO2 SERPL-SCNC: 24 MMOL/L (ref 21–32)
COLOR UR: ABNORMAL
CREAT SERPL-MCNC: 1.84 MG/DL (ref 0.6–1.3)
EOSINOPHIL # BLD AUTO: 0.07 THOUSAND/ÂΜL (ref 0–0.61)
EOSINOPHIL NFR BLD AUTO: 1 % (ref 0–6)
ERYTHROCYTE [DISTWIDTH] IN BLOOD BY AUTOMATED COUNT: 19.1 % (ref 11.6–15.1)
GFR SERPL CREATININE-BSD FRML MDRD: 25 ML/MIN/1.73SQ M
GLUCOSE SERPL-MCNC: 196 MG/DL (ref 65–140)
GLUCOSE UR STRIP-MCNC: NEGATIVE MG/DL
HCT VFR BLD AUTO: 21.5 % (ref 34.8–46.1)
HGB BLD-MCNC: 6.2 G/DL (ref 11.5–15.4)
HGB UR QL STRIP.AUTO: NEGATIVE
IMM GRANULOCYTES # BLD AUTO: 0.14 THOUSAND/UL (ref 0–0.2)
IMM GRANULOCYTES NFR BLD AUTO: 1 % (ref 0–2)
KETONES UR STRIP-MCNC: NEGATIVE MG/DL
LEUKOCYTE ESTERASE UR QL STRIP: ABNORMAL
LYMPHOCYTES # BLD AUTO: 1.38 THOUSANDS/ÂΜL (ref 0.6–4.47)
LYMPHOCYTES NFR BLD AUTO: 10 % (ref 14–44)
MCH RBC QN AUTO: 26.5 PG (ref 26.8–34.3)
MCHC RBC AUTO-ENTMCNC: 28.8 G/DL (ref 31.4–37.4)
MCV RBC AUTO: 92 FL (ref 82–98)
MONOCYTES # BLD AUTO: 0.82 THOUSAND/ÂΜL (ref 0.17–1.22)
MONOCYTES NFR BLD AUTO: 6 % (ref 4–12)
NEUTROPHILS # BLD AUTO: 11.54 THOUSANDS/ÂΜL (ref 1.85–7.62)
NEUTS SEG NFR BLD AUTO: 81 % (ref 43–75)
NITRITE UR QL STRIP: NEGATIVE
NON-SQ EPI CELLS URNS QL MICRO: ABNORMAL /HPF
NRBC BLD AUTO-RTO: 1 /100 WBCS
PH UR STRIP.AUTO: 5 [PH]
PLATELET # BLD AUTO: 338 THOUSANDS/UL (ref 149–390)
PMV BLD AUTO: 10.1 FL (ref 8.9–12.7)
POTASSIUM SERPL-SCNC: 4.7 MMOL/L (ref 3.5–5.3)
PROT SERPL-MCNC: 6.9 G/DL (ref 6.4–8.4)
PROT UR STRIP-MCNC: NEGATIVE MG/DL
QRS AXIS: -28 DEGREES
QRSD INTERVAL: 144 MS
QT INTERVAL: 462 MS
QTC INTERVAL: 476 MS
RBC # BLD AUTO: 2.34 MILLION/UL (ref 3.81–5.12)
RBC #/AREA URNS AUTO: ABNORMAL /HPF
RH BLD: POSITIVE
SODIUM SERPL-SCNC: 135 MMOL/L (ref 135–147)
SP GR UR STRIP.AUTO: 1.02 (ref 1–1.03)
SPECIMEN EXPIRATION DATE: NORMAL
T WAVE AXIS: 85 DEGREES
UROBILINOGEN UR STRIP-ACNC: <2 MG/DL
VENTRICULAR RATE: 64 BPM
WBC # BLD AUTO: 14.03 THOUSAND/UL (ref 4.31–10.16)
WBC #/AREA URNS AUTO: ABNORMAL /HPF

## 2024-12-25 PROCEDURE — 86850 RBC ANTIBODY SCREEN: CPT

## 2024-12-25 PROCEDURE — 81001 URINALYSIS AUTO W/SCOPE: CPT

## 2024-12-25 PROCEDURE — 80053 COMPREHEN METABOLIC PANEL: CPT

## 2024-12-25 PROCEDURE — 86923 COMPATIBILITY TEST ELECTRIC: CPT

## 2024-12-25 PROCEDURE — 71045 X-RAY EXAM CHEST 1 VIEW: CPT

## 2024-12-25 PROCEDURE — P9016 RBC LEUKOCYTES REDUCED: HCPCS

## 2024-12-25 PROCEDURE — 36415 COLL VENOUS BLD VENIPUNCTURE: CPT

## 2024-12-25 PROCEDURE — 86900 BLOOD TYPING SEROLOGIC ABO: CPT

## 2024-12-25 PROCEDURE — 93005 ELECTROCARDIOGRAM TRACING: CPT

## 2024-12-25 PROCEDURE — 99285 EMERGENCY DEPT VISIT HI MDM: CPT

## 2024-12-25 PROCEDURE — 86901 BLOOD TYPING SEROLOGIC RH(D): CPT

## 2024-12-25 PROCEDURE — 82272 OCCULT BLD FECES 1-3 TESTS: CPT

## 2024-12-25 PROCEDURE — 85025 COMPLETE CBC W/AUTO DIFF WBC: CPT

## 2024-12-25 PROCEDURE — 84484 ASSAY OF TROPONIN QUANT: CPT

## 2024-12-25 PROCEDURE — 30233N1 TRANSFUSION OF NONAUTOLOGOUS RED BLOOD CELLS INTO PERIPHERAL VEIN, PERCUTANEOUS APPROACH: ICD-10-PCS

## 2024-12-25 PROCEDURE — 36430 TRANSFUSION BLD/BLD COMPNT: CPT

## 2024-12-26 PROBLEM — K31.A0 GASTRIC INTESTINAL METAPLASIA: Status: ACTIVE | Noted: 2024-12-26

## 2024-12-26 PROBLEM — D72.829 LEUKOCYTOSIS: Status: ACTIVE | Noted: 2024-12-26

## 2024-12-26 PROBLEM — K92.1 MELENA: Status: ACTIVE | Noted: 2024-12-26

## 2024-12-26 PROBLEM — I50.32 CHRONIC DIASTOLIC HEART FAILURE (HCC): Status: ACTIVE | Noted: 2024-12-26

## 2024-12-26 LAB
ABO GROUP BLD BPU: NORMAL
ABO GROUP BLD BPU: NORMAL
ANION GAP SERPL CALCULATED.3IONS-SCNC: 10 MMOL/L (ref 4–13)
BASOPHILS # BLD AUTO: 0.09 THOUSANDS/ÂΜL (ref 0–0.1)
BASOPHILS NFR BLD AUTO: 1 % (ref 0–1)
BPU ID: NORMAL
BPU ID: NORMAL
BUN SERPL-MCNC: 63 MG/DL (ref 5–25)
CALCIUM SERPL-MCNC: 8.6 MG/DL (ref 8.4–10.2)
CHLORIDE SERPL-SCNC: 102 MMOL/L (ref 96–108)
CO2 SERPL-SCNC: 23 MMOL/L (ref 21–32)
CREAT SERPL-MCNC: 1.43 MG/DL (ref 0.6–1.3)
CROSSMATCH: NORMAL
CROSSMATCH: NORMAL
EOSINOPHIL # BLD AUTO: 0.17 THOUSAND/ÂΜL (ref 0–0.61)
EOSINOPHIL NFR BLD AUTO: 1 % (ref 0–6)
ERYTHROCYTE [DISTWIDTH] IN BLOOD BY AUTOMATED COUNT: 17.2 % (ref 11.6–15.1)
FERRITIN SERPL-MCNC: 14 NG/ML (ref 11–307)
GFR SERPL CREATININE-BSD FRML MDRD: 35 ML/MIN/1.73SQ M
GLUCOSE SERPL-MCNC: 112 MG/DL (ref 65–140)
GLUCOSE SERPL-MCNC: 115 MG/DL (ref 65–140)
GLUCOSE SERPL-MCNC: 138 MG/DL (ref 65–140)
GLUCOSE SERPL-MCNC: 175 MG/DL (ref 65–140)
HCT VFR BLD AUTO: 27.8 % (ref 34.8–46.1)
HCT VFR BLD AUTO: 28.8 % (ref 34.8–46.1)
HGB BLD-MCNC: 8.2 G/DL (ref 11.5–15.4)
HGB BLD-MCNC: 8.7 G/DL (ref 11.5–15.4)
HGB BLD-MCNC: 8.8 G/DL (ref 11.5–15.4)
IMM GRANULOCYTES # BLD AUTO: 0.07 THOUSAND/UL (ref 0–0.2)
IMM GRANULOCYTES NFR BLD AUTO: 1 % (ref 0–2)
IRON SATN MFR SERPL: 6 % (ref 15–50)
IRON SERPL-MCNC: 25 UG/DL (ref 50–212)
LYMPHOCYTES # BLD AUTO: 2.31 THOUSANDS/ÂΜL (ref 0.6–4.47)
LYMPHOCYTES NFR BLD AUTO: 19 % (ref 14–44)
MAGNESIUM SERPL-MCNC: 2.3 MG/DL (ref 1.9–2.7)
MCH RBC QN AUTO: 27.4 PG (ref 26.8–34.3)
MCHC RBC AUTO-ENTMCNC: 30.2 G/DL (ref 31.4–37.4)
MCV RBC AUTO: 91 FL (ref 82–98)
MONOCYTES # BLD AUTO: 0.76 THOUSAND/ÂΜL (ref 0.17–1.22)
MONOCYTES NFR BLD AUTO: 6 % (ref 4–12)
NEUTROPHILS # BLD AUTO: 8.56 THOUSANDS/ÂΜL (ref 1.85–7.62)
NEUTS SEG NFR BLD AUTO: 72 % (ref 43–75)
NRBC BLD AUTO-RTO: 1 /100 WBCS
PLATELET # BLD AUTO: 254 THOUSANDS/UL (ref 149–390)
PMV BLD AUTO: 9.6 FL (ref 8.9–12.7)
POTASSIUM SERPL-SCNC: 4 MMOL/L (ref 3.5–5.3)
RBC # BLD AUTO: 3.18 MILLION/UL (ref 3.81–5.12)
SODIUM SERPL-SCNC: 135 MMOL/L (ref 135–147)
TIBC SERPL-MCNC: 385 UG/DL (ref 250–450)
TRANSFERRIN SERPL-MCNC: 275 MG/DL (ref 203–362)
UIBC SERPL-MCNC: 360 UG/DL (ref 155–355)
UNIT DISPENSE STATUS: NORMAL
UNIT DISPENSE STATUS: NORMAL
UNIT PRODUCT CODE: NORMAL
UNIT PRODUCT CODE: NORMAL
UNIT PRODUCT VOLUME: 350 ML
UNIT PRODUCT VOLUME: 350 ML
UNIT RH: NORMAL
UNIT RH: NORMAL
WBC # BLD AUTO: 11.96 THOUSAND/UL (ref 4.31–10.16)

## 2024-12-26 PROCEDURE — 85014 HEMATOCRIT: CPT | Performed by: NURSE PRACTITIONER

## 2024-12-26 PROCEDURE — 85018 HEMOGLOBIN: CPT | Performed by: NURSE PRACTITIONER

## 2024-12-26 PROCEDURE — 99223 1ST HOSP IP/OBS HIGH 75: CPT | Performed by: STUDENT IN AN ORGANIZED HEALTH CARE EDUCATION/TRAINING PROGRAM

## 2024-12-26 PROCEDURE — 82948 REAGENT STRIP/BLOOD GLUCOSE: CPT

## 2024-12-26 PROCEDURE — 30233N1 TRANSFUSION OF NONAUTOLOGOUS RED BLOOD CELLS INTO PERIPHERAL VEIN, PERCUTANEOUS APPROACH: ICD-10-PCS

## 2024-12-26 PROCEDURE — 99222 1ST HOSP IP/OBS MODERATE 55: CPT | Performed by: INTERNAL MEDICINE

## 2024-12-26 PROCEDURE — 85025 COMPLETE CBC W/AUTO DIFF WBC: CPT | Performed by: NURSE PRACTITIONER

## 2024-12-26 PROCEDURE — 83540 ASSAY OF IRON: CPT | Performed by: NURSE PRACTITIONER

## 2024-12-26 PROCEDURE — 80048 BASIC METABOLIC PNL TOTAL CA: CPT | Performed by: NURSE PRACTITIONER

## 2024-12-26 PROCEDURE — 99223 1ST HOSP IP/OBS HIGH 75: CPT

## 2024-12-26 PROCEDURE — 83550 IRON BINDING TEST: CPT | Performed by: NURSE PRACTITIONER

## 2024-12-26 PROCEDURE — 83735 ASSAY OF MAGNESIUM: CPT | Performed by: NURSE PRACTITIONER

## 2024-12-26 PROCEDURE — 82728 ASSAY OF FERRITIN: CPT | Performed by: NURSE PRACTITIONER

## 2024-12-26 PROCEDURE — 87081 CULTURE SCREEN ONLY: CPT

## 2024-12-26 PROCEDURE — 99222 1ST HOSP IP/OBS MODERATE 55: CPT

## 2024-12-26 PROCEDURE — P9016 RBC LEUKOCYTES REDUCED: HCPCS

## 2024-12-26 RX ORDER — HYDRALAZINE HYDROCHLORIDE 25 MG/1
25 TABLET, FILM COATED ORAL EVERY 8 HOURS SCHEDULED
Status: DISCONTINUED | OUTPATIENT
Start: 2024-12-26 | End: 2024-12-28 | Stop reason: HOSPADM

## 2024-12-26 RX ORDER — ACETAMINOPHEN 325 MG/1
650 TABLET ORAL EVERY 6 HOURS PRN
Status: DISCONTINUED | OUTPATIENT
Start: 2024-12-26 | End: 2024-12-28 | Stop reason: HOSPADM

## 2024-12-26 RX ORDER — ISOSORBIDE DINITRATE 10 MG/1
20 TABLET ORAL
Status: DISCONTINUED | OUTPATIENT
Start: 2024-12-26 | End: 2024-12-28 | Stop reason: HOSPADM

## 2024-12-26 RX ORDER — PANTOPRAZOLE SODIUM 40 MG/10ML
40 INJECTION, POWDER, LYOPHILIZED, FOR SOLUTION INTRAVENOUS ONCE
Status: COMPLETED | OUTPATIENT
Start: 2024-12-26 | End: 2024-12-26

## 2024-12-26 RX ORDER — INSULIN LISPRO 100 [IU]/ML
1-5 INJECTION, SOLUTION INTRAVENOUS; SUBCUTANEOUS EVERY 6 HOURS SCHEDULED
Status: DISCONTINUED | OUTPATIENT
Start: 2024-12-26 | End: 2024-12-27

## 2024-12-26 RX ORDER — PANTOPRAZOLE SODIUM 40 MG/10ML
40 INJECTION, POWDER, LYOPHILIZED, FOR SOLUTION INTRAVENOUS EVERY 12 HOURS SCHEDULED
Status: DISCONTINUED | OUTPATIENT
Start: 2024-12-26 | End: 2024-12-28 | Stop reason: HOSPADM

## 2024-12-26 RX ORDER — LEVOTHYROXINE SODIUM 75 UG/1
75 TABLET ORAL
Status: DISCONTINUED | OUTPATIENT
Start: 2024-12-27 | End: 2024-12-28 | Stop reason: HOSPADM

## 2024-12-26 RX ORDER — BRIMONIDINE TARTRATE 2 MG/ML
1 SOLUTION/ DROPS OPHTHALMIC 2 TIMES DAILY
Status: DISCONTINUED | OUTPATIENT
Start: 2024-12-26 | End: 2024-12-28 | Stop reason: HOSPADM

## 2024-12-26 RX ORDER — LEVOTHYROXINE SODIUM 75 UG/1
75 TABLET ORAL DAILY
COMMUNITY

## 2024-12-26 RX ORDER — ATORVASTATIN CALCIUM 40 MG/1
40 TABLET, FILM COATED ORAL DAILY
Status: DISCONTINUED | OUTPATIENT
Start: 2024-12-26 | End: 2024-12-28 | Stop reason: HOSPADM

## 2024-12-26 RX ORDER — TIMOLOL MALEATE 5 MG/ML
1 SOLUTION/ DROPS OPHTHALMIC 2 TIMES DAILY
Status: DISCONTINUED | OUTPATIENT
Start: 2024-12-26 | End: 2024-12-28 | Stop reason: HOSPADM

## 2024-12-26 RX ORDER — LEVOTHYROXINE SODIUM 50 UG/1
50 TABLET ORAL
Status: DISCONTINUED | OUTPATIENT
Start: 2024-12-26 | End: 2024-12-28 | Stop reason: HOSPADM

## 2024-12-26 RX ORDER — FERROUS SULFATE 325(65) MG
325 TABLET ORAL
Status: DISCONTINUED | OUTPATIENT
Start: 2024-12-26 | End: 2024-12-26

## 2024-12-26 RX ORDER — CARVEDILOL 12.5 MG/1
12.5 TABLET ORAL 2 TIMES DAILY WITH MEALS
Status: DISCONTINUED | OUTPATIENT
Start: 2024-12-26 | End: 2024-12-28 | Stop reason: HOSPADM

## 2024-12-26 RX ADMIN — Medication 6 MG: at 01:33

## 2024-12-26 RX ADMIN — HYDRALAZINE HYDROCHLORIDE 25 MG: 25 TABLET ORAL at 09:33

## 2024-12-26 RX ADMIN — BRIMONIDINE TARTRATE 1 DROP: 2 SOLUTION/ DROPS OPHTHALMIC at 16:52

## 2024-12-26 RX ADMIN — TIMOLOL MALEATE 1 DROP: 5 SOLUTION OPHTHALMIC at 16:53

## 2024-12-26 RX ADMIN — Medication 6 MG: at 22:29

## 2024-12-26 RX ADMIN — ATORVASTATIN CALCIUM 40 MG: 40 TABLET, FILM COATED ORAL at 09:26

## 2024-12-26 RX ADMIN — HYDRALAZINE HYDROCHLORIDE 25 MG: 25 TABLET ORAL at 01:33

## 2024-12-26 RX ADMIN — PANTOPRAZOLE SODIUM 40 MG: 40 INJECTION, POWDER, FOR SOLUTION INTRAVENOUS at 09:26

## 2024-12-26 RX ADMIN — PANTOPRAZOLE SODIUM 40 MG: 40 INJECTION, POWDER, FOR SOLUTION INTRAVENOUS at 22:29

## 2024-12-26 RX ADMIN — LEVOTHYROXINE SODIUM 50 MCG: 0.05 TABLET ORAL at 05:31

## 2024-12-26 RX ADMIN — PANTOPRAZOLE SODIUM 40 MG: 40 INJECTION, POWDER, FOR SOLUTION INTRAVENOUS at 00:53

## 2024-12-26 RX ADMIN — CARVEDILOL 12.5 MG: 12.5 TABLET, FILM COATED ORAL at 09:33

## 2024-12-26 RX ADMIN — FERROUS SULFATE TAB 325 MG (65 MG ELEMENTAL FE) 325 MG: 325 (65 FE) TAB at 09:26

## 2024-12-26 RX ADMIN — INSULIN LISPRO 1 UNITS: 100 INJECTION, SOLUTION INTRAVENOUS; SUBCUTANEOUS at 12:42

## 2024-12-26 RX ADMIN — ISOSORBIDE DINITRATE 20 MG: 10 TABLET ORAL at 09:33

## 2024-12-26 NOTE — ED NOTES
Pt completed transfusion at this time, no s/s of reaction. Pt has no complaints or concerns      Oliva Santamaria RN  12/26/24 0051

## 2024-12-26 NOTE — PLAN OF CARE
Problem: PAIN - ADULT  Goal: Verbalizes/displays adequate comfort level or baseline comfort level  Description: Interventions:  - Encourage patient to monitor pain and request assistance  - Assess pain using appropriate pain scale  - Administer analgesics based on type and severity of pain and evaluate response  - Implement non-pharmacological measures as appropriate and evaluate response  - Consider cultural and social influences on pain and pain management  - Notify physician/advanced practitioner if interventions unsuccessful or patient reports new pain  Outcome: Progressing     Problem: INFECTION - ADULT  Goal: Absence or prevention of progression during hospitalization  Description: INTERVENTIONS:  - Assess and monitor for signs and symptoms of infection  - Monitor lab/diagnostic results  - Monitor all insertion sites, i.e. indwelling lines, tubes, and drains  - Monitor endotracheal if appropriate and nasal secretions for changes in amount and color  - [place] appropriate cooling/warming therapies per order  - Administer medications as ordered  - Instruct and encourage patient and family to use good hand hygiene technique  - Identify and instruct in appropriate isolation precautions for identified infection/condition  Outcome: Progressing  Goal: Absence of fever/infection during neutropenic period  Description: INTERVENTIONS:  - Monitor WBC    Outcome: Progressing     Problem: SAFETY ADULT  Goal: Patient will remain free of falls  Description: INTERVENTIONS:  - Educate patient/family on patient safety including physical limitations  - Instruct patient to call for assistance with activity   - Consult OT/PT to assist with strengthening/mobility   - Keep Call bell within reach  - Keep bed low and locked with side rails adjusted as appropriate  - Keep care items and personal belongings within reach  - Initiate and maintain comfort rounds  - Make Fall Risk Sign visible to staff  - Offer Toileting every 2 Hours,  in advance of need  - Initiate/Maintain bed/chair alarm  - Obtain necessary fall risk management equipment  - Apply yellow socks and bracelet for high fall risk patients  - Consider moving patient to room near nurses station  Outcome: Progressing  Goal: Maintain or return to baseline ADL function  Description: INTERVENTIONS:  -  Assess patient's ability to carry out ADLs; assess patient's baseline for ADL function and identify physical deficits which impact ability to perform ADLs (bathing, care of mouth/teeth, toileting, grooming, dressing, etc.)  - Assess/evaluate cause of self-care deficits   - Assess range of motion  - Assess patient's mobility; develop plan if impaired  - Assess patient's need for assistive devices and provide as appropriate  - Encourage maximum independence but intervene and supervise when necessary  - Involve family in performance of ADLs  - Assess for home care needs following discharge   - Consider OT consult to assist with ADL evaluation and planning for discharge  - Provide patient education as appropriate  Outcome: Progressing  Goal: Maintains/Returns to pre admission functional level  Description: INTERVENTIONS:  - Perform AM-PAC 6 Click Basic Mobility/ Daily Activity assessment daily.  - Set and communicate daily mobility goal to care team and patient/family/caregiver.   - Collaborate with rehabilitation services on mobility goals if consulted  - Perform Range of Motion 3 times a day.  - Reposition patient every 2 hours.  - Dangle patient 3 times a day  - Stand patient 3 times a day  - Ambulate patient 3 times a day  - Out of bed to chair 3 times a day   - Out of bed for meals 3 times a day  - Out of bed for toileting  - Record patient progress and toleration of activity level   Outcome: Progressing     Problem: DISCHARGE PLANNING  Goal: Discharge to home or other facility with appropriate resources  Description: INTERVENTIONS:  - Identify barriers to discharge w/patient and  caregiver  - Arrange for needed discharge resources and transportation as appropriate  - Identify discharge learning needs (meds, wound care, etc.)  - Arrange for interpretive services to assist at discharge as needed  - Refer to Case Management Department for coordinating discharge planning if the patient needs post-hospital services based on physician/advanced practitioner order or complex needs related to functional status, cognitive ability, or social support system  Outcome: Progressing     Problem: Knowledge Deficit  Goal: Patient/family/caregiver demonstrates understanding of disease process, treatment plan, medications, and discharge instructions  Description: Complete learning assessment and assess knowledge base.  Interventions:  - Provide teaching at level of understanding  - Provide teaching via preferred learning methods  Outcome: Progressing     Problem: HEMATOLOGIC - ADULT  Goal: Maintains hematologic stability  Description: INTERVENTIONS  - Assess for signs and symptoms of bleeding or hemorrhage  - Monitor labs  - Administer supportive blood products/factors as ordered and appropriate  Outcome: Progressing     Problem: Prexisting or High Potential for Compromised Skin Integrity  Goal: Skin integrity is maintained or improved  Description: INTERVENTIONS:  - Identify patients at risk for skin breakdown  - Assess and monitor skin integrity  - Assess and monitor nutrition and hydration status  - Monitor labs   - Assess for incontinence   - Turn and reposition patient  - Assist with mobility/ambulation  - Relieve pressure over bony prominences  - Avoid friction and shearing  - Provide appropriate hygiene as needed including keeping skin clean and dry  - Evaluate need for skin moisturizer/barrier cream  - Collaborate with interdisciplinary team   - Patient/family teaching  - Consider wound care consult   Outcome: Progressing     Problem: Potential for Falls  Goal: Patient will remain free of  falls  Description: INTERVENTIONS:  - Educate patient/family on patient safety including physical limitations  - Instruct patient to call for assistance with activity   - Consult OT/PT to assist with strengthening/mobility   - Keep Call bell within reach  - Keep bed low and locked with side rails adjusted as appropriate  - Keep care items and personal belongings within reach  - Initiate and maintain comfort rounds  - Make Fall Risk Sign visible to staff  - Offer Toileting every 2 Hours, in advance of need  - Initiate/Maintain bed/chair alarm  - Obtain necessary fall risk management equipment  - Apply yellow socks and bracelet for high fall risk patients  - Consider moving patient to room near nurses station  Outcome: Progressing

## 2024-12-26 NOTE — ED NOTES
Pt has no complaints or concerns at this time, no s/s of reaction. Plan of care on going, vitals alyssa Santamaria RN  12/25/24 4052

## 2024-12-26 NOTE — PLAN OF CARE
Discharge Summary   Patient ID:   Yandy Alexander   [id number]   58 year old   1962   Admit date: 7/5/2020   Discharge date: 7/9/2020   Admitting Physician: Leeanna Polk MD   Discharge Physician: Leeanna Polk MD     Primary Diagnoses:   Principal Problem:    Acute kidney injury superimposed on CKD (CMS/HCC)  Resolved Problems:    * No resolved hospital problems. *     Secondary Diagnoses:   Past Medical History:   Diagnosis Date   • Breast cancer (CMS/HCC)    • Cataract    • Clostridium difficile infection 12/25/2019 12/25/2019 [facility] C. Diff (+)   • Diabetes mellitus (CMS/HCC)    • Essential (primary) hypertension    • Narcolepsy    • Neuropathic pain    • PONV (postoperative nausea and vomiting)    • Renal failure     stage 3   • Seizure (CMS/HCC)     Unsubstantiated        Hospital Course By Problem List (see H&P for details of admission):     · Acute kidney injury superimposed on CKD stage III-possible prerenal etiology from diarrhea resolved   BUN 56, Cr 5.59 on admission. .,Renal ultrasound with no evidence of obstruction  · creatininw enormalised ,encouraged PO intake   · Hypotension resolved  Iatrogenic not due to sepsis     · Low back pain  X-ray lumbar spine with facet arthropathy  PT OT lidocaine patch  · Dysphagia- chronic. 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 Restart home PPI.         HTN, essential-  resume lisnipril and HCTZ as creatinine normalised    · Chronic diarrhea- underwent EGD/colonsocopy with GI with negative biopsies.  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 Hba1c 11.6% as of 6/15/20.   increase Lantus to 50 units with sliding scale insulin  ·  Tobacco abuse- nicotine patch while    Problem: PAIN - ADULT  Goal: Verbalizes/displays adequate comfort level or baseline comfort level  Description: Interventions:  - Encourage patient to monitor pain and request assistance  - Assess pain using appropriate pain scale  - Administer analgesics based on type and severity of pain and evaluate response  - Implement non-pharmacological measures as appropriate and evaluate response  - Consider cultural and social influences on pain and pain management  - Notify physician/advanced practitioner if interventions unsuccessful or patient reports new pain  Outcome: Progressing     Problem: INFECTION - ADULT  Goal: Absence or prevention of progression during hospitalization  Description: INTERVENTIONS:  - Assess and monitor for signs and symptoms of infection  - Monitor lab/diagnostic results  - Monitor all insertion sites, i.e. indwelling lines, tubes, and drains  - Monitor endotracheal if appropriate and nasal secretions for changes in amount and color  - [place] appropriate cooling/warming therapies per order  - Administer medications as ordered  - Instruct and encourage patient and family to use good hand hygiene technique  - Identify and instruct in appropriate isolation precautions for identified infection/condition  Outcome: Progressing  Goal: Absence of fever/infection during neutropenic period  Description: INTERVENTIONS:  - Monitor WBC    Outcome: Progressing     Problem: SAFETY ADULT  Goal: Patient will remain free of falls  Description: INTERVENTIONS:  - Educate patient/family on patient safety including physical limitations  - Instruct patient to call for assistance with activity   - Consult OT/PT to assist with strengthening/mobility   - Keep Call bell within reach  - Keep bed low and locked with side rails adjusted as appropriate  - Keep care items and personal belongings within reach  - Initiate and maintain comfort rounds  - Make Fall Risk Sign visible to staff  - Offer Toileting every ***  Hours, in advance of need  - Initiate/Maintain ***alarm  - Obtain necessary fall risk management equipment: ***  - Apply yellow socks and bracelet for high fall risk patients  - Consider moving patient to room near nurses station  Outcome: Progressing  Goal: Maintain or return to baseline ADL function  Description: INTERVENTIONS:  -  Assess patient's ability to carry out ADLs; assess patient's baseline for ADL function and identify physical deficits which impact ability to perform ADLs (bathing, care of mouth/teeth, toileting, grooming, dressing, etc.)  - Assess/evaluate cause of self-care deficits   - Assess range of motion  - Assess patient's mobility; develop plan if impaired  - Assess patient's need for assistive devices and provide as appropriate  - Encourage maximum independence but intervene and supervise when necessary  - Involve family in performance of ADLs  - Assess for home care needs following discharge   - Consider OT consult to assist with ADL evaluation and planning for discharge  - Provide patient education as appropriate  Outcome: Progressing  Goal: Maintains/Returns to pre admission functional level  Description: INTERVENTIONS:  - Perform AM-PAC 6 Click Basic Mobility/ Daily Activity assessment daily.  - Set and communicate daily mobility goal to care team and patient/family/caregiver.   - Collaborate with rehabilitation services on mobility goals if consulted  - Perform Range of Motion *** times a day.  - Reposition patient every *** hours.  - Dangle patient *** times a day  - Stand patient *** times a day  - Ambulate patient *** times a day  - Out of bed to chair *** times a day   - Out of bed for meals *** times a day  - Out of bed for toileting  - Record patient progress and toleration of activity level   Outcome: Progressing     Problem: DISCHARGE PLANNING  Goal: Discharge to home or other facility with appropriate resources  Description: INTERVENTIONS:  - Identify barriers to discharge  inpatient      Consults   IP Consult Orders (From admission, onward)     Start     Ordered    07/06/20 1106  Inpatient consult to Nephrology  ONE TIME     Provider:  Darin Negro MD    07/06/20 1105                 Procedures performed Us Renal Complete (comp Urinary System)    Result Date: 7/6/2020  EXAM:  US RENAL COMPLETE (COMPLETE URINARY SYSTEM) HISTORY:  acute kidney injury COMPARISON:  None. TECHNIQUE:  Real-time grayscale ultrasound and color Doppler evaluation of kidneys and urinary bladder was performed. FINDINGS:  The right kidney measures 11.2 cm in maximal sagittal length. The echogenicity is normal. The corticomedullary differentiation is well-maintained. There is no calculus or hydronephrosis. There is no cyst. There is normal color flow on color Doppler evaluation. The left kidney measures 11.0 cm in maximal sagittal length. The echogenicity is normal. The corticomedullary differentiation is well-maintained. There is no calculus or hydronephrosis. A 1.4 cm cyst is seen along the medial cortex of the left kidney in the interpolar region. There is normal color flow on color Doppler evaluation. The urinary bladder is well distended and is within normal limits. Both ureteric jets not visualized on power Doppler exam.     IMPRESSION:  Normal renal ultrasound study. Small simple appearing cyst in the left kidney.     Xr Chest Ap Or Pa - Portable    Result Date: 7/5/2020  CXR ONE VIEW HISTORY: Pain PROCEDURE: One view was performed. COMPARISON: 6/11/2020 FINDINGS: Patient moderately rotated. No infiltrate, mass, effusion, or pneumothorax is seen. Aorta mildly ectatic. The heart size is normal.  The pulmonary vascularity has a normal appearance.     IMPRESSION: No acute findings.        Discharge Exam   Blood pressure (!) 155/87, pulse 85, temperature 97.8 °F (36.6 °C), temperature source Oral, resp. rate 16, height 5' 6\" (1.676 m), weight 82.1 kg, SpO2 95 %.   General: A&O x3  Lungs: clear to auscultation    Heart: s1s2 RR    Activity: AS tolerated     Diet: cardiac diet  Code Status: Full Resuscitation     Pending issues to be followed up by PCP   Monitor creatinin e,blood sugar  BMP at PCP follow up       Discharge Medication List:       What to Do with Your Medications      START taking these medications today unless otherwise stated      Details   acetaminophen 500 MG tablet  Commonly known as:  TYLENOL      Take 1 tablet by mouth 3 times daily.  Authorizing Provider:  Leeanna Polk MD     lidocaine 4 % patch  Commonly known as:  LIDOCARE      Place 1 patch onto the skin daily. Do not start before July 10, 2020.  Authorizing Provider:  Leeanna Polk MD  Start Date:  July 10, 2020     oxyCODONE-acetaminophen 5-325 MG per tablet  Commonly known as:  PERCOCET      Take 1 tablet by mouth every 8 hours as needed for Pain.  Authorizing Provider:  Leeanna Polk MD                     CONTINUE taking these medications which have CHANGED      Details   Levemir FlexTouch 100 UNIT/ML pen-injector  What changed:  See the new instructions.      Generic drug:  insulin detemir  65 U nightly (needing 75 U prior )go up by 2 units nightly until blood sugars <150 (upto 75 units nightly)  Authorizing Provider:  Leeanna Polk MD     lisinopril-hydroCHLOROthiazide 20-12.5 MG per tablet  Commonly known as:  ZESTORETIC  What changed:  additional instructions      Take 1 tablet by mouth daily. Start on 7/12/20  Authorizing Provider:  Leeanna Polk MD                     CONTINUE taking these medications which have NOT CHANGED      Details   Abilify 5 MG tablet      Generic drug:  ARIPiprazole  Take 5 mg by mouth nightly.     amphetamine-dextroamphetamine 20 MG tablet  Commonly known as:  Adderall      Dose Increase: 40 MG 7:00 am, 40 MG 11:00 am, 20 MG 3:00 pm  Authorizing Provider:  Blaise Gonsales MD     Blood Glucose Monitoring Suppl w/Device Kit      For use testing blood sugar 4 times daily. E11.65 meter: one touch  w/patient and caregiver  - Arrange for needed discharge resources and transportation as appropriate  - Identify discharge learning needs (meds, wound care, etc.)  - Arrange for interpretive services to assist at discharge as needed  - Refer to Case Management Department for coordinating discharge planning if the patient needs post-hospital services based on physician/advanced practitioner order or complex needs related to functional status, cognitive ability, or social support system  Outcome: Progressing     Problem: Knowledge Deficit  Goal: Patient/family/caregiver demonstrates understanding of disease process, treatment plan, medications, and discharge instructions  Description: Complete learning assessment and assess knowledge base.  Interventions:  - Provide teaching at level of understanding  - Provide teaching via preferred learning methods  Outcome: Progressing      verio  Authorizing Provider:  SHLOMO Zamora  Comment:  Patient states her current verio meter is broken. Please let her know when she can pick this up. Thank you.     blood glucose test strip      Test blood sugar 3 times daily as directed. Diagnosis: E11.9, N18.3 Meter: ONE TOUCH VERIO  Authorizing Provider:  SHLOMO Zamora  Comment:  Per insurance only can test 3 times a day     cholestyramine 4 g packet  Commonly known as:  QUESTRAN      Take 1 packet by mouth 2 times daily as needed (Diarrhea).  Authorizing Provider:  Shmuel Knight MD     clonazePAM 0.5 MG tablet  Commonly known as:  KlonoPIN      Take 0.5 mg by mouth daily as needed for Anxiety.     Insulin Pen Needle 32G X 4 MM Misc      Use to inject insulin 4 times daily. Remove needle cover(s) to expose needle before injecting.  Authorizing Provider:  SHLOMO Joel     lovastatin 20 MG tablet  Commonly known as:  MEVACOR      Take 1 tablet by mouth nightly.  Authorizing Provider:  Hung Miller MD     NovoLOG FlexPen 100 UNIT/ML pen-injector      Generic drug:  insulin aspart  14 UNITS THREE TIMES DAILY AC + SSI. MAX 80 UNITS DAILY  Authorizing Provider:  SHLOMO Zamora     pantoprazole 20 MG tablet  Commonly known as:  PROTONIX      Take 2 tablets by mouth daily.  Authorizing Provider:  Shmuel Knight MD     pregabalin 150 MG capsule  Commonly known as:  LYRICA      TAKE 1 CAPSULE BY MOUTH THREE TIMES A DAY  Authorizing Provider:  Rudolph Abad DO  Comment:  Do not refill more than once every 30 days.     sertraline 100 MG tablet  Commonly known as:  ZOLOFT      Take 200 mg by mouth at bedtime.     trazodone 150 MG tablet  Commonly known as:  DESYREL      Take 150 mg by mouth nightly.                     STOP taking these medications, discuss with your Pharmacist      Reason for stopping Comments   cyclobenzaprine 10 MG tablet  Commonly known as:  FLEXERIL                              Where  to Get Your Medications      These medications were sent to [facility]/pharmacy #[id number] - [place] [id number]    Phone:  [phone]   · lisinopril-hydroCHLOROthiazide 20-12.5 MG per tablet  · oxyCODONE-acetaminophen 5-325 MG per tablet         Disposition: patient is being discharged to home as she refused SNF     Follow-up with:   Rudolph Abad, DO  [address] [facility] [id number] [phone]    In 7 days         Time spent on discharge was greater than 30 minutes   Signed:   Leeanna Polk MD   7/9/2020   10:04 AM

## 2024-12-26 NOTE — ASSESSMENT & PLAN NOTE
Patient was noted to have low blood pressures at facility on 12/25.  She was not given her antihypertensives.  Will continue with carvedilol, hydralazine for systolic blood pressure greater than 130  Losartan, spironolactone and Lasix are on hold as above

## 2024-12-26 NOTE — ED PROVIDER NOTES
Time reflects when diagnosis was documented in both MDM as applicable and the Disposition within this note       Time User Action Codes Description Comment    12/26/2024 12:11 AM Taylor Mckeon Add [D64.9] Anemia, unspecified type     12/26/2024 12:19 AM Yumiko Billyissa Add [D64.9] Symptomatic anemia     12/26/2024 12:19 AM Mariajose, Dee Add [K92.1] Melena     12/26/2024 12:19 AM Boss, Dee Add [R74.8] Elevated creatine kinase     12/26/2024 12:19 AM Mariajose, Dee Add [I48.21] Permanent atrial fibrillation (HCC)     12/26/2024 12:19 AM Boss, Dee Remove [I48.21] Permanent atrial fibrillation (HCC)     12/26/2024 12:20 AM Mariajose, Dee Add [I48.91] Atrial fibrillation (HCC)           ED Disposition       ED Disposition   Admit    Condition   Stable    Date/Time   Thu Dec 26, 2024 12:21 AM    Comment   Case was discussed with Ailyn Silva and the patient's admission status was agreed to be Admission Status: inpatient status to the service of Dr. Arellano.               Assessment & Plan       Medical Decision Making  Patient seen, examined and noted to have symptomatic anemia, melena, elevated CK and A-fib.  Patient coming in with complaints of dizziness, weakness and shortness of breath.  Recently started on Eliquis.  History of becoming anemic on Eliquis which is why it was discontinued initially.  Hemoglobin today was 6.2.  Hemoccult was positive patient does note she believes she has had some dark tarry stools.  No other complaints or tenderness on my exam here today.  Patient was given 2 units of blood and admitted to internal medicine for further management and care and to have her medications.    Differential diagnosis includes but is not limited to metabolic abnormality, dehydration, viral illness, anemia, ACS, MI, UTI    Patient's labs notable for: mentioned above    Imaging revealed:   Cxr unremarkable    EKG: was interpreted by myself as above.    Discussed patient's case with Nicolas  GaneshDuane (SLIM) regarding admission who accepted the patient for further evaluation and management.    All labs reviewed and utilized in the medical decision making process.    Amount and/or Complexity of Data Reviewed  Labs: ordered. Decision-making details documented in ED Course.  Radiology: ordered.    Risk  Prescription drug management.  Decision regarding hospitalization.        ED Course as of 12/26/24 0621   Wed Dec 25, 2024   2144 Hemoglobin(!): 6.2   2155 Blood consent is signed and on the chart   2224 Hemocult positive   2345 Reached out to [facility] for admission       Medications   insulin lispro (HumALOG/ADMELOG) 100 units/mL subcutaneous injection 1-5 Units (has no administration in time range)   atorvastatin (LIPITOR) tablet 40 mg (has no administration in time range)   brimonidine tartrate 0.2 % ophthalmic solution 1 drop (has no administration in time range)     And   timolol (TIMOPTIC) 0.5 % ophthalmic solution 1 drop (has no administration in time range)   carvedilol (COREG) tablet 12.5 mg (has no administration in time range)   ferrous sulfate tablet 325 mg (has no administration in time range)   hydrALAZINE (APRESOLINE) tablet 25 mg (25 mg Oral Given 12/26/24 0133)   isosorbide dinitrate (ISORDIL) tablet 20 mg (has no administration in time range)   levothyroxine tablet 75 mcg (has no administration in time range)   levothyroxine tablet 50 mcg (has no administration in time range)   melatonin tablet 6 mg (6 mg Oral Given 12/26/24 0133)   pantoprazole (PROTONIX) injection 40 mg (has no administration in time range)   acetaminophen (TYLENOL) tablet 650 mg (has no administration in time range)   pantoprazole (PROTONIX) injection 40 mg (40 mg Intravenous Given 12/26/24 0053)       ED Risk Strat Scores                                              History of Present Illness       Chief Complaint   Patient presents with    Dizziness     Pt presents to ed for dizziness that started today, denies sob,  denies cp, pt is from [facility] and was out and about today, started feeling light headed when she arrived back [place]. No other complaints        Past Medical History:   Diagnosis Date    Atypical chest pain     GERD (gastroesophageal reflux disease)     Hyperlipidemia     Hypertension     Kidney stone     Myocardial infarction ([facility])     2015     NSTEMI (non-ST elevated myocardial infarction) ([facility])     Last Assessed: 9/24/2015    Type 2 diabetes mellitus, without long-term current use of insulin ([facility]) 9/12/2013      Past Surgical History:   Procedure Laterality Date    A-V CARDIAC PACEMAKER INSERTION      ANKLE SURGERY      BACK SURGERY  01/2011    L4 and L5 laminectomy and fusion     BLADDER SURGERY      CORONARY ARTERY BYPASS GRAFT  09/02/2015    CABGx3 with LIMA to LAD, SVG to PDA, SVG to OM-1    EXAMINATION UNDER ANESTHESIA N/A 7/11/2020    Procedure: EXAM UNDER ANESTHESIA (EUA)  EXCISION OF POSTERIOR VAGINAL FOREIGN BODY;  Surgeon: Caryn Ramos MD;  [facility];  Service: Gynecology    FL RETROGRADE PYELOGRAM  7/11/2020    FL RETROGRADE PYELOGRAM  8/7/2020    HYSTERECTOMY      NM CYSTO BLADDER W/URETERAL CATHETERIZATION Right 7/11/2020    Procedure: CYSTOSCOPY RETROGRADE PYELOGRAM WITH INSERTION STENT URETERAL, bladder biopsy with fulguration;  Surgeon: Yordan Mccormick MD;  [facility];  Service: Urology    NM CYSTO/URETERO W/LITHOTRIPSY &INDWELL STENT INSRT Right 8/7/2020    Procedure: CYSTOSCOPY URETEROSCOPY WITH LITHOTRIPSY HOLMIUM LASER, RETROGRADE PYELOGRAM AND INSERTION STENT URETERAL;  Surgeon: Yordan Mccormick MD;  [facility];  Service: Urology    WRIST SURGERY        Family History   Problem Relation Age of Onset    Hypertension Mother     Hypertension Sister     Alcohol abuse Brother     Cirrhosis Brother     Diabetes Father     Other Brother         Heart transplant in his 50s    Lung cancer Sister     Diabetes Brother     Stroke Sister     No Known  Problems Daughter     No Known Problems Maternal Grandmother     No Known Problems Maternal Grandfather     No Known Problems Paternal Grandmother     No Known Problems Paternal Grandfather     No Known Problems Sister       Social History     Tobacco Use    Smoking status: Former     Current packs/day: 0.00     Average packs/day: 0.3 packs/day for 54.0 years (13.5 ttl pk-yrs)     Types: Cigarettes     Start date: 1967     Quit date: 2021     Years since quitting: 3.9    Smokeless tobacco: Never    Tobacco comments:     Started smoking at age 22; currently smoking <1ppd.   Vaping Use    Vaping status: Never Used   Substance Use Topics    Alcohol use: Not Currently    Drug use: Never      E-Cigarette/Vaping    E-Cigarette Use Never User       E-Cigarette/Vaping Substances      I have reviewed and agree with the history as documented.     Quynh Roa is a 79 y.o. female with a PMH of A-fib presenting to the ED on December 25, 2024 with dizziness. Patient endorses today she started feeling very short of breath and dizzy.  Especially while she is moving.  She is usually able to ambulate longer distance and now feels wiped out after a short distance.  She notes this has happened once before when she was taking Eliquis and required a blood transfusion.  Patient notes that after this she stopped taking Eliquis however started seeing a new cardiologist who wanted her to restart it so she has been taking it for approximately 10 days.  Patient denies blurry vision, chest pain, fever, chills, bright red blood in her stool, bleeding from other sites, falls, head injuries, urinary symptoms or any other complaints at this time.         Dizziness  Associated symptoms: shortness of breath    Associated symptoms: no blood in stool, no chest pain, no headaches, no nausea, no palpitations and no vomiting        Review of Systems   Constitutional:  Positive for fatigue. Negative for chills and fever.   Eyes:  Positive for visual  disturbance.   Respiratory:  Positive for shortness of breath. Negative for cough.    Cardiovascular:  Negative for chest pain and palpitations.   Gastrointestinal:  Negative for abdominal pain, anal bleeding, blood in stool, nausea and vomiting.   Genitourinary:  Negative for dysuria, frequency, hematuria and urgency.   Skin:  Negative for wound.   Neurological:  Positive for dizziness. Negative for syncope, light-headedness and headaches.           Objective       ED Triage Vitals   Temperature Pulse Blood Pressure Respirations SpO2 Patient Position - Orthostatic VS   12/25/24 1944 12/25/24 1944 12/25/24 1944 12/25/24 1944 12/25/24 1944 12/25/24 1944   98 °F (36.7 °C) 64 154/63 16 99 % Sitting      Temp Source Heart Rate Source BP Location FiO2 (%) Pain Score    12/25/24 1944 12/25/24 1944 12/25/24 1944 -- 12/25/24 2029    Oral Monitor Right arm  No Pain      Vitals      Date and Time Temp Pulse SpO2 Resp BP Pain Score FACES Pain Rating User   12/26/24 0326 -- -- -- -- -- No Pain -- NH   12/26/24 0321 97.9 °F (36.6 °C) 62 96 % 18 129/56 No Pain -- NH   12/26/24 0130 97.9 °F (36.6 °C) 61 97 % 18 137/62 -- -- NH   12/26/24 0124 97.9 °F (36.6 °C) 60 99 % 18 134/64 -- -- NH   12/26/24 0115 97.7 °F (36.5 °C) 63 -- 16 142/64 -- --    12/26/24 0115 -- -- 99 % -- -- -- -- NH   12/26/24 0052 -- 60 98 % 15 135/61 No Pain -- JV   12/26/24 0033 -- 30 98 % -- 132/60 No Pain -- JV   12/26/24 0010 -- -- -- -- -- No Pain -- JV   12/25/24 2340 -- 67 98 % 17 151/68 No Pain -- JV   12/25/24 2327 -- -- -- -- -- No Pain -- JV   12/25/24 2325 -- 68 98 % 17 155/63 No Pain -- JV   12/25/24 2315 97.7 °F (36.5 °C) 65 97 % 17 162/69 No Pain -- JV   12/25/24 2311 97.7 °F (36.5 °C) 63 -- 17 164/63 -- -- JV   12/25/24 2215 -- -- -- -- -- No Pain -- JV   12/25/24 2117 -- -- -- -- -- No Pain -- JV   12/25/24 2100 -- 68 98 % 17 142/65 No Pain -- JV   12/25/24 2029 -- -- -- -- -- No Pain -- JV   12/25/24 1944 98 °F (36.7 °C) 64 99 % 16 154/63 --  -- DW            Physical Exam  Exam conducted with a chaperone present.   Constitutional:       General: She is not in acute distress.     Appearance: Normal appearance. She is normal weight. She is not ill-appearing or toxic-appearing.   HENT:      Head: Normocephalic and atraumatic.   Eyes:      General:         Right eye: No discharge.         Left eye: No discharge.      Extraocular Movements: Extraocular movements intact.      Conjunctiva/sclera: Conjunctivae normal.      Pupils: Pupils are equal, round, and reactive to light.   Cardiovascular:      Rate and Rhythm: Normal rate and regular rhythm.      Heart sounds: Normal heart sounds. No murmur heard.     No friction rub. No gallop.   Pulmonary:      Effort: Pulmonary effort is normal. No respiratory distress.      Breath sounds: Normal breath sounds. No stridor. No wheezing, rhonchi or rales.   Chest:      Chest wall: No tenderness.   Abdominal:      General: Bowel sounds are normal. There is no distension.      Palpations: Abdomen is soft.      Tenderness: There is no abdominal tenderness. There is no guarding or rebound.   Genitourinary:     Rectum: Guaiac result positive.      Comments: No hemorrhoids or fissures, Hemoccult positive  Musculoskeletal:      Cervical back: Normal range of motion. No rigidity or tenderness.   Skin:     General: Skin is warm and dry.      Capillary Refill: Capillary refill takes less than 2 seconds.      Coloration: Skin is not pale.   Neurological:      General: No focal deficit present.      Mental Status: She is alert and oriented to person, place, and time.      Cranial Nerves: No cranial nerve deficit.      Sensory: No sensory deficit.      Motor: No weakness.      Coordination: Coordination normal.   Psychiatric:         Mood and Affect: Mood normal.         Behavior: Behavior normal.         Results Reviewed       Procedure Component Value Units Date/Time    Basic metabolic panel [[id number]]  (Abnormal) Collected:  12/26/24 0505    Lab Status: Final result Specimen: Blood from Hand, Right Updated: 12/26/24 0618     Sodium 135 mmol/L      Potassium 4.0 mmol/L      Chloride 102 mmol/L      CO2 23 mmol/L      ANION GAP 10 mmol/L      BUN 63 mg/dL      Creatinine 1.43 mg/dL      Glucose 112 mg/dL      Calcium 8.6 mg/dL      eGFR 35 ml/min/1.73sq m     Narrative:      National Kid[employer] Disease Foundation guidelines for Chronic Kidney Disease (CKD):     Stage 1 with normal or high GFR (GFR > 90 mL/min/1.73 square meters)    Stage 2 Mild CKD (GFR = 60-89 mL/min/1.73 square meters)    Stage 3A Moderate CKD (GFR = 45-59 mL/min/1.73 square meters)    Stage 3B Moderate CKD (GFR = 30-44 mL/min/1.73 square meters)    Stage 4 Severe CKD (GFR = 15-29 mL/min/1.73 square meters)    Stage 5 End Stage CKD (GFR <15 mL/min/1.73 square meters)  Note: GFR calculation is accurate only with a steady state creatinine    Magnesium [[id number]]  (Normal) Collected: 12/26/24 0505    Lab Status: Final result Specimen: Blood from Hand, Right Updated: 12/26/24 0618     Magnesium 2.3 mg/dL     Hemoglobin and hematocrit, blood [[id number]]  (Abnormal) Collected: 12/26/24 0505    Lab Status: Final result Specimen: Blood from Hand, Right Updated: 12/26/24 0541     Hemoglobin 8.8 g/dL      Hematocrit 27.8 %     CBC and differential [[id number]]  (Abnormal) Collected: 12/26/24 0505    Lab Status: Final result Specimen: Blood from Hand, Right Updated: 12/26/24 0531     WBC 11.96 Thousand/uL      RBC 3.18 Million/uL      Hemoglobin 8.7 g/dL      Hematocrit 28.8 %      MCV 91 fL      MCH 27.4 pg      MCHC 30.2 g/dL      RDW 17.2 %      MPV 9.6 fL      Platelets 254 Thousands/uL      nRBC 1 /100 WBCs      Segmented % 72 %      Immature Grans % 1 %      Lymphocytes % 19 %      Monocytes % 6 %      Eosinophils Relative 1 %      Basophils Relative 1 %      Absolute Neutrophils 8.56 Thousands/µL      Absolute Immature Grans 0.07 Thousand/uL      Absolute Lymphocytes 2.31  Thousands/µL      Absolute Monocytes 0.76 Thousand/µL      Eosinophils Absolute 0.17 Thousand/µL      Basophils Absolute 0.09 Thousands/µL     Ferritin [[id number]] Collected: 12/26/24 0505    Lab Status: In process Specimen: Blood from Hand, Right Updated: 12/26/24 0528    TIBC Panel (incl. Iron, TIBC, % Iron Saturation) [[id number]] Collected: 12/26/24 0505    Lab Status: In process Specimen: Blood from Hand, Right Updated: 12/26/24 0527    Urine Microscopic [[id number]]  (Abnormal) Collected: 12/25/24 2311    Lab Status: Final result Specimen: Urine, Clean Catch Updated: 12/25/24 2339     RBC, UA None Seen /hpf      WBC, UA 2-4 /hpf      Epithelial Cells Occasional /hpf      Bacteria, UA None Seen /hpf     UA w Reflex to Microscopic w Reflex to Culture [[id number]]  (Abnormal) Collected: 12/25/24 2311    Lab Status: Final result Specimen: Urine, Clean Catch Updated: 12/25/24 2321     Color, UA Light Yellow     Clarity, UA Clear     Specific Gravity, UA 1.017     pH, UA 5.0     Leukocytes, UA Trace     Nitrite, UA Negative     Protein, UA Negative mg/dl      Glucose, UA Negative mg/dl      Ketones, UA Negative mg/dl      Urobilinogen, UA <2.0 mg/dl      Bilirubin, UA Negative     Occult Blood, UA Negative    HS Troponin I 2hr [[id number]]  (Normal) Collected: 12/25/24 2208    Lab Status: Final result Specimen: Blood from Arm, Left Updated: 12/25/24 2239     hs TnI 2hr 14 ng/L      Delta 2hr hsTnI 0 ng/L     HS Troponin 0hr (reflex protocol) [[id number]]  (Normal) Collected: 12/25/24 2058    Lab Status: Final result Specimen: Blood from Arm, Left Updated: 12/25/24 2136     hs TnI 0hr 14 ng/L     Comprehensive metabolic panel [[id number]]  (Abnormal) Collected: 12/25/24 2058    Lab Status: Final result Specimen: Blood from Arm, Left Updated: 12/25/24 2133     Sodium 135 mmol/L      Potassium 4.7 mmol/L      Chloride 98 mmol/L      CO2 24 mmol/L      ANION GAP 13 mmol/L      BUN 73 mg/dL      Creatinine 1.84 mg/dL       Glucose 196 mg/dL      Calcium 9.0 mg/dL      AST 9 U/L      ALT 7 U/L      Alkaline Phosphatase 30 U/L      Total Protein 6.9 g/dL      Albumin 4.0 g/dL      Total Bilirubin 0.30 mg/dL      eGFR 25 ml/min/1.73sq m     Narrative:      National Kidney Disease Foundation guidelines for Chronic Kidney Disease (CKD):     Stage 1 with normal or high GFR (GFR > 90 mL/min/1.73 square meters)    Stage 2 Mild CKD (GFR = 60-89 mL/min/1.73 square meters)    Stage 3A Moderate CKD (GFR = 45-59 mL/min/1.73 square meters)    Stage 3B Moderate CKD (GFR = 30-44 mL/min/1.73 square meters)    Stage 4 Severe CKD (GFR = 15-29 mL/min/1.73 square meters)    Stage 5 End Stage CKD (GFR <15 mL/min/1.73 square meters)  Note: GFR calculation is accurate only with a steady state creatinine    CBC and differential [[id number]]  (Abnormal) Collected: 12/25/24 2058    Lab Status: Final result Specimen: Blood from Arm, Left Updated: 12/25/24 2106     WBC 14.03 Thousand/uL      RBC 2.34 Million/uL      Hemoglobin 6.2 g/dL      Hematocrit 21.5 %      MCV 92 fL      MCH 26.5 pg      MCHC 28.8 g/dL      RDW 19.1 %      MPV 10.1 fL      Platelets 338 Thousands/uL      nRBC 1 /100 WBCs      Segmented % 81 %      Immature Grans % 1 %      Lymphocytes % 10 %      Monocytes % 6 %      Eosinophils Relative 1 %      Basophils Relative 1 %      Absolute Neutrophils 11.54 Thousands/µL      Absolute Immature Grans 0.14 Thousand/uL      Absolute Lymphocytes 1.38 Thousands/µL      Absolute Monocytes 0.82 Thousand/µL      Eosinophils Absolute 0.07 Thousand/µL      Basophils Absolute 0.08 Thousands/µL             XR chest 1 view portable   ED Interpretation by Dee Billy PA-C (12/26 0615)   No acute cardiopulmonary abnormalities or focal consolidations per my read      Final Interpretation by Michael Tenorio MD (12/25 2200)      No acute cardiopulmonary disease.            Workstation performed: [id number]             ECG 12 Lead Documentation  Only    Date/Time: 12/25/2024 7:45 PM    Performed by: Dee Billy PA-C  Authorized by: Dee Billy PA-C    Indications / Diagnosis:  Shortness of breath  ECG reviewed by me, the ED Provider: yes    Patient location:  ED  Previous ECG:     Previous ECG:  Compared to current    Comparison ECG info:  6/29/2023    Similarity:  No change  Interpretation:     Interpretation: abnormal    Rate:     ECG rate:  64    ECG rate assessment: normal    Rhythm:     Rhythm: sinus rhythm and paced    Pacing:     Capture:  Complete    Type of pacing:  Ventricular  Ectopy:     Ectopy: none    QRS:     QRS axis:  Normal    QRS intervals:  Normal  Conduction:     Conduction: normal    ST segments:     ST segments:  Normal  T waves:     T waves: normal        ED Medication and Procedure Management   Prior to Admission Medications   Prescriptions Last Dose Informant Patient Reported? Taking?   Alcohol Swabs (Alcohol Prep) PADS 12/25/2024 Care Giver, Outside Facility (Specify) Yes Yes   Blood Glucose Monitoring Suppl (ONE TOUCH ULTRA 2) w/Device KIT 12/25/2024 Care Giver, Outside Facility (Specify) Yes Yes   Multiple Vitamins-Minerals (PreserVision AREDS) TABS 12/25/2024 Care Giver, Outside Facility (Specify) Yes Yes   OneTouch Ultra test strip 12/25/2024 Care Giver, Outside Facility (Specify) Yes Yes   acetaminophen (TYLENOL) 325 mg tablet 12/25/2024 Care Giver, Outside Facility (Specify) No Yes   Sig: Take 2 tablets (650 mg total) by mouth every 4 (four) hours as needed for mild pain   apixaban (Eliquis) 5 mg 12/25/2024  No Yes   Sig: Take 1 tablet (5 mg total) by mouth 2 (two) times a day   atorvastatin (LIPITOR) 40 mg tablet 12/25/2024 Care Giver, Outside Facility (Specify) No Yes   Sig: Take 1 tablet (40 mg total) by mouth daily   brimonidine-timolol (COMBIGAN) 0.2-0.5 % 12/25/2024 Care Giver, Outside Facility (Specify) Yes Yes   carvedilol (COREG) 12.5 mg tablet 12/25/2024 Care Giver, Outside Facility (Specify) Yes Yes    Sig: Take 12.5 mg by mouth 2 (two) times a day with meals   clopidogrel (PLAVIX) 75 mg tablet 12/25/2024 Care Giver, Outside Facility (Specify) No Yes   Sig: Take 1 tablet (75 mg total) by mouth daily Do not start before July 5, 2023.   ferrous sulfate 325 (65 Fe) mg tablet 12/25/2024 Care Giver, Outside Facility (Specify) No Yes   Sig: Take 1 tablet (325 mg total) by mouth every other day   furosemide (LASIX) 40 mg tablet 12/25/2024 Care Giver, Outside Facility (Specify) No Yes   Sig: Take 1 tablet (40 mg total) by mouth daily   glimepiride (AMARYL) 1 mg tablet 12/25/2024 Care Giver, Outside Facility (Specify) Yes Yes   hydrALAZINE (APRESOLINE) 25 mg tablet 12/26/2024 Morning Care Giver, Outside Facility (Specify) No Yes   Sig: Take 1 tablet (25 mg total) by mouth every 8 (eight) hours   isosorbide dinitrate (ISORDIL) 20 mg tablet 12/25/2024 Care Giver, Outside Facility (Specify) No Yes   Sig: Take 1 tablet (20 mg total) by mouth 3 (three) times daily after meals   levothyroxine 50 mcg tablet 12/25/2024 Care Giver, Outside Facility (Specify) Yes Yes   Sig: Take 50 mcg by mouth daily Daily on sun tues thur sat   levothyroxine 75 mcg tablet 12/25/2024  Yes Yes   Sig: Take 75 mcg by mouth daily Daily on mon wed fri   loratadine (CLARITIN) 10 mg tablet Not Taking Care Giver, Outside Facility (Specify) Yes No   Patient not taking: Reported on 9/17/2024   losartan (COZAAR) 25 mg tablet 12/25/2024 Care Giver, Outside Facility (Specify) No Yes   Sig: Take 1 tablet (25 mg total) by mouth daily   melatonin 3 mg 12/26/2024 Morning Care Giver, Outside Facility (Specify) No Yes   Sig: Take 2 tablets (6 mg total) by mouth daily at bedtime   meloxicam (MOBIC) 15 mg tablet Not Taking Care Giver, Outside Facility (Specify) Yes No   Patient not taking: Reported on 10/31/2023   pantoprazole (PROTONIX) 40 mg tablet 12/25/2024 Care Giver, Outside Facility (Specify) Yes Yes   psyllium (METAMUCIL) 58.6 % packet 12/25/2024  Yes Yes    Sig: Take 1 packet by mouth daily   spironolactone (ALDACTONE) 25 mg tablet 12/25/2024 Outside Facility (Specify) Yes Yes      Facility-Administered Medications: None     Current Discharge Medication List        CONTINUE these medications which have NOT CHANGED    Details   acetaminophen (TYLENOL) 325 mg tablet Take 2 tablets (650 mg total) by mouth every 4 (four) hours as needed for mild pain  Qty: 30 tablet, Refills: 0    Associated Diagnoses: Nephrolithiasis      Alcohol Swabs (Alcohol Prep) PADS       apixaban (Eliquis) 5 mg Take 1 tablet (5 mg total) by mouth 2 (two) times a day  Qty: 60 tablet, Refills: 17    Associated Diagnoses: Permanent atrial fibrillation (HCC)      atorvastatin (LIPITOR) 40 mg tablet Take 1 tablet (40 mg total) by mouth daily  Qty: 90 tablet, Refills: 3    Associated Diagnoses: Hypercholesterolemia      Blood Glucose Monitoring Suppl (ONE TOUCH ULTRA 2) w/Device KIT       brimonidine-timolol (COMBIGAN) 0.2-0.5 %       carvedilol (COREG) 12.5 mg tablet Take 12.5 mg by mouth 2 (two) times a day with meals      clopidogrel (PLAVIX) 75 mg tablet Take 1 tablet (75 mg total) by mouth daily Do not start before July 5, 2023.  Refills: 0    Associated Diagnoses: Atherosclerosis of native coronary artery of native heart without angina pectoris      ferrous sulfate 325 (65 Fe) mg tablet Take 1 tablet (325 mg total) by mouth every other day      furosemide (LASIX) 40 mg tablet Take 1 tablet (40 mg total) by mouth daily  Qty: 90 tablet, Refills: 3    Associated Diagnoses: Heart failure with reduced ejection fraction (HCC)      glimepiride (AMARYL) 1 mg tablet       hydrALAZINE (APRESOLINE) 25 mg tablet Take 1 tablet (25 mg total) by mouth every 8 (eight) hours  Refills: 0    Associated Diagnoses: Thoracic aortic dissection (HCC)      isosorbide dinitrate (ISORDIL) 20 mg tablet Take 1 tablet (20 mg total) by mouth 3 (three) times daily after meals  Refills: 0    Associated Diagnoses: Thoracic aortic  dissection (HCC)      !! levothyroxine 50 mcg tablet Take 50 mcg by mouth daily Daily on sun tues thur sat      !! levothyroxine 75 mcg tablet Take 75 mcg by mouth daily Daily on mon wed fri      losartan (COZAAR) 25 mg tablet Take 1 tablet (25 mg total) by mouth daily  Qty: 90 tablet, Refills: 3    Associated Diagnoses: Essential hypertension      melatonin 3 mg Take 2 tablets (6 mg total) by mouth daily at bedtime  Refills: 0    Associated Diagnoses: Thoracic aortic dissection (HCC)      Multiple Vitamins-Minerals (PreserVision AREDS) TABS       OneTouch Ultra test strip       pantoprazole (PROTONIX) 40 mg tablet       psyllium (METAMUCIL) 58.6 % packet Take 1 packet by mouth daily      spironolactone (ALDACTONE) 25 mg tablet       loratadine (CLARITIN) 10 mg tablet       meloxicam (MOBIC) 15 mg tablet        !! - Potential duplicate medications found. Please discuss with provider.        No discharge procedures on file.  ED SEPSIS DOCUMENTATION   Time reflects when diagnosis was documented in both MDM as applicable and the Disposition within this note       Time User Action Codes Description Comment    12/26/2024 12:11 AM Taylor Mckeon Add [D64.9] Anemia, unspecified type     12/26/2024 12:19 AM Dee Billy Add [D64.9] Symptomatic anemia     12/26/2024 12:19 AM Dee Billy Add [K92.1] Melena     12/26/2024 12:19 AM Dee Billy Add [R74.8] Elevated creatine kinase     12/26/2024 12:19 AM Dee Billy Add [I48.21] Permanent atrial fibrillation (HCC)     12/26/2024 12:19 AM Dee Billy Remove [I48.21] Permanent atrial fibrillation (HCC)     12/26/2024 12:20 AM Dee Billy Add [I48.91] Atrial fibrillation (HCC)                  Dee Billy PA-C  12/26/24 0621

## 2024-12-26 NOTE — ASSESSMENT & PLAN NOTE
Presented due to one day of fatigue and shortness of breath.  Hgb 6.2.  BUN 73, creatinine 1.84.  History of anemia with eliquis use.  Eliquis was recently resumed by cardiology.   Hospitalization 7/2023: EGD And colonoscopy showed mild erythematous mucosa in the antrum.  Colonoscopy showed pancolonic severe diverticula, hemorrhoids.  She was cleared by GI team to resume eliquis with plans for op capsule endoscopy but does not appear this was done.   Transfuse 2 unit prbc   Monitor volume status closely.  Lasix if needed.  Protonix 40 mg IV BID  NPO  GI consultation requested

## 2024-12-26 NOTE — ASSESSMENT & PLAN NOTE
Creatinine 1.84, less prior 1.4.  Elevation likely due to hypotension at facility, suspected GI bleeding.  Hold losartan, spironolactone, Lasix  Continue home dose carvedilol, hydralazine, isosorbide for systolic blood pressure greater than 130  Repeat BMP in a.m.

## 2024-12-26 NOTE — ED NOTES
Rn called [phone] and spoke with  Maria Del Carmen villalba at [facility] updating on pt status at this time     Oliva Santamaria RN  12/25/24 [id number]

## 2024-12-26 NOTE — ASSESSMENT & PLAN NOTE
-Last EGD July 2023 July 2023 done for symptomatic anemia showing mild gastritis and small hiatal hernia.  Gastric biopsies is positive for gastric intestinal metaplasia with no dysplasia.

## 2024-12-26 NOTE — H&P
H&P - Hospitalist   Name: Quynh Roa 79 y.o. female I MRN: [id number]  Unit/Bed#: ED-04 I Date of Admission: 12/25/2024   Date of Service: 12/26/2024 I Hospital Day: 0     Assessment & Plan  Anemia, unspecified  Presented due to one day of fatigue and shortness of breath.  Hgb 6.2.  BUN 73, creatinine 1.84.  History of anemia with eliquis use.  Eliquis was recently resumed by cardiology.   Hospitalization 7/2023: EGD And colonoscopy showed mild erythematous mucosa in the antrum.  Colonoscopy showed pancolonic severe diverticula, hemorrhoids.  She was cleared by GI team to resume eliquis with plans for op capsule endoscopy but does not appear this was done.   Transfuse 2 unit prbc   Monitor volume status closely.  Lasix if needed.  Protonix 40 mg IV BID  NPO  GI consultation requested  Aneurysm of ascending aorta (HCC)  Ascending root dilatation and dissection dating back to 10/2022.  Repeat CTA 7/2023 was stable compared to prior.  She was previously seen by CT surgery in the past and was noted she would be high risk for surgery due to anatomy.  Additionally she declined intervention in the past.    Did not want additional imaging when discussed at recent cardiology appointment  CAD (coronary atherosclerotic disease)  CAD s/p CABG x3 2015.  No obstructive disease in grafts on cath 2020.  Complains of SOB likely in setting of anemia.  Denies CP.   Continue statin, imdur  Holding plavix due to suspected bleeding  Hypertension  Patient was noted to have low blood pressures at facility on 12/25.  She was not given her antihypertensives.  Will continue with carvedilol, hydralazine for systolic blood pressure greater than 130  Losartan, spironolactone and Lasix are on hold as above  Permanent atrial fibrillation (HCC)  Maintained on carvedilol  Eliquis was on hold due to suspected GI bleeding  Type 2 diabetes mellitus, without long-term current use of insulin (HCC)  Lab Results   Component Value Date    HGBA1C 7.1 (H)  11/18/2024     Resume glimepiride on discharge  Accu-Chek, SSI every 6 hours  Chronic kidney disease, stage 3b (HCC)  Creatinine 1.84, less prior 1.4.  Elevation likely due to hypotension at facility, suspected GI bleeding.  Hold losartan, spironolactone, Lasix  Continue home dose carvedilol, hydralazine, isosorbide for systolic blood pressure greater than 130  Repeat BMP in a.m.  Chronic diastolic heart failure (HCC)  Wt Readings from Last 3 Encounters:   11/12/24 68.4 kg (150 lb 12.8 oz)   09/17/24 67.9 kg (149 lb 9.6 oz)   10/31/23 67.8 kg (149 lb 6.4 oz)     Currently examines euvolemic  Echo 2022 EF 36%.  Global hypokinesis.  Grade 2 diastolic Grade II diastolic dysfunction  Holding home dose lasix and aldactone due to pre hospital hypotension and suspected GI bleeding.    Monitor volume status with PRBC.  May need lasix IV following transfusions  I&O, weights.        Leukocytosis  Without overt infection  Monitor off antibiotics   Trend cbc and temp curve      VTE Pharmacologic Prophylaxis: VTE Score: 4 Moderate Risk (Score 3-4) - Pharmacological DVT Prophylaxis Contraindicated. Sequential Compression Devices Ordered.  Code Status: Prior level 3  Discussion with family: Patient declined call to .  Would like her daughter called during day time hours on 12/26.     Anticipated Length of Stay: Patient will be admitted on an observation basis with an anticipated length of stay of less than 2 midnights secondary to anemia.    History of Present Illness   Chief Complaint: shortness of breath, fatigue    Quynh Roa is a 79 y.o. female with a PMH of cad prior cabg, chf, dm 2, anemia, aortic aneurysm with dissection, hypertenion, atrial fibrillation on eliquis, tachy-keanu snydrome with pacemaker who presents with one day of shortness of breath and fatigue.  Patient notes low blood pressure earlier in the day on 12/25 and anti hypertensives were held.  She had a pleasant day with her daughter and  grandchildren for [place] but does not shortness of breath and fatigue today that is out of the normal for her.  Facility reported dark stools.  Patient has macular degeneration and unable to see well so she is unsure exactly how long her stools have been dark.  She is on iron supplement every other day.  Hgb 6.2.  She is receiving 2 unit PRBC.  Will start protonix IV BID, keep npo, and request GI evaluation.     Patient was recently resumed on eliquis due to atrial fibrillation.  She had similar episode of anemia back in July 2023 when she was on aspirin 325 mg and started on eliquis.  She underwent egd and colonoscopy at that time which showed mild erythematous mucosa in the antrum.  Colonoscopy showed pancolonic severe diverticula, hemorrhoids.  She was cleared by GI to resume Eliquis in July 2023 with plans for capsule endoscopy but does not appear this was yet completed.      Review of Systems   Constitutional:  Positive for fatigue. Negative for activity change, appetite change, chills, diaphoresis, fever and unexpected weight change.   Eyes:         Blind    Respiratory:  Positive for shortness of breath. Negative for cough and wheezing.    Cardiovascular:  Positive for leg swelling (right chronic).   All other systems reviewed and are negative.      Historical Information   Past Medical History:   Diagnosis Date    Atypical chest pain     GERD (gastroesophageal reflux disease)     Hyperlipidemia     Hypertension     Kidney stone     Myocardial infarction (Formerly [facility])     2015     NSTEMI (non-ST elevated myocardial infarction) (Formerly [facility])     Last Assessed: 9/24/2015    Type 2 diabetes mellitus, without long-term current use of insulin ([facility]ly [facility]) 9/12/2013     Past Surgical History:   Procedure Laterality Date    A-V CARDIAC PACEMAKER INSERTION      ANKLE SURGERY      BACK SURGERY  01/2011    L4 and L5 laminectomy and fusion     BLADDER SURGERY      CORONARY ARTERY BYPASS GRAFT  09/02/2015    CABGx3 with LIMA to LAD, SVG to  PDA, SVG to OM-1    EXAMINATION UNDER ANESTHESIA N/A 7/11/2020    Procedure: EXAM UNDER ANESTHESIA (EUA)  EXCISION OF POSTERIOR VAGINAL FOREIGN BODY;  Surgeon: Caryn Ramos MD;  Location: [facility];  Service: Gynecology    FL RETROGRADE PYELOGRAM  7/11/2020    FL RETROGRADE PYELOGRAM  8/7/2020    HYSTERECTOMY      VT CYSTO BLADDER W/URETERAL CATHETERIZATION Right 7/11/2020    Procedure: CYSTOSCOPY RETROGRADE PYELOGRAM WITH INSERTION STENT URETERAL, bladder biopsy with fulguration;  Surgeon: Yordan Mccormick MD;  Location[facility];  Service: Urology    VT CYSTO/URETERO W/LITHOTRIPSY &INDWELL STENT INSRT Right 8/7/2020    Procedure: CYSTOSCOPY URETEROSCOPY WITH LITHOTRIPSY HOLMIUM LASER, RETROGRADE PYELOGRAM AND INSERTION STENT URETERAL;  Surgeon: Yordan Mccormick MD;  [facility];  Service: Urology    WRIST SURGERY       Social History     Tobacco Use    Smoking status: Former     Current packs/day: 0.00     Average packs/day: 0.3 packs/day for 54.0 years (13.5 ttl pk-yrs)     Types: Cigarettes     Start date: 1967     Quit date: 2021     Years since quitting: 3.9    Smokeless tobacco: Never    Tobacco comments:     Started smoking at age 22; currently smoking <1ppd.   Vaping Use    Vaping status: Never Used   Substance and Sexual Activity    Alcohol use: Not Currently    Drug use: Never    Sexual activity: Not Currently     Partners: Male     Birth control/protection: Post-menopausal     E-Cigarette/Vaping    E-Cigarette Use Never User      E-Cigarette/Vaping Substances     Family History   Problem Relation Age of Onset    Hypertension Mother     Hypertension Sister     Alcohol abuse Brother     Cirrhosis Brother     Diabetes Father     Other Brother         Heart transplant in his 50s    Lung cancer Sister     Diabetes Brother     Stroke Sister     No Known Problems Daughter     No Known Problems Maternal Grandmother     No Known Problems Maternal Grandfather     No Known Problems Paternal  Grandmother     No Known Problems Paternal Grandfather     No Known Problems Sister      Social History:  Marital Status: Single   Occupation:   Patient Pre-hospital Living Situation: Assisted Living  Patient Pre-hospital Level of Mobility: walker and cane  Patient Pre-hospital Diet Restrictions:     Meds/Allergies   I have reveiwed home medications using records provided by SNF.  Prior to Admission medications    Medication Sig Start Date End Date Taking? Authorizing Provider   levothyroxine 75 mcg tablet Take 75 mcg by mouth daily Daily on mon wed fri   Yes Historical Provider, MD   psyllium (METAMUCIL) 58.6 % packet Take 1 packet by mouth daily   Yes Historical Provider, MD   acetaminophen (TYLENOL) 325 mg tablet Take 2 tablets (650 mg total) by mouth every 4 (four) hours as needed for mild pain 8/7/20   Yordan Mccormick MD   Alcohol Swabs (Alcohol Prep) PADS  7/19/23   Historical Provider, MD   apixaban (Eliquis) 5 mg Take 1 tablet (5 mg total) by mouth 2 (two) times a day 12/9/24 6/2/26  Conrad Edwards MD   atorvastatin (LIPITOR) 40 mg tablet Take 1 tablet (40 mg total) by mouth daily 9/22/21   Sindy Torres,    Blood Glucose Monitoring Suppl (ONE TOUCH ULTRA 2) w/Device KIT  11/2/22   Historical Provider, MD   brimonidine-timolol (COMBIGAN) 0.2-0.5 %  1/11/23   Historical Provider, MD   carvedilol (COREG) 12.5 mg tablet Take 12.5 mg by mouth 2 (two) times a day with meals    Historical Provider, MD   clopidogrel (PLAVIX) 75 mg tablet Take 1 tablet (75 mg total) by mouth daily Do not start before July 5, 2023. 7/5/23   Lizbeth Guzmán MD   ferrous sulfate 325 (65 Fe) mg tablet Take 1 tablet (325 mg total) by mouth every other day 10/12/22   Derick Ray MD   furosemide (LASIX) 40 mg tablet Take 1 tablet (40 mg total) by mouth daily 8/28/23   Conrad Edwards MD   glimepiride (AMARYL) 1 mg tablet  7/24/23   Historical Provider, MD   hydrALAZINE (APRESOLINE) 25 mg tablet Take 1 tablet (25 mg total) by  mouth every 8 (eight) hours 10/10/22   Anamaria Donovan PA-C   isosorbide dinitrate (ISORDIL) 20 mg tablet Take 1 tablet (20 mg total) by mouth 3 (three) times daily after meals 10/10/22   Anamaria Donovan PA-C   levothyroxine 50 mcg tablet Take 50 mcg by mouth daily Daily on sun tues thur sat 12/30/22   Historical Provider, MD   loratadine (CLARITIN) 10 mg tablet  6/27/23   Historical Provider, MD   losartan (COZAAR) 25 mg tablet Take 1 tablet (25 mg total) by mouth daily 9/22/21   Sindy Torres DO   melatonin 3 mg Take 2 tablets (6 mg total) by mouth daily at bedtime 10/10/22   Anamaria Donovan PA-C   meloxicam (MOBIC) 15 mg tablet  8/25/23   Historical Provider, MD   Multiple Vitamins-Minerals (PreserVision AREDS) TABS  8/9/23   Historical Provider, MD   OneTouch Ultra test strip  12/18/22   Historical Provider, MD   pantoprazole (PROTONIX) 40 mg tablet  1/11/23   Historical Provider, MD   spironolactone (ALDACTONE) 25 mg tablet  9/13/24   Historical Provider, MD     Allergies   Allergen Reactions    Nickel Rash       Objective :  Temp:  [97.7 °F (36.5 °C)-98 °F (36.7 °C)] 97.7 °F (36.5 °C)  HR:  [30-68] 63  BP: (132-164)/(60-69) 142/64  Resp:  [15-17] 16  SpO2:  [97 %-99 %] 98 %  O2 Device: None (Room air)    Physical Exam  Constitutional:       General: She is not in acute distress.     Appearance: Normal appearance. She is not ill-appearing.   HENT:      Head: Normocephalic and atraumatic.      Right Ear: External ear normal.      Left Ear: External ear normal.      Nose: Nose normal.      Mouth/Throat:      Pharynx: Oropharynx is clear.   Eyes:      Conjunctiva/sclera: Conjunctivae normal.   Cardiovascular:      Rate and Rhythm: Normal rate. Rhythm irregular.      Pulses: Normal pulses.      Heart sounds: Normal heart sounds.   Pulmonary:      Effort: Pulmonary effort is normal.      Breath sounds: Normal breath sounds.   Abdominal:      General: Bowel sounds are normal. There is no distension.       Palpations: Abdomen is soft.      Tenderness: There is no abdominal tenderness. There is no right CVA tenderness, left CVA tenderness, guarding or rebound.   Musculoskeletal:         General: Normal range of motion.      Cervical back: Normal range of motion.   Skin:     General: Skin is warm.      Capillary Refill: Capillary refill takes less than 2 seconds.   Neurological:      General: No focal deficit present.      Mental Status: She is alert and oriented to person, place, and time.   Psychiatric:         Mood and Affect: Mood normal.         Behavior: Behavior normal.          Lines/Drains:            Lab Results: I have reviewed the following results:  Results from last 7 days   Lab Units 12/25/24 2058   WBC Thousand/uL 14.03*   HEMOGLOBIN g/dL 6.2*   HEMATOCRIT % 21.5*   PLATELETS Thousands/uL 338   SEGS PCT % 81*   LYMPHO PCT % 10*   MONO PCT % 6   EOS PCT % 1     Results from last 7 days   Lab Units 12/25/24 2058   SODIUM mmol/L 135   POTASSIUM mmol/L 4.7   CHLORIDE mmol/L 98   CO2 mmol/L 24   BUN mg/dL 73*   CREATININE mg/dL 1.84*   ANION GAP mmol/L 13   CALCIUM mg/dL 9.0   ALBUMIN g/dL 4.0   TOTAL BILIRUBIN mg/dL 0.30   ALK PHOS U/L 30*   ALT U/L 7   AST U/L 9*   GLUCOSE RANDOM mg/dL 196*             Lab Results   Component Value Date    HGBA1C 7.1 (H) 11/18/2024    HGBA1C 7.8 (H) 03/28/2023    HGBA1C 6 06/29/2022           Imaging Results Review: No pertinent imaging studies reviewed.  Other Study Results Review: EKG was reviewed.  EKG was personally reviewed and my interpretation is: Personally Reviewed. Atrial flutter. HR 64..    Administrative Statements   I have spent a total time of   minutes in caring for this patient on the day of the visit/encounter including Diagnostic results, Prognosis, Risks and benefits of tx options, Instructions for management, Patient and family education, Importance of tx compliance, Risk factor reductions, Impressions, Counseling / Coordination of care, Documenting in  the medical record, Reviewing / ordering tests, medicine, procedures  , Obtaining or reviewing history  , and Communicating with other healthcare professionals .    ** Please Note: This note has been constructed using a voice recognition system. **

## 2024-12-26 NOTE — ASSESSMENT & PLAN NOTE
Lab Results   Component Value Date    HGBA1C 7.1 (H) 11/18/2024     Resume glimepiride on discharge  Accu-Chek, SSI every 6 hours

## 2024-12-26 NOTE — CONSULTS
Consultation - Gastroenterology   Name: Quynh Roa 79 y.o. female I MRN: [id number]  Unit/Bed#: -01 I Date of Admission: 12/25/2024   Date of Service: 12/26/2024 I Hospital Day: 0   Inpatient consult to gastroenterology  Consult performed by: Viet Kay PA-C  Consult ordered by: PETE Colon      Physician Requesting Evaluation: Monse Harrington DO   Reason for Evaluation / Principal Problem: Anemia    Assessment & Plan  Anemia, unspecified  -Previous EGD and colonoscopy for same issue in July 2023 was unrevealing for any GI source of bleeding.  -Concerns for upper GI bleeding with melena.  Elevated BUN however this is likely related to acute on chronic kidney injury and hypotension.  BUN currently improving.  -Would plan EGD after appropriate Eliquis washout.  -With slight kidney dysfunction would prefer to wait until Friday, December 27 to allow for more appropriate Eliquis washout.  - I have reviewed the risks of endoscopy which include but are not limited to; anesthesia complications, injury/perforation of the bowel, infection and bleeding.  Patient and daughter amenable to procedure.  -Patient and daughter also concerned about her current heart condition and were hoping that cardiology could weigh in on the risk assessment for her procedure.  I have reached out to the primary team with regards to a possible cardiology consult.    Melena  -Question GI bleeding versus from oral iron  -Plan EGD once sufficient Eliquis washout  Gastric intestinal metaplasia  -Last EGD July 2023 July 2023 done for symptomatic anemia showing mild gastritis and small hiatal hernia.  Gastric biopsies is positive for gastric intestinal metaplasia with no dysplasia.      History of Present Illness   HPI:  Quynh Roa is a 79 y.o. female with past medical history of atrial fibrillation on Eliquis, tachybradycardia syndrome with pacemaker, GERD, hyperlipidemia, hypertension, kidney stones, myocardial  infarction in September 2015 status post CABG, CHF with grade 2 diastolic dysfunction last echo 2022 with ejection fraction 36%., diabetes type 2 and chronic kidney disease stage IIIb who presents on [place] to the emergency room at approximately 10 PM at night with 1 day of shortness of breath and fatigue.  She also reports 1 episode of dark stool.  She is on oral iron supplement every other day.  Her hemoglobin was found to be 6.2 on admission.  There is an acute kidney injury with BUN 73 and creatinine 1.84 which is now down to BUN 63 and creatinine 1.43.    Vital signs are stable with no fever.  Hemoglobin 6.2 now up to 8.7 status post 2 units of packed red blood cells.  Normal MCV and MCH.  Iron panel including ferritin is pending.    Endoscopic History  EGD -July 2023 done for symptomatic anemia showing mild gastritis and small hiatal hernia.  Gastric biopsies is positive for gastric intestinal metaplasia with no dysplasia.  Gastric biopsy is negative for H. pylori  Colonoscopy -July 2023 done for symptomatic anemia with adequate bowel prep showing extensive severe diverticulosis throughout the colon and external and internal hemorrhoids.      Review of Systems   Constitutional:  Negative for activity change, appetite change, chills, diaphoresis, fatigue and unexpected weight change.   HENT:  Negative for mouth sores, sore throat and trouble swallowing.    Eyes:  Negative for pain, redness and visual disturbance.   Respiratory:  Negative for apnea, chest tightness and shortness of breath.    Cardiovascular:  Negative for chest pain and leg swelling.   Gastrointestinal:  Negative for abdominal distention, abdominal pain, anal bleeding, blood in stool, constipation, diarrhea, nausea and vomiting.   Genitourinary:  Negative for difficulty urinating, dysuria and hematuria.   Musculoskeletal:  Negative for arthralgias, back pain, gait problem, joint swelling and myalgias.   Skin:  Negative for color change,  pallor and rash.   Allergic/Immunologic: Negative for environmental allergies and food allergies.   Neurological:  Negative for dizziness, weakness, light-headedness, numbness and headaches.   Psychiatric/Behavioral:  Negative for agitation and behavioral problems.      Historical Information   Past Medical History:   Diagnosis Date    Atypical chest pain     GERD (gastroesophageal reflux disease)     Hyperlipidemia     Hypertension     Kidney stone     Myocardial infarction ([facility])     2015     NSTEMI (non-ST elevated myocardial infarction) ([facility])     Last Assessed: 9/24/2015    Type 2 diabetes mellitus, without long-term current use of insulin ([facility]) 9/12/2013     Past Surgical History:   Procedure Laterality Date    A-V CARDIAC PACEMAKER INSERTION      ANKLE SURGERY      BACK SURGERY  01/2011    L4 and L5 laminectomy and fusion     BLADDER SURGERY      CORONARY ARTERY BYPASS GRAFT  09/02/2015    CABGx3 with LIMA to LAD, SVG to PDA, SVG to OM-1    EXAMINATION UNDER ANESTHESIA N/A 7/11/2020    Procedure: EXAM UNDER ANESTHESIA (EUA)  EXCISION OF POSTERIOR VAGINAL FOREIGN BODY;  Surgeon: Caryn Ramos MD;  [facility];  Service: Gynecology    FL RETROGRADE PYELOGRAM  7/11/2020    FL RETROGRADE PYELOGRAM  8/7/2020    HYSTERECTOMY      SC CYSTO BLADDER W/URETERAL CATHETERIZATION Right 7/11/2020    Procedure: CYSTOSCOPY RETROGRADE PYELOGRAM WITH INSERTION STENT URETERAL, bladder biopsy with fulguration;  Surgeon: Yordan Mccormick MD;  [facility];  Service: Urology    SC CYSTO/URETERO W/LITHOTRIPSY &INDWELL STENT INSRT Right 8/7/2020    Procedure: CYSTOSCOPY URETEROSCOPY WITH LITHOTRIPSY HOLMIUM LASER, RETROGRADE PYELOGRAM AND INSERTION STENT URETERAL;  Surgeon: Yordan Mccormick MD;  [facility];  Service: Urology    WRIST SURGERY       Social History     Tobacco Use    Smoking status: Former     Current packs/day: 0.00     Average packs/day: 0.3 packs/day for 54.0 years (13.5 ttl  pk-yrs)     Types: Cigarettes     Start date: 1967     Quit date: 2021     Years since quitting: 3.9    Smokeless tobacco: Never    Tobacco comments:     Started smoking at age 22; currently smoking <1ppd.   Vaping Use    Vaping status: Never Used   Substance and Sexual Activity    Alcohol use: Not Currently    Drug use: Never    Sexual activity: Not Currently     Partners: Male     Birth control/protection: Post-menopausal     E-Cigarette/Vaping    E-Cigarette Use Never User      E-Cigarette/Vaping Substances       Social History     Tobacco Use    Smoking status: Former     Current packs/day: 0.00     Average packs/day: 0.3 packs/day for 54.0 years (13.5 ttl pk-yrs)     Types: Cigarettes     Start date: 1967     Quit date: 2021     Years since quitting: 3.9    Smokeless tobacco: Never    Tobacco comments:     Started smoking at age 22; currently smoking <1ppd.   Vaping Use    Vaping status: Never Used   Substance and Sexual Activity    Alcohol use: Not Currently    Drug use: Never    Sexual activity: Not Currently     Partners: Male     Birth control/protection: Post-menopausal       Current Facility-Administered Medications:     acetaminophen (TYLENOL) tablet 650 mg, Q6H PRN    atorvastatin (LIPITOR) tablet 40 mg, Daily    brimonidine tartrate 0.2 % ophthalmic solution 1 drop, BID **AND** timolol (TIMOPTIC) 0.5 % ophthalmic solution 1 drop, BID    carvedilol (COREG) tablet 12.5 mg, BID With Meals    ferrous sulfate tablet 325 mg, Q48H    hydrALAZINE (APRESOLINE) tablet 25 mg, Q8H ISRA    insulin lispro (HumALOG/ADMELOG) 100 units/mL subcutaneous injection 1-5 Units, Q6H ISRA **AND** Fingerstick Glucose (POCT), Q6H    isosorbide dinitrate (ISORDIL) tablet 20 mg, TID after meals    levothyroxine tablet 50 mcg, Once per day on Sunday Tuesday Thursday Saturday    [START ON 12/27/2024] levothyroxine tablet 75 mcg, Once per day on Monday Wednesday Friday    melatonin tablet 6 mg, HS    pantoprazole (PROTONIX) injection  40 mg, Q12H ISRA  Prior to Admission Medications   Prescriptions Last Dose Informant Patient Reported? Taking?   Alcohol Swabs (Alcohol Prep) PADS 12/25/2024 Care Giver, Outside Facility (Specify) Yes Yes   Blood Glucose Monitoring Suppl (ONE TOUCH ULTRA 2) w/Device KIT 12/25/2024 Care Giver, Outside Facility (Specify) Yes Yes   Multiple Vitamins-Minerals (PreserVision AREDS) TABS 12/25/2024 Care Giver, Outside Facility (Specify) Yes Yes   OneTouch Ultra test strip 12/25/2024 Care Giver, Outside Facility (Specify) Yes Yes   acetaminophen (TYLENOL) 325 mg tablet 12/25/2024 Care Giver, Outside Facility (Specify) No Yes   Sig: Take 2 tablets (650 mg total) by mouth every 4 (four) hours as needed for mild pain   apixaban (Eliquis) 5 mg 12/25/2024  No Yes   Sig: Take 1 tablet (5 mg total) by mouth 2 (two) times a day   atorvastatin (LIPITOR) 40 mg tablet 12/25/2024 Care Giver, Outside Facility (Specify) No Yes   Sig: Take 1 tablet (40 mg total) by mouth daily   brimonidine-timolol (COMBIGAN) 0.2-0.5 % 12/25/2024 Care Giver, Outside Facility (Specify) Yes Yes   carvedilol (COREG) 12.5 mg tablet 12/25/2024 Care Giver, Outside Facility (Specify) Yes Yes   Sig: Take 12.5 mg by mouth 2 (two) times a day with meals   clopidogrel (PLAVIX) 75 mg tablet 12/25/2024 Care Giver, Outside Facility (Specify) No Yes   Sig: Take 1 tablet (75 mg total) by mouth daily Do not start before July 5, 2023.   ferrous sulfate 325 (65 Fe) mg tablet 12/25/2024 Care Giver, Outside Facility (Specify) No Yes   Sig: Take 1 tablet (325 mg total) by mouth every other day   furosemide (LASIX) 40 mg tablet 12/25/2024 Care Giver, Outside Facility (Specify) No Yes   Sig: Take 1 tablet (40 mg total) by mouth daily   glimepiride (AMARYL) 1 mg tablet 12/25/2024 Care Giver, Outside Facility (Specify) Yes Yes   hydrALAZINE (APRESOLINE) 25 mg tablet 12/26/2024 Morning Care Giver, Outside Facility (Specify) No Yes   Sig: Take 1 tablet (25 mg total) by mouth every 8  (eight) hours   isosorbide dinitrate (ISORDIL) 20 mg tablet 12/25/2024 Care Giver, Outside Facility (Specify) No Yes   Sig: Take 1 tablet (20 mg total) by mouth 3 (three) times daily after meals   levothyroxine 50 mcg tablet 12/25/2024 Care Giver, Outside Facility (Specify) Yes Yes   Sig: Take 50 mcg by mouth daily Daily on sun tues thur sat   levothyroxine 75 mcg tablet 12/25/2024  Yes Yes   Sig: Take 75 mcg by mouth daily Daily on mon wed fri   loratadine (CLARITIN) 10 mg tablet Not Taking Care Giver, Outside Facility (Specify) Yes No   Patient not taking: Reported on 9/17/2024   losartan (COZAAR) 25 mg tablet 12/25/2024 Care Giver, Outside Facility (Specify) No Yes   Sig: Take 1 tablet (25 mg total) by mouth daily   melatonin 3 mg 12/26/2024 Morning Care Giver, Outside Facility (Specify) No Yes   Sig: Take 2 tablets (6 mg total) by mouth daily at bedtime   meloxicam (MOBIC) 15 mg tablet Not Taking Care Giver, Outside Facility (Specify) Yes No   Patient not taking: Reported on 10/31/2023   pantoprazole (PROTONIX) 40 mg tablet 12/25/2024 Care Giver, Outside Facility (Specify) Yes Yes   psyllium (METAMUCIL) 58.6 % packet 12/25/2024  Yes Yes   Sig: Take 1 packet by mouth daily   spironolactone (ALDACTONE) 25 mg tablet 12/25/2024 Outside Facility (Specify) Yes Yes      Facility-Administered Medications: None     Nickel    Objective :  Temp:  [97.7 °F (36.5 °C)-98 °F (36.7 °C)] 97.9 °F (36.6 °C)  HR:  [30-68] 62  BP: (129-164)/(56-69) 138/64  Resp:  [15-18] 15  SpO2:  [96 %-99 %] 96 %  O2 Device: None (Room air)    Physical Exam  Vitals reviewed.   Constitutional:       General: She is not in acute distress.     Appearance: Normal appearance. She is not ill-appearing.   HENT:      Head: Normocephalic and atraumatic.   Eyes:      General: No scleral icterus.     Conjunctiva/sclera: Conjunctivae normal.   Cardiovascular:      Rate and Rhythm: Normal rate and regular rhythm.   Pulmonary:      Effort: Pulmonary effort is  normal. No respiratory distress.      Breath sounds: Normal breath sounds.   Abdominal:      General: Bowel sounds are normal. There is no distension.      Palpations: Abdomen is soft.      Tenderness: There is no abdominal tenderness. There is no guarding.   Skin:     General: Skin is warm and dry.   Neurological:      Mental Status: She is alert and oriented to person, place, and time.   Psychiatric:         Mood and Affect: Mood normal.         Behavior: Behavior normal.         Lab Results: I have reviewed the following results:CBC/BMP:   .     12/26/24  0505   WBC 11.96*   HGB 8.8*  8.7*   HCT 27.8*  28.8*      SODIUM 135   K 4.0      CO2 23   BUN 63*   CREATININE 1.43*   GLUC 112   MG 2.3    , LFTs:   .     12/25/24  2058   AST 9*   ALT 7   ALB 4.0   TBILI 0.30   ALKPHOS 30*    , PTT/INR:No new results in last 24 hours.     Imaging Results Review: I reviewed radiology reports from this admission including: chest xray.  Other Study Results Review: No additional pertinent studies reviewed.        Viet Kay PA-C  [facility] - Gastroentrology

## 2024-12-26 NOTE — CONSULTS
Consultation - Cardiology   Name: Quynh Roa 79 y.o. female I MRN: [id number]  Unit/Bed#: -01 I Date of Admission: 12/25/2024   Date of Service: 12/26/2024 I Hospital Day: 0   Inpatient consult to Cardiology  Consult performed by: Darvin Suresh PA-C  Consult ordered by: Monse Harrington DO        Physician Requesting Evaluation: Monse Harrington DO   Reason for Evaluation / Principal Problem:     Assessment & Plan  Anemia, unspecified  She presents with melena and anemia and is planned for an EGD tomorrow 12/27/24  Preoperative cardiovascular examination  She has a somewhat extensive cardiac history  2015 she underwent CABG x 3  2020 [facility] with patent bypass grafts  2020 dual-chamber pacemaker placement for bradycardia  Ischemic cardiomyopathy last EF 35 to 40% on echo 2022  Severe aortic root dilation and aortic dissection managed medically due to high operative risk and patient not within her goals of care to reverse CODE STATUS for cardiac surgery (known, since October 2022)  Atrial fibrillation and flutter, probably permanent with 100% burden on recent device check    Summary:  She will be elevated risk for anesthesia but currently this risk is nonmodifiable. She is currently asymptomatic from a cardiac standpoint, and though not extremely active she says she is able to ambulate around her living facility without any difficulty and actually helps manage bingo games which involves walking around the room distributing materials and picking them up afterwards. She has no chest pain or chest pressure. Up until yesterday her breathing had been stable. She has had no episodes of passing out. She tells me that after this hospitalization she is no longer interested in taking Eliquis. Can proceed with EGD at the discretion of primary team. Discussed risk involved extensively with pt at bedside today.    History of Present Illness   Quynh Roa is a 79 y.o. female who presents with weakness and  shortness of breath.  These are similar symptoms to the last time she was anemic.  She has been found to have melena and is planned for an EGD tomorrow.  Given her extensive cardiac history our consultation has been requested for preoperative risk stratification.    Review of Systems      Objective :  Temp:  [97.7 °F (36.5 °C)-98 °F (36.7 °C)] 97.9 °F (36.6 °C)  HR:  [30-68] 61  BP: (126-164)/(56-69) 126/57  Resp:  [15-18] 15  SpO2:  [96 %-99 %] 96 %  O2 Device: None (Room air)  Orthostatic Blood Pressures      Flowsheet Row Most Recent Value   Blood Pressure 126/57 filed at 12/26/2024 1223   Patient Position - Orthostatic VS Lying filed at 12/26/2024 1223          First Weight: Weight - Scale: 70.7 kg (155 lb 13.8 oz) (12/26/24 0500)  Vitals:    12/26/24 0500   Weight: 70.7 kg (155 lb 13.8 oz)       Physical Exam      Lab Results: I have reviewed the following results:  Results from last 7 days   Lab Units 12/26/24  0505 12/25/24 2058   WBC Thousand/uL 11.96* 14.03*   HEMOGLOBIN g/dL 8.8*  8.7* 6.2*   HEMATOCRIT % 27.8*  28.8* 21.5*   PLATELETS Thousands/uL 254 338     Results from last 7 days   Lab Units 12/26/24  0505 12/25/24 2058   POTASSIUM mmol/L 4.0 4.7   CHLORIDE mmol/L 102 98   CO2 mmol/L 23 24   BUN mg/dL 63* 73*   CREATININE mg/dL 1.43* 1.84*   CALCIUM mg/dL 8.6 9.0         Lab Results   Component Value Date    HGBA1C 7.1 (H) 11/18/2024     Lab Results   Component Value Date    CKTOTAL 29 05/30/2020    TROPONINI 0.02 07/10/2020             VTE Prophylaxis:

## 2024-12-26 NOTE — ED NOTES
Pt educated on s/s of a reaction. No questions or concerns       Oliva Santamaria RN  12/25/24 [id number]

## 2024-12-26 NOTE — ASSESSMENT & PLAN NOTE
She has a somewhat extensive cardiac history  2015 she underwent CABG x 3  2020 [facility] with patent bypass grafts  2020 dual-chamber pacemaker placement for bradycardia  Ischemic cardiomyopathy last EF 35 to 40% on echo 2022  Severe aortic root dilation and aortic dissection managed medically due to high operative risk and patient not within her goals of care to reverse CODE STATUS for cardiac surgery (known, since October 2022)  Atrial fibrillation and flutter, probably permanent with 100% burden on recent device check

## 2024-12-26 NOTE — ASSESSMENT & PLAN NOTE
CAD s/p CABG x3 2015.  No obstructive disease in grafts on cath 2020.  Complains of SOB likely in setting of anemia.  Denies CP.   Continue statin, imdur  Holding plavix due to suspected bleeding

## 2024-12-26 NOTE — ASSESSMENT & PLAN NOTE
Ascending root dilatation and dissection dating back to 10/2022.  Repeat CTA 7/2023 was stable compared to prior.  She was previously seen by CT surgery in the past and was noted she would be high risk for surgery due to anatomy.  Additionally she declined intervention in the past.    Did not want additional imaging when discussed at recent cardiology appointment

## 2024-12-26 NOTE — ASSESSMENT & PLAN NOTE
-Previous EGD and colonoscopy for same issue in July 2023 was unrevealing for any GI source of bleeding.  -Concerns for upper GI bleeding with melena.  Elevated BUN however this is likely related to acute on chronic kidney injury and hypotension.  BUN currently improving.  -Would plan EGD after appropriate Eliquis washout.  -With slight kidney dysfunction would prefer to wait until Friday, December 27 to allow for more appropriate Eliquis washout.  - I have reviewed the risks of endoscopy which include but are not limited to; anesthesia complications, injury/perforation of the bowel, infection and bleeding.  Patient and daughter amenable to procedure.  -Patient and daughter also concerned about her current heart condition and were hoping that cardiology could weigh in on the risk assessment for her procedure.  I have reached out to the primary team with regards to a possible cardiology consult.

## 2024-12-26 NOTE — ASSESSMENT & PLAN NOTE
Wt Readings from Last 3 Encounters:   11/12/24 68.4 kg (150 lb 12.8 oz)   09/17/24 67.9 kg (149 lb 9.6 oz)   10/31/23 67.8 kg (149 lb 6.4 oz)     Currently examines euvolemic  Echo 2022 EF 36%.  Global hypokinesis.  Grade 2 diastolic Grade II diastolic dysfunction  Holding home dose lasix and aldactone due to pre hospital hypotension and suspected GI bleeding.    Monitor volume status with PRBC.  May need lasix IV following transfusions  I&O, weights.

## 2024-12-27 ENCOUNTER — ANESTHESIA EVENT (INPATIENT)
Dept: GASTROENTEROLOGY | Facility: HOSPITAL | Age: 79
DRG: 812 | End: 2024-12-27
Payer: COMMERCIAL

## 2024-12-27 ENCOUNTER — ANESTHESIA (INPATIENT)
Dept: GASTROENTEROLOGY | Facility: HOSPITAL | Age: 79
DRG: 812 | End: 2024-12-27
Payer: COMMERCIAL

## 2024-12-27 ENCOUNTER — TELEPHONE (OUTPATIENT)
Dept: CARDIOLOGY CLINIC | Facility: CLINIC | Age: 79
End: 2024-12-27

## 2024-12-27 ENCOUNTER — APPOINTMENT (INPATIENT)
Dept: GASTROENTEROLOGY | Facility: HOSPITAL | Age: 79
DRG: 812 | End: 2024-12-27
Payer: COMMERCIAL

## 2024-12-27 LAB
ANION GAP SERPL CALCULATED.3IONS-SCNC: 7 MMOL/L (ref 4–13)
BUN SERPL-MCNC: 38 MG/DL (ref 5–25)
CALCIUM SERPL-MCNC: 8.4 MG/DL (ref 8.4–10.2)
CHLORIDE SERPL-SCNC: 106 MMOL/L (ref 96–108)
CO2 SERPL-SCNC: 26 MMOL/L (ref 21–32)
CREAT SERPL-MCNC: 1.22 MG/DL (ref 0.6–1.3)
GFR SERPL CREATININE-BSD FRML MDRD: 42 ML/MIN/1.73SQ M
GLUCOSE SERPL-MCNC: 124 MG/DL (ref 65–140)
GLUCOSE SERPL-MCNC: 130 MG/DL (ref 65–140)
GLUCOSE SERPL-MCNC: 133 MG/DL (ref 65–140)
GLUCOSE SERPL-MCNC: 158 MG/DL (ref 65–140)
GLUCOSE SERPL-MCNC: 168 MG/DL (ref 65–140)
HGB BLD-MCNC: 9.5 G/DL (ref 11.5–15.4)
MAGNESIUM SERPL-MCNC: 2.4 MG/DL (ref 1.9–2.7)
POTASSIUM SERPL-SCNC: 4.1 MMOL/L (ref 3.5–5.3)
SODIUM SERPL-SCNC: 139 MMOL/L (ref 135–147)

## 2024-12-27 PROCEDURE — 99232 SBSQ HOSP IP/OBS MODERATE 35: CPT

## 2024-12-27 PROCEDURE — 0DJ08ZZ INSPECTION OF UPPER INTESTINAL TRACT, VIA NATURAL OR ARTIFICIAL OPENING ENDOSCOPIC: ICD-10-PCS | Performed by: STUDENT IN AN ORGANIZED HEALTH CARE EDUCATION/TRAINING PROGRAM

## 2024-12-27 PROCEDURE — 80048 BASIC METABOLIC PNL TOTAL CA: CPT

## 2024-12-27 PROCEDURE — 44360 SMALL BOWEL ENDOSCOPY: CPT | Performed by: STUDENT IN AN ORGANIZED HEALTH CARE EDUCATION/TRAINING PROGRAM

## 2024-12-27 PROCEDURE — 82948 REAGENT STRIP/BLOOD GLUCOSE: CPT

## 2024-12-27 PROCEDURE — 83735 ASSAY OF MAGNESIUM: CPT

## 2024-12-27 PROCEDURE — 85018 HEMOGLOBIN: CPT | Performed by: NURSE PRACTITIONER

## 2024-12-27 RX ORDER — PHENYLEPHRINE HCL IN 0.9% NACL 1 MG/10 ML
SYRINGE (ML) INTRAVENOUS AS NEEDED
Status: DISCONTINUED | OUTPATIENT
Start: 2024-12-27 | End: 2024-12-27

## 2024-12-27 RX ORDER — EPHEDRINE SULFATE 50 MG/ML
INJECTION INTRAVENOUS AS NEEDED
Status: DISCONTINUED | OUTPATIENT
Start: 2024-12-27 | End: 2024-12-27

## 2024-12-27 RX ORDER — INSULIN LISPRO 100 [IU]/ML
1-5 INJECTION, SOLUTION INTRAVENOUS; SUBCUTANEOUS
Status: DISCONTINUED | OUTPATIENT
Start: 2024-12-28 | End: 2024-12-28 | Stop reason: HOSPADM

## 2024-12-27 RX ORDER — CLOPIDOGREL BISULFATE 75 MG/1
75 TABLET ORAL DAILY
Status: DISCONTINUED | OUTPATIENT
Start: 2024-12-28 | End: 2024-12-28 | Stop reason: HOSPADM

## 2024-12-27 RX ORDER — PROPOFOL 10 MG/ML
INJECTION, EMULSION INTRAVENOUS AS NEEDED
Status: DISCONTINUED | OUTPATIENT
Start: 2024-12-27 | End: 2024-12-27

## 2024-12-27 RX ORDER — SODIUM CHLORIDE, SODIUM LACTATE, POTASSIUM CHLORIDE, CALCIUM CHLORIDE 600; 310; 30; 20 MG/100ML; MG/100ML; MG/100ML; MG/100ML
INJECTION, SOLUTION INTRAVENOUS CONTINUOUS PRN
Status: DISCONTINUED | OUTPATIENT
Start: 2024-12-27 | End: 2024-12-27

## 2024-12-27 RX ORDER — LIDOCAINE HYDROCHLORIDE 20 MG/ML
INJECTION, SOLUTION EPIDURAL; INFILTRATION; INTRACAUDAL; PERINEURAL AS NEEDED
Status: DISCONTINUED | OUTPATIENT
Start: 2024-12-27 | End: 2024-12-27

## 2024-12-27 RX ADMIN — BRIMONIDINE TARTRATE 1 DROP: 2 SOLUTION/ DROPS OPHTHALMIC at 08:10

## 2024-12-27 RX ADMIN — Medication 100 MCG: at 10:37

## 2024-12-27 RX ADMIN — PROPOFOL 110 MG: 10 INJECTION, EMULSION INTRAVENOUS at 10:30

## 2024-12-27 RX ADMIN — PANTOPRAZOLE SODIUM 40 MG: 40 INJECTION, POWDER, FOR SOLUTION INTRAVENOUS at 20:39

## 2024-12-27 RX ADMIN — LIDOCAINE HYDROCHLORIDE 90 MG: 20 INJECTION, SOLUTION EPIDURAL; INFILTRATION; INTRACAUDAL at 10:30

## 2024-12-27 RX ADMIN — EPHEDRINE SULFATE 10 MG: 50 INJECTION INTRAVENOUS at 10:32

## 2024-12-27 RX ADMIN — SODIUM CHLORIDE, SODIUM LACTATE, POTASSIUM CHLORIDE, AND CALCIUM CHLORIDE: .6; .31; .03; .02 INJECTION, SOLUTION INTRAVENOUS at 09:57

## 2024-12-27 RX ADMIN — PROPOFOL 20 MG: 10 INJECTION, EMULSION INTRAVENOUS at 10:32

## 2024-12-27 RX ADMIN — IRON SUCROSE 300 MG: 20 INJECTION, SOLUTION INTRAVENOUS at 08:43

## 2024-12-27 RX ADMIN — LEVOTHYROXINE SODIUM 75 MCG: 75 TABLET ORAL at 05:15

## 2024-12-27 RX ADMIN — TIMOLOL MALEATE 1 DROP: 5 SOLUTION OPHTHALMIC at 08:10

## 2024-12-27 RX ADMIN — PROPOFOL 20 MG: 10 INJECTION, EMULSION INTRAVENOUS at 10:35

## 2024-12-27 RX ADMIN — INSULIN LISPRO 1 UNITS: 100 INJECTION, SOLUTION INTRAVENOUS; SUBCUTANEOUS at 12:35

## 2024-12-27 RX ADMIN — PANTOPRAZOLE SODIUM 40 MG: 40 INJECTION, POWDER, FOR SOLUTION INTRAVENOUS at 08:08

## 2024-12-27 RX ADMIN — ATORVASTATIN CALCIUM 40 MG: 40 TABLET, FILM COATED ORAL at 08:08

## 2024-12-27 RX ADMIN — PROPOFOL 20 MG: 10 INJECTION, EMULSION INTRAVENOUS at 10:38

## 2024-12-27 RX ADMIN — EPHEDRINE SULFATE 10 MG: 50 INJECTION INTRAVENOUS at 10:36

## 2024-12-27 RX ADMIN — BRIMONIDINE TARTRATE 1 DROP: 2 SOLUTION/ DROPS OPHTHALMIC at 18:03

## 2024-12-27 RX ADMIN — Medication 6 MG: at 20:39

## 2024-12-27 RX ADMIN — TIMOLOL MALEATE 1 DROP: 5 SOLUTION OPHTHALMIC at 18:03

## 2024-12-27 NOTE — ANESTHESIA PREPROCEDURE EVALUATION
Procedure:  EGD WITH PUSH ENTEROSCOPY    EP Report 12/2024:  MDT DC PM - ACTIVE SYSTEM IS MRI CONDITIONAL   CARELINK TRANSMISSION:  BATTERY VOLTAGE ADEQUATE (8.5 YR.).  AP <0.1%  99.8% (MODE SWITCHED).  ALL LEAD PARAMETERS WITHIN NORMAL LIMITS.  NO NEW HIGH RATE EPISODES.  100% AF, ON ELIQUIS AND CARVEDILOL.  NORMAL DEVICE FUNCTION.         Echo 2022  •  Left Ventricle: Left ventricular cavity size is normal. Wall thickness is normal. The left ventricular ejection fraction is 36% by biplane measurement.  By visual assessment, ejection fraction appears slightly better.  Systolic function is moderately reduced.   Endocardium is poorly visualized. There is global hypokinesis with regional variation. Diastolic function is moderately abnormal, consistent with grade II (pseudonormal) relaxation.  Left atrial filling pressure is elevated.  •  Right Ventricle: Systolic function is moderately reduced.  •  Left Atrium: The atrium is mildly dilated.  •  Aortic Valve: There is mild to moderate regurgitation.  •  Mitral Valve: There is mild annular calcification. There is mild to moderate regurgitation.  •  Tricuspid Valve: There is moderate regurgitation. The right ventricular systolic pressure is moderately elevated. The estimated right ventricular systolic pressure is 58.00 mmHg.  •  Pulmonic Valve: There is mild to moderate regurgitation.  •  Aorta: The aortic root is severely dilated. The aortic root is 5.60 cm.  •  Pericardium: There is no pericardial effusion.  •  Technically difficult study      Relevant Problems   CARDIO   (+) Aneurysm of ascending aorta (HCC)   (+) CAD (coronary atherosclerotic disease)   (+) Cardiac pacemaker in situ   (+) Hyperlipidemia   (+) Hypertension   (+) Hypertensive urgency   (+) Permanent atrial fibrillation (HCC)   (+) 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HCC)   (+) Thoracic aortic dissection (HCC)      ENDO   (+) Type 2 diabetes mellitus with hyperglycemia, without long-term current use of insulin  "(HCC)   (+) 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, without long-term current use of insulin (HCC)   (+) Type 2 diabetes mellitus, without long-term current use of insulin (HCC)      GI/HEPATIC   (+) Gastroesophageal reflux disease      /RENAL   (+) LAURENCE (acute kidney injury) (HCC)   (+) Chronic kidney disease, stage 3b (HCC)   (+) Nephrolithiasis      HEMATOLOGY   (+) Acute blood loss anemia   (+) Anemia, unspecified      MUSCULOSKELETAL   (+) Primary osteoarthritis of right knee      NEURO/PSYCH   (+) Anxiety   (+) Continuous opioid dependence (HCC)     Reports heart attack in 2020 with no stents placed     Meds:  Carvedilol yesterday  Levothyroxine today    METS:    NPO?:  Food esteban  Liquid midnight    Physical Exam    Airway    Mallampati score: III         Dental   Comment: Only 4, or 5   teeth remaining- bottom row.      Cardiovascular  Rhythm: regular, Rate: normal, Cardiovascular exam normal    Pulmonary  Pulmonary exam normal Breath sounds clear to auscultation, Breath sounds normal    Other Findings  Pacemaker present Lt upper chest wallpost-pubertal.      Anesthesia Plan  ASA Score- 3     Anesthesia Type- IV sedation with anesthesia with ASA Monitors.         Additional Monitors:     Airway Plan:            Plan Factors-Exercise comment: Can slowly walk 2 blocks with walker  .    Chart reviewed. EKG reviewed.   Patient summary reviewed.    Patient is not a current smoker (51 yr 1 ppd and stopped 4 yrs ago).              Induction- intravenous.    Postoperative Plan-     Perioperative Resuscitation Plan -   The DNR status was discussed with the patient and/or POA, and Level 3 code status (DNR/DNI) will be maintained throughout the perioperative period. Patient reports that if her heart stops during the procedure she does \"not want life support\". She would not like chest compressions..    Informed Consent- Anesthetic plan and risks discussed with patient.  I " personally reviewed this patient with the CRNA. Discussed and agreed on the Anesthesia Plan with the CRNA..

## 2024-12-27 NOTE — PLAN OF CARE
Problem: PAIN - ADULT  Goal: Verbalizes/displays adequate comfort level or baseline comfort level  Description: Interventions:  - Encourage patient to monitor pain and request assistance  - Assess pain using appropriate pain scale  - Administer analgesics based on type and severity of pain and evaluate response  - Implement non-pharmacological measures as appropriate and evaluate response  - Consider cultural and social influences on pain and pain management  - Notify physician/advanced practitioner if interventions unsuccessful or patient reports new pain  Outcome: Progressing     Problem: INFECTION - ADULT  Goal: Absence or prevention of progression during hospitalization  Description: INTERVENTIONS:  - Assess and monitor for signs and symptoms of infection  - Monitor lab/diagnostic results  - Monitor all insertion sites, i.e. indwelling lines, tubes, and drains  - Monitor endotracheal if appropriate and nasal secretions for changes in amount and color  - [place] appropriate cooling/warming therapies per order  - Administer medications as ordered  - Instruct and encourage patient and family to use good hand hygiene technique  - Identify and instruct in appropriate isolation precautions for identified infection/condition  Outcome: Progressing  Goal: Absence of fever/infection during neutropenic period  Description: INTERVENTIONS:  - Monitor WBC    Outcome: Progressing     Problem: SAFETY ADULT  Goal: Patient will remain free of falls  Description: INTERVENTIONS:  - Educate patient/family on patient safety including physical limitations  - Instruct patient to call for assistance with activity   - Consult OT/PT to assist with strengthening/mobility   - Keep Call bell within reach  - Keep bed low and locked with side rails adjusted as appropriate  - Keep care items and personal belongings within reach  - Initiate and maintain comfort rounds  - Make Fall Risk Sign visible to staff  - Offer Toileting every 2 Hours,  in advance of need  - Initiate/Maintain bed/chair alarm  - Obtain necessary fall risk management equipment  - Apply yellow socks and bracelet for high fall risk patients  - Consider moving patient to room near nurses station  Outcome: Progressing  Goal: Maintain or return to baseline ADL function  Description: INTERVENTIONS:  -  Assess patient's ability to carry out ADLs; assess patient's baseline for ADL function and identify physical deficits which impact ability to perform ADLs (bathing, care of mouth/teeth, toileting, grooming, dressing, etc.)  - Assess/evaluate cause of self-care deficits   - Assess range of motion  - Assess patient's mobility; develop plan if impaired  - Assess patient's need for assistive devices and provide as appropriate  - Encourage maximum independence but intervene and supervise when necessary  - Involve family in performance of ADLs  - Assess for home care needs following discharge   - Consider OT consult to assist with ADL evaluation and planning for discharge  - Provide patient education as appropriate  Outcome: Progressing  Goal: Maintains/Returns to pre admission functional level  Description: INTERVENTIONS:  - Perform AM-PAC 6 Click Basic Mobility/ Daily Activity assessment daily.  - Set and communicate daily mobility goal to care team and patient/family/caregiver.   - Collaborate with rehabilitation services on mobility goals if consulted  - Perform Range of Motion 3 times a day.  - Reposition patient every 2 hours.  - Dangle patient 3 times a day  - Stand patient 3 times a day  - Ambulate patient 3 times a day  - Out of bed to chair 3 times a day   - Out of bed for meals 3 times a day  - Out of bed for toileting  - Record patient progress and toleration of activity level   Outcome: Progressing     Problem: DISCHARGE PLANNING  Goal: Discharge to home or other facility with appropriate resources  Description: INTERVENTIONS:  - Identify barriers to discharge w/patient and  caregiver  - Arrange for needed discharge resources and transportation as appropriate  - Identify discharge learning needs (meds, wound care, etc.)  - Arrange for interpretive services to assist at discharge as needed  - Refer to Case Management Department for coordinating discharge planning if the patient needs post-hospital services based on physician/advanced practitioner order or complex needs related to functional status, cognitive ability, or social support system  Outcome: Progressing     Problem: Knowledge Deficit  Goal: Patient/family/caregiver demonstrates understanding of disease process, treatment plan, medications, and discharge instructions  Description: Complete learning assessment and assess knowledge base.  Interventions:  - Provide teaching at level of understanding  - Provide teaching via preferred learning methods  Outcome: Progressing     Problem: HEMATOLOGIC - ADULT  Goal: Maintains hematologic stability  Description: INTERVENTIONS  - Assess for signs and symptoms of bleeding or hemorrhage  - Monitor labs  - Administer supportive blood products/factors as ordered and appropriate  Outcome: Progressing     Problem: Prexisting or High Potential for Compromised Skin Integrity  Goal: Skin integrity is maintained or improved  Description: INTERVENTIONS:  - Identify patients at risk for skin breakdown  - Assess and monitor skin integrity  - Assess and monitor nutrition and hydration status  - Monitor labs   - Assess for incontinence   - Turn and reposition patient  - Assist with mobility/ambulation  - Relieve pressure over bony prominences  - Avoid friction and shearing  - Provide appropriate hygiene as needed including keeping skin clean and dry  - Evaluate need for skin moisturizer/barrier cream  - Collaborate with interdisciplinary team   - Patient/family teaching  - Consider wound care consult   Outcome: Progressing     Problem: Potential for Falls  Goal: Patient will remain free of  falls  Description: INTERVENTIONS:  - Educate patient/family on patient safety including physical limitations  - Instruct patient to call for assistance with activity   - Consult OT/PT to assist with strengthening/mobility   - Keep Call bell within reach  - Keep bed low and locked with side rails adjusted as appropriate  - Keep care items and personal belongings within reach  - Initiate and maintain comfort rounds  - Make Fall Risk Sign visible to staff  - Offer Toileting every 2 Hours, in advance of need  - Initiate/Maintain bed/chair alarm  - Obtain necessary fall risk management equipment  - Apply yellow socks and bracelet for high fall risk patients  - Consider moving patient to room near nurses station  Outcome: Progressing

## 2024-12-27 NOTE — PROGRESS NOTES
Progress Note - Hospitalist   Name: Quynh Roa 79 y.o. female I MRN: [id number]  Unit/Bed#: S -01 I Date of Admission: 12/25/2024   Date of Service: 12/27/2024 I Hospital Day: 1    Assessment & Plan  Anemia, unspecified  Presented due to one day of fatigue and shortness of breath.  Hgb 6.2.  BUN 73, creatinine 1.84.  History of anemia with eliquis use.  Eliquis was recently resumed by cardiology.   Hospitalization 7/2023: EGD And colonoscopy showed mild erythematous mucosa in the antrum.  Colonoscopy showed pancolonic severe diverticula, hemorrhoids.  She was cleared by GI team to resume eliquis with plans for op capsule endoscopy but does not appear this was done.   Transfused 2 unit prbc   EGD performed without any abnormalities    Plan:  Protonix 40 mg IV BID  Patient should have video capsule if still anemic  For now will continue IV venofer   Aneurysm of ascending aorta (HCC)  Ascending root dilatation and dissection dating back to 10/2022.  Repeat CTA 7/2023 was stable compared to prior.  She was previously seen by CT surgery in the past and was noted she would be high risk for surgery due to anatomy.  Additionally she declined intervention in the past.    Did not want additional imaging when discussed at recent cardiology appointment  CAD (coronary atherosclerotic disease)  CAD s/p CABG x3 2015.  No obstructive disease in grafts on cath 2020.  Complains of SOB likely in setting of anemia.  Denies CP.   Continue statin, imdur  Resume plavix  Hypertension  Patient was noted to have low blood pressures at facility on 12/25.  She was not given her antihypertensives.  Will continue with carvedilol, hydralazine for systolic blood pressure greater than 130  Losartan, spironolactone and Lasix are on hold as above  Permanent atrial fibrillation (HCC)  Maintained on carvedilol  Eliquis was on hold due to suspected GI bleeding, pt would like to d/c this  Type 2 diabetes mellitus, without long-term current use  of insulin ([facility])  Lab Results   Component Value Date    HGBA1C 7.1 (H) 11/18/2024     Resume glimepiride on discharge  Accu-Chek, SSI every 6 hours  Chronic kidney disease, stage 3b ([facility])  Creatinine 1.84, less prior 1.4.  Elevation likely due to hypotension at facility, suspected GI bleeding.  Hold losartan, spironolactone, Lasix  Continue home dose carvedilol, hydralazine, isosorbide for systolic blood pressure greater than 130  Chronic diastolic heart failure (HCC)  Wt Readings from Last 3 Encounters:   12/27/24 66.4 kg (146 lb 6.4 oz)   11/12/24 68.4 kg (150 lb 12.8 oz)   09/17/24 67.9 kg (149 lb 9.6 oz)     Currently examines euvolemic  Echo 2022 EF 36%.  Global hypokinesis.  Grade 2 diastolic Grade II diastolic dysfunction  Holding home dose lasix and aldactone due to pre hospital hypotension and suspected GI bleeding.    I&O, weights.  Leukocytosis  Without overt infection  Monitor off antibiotics   Assess CBC tomorrow  Preoperative cardiovascular examination    Melena    Gastric intestinal metaplasia      VTE Pharmacologic Prophylaxis: VTE Score: 4 Moderate Risk (Score 3-4) - Pharmacological DVT Prophylaxis Contraindicated. Sequential Compression Devices Ordered.    Mobility:   Basic Mobility Inpatient Raw Score: 20  JH-HLM Goal: 6: Walk 10 steps or more  JH-HLM Achieved: 2: Bed activities/Dependent transfer  JH-HLM Goal NOT achieved. Continue with multidisciplinary rounding and encourage appropriate mobility to improve upon JH-HLM goals.    Patient Centered Rounds: I performed bedside rounds with nursing staff today.   Discussions with Specialists or Other Care Team Provider: GI    Education and Discussions with Family / Patient: Patient declined call to .     Current Length of Stay: 1 day(s)  Current Patient Status: Inpatient   Certification Statement: The patient will continue to require additional inpatient hospital stay due to post EGD  Discharge Plan: Anticipate discharge tomorrow to  home.    Code Status: Level 3 - DNAR and DNI    Subjective   Patient feeling well, had a bowel movement without issue post EGD. Has some uneasiness, but not pain/nausea. Eating well.    Objective :  Temp:  [96.5 °F (35.8 °C)-98.6 °F (37 °C)] 96.9 °F (36.1 °C)  HR:  [59-68] 60  BP: (121-157)/(49-67) 121/50  Resp:  [16-18] 18  SpO2:  [94 %-100 %] 97 %  O2 Device: None (Room air)    Body mass index is 29.57 kg/m².     Input and Output Summary (last 24 hours):   No intake or output data in the 24 hours ending 12/27/24 1309    Physical Exam  Vitals and nursing note reviewed.   Constitutional:       General: She is not in acute distress.     Appearance: She is well-developed.   HENT:      Head: Normocephalic and atraumatic.   Eyes:      Conjunctiva/sclera: Conjunctivae normal.   Cardiovascular:      Rate and Rhythm: Normal rate and regular rhythm.      Heart sounds: No murmur heard.  Pulmonary:      Effort: Pulmonary effort is normal. No respiratory distress.      Breath sounds: Normal breath sounds.   Abdominal:      General: Bowel sounds are normal. There is no distension.      Palpations: Abdomen is soft.      Tenderness: There is no abdominal tenderness.   Musculoskeletal:         General: No swelling.      Cervical back: Neck supple.      Right lower leg: No edema.      Left lower leg: No edema.   Skin:     General: Skin is warm and dry.      Capillary Refill: Capillary refill takes less than 2 seconds.   Neurological:      Mental Status: She is alert.   Psychiatric:         Mood and Affect: Mood normal.           Lines/Drains:              Lab Results: I have reviewed the following results:   Results from last 7 days   Lab Units 12/27/24  1200 12/26/24  2123 12/26/24  0505   WBC Thousand/uL  --   --  11.96*   HEMOGLOBIN g/dL 9.5*   < > 8.8*  8.7*   HEMATOCRIT %  --   --  27.8*  28.8*   PLATELETS Thousands/uL  --   --  254   SEGS PCT %  --   --  72   LYMPHO PCT %  --   --  19   MONO PCT %  --   --  6   EOS PCT %   --   --  1    < > = values in this interval not displayed.     Results from last 7 days   Lab Units 12/27/24  0530 12/26/24  0505 12/25/24  2058   SODIUM mmol/L 139   < > 135   POTASSIUM mmol/L 4.1   < > 4.7   CHLORIDE mmol/L 106   < > 98   CO2 mmol/L 26   < > 24   BUN mg/dL 38*   < > 73*   CREATININE mg/dL 1.22   < > 1.84*   ANION GAP mmol/L 7   < > 13   CALCIUM mg/dL 8.4   < > 9.0   ALBUMIN g/dL  --   --  4.0   TOTAL BILIRUBIN mg/dL  --   --  0.30   ALK PHOS U/L  --   --  30*   ALT U/L  --   --  7   AST U/L  --   --  9*   GLUCOSE RANDOM mg/dL 130   < > 196*    < > = values in this interval not displayed.         Results from last 7 days   Lab Units 12/27/24  1150 12/27/24  0514 12/27/24  0008 12/26/24  1656 12/26/24  1205 12/26/24  0527   POC GLUCOSE mg/dl 158* 133 124 138 175* 115               Recent Cultures (last 7 days):         Imaging Results Review: I reviewed radiology reports from this admission including: procedure reports.  Other Study Results Review: No additional pertinent studies reviewed.    Last 24 Hours Medication List:     Current Facility-Administered Medications:     acetaminophen (TYLENOL) tablet 650 mg, Q6H PRN    atorvastatin (LIPITOR) tablet 40 mg, Daily    brimonidine tartrate 0.2 % ophthalmic solution 1 drop, BID **AND** timolol (TIMOPTIC) 0.5 % ophthalmic solution 1 drop, BID    carvedilol (COREG) tablet 12.5 mg, BID With Meals    hydrALAZINE (APRESOLINE) tablet 25 mg, Q8H ISRA    insulin lispro (HumALOG/ADMELOG) 100 units/mL subcutaneous injection 1-5 Units, Q6H ISRA **AND** Fingerstick Glucose (POCT), Q6H    iron sucrose (VENOFER) 300 mg in sodium chloride 0.9 % 250 mL IVPB, Once per day on Monday Wednesday Friday, Last Rate: 300 mg (12/27/24 0843)    isosorbide dinitrate (ISORDIL) tablet 20 mg, TID after meals    levothyroxine tablet 50 mcg, Once per day on Sunday Tuesday Thursday Saturday    levothyroxine tablet 75 mcg, Once per day on Monday Wednesday Friday    melatonin tablet 6 mg,  HS    pantoprazole (PROTONIX) injection 40 mg, Q12H ISRA    Administrative Statements   Today, Patient Was Seen By: Monse Harrington DO      **Please Note: This note may have been constructed using a voice recognition system.**

## 2024-12-27 NOTE — ASSESSMENT & PLAN NOTE
Wt Readings from Last 3 Encounters:   12/27/24 66.4 kg (146 lb 6.4 oz)   11/12/24 68.4 kg (150 lb 12.8 oz)   09/17/24 67.9 kg (149 lb 9.6 oz)     Currently examines euvolemic  Echo 2022 EF 36%.  Global hypokinesis.  Grade 2 diastolic Grade II diastolic dysfunction  Holding home dose lasix and aldactone due to pre hospital hypotension and suspected GI bleeding.    I&O, weights.

## 2024-12-27 NOTE — ANESTHESIA POSTPROCEDURE EVALUATION
Post-Op Assessment Note    CV Status:  Stable    Pain management: adequate       Mental Status:  Sleepy and arousable   Hydration Status:  Euvolemic   PONV Controlled:  Controlled   Airway Patency:  Patent     Post Op Vitals Reviewed: Yes    No anethesia notable event occurred.    Staff: CRNA           Last Filed PACU Vitals:  Vitals Value Taken Time   Temp 97.3 °F (36.3 °C) 12/27/24 1045   Pulse 59 12/27/24 1045   /58 12/27/24 1045   Resp 18 12/27/24 1045   SpO2 100 % 12/27/24 1045       Modified Reina:  Activity: 0 (12/27/2024 10:45 AM)  Respiration: 2 (12/27/2024 10:45 AM)  Circulation: 2 (12/27/2024 10:45 AM)  Consciousness: 0 (12/27/2024 10:45 AM)  Oxygen Saturation: 2 (12/27/2024 10:45 AM)  Modified [place] Score: 6 (12/27/2024 10:45 AM)

## 2024-12-27 NOTE — ASSESSMENT & PLAN NOTE
Presented due to one day of fatigue and shortness of breath.  Hgb 6.2.  BUN 73, creatinine 1.84.  History of anemia with eliquis use.  Eliquis was recently resumed by cardiology.   Hospitalization 7/2023: EGD And colonoscopy showed mild erythematous mucosa in the antrum.  Colonoscopy showed pancolonic severe diverticula, hemorrhoids.  She was cleared by GI team to resume eliquis with plans for op capsule endoscopy but does not appear this was done.   Transfused 2 unit prbc   EGD performed without any abnormalities    Plan:  Protonix 40 mg IV BID  Patient should have video capsule if still anemic  For now will continue IV venofer

## 2024-12-27 NOTE — UTILIZATION REVIEW
Initial Clinical Review  ED PROVIDER NOTE & CARE ON 12/25/2024 WITH INPATIENT ORDER  12/26/2024    Admission: Date/Time/Statement:   Admission Orders (From admission, onward)       Ordered        12/26/24 0021  INPATIENT ADMISSION  Once                          Orders Placed This Encounter   Procedures    INPATIENT ADMISSION     Standing Status:   Standing     Number of Occurrences:   1     Level of Care:   Med Surg [16]     Estimated length of stay:   More than 2 Midnights     Certification:   I certify that inpatient services are medically necessary for this patient for a duration of greater than two midnights. See H&P and MD Progress Notes for additional information about the patient's course of treatment.     ED Arrival Information       Expected   -    Arrival   12/25/2024 19:34    Acuity   Emergent              Means of arrival   Ambulance    Escorted by   [facility]    Service   Hospitalist    Admission type   Emergency              Arrival complaint   EMS             Chief Complaint   Patient presents with    Dizziness     Pt presents to ed for dizziness that started today, denies sob, denies cp, pt is from [facility] and was out and about today, started feeling light headed when she arrived back [place]. No other complaints        Initial Presentation: 79 y.o. female to ED by EMS for evaluation & management as Inpatient admission due to symptomatic anemia, CAD w systolic HF  PMH  macular degeneration, CAD, A-fib w Eliquis recently restarted, HX anemia on Eliquis tachy-keanu snydrome with pacemaker, aortic root aneurysm/dissection, hypertension, CKD 3B (CR prior < 1.4), heart failure  presents due to abrupt onset shortness of breath and fatigue, Facility reports hypotension,  dark stools   EXAM  EKG SR paced, labs leukocytosis @ 14.03, HGB 6.2, guaiac + stool sample. Elevated BUN. TXd 2 units PRbc, IV Protonix, IV Anticipated Length of Stay/Certification Statement: Patient will be admitted on  an observation basis with an anticipated length of stay of less than 2 midnights secondary to anemia   Date: 12/26/2024   Day 2:   PRN  apresoline; Hold losartan, spironolactone, Lasix AM BMP monitor volume, PRN Lasix,   IV Protonix, NPO, EGD. Consult GI. Holding Plavix/ per patient preference DC Eliquis .   IV Venofer; OP Cardiology, Cont carvedilol, hydralazine for systolic blood pressure greater than 130  GI  Concern for UGIB given dark stools and elevated BUN    Please hold Eliquis and continue IV PPI BID. Please make NPO tomorrow for push enteroscopy. Will need cardiac evaluation for preprocedural risk assessment.   Cardiology  Stable to undergo endoscopy from a cardiac perspective. Will need to come up with a plan for  anticoagulation  Consider Eliquis by itself (without aspirin or Plavix) patient is hesitant to consider this because this is now the second time that she has presented with anemia on a blood thinner.  However both instances have been Eliquis plus an antiplatelet. Defer to her wishes and her primary cardiology  Date: 12/27/2024  Day 3: Has surpassed a 2nd midnight with active treatments and services.  Inpatient admission due to symptomatic anemia,   CAD w systolic HF  Undergoing EGD WITH PUSH ENTEROSCOPY   Management per results  Monitoring volume, vitals to resume OP regimen losartan, spironolactone, Lasix. Follow CR. Requires plan for AC  ED Treatment-Medication Administration from 12/25/2024 1934 to 12/26/2024 0310         Date/Time Order Dose Route Action     12/26/2024 0053 pantoprazole (PROTONIX) injection 40 mg 40 mg Intravenous Given     12/26/2024 0133 hydrALAZINE (APRESOLINE) tablet 25 mg 25 mg Oral Given     12/26/2024 0133 melatonin tablet 6 mg 6 mg Oral Given            Scheduled Medications:  atorvastatin, 40 mg, Oral, Daily  brimonidine tartrate, 1 drop, Both Eyes, BID   And  timolol, 1 drop, Both Eyes, BID  carvedilol, 12.5 mg, Oral, BID With Meals  hydrALAZINE, 25 mg, Oral, Q8H  ISRA  insulin lispro, 1-5 Units, Subcutaneous, Q6H ISRA  iron sucrose, 300 mg, Intravenous, Once per day on Monday Wednesday Friday  isosorbide dinitrate, 20 mg, Oral, TID after meals  levothyroxine, 50 mcg, Oral, Once per day on Sunday Tuesday Thursday Saturday  levothyroxine, 75 mcg, Oral, Once per day on Monday Wednesday Friday  melatonin, 6 mg, Oral, HS  pantoprazole, 40 mg, Intravenous, Q12H ISRA      Continuous IV Infusions:     PRN Meds:  acetaminophen, 650 mg, Oral, Q6H PRN      ED Triage Vitals   Temperature Pulse Respirations Blood Pressure SpO2 Pain Score   12/25/24 1944 12/25/24 1944 12/25/24 1944 12/25/24 1944 12/25/24 1944 12/25/24 2029   98 °F (36.7 °C) 64 16 154/63 99 % No Pain     Weight (last 2 days)       Date/Time Weight    12/27/24 0532 66.4 (146.4)    12/26/24 0500 70.7 (155.87)            Vital Signs (last 3 days)       Date/Time Temp Pulse Resp BP MAP (mmHg) SpO2 O2 Device Patient Position - Orthostatic VS Anna Coma Scale Score Pain    12/27/24 11:21:18 -- -- -- 121/50 74 -- -- -- -- --    12/27/24 1100 96.9 °F (36.1 °C) 60 18 142/58 -- 97 % None (Room air) -- -- No Pain    12/27/24 1045 97.3 °F (36.3 °C) 59 18 142/58 -- 100 % None (Room air) -- -- --    12/27/24 0959 96.5 °F (35.8 °C) 62 18 157/67 -- 97 % None (Room air) -- -- --    12/27/24 08:00:27 98.5 °F (36.9 °C) -- -- 126/57 80 -- -- -- -- --    12/27/24 0800 -- -- -- -- -- 94 % None (Room air) -- 15 No Pain    12/27/24 01:07:41 -- 68 -- 126/57 80 95 % -- -- -- --    12/26/24 22:21:54 -- 65 -- 133/56 82 95 % -- -- -- --    12/26/24 2100 -- -- -- -- -- -- -- -- 15 No Pain    12/26/24 1816 -- -- -- -- -- 96 % None (Room air) -- 15 No Pain    12/26/24 18:06:24 98.6 °F (37 °C) 63 18 122/49 73 96 % -- -- -- --    12/26/24 1647 98 °F (36.7 °C) 64 16 123/55 -- 96 % None (Room air) Lying -- No Pain    12/26/24 1223 -- -- -- 126/57 -- -- -- Lying -- --    12/26/24 0932 -- 61 -- 148/60 -- -- -- Lying -- --    12/26/24 0925 -- -- -- -- -- -- None  (Room air) -- 15 No Pain    12/26/24 0700 97.9 °F (36.6 °C) 62 15 138/64 91 96 % None (Room air) Lying -- --    12/26/24 0326 -- -- -- -- -- -- None (Room air) -- 15 No Pain    12/26/24 0321 97.9 °F (36.6 °C) 62 18 129/56 -- 96 % None (Room air) -- -- No Pain    12/26/24 0130 97.9 °F (36.6 °C) 61 18 137/62 -- 97 % None (Room air) -- -- --    12/26/24 0124 97.9 °F (36.6 °C) 60 18 134/64 -- 99 % None (Room air) -- -- --    12/26/24 0115 97.7 °F (36.5 °C) 63 16 142/64 -- 99 % None (Room air) -- -- --    12/26/24 0052 -- 60 15 135/61 -- 98 % None (Room air) -- -- No Pain    12/26/24 0033 -- 30 -- 132/60 -- 98 % None (Room air) -- -- No Pain    12/26/24 0010 -- -- -- -- -- -- -- -- -- No Pain    12/25/24 2340 -- 67 17 151/68 -- 98 % None (Room air) -- -- No Pain    12/25/24 2327 -- -- -- -- -- -- -- -- -- No Pain    12/25/24 2325 -- 68 17 155/63 -- 98 % None (Room air) -- -- No Pain    12/25/24 2315 97.7 °F (36.5 °C) 65 17 162/69 -- 97 % None (Room air) -- -- No Pain    12/25/24 2311 97.7 °F (36.5 °C) 63 17 164/63 -- -- -- -- -- --    12/25/24 2215 -- -- -- -- -- -- -- -- -- No Pain    12/25/24 2117 -- -- -- -- -- -- -- -- -- No Pain    12/25/24 2100 -- 68 17 142/65 -- 98 % None (Room air) -- -- No Pain    12/25/24 2029 -- -- -- -- -- -- -- -- -- No Pain    12/25/24 1945 -- -- -- -- -- -- None (Room air) -- 15 --    12/25/24 1944 98 °F (36.7 °C) 64 16 154/63 -- 99 % None (Room air) Sitting -- --              Pertinent Labs/Diagnostic Test Results:   Radiology:  XR chest 1 view portable   ED Interpretation by Dee Billy PA-C (12/26 0615)   No acute cardiopulmonary abnormalities or focal consolidations per my read      Final Interpretation by Michael Tenorio MD (12/25 2200)      No acute cardiopulmonary disease.            Workstation performed: [id number]           Cardiology:  ECG 12 lead    by Interface, Ris Results In (1946)        GI:  EGD with Push Enteroscopy   Final Result by Merry Quezada MD (12/27  "1047)   The esophagus, stomach, duodenum and jejunum appeared normal.         RECOMMENDATION:   Advance diet as tolerated    Trend CBC every 12 hours and transfuse for goal > 7   Recommend outpatient VCE if she continues to have refractory iron    deficiency anemia                         Merry Quezada MD               Results from last 7 days   Lab Units 12/27/24  1200 12/26/24 2123 12/26/24  0505 12/25/24 2058   WBC Thousand/uL  --   --  11.96* 14.03*   HEMOGLOBIN g/dL 9.5* 8.2* 8.8*  8.7* 6.2*   HEMATOCRIT %  --   --  27.8*  28.8* 21.5*   PLATELETS Thousands/uL  --   --  254 338   TOTAL NEUT ABS Thousands/µL  --   --  8.56* 11.54*         Results from last 7 days   Lab Units 12/27/24  0530 12/26/24 0505 12/25/24 2058   SODIUM mmol/L 139 135 135   POTASSIUM mmol/L 4.1 4.0 4.7   CHLORIDE mmol/L 106 102 98   CO2 mmol/L 26 23 24   ANION GAP mmol/L 7 10 13   BUN mg/dL 38* 63* 73*   CREATININE mg/dL 1.22 1.43* 1.84*   EGFR ml/min/1.73sq m 42 35 25   CALCIUM mg/dL 8.4 8.6 9.0   MAGNESIUM mg/dL 2.4 2.3  --      Results from last 7 days   Lab Units 12/25/24 2058   AST U/L 9*   ALT U/L 7   ALK PHOS U/L 30*   TOTAL PROTEIN g/dL 6.9   ALBUMIN g/dL 4.0   TOTAL BILIRUBIN mg/dL 0.30     Results from last 7 days   Lab Units 12/27/24  1150 12/27/24  0514 12/27/24  0008 12/26/24  1656 12/26/24  1205 12/26/24  0527   POC GLUCOSE mg/dl 158* 133 124 138 175* 115     Results from last 7 days   Lab Units 12/27/24  0530 12/26/24  0505 12/25/24 2058   GLUCOSE RANDOM mg/dL 130 112 196*             No results found for: \"BETA-HYDROXYBUTYRATE\"                   Results from last 7 days   Lab Units 12/25/24  2208 12/25/24 2058   HS TNI 0HR ng/L  --  14   HS TNI 2HR ng/L 14  --    HSTNI D2 ng/L 0  --                                      Results from last 7 days   Lab Units 12/26/24  0505   FERRITIN ng/mL 14   IRON SATURATION % 6*   IRON ug/dL 25*   TIBC ug/dL 385     Results from last 7 days   Lab Units 12/26/24  0505   TRANSFERRIN " mg/dL 275     Results from last 7 days   Lab Units 12/26/24  0547   UNIT PRODUCT CODE  [id number]  [id number]   UNIT NUMBER  [id number]  [id number]   UNITABO  O  O   UNITRH  POS  POS   CROSSMATCH  Compatible  Compatible   UNIT DISPENSE STATUS  Presumed Trans  Presumed Trans   UNIT PRODUCT VOL ml 350  350                         Results from last 7 days   Lab Units 12/25/24  2311   CLARITY UA  Clear   COLOR UA  Light Yellow   SPEC GRAV UA  1.017   PH UA  5.0   GLUCOSE UA mg/dl Negative   KETONES UA mg/dl Negative   BLOOD UA  Negative   PROTEIN UA mg/dl Negative   NITRITE UA  Negative   BILIRUBIN UA  Negative   UROBILINOGEN UA (BE) mg/dl <2.0   LEUKOCYTES UA  Trace*   WBC UA /hpf 2-4*   RBC UA /hpf None Seen   BACTERIA UA /hpf None Seen   EPITHELIAL CELLS WET PREP /hpf Occasional                                                   Past Medical History:   Diagnosis Date    Atypical chest pain     GERD (gastroesophageal reflux disease)     Hyperlipidemia     Hypertension     Kidney stone     Myocardial infarction ([facility])     2015     NSTEMI (non-ST elevated myocardial infarction) ([facility])     Last Assessed: 9/24/2015    Type 2 diabetes mellitus, without long-term current use of insulin ([facility]) 9/12/2013     Present on Admission:   Chronic kidney disease, stage 3b ([facility])   CAD (coronary atherosclerotic disease)   Permanent atrial fibrillation ([facility])   Aneurysm of ascending aorta ([facility])   Anemia, unspecified   Hypertension   Type 2 diabetes mellitus, without long-term current use of insulin ([facility])      Admitting Diagnosis: Atrial fibrillation ([facility]) [I48.91]  Melena [K92.1]  Dizziness [R42]  Elevated creatine kinase [R74.8]  Symptomatic anemia [D64.9]  Anemia, unspecified type [D64.9]  Age/Sex: 79 y.o. female    Network Utilization Review Department  ATTENTION: Please call with any questions or concerns to [phone] and carefully listen to the prompts so that you are directed to the right person. All voicemails are  confidential.   For Discharge needs, contact Care Management DC Support Team at [phone] opt. 2  Send all requests for admission clinical reviews, approved or denied determinations and any other requests to dedicated fax number below belonging to the campus where the patient is receiving treatment. List of dedicated fax numbers for the Facilities:  FACILITY NAME UR FAX NUMBER   ADMISSION DENIALS (Administrative/Medical Necessity) [phone]   DISCHARGE SUPPORT TEAM (NETWORK) [phone]   PARENT CHILD HEALTH (Maternity/NICU/Pediatrics) [phone]   [facility] [phone]   [facility] [phone]   [facility] [phone]   [facility] [phone]   [facility] [phone]   [facility] [phone]   [facility] [phone]   [facility] [phone]   [facility] [phone]   [facility] [phone]   [facility] [phone]   [facility] [phone]

## 2024-12-27 NOTE — ASSESSMENT & PLAN NOTE
Maintained on carvedilol  Eliquis was on hold due to suspected GI bleeding, pt would like to d/c this

## 2024-12-27 NOTE — ASSESSMENT & PLAN NOTE
Creatinine 1.84, less prior 1.4.  Elevation likely due to hypotension at facility, suspected GI bleeding.  Hold losartan, spironolactone, Lasix  Continue home dose carvedilol, hydralazine, isosorbide for systolic blood pressure greater than 130

## 2024-12-27 NOTE — TELEPHONE ENCOUNTER
----- Message from Darvin Suresh PA-C sent at 12/27/2024  4:05 PM EST -----  Regarding: Follow up  Maikel Yao please get a post hospital follow up with an AP for a-fib? 2-3 weeks is fine. Thanks! Keith

## 2024-12-27 NOTE — ANESTHESIA POSTPROCEDURE EVALUATION
Post-Op Assessment Note    CV Status:  Stable    Pain management: adequate       Mental Status:  Alert and awake   Hydration Status:  Euvolemic   PONV Controlled:  Controlled   Airway Patency:  Patent     Post Op Vitals Reviewed: Yes    No anethesia notable event occurred.    Staff: Anesthesiologist, CRNA           Last Filed PACU Vitals:  Vitals Value Taken Time   Temp 96.9 °F (36.1 °C) 12/27/24 1100   Pulse 60 12/27/24 1100   /50 12/27/24 1121   Resp 18 12/27/24 1100   SpO2 97 % 12/27/24 1100       Modified Reina:  Activity: 2 (12/27/2024 11:00 AM)  Respiration: 2 (12/27/2024 11:00 AM)  Circulation: 2 (12/27/2024 11:00 AM)  Consciousness: 2 (12/27/2024 11:00 AM)  Oxygen Saturation: 2 (12/27/2024 11:00 AM)  Modified Reina Score: 10 (12/27/2024 11:00 AM)

## 2024-12-27 NOTE — ASSESSMENT & PLAN NOTE
CAD s/p CABG x3 2015.  No obstructive disease in grafts on cath 2020.  Complains of SOB likely in setting of anemia.  Denies CP.   Continue statin, imdur  Resume plavix

## 2024-12-28 VITALS
RESPIRATION RATE: 18 BRPM | OXYGEN SATURATION: 97 % | HEART RATE: 57 BPM | WEIGHT: 147.27 LBS | DIASTOLIC BLOOD PRESSURE: 97 MMHG | TEMPERATURE: 98.6 F | BODY MASS INDEX: 29.74 KG/M2 | SYSTOLIC BLOOD PRESSURE: 127 MMHG

## 2024-12-28 PROBLEM — M17.12 PRIMARY OSTEOARTHRITIS OF LEFT KNEE: Status: ACTIVE | Noted: 2024-12-28

## 2024-12-28 PROBLEM — D50.0 IRON DEFICIENCY ANEMIA DUE TO CHRONIC BLOOD LOSS: Status: ACTIVE | Noted: 2020-10-14

## 2024-12-28 LAB
ANION GAP SERPL CALCULATED.3IONS-SCNC: 9 MMOL/L (ref 4–13)
BUN SERPL-MCNC: 28 MG/DL (ref 5–25)
CALCIUM SERPL-MCNC: 8.3 MG/DL (ref 8.4–10.2)
CHLORIDE SERPL-SCNC: 108 MMOL/L (ref 96–108)
CO2 SERPL-SCNC: 25 MMOL/L (ref 21–32)
CREAT SERPL-MCNC: 1.05 MG/DL (ref 0.6–1.3)
ERYTHROCYTE [DISTWIDTH] IN BLOOD BY AUTOMATED COUNT: 17.8 % (ref 11.6–15.1)
GFR SERPL CREATININE-BSD FRML MDRD: 50 ML/MIN/1.73SQ M
GLUCOSE SERPL-MCNC: 111 MG/DL (ref 65–140)
GLUCOSE SERPL-MCNC: 137 MG/DL (ref 65–140)
GLUCOSE SERPL-MCNC: 161 MG/DL (ref 65–140)
GLUCOSE SERPL-MCNC: 232 MG/DL (ref 65–140)
HCT VFR BLD AUTO: 29.7 % (ref 34.8–46.1)
HGB BLD-MCNC: 8.7 G/DL (ref 11.5–15.4)
MAGNESIUM SERPL-MCNC: 2.3 MG/DL (ref 1.9–2.7)
MCH RBC QN AUTO: 27.4 PG (ref 26.8–34.3)
MCHC RBC AUTO-ENTMCNC: 29.3 G/DL (ref 31.4–37.4)
MCV RBC AUTO: 94 FL (ref 82–98)
MRSA NOSE QL CULT: NORMAL
PLATELET # BLD AUTO: 264 THOUSANDS/UL (ref 149–390)
PMV BLD AUTO: 10.2 FL (ref 8.9–12.7)
POTASSIUM SERPL-SCNC: 3.9 MMOL/L (ref 3.5–5.3)
RBC # BLD AUTO: 3.17 MILLION/UL (ref 3.81–5.12)
SODIUM SERPL-SCNC: 142 MMOL/L (ref 135–147)
WBC # BLD AUTO: 10.69 THOUSAND/UL (ref 4.31–10.16)

## 2024-12-28 PROCEDURE — 80048 BASIC METABOLIC PNL TOTAL CA: CPT

## 2024-12-28 PROCEDURE — 83735 ASSAY OF MAGNESIUM: CPT

## 2024-12-28 PROCEDURE — 99239 HOSP IP/OBS DSCHRG MGMT >30: CPT

## 2024-12-28 PROCEDURE — 85027 COMPLETE CBC AUTOMATED: CPT

## 2024-12-28 PROCEDURE — 82948 REAGENT STRIP/BLOOD GLUCOSE: CPT

## 2024-12-28 RX ORDER — FERROUS SULFATE 325(65) MG
325 TABLET ORAL
Qty: 30 TABLET | Refills: 0 | Status: SHIPPED | OUTPATIENT
Start: 2024-12-28 | End: 2025-02-26

## 2024-12-28 RX ADMIN — ATORVASTATIN CALCIUM 40 MG: 40 TABLET, FILM COATED ORAL at 09:54

## 2024-12-28 RX ADMIN — CLOPIDOGREL BISULFATE 75 MG: 75 TABLET ORAL at 09:54

## 2024-12-28 RX ADMIN — TIMOLOL MALEATE 1 DROP: 5 SOLUTION OPHTHALMIC at 09:54

## 2024-12-28 RX ADMIN — LEVOTHYROXINE SODIUM 50 MCG: 0.05 TABLET ORAL at 06:00

## 2024-12-28 RX ADMIN — BRIMONIDINE TARTRATE 1 DROP: 2 SOLUTION/ DROPS OPHTHALMIC at 09:54

## 2024-12-28 RX ADMIN — PANTOPRAZOLE SODIUM 40 MG: 40 INJECTION, POWDER, FOR SOLUTION INTRAVENOUS at 09:54

## 2024-12-28 RX ADMIN — INSULIN LISPRO 2 UNITS: 100 INJECTION, SOLUTION INTRAVENOUS; SUBCUTANEOUS at 12:49

## 2024-12-28 NOTE — PLAN OF CARE
Problem: PAIN - ADULT  Goal: Verbalizes/displays adequate comfort level or baseline comfort level  Description: Interventions:  - Encourage patient to monitor pain and request assistance  - Assess pain using appropriate pain scale  - Administer analgesics based on type and severity of pain and evaluate response  - Implement non-pharmacological measures as appropriate and evaluate response  - Consider cultural and social influences on pain and pain management  - Notify physician/advanced practitioner if interventions unsuccessful or patient reports new pain  Outcome: Progressing     Problem: INFECTION - ADULT  Goal: Absence or prevention of progression during hospitalization  Description: INTERVENTIONS:  - Assess and monitor for signs and symptoms of infection  - Monitor lab/diagnostic results  - Monitor all insertion sites, i.e. indwelling lines, tubes, and drains  - Monitor endotracheal if appropriate and nasal secretions for changes in amount and color  - [place] appropriate cooling/warming therapies per order  - Administer medications as ordered  - Instruct and encourage patient and family to use good hand hygiene technique  - Identify and instruct in appropriate isolation precautions for identified infection/condition  Outcome: Progressing  Goal: Absence of fever/infection during neutropenic period  Description: INTERVENTIONS:  - Monitor WBC    Outcome: Progressing     Problem: SAFETY ADULT  Goal: Patient will remain free of falls  Description: INTERVENTIONS:  - Educate patient/family on patient safety including physical limitations  - Instruct patient to call for assistance with activity   - Consult OT/PT to assist with strengthening/mobility   - Keep Call bell within reach  - Keep bed low and locked with side rails adjusted as appropriate  - Keep care items and personal belongings within reach  - Initiate and maintain comfort rounds  - Make Fall Risk Sign visible to staff  - Initiate/Maintain bed/chair  alarm  - Obtain necessary fall risk management equipment  - Apply yellow socks and bracelet for high fall risk patients  - Consider moving patient to room near nurses station  Outcome: Progressing  Goal: Maintain or return to baseline ADL function  Description: INTERVENTIONS:  -  Assess patient's ability to carry out ADLs; assess patient's baseline for ADL function and identify physical deficits which impact ability to perform ADLs (bathing, care of mouth/teeth, toileting, grooming, dressing, etc.)  - Assess/evaluate cause of self-care deficits   - Assess range of motion  - Assess patient's mobility; develop plan if impaired  - Assess patient's need for assistive devices and provide as appropriate  - Encourage maximum independence but intervene and supervise when necessary  - Involve family in performance of ADLs  - Assess for home care needs following discharge   - Consider OT consult to assist with ADL evaluation and planning for discharge  - Provide patient education as appropriate  Outcome: Progressing  Goal: Maintains/Returns to pre admission functional level  Description: INTERVENTIONS:  - Perform AM-PAC 6 Click Basic Mobility/ Daily Activity assessment daily.  - Set and communicate daily mobility goal to care team and patient/family/caregiver.   - Collaborate with rehabilitation services on mobility goals if consulted  - Ambulate patient 3 times a day  - Out of bed for meals 3 times a day  - Out of bed for toileting  - Record patient progress and toleration of activity level   Outcome: Progressing     Problem: DISCHARGE PLANNING  Goal: Discharge to home or other facility with appropriate resources  Description: INTERVENTIONS:  - Identify barriers to discharge w/patient and caregiver  - Arrange for needed discharge resources and transportation as appropriate  - Identify discharge learning needs (meds, wound care, etc.)  - Arrange for interpretive services to assist at discharge as needed  - Refer to Case  Management Department for coordinating discharge planning if the patient needs post-hospital services based on physician/advanced practitioner order or complex needs related to functional status, cognitive ability, or social support system  Outcome: Progressing     Problem: Knowledge Deficit  Goal: Patient/family/caregiver demonstrates understanding of disease process, treatment plan, medications, and discharge instructions  Description: Complete learning assessment and assess knowledge base.  Interventions:  - Provide teaching at level of understanding  - Provide teaching via preferred learning methods  Outcome: Progressing     Problem: HEMATOLOGIC - ADULT  Goal: Maintains hematologic stability  Description: INTERVENTIONS  - Assess for signs and symptoms of bleeding or hemorrhage  - Monitor labs  - Administer supportive blood products/factors as ordered and appropriate  Outcome: Progressing     Problem: Prexisting or High Potential for Compromised Skin Integrity  Goal: Skin integrity is maintained or improved  Description: INTERVENTIONS:  - Identify patients at risk for skin breakdown  - Assess and monitor skin integrity  - Assess and monitor nutrition and hydration status  - Monitor labs   - Assess for incontinence   - Turn and reposition patient  - Assist with mobility/ambulation  - Relieve pressure over bony prominences  - Avoid friction and shearing  - Provide appropriate hygiene as needed including keeping skin clean and dry  - Evaluate need for skin moisturizer/barrier cream  - Collaborate with interdisciplinary team   - Patient/family teaching  - Consider wound care consult   Outcome: Progressing     Problem: Potential for Falls  Goal: Patient will remain free of falls  Description: INTERVENTIONS:  - Educate patient/family on patient safety including physical limitations  - Instruct patient to call for assistance with activity   - Consult OT/PT to assist with strengthening/mobility   - Keep Call bell within  reach  - Keep bed low and locked with side rails adjusted as appropriate  - Keep care items and personal belongings within reach  - Initiate and maintain comfort rounds  - Make Fall Risk Sign visible to staff  - Initiate/Maintain bed/chair alarm  - Obtain necessary fall risk management equipment  - Apply yellow socks and bracelet for high fall risk patients  - Consider moving patient to room near nurses station  Outcome: Progressing

## 2024-12-28 NOTE — ASSESSMENT & PLAN NOTE
CAD s/p CABG x3 2015.  No obstructive disease in grafts on cath 2020.  Complains of SOB likely in setting of anemia.  Denies CP.   Continue statin, imdur  Resume plavix  Pt does not want to start Eliquis again

## 2024-12-28 NOTE — ASSESSMENT & PLAN NOTE
Wt Readings from Last 3 Encounters:   12/28/24 66.8 kg (147 lb 4.3 oz)   11/12/24 68.4 kg (150 lb 12.8 oz)   09/17/24 67.9 kg (149 lb 9.6 oz)     Currently examines euvolemic  Echo 2022 EF 36%.  Global hypokinesis.  Grade 2 diastolic Grade II diastolic dysfunction

## 2024-12-28 NOTE — ASSESSMENT & PLAN NOTE
Presented due to one day of fatigue and shortness of breath.  Hgb 6.2.  BUN 73, creatinine 1.84.  History of anemia with eliquis use.  Eliquis was recently resumed by cardiology.   Transfused 2 unit prbc, IV venofer  EGD performed without any abnormalities    Plan:  Protonix 40 mg IV BID  Patient should have video capsule if still anemic in the outpatient setting  For now will continue PO iron

## 2024-12-28 NOTE — ASSESSMENT & PLAN NOTE
Patient was noted to have low blood pressures at facility on 12/25.  She was not given her antihypertensives.  Will continue with carvedilol, hydralazine for systolic blood pressure greater than 130  Losartan, spironolactone to be resumed on d/c  Lasix continue to hold until approved by facility staff and/or Dr. Edwards

## 2024-12-28 NOTE — ASSESSMENT & PLAN NOTE
Creatinine 1.84, less prior 1.4.  Elevation likely due to hypotension at facility, suspected GI bleeding.  Resume losartan + aldactone, continue to hold lasix until instructed by PCP/Dr. Edwards  Continue home dose carvedilol, hydralazine, isosorbide for systolic blood pressure greater than 130  BP stable

## 2024-12-28 NOTE — ASSESSMENT & PLAN NOTE
Maintained on carvedilol  Eliquis recently added due to stroke risk, but patient is scared of rebleeding, and would like to d/c eliquis  I discussed with her the risk of stroke and offered her AC + ASA vs AC alone, she would be okay with taking plavix + ASA, but not AC. For now will just continue plavix

## 2024-12-28 NOTE — CASE MANAGEMENT
Case Management Discharge Planning Note    Patient name Quynh DENNY /S -01 MRN [id number]  : 1945 Date 2024       Current Admission Date: 2024  Current Admission Diagnosis:Anemia, unspecified   Patient Active Problem List    Diagnosis Date Noted Date Diagnosed    Primary osteoarthritis of left knee 2024     Chronic diastolic heart failure ([facility]) 2024     Leukocytosis 2024     Melena 2024     Gastric intestinal metaplasia 2024     Acute on chronic diastolic (congestive) heart failure ([facility]) 2024     Type 2 diabetes mellitus with hyperglycemia, without long-term current use of insulin ([facility]) 2024     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, without long-term current use of insulin ([facility]) 2024     Chronic kidney disease, stage 3b ([facility]) 2024     Primary osteoarthritis of right knee 2023     Acute pain of right knee 2023     Acute blood loss anemia 2023     S/P CABG (coronary artery bypass graft) 2023     DNR (do not resuscitate) 10/12/2022     Type 2 diabetes mellitus, without long-term current use of insulin ([facility]) 10/12/2022     Ambulatory dysfunction 10/11/2022     Thoracic aortic dissection ([facility]) 10/05/2022     LAURENCE (acute kidney injury) ([facility]) 10/05/2022     Constipation 10/04/2022     Wound of right leg 2022     Heart failure with reduced ejection fraction ([facility]) 2022     Hypertensive urgency 2022     Acquired absence of other right toe(s) ([facility]) 2022     Continuous opioid dependence ([facility]) 2022     Permanent atrial fibrillation ([facility]) 02/15/2021     Anemia, unspecified 10/14/2020     Vitamin B 12 deficiency 10/14/2020     Cardiac pacemaker in situ 2020     Preoperative cardiovascular examination 2020     Foreign body in vagina 2020     Nephrolithiasis 2020     Hematuria 2020     Unintentional weight loss  06/01/2020     Generalized weakness 05/31/2020     Gross hematuria 05/25/2020     Elevated troponin level not due myocardial infarction 05/23/2020     Osteopenia 09/29/2016     Tobacco abuse 09/29/2016     Aneurysm of ascending aorta (HCC) 09/24/2015     CAD (coronary atherosclerotic disease) 09/24/2015     Gastroesophageal reflux disease 09/24/2015     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     Glaucoma 05/13/2014     Macular degeneration 05/13/2014     Neck pain 05/09/2013     Anxiety 01/08/2013     Hypertension 01/08/2013     Hyperlipidemia 01/08/2013       LOS (days): 2  Geometric Mean LOS (GMLOS) (days): 2.9  Days to GMLOS:0.3     OBJECTIVE:  Risk of Unplanned Readmission Score: 16.63         Current admission status: Inpatient   Preferred Pharmacy:   [facility] [place] [address]  Phone: [phone] Fax: [phone]    [facility] [place] [address] [address] [address]  Phone: [phone] Fax: [phone]    Primary Care Provider: Sindy Hess    Primary Insurance: [employer] [facility]  Secondary Insurance:     DISCHARGE DETAILS:    Discharge planning discussed with:: Jessi at [facility], daughter Mert on phone     Comments - Freedom of Choice: Patient for discharge back to [facility]. Spoke with Jessi at facility, she confirmed patient is ok to return. Daughter Mert will transport  CM contacted family/caregiver?: Yes  Were Treatment Team discharge recommendations reviewed with patient/caregiver?: Yes  Did patient/caregiver verbalize understanding of patient care needs?: N/A- going to facility  Were patient/caregiver advised of the risks associated with not following Treatment Team discharge recommendations?: Yes    Contacts  Patient Contacts: Mert  Relationship to Patient:: Family  Contact Method: Phone  Phone Number: [phone]  Reason/Outcome:  Discharge Planning              Other Referral/Resources/Interventions Provided:  Interventions: Assisted Living  Referral Comments: Patient discharging back to [facility] Facility Name, City & State : [facility]  Receiving Facility/Agency Phone Number: [phone]  Facility/Agency Fax Number: [phone]

## 2024-12-28 NOTE — DISCHARGE INSTR - AVS FIRST PAGE
If you experience low blood pressure (<120/80), please do not take your losartan and aldactone- you can ask Sindy Hess if you should skip the medications.

## 2024-12-28 NOTE — DISCHARGE SUMMARY
Discharge Summary - Hospitalist   Name: Quynh Roa 79 y.o. female I MRN: [id number]  Unit/Bed#: S -01 I Date of Admission: 12/25/2024   Date of Service: 12/28/2024 I Hospital Day: 2     Assessment & Plan  Iron deficiency anemia due to chronic blood loss  Presented due to one day of fatigue and shortness of breath.  Hgb 6.2.  BUN 73, creatinine 1.84.  History of anemia with eliquis use.  Eliquis was recently resumed by cardiology.   Transfused 2 unit prbc, IV venofer  EGD performed without any abnormalities    Plan:  Protonix 40 mg IV BID  Patient should have video capsule if still anemic in the outpatient setting  For now will continue PO iron  Aneurysm of ascending aorta (HCC)  Ascending root dilatation and dissection dating back to 10/2022.  Repeat CTA 7/2023 was stable compared to prior.  She was previously seen by CT surgery in the past and was noted she would be high risk for surgery due to anatomy.  Additionally she declined intervention in the past.    Did not want additional imaging when discussed at recent cardiology appointment  CAD (coronary atherosclerotic disease)  CAD s/p CABG x3 2015.  No obstructive disease in grafts on cath 2020.  Complains of SOB likely in setting of anemia.  Denies CP.   Continue statin, imdur  Resume plavix  Pt does not want to start Eliquis again  Hypertension  Patient was noted to have low blood pressures at facility on 12/25.  She was not given her antihypertensives.  Will continue with carvedilol, hydralazine for systolic blood pressure greater than 130  Losartan, spironolactone to be resumed on d/c  Lasix continue to hold until approved by facility staff and/or Dr. Edwards  Permanent atrial fibrillation (HCC)  Maintained on carvedilol  Eliquis recently added due to stroke risk, but patient is scared of rebleeding, and would like to d/c eliquis  I discussed with her the risk of stroke and offered her AC + ASA vs AC alone, she would be okay with taking plavix + ASA, but  not AC. For now will just continue plavix  Type 2 diabetes mellitus, without long-term current use of insulin (HCC)  Lab Results   Component Value Date    HGBA1C 7.1 (H) 11/18/2024     Resume glimepiride on discharge  Accu-Chek, SSI every 6 hours  Chronic kidney disease, stage 3b (HCC)  Creatinine 1.84, less prior 1.4.  Elevation likely due to hypotension at facility, suspected GI bleeding.  Resume losartan + aldactone, continue to hold lasix until instructed by PCP/Dr. Edwards  Continue home dose carvedilol, hydralazine, isosorbide for systolic blood pressure greater than 130  BP stable  Chronic diastolic heart failure (HCC)  Wt Readings from Last 3 Encounters:   12/28/24 66.8 kg (147 lb 4.3 oz)   11/12/24 68.4 kg (150 lb 12.8 oz)   09/17/24 67.9 kg (149 lb 9.6 oz)     Currently examines euvolemic  Echo 2022 EF 36%.  Global hypokinesis.  Grade 2 diastolic Grade II diastolic dysfunction  Leukocytosis  Without overt infection  Monitor off antibiotics   Assess CBC tomorrow  Preoperative cardiovascular examination    Melena    Gastric intestinal metaplasia    Primary osteoarthritis of left knee  Voltaren gel prn     Medical Problems       Resolved Problems  Date Reviewed: 12/25/2024   None       Discharging Physician / Practitioner: Monse Harrington DO  PCP: Sindy Hess  Admission Date:   Admission Orders (From admission, onward)       Ordered        12/26/24 0021  INPATIENT ADMISSION  Once                          Discharge Date: 12/28/24    Consultations During Hospital Stay:  GI  Cardiology    Procedures Performed:   EGD    Significant Findings / Test Results:   Iron deficiency anemia    Incidental Findings:   None    Test Results Pending at Discharge (will require follow up):   None     Outpatient Tests Requested:  CBC next week    Complications:  none    Reason for Admission: Shortness of breath, fatigue    Hospital Course:   Quynh Roa is a 79 y.o. female patient who originally presented to the hospital  on 12/25/2024 due to shortness of breath, fatigue.  Found to be anemic on admission, transfused 2 units packed red blood cells.  FOBT was positive in the ED, GI was consulted and performed EGD.  EGD was normal, patient was given IV iron due to iron deficiency.  Hemoglobin has remained stable.  Patient does have macular degeneration so she is not able to note change in her stool caliber/color.     Please see above list of diagnoses and related plan for additional information.     Condition at Discharge: good    Discharge Day Visit / Exam:   Subjective: Patient feeling well, denies any abdominal pain, nausea, vomiting.  She did have a bowel movement without any issues.  No overnight events noted by nursing  Vitals: Blood Pressure: 127/97 (12/28/24 1250)  Pulse: 57 (12/28/24 1250)  Temperature: 98.6 °F (37 °C) (12/28/24 0746)  Temp Source: Temporal (12/27/24 1100)  Respirations: 18 (12/27/24 1100)  Weight - Scale: 66.8 kg (147 lb 4.3 oz) (12/28/24 0600)  SpO2: 97 % (12/28/24 1250)  Physical Exam  Vitals and nursing note reviewed.   Constitutional:       General: She is not in acute distress.     Appearance: She is well-developed.   HENT:      Head: Normocephalic and atraumatic.   Eyes:      Extraocular Movements: Extraocular movements intact.   Cardiovascular:      Rate and Rhythm: Normal rate and regular rhythm.      Heart sounds: No murmur heard.  Pulmonary:      Effort: Pulmonary effort is normal. No respiratory distress.      Breath sounds: Normal breath sounds. No wheezing.   Abdominal:      General: Abdomen is flat. Bowel sounds are normal. There is no distension.      Palpations: Abdomen is soft.      Tenderness: There is no abdominal tenderness.   Musculoskeletal:         General: Tenderness (Knee pain, normal range of motion) present. No swelling.      Cervical back: Neck supple.      Right lower leg: No edema.      Left lower leg: No edema.   Skin:     General: Skin is warm and dry.   Neurological:       Mental Status: She is alert.   Psychiatric:         Mood and Affect: Mood normal.          Discussion with Family: Updated  (daughter) via phone.    Discharge instructions/Information to patient and family:   See after visit summary for information provided to patient and family.      Provisions for Follow-Up Care:  See after visit summary for information related to follow-up care and any pertinent home health orders.      Mobility at time of Discharge:   Basic Mobility Inpatient Raw Score: 20  JH-HLM Goal: 6: Walk 10 steps or more  JH-HLM Achieved: 7: Walk 25 feet or more  HLM Goal achieved. Continue to encourage appropriate mobility.     Disposition:   Home    Planned Readmission: no    Discharge Medications:  See after visit summary for reconciled discharge medications provided to patient and/or family.      Administrative Statements   Discharge Statement:  I have spent a total time of 45 minutes in caring for this patient on the day of the visit/encounter.     **Please Note: This note may have been constructed using a voice recognition system**

## 2024-12-30 DIAGNOSIS — I25.10 ATHEROSCLEROSIS OF NATIVE CORONARY ARTERY OF NATIVE HEART WITHOUT ANGINA PECTORIS: ICD-10-CM

## 2024-12-30 LAB
ATRIAL RATE: 312 BPM
QRS AXIS: -28 DEGREES
QRSD INTERVAL: 144 MS
QT INTERVAL: 462 MS
QTC INTERVAL: 476 MS
T WAVE AXIS: 85 DEGREES
VENTRICULAR RATE: 64 BPM

## 2024-12-30 PROCEDURE — 93010 ELECTROCARDIOGRAM REPORT: CPT | Performed by: INTERNAL MEDICINE

## 2024-12-30 RX ORDER — ASPIRIN 81 MG/1
81 TABLET, CHEWABLE ORAL DAILY
Qty: 30 TABLET | Refills: 17 | Status: SHIPPED | OUTPATIENT
Start: 2024-12-30 | End: 2026-06-23

## 2024-12-30 RX ORDER — CLOPIDOGREL BISULFATE 75 MG/1
75 TABLET ORAL DAILY
Qty: 30 TABLET | Refills: 17 | Status: SHIPPED | OUTPATIENT
Start: 2024-12-30 | End: 2026-06-23

## 2024-12-31 ENCOUNTER — TELEPHONE (OUTPATIENT)
Age: 79
End: 2024-12-31

## 2024-12-31 NOTE — UTILIZATION REVIEW
NOTIFICATION OF ADMISSION DISCHARGE   This is a Notification of Discharge from [facility]. Please be advised that this patient has been discharge from our facility. Below you will find the admission and discharge date and time including the patient’s disposition.   UTILIZATION REVIEW CONTACT:  Janine Hines  Utilization   Network Utilization Review Department  Phone: [phone] x carefully listen to the prompts. All voicemails are confidential.  Email: [email or web address]     ADMISSION INFORMATION  PRESENTATION DATE: 12/25/2024  7:34 PM  OBERVATION ADMISSION DATE: N/A  INPATIENT ADMISSION DATE: 12/26/24 12:21 AM   DISCHARGE DATE: 12/28/2024  2:35 PM   DISPOSITION:Discharged/Transferred to Long Term Care/Personal Care Home/Assisted Living    Network Utilization Review Department  ATTENTION: Please call with any questions or concerns to [phone] and carefully listen to the prompts so that you are directed to the right person. All voicemails are confidential.   For Discharge needs, contact Care Management DC Support Team at [phone] opt. 2  Send all requests for admission clinical reviews, approved or denied determinations and any other requests to dedicated fax number below belonging to the campus where the patient is receiving treatment. List of dedicated fax numbers for the Facilities:  FACILITY NAME UR FAX NUMBER   ADMISSION DENIALS (Administrative/Medical Necessity) [phone]   DISCHARGE SUPPORT TEAM ([facility]) [phone]   PARENT CHILD HEALTH (Maternity/NICU/Pediatrics) [phone]   [facility] [phone]   [facility] [phone]   [facility] [phone]   [facility] [phone]   [facility] [phone]   [facility] [phone]   [facility] [phone]    [facility] [phone]   [facility] [phone]   [facility] [phone]   [facility] [phone]   [facility] [phone]

## 2024-12-31 NOTE — TELEPHONE ENCOUNTER
[place] called to confirm fax number. Please look out for fax today. First form was sent on 12/20

## 2025-01-06 NOTE — PROGRESS NOTES
Name: Quynh Roa      : 1945      MRN: [id number]  Encounter Provider: PETE Ambriz  Encounter Date: 2024   Encounter department[facility]  :  Assessment & Plan  Varicose veins of unspecified lower extremity with pain  79-year-old female with HTN, HLD, DM, PAF/Aflutter on Eliquis, CAD, CABGx3 '15, s/p PPM, chronic heart failure, 5.9 cm ascending aortic aneurysm with dissection '22, anemia, BLE varicose veins     Patient presents for evaluation of BLE tiredness and LLE swelling    -LEVD 2024 showed no acute or chronic DVT/SVT, tibial peroneal occlusive disease, left popliteal nonvascular cyst x 2  measuring 5.6 cm at the popliteal fossa and 7.4 cm at the proximal to mid calf  -Will evaluate with MICHOACANO for baseline arterial perfusion and to guide compression use  -Will further evaluate lumps and left lower extremity swelling with venous duplex  -Follow-up after testing to review  Orders:    Ambulatory Referral to Vascular Surgery    Pain and swelling of left lower leg    Orders:    VAS VENOUS DUPLEX -LOWER LIMB UNILATERAL; Future    VAS MICHOACANO and waveform analysis, multiple levels; Future      Chief Complaint   Patient presents with    Venous Disease     Pt is new to the practice, she had a LEV on  24, She complains of tired legs, she elevates at night uses no compression stockings.       History of Present Illness     Quynh Roa is a 79 y.o. female with HTN, HLD, DM, PAF/Aflutter on Eliquis, CAD, CABGx3 '15, s/p PPM, chronic heart failure, 5.9 cm ascending aortic aneurysm with dissection ', anemia, BLE varicose veins. Patient presents for evaluation of BLE tiredness and LLE swelling.  Patient noted to have left at the left lower extremity was evaluated with venous duplex 2024 showed no acute or chronic DVT/SVT, tibial peroneal occlusive disease, left popliteal nonvascular cyst x 2  measuring 5.6 cm at the popliteal fossa and 7.4 cm at the  "proximal to mid calf.  She complains of bilateral lower extremity tiredness.  She elevates her legs at the end of the day.  Does not currently use compression stockings.  Denies history of deep or superficial venous thrombosis.  Notable lump at left proximal to mid calf.   History obtained from: patient    Review of Systems  Medical History Reviewed by provider this encounter:  Tobacco  Allergies  Meds  Problems  Med Hx  Surg Hx  Fam Hx     .     Objective   I have reviewed and made appropriate changes to the review of systems input by the medical assistant.    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[facility])    Tobacco abuse    Elevated troponin level not due myocardial infarction    Gross hematuria    Generalized weakness    Unintentional weight loss    Nephrolithiasis    Hematuria    Foreign body in vagina    Cardiac pacemaker in situ    Preoperative cardiovascular examination    Iron deficiency anemia due to chronic blood loss    Vitamin B 12 deficiency    Permanent atrial fibrillation (HCC)    Acquired absence of other right toe(s) (HCC)    Continuous opioid dependence ([facility])    Hypertensive urgency    Heart failure with reduced ejection fraction ([facility])    Wound of right leg    Constipation    Thoracic aortic dissection ([facility])    LAURENCE (acute kidney injury) ([facility])    Ambulatory dysfunction    DNR (do not resuscitate)    Type 2 diabetes mellitus, without long-term current use of insulin ([facility])    S/P CABG (coronary artery bypass graft)    Acute blood loss anemia "    Primary osteoarthritis of right knee    Acute pain of right knee    Acute on chronic diastolic (congestive) heart failure (HCC)    Type 2 diabetes mellitus with hyperglycemia, without long-term current use of insulin (HCC)    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, without long-term current use of insulin (HCC)    Chronic kidney disease, stage 3b (HCC)    Chronic diastolic heart failure (HCC)    Leukocytosis    Melena    Gastric intestinal metaplasia    Primary osteoarthritis of left knee       Past Surgical History:   Procedure Laterality Date    A-V CARDIAC PACEMAKER INSERTION      ANKLE SURGERY      BACK SURGERY  01/2011    L4 and L5 laminectomy and fusion     BLADDER SURGERY      CORONARY ARTERY BYPASS GRAFT  09/02/2015    CABGx3 with LIMA to LAD, SVG to PDA, SVG to OM-1    EXAMINATION UNDER ANESTHESIA N/A 7/11/2020    Procedure: EXAM UNDER ANESTHESIA (EUA)  EXCISION OF POSTERIOR VAGINAL FOREIGN BODY;  Surgeon: Caryn Ramos MD;  [facility]: [facility];  Service: Gynecology    FL RETROGRADE PYELOGRAM  7/11/2020    FL RETROGRADE PYELOGRAM  8/7/2020    HYSTERECTOMY      UT CYSTO/URETERO W/LITHOTRIPSY &INDWELL STENT INSRT Right 8/7/2020    Procedure: CYSTOSCOPY URETEROSCOPY WITH LITHOTRIPSY HOLMIUM LASER, RETROGRADE PYELOGRAM AND INSERTION STENT URETERAL;  Surgeon: Yordan Mccormick MD;  Location[facility];  Service: Urology    UT CYSTOURETHROSCOPY W/URETERAL CATHETERIZATION Right 7/11/2020    Procedure: CYSTOSCOPY RETROGRADE PYELOGRAM WITH INSERTION STENT URETERAL, bladder biopsy with fulguration;  Surgeon: Yordan Mccormick MD;  Location[facility];  Service: Urology    WRIST SURGERY         Family History   Problem Relation Age of Onset    Hypertension Mother     Hypertension Sister     Alcohol abuse Brother     Cirrhosis Brother     Diabetes Father     Other Brother         Heart transplant in his 50s    Lung cancer Sister     Diabetes Brother     Stroke Sister      No Known Problems Daughter     No Known Problems Maternal Grandmother     No Known Problems Maternal Grandfather     No Known Problems Paternal Grandmother     No Known Problems Paternal Grandfather     No Known Problems Sister        Social History     Socioeconomic History    Marital status: Single     Spouse name: Not on file    Number of children: 1    Years of education: Not on file    Highest education level: Not on file   Occupational History    Not on file   Tobacco Use    Smoking status: Former     Current packs/day: 0.00     Average packs/day: 0.3 packs/day for 54.0 years (13.5 ttl pk-yrs)     Types: Cigarettes     Start date:      Quit date:      Years since quittin.0    Smokeless tobacco: Never    Tobacco comments:     Started smoking at age 22; currently smoking <1ppd.   Vaping Use    Vaping status: Never Used   Substance and Sexual Activity    Alcohol use: Not Currently    Drug use: Never    Sexual activity: Not Currently     Partners: Male     Birth control/protection: Post-menopausal   Other Topics Concern    Not on file   Social History Narrative    Not on file     Social Drivers of Health     Financial Resource Strain: Medium Risk (2022)    Overall Financial Resource Strain (CARDIA)     Difficulty of Paying Living Expenses: Somewhat hard   Food Insecurity: No Food Insecurity (2024)    Nursing - Inadequate Food Risk Classification     Worried About Running Out of Food in the Last Year: Not on file     Ran Out of Food in the Last Year: Not on file     Ran Out of Food in the Last Year: Never true   Transportation Needs: No Transportation Needs (2024)    Nursing - Transportation Risk Classification     Lack of Transportation: Not on file     Lack of Transportation: No   Physical Activity: Not on file   Stress: Not on file   Social Connections: Not on file   Intimate Partner Violence: Unknown (2024)    Nursing IPS     Feels Physically and Emotionally Safe: Not on  file     Physically Hurt by Someone: Not on file     Humiliated or Emotionally Abused by Someone: Not on file     Physically Hurt by Someone: No     Hurt or Threatened by Someone: No   Housing Stability: Unknown (2024)    Nursing: Inadequate Housing Risk Classification     Has Housing: Not on file     Worried About Losing Housing: Not on file     Unable to Get Utilities: Not on file     Unable to Pay for Housing in the Last Year: No     Has Housin       Allergies   Allergen Reactions    Nickel Rash         Current Outpatient Medications:     acetaminophen (TYLENOL) 325 mg tablet, Take 2 tablets (650 mg total) by mouth every 4 (four) hours as needed for mild pain, Disp: 30 tablet, Rfl: 0    Alcohol Swabs (Alcohol Prep) PADS, , Disp: , Rfl:     atorvastatin (LIPITOR) 40 mg tablet, Take 1 tablet (40 mg total) by mouth daily, Disp: 90 tablet, Rfl: 3    Blood Glucose Monitoring Suppl (ONE TOUCH ULTRA 2) w/Device KIT, , Disp: , Rfl:     brimonidine-timolol (COMBIGAN) 0.2-0.5 %, , Disp: , Rfl:     carvedilol (COREG) 12.5 mg tablet, Take 12.5 mg by mouth 2 (two) times a day with meals, Disp: , Rfl:     [Paused] furosemide (LASIX) 40 mg tablet, Take 1 tablet (40 mg total) by mouth daily, Disp: 90 tablet, Rfl: 3    glimepiride (AMARYL) 1 mg tablet, , Disp: , Rfl:     hydrALAZINE (APRESOLINE) 25 mg tablet, Take 1 tablet (25 mg total) by mouth every 8 (eight) hours, Disp: , Rfl: 0    isosorbide dinitrate (ISORDIL) 20 mg tablet, Take 1 tablet (20 mg total) by mouth 3 (three) times daily after meals, Disp: , Rfl: 0    levothyroxine 50 mcg tablet, Take 50 mcg by mouth daily Daily on sun  sat, Disp: , Rfl:     losartan (COZAAR) 25 mg tablet, Take 1 tablet (25 mg total) by mouth daily, Disp: 90 tablet, Rfl: 3    melatonin 3 mg, Take 2 tablets (6 mg total) by mouth daily at bedtime, Disp: , Rfl: 0    Multiple Vitamins-Minerals (PreserVision AREDS) TABS, , Disp: , Rfl:     OneTouch Ultra test strip, , Disp: , Rfl:  "    pantoprazole (PROTONIX) 40 mg tablet, , Disp: , Rfl:     spironolactone (ALDACTONE) 25 mg tablet, , Disp: , Rfl:     aspirin 81 mg chewable tablet, Chew 1 tablet (81 mg total) daily, Disp: 30 tablet, Rfl: 17    clopidogrel (PLAVIX) 75 mg tablet, Take 1 tablet (75 mg total) by mouth daily, Disp: 30 tablet, Rfl: 17    Diclofenac Sodium (VOLTAREN) 1 %, Apply 2 g topically 4 (four) times a day, Disp: 240 g, Rfl: 0    ferrous sulfate 325 (65 Fe) mg tablet, Take 1 tablet (325 mg total) by mouth every other day with breakfast, Disp: 30 tablet, Rfl: 0    levothyroxine 75 mcg tablet, Take 75 mcg by mouth daily Daily on mon wed fri, Disp: , Rfl:     psyllium (METAMUCIL) 58.6 % packet, Take 1 packet by mouth daily, Disp: , Rfl:    BP (!) 104/36 (BP Location: Right arm, Patient Position: Sitting, Cuff Size: Large)   Pulse 65   Resp 18   Ht 4' 11\" (1.499 m)   Wt 67.9 kg (149 lb 9.6 oz)   SpO2 97%   BMI 30.22 kg/m²      Physical Exam  Vitals and nursing note reviewed.   Constitutional:       Appearance: She is well-developed.   HENT:      Head: Normocephalic and atraumatic.   Cardiovascular:      Pulses:           Dorsalis pedis pulses are 0 on the right side and 0 on the left side.        Posterior tibial pulses are 0 on the right side and 0 on the left side.      Heart sounds: Normal heart sounds.   Pulmonary:      Effort: Pulmonary effort is normal.      Breath sounds: Normal breath sounds.   Abdominal:      General: Bowel sounds are normal.      Palpations: Abdomen is soft.   Musculoskeletal:         General: Swelling present. Normal range of motion.      Cervical back: Neck supple.      Comments: LLE swelling, palpable lump left medial proximal calf    Skin:     General: Skin is warm.   Neurological:      General: No focal deficit present.      Mental Status: She is alert and oriented to person, place, and time.   Psychiatric:         Behavior: Behavior normal.         Thought Content: Thought content normal. "

## 2025-01-13 ENCOUNTER — TELEPHONE (OUTPATIENT)
Dept: VASCULAR SURGERY | Facility: CLINIC | Age: 80
End: 2025-01-13

## 2025-01-13 NOTE — TELEPHONE ENCOUNTER
Called pt 2x to reschedule ultrasounds that were canceled in November through [facility], called [facility] Greer and they stated she handles her own scheduling and took our number and will inform her that we called.

## 2025-01-14 NOTE — TELEPHONE ENCOUNTER
Caller: Quynh Roa    Doctor: Nilsa Avila    Reason for call: Patient does not want to be called to reschedule ultrasounds anymore and is requesting to be taken off of the list to call. She will call us when she is ready to schedule    Call back#: [phone]

## 2025-03-07 NOTE — ASSESSMENT & PLAN NOTE
Pt utilized the bike in the exercise room at 1510.   Rate/Rhythm Control: Toprol XL, Amiodarone  Antiplatelet/Anticoagulation: Eliquis  Per device interrogation, has been 100% AFib since 11/2021  EKG: ventricular paced rhythm with underlying flutter    · Discontinued Amiodarone per cardiology rec  · Continue Toprol XL, Eliquis

## 2025-03-11 ENCOUNTER — RESULTS FOLLOW-UP (OUTPATIENT)
Dept: NON INVASIVE DIAGNOSTICS | Facility: HOSPITAL | Age: 80
End: 2025-03-11

## 2025-03-11 ENCOUNTER — REMOTE DEVICE CLINIC VISIT (OUTPATIENT)
Dept: CARDIOLOGY CLINIC | Facility: CLINIC | Age: 80
End: 2025-03-11
Payer: MEDICARE

## 2025-03-11 DIAGNOSIS — Z95.0 CARDIAC PACEMAKER IN SITU: Primary | ICD-10-CM

## 2025-03-11 PROCEDURE — 93296 REM INTERROG EVL PM/IDS: CPT | Performed by: STUDENT IN AN ORGANIZED HEALTH CARE EDUCATION/TRAINING PROGRAM

## 2025-03-11 PROCEDURE — 93294 REM INTERROG EVL PM/LDLS PM: CPT | Performed by: STUDENT IN AN ORGANIZED HEALTH CARE EDUCATION/TRAINING PROGRAM

## 2025-03-11 NOTE — PROGRESS NOTES
Results for orders placed or performed in visit on 03/11/25   Cardiac EP device report    Narrative    MDT DC PM - ACTIVE SYSTEM IS MRI CONDITIONAL  [employer] TRANSMISSION (REMOTES ONLY): BATTERY VOLTAGE ADEQUATE (8 YRS). AP<0.1%, >99% (>40% AF/MVP@60PPM). ALL AVAILABLE LEAD PARAMETERS WITHIN NORMAL LIMITS. NO NEW SIGNIFICANT HIGH RATE EPISODES. PT IN % OF TIME. HX: PAF & ON ASA 81MG, CLOPIDOGREL & CARVEDILOL; NO ANTICOAGULATION DUE TO AORTIC DISSECTION & ANEMIA. NORMAL DEVICE FUNCTION. GV

## 2025-06-10 ENCOUNTER — REMOTE DEVICE CLINIC VISIT (OUTPATIENT)
Dept: CARDIOLOGY CLINIC | Facility: CLINIC | Age: 80
End: 2025-06-10
Payer: MEDICARE

## 2025-06-10 ENCOUNTER — RESULTS FOLLOW-UP (OUTPATIENT)
Dept: CARDIOLOGY CLINIC | Facility: CLINIC | Age: 80
End: 2025-06-10

## 2025-06-10 DIAGNOSIS — Z95.0 CARDIAC PACEMAKER IN SITU: Primary | ICD-10-CM

## 2025-06-10 PROCEDURE — 93296 REM INTERROG EVL PM/IDS: CPT | Performed by: INTERNAL MEDICINE

## 2025-06-10 PROCEDURE — 93294 REM INTERROG EVL PM/LDLS PM: CPT | Performed by: INTERNAL MEDICINE

## 2025-06-10 NOTE — PROGRESS NOTES
Results for orders placed or performed in visit on 06/10/25   Cardiac EP device report    Narrative    MDT DC PM - ACTIVE SYSTEM IS MRI CONDITIONAL  CARELINK TRANSMISSION (REMOTES ONLY): BATTERY VOLTAGE ADEQUATE (7.5 YRS). AP<0.1%, >99% (>40% AF/MVPR@60PPM). ALL AVAILABLE LEAD PARAMETERS WITHIN NORMAL LIMITS. NO NEW SIGNIFICANT HIGH RATE EPISODES. PT IN % OF TIME. HX: PAF & ON ASA 81MG, CLOPIDOGREL & CARVEDILOL; NO ANTICOAGULATION DUE TO AORTIC DISSECTION & ANEMIA. NORMAL DEVICE FUNCTION. GV

## (undated) DEVICE — INVIEW CLEAR LEGGINGS: Brand: CONVERTORS

## (undated) DEVICE — STENT URETERAL 6FR 22CM INLAY OPTIMA W/NITINOL GDWR: Type: IMPLANTABLE DEVICE | Site: URETER | Status: NON-FUNCTIONAL

## (undated) DEVICE — UROCATCH BAG

## (undated) DEVICE — PACK TUR

## (undated) DEVICE — 3M™ TEGADERM™ TRANSPARENT FILM DRESSING FRAME STYLE, 1626W, 4 IN X 4-3/4 IN (10 CM X 12 CM), 50/CT 4CT/CASE: Brand: 3M™ TEGADERM™

## (undated) DEVICE — GUIDEWIRE STRGHT TIP 0.035 IN  SOLO PLUS

## (undated) DEVICE — GLOVE SRG BIOGEL 7

## (undated) DEVICE — CATH URETERAL 5FR X 70 CM FLEX TIP POLYUR BARD

## (undated) DEVICE — BASKET SPECIMEN RETRIVAL 1.9FR 120CM

## (undated) DEVICE — STERILE SURGICAL LUBRICANT,  TUBE: Brand: SURGILUBE

## (undated) DEVICE — CHLORHEXIDINE 4PCT 4 OZ

## (undated) DEVICE — PREMIUM DRY TRAY LF: Brand: MEDLINE INDUSTRIES, INC.

## (undated) DEVICE — FIBER HOLMIUM 200

## (undated) DEVICE — SHEATH URETERAL ACCESS 12/14FR 35CM PROXIS

## (undated) DEVICE — CATH URET .038 10FR 50CM DUAL LUMEN